# Patient Record
Sex: FEMALE | Race: WHITE | Employment: FULL TIME | URBAN - METROPOLITAN AREA
[De-identification: names, ages, dates, MRNs, and addresses within clinical notes are randomized per-mention and may not be internally consistent; named-entity substitution may affect disease eponyms.]

---

## 2017-02-14 ENCOUNTER — OFFICE VISIT (OUTPATIENT)
Dept: INTERNAL MEDICINE CLINIC | Age: 47
End: 2017-02-14

## 2017-02-14 VITALS
HEIGHT: 63 IN | HEART RATE: 68 BPM | BODY MASS INDEX: 26.75 KG/M2 | OXYGEN SATURATION: 98 % | SYSTOLIC BLOOD PRESSURE: 100 MMHG | WEIGHT: 151 LBS | TEMPERATURE: 98.2 F | DIASTOLIC BLOOD PRESSURE: 70 MMHG

## 2017-02-14 DIAGNOSIS — J02.9 SORE THROAT: ICD-10-CM

## 2017-02-14 DIAGNOSIS — R05.9 COUGH: Primary | ICD-10-CM

## 2017-02-14 LAB
S PYO AG THROAT QL: NEGATIVE
VALID INTERNAL CONTROL?: YES

## 2017-02-14 RX ORDER — HYDROCODONE POLISTIREX AND CHLORPHENIRAMINE POLISTIREX 10; 8 MG/5ML; MG/5ML
1 SUSPENSION, EXTENDED RELEASE ORAL
Qty: 240 ML | Refills: 0 | Status: SHIPPED | OUTPATIENT
Start: 2017-02-14 | End: 2017-03-08 | Stop reason: ALTCHOICE

## 2017-02-14 NOTE — PROGRESS NOTES
Chief Complaint   Patient presents with    Sore Throat    Cough     Complained of the symptoms listed above. Stated that her symptoms started on Sunday afternoon. Tried OTC \"throat spray\" to no avail. 1. Have you been to the ER, urgent care clinic since your last visit? Hospitalized since your last visit? No    2. Have you seen or consulted any other health care providers outside of the [address] [place] [address] since your last visit? Include any pap smears or colon screening.  No

## 2017-02-14 NOTE — PROGRESS NOTES
Chief Complaint   Patient presents with    Sore Throat    Cough       HPI:  Patient is a 48year old  female presents today for an acute visit with sore throat. Sore throat started three days ago and associated is productive cough. However, she denies fever, chills, sneezing, rhinorrhea, nasal congestion, sinus congestion, chest congestion, nausea, vomiting, CP, SOB, or sick contacts. She used chloraseptic throat spray and Nyquil with mild improvement and thus came in today for evaluation. Past Medical History   Diagnosis Date    Allergic rhinitis     ANURADHA (generalised anxiety disorder)     GERD (gastroesophageal reflux disease)      Lower  GERD    IBS (irritable bowel syndrome)     Other ill-defined conditions(799.89)      C5/6 HNP    Thumb pain 6/7/2010     Allergies   Allergen Reactions    Milk Containing Products Diarrhea    Sulfa (Sulfonamide Antibiotics) Rash    Other Medication Diarrhea     Eggs, patient eats, and gets flu shot yearly with no reaction     Current Outpatient Prescriptions   Medication Sig Dispense Refill    chlorpheniramine-HYDROcodone (TUSSIONEX) 10-8 mg/5 mL suspension Take 5 mL by mouth every twelve (12) hours as needed for Cough. Max Daily Amount: 10 mL. 240 mL 0    benzocaine-menthol (CEPACOL SORE THROAT, CALLIE-MEN,) 15-2.6 mg lozg lozenge Take 1 Lozenge by mouth every two (2) hours as needed for Sore throat. 20 Lozenge 0    DYMISTA 137-50 mcg/spray spry       ESTRACE 0.01 % (0.1 mg/gram) vaginal cream       FLUARIX QUAD [id number], PF, syrg injection       HYDROcodone-acetaminophen (NORCO)  mg tablet Take 1 Tab by mouth daily. Max Daily Amount: 1 Tab. 30 Tab 0    bisacodyl (DULCOLAX) 5 mg EC tablet Take 5 mg by mouth daily as needed for Constipation.  desoximetasone (TOPICORT) 0.05 % topical gel Apply  to affected area two (2) times a day.  polyethylene glycol (MIRALAX) 17 gram/dose powder One to two scoops daily as needed for constipation. 289 g 3    HYDROcodone-acetaminophen (NORCO)  mg tablet Take 1 Tab by mouth every four (4) hours as needed for Pain. Max Daily Amount: 6 Tabs. 60 Tab 0    loratadine (CLARITIN) 10 mg tablet Take 10 mg by mouth daily.  ketotifen (ZADITOR) 0.025 % (0.035 %) ophthalmic solution Administer 1 Drop to both eyes two (2) times a day.  esomeprazole (NEXIUM) 20 mg capsule Take 20 mg by mouth nightly.  diphenhydrAMINE (SIMPLY SLEEP) 25 mg tablet Take 25 mg by mouth nightly. ROS:  Pertinent as in HPI      Physical Exam:  Visit Vitals    /70 (BP 1 Location: Left arm, BP Patient Position: Sitting)    Pulse 68    Temp 98.2 °F (36.8 °C) (Oral)    Ht 5' 3\" (1.6 m)    Wt 151 lb (68.5 kg)    LMP 01/14/2017    SpO2 98%    BMI 26.75 kg/m2     General: a & o x 3, afebrile, well-nourished, interacting appropriately, in no acute distress  HEENT: Pharyngeal erythema noted  Respiratory: symmetrical chest expansion, lung sounds clear bilaterally  Cardiovascular: normal S1S2, regular rate and rhythm      Assessment/Plan:    ICD-10-CM ICD-9-CM    1. Cough R05 786.2 chlorpheniramine-HYDROcodone (TUSSIONEX) 10-8 mg/5 mL suspension   2. Sore throat J02.9 462 Rapid strep done today was negative and may be viral URI and Cepacol started today. benzocaine-menthol (CEPACOL SORE THROAT, CALLIE-MEN,) 15-2.6 mg lozg lozenge           Orders Placed This Encounter    chlorpheniramine-HYDROcodone (TUSSIONEX) 10-8 mg/5 mL suspension     Sig: Take 5 mL by mouth every twelve (12) hours as needed for Cough. Max Daily Amount: 10 mL. Dispense:  240 mL     Refill:  0    benzocaine-menthol (CEPACOL SORE THROAT, CALLIE-MEN,) 15-2.6 mg lozg lozenge     Sig: Take 1 Lozenge by mouth every two (2) hours as needed for Sore throat. Dispense:  20 Lozenge     Refill:  0       Additional Notes: Discussed today's diagnosis, treatment plans. Discussed medication indications and side effects.     After Visit Summary: Discussed provided printed patient instructions. Answered questions accordingly.   Follow-up Disposition: As previously scheduled with PCP      Leopold Lager, DO, MPH  Internal Medicine

## 2017-02-14 NOTE — PATIENT INSTRUCTIONS
Cough: Care Instructions  Your Care Instructions  A cough is your body's response to something that bothers your throat or airways. Many things can cause a cough. You might cough because of a cold or the flu, bronchitis, or asthma. Smoking, postnasal drip, allergies, and stomach acid that backs up into your throat also can cause coughs. A cough is a symptom, not a disease. Most coughs stop when the cause, such as a cold, goes away. You can take a few steps at home to cough less and feel better. Follow-up care is a key part of your treatment and safety. Be sure to make and go to all appointments, and call your doctor if you are having problems. It's also a good idea to know your test results and keep a list of the medicines you take. How can you care for yourself at home? · Drink lots of water and other fluids. This helps thin the mucus and soothes a dry or sore throat. Honey or lemon juice in hot water or tea may ease a dry cough. · Take cough medicine as directed by your doctor. · Prop up your head on pillows to help you breathe and ease a dry cough. · Try cough drops to soothe a dry or sore throat. Cough drops don't stop a cough. Medicine-flavored cough drops are no better than candy-flavored drops or hard candy. · Do not smoke. Avoid secondhand smoke. If you need help quitting, talk to your doctor about stop-smoking programs and medicines. These can increase your chances of quitting for good. When should you call for help? Call 911 anytime you think you may need emergency care. For example, call if:  · You have severe trouble breathing. Call your doctor now or seek immediate medical care if:  · You cough up blood. · You have new or worse trouble breathing. · You have a new or higher fever. · You have a new rash.   Watch closely for changes in your health, and be sure to contact your doctor if:  · You cough more deeply or more often, especially if you notice more mucus or a change in the color of your mucus. · You have new symptoms, such as a sore throat, an earache, or sinus pain. · You do not get better as expected. Where can you learn more? Go to http://sary-mk.info/. Enter D279 in the search box to learn more about \"Cough: Care Instructions. \"  Current as of: May 27, 2016  Content Version: 11.1  © 7795-9034 [employer]. Care instructions adapted under license by [employer] (which disclaims liability or warranty for this information). If you have questions about a medical condition or this instruction, always ask your healthcare professional. [address] any warranty or liability for your use of this information.

## 2017-02-14 NOTE — MR AVS SNAPSHOT
Visit Information Date & Time Provider Department Dept. Phone Encounter #  
 2/14/2017  2:30 PM Leopold Lager, DO [facility] [phone] Upcoming Health Maintenance Date Due DTaP/Tdap/Td series (1 - Tdap) 8/5/1989 PAP AKA CERVICAL CYTOLOGY 1/7/2016 BREAST CANCER SCRN MAMMOGRAM 2/18/2016 Allergies as of 2/14/2017  Review Complete On: 2/14/2017 By: Sánchez Velarde LPN Severity Noted Reaction Type Reactions Milk Containing Products  08/04/2016    Diarrhea Sulfa (Sulfonamide Antibiotics)  06/07/2010    Rash Other Medication Low 06/07/2010    Diarrhea Eggs, patient eats, and gets flu shot yearly with no reaction Current Immunizations  Reviewed on 7/28/2016 Name Date Influenza Vaccine 10/7/2016, 9/26/2015 Influenza Vaccine (Quad) PF 9/15/2014 Influenza Vaccine Split 10/10/2012, 11/1/2010 Not reviewed this visit You Were Diagnosed With   
  
 Codes Comments Cough    -  Primary ICD-10-CM: H95 ICD-9-CM: 786.2 Sore throat     ICD-10-CM: J02.9 ICD-9-CM: 368 Vitals BP Pulse Temp Height(growth percentile) Weight(growth percentile) LMP  
 100/70 (BP 1 Location: Left arm, BP Patient Position: Sitting) 68 98.2 °F (36.8 °C) (Oral) 5' 3\" (1.6 m) 151 lb (68.5 kg) 01/14/2017 SpO2 BMI OB Status Smoking Status 98% 26.75 kg/m2 Having regular periods Never Smoker Vitals History BMI and BSA Data Body Mass Index Body Surface Area  
 26.75 kg/m 2 1.74 m 2 Preferred Pharmacy Pharmacy Name Phone [facility], [address] [place] [address] [phone] Your Updated Medication List  
  
   
This list is accurate as of: 2/14/17  3:04 PM.  Always use your most recent med list.  
  
  
  
  
 benzocaine-menthol 15-2.6 mg Lozg lozenge Commonly known as:  CEPACOL SORE THROAT (CALLIE-MEN) Take 1 Lozenge by mouth every two (2) hours as needed for Sore throat. bisacodyl 5 mg EC tablet Commonly known as:  DULCOLAX Take 5 mg by mouth daily as needed for Constipation. chlorpheniramine-HYDROcodone 10-8 mg/5 mL suspension Commonly known as:  Gaile John Take 5 mL by mouth every twelve (12) hours as needed for Cough. Max Daily Amount: 10 mL. desoximetasone 0.05 % topical gel Commonly known as:  TOPICORT Apply  to affected area two (2) times a day. DYMISTA 137-50 mcg/spray [place] Generic drug:  azelastine-fluticasone  
  
 esomeprazole 20 mg capsule Commonly known as:  Mertha Sebastian Take 20 mg by mouth nightly. ESTRACE 0.01 % (0.1 mg/gram) vaginal cream  
Generic drug:  estradiol FLUARIX QUAD [id number] (PF) Syrg injection Generic drug:  influenza vaccine 2016-17 (36mos+)(PF)  
  
 * HYDROcodone-acetaminophen  mg tablet Commonly known as:  Barnet Cuff Take 1 Tab by mouth every four (4) hours as needed for Pain. Max Daily Amount: 6 Tabs. * HYDROcodone-acetaminophen  mg tablet Commonly known as:  Barnet Cuff Take 1 Tab by mouth daily. Max Daily Amount: 1 Tab.  
  
 ketotifen 0.025 % (0.035 %) ophthalmic solution Commonly known as:  ZADITOR Administer 1 Drop to both eyes two (2) times a day. loratadine 10 mg tablet Commonly known as:  Jena Martinet Take 10 mg by mouth daily. polyethylene glycol 17 gram/dose powder Commonly known as:  Rachelle Ervin One to two scoops daily as needed for constipation. SIMPLY SLEEP 25 mg tablet Generic drug:  diphenhydrAMINE Take 25 mg by mouth nightly. * Notice: This list has 2 medication(s) that are the same as other medications prescribed for you. Read the directions carefully, and ask your doctor or other care provider to review them with you. Prescriptions Printed  Refills  
 chlorpheniramine-HYDROcodone (TUSSIONEX) 10-8 mg/5 mL suspension 0  
 Sig: Take 5 mL by mouth every twelve (12) hours as needed for Cough. Max Daily Amount: 10 mL. Class: Print Route: Oral  
  
Prescriptions Sent to Pharmacy Refills  
 benzocaine-menthol (CEPACOL SORE THROAT, CALLIE-MEN,) 15-2.6 mg lozg lozenge 0 Sig: Take 1 Lozenge by mouth every two (2) hours as needed for Sore throat. Class: Normal  
 Pharmacy: [facility], [address] [address] #: [phone] Route: Oral  
  
Patient Instructions Cough: Care Instructions Your Care Instructions A cough is your body's response to something that bothers your throat or airways. Many things can cause a cough. You might cough because of a cold or the flu, bronchitis, or asthma. Smoking, postnasal drip, allergies, and stomach acid that backs up into your throat also can cause coughs. A cough is a symptom, not a disease. Most coughs stop when the cause, such as a cold, goes away. You can take a few steps at home to cough less and feel better. Follow-up care is a key part of your treatment and safety. Be sure to make and go to all appointments, and call your doctor if you are having problems. It's also a good idea to know your test results and keep a list of the medicines you take. How can you care for yourself at home? · Drink lots of water and other fluids. This helps thin the mucus and soothes a dry or sore throat. Honey or lemon juice in hot water or tea may ease a dry cough. · Take cough medicine as directed by your doctor. · Prop up your head on pillows to help you breathe and ease a dry cough. · Try cough drops to soothe a dry or sore throat. Cough drops don't stop a cough. Medicine-flavored cough drops are no better than candy-flavored drops or hard candy. · Do not smoke. Avoid secondhand smoke. If you need help quitting, talk to your doctor about stop-smoking programs and medicines. These can increase your chances of quitting for good. When should you call for help? Call 911 anytime you think you may need emergency care. For example, call if: 
· You have severe trouble breathing. Call your doctor now or seek immediate medical care if: 
· You cough up blood. · You have new or worse trouble breathing. · You have a new or higher fever. · You have a new rash. Watch closely for changes in your health, and be sure to contact your doctor if: 
· You cough more deeply or more often, especially if you notice more mucus or a change in the color of your mucus. · You have new symptoms, such as a sore throat, an earache, or sinus pain. · You do not get better as expected. Where can you learn more? Go to http://sary-mk.info/. Enter D279 in the search box to learn more about \"Cough: Care Instructions. \" Current as of: May 27, 2016 Content Version: 11.1 © 1190-0105 [employer]. Care instructions adapted under license by [employer] (which disclaims liability or warranty for this information). If you have questions about a medical condition or this instruction, always ask your healthcare professional. [place] [address] any warranty or liability for your use of this information. Introducing [facility]! Dear Dale Sarkar: Thank you for requesting a [employer] account. Our records indicate that you already have an active [employer] account. You can access your account anytime at [email or web address] Did you know that you can access your hospital and ER discharge instructions at any time in [employer]? You can also review all of your test results from your hospital stay or ER visit. Additional Information If you have questions, please visit the Frequently Asked Questions section of the [employer] website at [email or web address] Remember, [employer] is NOT to be used for urgent needs. For medical emergencies, dial 911. Now available from your iPhone and Android! Please provide this summary of care documentation to your next provider. Your primary care clinician is listed as Juan Villatoro. If you have any questions after today's visit, please call [phone].

## 2017-03-08 ENCOUNTER — OFFICE VISIT (OUTPATIENT)
Dept: FAMILY MEDICINE CLINIC | Age: 47
End: 2017-03-08

## 2017-03-08 VITALS
HEIGHT: 63 IN | RESPIRATION RATE: 16 BRPM | SYSTOLIC BLOOD PRESSURE: 114 MMHG | OXYGEN SATURATION: 99 % | DIASTOLIC BLOOD PRESSURE: 80 MMHG | TEMPERATURE: 97.7 F | HEART RATE: 58 BPM | WEIGHT: 157 LBS | BODY MASS INDEX: 27.82 KG/M2

## 2017-03-08 DIAGNOSIS — Z00.00 WELL WOMAN EXAM (NO GYNECOLOGICAL EXAM): Primary | ICD-10-CM

## 2017-03-08 DIAGNOSIS — Z13.6 SCREENING FOR ISCHEMIC HEART DISEASE: ICD-10-CM

## 2017-03-08 DIAGNOSIS — S46.011A ROTATOR CUFF STRAIN, RIGHT, INITIAL ENCOUNTER: ICD-10-CM

## 2017-03-08 RX ORDER — MELOXICAM 15 MG/1
TABLET ORAL
Qty: 30 TAB | Refills: 2 | Status: SHIPPED | OUTPATIENT
Start: 2017-03-08 | End: 2017-04-21 | Stop reason: ALTCHOICE

## 2017-03-08 NOTE — PROGRESS NOTES
Subjective:   48 y.o. female for Well Woman Check. Her gyne and breast care is done elsewhere by her Ob-Gyne physician last was [id number] normal.  MAMM scheduled. Lost 10# since January - weight watchers.     Patient Active Problem List   Diagnosis Code    Foot pain M79.673    Thumb pain M79.646    Mass of axilla R22.30    Seasonal allergic rhinitis J30.2    GERD (gastroesophageal reflux disease) K21.9    Cervical disc disorder with radiculopathy M50.10    Cervical disc herniation M50.20    S/P cervical discectomy Z98.890    Bulging lumbar disc M51.26    Spondylosis of lumbar region without myelopathy or radiculopathy M47.816    Lumbar neuritis M54.16    DDD (degenerative disc disease), lumbar M51.36    Displacement of lumbar intervertebral disc without myelopathy M51.26    HNP (herniated nucleus pulposus), lumbar M51.26    Status post lumbar laminectomy Z98.890    Cough R05    Sore throat J02.9     Patient Active Problem List    Diagnosis Date Noted    Cough 02/14/2017    Sore throat 02/14/2017    Status post lumbar laminectomy 08/19/2016    HNP (herniated nucleus pulposus), lumbar 07/01/2016    Displacement of lumbar intervertebral disc without myelopathy 06/03/2016    Bulging lumbar disc 04/07/2016    Spondylosis of lumbar region without myelopathy or radiculopathy 04/07/2016    Lumbar neuritis 04/07/2016    DDD (degenerative disc disease), lumbar 04/07/2016    Cervical disc herniation 11/14/2012    S/P cervical discectomy 11/14/2012    Cervical disc disorder with radiculopathy 09/21/2012    Seasonal allergic rhinitis 07/17/2012    GERD (gastroesophageal reflux disease) 07/17/2012    Mass of axilla 08/27/2010    Foot pain 06/07/2010    Thumb pain 06/07/2010     Current Outpatient Prescriptions   Medication Sig Dispense Refill    DYMISTA 137-50 mcg/spray spry       ESTRACE 0.01 % (0.1 mg/gram) vaginal cream       ketotifen (ZADITOR) 0.025 % (0.035 %) ophthalmic solution Administer 1 Drop to both eyes two (2) times a day.  esomeprazole (NEXIUM) 20 mg capsule Take 20 mg by mouth nightly.  diphenhydrAMINE (SIMPLY SLEEP) 25 mg tablet Take 25 mg by mouth nightly.  chlorpheniramine-HYDROcodone (TUSSIONEX) 10-8 mg/5 mL suspension Take 5 mL by mouth every twelve (12) hours as needed for Cough. Max Daily Amount: 10 mL. 240 mL 0    benzocaine-menthol (CEPACOL SORE THROAT, CALLIE-MEN,) 15-2.6 mg lozg lozenge Take 1 Lozenge by mouth every two (2) hours as needed for Sore throat. [address] QUAD [id number], PF, syrg injection       HYDROcodone-acetaminophen (NORCO)  mg tablet Take 1 Tab by mouth daily. Max Daily Amount: 1 Tab. 30 Tab 0    bisacodyl (DULCOLAX) 5 mg EC tablet Take 5 mg by mouth daily as needed for Constipation.  desoximetasone (TOPICORT) 0.05 % topical gel Apply  to affected area two (2) times a day.  polyethylene glycol (MIRALAX) 17 gram/dose powder One to two scoops daily as needed for constipation. 289 g 3    HYDROcodone-acetaminophen (NORCO)  mg tablet Take 1 Tab by mouth every four (4) hours as needed for Pain. Max Daily Amount: 6 Tabs. 60 Tab 0    loratadine (CLARITIN) 10 mg tablet Take 10 mg by mouth daily.        Allergies   Allergen Reactions    Milk Containing Products Diarrhea    Sulfa (Sulfonamide Antibiotics) Rash    Other Medication Diarrhea     Eggs, patient eats, and gets flu shot yearly with no reaction     Past Medical History:   Diagnosis Date    Allergic rhinitis     ANURADHA (generalised anxiety disorder)     GERD (gastroesophageal reflux disease)     Lower  GERD    IBS (irritable bowel syndrome)     Other ill-defined conditions(799.05)     C5/6 HNP    Thumb pain 6/7/2010     Past Surgical History:   Procedure Laterality Date    HX GYN      left tube removal ectopic pregnancy    HX HEENT  9/2011    Basal cell cancer lesion removed from nose    HX LYMPHADENECTOMY Right 8/25/16    [address]    HX OTHER SURGICAL  2013    Spinal Fusion    HX TONSILLECTOMY  2/2007     Family History   Problem Relation Age of Onset    Cancer Mother      breast cancer    Diabetes Father     High Cholesterol Father     Cancer Maternal Aunt      breast cancer    Arthritis-osteo Maternal Grandmother      arthritis      Social History   Substance Use Topics    Smoking status: Never Smoker    Smokeless tobacco: Never Used    Alcohol use Yes      Comment: few times per week        Lab Results   Component Value Date/Time    Sodium 142 07/26/2016 09:53 AM    Potassium 4.5 07/26/2016 09:53 AM    Chloride 105 07/26/2016 09:53 AM    CO2 28 07/26/2016 09:53 AM    Anion gap 9 07/26/2016 09:53 AM    Glucose 81 07/26/2016 09:53 AM    BUN 23 07/26/2016 09:53 AM    Creatinine 0.74 07/26/2016 09:53 AM    BUN/Creatinine ratio 31 07/26/2016 09:53 AM    GFR est AA >60 07/26/2016 09:53 AM    GFR est non-AA >60 07/26/2016 09:53 AM    Calcium 9.0 07/26/2016 09:53 AM    Bilirubin, total 0.4 07/26/2016 09:53 AM    AST (SGOT) 8 07/26/2016 09:53 AM    Alk. phosphatase 61 07/26/2016 09:53 AM    Protein, total 7.4 07/26/2016 09:53 AM    Albumin 4.2 07/26/2016 09:53 AM    Globulin 3.2 07/26/2016 09:53 AM    A-G Ratio 1.3 07/26/2016 09:53 AM    ALT (SGPT) 22 07/26/2016 09:53 AM     Lab Results   Component Value Date/Time    Cholesterol, total 169 01/19/2015 09:13 AM    HDL Cholesterol 57 01/19/2015 09:13 AM    LDL, calculated 99 01/19/2015 09:13 AM    VLDL, calculated 13 01/19/2015 09:13 AM    Triglyceride 63 01/19/2015 09:13 AM          Specific concerns today: see other note - right shoulder after volleyball. Review of Systems  A comprehensive review of systems was negative except for that written in the HPI.     Objective:     Visit Vitals    /80 (BP 1 Location: Left arm, BP Patient Position: Sitting)    Pulse (!) 58    Temp 97.7 °F (36.5 °C) (Oral)    Resp 16    Ht 5' 3\" (1.6 m)    Wt 157 lb (71.2 kg)    [facility]  SpO2 99%    BMI 27.81 kg/m2     GEN: Appears stated age in NAD. HEENT: Conjunctiva/lids WNL. External canals/nares WNL. Tongue midline. PERRL, EOMI. Hearing intact. NECK: Trachea midline. Supple, Full ROM. No thyroid masses. CARDIAC: RRR. S1S2. No Murmur. No peripheral edema. LUNGS: CTAB w/ normal effort. ABD: Soft, NT. No HSM. MS: No clubbing/cyanosis. Steady gait. Strength +5/5 all extremities with full ROM and good tone. NEURO: Sensation intact light touch. Good coordination. No focal deficits. SKIN: Warm/dry w/o rash. PSYCH: A+O x3. Appropriate judgment and insight. Assessment/Plan:     continue present plan, routine labs ordered    ICD-10-CM ICD-9-CM    1. Well woman exam (no gynecological exam) Z00.00 V70.0     [V70.0]   2. Rotator cuff strain, right, initial encounter S46.011A 840.4 meloxicam (MOBIC) 15 mg tablet   3. Screening for ischemic heart disease Z13.6 V81.0 LIPID PANEL      METABOLIC PANEL, COMPREHENSIVE      Will get labs and notify result mychart. See other note E/M.

## 2017-03-08 NOTE — MR AVS SNAPSHOT
Visit Information Date & Time Provider Department Dept. Phone Encounter #  
 3/8/2017 11:20 AM Brad Powers, [address]  [id number] Follow-up Instructions Return in about 6 weeks (around 4/19/2017), or if symptoms worsen or fail to improve, for shoulder. Upcoming Health Maintenance Date Due DTaP/Tdap/Td series (1 - Tdap) 8/5/1989 PAP AKA CERVICAL CYTOLOGY 1/7/2016 BREAST CANCER SCRN MAMMOGRAM 2/18/2016 Allergies as of 3/8/2017  Review Complete On: 3/8/2017 By: Brad Powers DO Severity Noted Reaction Type Reactions Milk Containing Products  08/04/2016    Diarrhea Sulfa (Sulfonamide Antibiotics)  06/07/2010    Rash Other Medication Low 06/07/2010    Diarrhea Eggs, patient eats, and gets flu shot yearly with no reaction Current Immunizations  Reviewed on 7/28/2016 Name Date Influenza Vaccine 10/7/2016, 9/26/2015 Influenza Vaccine (Quad) PF 9/15/2014 Influenza Vaccine Split 10/10/2012, 11/1/2010 Not reviewed this visit You Were Diagnosed With   
  
 Codes Comments Well woman exam (no gynecological exam)    -  Primary ICD-10-CM: Z00.00 ICD-9-CM: V70.0 [V70.0] Rotator cuff strain, right, initial encounter     ICD-10-CM: S46.011A ICD-9-CM: 840.4 Screening for ischemic heart disease     ICD-10-CM: Z13.6 ICD-9-CM: V81.0 Vitals BP Pulse Temp Resp Height(growth percentile) Weight(growth percentile) 114/80 (BP 1 Location: Left arm, BP Patient Position: Sitting) (!) 58 97.7 °F (36.5 °C) (Oral) 16 5' 3\" (1.6 m) 157 lb (71.2 kg) LMP SpO2 BMI OB Status Smoking Status 01/14/2017 99% 27.81 kg/m2 Having regular periods Never Smoker Vitals History BMI and BSA Data Body Mass Index Body Surface Area  
 27.81 kg/m 2 1.78 m 2 Preferred Pharmacy Pharmacy Name Phone  [facility] [address], [address] [place] [address] Road [phone] Your Updated Medication List  
  
   
This list is accurate as of: 3/8/17 11:42 AM.  Always use your most recent med list.  
  
  
  
  
 benzocaine-menthol 15-2.6 mg Lozg lozenge Commonly known as:  CEPACOL SORE THROAT (CALLIE-MEN) Take 1 Lozenge by mouth every two (2) hours as needed for Sore throat. bisacodyl 5 mg EC tablet Commonly known as:  DULCOLAX Take 5 mg by mouth daily as needed for Constipation. chlorpheniramine-HYDROcodone 10-8 mg/5 mL suspension Commonly known as:  Evalina Grime Take 5 mL by mouth every twelve (12) hours as needed for Cough. Max Daily Amount: 10 mL. desoximetasone 0.05 % topical gel Commonly known as:  TOPICORT Apply  to affected area two (2) times a day. DYMISTA 137-50 mcg/spray [place] Generic drug:  azelastine-fluticasone  
  
 esomeprazole 20 mg capsule Commonly known as:  Kavita Lazar Take 20 mg by mouth nightly. ESTRACE 0.01 % (0.1 mg/gram) vaginal cream  
Generic drug:  estradiol FLUARIX QUAD [id number] (PF) Syrg injection Generic drug:  influenza vaccine 2016-17 (36mos+)(PF)  
  
 ketotifen 0.025 % (0.035 %) ophthalmic solution Commonly known as:  ZADITOR Administer 1 Drop to both eyes two (2) times a day. loratadine 10 mg tablet Commonly known as:  Veria Crew Take 10 mg by mouth daily. meloxicam 15 mg tablet Commonly known as:  MOBIC Take 1 tab daily or 1/2 tab twice daily as needed pain with food. polyethylene glycol 17 gram/dose powder Commonly known as:  Jessie Zita One to two scoops daily as needed for constipation. SIMPLY SLEEP 25 mg tablet Generic drug:  diphenhydrAMINE Take 25 mg by mouth nightly. Prescriptions Sent to Pharmacy Refills  
 meloxicam (MOBIC) 15 mg tablet 2 Sig: Take 1 tab daily or 1/2 tab twice daily as needed pain with food. Class: Normal  
 Pharmacy: [place] [address] [address] [facility], [address] [place] [address] Ph #: [phone] Follow-up Instructions Return in about 6 weeks (around 4/19/2017), or if symptoms worsen or fail to improve, for shoulder. To-Do List   
 03/08/2017 Lab:  LIPID PANEL   
  
 03/08/2017 Lab:  METABOLIC PANEL, COMPREHENSIVE Patient Instructions   
Rosa Search YouTube for my channel: 
 
Dr. Anny Langford Rotator cuff Rotator Cuff: Exercises Your Care Instructions Here are some examples of typical rehabilitation exercises for your condition. Start each exercise slowly. Ease off the exercise if you start to have pain. Your doctor or physical therapist will tell you when you can start these exercises and which ones will work best for you. How to do the exercises Arm raise to the side thumb down w/ band under foot Note: During this strengthening exercise, your arm should stay about 30 degrees to the front of your side. 1. Slowly raise your injured arm to the side, with your thumb facing up. Raise your arm 60 degrees at the most (shoulder level is 90 degrees). 2. Hold the position for 3 to 5 seconds. Then lower your arm back to your side. If you need to, bring your \"good\" arm across your body and place it under the elbow as you lower your injured arm. Use your good arm to keep your injured arm from dropping down too fast. 
3. Repeat 8 to 12 times. 4. When you first start out, don't hold any extra weight in your hand. As you get stronger, you may use a 1-pound to 2-pound dumbbell or a small can of food. 1.  
Internal rotator strengthening exercise 1. Start by tying a piece of elastic exercise material to a doorknob. You can use surgical tubing or Thera-Band. 2. Stand or sit with your shoulder relaxed and your elbow bent 90 degrees. Your upper arm should rest comfortably against your side. Squeeze a rolled towel between your elbow and your body for comfort. This will help keep your arm at your side. 3. Hold one end of the elastic band in the hand of the painful arm. 4. Slowly rotate your forearm toward your body until it touches your belly. Slowly move it back to where you started. 5. Keep your elbow and upper arm firmly tucked against the towel roll or at your side. 6. Repeat 8 to 12 times. External rotator strengthening exercise 1. Start by tying a piece of elastic exercise material to a doorknob. You can use surgical tubing or Thera-Band. (You may also hold one end of the band in each hand.) 2. Stand or sit with your shoulder relaxed and your elbow bent 90 degrees. Your upper arm should rest comfortably against your side. Squeeze a rolled towel between your elbow and your body for comfort. This will help keep your arm at your side. 3. Hold one end of the elastic band with the hand of the painful arm. 4. Start with your forearm across your belly. Slowly rotate the forearm out away from your body. Keep your elbow and upper arm tucked against the towel roll or the side of your body until you begin to feel tightness in your shoulder. Slowly move your arm back to where you started. 5. Repeat 8 to 12 times. Follow-up care is a key part of your treatment and safety. Be sure to make and go to all appointments, and call your doctor if you are having problems. It's also a good idea to know your test results and keep a list of the medicines you take. Where can you learn more? Go to Schvey.be Enter Jone Dalal in the search box to learn more about \"Rotator Cuff: Exercises. \"  
© 8955-8172 Healthwise, Incorporated. Care instructions adapted under license by [facility] [employer] (which disclaims liability or warranty for this information).  This care instruction is for use with your licensed healthcare professional. If you have questions about a medical condition or this instruction, always ask your healthcare professional. [employer] disclaims any warranty or liability for your use of this information. Content Version: 32.1.831601; Current as of: June 4, 2014 Well Visit, Ages 25 to 48: Care Instructions Your Care Instructions Physical exams can help you stay healthy. Your doctor has checked your overall health and may have suggested ways to take good care of yourself. He or she also may have recommended tests. At home, you can help prevent illness with healthy eating, regular exercise, and other steps. Follow-up care is a key part of your treatment and safety. Be sure to make and go to all appointments, and call your doctor if you are having problems. It's also a good idea to know your test results and keep a list of the medicines you take. How can you care for yourself at home? · Reach and stay at a healthy weight. This will lower your risk for many problems, such as obesity, diabetes, heart disease, and high blood pressure. · Get at least 30 minutes of physical activity on most days of the week. Walking is a good choice. You also may want to do other activities, such as running, swimming, cycling, or playing tennis or team sports. Discuss any changes in your exercise program with your doctor. · Do not smoke or allow others to smoke around you. If you need help quitting, talk to your doctor about stop-smoking programs and medicines. These can increase your chances of quitting for good. · Talk to your doctor about whether you have any risk factors for sexually transmitted infections (STIs). Having one sex partner (who does not have STIs and does not have sex with anyone else) is a good way to avoid these infections. · Use birth control if you do not want to have children at this time. Talk with your doctor about the choices available and what might be best for you. · Protect your skin from too much sun.  When you're outdoors from 10 a.m. to 4 p.m., stay in the shade or cover up with clothing and a hat with a wide brim. Wear sunglasses that block UV rays. Even when it's cloudy, put broad-spectrum sunscreen (SPF 30 or higher) on any exposed skin. · See a dentist one or two times a year for checkups and to have your teeth cleaned. · Wear a seat belt in the car. · Drink alcohol in moderation, if at all. That means no more than 2 drinks a day for men and 1 drink a day for women. Follow your doctor's advice about when to have certain tests. These tests can spot problems early. For everyone · Cholesterol. Have the fat (cholesterol) in your blood tested after age 21. Your doctor will tell you how often to have this done based on your age, family history, or other things that can increase your risk for heart disease. · Blood pressure. Have your blood pressure checked during a routine doctor visit. Your doctor will tell you how often to check your blood pressure based on your age, your blood pressure results, and other factors. · Vision. Talk with your doctor about how often to have a glaucoma test. 
· Diabetes. Ask your doctor whether you should have tests for diabetes. · Colon cancer. Have a test for colon cancer at age 48. You may have one of several tests. If you are younger than 48, you may need a test earlier if you have any risk factors. Risk factors include whether you already had a precancerous polyp removed from your colon or whether your parent, brother, sister, or child has had colon cancer. For women · Breast exam and mammogram. Talk to your doctor about when you should have a clinical breast exam and a mammogram. Medical experts differ on whether and how often women under 50 should have these tests. Your doctor can help you decide what is right for you. · Pap test and pelvic exam. Begin Pap tests at age 24. A Pap test is the best way to find cervical cancer.  The test often is part of a pelvic exam. Ask how often to have this test. 
 · Tests for sexually transmitted infections (STIs). Ask whether you should have tests for STIs. You may be at risk if you have sex with more than one person, especially if your partners do not wear condoms. For men · Tests for sexually transmitted infections (STIs). Ask whether you should have tests for STIs. You may be at risk if you have sex with more than one person, especially if you do not wear a condom. · Testicular cancer exam. Ask your doctor whether you should check your testicles regularly. · Prostate exam. Talk to your doctor about whether you should have a blood test (called a PSA test) for prostate cancer. Experts differ on whether and when men should have this test. Some experts suggest it if you are older than 39 and are -American or have a father or brother who got prostate cancer when he was younger than 72. When should you call for help? Watch closely for changes in your health, and be sure to contact your doctor if you have any problems or symptoms that concern you. Where can you learn more? Go to http://sary-mk.info/. Enter P072 in the search box to learn more about \"Well Visit, Ages 25 to 48: Care Instructions. \" Current as of: July 19, 2016 Content Version: 11.1 © 2084-2705 [employer], Incorporated. Care instructions adapted under license by [employer] (which disclaims liability or warranty for this information). If you have questions about a medical condition or this instruction, always ask your healthcare professional. [place] [address] any warranty or liability for your use of this information. Introducing [facility]! Dear Jesse Benitez: Thank you for requesting a [employer] [facility] [employer] account. Our records indicate that you already have an active [employer] account. You can access your account anytime at [email or web address] Did you know that you can access your hospital and ER discharge instructions at any time in [employer]? You can also review all of your test results from your hospital stay or ER visit. Additional Information If you have questions, please visit the Frequently Asked Questions section of the [employer] website at [email or web address] Remember, [employer] is NOT to be used for urgent needs. For medical emergencies, dial 911. Now available from your iPhone and Android! Please provide this summary of care documentation to your next provider. Your primary care clinician is listed as Juan Villatoro. If you have any questions after today's visit, please call [phone].

## 2017-03-08 NOTE — PATIENT INSTRUCTIONS
Jack.ronnell    Search YouTube for my channel:    Dr. Lauren Mijares cuff      Rotator Cuff: Exercises  Your Care Instructions  Here are some examples of typical rehabilitation exercises for your condition. Start each exercise slowly. Ease off the exercise if you start to have pain. Your doctor or physical therapist will tell you when you can start these exercises and which ones will work best for you. How to do the exercises    Arm raise to the side thumb down w/ band under foot    Note: During this strengthening exercise, your arm should stay about 30 degrees to the front of your side. 1. Slowly raise your injured arm to the side, with your thumb facing up. Raise your arm 60 degrees at the most (shoulder level is 90 degrees). 2. Hold the position for 3 to 5 seconds. Then lower your arm back to your side. If you need to, bring your \"good\" arm across your body and place it under the elbow as you lower your injured arm. Use your good arm to keep your injured arm from dropping down too fast.  3. Repeat 8 to 12 times. 4. When you first start out, don't hold any extra weight in your hand. As you get stronger, you may use a 1-pound to 2-pound dumbbell or a small can of food. 1.   Internal rotator strengthening exercise    1. Start by tying a piece of elastic exercise material to a doorknob. You can use surgical tubing or Thera-Band. 2. Stand or sit with your shoulder relaxed and your elbow bent 90 degrees. Your upper arm should rest comfortably against your side. Squeeze a rolled towel between your elbow and your body for comfort. This will help keep your arm at your side. 3. Hold one end of the elastic band in the hand of the painful arm. 4. Slowly rotate your forearm toward your body until it touches your belly. Slowly move it back to where you started. 5. Keep your elbow and upper arm firmly tucked against the towel roll or at your side.   6. Repeat 8 to 12 times.  External rotator strengthening exercise    1. Start by tying a piece of elastic exercise material to a doorknob. You can use surgical tubing or Thera-Band. (You may also hold one end of the band in each hand.)  2. Stand or sit with your shoulder relaxed and your elbow bent 90 degrees. Your upper arm should rest comfortably against your side. Squeeze a rolled towel between your elbow and your body for comfort. This will help keep your arm at your side. 3. Hold one end of the elastic band with the hand of the painful arm. 4. Start with your forearm across your belly. Slowly rotate the forearm out away from your body. Keep your elbow and upper arm tucked against the towel roll or the side of your body until you begin to feel tightness in your shoulder. Slowly move your arm back to where you started. 5. Repeat 8 to 12 times. Follow-up care is a key part of your treatment and safety. Be sure to make and go to all appointments, and call your doctor if you are having problems. It's also a good idea to know your test results and keep a list of the medicines you take. Where can you learn more? Go to Survival Media.be  Enter J005 in the search box to learn more about \"Rotator Cuff: Exercises. \"   © 1394-8949 Healthwise, Incorporated. Care instructions adapted under license by [employer] (which disclaims liability or warranty for this information). This care instruction is for use with your licensed healthcare professional. If you have questions about a medical condition or this instruction, always ask your healthcare professional. [place] [address] any warranty or liability for your use of this information. Content Version: 99.0.156886; Current as of: June 4, 2014           Well Visit, Ages 25 to 48: Care Instructions  Your Care Instructions  Physical exams can help you stay healthy.  Your doctor has checked your overall health and may have suggested ways to take good care of yourself. He or she also may have recommended tests. At home, you can help prevent illness with healthy eating, regular exercise, and other steps. Follow-up care is a key part of your treatment and safety. Be sure to make and go to all appointments, and call your doctor if you are having problems. It's also a good idea to know your test results and keep a list of the medicines you take. How can you care for yourself at home? · Reach and stay at a healthy weight. This will lower your risk for many problems, such as obesity, diabetes, heart disease, and high blood pressure. · Get at least 30 minutes of physical activity on most days of the week. Walking is a good choice. You also may want to do other activities, such as running, swimming, cycling, or playing tennis or team sports. Discuss any changes in your exercise program with your doctor. · Do not smoke or allow others to smoke around you. If you need help quitting, talk to your doctor about stop-smoking programs and medicines. These can increase your chances of quitting for good. · Talk to your doctor about whether you have any risk factors for sexually transmitted infections (STIs). Having one sex partner (who does not have STIs and does not have sex with anyone else) is a good way to avoid these infections. · Use birth control if you do not want to have children at this time. Talk with your doctor about the choices available and what might be best for you. · Protect your skin from too much sun. When you're outdoors from 10 a.m. to 4 p.m., stay in the shade or cover up with clothing and a hat with a wide brim. Wear sunglasses that block UV rays. Even when it's cloudy, put broad-spectrum sunscreen (SPF 30 or higher) on any exposed skin. · See a dentist one or two times a year for checkups and to have your teeth cleaned. · Wear a seat belt in the car. · Drink alcohol in moderation, if at all.  That means no more than 2 drinks a day for men and 1 drink a day for women. Follow your doctor's advice about when to have certain tests. These tests can spot problems early. For everyone  · Cholesterol. Have the fat (cholesterol) in your blood tested after age 21. Your doctor will tell you how often to have this done based on your age, family history, or other things that can increase your risk for heart disease. · Blood pressure. Have your blood pressure checked during a routine doctor visit. Your doctor will tell you how often to check your blood pressure based on your age, your blood pressure results, and other factors. · Vision. Talk with your doctor about how often to have a glaucoma test.  · Diabetes. Ask your doctor whether you should have tests for diabetes. · Colon cancer. Have a test for colon cancer at age 48. You may have one of several tests. If you are younger than 48, you may need a test earlier if you have any risk factors. Risk factors include whether you already had a precancerous polyp removed from your colon or whether your parent, brother, sister, or child has had colon cancer. For women  · Breast exam and mammogram. Talk to your doctor about when you should have a clinical breast exam and a mammogram. Medical experts differ on whether and how often women under 50 should have these tests. Your doctor can help you decide what is right for you. · Pap test and pelvic exam. Begin Pap tests at age 24. A Pap test is the best way to find cervical cancer. The test often is part of a pelvic exam. Ask how often to have this test.  · Tests for sexually transmitted infections (STIs). Ask whether you should have tests for STIs. You may be at risk if you have sex with more than one person, especially if your partners do not wear condoms. For men  · Tests for sexually transmitted infections (STIs). Ask whether you should have tests for STIs. You may be at risk if you have sex with more than one person, especially if you do not wear a condom.   · Testicular cancer exam. Ask your doctor whether you should check your testicles regularly. · Prostate exam. Talk to your doctor about whether you should have a blood test (called a PSA test) for prostate cancer. Experts differ on whether and when men should have this test. Some experts suggest it if you are older than 39 and are -American or have a father or brother who got prostate cancer when he was younger than 72. When should you call for help? Watch closely for changes in your health, and be sure to contact your doctor if you have any problems or symptoms that concern you. Where can you learn more? Go to http://sary-mk.info/. Enter P072 in the search box to learn more about \"Well Visit, Ages 25 to 48: Care Instructions. \"  Current as of: July 19, 2016  Content Version: 11.1  © 0284-9675 [employer]. Care instructions adapted under license by [employer] (which disclaims liability or warranty for this information). If you have questions about a medical condition or this instruction, always ask your healthcare professional. [place] [address] any warranty or liability for your use of this information.

## 2017-03-08 NOTE — PROGRESS NOTES
HISTORY OF PRESENT ILLNESS    Abhilash Toussaint is a 48y.o. year old female here today for:  Shoulder pain    Patients symptoms have been present for 1 weeks. Pain level 3/10 right shoudler, It has worsened with volleyball hitting. Patient has tried:  Rest and OTC. It is described as pain in shoulder after playing VB last weekend. P[ulling when lifting overhead. Current Outpatient Prescriptions   Medication Sig Dispense Refill    DYMISTA 137-50 mcg/spray spry       ESTRACE 0.01 % (0.1 mg/gram) vaginal cream       ketotifen (ZADITOR) 0.025 % (0.035 %) ophthalmic solution Administer 1 Drop to both eyes two (2) times a day.  esomeprazole (NEXIUM) 20 mg capsule Take 20 mg by mouth nightly.  diphenhydrAMINE (SIMPLY SLEEP) 25 mg tablet Take 25 mg by mouth nightly.  chlorpheniramine-HYDROcodone (TUSSIONEX) 10-8 mg/5 mL suspension Take 5 mL by mouth every twelve (12) hours as needed for Cough. Max Daily Amount: 10 mL. 240 mL 0    benzocaine-menthol (CEPACOL SORE THROAT, CALLIE-MEN,) 15-2.6 mg lozg lozenge Take 1 Lozenge by mouth every two (2) hours as needed for Sore throat. [address] QUAD [id number], PF, syrg injection       HYDROcodone-acetaminophen (NORCO)  mg tablet Take 1 Tab by mouth daily. Max Daily Amount: 1 Tab. 30 Tab 0    bisacodyl (DULCOLAX) 5 mg EC tablet Take 5 mg by mouth daily as needed for Constipation.  desoximetasone (TOPICORT) 0.05 % topical gel Apply  to affected area two (2) times a day.  polyethylene glycol (MIRALAX) 17 gram/dose powder One to two scoops daily as needed for constipation. 289 g 3    HYDROcodone-acetaminophen (NORCO)  mg tablet Take 1 Tab by mouth every four (4) hours as needed for Pain. Max Daily Amount: 6 Tabs. 60 Tab 0    loratadine (CLARITIN) 10 mg tablet Take 10 mg by mouth daily.        Past Medical History:   Diagnosis Date    Allergic rhinitis     ANURADHA (generalised anxiety disorder)     GERD (gastroesophageal reflux disease) Lower  GERD    IBS (irritable bowel syndrome)     Other ill-defined conditions(799.89)     C5/6 HNP    Thumb pain 6/7/2010     Social History     Social History    Marital status:      Spouse name: N/A    Number of children: N/A    Years of education: N/A     Social History Main Topics    Smoking status: Never Smoker    Smokeless tobacco: Never Used    Alcohol use Yes      Comment: few times per week    Drug use: No    Sexual activity: Yes     Partners: Male      Comment: pregnancy test negative     Other Topics Concern    None     Social History Narrative     Family History   Problem Relation Age of Onset    Cancer Mother      breast cancer    Diabetes Father     High Cholesterol Father     Cancer Maternal Aunt      breast cancer    Arthritis-osteo Maternal Grandmother      arthritis        ROS:  No numb, tingle, swell. Objective:  Visit Vitals    /80 (BP 1 Location: Left arm, BP Patient Position: Sitting)    Pulse (!) 58    Temp 97.7 °F (36.5 °C) (Oral)    Resp 16    Ht 5' 3\" (1.6 m)    Wt 157 lb (71.2 kg)    LMP 01/14/2017    SpO2 99%    BMI 27.81 kg/m2       GEN:  Appears stated age in NAD. HEAD:  Normocephalic, Atraumatic. NEURO:  Sensation intact light touch B/L upper extremities. right hand dominant. M/S:  Shoulder ROM Normal  bilaterally. Spurling's negative bilaterally  right Shoulder:  Empty can negative External rotation negative. Internal rotation negative. [place] negative. SLAP negative. Load and Shift +1 Anterior, 1 Posterior. Strength +5/5 bilaterally upper extremities. Crossover test negative. Negative atrophy bilaterally. Negative TTP at [facility] joint. Apprehension test negative. Baird-Sony Test positive. Neer Test negative. Yergason's test negative. Speed's test negative. EXT no Clubbing/cyanosis. no edema. SKIN: Warm, dry w/o rash. Assessment/Plan:     ICD-10-CM ICD-9-CM    1.  Well woman exam (no gynecological exam) Z00.00 V70.0     [V70.0]   2. Rotator cuff strain, right, initial encounter S46.011A 840.4 meloxicam (MOBIC) 15 mg tablet   3. Screening for ischemic heart disease Z13.6 V81.0 LIPID PANEL      METABOLIC PANEL, COMPREHENSIVE     Will start HEP and mobic PRN and RTC not improved 4 weeks. Patient verbalizes understanding of evaluation and plan. Will notify labs mychart. See other note CPE.

## 2017-03-08 NOTE — PROGRESS NOTES
Patient is currently not taking the following medications and wants them removed from their list:    Hydrocodone, benzocaine,miralax, dulcolax, tipicort, fluarix, claritan    Learning Assessment (baseline): complete  Depression Screening: complete      1. Have you been to the ER, urgent care clinic since your last visit? Hospitalized since your last visit? No    2. Have you seen or consulted any other health care providers outside of the [address] [place] [address] since your last visit? Include any pap smears or colon screening.  No      Patient is due for the following immunizations:    Influenza: completed

## 2017-03-13 ENCOUNTER — HOSPITAL ENCOUNTER (OUTPATIENT)
Dept: LAB | Age: 47
Discharge: HOME | End: 2017-03-13
Payer: COMMERCIAL

## 2017-03-13 ENCOUNTER — HOSPITAL ENCOUNTER (OUTPATIENT)
Dept: MAMMOGRAPHY | Age: 47
Discharge: HOME | End: 2017-03-13
Attending: NURSE PRACTITIONER
Payer: COMMERCIAL

## 2017-03-13 DIAGNOSIS — Z13.6 SCREENING FOR ISCHEMIC HEART DISEASE: ICD-10-CM

## 2017-03-13 DIAGNOSIS — Z12.31 VISIT FOR SCREENING MAMMOGRAM: ICD-10-CM

## 2017-03-13 LAB
ALBUMIN SERPL BCP-MCNC: 3.9 G/DL (ref 3.4–5)
ALBUMIN/GLOB SERPL: 1.3 {RATIO} (ref 0.8–1.7)
ALP SERPL-CCNC: 63 U/L (ref 45–117)
ALT SERPL-CCNC: 17 U/L (ref 13–56)
ANION GAP BLD CALC-SCNC: 7 MMOL/L (ref 3–18)
AST SERPL W P-5'-P-CCNC: 13 U/L (ref 15–37)
BILIRUB SERPL-MCNC: 0.3 MG/DL (ref 0.2–1)
BUN SERPL-MCNC: 13 MG/DL (ref 7–18)
BUN/CREAT SERPL: 19 (ref 12–20)
CALCIUM SERPL-MCNC: 9 MG/DL (ref 8.5–10.1)
CHLORIDE SERPL-SCNC: 108 MMOL/L (ref 100–108)
CHOLEST SERPL-MCNC: 206 MG/DL
CO2 SERPL-SCNC: 27 MMOL/L (ref 21–32)
CREAT SERPL-MCNC: 0.69 MG/DL (ref 0.6–1.3)
GLOBULIN SER CALC-MCNC: 3.1 G/DL (ref 2–4)
GLUCOSE SERPL-MCNC: 81 MG/DL (ref 74–99)
HDLC SERPL-MCNC: 50 MG/DL (ref 40–60)
HDLC SERPL: 4.1 {RATIO} (ref 0–5)
LDLC SERPL CALC-MCNC: 140.6 MG/DL (ref 0–100)
LIPID PROFILE,FLP: ABNORMAL
POTASSIUM SERPL-SCNC: 4.1 MMOL/L (ref 3.5–5.5)
PROT SERPL-MCNC: 7 G/DL (ref 6.4–8.2)
SODIUM SERPL-SCNC: 142 MMOL/L (ref 136–145)
TRIGL SERPL-MCNC: 77 MG/DL (ref ?–150)
VLDLC SERPL CALC-MCNC: 15.4 MG/DL

## 2017-03-13 PROCEDURE — 80053 COMPREHEN METABOLIC PANEL: CPT | Performed by: FAMILY MEDICINE

## 2017-03-13 PROCEDURE — 77067 SCR MAMMO BI INCL CAD: CPT

## 2017-03-13 PROCEDURE — 80061 LIPID PANEL: CPT | Performed by: FAMILY MEDICINE

## 2017-03-13 PROCEDURE — 36415 COLL VENOUS BLD VENIPUNCTURE: CPT | Performed by: FAMILY MEDICINE

## 2017-04-18 ENCOUNTER — OFFICE VISIT (OUTPATIENT)
Dept: INTERNAL MEDICINE CLINIC | Age: 47
End: 2017-04-18

## 2017-04-18 VITALS
WEIGHT: 144.8 LBS | HEIGHT: 63 IN | RESPIRATION RATE: 16 BRPM | TEMPERATURE: 98.1 F | HEART RATE: 83 BPM | OXYGEN SATURATION: 98 % | DIASTOLIC BLOOD PRESSURE: 72 MMHG | BODY MASS INDEX: 25.66 KG/M2 | SYSTOLIC BLOOD PRESSURE: 103 MMHG

## 2017-04-18 DIAGNOSIS — J32.9 SINUSITIS, UNSPECIFIED CHRONICITY, UNSPECIFIED LOCATION: Primary | ICD-10-CM

## 2017-04-18 RX ORDER — AZITHROMYCIN 250 MG/1
TABLET, FILM COATED ORAL
Qty: 6 TAB | Refills: 0 | Status: SHIPPED | OUTPATIENT
Start: 2017-04-18 | End: 2017-04-23

## 2017-04-18 RX ORDER — HYDROCODONE POLISTIREX AND CHLORPHENIRAMINE POLISTIREX 10; 8 MG/5ML; MG/5ML
SUSPENSION, EXTENDED RELEASE ORAL
Qty: 115 ML | Refills: 0 | Status: SHIPPED | OUTPATIENT
Start: 2017-04-18 | End: 2017-11-01 | Stop reason: ALTCHOICE

## 2017-04-18 RX ORDER — HYDROCODONE POLISTIREX AND CHLORPHENIRAMINE POLISTIREX 10; 8 MG/5ML; MG/5ML
SUSPENSION, EXTENDED RELEASE ORAL
Refills: 0 | COMMUNITY
Start: 2017-02-14 | End: 2017-04-18 | Stop reason: SDUPTHER

## 2017-04-18 NOTE — PATIENT INSTRUCTIONS
Sinusitis: Care Instructions  Your Care Instructions    Sinusitis is an infection of the lining of the sinus cavities in your head. Sinusitis often follows a cold. It causes pain and pressure in your head and face. In most cases, sinusitis gets better on its own in 1 to 2 weeks. But some mild symptoms may last for several weeks. Sometimes antibiotics are needed. Follow-up care is a key part of your treatment and safety. Be sure to make and go to all appointments, and call your doctor if you are having problems. It's also a good idea to know your test results and keep a list of the medicines you take. How can you care for yourself at home? · Take an over-the-counter pain medicine, such as acetaminophen (Tylenol), ibuprofen (Advil, Motrin), or naproxen (Aleve). Read and follow all instructions on the label. · If the doctor prescribed antibiotics, take them as directed. Do not stop taking them just because you feel better. You need to take the full course of antibiotics. · Be careful when taking over-the-counter cold or flu medicines and Tylenol at the same time. Many of these medicines have acetaminophen, which is Tylenol. Read the labels to make sure that you are not taking more than the recommended dose. Too much acetaminophen (Tylenol) can be harmful. · Breathe warm, moist air from a steamy shower, a hot bath, or a sink filled with hot water. Avoid cold, dry air. Using a humidifier in your home may help. Follow the directions for cleaning the machine. · Use saline (saltwater) nasal washes to help keep your nasal passages open and wash out mucus and bacteria. You can buy saline nose drops at a grocery store or drugstore. Or you can make your own at home by adding 1 teaspoon of salt and 1 teaspoon of baking soda to 2 cups of distilled water. If you make your own, fill a bulb syringe with the solution, insert the tip into your nostril, and squeeze gently. Yari Piper your nose.   · Put a hot, wet towel or a warm gel pack on your face 3 or 4 times a day for 5 to 10 minutes each time. · Try a decongestant nasal spray like oxymetazoline (Afrin). Do not use it for more than 3 days in a row. Using it for more than 3 days can make your congestion worse. When should you call for help? Call your doctor now or seek immediate medical care if:  · You have new or worse swelling or redness in your face or around your eyes. · You have a new or higher fever. Watch closely for changes in your health, and be sure to contact your doctor if:  · You have new or worse facial pain. · The mucus from your nose becomes thicker (like pus) or has new blood in it. · You are not getting better as expected. Where can you learn more? Go to http://sary-mk.info/. Enter L505 in the search box to learn more about \"Sinusitis: Care Instructions. \"  Current as of: July 29, 2016  Content Version: 11.2  © 7925-7842 Healthwise, Incorporated. Care instructions adapted under license by [employer] (which disclaims liability or warranty for this information). If you have questions about a medical condition or this instruction, always ask your healthcare professional. [place] [address] any warranty or liability for your use of this information.

## 2017-04-18 NOTE — PROGRESS NOTES
Chief Complaint   Patient presents with    Sore Throat    Migraine    Cold Symptoms   Patient sts symptoms have been persisent since Sunday. 1. Have you been to the ER, urgent care clinic since your last visit? Hospitalized since your last visit? No    2. Have you seen or consulted any other health care providers outside of the [facility] since your last visit? Include any pap smears or colon screening. Yes [facility] Reflux and [facility] PAP.

## 2017-04-18 NOTE — MR AVS SNAPSHOT
Visit Information Date & Time Provider Department Dept. Phone Encounter #  
 4/18/2017  1:00 PM Jose R Villanueva DO [facility] [id number] Follow-up Instructions Return if symptoms worsen or fail to improve. Upcoming Health Maintenance Date Due DTaP/Tdap/Td series (1 - Tdap) 8/5/1989 PAP AKA CERVICAL CYTOLOGY 1/7/2016 BREAST CANCER SCRN MAMMOGRAM 3/13/2018 Allergies as of 4/18/2017  Review Complete On: 4/18/2017 By: Jose R Villanueva DO Severity Noted Reaction Type Reactions Milk Containing Products  08/04/2016    Diarrhea Sulfa (Sulfonamide Antibiotics)  06/07/2010    Rash Other Medication Low 06/07/2010    Diarrhea Eggs, patient eats, and gets flu shot yearly with no reaction Current Immunizations  Reviewed on 7/28/2016 Name Date Influenza Vaccine 10/7/2016, 9/26/2015 Influenza Vaccine (Quad) PF 9/15/2014 Influenza Vaccine Split 10/10/2012, 11/1/2010 Not reviewed this visit You Were Diagnosed With   
  
 Codes Comments Sinusitis, unspecified chronicity, unspecified location    -  Primary ICD-10-CM: J32.9 ICD-9-CM: 473.9 Vitals BP Pulse Temp Resp Height(growth percentile) Weight(growth percentile) 103/72 (BP 1 Location: Left arm, BP Patient Position: Sitting) 83 98.1 °F (36.7 °C) (Oral) 16 5' 3\" (1.6 m) 144 lb 12.8 oz (65.7 kg) LMP SpO2 BMI OB Status Smoking Status 04/12/2017 98% 25.65 kg/m2 Having regular periods Never Smoker Vitals History BMI and BSA Data Body Mass Index Body Surface Area  
 25.65 kg/m 2 1.71 m 2 Preferred Pharmacy Pharmacy Name Phone [facility] [address], [address] [phone] Your Updated Medication List  
  
   
This list is accurate as of: 4/18/17  1:38 PM.  Always use your most recent med list.  
  
  
  
  
 azithromycin 250 mg tablet Commonly known as:  Sony Wall Take 2 tablets today, then take 1 tablet daily  
  
 chlorpheniramine-HYDROcodone 10-8 mg/5 mL suspension Commonly known as:  TUSSIONEX  
TAKE 5 ML BY MOUTH EVERY 12 HOURS AS NEEDED FOR COUGH. MAX DAILY AMOUNT 10 ML.  
  
 esomeprazole 20 mg capsule Commonly known as:  Ryan España Take 20 mg by mouth nightly. ESTRACE 0.01 % (0.1 mg/gram) vaginal cream  
Generic drug:  estradiol FLUARIX QUAD [id number] (PF) Syrg injection Generic drug:  influenza vaccine 2016-17 (36mos+)(PF)  
  
 ketotifen 0.025 % (0.035 %) ophthalmic solution Commonly known as:  ZADITOR Administer 1 Drop to both eyes two (2) times a day. loratadine 10 mg tablet Commonly known as:  Juan Carlos Skinner Take 10 mg by mouth daily. meloxicam 15 mg tablet Commonly known as:  MOBIC Take 1 tab daily or 1/2 tab twice daily as needed pain with food. SIMPLY SLEEP 25 mg tablet Generic drug:  diphenhydrAMINE Take 25 mg by mouth nightly. Prescriptions Printed Refills  
 chlorpheniramine-HYDROcodone (TUSSIONEX) 10-8 mg/5 mL suspension 0 Sig: TAKE 5 ML BY MOUTH EVERY 12 HOURS AS NEEDED FOR COUGH. MAX DAILY AMOUNT 10 ML. Class: Print Prescriptions Sent to Pharmacy Refills  
 azithromycin (ZITHROMAX) 250 mg tablet 0 Sig: Take 2 tablets today, then take 1 tablet daily Class: Normal  
 Pharmacy: [facility] #: [phone] Follow-up Instructions Return if symptoms worsen or fail to improve. Patient Instructions Sinusitis: Care Instructions Your Care Instructions Sinusitis is an infection of the lining of the sinus cavities in your head. Sinusitis often follows a cold. It causes pain and pressure in your head and face. In most cases, sinusitis gets better on its own in 1 to 2 weeks. But some mild symptoms may last for several weeks. Sometimes antibiotics are needed. Follow-up care is a key part of your treatment and safety. Be sure to make and go to all appointments, and call your doctor if you are having problems. It's also a good idea to know your test results and keep a list of the medicines you take. How can you care for yourself at home? · Take an over-the-counter pain medicine, such as acetaminophen (Tylenol), ibuprofen (Advil, Motrin), or naproxen (Aleve). Read and follow all instructions on the label. · If the doctor prescribed antibiotics, take them as directed. Do not stop taking them just because you feel better. You need to take the full course of antibiotics. · Be careful when taking over-the-counter cold or flu medicines and Tylenol at the same time. Many of these medicines have acetaminophen, which is Tylenol. Read the labels to make sure that you are not taking more than the recommended dose. Too much acetaminophen (Tylenol) can be harmful. · Breathe warm, moist air from a steamy shower, a hot bath, or a sink filled with hot water. Avoid cold, dry air. Using a humidifier in your home may help. Follow the directions for cleaning the machine. · Use saline (saltwater) nasal washes to help keep your nasal passages open and wash out mucus and bacteria. You can buy saline nose drops at a grocery store or drugstore. Or you can make your own at home by adding 1 teaspoon of salt and 1 teaspoon of baking soda to 2 cups of distilled water. If you make your own, fill a bulb syringe with the solution, insert the tip into your nostril, and squeeze gently. Ernestina Néstor your nose. · Put a hot, wet towel or a warm gel pack on your face 3 or 4 times a day for 5 to 10 minutes each time. · Try a decongestant nasal spray like oxymetazoline (Afrin). Do not use it for more than 3 days in a row. Using it for more than 3 days can make your congestion worse. When should you call for help? Call your doctor now or seek immediate medical care if: · You have new or worse swelling or redness in your face or around your eyes. · You have a new or higher fever. Watch closely for changes in your health, and be sure to contact your doctor if: 
· You have new or worse facial pain. · The mucus from your nose becomes thicker (like pus) or has new blood in it. · You are not getting better as expected. Where can you learn more? Go to http://sary-mk.info/. Enter S596 in the search box to learn more about \"Sinusitis: Care Instructions. \" Current as of: July 29, 2016 Content Version: 11.2 © 3650-7036 IPG. Care instructions adapted under license by [employer]. (which disclaims liability or warranty for this information). If you have questions about a medical condition or this instruction, always ask your healthcare professional. [address] any warranty or liability for your use of this information. Introducing [facility]! Dear Andra Noonan: Thank you for requesting a TweetDeck account. Our records indicate that you already have an active TweetDeck account. You can access your account anytime at [email or web address] Did you know that you can access your hospital and ER discharge instructions at any time in TweetDeck? You can also review all of your test results from your hospital stay or ER visit. Additional Information If you have questions, please visit the Frequently Asked Questions section of the TweetDeck website at [email or web address] Remember, TweetDeck is NOT to be used for urgent needs. For medical emergencies, dial 911. Now available from your iPhone and Android! Please provide this summary of care documentation to your next provider. Your primary care clinician is listed as Bessie Patterson. If you have any questions after today's visit, please call [phone].

## 2017-04-18 NOTE — LETTER
NOTIFICATION RETURN TO WORK / SCHOOL 
 
4/18/2017 1:37 PM 
 
Ms. Hurtado [address] [address] To Whom It May Concern: 
 
Tamia Werner is currently under the care of [facility] [address] [place] [address]. She will return to work/school on: 4/20/17 If there are questions or concerns please have the patient contact our office. Sincerely, Aleena Marsh, DO

## 2017-04-21 ENCOUNTER — OFFICE VISIT (OUTPATIENT)
Dept: INTERNAL MEDICINE CLINIC | Age: 47
End: 2017-04-21

## 2017-04-21 VITALS
WEIGHT: 147.6 LBS | HEIGHT: 63 IN | RESPIRATION RATE: 16 BRPM | HEART RATE: 63 BPM | BODY MASS INDEX: 26.15 KG/M2 | DIASTOLIC BLOOD PRESSURE: 78 MMHG | SYSTOLIC BLOOD PRESSURE: 106 MMHG | TEMPERATURE: 96.7 F | OXYGEN SATURATION: 99 %

## 2017-04-21 DIAGNOSIS — R05.9 COUGH: Primary | ICD-10-CM

## 2017-04-21 NOTE — PATIENT INSTRUCTIONS
Cough: Care Instructions  Your Care Instructions  A cough is your body's response to something that bothers your throat or airways. Many things can cause a cough. You might cough because of a cold or the flu, bronchitis, or asthma. Smoking, postnasal drip, allergies, and stomach acid that backs up into your throat also can cause coughs. A cough is a symptom, not a disease. Most coughs stop when the cause, such as a cold, goes away. You can take a few steps at home to cough less and feel better. Follow-up care is a key part of your treatment and safety. Be sure to make and go to all appointments, and call your doctor if you are having problems. It's also a good idea to know your test results and keep a list of the medicines you take. How can you care for yourself at home? · Drink lots of water and other fluids. This helps thin the mucus and soothes a dry or sore throat. Honey or lemon juice in hot water or tea may ease a dry cough. · Take cough medicine as directed by your doctor. · Prop up your head on pillows to help you breathe and ease a dry cough. · Try cough drops to soothe a dry or sore throat. Cough drops don't stop a cough. Medicine-flavored cough drops are no better than candy-flavored drops or hard candy. · Do not smoke. Avoid secondhand smoke. If you need help quitting, talk to your doctor about stop-smoking programs and medicines. These can increase your chances of quitting for good. When should you call for help? Call 911 anytime you think you may need emergency care. For example, call if:  · You have severe trouble breathing. Call your doctor now or seek immediate medical care if:  · You cough up blood. · You have new or worse trouble breathing. · You have a new or higher fever. · You have a new rash.   Watch closely for changes in your health, and be sure to contact your doctor if:  · You cough more deeply or more often, especially if you notice more mucus or a change in the color of your mucus. · You have new symptoms, such as a sore throat, an earache, or sinus pain. · You do not get better as expected. Where can you learn more? Go to http://sary-mk.info/. Enter D279 in the search box to learn more about \"Cough: Care Instructions. \"  Current as of: May 27, 2016  Content Version: 11.2  © 6233-0531 [employer]. Care instructions adapted under license by [employer] (which disclaims liability or warranty for this information). If you have questions about a medical condition or this instruction, always ask your healthcare professional. [address] any warranty or liability for your use of this information.

## 2017-04-21 NOTE — MR AVS SNAPSHOT
Visit Information Date & Time Provider Department Dept. Phone Encounter #  
 4/21/2017  1:00 PM Ruben Rivas DO [facility] [phone] Follow-up Instructions Return if symptoms worsen or fail to improve. Your Appointments 10/13/2017  8:20 AM  
LAB with Ruben Rivas DO [facility] (--) Appt Note: 6 mos labs per 132[address] [facility] [address] [id number] [phone]  
  
   
 [address] [address]  
  
    
 10/20/2017  1:00 PM  
ROUTINE CARE with Ruben Rivas DO [facility] (--) Appt Note: 6 mos fu per [address] [facility] [address] [address] [phone]  
  
   
 [address] [address] Upcoming Health Maintenance Date Due DTaP/Tdap/Td series (1 - Tdap) 8/5/1989 PAP AKA CERVICAL CYTOLOGY 1/7/2016 BREAST CANCER SCRN MAMMOGRAM 3/13/2018 Allergies as of 4/21/2017  Review Complete On: 4/21/2017 By: Jose Enrique Bassett LPN Severity Noted Reaction Type Reactions Milk Containing Products  08/04/2016    Diarrhea Sulfa (Sulfonamide Antibiotics)  06/07/2010    Rash Other Medication Low 06/07/2010    Diarrhea Eggs, patient eats, and gets flu shot yearly with no reaction Current Immunizations  Reviewed on 7/28/2016 Name Date Influenza Vaccine 10/7/2016, 9/26/2015 Influenza Vaccine (Quad) PF 9/15/2014 Influenza Vaccine Split 10/10/2012, 11/1/2010 Not reviewed this visit You Were Diagnosed With   
  
 Codes Comments Cough    -  Primary ICD-10-CM: H46 ICD-9-CM: 869. 2 Vitals BP Pulse Temp Resp Height(growth percentile) Weight(growth percentile) 106/78 63 96.7 °F (35.9 °C) (Oral) 16 5' 3\" (1.6 m) 147 lb 9.6 oz (67 kg) LMP SpO2 BMI OB Status Smoking Status 04/12/2017 99% 26.15 kg/m2 Having regular periods Never Smoker Vitals History BMI and BSA Data Body Mass Index Body Surface Area  
 26.15 kg/m 2 1.73 m 2 Preferred Pharmacy Pharmacy Name Phone [facility], [address] [phone] Your Updated Medication List  
  
   
This list is accurate as of: 4/21/17  1:27 PM.  Always use your most recent med list.  
  
  
  
  
 azithromycin 250 mg tablet Commonly known as:  Mike Wilder Take 2 tablets today, then take 1 tablet daily  
  
 chlorpheniramine-HYDROcodone 10-8 mg/5 mL suspension Commonly known as:  TUSSIONEX  
TAKE 5 ML BY MOUTH EVERY 12 HOURS AS NEEDED FOR COUGH. MAX DAILY AMOUNT 10 ML.  
  
 esomeprazole 20 mg capsule Commonly known as:  Geneva Browne Take 20 mg by mouth nightly. ESTRACE 0.01 % (0.1 mg/gram) vaginal cream  
Generic drug:  estradiol FLUARIX QUAD [id number] (PF) Syrg injection Generic drug:  influenza vaccine 2016-17 (36mos+)(PF)  
  
 ketotifen 0.025 % (0.035 %) ophthalmic solution Commonly known as:  ZADITOR Administer 1 Drop to both eyes two (2) times a day. loratadine 10 mg tablet Commonly known as:  Leeroy Harmeet Take 10 mg by mouth daily. SIMPLY SLEEP 25 mg tablet Generic drug:  diphenhydrAMINE Take 25 mg by mouth nightly. Follow-up Instructions Return if symptoms worsen or fail to improve. Patient Instructions Cough: Care Instructions Your Care Instructions A cough is your body's response to something that bothers your throat or airways. Many things can cause a cough. You might cough because of a cold or the flu, bronchitis, or asthma. Smoking, postnasal drip, allergies, and stomach acid that backs up into your throat also can cause coughs. A cough is a symptom, not a disease. Most coughs stop when the cause, such as a cold, goes away. You can take a few steps at home to cough less and feel better. Follow-up care is a key part of your treatment and safety. Be sure to make and go to all appointments, and call your doctor if you are having problems. It's also a good idea to know your test results and keep a list of the medicines you take. How can you care for yourself at home? · Drink lots of water and other fluids. This helps thin the mucus and soothes a dry or sore throat. Honey or lemon juice in hot water or tea may ease a dry cough. · Take cough medicine as directed by your doctor. · Prop up your head on pillows to help you breathe and ease a dry cough. · Try cough drops to soothe a dry or sore throat. Cough drops don't stop a cough. Medicine-flavored cough drops are no better than candy-flavored drops or hard candy. · Do not smoke. Avoid secondhand smoke. If you need help quitting, talk to your doctor about stop-smoking programs and medicines. These can increase your chances of quitting for good. When should you call for help? Call 911 anytime you think you may need emergency care. For example, call if: 
· You have severe trouble breathing. Call your doctor now or seek immediate medical care if: 
· You cough up blood. · You have new or worse trouble breathing. · You have a new or higher fever. · You have a new rash. Watch closely for changes in your health, and be sure to contact your doctor if: 
· You cough more deeply or more often, especially if you notice more mucus or a change in the color of your mucus. · You have new symptoms, such as a sore throat, an earache, or sinus pain. · You do not get better as expected. Where can you learn more? Go to http://sary-mk.info/. Enter D279 in the search box to learn more about \"Cough: Care Instructions. \" Current as of: May 27, 2016 Content Version: 11.2 © 3669-7886 [employer], Book[employer]ub.  Care instructions adapted under license by [employer] (which disclaims liability or warranty for this information). If you have questions about a medical condition or this instruction, always ask your healthcare professional. [address] any warranty or liability for your use of this information. Introducing [facility]! Dear Virgil Aguayo: Thank you for requesting a [employer] account. Our records indicate that you already have an active [employer] account. You can access your account anytime at [email or web address]/"[email or web address]." Did you know that you can access your hospital and ER discharge instructions at any time in AutekBio? You can also review all of your test results from your hospital stay or ER visit. Additional Information If you have questions, please visit the Frequently Asked Questions section of the [employer] website at [email or web address][employer] [email or web address] Remember, [employer] is NOT to be used for urgent needs. For medical emergencies, dial 911. Now available from your iPhone and Android! Please provide this summary of care documentation to your next provider. Your primary care clinician is listed as Ghislaine Dennis. If you have any questions after today's visit, please call [phone].

## 2017-04-21 NOTE — PROGRESS NOTES
Pt is here for cough x 5 days. Hurts to breathe when coughing. Pt states she was seen on Tuesday but her symptoms have changed. Pt states she is taking a nasal spray & allergy medicine but doesn't recall the names. Do you have an advance directive yes    1. Have you been to the ER, urgent care clinic since your last visit? Hospitalized since your last visit? No    2. Have you seen or consulted any other health care providers outside of the [facility] since your last visit? Include any pap smears or colon screening.  No

## 2017-04-26 NOTE — PROGRESS NOTES
HISTORY OF PRESENT ILLNESS  Adan Linares is a 48 y.o. female. HPI  48year old established female patient in with an acute concern. She reports HA 7/10, temporal in nature, not associated with photophobia or phonophobia. C/o nonproductive cough, similar to her illness 1 month ago and treated with anitussive. Symptoms started about 2 days ago, she reports fatigue, sorethroat. She denies fevers and chills  She has hx of seasonal allergies but does not feel that this is the case      ROS   See HPI    Visit Vitals    /72 (BP 1 Location: Left arm, BP Patient Position: Sitting)    Pulse 83    Temp 98.1 °F (36.7 °C) (Oral)    Resp 16    Ht 5' 3\" (1.6 m)    Wt 144 lb 12.8 oz (65.7 kg)    LMP 04/12/2017    SpO2 98%    BMI 25.65 kg/m2     Physical Exam   HENT:   Right Ear: A middle ear effusion is present. Left Ear: A middle ear effusion is present. Nose: No mucosal edema or rhinorrhea. Right sinus exhibits maxillary sinus tenderness. Right sinus exhibits no frontal sinus tenderness. Left sinus exhibits maxillary sinus tenderness. Left sinus exhibits no frontal sinus tenderness. Mouth/Throat: No posterior oropharyngeal erythema. Cardiovascular: Normal rate and regular rhythm. No murmur heard. Pulmonary/Chest: Effort normal and breath sounds normal. No respiratory distress. Skin:   Erythematous right cheek (rosacea)       ASSESSMENT and PLAN    ICD-10-CM ICD-9-CM    1. Sinusitis, unspecified chronicity, unspecified location J32.9 473.9 chlorpheniramine-HYDROcodone (TUSSIONEX) 10-8 mg/5 mL suspension      azithromycin (ZITHROMAX) 250 mg tablet     -Advised symptomatic care  -Work not provided today  -RTC prn    Additional Instructions: The patient understands that they should contact the office at any time if any questions or concerns develop.   They are also aware that they can call our main office number at [phone] at any time if they would like to address any concerns with the physician. They also understand that they should dial 911 if any acute emergency arises. The patient understands that they should give us a minimum of 48 hours to complete prescription refills once they are requested. The patient has also been instructed to contact us by calling the main office number if they have not received feedback within 2 weeks of having any tests completed. The patient is a aware that they should read all package insert information when picking up the medications and that they should consult the pharmacist of a physician if they have any questions or concerns regarding the prescribed medications. Discussed with the patient new medications given and patient instructed to read pharmacy literature regarding side effects and drug interactions. Instructions for taking the medications were provided to the patient and the consequences of not taking it. Follow-up Disposition:   Return if symptoms worsen or fail to improve. Risk and benefits of new medication discussed in detail when indicated, patient was given the opportunity to ask questions   AVS provided  reviewed diet, exercise and weight control when indicated  Alarm signals discussed. ER precautions reviewed when indicated  Plan of care reviewed with patient. Understanding verbalized and they are in agreement with plan of care.      Taylor Olivia DO

## 2017-04-26 NOTE — PROGRESS NOTES
HISTORY OF PRESENT ILLNESS  Tamy Medrano is a 48 y.o. female. HPI  48year old established female patient in with an acute concern. She reports that the nature of her illness has changed. She says she now has deepening in cough, chest tightness, white sputum production. She has tried albuterol once but is not sure that it has helped. Fatigue continues, cough worse at night. She denies fevers, chills and sweats. Recap from 4/18/17:  She reports HA 7/10, temporal in nature, not associated with photophobia or phonophobia. C/o nonproductive cough, similar to her illness 1 month ago and treated with anitussive. Symptoms started about 2 days ago, she reports fatigue, sorethroat. She denies fevers and chills  She has hx of seasonal allergies but does not feel that this is the case      ROS   See HPI    Visit Vitals    /78    Pulse 63    Temp 96.7 °F (35.9 °C) (Oral)    Resp 16    Ht 5' 3\" (1.6 m)    Wt 147 lb 9.6 oz (67 kg)    LMP 04/12/2017    SpO2 99%    BMI 26.15 kg/m2     Physical Exam   HENT:   Right Ear: No middle ear effusion. Left Ear:  No middle ear effusion. Nose: No mucosal edema or rhinorrhea. Right sinus exhibits no maxillary sinus tenderness and no frontal sinus tenderness. Left sinus exhibits no maxillary sinus tenderness and no frontal sinus tenderness. Mouth/Throat: Mucous membranes are dry. No posterior oropharyngeal erythema. Cardiovascular: Normal rate and regular rhythm. No murmur heard. Pulmonary/Chest: Effort normal and breath sounds normal. No respiratory distress. Skin:   Erythematous right cheek (rosacea)       ASSESSMENT and PLAN    ICD-10-CM ICD-9-CM    1. Cough R05 786.2      -Advised symptomatic care  -Work not provided today  -RTC prn    Additional Instructions: The patient understands that they should contact the office at any time if any questions or concerns develop.   They are also aware that they can call our main office number at [phone] at any time if they would like to address any concerns with the physician. They also understand that they should dial 911 if any acute emergency arises. The patient understands that they should give us a minimum of 48 hours to complete prescription refills once they are requested. The patient has also been instructed to contact us by calling the main office number if they have not received feedback within 2 weeks of having any tests completed. The patient is a aware that they should read all package insert information when picking up the medications and that they should consult the pharmacist of a physician if they have any questions or concerns regarding the prescribed medications. Discussed with the patient new medications given and patient instructed to read pharmacy literature regarding side effects and drug interactions. Instructions for taking the medications were provided to the patient and the consequences of not taking it. Follow-up Disposition:   Return if symptoms worsen or fail to improve. Risk and benefits of new medication discussed in detail when indicated, patient was given the opportunity to ask questions   AVS provided  reviewed diet, exercise and weight control when indicated  Alarm signals discussed. ER precautions reviewed when indicated  Plan of care reviewed with patient. Understanding verbalized and they are in agreement with plan of care.      Ragini Beard,

## 2017-08-09 ENCOUNTER — OFFICE VISIT (OUTPATIENT)
Dept: FAMILY MEDICINE CLINIC | Age: 47
End: 2017-08-09

## 2017-08-09 VITALS
RESPIRATION RATE: 18 BRPM | HEART RATE: 67 BPM | SYSTOLIC BLOOD PRESSURE: 104 MMHG | BODY MASS INDEX: 26.22 KG/M2 | DIASTOLIC BLOOD PRESSURE: 80 MMHG | OXYGEN SATURATION: 98 % | HEIGHT: 63 IN | WEIGHT: 148 LBS | TEMPERATURE: 97.5 F

## 2017-08-09 DIAGNOSIS — J01.00 ACUTE NON-RECURRENT MAXILLARY SINUSITIS: Primary | ICD-10-CM

## 2017-08-09 RX ORDER — FLUCONAZOLE 150 MG/1
150 TABLET ORAL DAILY
Qty: 1 TAB | Refills: 1 | Status: SHIPPED | OUTPATIENT
Start: 2017-08-09 | End: 2017-08-10

## 2017-08-09 RX ORDER — CEFUROXIME AXETIL 500 MG/1
500 TABLET ORAL 2 TIMES DAILY
Qty: 20 TAB | Refills: 0 | Status: SHIPPED | OUTPATIENT
Start: 2017-08-09 | End: 2017-11-01 | Stop reason: ALTCHOICE

## 2017-08-09 NOTE — PROGRESS NOTES
HISTORY OF PRESENT ILLNESS  Brian Morales is a 50 y.o. female. Pt was seen in [facility] on 8-7-17 after being sick for over a week. She has nasal congestion, body aches, fever and chills, and a sore throat. She took the Fiorinal which helped her headache for the first day but makes her stomach nauseated. She has missed 4 days of work now. HPI    Review of Systems   Constitutional: Positive for chills and fever. HENT: Positive for congestion and sore throat. Eyes: Negative. Respiratory: Negative. Cardiovascular: Negative. Neurological: Positive for headaches. Visit Vitals    /80 (BP 1 Location: Left arm, BP Patient Position: Sitting)    Pulse 67    Temp 97.5 °F (36.4 °C) (Oral)    Resp 18    Ht 5' 3\" (1.6 m)    Wt 148 lb (67.1 kg)    LMP 08/02/2017    SpO2 98%    BMI 26.22 kg/m2       Physical Exam   Constitutional: She appears well-developed and well-nourished. No distress. HENT:   Right Ear: Tympanic membrane is retracted. A middle ear effusion is present. Left Ear: Tympanic membrane is retracted. A middle ear effusion is present. Eyes: Pupils are equal, round, and reactive to light. Right eye exhibits no discharge. Left eye exhibits no discharge. Neck: Normal range of motion. Neck supple. No thyromegaly present. Cardiovascular: Normal rate, regular rhythm and normal heart sounds. No murmur heard. Pulmonary/Chest: Effort normal and breath sounds normal. No respiratory distress. She has no wheezes. She has no rales. Lymphadenopathy:     She has no cervical adenopathy. ASSESSMENT and PLAN    ICD-10-CM ICD-9-CM    1. Acute non-recurrent maxillary sinusitis J01.00 461.0        PLAN:  Pt instructed to take antibiotic twice a day. She is to call if symptoms persist or worsen. Pt given after visit summary.

## 2017-08-09 NOTE — LETTER
NOTIFICATION RETURN TO WORK / SCHOOL 
 
8/9/2017 9:08 AM 
 
Ms. Hurtado [address] [address] To Whom It May Concern: 
 
Don Chou is currently under the care of [address] [place] [address]. She was seen today for sick visit. Please excuse from work 8-4-17 thru 8-9-17. If there are questions or concerns please have the patient contact our office. Sincerely, Dung Boyer NP

## 2017-08-09 NOTE — MR AVS SNAPSHOT
Visit Information Date & Time Provider Department Dept. Phone Encounter #  
 8/9/2017  8:45 AM Pratik Easley  [place] [phone] [id number] Upcoming Health Maintenance Date Due DTaP/Tdap/Td series (1 - Tdap) 8/5/1989 PAP AKA CERVICAL CYTOLOGY 1/7/2016 INFLUENZA AGE 9 TO ADULT 8/1/2017 BREAST CANCER SCRN MAMMOGRAM 3/13/2018 Allergies as of 8/9/2017  Review Complete On: 8/9/2017 By: Pratik Easley NP Severity Noted Reaction Type Reactions Milk Containing Products  08/04/2016    Diarrhea Sulfa (Sulfonamide Antibiotics)  06/07/2010    Rash Other Medication Low 06/07/2010    Diarrhea Eggs, patient eats, and gets flu shot yearly with no reaction Current Immunizations  Reviewed on 7/28/2016 Name Date Influenza Vaccine 10/7/2016, 9/26/2015 Influenza Vaccine (Quad) PF 9/15/2014 Influenza Vaccine Split 10/10/2012, 11/1/2010 Not reviewed this visit You Were Diagnosed With   
  
 Codes Comments Acute non-recurrent maxillary sinusitis    -  Primary ICD-10-CM: J01.00 ICD-9-CM: 461.0 Vitals BP Pulse Temp Resp Height(growth percentile) Weight(growth percentile) 104/80 (BP 1 Location: Left arm, BP Patient Position: Sitting) 67 97.5 °F (36.4 °C) (Oral) 18 5' 3\" (1.6 m) 148 lb (67.1 kg) LMP SpO2 BMI OB Status Smoking Status 08/02/2017 98% 26.22 kg/m2 Having regular periods Never Smoker Vitals History BMI and BSA Data Body Mass Index Body Surface Area  
 26.22 kg/m 2 1.73 m 2 Preferred Pharmacy Pharmacy Name Phone [facility], [address] [place] [address] [phone] Your Updated Medication List  
  
   
This list is accurate as of: 8/9/17  9:05 AM.  Always use your most recent med list.  
  
  
  
  
 cefUROXime 500 mg tablet Commonly known as:  CEFTIN Take 1 Tab by mouth two (2) times a day. chlorpheniramine-HYDROcodone 10-8 mg/5 mL suspension Commonly known as:  TUSSIONEX  
TAKE 5 ML BY MOUTH EVERY 12 HOURS AS NEEDED FOR COUGH. MAX DAILY AMOUNT 10 ML.  
  
 esomeprazole 20 mg capsule Commonly known as:  [place]ilio Salcedoman Take 20 mg by mouth nightly. ESTRACE 0.01 % (0.1 mg/gram) vaginal cream  
Generic drug:  estradiol FLUARIX QUAD [id number] (PF) Syrg injection Generic drug:  influenza vaccine 2016-17 (36mos+)(PF)  
  
 fluconazole 150 mg tablet Commonly known as:  DIFLUCAN Take 1 Tab by mouth daily for 1 day. FDA advises cautious prescribing of oral fluconazole in pregnancy. ketotifen 0.025 % (0.035 %) ophthalmic solution Commonly known as:  ZADITOR Administer 1 Drop to both eyes two (2) times a day. loratadine 10 mg tablet Commonly known as:  Fadumo Badder Take 10 mg by mouth daily. SIMPLY SLEEP 25 mg tablet Generic drug:  diphenhydrAMINE Take 25 mg by mouth nightly. Prescriptions Sent to Pharmacy Refills  
 cefUROXime (CEFTIN) 500 mg tablet 0 Sig: Take 1 Tab by mouth two (2) times a day. Class: Normal  
 Pharmacy: [address] Ph #: [phone] Route: Oral  
 fluconazole (DIFLUCAN) 150 mg tablet 1 Sig: Take 1 Tab by mouth daily for 1 day. FDA advises cautious prescribing of oral fluconazole in pregnancy. Class: Normal  
 Pharmacy: [address] Ph #: [phone] Route: Oral  
  
Introducing [address]! Dear Gaetano Argue: Thank you for requesting a [employer] account. Our records indicate that you already have an active [employer] account. You can access your account anytime at [email or web address] Did you know that you can access your hospital and ER discharge instructions at any time in [employer]? You can also review all of your test results from your hospital stay or ER visit. Additional Information If you have questions, please visit the Frequently Asked Questions section of the [employer] Energy Networkshart website at [email or web address] Remember, [employer] is NOT to be used for urgent needs. For medical emergencies, dial 911. Now available from your iPhone and Android! Please provide this summary of care documentation to your next provider. Your primary care clinician is listed as Helder Jeffers. If you have any questions after today's visit, please call [phone].

## 2017-08-09 NOTE — PROGRESS NOTES
1. Have you been to the ER, urgent care clinic since your last visit? Hospitalized since your last visit? yes here today to follow up    2. Have you seen or consulted any other health care providers outside of the [facility] since your last visit? Include any pap smears or colon screening.  No

## 2017-08-09 NOTE — PROGRESS NOTES
HISTORY OF PRESENT ILLNESS  Don Chou is a 50 y.o. female.   HPI    ROS    Physical Exam    ASSESSMENT and PLAN  {ASSESSMENT/PLAN:[id number]}

## 2017-08-18 ENCOUNTER — OFFICE VISIT (OUTPATIENT)
Dept: FAMILY MEDICINE CLINIC | Age: 47
End: 2017-08-18

## 2017-08-18 VITALS
HEART RATE: 85 BPM | SYSTOLIC BLOOD PRESSURE: 94 MMHG | RESPIRATION RATE: 14 BRPM | BODY MASS INDEX: 26.05 KG/M2 | DIASTOLIC BLOOD PRESSURE: 72 MMHG | HEIGHT: 63 IN | OXYGEN SATURATION: 97 % | TEMPERATURE: 98.1 F | WEIGHT: 147 LBS

## 2017-08-18 DIAGNOSIS — A09 DIARRHEA OF INFECTIOUS ORIGIN: Primary | ICD-10-CM

## 2017-08-18 RX ORDER — FLUTICASONE PROPIONATE 50 MCG
2 SPRAY, SUSPENSION (ML) NASAL DAILY
COMMUNITY
End: 2017-11-01

## 2017-08-18 RX ORDER — METRONIDAZOLE 500 MG/1
500 TABLET ORAL 3 TIMES DAILY
Qty: 30 TAB | Refills: 0 | Status: SHIPPED | OUTPATIENT
Start: 2017-08-18 | End: 2017-08-28

## 2017-08-18 RX ORDER — BUTALBITAL, ASPIRIN, AND CAFFEINE 325; 50; 40 MG/1; MG/1; MG/1
CAPSULE ORAL
Refills: 0 | COMMUNITY
Start: 2017-08-07 | End: 2017-11-01

## 2017-08-18 NOTE — PATIENT INSTRUCTIONS
Clostridium Difficile Colitis: Care Instructions  Your Care Instructions  Clostridium difficile (also called C. difficile) are bacteria that can cause swelling and irritation of the large intestine, or colon. This inflammation is also called colitis. It can cause diarrhea, fever, and belly cramps. You may get C. difficile colitis if you take antibiotics. The infection is most common in people who are taking antibiotics while in the hospital. It is also common in older people in hospitals and nursing homes. Severe disease could cause the colon to swell to many times its normal size (toxic megacolon). This can cause death and needs emergency treatment. You may have a swollen belly that is painful or tender, a rapid heartbeat, and a fever. Follow-up care is a key part of your treatment and safety. Be sure to make and go to all appointments, and call your doctor if you are having problems. It's also a good idea to know your test results and keep a list of the medicines you take. How can you care for yourself at home? · Your doctor may give you antibiotics to treat C. difficile colitis. If your doctor prescribes an antibiotic, he or she will give you a different antibiotic than the one that caused your infection. Take your antibiotics as directed. Do not stop taking them just because you feel better. You need to take the full course of antibiotics. · To prevent dehydration, drink plenty of fluids, enough so that your urine is light yellow or clear like water. Choose water and other caffeine-free clear liquids until you feel better. If you have kidney, heart, or liver disease and have to limit fluids, talk with your doctor before you increase the amount of fluids you drink. · Begin eating small amounts of mild foods, if you feel like it. Try yogurt that has live cultures of lactobacillus (check the label). ¨ Avoid spicy foods, fruits, alcohol, and caffeine until 48 hours after all symptoms go away.   ¨ Avoid chewing gum that contains sorbitol. ¨ Avoid dairy products (except for yogurt with lactobacillus) while you have diarrhea and for 3 days after symptoms go away. · To prevent the spread of C. difficile, practice good hygiene. Keep your hands clean by washing them well and often with soap and clean, running water. Alcohol-based hand sanitizers do not kill C. difficile. When should you call for help? Call 911 if:  · You passed out (lost consciousness). Call your doctor now or seek immediate medical care if:  · You have a fever over 101°F or shaking chills. · You feel lightheaded or have a fast heart rate. · You pass stools that are almost always bloody. · You have signs of needing more fluids. You have sunken eyes and a dry mouth, and you pass only a little dark urine. · You have severe belly pain with or without bloating. · You have severe vomiting and cannot keep down liquids. · You are not passing any stools or gas. Watch closely for changes in your health, and be sure to contact your doctor if:  · You do not get better as expected. Where can you learn more? Go to http://sary-mk.info/. Enter [phone] in the search box to learn more about \"Clostridium Difficile Colitis: Care Instructions. \"  Current as of: March 3, 2017  Content Version: 11.3  © 1482-1480 [employer]. Care instructions adapted under license by [employer] (which disclaims liability or warranty for this information). If you have questions about a medical condition or this instruction, always ask your healthcare professional. [place] [address] any warranty or liability for your use of this information.

## 2017-08-18 NOTE — MR AVS SNAPSHOT
Visit Information Date & Time Provider Department Dept. Phone Encounter #  
 8/18/2017 12:15 PM James Cline  [facility] [phone] [id number] Upcoming Health Maintenance Date Due DTaP/Tdap/Td series (1 - Tdap) 8/5/1989 PAP AKA CERVICAL CYTOLOGY 1/7/2016 INFLUENZA AGE 9 TO ADULT 8/1/2017 BREAST CANCER SCRN MAMMOGRAM 3/13/2018 Allergies as of 8/18/2017  Review Complete On: 8/18/2017 By: James Cline NP Severity Noted Reaction Type Reactions Milk Containing Products  08/04/2016    Diarrhea Sulfa (Sulfonamide Antibiotics)  06/07/2010    Rash Other Medication Low 06/07/2010    Diarrhea Eggs, patient eats, and gets flu shot yearly with no reaction Current Immunizations  Reviewed on 7/28/2016 Name Date Influenza Vaccine 10/7/2016, 9/26/2015 Influenza Vaccine (Quad) PF 9/15/2014 Influenza Vaccine Split 10/10/2012, 11/1/2010 Not reviewed this visit You Were Diagnosed With   
  
 Codes Comments Diarrhea of infectious origin    -  Primary ICD-10-CM: A09 ICD-9-CM: 135. 3 Vitals BP Pulse Temp Resp Height(growth percentile) Weight(growth percentile) 94/72 (BP 1 Location: Left arm, BP Patient Position: Sitting) 85 98.1 °F (36.7 °C) (Oral) 14 5' 3\" (1.6 m) 147 lb (66.7 kg) LMP SpO2 BMI OB Status Smoking Status 08/02/2017 97% 26.04 kg/m2 Having regular periods Never Smoker BMI and BSA Data Body Mass Index Body Surface Area 26.04 kg/m 2 1.72 m 2 Preferred Pharmacy Pharmacy Name Phone [facility], [address] [place] [address] [phone] Your Updated Medication List  
  
   
This list is accurate as of: 8/18/17 12:52 PM.  Always use your most recent med list.  
  
  
  
  
 butalbital-aspirin-caffeine capsule Commonly known as:  FIORINAL  
TAKE 1-2 CAPSULES BY MOUTH EVERY 4 HOURS AS NEEDED FOR HEADACHE UP TO 6 PER DAY  
  
 cefUROXime 500 mg tablet Commonly known as:  CEFTIN Take 1 Tab by mouth two (2) times a day. chlorpheniramine-HYDROcodone 10-8 mg/5 mL suspension Commonly known as:  TUSSIONEX  
TAKE 5 ML BY MOUTH EVERY 12 HOURS AS NEEDED FOR COUGH. MAX DAILY AMOUNT 10 ML.  
  
 esomeprazole 20 mg capsule Commonly known as:  Juanetta Budds Take 20 mg by mouth nightly. ESTRACE 0.01 % (0.1 mg/gram) vaginal cream  
Generic drug:  estradiol FLONASE 50 mcg/actuation nasal spray Generic drug:  fluticasone 2 Sprays by Both Nostrils route daily. FLUARIX QUAD [id number] (PF) Syrg injection Generic drug:  influenza vaccine 2016-17 (36mos+)(PF)  
  
 ketotifen 0.025 % (0.035 %) ophthalmic solution Commonly known as:  ZADITOR Administer 1 Drop to both eyes two (2) times a day. loratadine 10 mg tablet Commonly known as:  Nori Case Take 10 mg by mouth daily. metroNIDAZOLE 500 mg tablet Commonly known as:  FLAGYL Take 1 Tab by mouth three (3) times daily for 10 days. SIMPLY SLEEP 25 mg tablet Generic drug:  diphenhydrAMINE Take 25 mg by mouth nightly. Prescriptions Sent to Pharmacy Refills  
 metroNIDAZOLE (FLAGYL) 500 mg tablet 0 Sig: Take 1 Tab by mouth three (3) times daily for 10 days. Class: Normal  
 Pharmacy: [address] #: [phone] Route: Oral  
  
Patient Instructions Clostridium Difficile Colitis: Care Instructions Your Care Instructions Clostridium difficile (also called C. difficile) are bacteria that can cause swelling and irritation of the large intestine, or colon. This inflammation is also called colitis. It can cause diarrhea, fever, and belly cramps. You may get C. difficile colitis if you take antibiotics. The infection is most common in people who are taking antibiotics while in the hospital. It is also common in older people in hospitals and nursing homes. Severe disease could cause the colon to swell to many times its normal size (toxic megacolon). This can cause death and needs emergency treatment. You may have a swollen belly that is painful or tender, a rapid heartbeat, and a fever. Follow-up care is a key part of your treatment and safety. Be sure to make and go to all appointments, and call your doctor if you are having problems. It's also a good idea to know your test results and keep a list of the medicines you take. How can you care for yourself at home? · Your doctor may give you antibiotics to treat C. difficile colitis. If your doctor prescribes an antibiotic, he or she will give you a different antibiotic than the one that caused your infection. Take your antibiotics as directed. Do not stop taking them just because you feel better. You need to take the full course of antibiotics. · To prevent dehydration, drink plenty of fluids, enough so that your urine is light yellow or clear like water. Choose water and other caffeine-free clear liquids until you feel better. If you have kidney, heart, or liver disease and have to limit fluids, talk with your doctor before you increase the amount of fluids you drink. · Begin eating small amounts of mild foods, if you feel like it. Try yogurt that has live cultures of lactobacillus (check the label). ¨ Avoid spicy foods, fruits, alcohol, and caffeine until 48 hours after all symptoms go away. ¨ Avoid chewing gum that contains sorbitol. ¨ Avoid dairy products (except for yogurt with lactobacillus) while you have diarrhea and for 3 days after symptoms go away. · To prevent the spread of C. difficile, practice good hygiene. Keep your hands clean by washing them well and often with soap and clean, running water. Alcohol-based hand sanitizers do not kill C. difficile. When should you call for help? Call 911 if: 
· You passed out (lost consciousness). Call your doctor now or seek immediate medical care if: · You have a fever over 101°F or shaking chills. · You feel lightheaded or have a fast heart rate. · You pass stools that are almost always bloody. · You have signs of needing more fluids. You have sunken eyes and a dry mouth, and you pass only a little dark urine. · You have severe belly pain with or without bloating. · You have severe vomiting and cannot keep down liquids. · You are not passing any stools or gas. Watch closely for changes in your health, and be sure to contact your doctor if: 
· You do not get better as expected. Where can you learn more? Go to http://sary-mk.info/. Enter [phone] in the search box to learn more about \"Clostridium Difficile Colitis: Care Instructions. \" Current as of: March 3, 2017 Content Version: 11.3 © 9356-0829 [employer]. Care instructions adapted under license by [employer] (which disclaims liability or warranty for this information). If you have questions about a medical condition or this instruction, always ask your healthcare professional. [address] any warranty or liability for your use of this information. Introducing [facility]! Dear Jeff Kemp: Thank you for requesting a Zymetis account. Our records indicate that you already have an active Zymetis account. You can access your account anytime at [email or web address]. [email or web address] Did you know that you can access your hospital and ER discharge instructions at any time in Zymetis? You can also review all of your test results from your hospital stay or ER visit. Additional Information If you have questions, please visit the Frequently Asked Questions section of the Zymetis website at [email or web address] Remember, Zymetis is NOT to be used for urgent needs. For medical emergencies, dial 911. Now available from your iPhone and Android! Please provide this summary of care documentation to your next provider. Your primary care clinician is listed as Dorys Steiner. If you have any questions after today's visit, please call [phone].

## 2017-08-18 NOTE — PROGRESS NOTES
Chief Complaint   Patient presents with    Abdominal Pain    Diarrhea    Other     Not been able to eat in 48 hours    Headache

## 2017-08-18 NOTE — PROGRESS NOTES
HISTORY OF PRESENT ILLNESS  Clementine Levy is a 50 y.o. female. Patient presents for diarrhea that started yesterday. She has cramps, she has a lose stool after everything she eats. It is yellow in color and smells terrible. She had a fever. No appetite. HPI    Review of Systems   Constitutional: Positive for fever and malaise/fatigue. Negative for chills. HENT: Negative. Eyes: Negative. Respiratory: Negative. Cardiovascular: Negative. Gastrointestinal: Positive for abdominal pain, diarrhea and nausea. Negative for vomiting. Genitourinary: Negative. Skin: Negative. Visit Vitals    BP 94/72 (BP 1 Location: Left arm, BP Patient Position: Sitting)    Pulse 85    Temp 98.1 °F (36.7 °C) (Oral)    Resp 14    Ht 5' 3\" (1.6 m)    Wt 147 lb (66.7 kg)    LMP 08/02/2017    SpO2 97%    BMI 26.04 kg/m2       Physical Exam   Constitutional: She appears well-developed and well-nourished. No distress. Cardiovascular: Normal rate, regular rhythm and normal heart sounds. No murmur heard. Pulmonary/Chest: Effort normal and breath sounds normal. No respiratory distress. She has no wheezes. She has no rales. Abdominal: Soft. She exhibits no distension and no mass. There is tenderness. There is no rebound and no guarding. ASSESSMENT and PLAN    ICD-10-CM ICD-9-CM    1. Diarrhea of infectious origin A09 009.3 metroNIDAZOLE (FLAGYL) 500 mg tablet       PLAN:  Stop antibiotic  Push fluids  Diet as tolerated. Take Flagyl as prescribed. Dif dx: C dif. Pt given after visit summary.

## 2017-09-20 ENCOUNTER — OFFICE VISIT (OUTPATIENT)
Dept: FAMILY MEDICINE CLINIC | Age: 47
End: 2017-09-20

## 2017-09-20 VITALS
HEIGHT: 63 IN | OXYGEN SATURATION: 98 % | WEIGHT: 148 LBS | BODY MASS INDEX: 26.22 KG/M2 | RESPIRATION RATE: 16 BRPM | TEMPERATURE: 98 F | HEART RATE: 86 BPM | SYSTOLIC BLOOD PRESSURE: 116 MMHG | DIASTOLIC BLOOD PRESSURE: 80 MMHG

## 2017-09-20 DIAGNOSIS — M77.11 LATERAL EPICONDYLITIS OF RIGHT ELBOW: Primary | ICD-10-CM

## 2017-09-20 RX ORDER — MELOXICAM 15 MG/1
TABLET ORAL
Qty: 90 TAB | Refills: 0 | Status: SHIPPED | OUTPATIENT
Start: 2017-09-20 | End: 2018-12-10 | Stop reason: SDUPTHER

## 2017-09-20 NOTE — PATIENT INSTRUCTIONS
Tennis Elbow: Exercises  Your Care Instructions  Here are some examples of typical rehabilitation exercises for your condition. Start each exercise slowly. Ease off the exercise if you start to have pain. Your doctor or physical therapist will tell you when you can start these exercises and which ones will work best for you. How to do the exercises  Wrist flexor stretch    1. Extend your arm in front of you with your palm up. 2. Bend your wrist, pointing your hand toward the floor. 3. With your other hand, gently bend your wrist farther until you feel a mild to moderate stretch in your forearm. 4. Hold for at least 15 to 30 seconds. Repeat 2 to 4 times. Wrist extensor stretch    Repeat steps 1 to 4 of the stretch above but begin with your extended hand palm down. Ball or sock squeeze    1. Hold a tennis ball (or a rolled-up sock) in your hand. 2. Make a fist around the ball (or sock) and squeeze. 3. Hold for about 6 seconds, and then relax for up to 10 seconds. 4. Repeat 8 to 12 times. 5. Switch the ball (or sock) to your other hand and do 8 to 12 times. Wrist deviation    1. Sit so that your arm is supported but your hand hangs off the edge of a flat surface, such as a table. 2. Hold your hand out like you are shaking hands with someone. 3. Move your hand up and down. 4. Repeat this motion 8 to 12 times. 5. Switch arms. 6. Try to do this exercise twice with each hand. Wrist curls    1. Place your forearm on a table with your hand hanging over the edge of the table, palm up. 2. Place a 1- to 2-pound weight in your hand. This may be a dumbbell, a can of food, or a filled water bottle. 3. Slowly raise and lower the weight while keeping your forearm on the table and your palm facing up. 4. Repeat this motion 8 to 12 times. 5. Switch arms, and do steps 1 through 4.  6. Repeat with your hand facing down toward the floor. Switch arms. Biceps curls    1.  Sit leaning forward with your legs slightly spread and your left hand on your left thigh. 2. Place your right elbow on your right thigh, and hold the weight with your forearm horizontal.  3. Slowly curl the weight up and toward your chest.  4. Repeat this motion 8 to 12 times. 5. Switch arms, and do steps 1 through 4. Follow-up care is a key part of your treatment and safety. Be sure to make and go to all appointments, and call your doctor if you are having problems. It's also a good idea to know your test results and keep a list of the medicines you take. Where can you learn more? Go to http://sayr-mk.info/. Enter G405 in the search box to learn more about \"Tennis Elbow: Exercises. \"  Current as of: March 21, 2017  Content Version: 11.3  © 5617-7071 [employer]. Care instructions adapted under license by [employer] (which disclaims liability or warranty for this information). If you have questions about a medical condition or this instruction, always ask your healthcare professional. [address] any warranty or liability for your use of this information.

## 2017-09-20 NOTE — PROGRESS NOTES
HISTORY OF PRESENT ILLNESS    Guzman Watkins is a 50y.o. year old female here today for:  Right elbow pain    Patients symptoms have been present for 4 weeks. Pain level 2/10 right elbow, It is unchanged with home exercise program.  It is described as psin in right elbow worse with certain movements. Current Outpatient Prescriptions   Medication Sig Dispense Refill    fluticasone (FLONASE) 50 mcg/actuation nasal spray 2 Sprays by Both Nostrils route daily.  butalbital-aspirin-caffeine (FIORINAL) capsule TAKE 1-2 CAPSULES BY MOUTH EVERY 4 HOURS AS NEEDED FOR HEADACHE UP TO 6 PER DAY  0    cefUROXime (CEFTIN) 500 mg tablet Take 1 Tab by mouth two (2) times a day. 20 Tab 0    chlorpheniramine-HYDROcodone (TUSSIONEX) 10-8 mg/5 mL suspension TAKE 5 ML BY MOUTH EVERY 12 HOURS AS NEEDED FOR COUGH. MAX DAILY AMOUNT 10 ML. 115 mL 0    ESTRACE 0.01 % (0.1 mg/gram) vaginal cream       FLUARIX QUAD [id number], PF, syrg injection       loratadine (CLARITIN) 10 mg tablet Take 10 mg by mouth daily.  ketotifen (ZADITOR) 0.025 % (0.035 %) ophthalmic solution Administer 1 Drop to both eyes two (2) times a day.  esomeprazole (NEXIUM) 20 mg capsule Take 20 mg by mouth nightly.  diphenhydrAMINE (SIMPLY SLEEP) 25 mg tablet Take 25 mg by mouth nightly.        Past Medical History:   Diagnosis Date    Allergic rhinitis     ANURADHA (generalised anxiety disorder)     GERD (gastroesophageal reflux disease)     Lower  GERD    IBS (irritable bowel syndrome)     Other ill-defined conditions     C5/6 HNP    Thumb pain 6/7/2010     Social History     Social History    Marital status:      Spouse name: N/A    Number of children: N/A    Years of education: N/A     Social History Main Topics    Smoking status: Never Smoker    Smokeless tobacco: Never Used    Alcohol use Yes      Comment: few times per week    Drug use: No    Sexual activity: Yes     Partners: Male      Comment: pregnancy test negative     Other Topics Concern    None     Social History Narrative     Family History   Problem Relation Age of Onset    Cancer Mother      breast cancer    Diabetes Father     High Cholesterol Father     Cancer Maternal Aunt      breast cancer    Arthritis-osteo Maternal Grandmother      arthritis        ROS:  No numb, tingle, swell, bruise    Objective:  Visit Vitals    /80 (BP 1 Location: Left arm, BP Patient Position: Sitting)    Pulse 86    Temp 98 °F (36.7 °C) (Oral)    Resp 16    Ht 5' 3\" (1.6 m)    Wt 148 lb (67.1 kg)    SpO2 98%    BMI 26.22 kg/m2       GEN:  Appears stated age in NAD. HEAD:  Normocephalic, Atraumatic. NEURO:  Sensation intact light touch upper and lower extremities. Biceps & Triceps reflexes +2/4 bilaterally. right hand dominant. M/S:  right elbow/wrist: Negative ray tenderness. Phalen's negative. Tinel's negative. Strength +5/5 bilateral .  Piano key sign Negative bilateral .  Carpal bone motion normal.  Finklestein's negative  TFCC Load Test negative. Right lateral epicondyle(s) with TTP worsened with wrist extension. negative muscular atrophy. EXT:  no clubbing/cyanosis. no edema. Assessment/Plan:     ICD-10-CM ICD-9-CM    1. Lateral epicondylitis of right elbow M77.11 726.32      Orders Placed This Encounter    meloxicam (MOBIC) 15 mg tablet     Sig: Take 1 tab daily or 1/2 tab twice daily as needed pain with food. Dispense:  90 Tab     Refill:  0     Patient verbalizes understanding of evaluation and plan. Will use mobic and wrist brace with stretch often and RTC not improved for possible injection.

## 2017-09-20 NOTE — MR AVS SNAPSHOT
Visit Information Date & Time Provider Department Dept. Phone Encounter #  
 9/20/2017  2:20 PM Ramona DillonChristi [id number] Follow-up Instructions Return if symptoms worsen or fail to improve. Upcoming Health Maintenance Date Due DTaP/Tdap/Td series (1 - Tdap) 8/5/1989 PAP AKA CERVICAL CYTOLOGY 1/7/2016 INFLUENZA AGE 9 TO ADULT 8/1/2017 BREAST CANCER SCRN MAMMOGRAM 3/13/2018 Allergies as of 9/20/2017  Review Complete On: 9/20/2017 By: Ramona Dillon DO Severity Noted Reaction Type Reactions Milk Containing Products  08/04/2016    Diarrhea Sulfa (Sulfonamide Antibiotics)  06/07/2010    Rash Other Medication Low 06/07/2010    Diarrhea Eggs, patient eats, and gets flu shot yearly with no reaction Current Immunizations  Reviewed on 7/28/2016 Name Date Influenza Vaccine 10/7/2016, 9/26/2015 Influenza Vaccine (Quad) PF 9/15/2014 Influenza Vaccine Split 10/10/2012, 11/1/2010 Not reviewed this visit You Were Diagnosed With   
  
 Codes Comments Lateral epicondylitis of right elbow    -  Primary ICD-10-CM: M77.11 ICD-9-CM: 726.32 Vitals BP Pulse Temp Resp Height(growth percentile) Weight(growth percentile) 116/80 (BP 1 Location: Left arm, BP Patient Position: Sitting) 86 98 °F (36.7 °C) (Oral) 16 5' 3\" (1.6 m) 148 lb (67.1 kg) SpO2 BMI OB Status Smoking Status 98% 26.22 kg/m2 Having regular periods Never Smoker BMI and BSA Data Body Mass Index Body Surface Area  
 26.22 kg/m 2 1.73 m 2 Preferred Pharmacy Pharmacy Name Phone [facility], [place]  [phone] Your Updated Medication List  
  
   
This list is accurate as of: 9/20/17  2:44 PM.  Always use your most recent med list.  
  
  
  
  
 butalbital-aspirin-caffeine capsule Commonly known as:  Isabel Wynn  
 TAKE 1-2 CAPSULES BY MOUTH EVERY 4 HOURS AS NEEDED FOR HEADACHE UP TO 6 PER DAY  
  
 cefUROXime 500 mg tablet Commonly known as:  CEFTIN Take 1 Tab by mouth two (2) times a day. chlorpheniramine-HYDROcodone 10-8 mg/5 mL suspension Commonly known as:  TUSSIONEX  
TAKE 5 ML BY MOUTH EVERY 12 HOURS AS NEEDED FOR COUGH. MAX DAILY AMOUNT 10 ML.  
  
 esomeprazole 20 mg capsule Commonly known as:  Alinda Boots Take 20 mg by mouth nightly. ESTRACE 0.01 % (0.1 mg/gram) vaginal cream  
Generic drug:  estradiol FLONASE 50 mcg/actuation nasal spray Generic drug:  fluticasone 2 Sprays by Both Nostrils route daily. FLUARIX QUAD [id number] (PF) Syrg injection Generic drug:  influenza vaccine 2016-17 (36mos+)(PF)  
  
 ketotifen 0.025 % (0.035 %) ophthalmic solution Commonly known as:  ZADITOR Administer 1 Drop to both eyes two (2) times a day. loratadine 10 mg tablet Commonly known as:  Bula Holms Take 10 mg by mouth daily. meloxicam 15 mg tablet Commonly known as:  MOBIC Take 1 tab daily or 1/2 tab twice daily as needed pain with food. SIMPLY SLEEP 25 mg tablet Generic drug:  diphenhydrAMINE Take 25 mg by mouth nightly. Prescriptions Sent to Pharmacy Refills  
 meloxicam (MOBIC) 15 mg tablet 0 Sig: Take 1 tab daily or 1/2 tab twice daily as needed pain with food. Class: Normal  
 Pharmacy: [address] #: [phone] Follow-up Instructions Return if symptoms worsen or fail to improve. Patient Instructions Tennis Elbow: Exercises Your Care Instructions Here are some examples of typical rehabilitation exercises for your condition. Start each exercise slowly. Ease off the exercise if you start to have pain. Your doctor or physical therapist will tell you when you can start these exercises and which ones will work best for you. How to do the exercises Wrist flexor stretch 1. Extend your arm in front of you with your palm up. 2. Bend your wrist, pointing your hand toward the floor. 3. With your other hand, gently bend your wrist farther until you feel a mild to moderate stretch in your forearm. 4. Hold for at least 15 to 30 seconds. Repeat 2 to 4 times. Wrist extensor stretch Repeat steps 1 to 4 of the stretch above but begin with your extended hand palm down. Ball or sock squeeze 1. Hold a tennis ball (or a rolled-up sock) in your hand. 2. Make a fist around the ball (or sock) and squeeze. 3. Hold for about 6 seconds, and then relax for up to 10 seconds. 4. Repeat 8 to 12 times. 5. Switch the ball (or sock) to your other hand and do 8 to 12 times. Wrist deviation 1. Sit so that your arm is supported but your hand hangs off the edge of a flat surface, such as a table. 2. Hold your hand out like you are shaking hands with someone. 3. Move your hand up and down. 4. Repeat this motion 8 to 12 times. 5. Switch arms. 6. Try to do this exercise twice with each hand. Wrist curls 1. Place your forearm on a table with your hand hanging over the edge of the table, palm up. 2. Place a 1- to 2-pound weight in your hand. This may be a dumbbell, a can of food, or a filled water bottle. 3. Slowly raise and lower the weight while keeping your forearm on the table and your palm facing up. 4. Repeat this motion 8 to 12 times. 5. Switch arms, and do steps 1 through 4. 
6. Repeat with your hand facing down toward the floor. Switch arms. Biceps curls 1. Sit leaning forward with your legs slightly spread and your left hand on your left thigh. 2. Place your right elbow on your right thigh, and hold the weight with your forearm horizontal. 
3. Slowly curl the weight up and toward your chest. 
4. Repeat this motion 8 to 12 times. 5. Switch arms, and do steps 1 through 4. Follow-up care is a key part of your treatment and safety.  Be sure to make and go to all appointments, and call your doctor if you are having problems. It's also a good idea to know your test results and keep a list of the medicines you take. Where can you learn more? Go to http://sary-mk.info/. Enter P092 in the search box to learn more about \"Tennis Elbow: Exercises. \" Current as of: March 21, 2017 Content Version: 11.3 © 8461-5453 [employer]. Care instructions adapted under license by [employer] Exchange (which disclaims liability or warranty for this information). If you have questions about a medical condition or this instruction, always ask your healthcare professional. [address] any warranty or liability for your use of this information. Introducing hospitals & HEALTH SERVICES! Dear Madina Narayan: Thank you for requesting a [employer] account. Our records indicate that you already have an active [employer] account. You can access your account anytime at [email or web address] Did you know that you can access your hospital and ER discharge instructions at any time in [employer]? You can also review all of your test results from your hospital stay or ER visit. Additional Information If you have questions, please visit the Frequently Asked Questions section of the [employer] website at [email or web address] Remember, [employer] is NOT to be used for urgent needs. For medical emergencies, dial 911. Now available from your iPhone and Android! Please provide this summary of care documentation to your next provider. Your primary care clinician is listed as Bernette Fothergill. If you have any questions after today's visit, please call [phone].

## 2017-10-23 DIAGNOSIS — M77.11 LATERAL EPICONDYLITIS OF RIGHT ELBOW: Primary | ICD-10-CM

## 2017-11-01 ENCOUNTER — OFFICE VISIT (OUTPATIENT)
Dept: FAMILY MEDICINE CLINIC | Age: 47
End: 2017-11-01

## 2017-11-01 VITALS
RESPIRATION RATE: 20 BRPM | TEMPERATURE: 98.1 F | DIASTOLIC BLOOD PRESSURE: 82 MMHG | OXYGEN SATURATION: 99 % | HEIGHT: 63 IN | BODY MASS INDEX: 27.14 KG/M2 | WEIGHT: 153.2 LBS | HEART RATE: 70 BPM | SYSTOLIC BLOOD PRESSURE: 119 MMHG

## 2017-11-01 DIAGNOSIS — H00.011 HORDEOLUM EXTERNUM OF RIGHT UPPER EYELID: Primary | ICD-10-CM

## 2017-11-01 RX ORDER — GENTAMICIN SULFATE 3 MG/ML
1 SOLUTION/ DROPS OPHTHALMIC 3 TIMES DAILY
Qty: 15 ML | Refills: 0 | Status: SHIPPED | OUTPATIENT
Start: 2017-11-01 | End: 2018-03-30 | Stop reason: ALTCHOICE

## 2017-11-01 NOTE — PROGRESS NOTES
1. Have you been to the ER, urgent care clinic since your last visit? Hospitalized since your last visit? No    2. Have you seen or consulted any other health care providers outside of the [address] [place] [address] since your last visit? Include any pap smears or colon screening.  No

## 2017-11-01 NOTE — PROGRESS NOTES
HISTORY OF PRESENT ILLNESS  Isabella Waggoner is a 50 y.o. female. Patient presents today with eye pain. HPI  The pain is in the corner of her right eye. There is no blurry vision. Light does not bother her eye. She denies cold symptoms. Review of Systems   Constitutional: Negative. HENT: Negative. Eyes: Positive for pain (right eye). Negative for blurred vision, double vision, photophobia, discharge and redness. Respiratory: Negative. Cardiovascular: Negative. Neurological: Negative for headaches. Visit Vitals    /82 (BP 1 Location: Left arm, BP Patient Position: Sitting)    Pulse 70    Temp 98.1 °F (36.7 °C) (Oral)    Resp 20    Ht 5' 3\" (1.6 m)    Wt 153 lb 3.2 oz (69.5 kg)    LMP 10/06/2017    SpO2 99%    BMI 27.14 kg/m2       Physical Exam   Constitutional: She appears well-developed. No distress. Eyes: Conjunctivae and EOM are normal. Pupils are equal, round, and reactive to light. Right eye exhibits hordeolum (there is a red area at the medial aspect of the right eye.). Right eye exhibits no discharge. Left eye exhibits no discharge. Neck: Normal range of motion. Neck supple. Cardiovascular: Normal rate, regular rhythm and normal heart sounds. No murmur heard. Pulmonary/Chest: Effort normal and breath sounds normal. No respiratory distress. She has no wheezes. She has no rales. ASSESSMENT and PLAN    ICD-10-CM ICD-9-CM    1. Hordeolum externum of right upper eyelid H00.011 373.11 gentamicin (GARAMYCIN) 0.3 % ophthalmic solution     PLAN:  Warm compresses. Pt asked to call 11-3-17 if there is no improvement. Pt given after visit summary.

## 2017-11-01 NOTE — MR AVS SNAPSHOT
Visit Information Date & Time Provider Department Dept. Phone Encounter #  
 11/1/2017  8:45 AM Serafin Delgado NP [facility] [phone] [id number] Upcoming Health Maintenance Date Due DTaP/Tdap/Td series (1 - Tdap) 8/5/1989 PAP AKA CERVICAL CYTOLOGY 1/7/2016 BREAST CANCER SCRN MAMMOGRAM 3/13/2018 Allergies as of 11/1/2017  Review Complete On: 11/1/2017 By: Serafin Delgado NP Severity Noted Reaction Type Reactions Milk Containing Products  08/04/2016    Diarrhea Sulfa (Sulfonamide Antibiotics)  06/07/2010    Rash Other Medication Low 06/07/2010    Diarrhea Eggs, patient eats, and gets flu shot yearly with no reaction Current Immunizations  Reviewed on 7/28/2016 Name Date Influenza Vaccine 10/7/2016, 9/26/2015 Influenza Vaccine (Quad) PF 9/15/2014 Influenza Vaccine Split 10/10/2012, 11/1/2010 Not reviewed this visit You Were Diagnosed With   
  
 Codes Comments Hordeolum externum of right upper eyelid    -  Primary ICD-10-CM: H00.011 ICD-9-CM: 373.11 Vitals BP Pulse Temp Resp Height(growth percentile) Weight(growth percentile) 119/82 (BP 1 Location: Left arm, BP Patient Position: Sitting) 70 98.1 °F (36.7 °C) (Oral) 20 5' 3\" (1.6 m) 153 lb 3.2 oz (69.5 kg) LMP SpO2 BMI OB Status Smoking Status 10/06/2017 99% 27.14 kg/m2 Having regular periods Never Smoker BMI and BSA Data Body Mass Index Body Surface Area  
 27.14 kg/m 2 1.76 m 2 Preferred Pharmacy Pharmacy Name Phone [facility], [address] [place] [address] [phone] Your Updated Medication List  
  
   
This list is accurate as of: 11/1/17  9:36 AM.  Always use your most recent med list.  
  
  
  
  
 esomeprazole 20 mg capsule Commonly known as:  Bonnita Buerger Take 20 mg by mouth nightly. gentamicin 0.3 % ophthalmic solution Commonly known as:  GARAMYCIN  
 Administer 1 Drop to both eyes three (3) times daily. meloxicam 15 mg tablet Commonly known as:  MOBIC Take 1 tab daily or 1/2 tab twice daily as needed pain with food. NATURAL VITAMIN D PO Take  by mouth. SIMPLY SLEEP 25 mg tablet Generic drug:  diphenhydrAMINE Take 25 mg by mouth nightly. ZANTAC PO Take  by mouth. Prescriptions Sent to Pharmacy Refills  
 gentamicin (GARAMYCIN) 0.3 % ophthalmic solution 0 Sig: Administer 1 Drop to both eyes three (3) times daily. Class: Normal  
 Pharmacy: [address] #: [phone] Route: Both Eyes To-Do List   
 11/06/2017 9:20 AM  
  Appointment with Hector Breen DO at [facility], [address] and [facility] [address] ([phone]) Patient Instructions Styes and Chalazia: Care Instructions Your Care Instructions Styes and chalazia (say \"jpo-WXH-blq-\") are both conditions that can cause swelling of the eyelid. A stye is an infection in the root of an eyelash. The infection causes a tender red lump on the edge of the eyelid. The infection can spread until the whole eyelid becomes red and inflamed. Styes usually break open, and a tiny amount of pus drains. They usually clear up on their own in about a week, but they sometimes need treatment with antibiotics. A chalazion is a lump or cyst in the eyelid (chalazion is singular; chalazia is plural). It is caused by swelling and inflammation of deep oil glands inside the eyelid. Chalazia are usually not infected. They can take a few months to heal. 
If a chalazion becomes more swollen and painful or does not go away, you may need to have it drained by your doctor. Follow-up care is a key part of your treatment and safety. Be sure to make and go to all appointments, and call your doctor if you are having problems.  It's also a good idea to know your test results and keep a list of the medicines you take. How can you care for yourself at home? · Do not rub your eyes. Do not squeeze or try to open a stye or chalazion. · To help a stye or chalazion heal faster: ¨ Put a warm, moist compress on your eye for 5 to 10 minutes, 3 to 6 times a day. Heat often brings a stye to a point where it drains on its own. Keep in mind that warm compresses will often increase swelling a little at first. 
¨ Do not use hot water or heat a wet cloth in a microwave oven. The compress may get too hot and can burn the eyelid. · Always wash your hands before and after you use a compress or touch your eyes. · If the doctor gave you antibiotic drops or ointment, use the medicine exactly as directed. Use the medicine for as long as instructed, even if your eye starts to feel better. · To put in eyedrops or ointment: ¨ Tilt your head back, and pull your lower eyelid down with one finger. ¨ Drop or squirt the medicine inside the lower lid. ¨ Close your eye for 30 to 60 seconds to let the drops or ointment move around. ¨ Do not touch the ointment or dropper tip to your eyelashes or any other surface. · Do not wear eye makeup or contact lenses until the stye or chalazion heals. · Do not share towels, pillows, or washcloths while you have a stye. When should you call for help? Call your doctor now or seek immediate medical care if: 
? · You have pain in your eye.  
? · You have a change in vision or loss of vision. ? · Redness and swelling get much worse. ? Watch closely for changes in your health, and be sure to contact your doctor if: 
? · Your stye does not get better in 1 week. ? · Your chalazion does not start to get better after several weeks. Where can you learn more? Go to http://sary-mk.info/. Enter C952 in the search box to learn more about \"Styes and Chalazia: Care Instructions. \" Current as of: March 3, 2017 Content Version: 11.4 © 3749-3163 [employer]. Care instructions adapted under license by [employer] (which disclaims liability or warranty for this information). If you have questions about a medical condition or this instruction, always ask your healthcare professional. [address] any warranty or liability for your use of this information. Introducing [facility] & HEALTH SERVICES! Dear Judith Patient: Thank you for requesting a Splunk account. Our records indicate that you already have an active Splunk account. You can access your account anytime at [email or web address] Did you know that you can access your hospital and ER discharge instructions at any time in Splunk? You can also review all of your test results from your hospital stay or ER visit. Additional Information If you have questions, please visit the Frequently Asked Questions section of the Splunk website at [email or web address] Remember, Splunk is NOT to be used for urgent needs. For medical emergencies, dial 911. Now available from your iPhone and Android! Please provide this summary of care documentation to your next provider. Your primary care clinician is listed as Jadon Banks. If you have any questions after today's visit, please call [phone].

## 2017-11-01 NOTE — PATIENT INSTRUCTIONS
Styes and Chalazia: Care Instructions  Your Care Instructions    Styes and chalazia (say \"wqx-NRQ-wpu-uh\") are both conditions that can cause swelling of the eyelid. A stye is an infection in the root of an eyelash. The infection causes a tender red lump on the edge of the eyelid. The infection can spread until the whole eyelid becomes red and inflamed. Styes usually break open, and a tiny amount of pus drains. They usually clear up on their own in about a week, but they sometimes need treatment with antibiotics. A chalazion is a lump or cyst in the eyelid (chalazion is singular; chalazia is plural). It is caused by swelling and inflammation of deep oil glands inside the eyelid. Chalazia are usually not infected. They can take a few months to heal.  If a chalazion becomes more swollen and painful or does not go away, you may need to have it drained by your doctor. Follow-up care is a key part of your treatment and safety. Be sure to make and go to all appointments, and call your doctor if you are having problems. It's also a good idea to know your test results and keep a list of the medicines you take. How can you care for yourself at home? · Do not rub your eyes. Do not squeeze or try to open a stye or chalazion. · To help a stye or chalazion heal faster:  ¨ Put a warm, moist compress on your eye for 5 to 10 minutes, 3 to 6 times a day. Heat often brings a stye to a point where it drains on its own. Keep in mind that warm compresses will often increase swelling a little at first.  ¨ Do not use hot water or heat a wet cloth in a microwave oven. The compress may get too hot and can burn the eyelid. · Always wash your hands before and after you use a compress or touch your eyes. · If the doctor gave you antibiotic drops or ointment, use the medicine exactly as directed. Use the medicine for as long as instructed, even if your eye starts to feel better.   · To put in eyedrops or ointment:  ¨ Tilt your head back, and pull your lower eyelid down with one finger. ¨ Drop or squirt the medicine inside the lower lid. ¨ Close your eye for 30 to 60 seconds to let the drops or ointment move around. ¨ Do not touch the ointment or dropper tip to your eyelashes or any other surface. · Do not wear eye makeup or contact lenses until the stye or chalazion heals. · Do not share towels, pillows, or washcloths while you have a stye. When should you call for help? Call your doctor now or seek immediate medical care if:  ? · You have pain in your eye.   ? · You have a change in vision or loss of vision. ? · Redness and swelling get much worse. ? Watch closely for changes in your health, and be sure to contact your doctor if:  ? · Your stye does not get better in 1 week. ? · Your chalazion does not start to get better after several weeks. Where can you learn more? Go to http://sary-mk.info/. Enter M301 in the search box to learn more about \"Styes and Chalazia: Care Instructions. \"  Current as of: March 3, 2017  Content Version: 11.4  © 1801-9593 [employer]. Care instructions adapted under license by Elite Daily (which disclaims liability or warranty for this information). If you have questions about a medical condition or this instruction, always ask your healthcare professional. [place] [address] any warranty or liability for your use of this information.

## 2017-11-06 ENCOUNTER — OFFICE VISIT (OUTPATIENT)
Dept: ORTHOPEDIC SURGERY | Age: 47
End: 2017-11-06

## 2017-11-06 VITALS
TEMPERATURE: 98.2 F | DIASTOLIC BLOOD PRESSURE: 75 MMHG | BODY MASS INDEX: 26.93 KG/M2 | RESPIRATION RATE: 15 BRPM | HEIGHT: 63 IN | HEART RATE: 70 BPM | WEIGHT: 152 LBS | SYSTOLIC BLOOD PRESSURE: 111 MMHG

## 2017-11-06 DIAGNOSIS — M25.522 ELBOW PAIN, LEFT: ICD-10-CM

## 2017-11-06 DIAGNOSIS — M77.11 LATERAL EPICONDYLITIS OF RIGHT ELBOW: Primary | ICD-10-CM

## 2017-11-06 NOTE — MR AVS SNAPSHOT
Visit Information Date & Time Provider Department Dept. Phone Encounter #  
 11/6/2017  9:20 AM Arlette Holbrook, [address] [facility] [phone] [id number] Follow-up Instructions Return in about 6 weeks (around 12/18/2017) for right tennis elbow. Routing History Follow-up and Disposition History Upcoming Health Maintenance Date Due DTaP/Tdap/Td series (1 - Tdap) 8/5/1989 PAP AKA CERVICAL CYTOLOGY 1/7/2016 BREAST CANCER SCRN MAMMOGRAM 3/13/2018 Allergies as of 11/6/2017  Review Complete On: 11/6/2017 By: Arlette Holbrook DO Severity Noted Reaction Type Reactions Milk Containing Products  08/04/2016    Diarrhea Sulfa (Sulfonamide Antibiotics)  06/07/2010    Rash Other Medication Low 06/07/2010    Diarrhea Eggs, patient eats, and gets flu shot yearly with no reaction Current Immunizations  Reviewed on 7/28/2016 Name Date Influenza Vaccine 10/7/2016, 9/26/2015 Influenza Vaccine (Quad) PF 9/15/2014 Influenza Vaccine Split 10/10/2012, 11/1/2010 Not reviewed this visit You Were Diagnosed With   
  
 Codes Comments Lateral epicondylitis of right elbow    -  Primary ICD-10-CM: M77.11 ICD-9-CM: 726.32 Elbow pain, left     ICD-10-CM: K99.404 ICD-9-CM: 719.42 Vitals BP Pulse Temp Resp Height(growth percentile) Weight(growth percentile) 111/75 70 98.2 °F (36.8 °C) 15 5' 3\" (1.6 m) 152 lb (68.9 kg) LMP BMI OB Status Smoking Status 10/06/2017 26.93 kg/m2 Having regular periods Never Smoker Vitals History BMI and BSA Data Body Mass Index Body Surface Area  
 26.93 kg/m 2 1.75 m 2 Preferred Pharmacy Pharmacy Name Phone [facility], [address] [place] [address] [phone] Your Updated Medication List  
  
   
This list is accurate as of: 11/6/17  9:38 AM.  Always use your most recent med list.  
  
  
  
  
 esomeprazole 20 mg capsule Commonly known as:  Korin Pandya Take 20 mg by mouth nightly. gentamicin 0.3 % ophthalmic solution Commonly known as:  GARAMYCIN Administer 1 Drop to both eyes three (3) times daily. meloxicam 15 mg tablet Commonly known as:  MOBIC Take 1 tab daily or 1/2 tab twice daily as needed pain with food. NATURAL VITAMIN D PO Take  by mouth. SIMPLY SLEEP 25 mg tablet Generic drug:  diphenhydrAMINE Take 25 mg by mouth nightly. ZANTAC PO Take  by mouth. We Performed the Following ARTHROCENTESIS ASPIR&/INJ INTERM JT/BURS W/US [05698 CPT(R)] Follow-up Instructions Return in about 6 weeks (around 12/18/2017) for right tennis elbow. Introducing [facility] [place] & [facility] SERVICES! Dear Malena Buenrostro: Thank you for requesting a View and Chew account. Our records indicate that you already have an active View and Chew account. You can access your account anytime at [email or web address] Did you know that you can access your hospital and ER discharge instructions at any time in View and Chew? You can also review all of your test results from your hospital stay or ER visit. Additional Information If you have questions, please visit the Frequently Asked Questions section of the View and Chew website at [email or web address] Remember, View and Chew is NOT to be used for urgent needs. For medical emergencies, dial 911. Now available from your iPhone and Android! Please provide this summary of care documentation to your next provider. If you have any questions after today's visit, please call [phone].

## 2017-11-06 NOTE — PROGRESS NOTES
HISTORY OF PRESENT ILLNESS    Don Rangel is a 50y.o. year old female comes in today to be evaluated and treated for: right elbow pain    Has been doing HEP and wearing brace for right tennis elbow but still pain 3/10. No significant activity and no numbness. Social History     Social History    Marital status:      Spouse name: N/A    Number of children: N/A    Years of education: N/A     Social History Main Topics    Smoking status: Never Smoker    Smokeless tobacco: Never Used    Alcohol use Yes      Comment: few times per week    Drug use: No    Sexual activity: Yes     Partners: Male      Comment: pregnancy test negative     Other Topics Concern    None     Social History Narrative     Current Outpatient Prescriptions   Medication Sig Dispense Refill    RANITIDINE HCL (ZANTAC PO) Take  by mouth.  VITAMIN A/VITAMIN D3 (NATURAL VITAMIN D PO) Take  by mouth.  gentamicin (GARAMYCIN) 0.3 % ophthalmic solution Administer 1 Drop to both eyes three (3) times daily. 15 mL 0    meloxicam (MOBIC) 15 mg tablet Take 1 tab daily or 1/2 tab twice daily as needed pain with food. 90 Tab 0    esomeprazole (NEXIUM) 20 mg capsule Take 20 mg by mouth nightly.  diphenhydrAMINE (SIMPLY SLEEP) 25 mg tablet Take 25 mg by mouth nightly. 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    Thumb pain 6/7/2010     Family History   Problem Relation Age of Onset    Cancer Mother      breast cancer    Diabetes Father     High Cholesterol Father     Cancer Maternal Aunt      breast cancer    Arthritis-osteo Maternal Grandmother      arthritis          ROS:  See HPI.   Some swell    Objective:  Visit Vitals    /75    Pulse 70    Temp 98.2 °F (36.8 °C)    Resp 15    Ht 5' 3\" (1.6 m)    [employer] 152 lb (68.9 kg)    LMP 10/06/2017    BMI 26.93 kg/m2     GEN:  Appears stated age in NAD. HEAD:  Normocephalic, Atraumatic. NEURO:  Sensation intact light touch upper and lower extremities. Biceps & Triceps reflexes +2/4 bilaterally. right hand dominant. M/S:  right elbow Negative ray tenderness. Phalen's negative. Tinel's negative. Strength +5/5 bilateral .  Piano key sign Negative bilateral .  Carpal bone motion normal.  Finklestein's negative  TFCC Load Test negative. RIGHT lateral epicondyle(s) with TTP significantly worsened with wrist flexion. negative muscular atrophy. EXT:  no clubbing/cyanosis. no edema. Assessment/Plan:     ICD-10-CM ICD-9-CM    1. Lateral epicondylitis of right elbow M77.11 726.32 ARTHROCENTESIS ASPIR&/INJ INTERM JT/BURS W/US   2. Elbow pain, left M25.522 719.42        Patient verbalizes understanding of evaluation and plan. Will inject today and continue current Tx and RTC 6 weeks.

## 2017-11-06 NOTE — PROCEDURES
PROCEDURE NOTE:  Time out: 933am  * Patient was identified by name and date of birth   * Agreement on procedure being performed was verified  * Risks and Benefits explained to the patient  * Procedure site verified and marked as necessary  * Patient was positioned for comfort  * Consent was signed and verified. Risks/benefits including but not limited to bleeding, infection, and scarring discussed and Pt wishes to proceed with procedure. The area was prepped with betadine. Under sterile technique  and with ultrasound guidance 1cc of 40mg/cc depomedrol and 1cc 1% lidocaine were injected into point of maximal tenderness of right lateral epicondyle. Sterile gauze used to clean the area. Blood loss minimal.  Noticed improvement in pain Sx within 5 minutes (now rated 0/10). Tolerated procedure well. Discussed possible signs/Sx of infxn, and advised to seek care if concerned.

## 2017-11-06 NOTE — LETTER
NAME: Asia Hayden : 1970 MRN: [id number] CONSENT FOR TREATMENT/PROCEDURE I authorize Lucius Gore DO at [address] [place] [address] [facility] to perform the medical treatment and/or procedure(s) described below:  
 
Description of Medical Treatment and/or Procedure(s): (Specify number of treatments or procedures if repeated treatment is recommended) 
 
_____________inject steroid into right elbow lateral epicondyle_________________________ I understand my provider may be assisted with significant procedural tasks by other qualified medical practitioners, who may perform important parts of the treatment/procedure(s) or assist with the administration of analgesia (pain-relieving medications) as necessary for my treatment/procedure(s). These practitioners will only perform tasks within the scope of their licensure and practice, as determined under [place] law and any other applicable regulation(s). Name and Credentials of Practitioners Who May Assist with My Treatment/Procedure(s):  
 
______________________________________________________________________ I understand that a medical company representative may be present during my treatment/procedure(s) to observe and provide verbal, technical advice to my provider. I consent to the participation of this representative in my treatment/procedure(s). My provider has explained that unforeseen conditions may be identified during the performance of my treatment/procedure(s) that necessitate an extension of the original treatment/procedure(s) or the performance of procedures other than those identified above. I consent to the performance of additional treatment/procedure(s) by my provider and the qualified medical practitioners assisting my provider as deemed necessary by my provider for treatment of my medical condition(s).   
 
I understand that certain complications may arise during the use of analgesia during my treatment/procedure(s) that may include, but are not limited to, decreased/altered awareness, respiratory problems, drug reactions, paralysis, brain damage, or possibly, death. I understand that the analgesia required for the performance of my treatment/procedure(s) involves additional risks beyond those of the treatment/procedure(s) to be performed, and authorize the use of analgesia by my provider as deemed necessary for the control of pain during my treatment/procedure(s). I understand that my provider may need to change the type of analgesia or medications used, possibly without explanation to me, and I consent to any such changes as deemed necessary by my provider for treatment of my medical condition(s). I understand that there are risks of infection and other unexpected complications associated with any medical or surgical treatment/procedure including the treatment/procedure(s) listed above or other treatment/procedure(s) necessitated by my medical condition, and that such complications may occur in the absence of any negligence on the part of my healthcare providers. My provider has explained to my satisfaction my medical condition and the specific treatment/procedure(s) recommended and identified above. I have been given an opportunity to ask and have answered to my satisfaction questions about: (CONTINUED PAGE 2) NAME: Michele Landin : 1970 MRN: [id number]  The nature and extent of the treatment/procedure(s) to be performed;  The benefit of treatment, and the risks associated with not having the recommended treatment/procedure(s);  The risks and possible complications associated with having the treatment, including those which, even though   
   unlikely to occur, may involve serious consequences;  
 Analgesia and alternative forms of analgesia;  Alternative procedures and methods of treatment, and the risk associated with each;  
  The expected consequences of the treatment/procedure(s) on my future health. I understand that no assurance can be given that the treatment/procedure(s) performed will be a success, and no guarantee or warranty of success for the treatment/procedure(s) has been given to me by my provider. I consent to the disposal by the examining Pathologist of any tissue removed during my treatment/procedure(s) in accordance with the receiving hospitals or laboratorys policy and any associated regulations specific to such disposal.  
 
I DO__x___  DO NOT______ consent to other health care personnel observing my treatment/procedure(s) for the purpose of medical education or other specified purposes as may be explained by my provider. I DO_______ DO NOT____x____ consent to photography or videotaping of all or any part of my treatment/procedure(s) for medical and/or educational purposes. I understand that my identity will not be revealed in any photographs, videos, or accompanying explanations should these images be used by my healthcare providers. I certify that I have read and fully understand the above Consent for Treatment/Procedure and that all blanks were completed before I signed this consent.  
 
__________Alexandra JEROME Felisa_________                      _______________ Print Name of Patient or Legal Representative        Relationship to Patient (if not self) X_______________________________            __________________________ Signature of Patient (or legal representative)  Witness to signature __11/6/2017___/_________AM/PM 
                                                                   Date/Time (If patient is a minor or is unable to sign, complete the following) Patient is a minor, ________ years of age, or is unable to sign due to:______________________ I have explained the nature, purpose and anticipated benefits as well as any possible alternative methods of treatment, the known risks that are involved, and the possibility of complications of the proposed procedure(s) to the patient or patients legal representative. I have provided the patient or legal representative with an opportunity to ask and have answered to their satisfaction any questions about the proposed treatment/procedure(s) and alternative methods of treatment.   
 
Provider Signature:___________________  Date:__11/6/2017__Time:_____________AM/PM

## 2017-11-08 RX ORDER — LIDOCAINE HYDROCHLORIDE 20 MG/ML
1 INJECTION, SOLUTION EPIDURAL; INFILTRATION; INTRACAUDAL; PERINEURAL ONCE
Qty: 1 ML | Refills: 0
Start: 2017-11-08 | End: 2017-11-08

## 2017-11-08 RX ORDER — METHYLPREDNISOLONE ACETATE 40 MG/ML
40 INJECTION, SUSPENSION INTRA-ARTICULAR; INTRALESIONAL; INTRAMUSCULAR; SOFT TISSUE ONCE
Qty: 1 VIAL | Refills: 0
Start: 2017-11-08 | End: 2017-11-08

## 2017-12-05 ENCOUNTER — OFFICE VISIT (OUTPATIENT)
Dept: FAMILY MEDICINE CLINIC | Age: 47
End: 2017-12-05

## 2017-12-05 VITALS
WEIGHT: 150 LBS | TEMPERATURE: 98.3 F | DIASTOLIC BLOOD PRESSURE: 76 MMHG | HEIGHT: 63 IN | RESPIRATION RATE: 18 BRPM | BODY MASS INDEX: 26.58 KG/M2 | OXYGEN SATURATION: 98 % | HEART RATE: 70 BPM | SYSTOLIC BLOOD PRESSURE: 104 MMHG

## 2017-12-05 DIAGNOSIS — M25.511 ACUTE PAIN OF RIGHT SHOULDER: Primary | ICD-10-CM

## 2017-12-05 NOTE — MR AVS SNAPSHOT
Visit Information Date & Time Provider Department Dept. Phone Encounter #  
 12/5/2017 11:30 AM [place] [facility] [phone] [id number] Follow-up Instructions Return if symptoms worsen or fail to improve. Upcoming Health Maintenance Date Due DTaP/Tdap/Td series (1 - Tdap) 8/5/1989 PAP AKA CERVICAL CYTOLOGY 1/7/2016 BREAST CANCER SCRN MAMMOGRAM 3/13/2018 Allergies as of 12/5/2017  Review Complete On: 11/6/2017 By: Gracie Hernandez DO Severity Noted Reaction Type Reactions Milk Containing Products  08/04/2016    Diarrhea Sulfa (Sulfonamide Antibiotics)  06/07/2010    Rash Other Medication Low 06/07/2010    Diarrhea Eggs, patient eats, and gets flu shot yearly with no reaction Current Immunizations  Reviewed on 7/28/2016 Name Date Influenza Vaccine 10/7/2016, 9/26/2015 Influenza Vaccine (Quad) PF 9/15/2014 Influenza Vaccine Split 10/10/2012, 11/1/2010 Not reviewed this visit You Were Diagnosed With   
  
 Codes Comments Acute pain of right shoulder    -  Primary ICD-10-CM: M25.511 ICD-9-CM: 719.41 Vitals BP Pulse Temp Resp Height(growth percentile) Weight(growth percentile) 104/76 (BP 1 Location: Left arm, BP Patient Position: Sitting) 70 98.3 °F (36.8 °C) (Oral) 18 5' 3\" (1.6 m) 150 lb (68 kg) SpO2 BMI OB Status Smoking Status 98% 26.57 kg/m2 Having regular periods Never Smoker Vitals History BMI and BSA Data Body Mass Index Body Surface Area  
 26.57 kg/m 2 1.74 m 2 Preferred Pharmacy Pharmacy Name Phone [facility] [place] [address] [facility] [phone] Your Updated Medication List  
  
   
This list is accurate as of: 12/5/17 12:03 PM.  Always use your most recent med list.  
  
  
  
  
 esomeprazole 20 mg capsule Commonly known as:  Montine Likes Take 20 mg by mouth nightly. gentamicin 0.3 % ophthalmic solution Commonly known as:  GARAMYCIN Administer 1 Drop to both eyes three (3) times daily. meloxicam 15 mg tablet Commonly known as:  MOBIC Take 1 tab daily or 1/2 tab twice daily as needed pain with food. NATURAL VITAMIN D PO Take  by mouth. SIMPLY SLEEP 25 mg tablet Generic drug:  diphenhydrAMINE Take 25 mg by mouth nightly. ZANTAC PO Take  by mouth. Follow-up Instructions Return if symptoms worsen or fail to improve. Patient Instructions Arm Pain: Care Instructions Your Care Instructions You can hurt your arm by using it too much or by injuring it. Biking, wrestling, and home repair projects are examples of activities that can lead to arm pain. Everyday wear and tear, especially as you get older, can cause arm pain. Your forearms, wrists, hands, and fingers are the parts of your arm that are most likely to become painful. A minor arm injury usually will heal on its own with home treatment to relieve swelling and pain. If you have a more serious injury, you may need tests and treatment. Follow-up care is a key part of your treatment and safety. Be sure to make and go to all appointments, and call your doctor if you are having problems. It's also a good idea to know your test results and keep a list of the medicines you take. How can you care for yourself at home? · Take pain medicines exactly as directed. ¨ If the doctor gave you a prescription medicine for pain, take it as prescribed. ¨ If you are not taking a prescription pain medicine, ask your doctor if you can take an over-the-counter medicine. · Rest and protect your arm. Take a break from any activity that may cause pain. · Put ice or a cold pack on your arm for 10 to 20 minutes at a time. Put a thin cloth between the ice and your skin.  
· Prop up the sore arm on a pillow when you ice it or anytime you sit or lie down during the next 3 days. Try to keep it above the level of your heart. This will help reduce swelling. · If your doctor recommends a sling to support your arm, wear it as directed. When should you call for help? Call 911 anytime you think you may need emergency care. For example, call if: 
? · Your arm or hand is cool or pale or changes color. ?Call your doctor now or seek immediate medical care if: 
? · You cannot use your arm. ? · You have signs of infection, such as: 
¨ Increased pain, swelling, warmth, or redness. ¨ Red streaks running up or down your arm. ¨ Pus draining from an area of your arm. ¨ A fever. ? · You have tingling, weakness, or numbness in your arm. ? Watch closely for changes in your health, and be sure to contact your doctor if: 
? · You do not get better as expected. Where can you learn more? Go to http://sary-mk.info/. Enter B641 in the search box to learn more about \"Arm Pain: Care Instructions. \" Current as of: March 20, 2017 Content Version: 11.4 © 7353-7944 [employer]. Care instructions adapted under license by [employer] (which disclaims liability or warranty for this information). If you have questions about a medical condition or this instruction, always ask your healthcare professional. [place] [address] any warranty or liability for your use of this information. Rotator Cuff: Exercises Your Care Instructions Here are some examples of typical rehabilitation exercises for your condition. Start each exercise slowly. Ease off the exercise if you start to have pain. Your doctor or physical therapist will tell you when you can start these exercises and which ones will work best for you. How to do the exercises Pendulum swing If you have pain in your back, do not do this exercise. 1. Hold on to a table or the back of a chair with your good arm.  Then bend forward a little and let your sore arm hang straight down. This exercise does not use the arm muscles. Rather, use your legs and your hips to create movement that makes your arm swing freely. 2. Use the movement from your hips and legs to guide the slightly swinging arm back and forth like a pendulum (or elephant trunk). Then guide it in circles that start small (about the size of a dinner plate). Make the circles a bit larger each day, as your pain allows. 3. Do this exercise for 5 minutes, 5 to 7 times each day. 4. As you have less pain, try bending over a little farther to do this exercise. This will increase the amount of movement at your shoulder. Posterior stretching exercise 1. Hold the elbow of your injured arm with your other hand. 2. Use your hand to pull your injured arm gently up and across your body. You will feel a gentle stretch across the back of your injured shoulder. 3. Hold for at least 15 to 30 seconds. Then slowly lower your arm. 4. Repeat 2 to 4 times. Up-the-back stretch Your doctor or physical therapist may want you to wait to do this stretch until you have regained most of your range of motion and strength. You can do this stretch in different ways. Hold any of these stretches for at least 15 to 30 seconds. Repeat them 2 to 4 times. 1. Put your hand in your back pocket. Let it rest there to stretch your shoulder. 2. With your other hand, hold your injured arm (palm outward) behind your back by the wrist. Pull your arm up gently to stretch your shoulder. 3. Next, put a towel over your other shoulder. Put the hand of your injured arm behind your back. Now hold the back end of the towel. With the other hand, hold the front end of the towel in front of your body. Pull gently on the front end of the towel. This will bring your hand farther up your back to stretch your shoulder. Overhead stretch 1. Standing about an arm's length away, grasp onto a solid surface.  You could use a countertop, a doorknob, or the back of a sturdy chair. 2. With your knees slightly bent, bend forward with your arms straight. Lower your upper body, and let your shoulders stretch. 3. As your shoulders are able to stretch farther, you may need to take a step or two backward. 4. Hold for at least 15 to 30 seconds. Then stand up and relax. If you had stepped back during your stretch, step forward so you can keep your hands on the solid surface. 5. Repeat 2 to 4 times. Shoulder flexion (lying down) To make a wand for this exercise, use a piece of PVC pipe or a broom handle with the broom removed. Make the wand about a foot wider than your shoulders. 1. Lie on your back, holding a wand with both hands. Your palms should face down as you hold the wand. 2. Keeping your elbows straight, slowly raise your arms over your head. Raise them until you feel a stretch in your shoulders, upper back, and chest. 
3. Hold for 15 to 30 seconds. 4. Repeat 2 to 4 times. Shoulder rotation (lying down) To make a wand for this exercise, use a piece of PVC pipe or a broom handle with the broom removed. Make the wand about a foot wider than your shoulders. 1. Lie on your back. Hold a wand with both hands with your elbows bent and palms up. 2. Keep your elbows close to your body, and move the wand across your body toward the sore arm. 3. Hold for 8 to 12 seconds. 4. Repeat 2 to 4 times. Wall climbing (to the side) Avoid any movement that is straight to your side, and be careful not to arch your back. Your arm should stay about 30 degrees to the front of your side. 1. Stand with your side to a wall so that your fingers can just touch it at an angle about 30 degrees toward the front of your body. 2. Walk the fingers of your injured arm up the wall as high as pain permits. Try not to shrug your shoulder up toward your ear as you move your arm up. 3. Hold that position for a count of at least 15 to 20. 4. Walk your fingers back down to the starting position. 5. Repeat at least 2 to 4 times. Try to reach higher each time. Wall climbing (to the front) During this stretching exercise, be careful not to arch your back. 1. Face a wall, and stand so your fingers can just touch it. 2. Keeping your shoulder down, walk the fingers of your injured arm up the wall as high as pain permits. (Don't shrug your shoulder up toward your ear.) 3. Hold your arm in that position for at least 15 to 30 seconds. 4. Slowly walk your fingers back down to where you started. 5. Repeat at least 2 to 4 times. Try to reach higher each time. Shoulder blade squeeze 1. Stand with your arms at your sides, and squeeze your shoulder blades together. Do not raise your shoulders up as you squeeze. 2. Hold 6 seconds. 3. Repeat 8 to 12 times. Scapular exercise: Arm reach 1. Lie flat on your back. This exercise is a very slight motion that starts with your arms raised (elbows straight, arms straight). 2. From this position, reach higher toward the archie or ceiling. Keep your elbows straight. All motion should be from your shoulder blade only. 3. Relax your arms back to where you started. 4. Repeat 8 to 12 times. Arm raise to the side During this strengthening exercise, your arm should stay about 30 degrees to the front of your side. 1. Slowly raise your injured arm to the side, with your thumb facing up. Raise your arm 60 degrees at the most (shoulder level is 90 degrees). 2. Hold the position for 3 to 5 seconds. Then lower your arm back to your side. If you need to, bring your \"good\" arm across your body and place it under the elbow as you lower your injured arm. Use your good arm to keep your injured arm from dropping down too fast. 
3. Repeat 8 to 12 times. 4. When you first start out, don't hold any extra weight in your hand. As you get stronger, you may use a 1-pound to 2-pound dumbbell or a small can of food. Shoulder flexor and extensor exercise These are isometric exercises. That means you contract your muscles without actually moving. 1. Push forward (flex): Stand facing a wall or doorjamb, about 6 inches or less back. Hold your injured arm against your body. Make a closed fist with your thumb on top. Then gently push your hand forward into the wall with about 25% to 50% of your strength. Don't let your body move backward as you push. Hold for about 6 seconds. Relax for a few seconds. Repeat 8 to 12 times. 2. Push backward (extend): Stand with your back flat against a wall. Your upper arm should be against the wall, with your elbow bent 90 degrees (your hand straight ahead). Push your elbow gently back against the wall with about 25% to 50% of your strength. Don't let your body move forward as you push. Hold for about 6 seconds. Relax for a few seconds. Repeat 8 to 12 times. Scapular exercise: Wall push-ups This exercise is best done with your fingers somewhat turned out, rather than straight up and down. 1. Stand facing a wall, about 12 inches to 18 inches away. 2. Place your hands on the wall at shoulder height. 3. Slowly bend your elbows and bring your face to the wall. Keep your back and hips straight. 4. Push back to where you started. 5. Repeat 8 to 12 times. 6. When you can do this exercise against a wall comfortably, you can try it against a counter. You can then slowly progress to the end of a couch, then to a sturdy chair, and finally to the floor. Scapular exercise: Retraction For this exercise, you will need elastic exercise material, such as surgical tubing or Thera-Band. 1. Put the band around a solid object at about waist level. (A bedpost will work well.) Each hand should hold an end of the band. 2. With your elbows at your sides and bent to 90 degrees, pull the band back. Your shoulder blades should move toward each other. Then move your arms back where you started. 3. Repeat 8 to 12 times. 4. If you have good range of motion in your shoulders, try this exercise with your arms lifted out to the sides. Keep your elbows at a 90-degree angle. Raise the elastic band up to about shoulder level. Pull the band back to move your shoulder blades toward each other. Then move your arms back where you started. Internal rotator strengthening exercise 1. Start by tying a piece of elastic exercise material to a doorknob. You can use surgical tubing or Thera-Band. 2. Stand or sit with your shoulder relaxed and your elbow bent 90 degrees. Your upper arm should rest comfortably against your side. Squeeze a rolled towel between your elbow and your body for comfort. This will help keep your arm at your side. 3. Hold one end of the elastic band in the hand of the painful arm. 4. Slowly rotate your forearm toward your body until it touches your belly. Slowly move it back to where you started. 5. Keep your elbow and upper arm firmly tucked against the towel roll or at your side. 6. Repeat 8 to 12 times. External rotator strengthening exercise 1. Start by tying a piece of elastic exercise material to a doorknob. You can use surgical tubing or Thera-Band. (You may also hold one end of the band in each hand.) 2. Stand or sit with your shoulder relaxed and your elbow bent 90 degrees. Your upper arm should rest comfortably against your side. Squeeze a rolled towel between your elbow and your body for comfort. This will help keep your arm at your side. 3. Hold one end of the elastic band with the hand of the painful arm. 4. Start with your forearm across your belly. Slowly rotate the forearm out away from your body. Keep your elbow and upper arm tucked against the towel roll or the side of your body until you begin to feel tightness in your shoulder. Slowly move your arm back to where you started. 5. Repeat 8 to 12 times. Follow-up care is a key part of your treatment and safety. Be sure to make and go to all appointments, and call your doctor if you are having problems. It's also a good idea to know your test results and keep a list of the medicines you take. Where can you learn more? Go to http://sary-mk.info/. Enter Barbara Westfall in the search box to learn more about \"Rotator Cuff: Exercises. \" Current as of: March 21, 2017 Content Version: 11.4 © 7585-9250 [employer]. Care instructions adapted under license by [employer] (which disclaims liability or warranty for this information). If you have questions about a medical condition or this instruction, always ask your healthcare professional. [address] any warranty or liability for your use of this information. Introducing [facility]! Dear Jarrett Trujillo: Thank you for requesting a StoreDot account. Our records indicate that you already have an active StoreDot account. You can access your account anytime at [email or web address] Did you know that you can access your hospital and ER discharge instructions at any time in StoreDot? You can also review all of your test results from your hospital stay or ER visit. Additional Information If you have questions, please visit the Frequently Asked Questions section of the StoreDot website at [email or web address] Remember, StoreDot is NOT to be used for urgent needs. For medical emergencies, dial 911. Now available from your iPhone and Android! Please provide this summary of care documentation to your next provider. If you have any questions after today's visit, please call [phone].

## 2017-12-05 NOTE — PROGRESS NOTES
Patient is in the office today for right arm pain x 2 weeks. Patient states she received a injection from Dr. Eze Luo for tennis elbow. Patient states she did have some lymph nodes removed from the same arm and does not know if what she is experiencing is lymphedema. 1. Have you been to the ER, urgent care clinic since your last visit? Hospitalized since your last visit? No    2. Have you seen or consulted any other health care providers outside of the [address] [place] [address] since your last visit? Include any pap smears or colon screening. Yes, [facility], [facility]NT for Reflux.

## 2017-12-05 NOTE — PATIENT INSTRUCTIONS
Arm Pain: Care Instructions  Your Care Instructions    You can hurt your arm by using it too much or by injuring it. Biking, wrestling, and home repair projects are examples of activities that can lead to arm pain. Everyday wear and tear, especially as you get older, can cause arm pain. Your forearms, wrists, hands, and fingers are the parts of your arm that are most likely to become painful. A minor arm injury usually will heal on its own with home treatment to relieve swelling and pain. If you have a more serious injury, you may need tests and treatment. Follow-up care is a key part of your treatment and safety. Be sure to make and go to all appointments, and call your doctor if you are having problems. It's also a good idea to know your test results and keep a list of the medicines you take. How can you care for yourself at home? · Take pain medicines exactly as directed. ¨ If the doctor gave you a prescription medicine for pain, take it as prescribed. ¨ If you are not taking a prescription pain medicine, ask your doctor if you can take an over-the-counter medicine. · Rest and protect your arm. Take a break from any activity that may cause pain. · Put ice or a cold pack on your arm for 10 to 20 minutes at a time. Put a thin cloth between the ice and your skin. · Prop up the sore arm on a pillow when you ice it or anytime you sit or lie down during the next 3 days. Try to keep it above the level of your heart. This will help reduce swelling. · If your doctor recommends a sling to support your arm, wear it as directed. When should you call for help? Call 911 anytime you think you may need emergency care. For example, call if:  ? · Your arm or hand is cool or pale or changes color. ?Call your doctor now or seek immediate medical care if:  ? · You cannot use your arm. ? · You have signs of infection, such as:  ¨ Increased pain, swelling, warmth, or redness.   ¨ Red streaks running up or down your arm.  ¨ Pus draining from an area of your arm. ¨ A fever. ? · You have tingling, weakness, or numbness in your arm. ? Watch closely for changes in your health, and be sure to contact your doctor if:  ? · You do not get better as expected. Where can you learn more? Go to http://sary-mk.info/. Enter B641 in the search box to learn more about \"Arm Pain: Care Instructions. \"  Current as of: March 20, 2017  Content Version: 11.4  © 8233-2809 [employer]. Care instructions adapted under license by [employer] (which disclaims liability or warranty for this information). If you have questions about a medical condition or this instruction, always ask your healthcare professional. [place] [address] any warranty or liability for your use of this information. Rotator Cuff: Exercises  Your Care Instructions  Here are some examples of typical rehabilitation exercises for your condition. Start each exercise slowly. Ease off the exercise if you start to have pain. Your doctor or physical therapist will tell you when you can start these exercises and which ones will work best for you. How to do the exercises  Pendulum swing    If you have pain in your back, do not do this exercise. 1. Hold on to a table or the back of a chair with your good arm. Then bend forward a little and let your sore arm hang straight down. This exercise does not use the arm muscles. Rather, use your legs and your hips to create movement that makes your arm swing freely. 2. Use the movement from your hips and legs to guide the slightly swinging arm back and forth like a pendulum (or elephant trunk). Then guide it in circles that start small (about the size of a dinner plate). Make the circles a bit larger each day, as your pain allows. 3. Do this exercise for 5 minutes, 5 to 7 times each day. 4. As you have less pain, try bending over a little farther to do this exercise.  This will increase the amount of movement at your shoulder. Posterior stretching exercise    1. Hold the elbow of your injured arm with your other hand. 2. Use your hand to pull your injured arm gently up and across your body. You will feel a gentle stretch across the back of your injured shoulder. 3. Hold for at least 15 to 30 seconds. Then slowly lower your arm. 4. Repeat 2 to 4 times. Up-the-back stretch    Your doctor or physical therapist may want you to wait to do this stretch until you have regained most of your range of motion and strength. You can do this stretch in different ways. Hold any of these stretches for at least 15 to 30 seconds. Repeat them 2 to 4 times. 1. Put your hand in your back pocket. Let it rest there to stretch your shoulder. 2. With your other hand, hold your injured arm (palm outward) behind your back by the wrist. Pull your arm up gently to stretch your shoulder. 3. Next, put a towel over your other shoulder. Put the hand of your injured arm behind your back. Now hold the back end of the towel. With the other hand, hold the front end of the towel in front of your body. Pull gently on the front end of the towel. This will bring your hand farther up your back to stretch your shoulder. Overhead stretch    1. Standing about an arm's length away, grasp onto a solid surface. You could use a countertop, a doorknob, or the back of a sturdy chair. 2. With your knees slightly bent, bend forward with your arms straight. Lower your upper body, and let your shoulders stretch. 3. As your shoulders are able to stretch farther, you may need to take a step or two backward. 4. Hold for at least 15 to 30 seconds. Then stand up and relax. If you had stepped back during your stretch, step forward so you can keep your hands on the solid surface. 5. Repeat 2 to 4 times.   Shoulder flexion (lying down)    To make a wand for this exercise, use a piece of PVC pipe or a broom handle with the broom removed. Make the wand about a foot wider than your shoulders. 1. Lie on your back, holding a wand with both hands. Your palms should face down as you hold the wand. 2. Keeping your elbows straight, slowly raise your arms over your head. Raise them until you feel a stretch in your shoulders, upper back, and chest.  3. Hold for 15 to 30 seconds. 4. Repeat 2 to 4 times. Shoulder rotation (lying down)    To make a wand for this exercise, use a piece of PVC pipe or a broom handle with the broom removed. Make the wand about a foot wider than your shoulders. 1. Lie on your back. Hold a wand with both hands with your elbows bent and palms up. 2. Keep your elbows close to your body, and move the wand across your body toward the sore arm. 3. Hold for 8 to 12 seconds. 4. Repeat 2 to 4 times. Wall climbing (to the side)    Avoid any movement that is straight to your side, and be careful not to arch your back. Your arm should stay about 30 degrees to the front of your side. 1. Stand with your side to a wall so that your fingers can just touch it at an angle about 30 degrees toward the front of your body. 2. Walk the fingers of your injured arm up the wall as high as pain permits. Try not to shrug your shoulder up toward your ear as you move your arm up. 3. Hold that position for a count of at least 15 to 20.  4. Walk your fingers back down to the starting position. 5. Repeat at least 2 to 4 times. Try to reach higher each time. Wall climbing (to the front)    During this stretching exercise, be careful not to arch your back. 1. Face a wall, and stand so your fingers can just touch it. 2. Keeping your shoulder down, walk the fingers of your injured arm up the wall as high as pain permits. (Don't shrug your shoulder up toward your ear.)  3. Hold your arm in that position for at least 15 to 30 seconds. 4. Slowly walk your fingers back down to where you started. 5. Repeat at least 2 to 4 times.  Try to reach higher each time. Shoulder blade squeeze    1. Stand with your arms at your sides, and squeeze your shoulder blades together. Do not raise your shoulders up as you squeeze. 2. Hold 6 seconds. 3. Repeat 8 to 12 times. Scapular exercise: Arm reach    1. Lie flat on your back. This exercise is a very slight motion that starts with your arms raised (elbows straight, arms straight). 2. From this position, reach higher toward the archie or ceiling. Keep your elbows straight. All motion should be from your shoulder blade only. 3. Relax your arms back to where you started. 4. Repeat 8 to 12 times. Arm raise to the side    During this strengthening exercise, your arm should stay about 30 degrees to the front of your side. 1. Slowly raise your injured arm to the side, with your thumb facing up. Raise your arm 60 degrees at the most (shoulder level is 90 degrees). 2. Hold the position for 3 to 5 seconds. Then lower your arm back to your side. If you need to, bring your \"good\" arm across your body and place it under the elbow as you lower your injured arm. Use your good arm to keep your injured arm from dropping down too fast.  3. Repeat 8 to 12 times. 4. When you first start out, don't hold any extra weight in your hand. As you get stronger, you may use a 1-pound to 2-pound dumbbell or a small can of food. Shoulder flexor and extensor exercise    These are isometric exercises. That means you contract your muscles without actually moving. 1. Push forward (flex): Stand facing a wall or doorjamb, about 6 inches or less back. Hold your injured arm against your body. Make a closed fist with your thumb on top. Then gently push your hand forward into the wall with about 25% to 50% of your strength. Don't let your body move backward as you push. Hold for about 6 seconds. Relax for a few seconds. Repeat 8 to 12 times. 2. Push backward (extend): Stand with your back flat against a wall.  Your upper arm should be against the wall, with your elbow bent 90 degrees (your hand straight ahead). Push your elbow gently back against the wall with about 25% to 50% of your strength. Don't let your body move forward as you push. Hold for about 6 seconds. Relax for a few seconds. Repeat 8 to 12 times. Scapular exercise: Wall push-ups    This exercise is best done with your fingers somewhat turned out, rather than straight up and down. 1. Stand facing a wall, about 12 inches to 18 inches away. 2. Place your hands on the wall at shoulder height. 3. Slowly bend your elbows and bring your face to the wall. Keep your back and hips straight. 4. Push back to where you started. 5. Repeat 8 to 12 times. 6. When you can do this exercise against a wall comfortably, you can try it against a counter. You can then slowly progress to the end of a couch, then to a sturdy chair, and finally to the floor. Scapular exercise: Retraction    For this exercise, you will need elastic exercise material, such as surgical tubing or Thera-Band. 1. Put the band around a solid object at about waist level. (A bedpost will work well.) Each hand should hold an end of the band. 2. With your elbows at your sides and bent to 90 degrees, pull the band back. Your shoulder blades should move toward each other. Then move your arms back where you started. 3. Repeat 8 to 12 times. 4. If you have good range of motion in your shoulders, try this exercise with your arms lifted out to the sides. Keep your elbows at a 90-degree angle. Raise the elastic band up to about shoulder level. Pull the band back to move your shoulder blades toward each other. Then move your arms back where you started. Internal rotator strengthening exercise    1. Start by tying a piece of elastic exercise material to a doorknob. You can use surgical tubing or Thera-Band. 2. Stand or sit with your shoulder relaxed and your elbow bent 90 degrees. Your upper arm should rest comfortably against your side.  Squeeze a rolled towel between your elbow and your body for comfort. This will help keep your arm at your side. 3. Hold one end of the elastic band in the hand of the painful arm. 4. Slowly rotate your forearm toward your body until it touches your belly. Slowly move it back to where you started. 5. Keep your elbow and upper arm firmly tucked against the towel roll or at your side. 6. Repeat 8 to 12 times. External rotator strengthening exercise    1. Start by tying a piece of elastic exercise material to a doorknob. You can use surgical tubing or Thera-Band. (You may also hold one end of the band in each hand.)  2. Stand or sit with your shoulder relaxed and your elbow bent 90 degrees. Your upper arm should rest comfortably against your side. Squeeze a rolled towel between your elbow and your body for comfort. This will help keep your arm at your side. 3. Hold one end of the elastic band with the hand of the painful arm. 4. Start with your forearm across your belly. Slowly rotate the forearm out away from your body. Keep your elbow and upper arm tucked against the towel roll or the side of your body until you begin to feel tightness in your shoulder. Slowly move your arm back to where you started. 5. Repeat 8 to 12 times. Follow-up care is a key part of your treatment and safety. Be sure to make and go to all appointments, and call your doctor if you are having problems. It's also a good idea to know your test results and keep a list of the medicines you take. Where can you learn more? Go to http://sary-mk.info/. Enter Claudetta Sara in the search box to learn more about \"Rotator Cuff: Exercises. \"  Current as of: March 21, 2017  Content Version: 11.4  © 7850-1781 [employer]. Care instructions adapted under license by [employer] (which disclaims liability or warranty for this information).  If you have questions about a medical condition or this instruction, always ask your healthcare professional. [address] any warranty or liability for your use of this information.

## 2017-12-07 NOTE — PROGRESS NOTES
Louie Hagen is a 50 y.o.  female and presents with Arm Pain (right) and Shoulder Pain      SUBJECTIVE:    Shoulder Pain  Patient complains of right side shoulder pain. The symptoms began 3 weeks ago Course of symptoms since onset has been symptoms have progressed to a point and plateaued. . Pain is described as overall severity = 2 / 10, worse with overhead movements and worse at night. Symptoms were incited by no known event. Patient denies fever. Therapy to date includes none. Respiratory ROS: negative for - shortness of breath  Cardiovascular ROS: negative for - chest pain  Current Outpatient Prescriptions   Medication Sig    RANITIDINE HCL (ZANTAC PO) Take  by mouth.  VITAMIN A/VITAMIN D3 (NATURAL VITAMIN D PO) Take  by mouth.  gentamicin (GARAMYCIN) 0.3 % ophthalmic solution Administer 1 Drop to both eyes three (3) times daily.  meloxicam (MOBIC) 15 mg tablet Take 1 tab daily or 1/2 tab twice daily as needed pain with food.  esomeprazole (NEXIUM) 20 mg capsule Take 20 mg by mouth nightly.  diphenhydrAMINE (SIMPLY SLEEP) 25 mg tablet Take 25 mg by mouth nightly. No current facility-administered medications for this visit. OBJECTIVE:  alert, well appearing, and in no distress  Visit Vitals    /76 (BP 1 Location: Left arm, BP Patient Position: Sitting)    Pulse 70    Temp 98.3 °F (36.8 °C) (Oral)    Resp 18    Ht 5' 3\" (1.6 m)    Wt 150 lb (68 kg)    SpO2 98%    BMI 26.57 kg/m2      well developed and well nourished  Musculoskeletal - abnormal exam of right shoulder with pain on internal and external rotation as well as with passive abduction         Assessment/Plan      ICD-10-CM ICD-9-CM    1. Acute pain of right shoulder M25.511 719.41 Possible rotator cuff strain. Pt will try home exercises for rotator cuff. If not improving will need PT      Follow-up Disposition:  Return if symptoms worsen or fail to improve. Reviewed plan of care.  Patient has provided input and agrees with goals.

## 2017-12-11 ENCOUNTER — TELEPHONE (OUTPATIENT)
Dept: FAMILY MEDICINE CLINIC | Age: 47
End: 2017-12-11

## 2017-12-11 DIAGNOSIS — M25.511 ACUTE PAIN OF RIGHT SHOULDER: Primary | ICD-10-CM

## 2017-12-20 ENCOUNTER — HOSPITAL ENCOUNTER (OUTPATIENT)
Dept: PHYSICAL THERAPY | Age: 47
Discharge: HOME | End: 2017-12-20
Payer: COMMERCIAL

## 2017-12-20 PROCEDURE — 97161 PT EVAL LOW COMPLEX 20 MIN: CPT

## 2017-12-20 PROCEDURE — 97110 THERAPEUTIC EXERCISES: CPT

## 2017-12-20 PROCEDURE — 97140 MANUAL THERAPY 1/> REGIONS: CPT

## 2017-12-20 NOTE — PROGRESS NOTES
[facility] [address], [address] [place] [address], [address] [place], [place] [address],[address]  [phone] fax      Plan of Care/ Statement of Necessity for Physical Therapy Services    Patient name: Michele Landin Start of Care: 2017   Referral source: Belinda Louis : 1970    Medical Diagnosis: Pain in right shoulder [M25.511]   Onset Date:11/15/17    Treatment Diagnosis: RC strain   Prior Hospitalization: see medical history Provider#: [id number]   Medications: Verified on Patient summary List    Comorbidities: None noted   Prior Level of Function: Able to play Volleyball and perform weight training w/o Shoulder pain     The Plan of Care and following information is based on the information from the initial evaluation. Assessment/ key information: Patient is a 50 y.o. female referred to PT with the above Dx. Patient seen today for c/o (R) shoulder pain since 11/15/17 with reason unknown. Pain with rolling over onto the (R) shoulder in bed causing her to wake up at night. Patient presents to PT with decreased strength, decreased flexibility, and decreased mobility of the (R) Shoulder. She has poor Cx/Tx/Shoulder posture and s/s appear to be consistent w/ diagnosis. Patient demonstrates the potential to make functional gains within a reasonable time frame and will benefit from skilled PT to address impairments and improve functional mobility and strength for an improved quality of life.   Fall Risk Assessment: Patient demonstrates no Fall Risk   Evaluation Complexity History MEDIUM  Complexity : 1-2 comorbidities / personal factors will impact the outcome/ POC ; Examination LOW Complexity : 1-2 Standardized tests and measures addressing body structure, function, activity limitation and / or participation in recreation  ;Presentation LOW Complexity : Stable, uncomplicated  ;Clinical Decision Making MEDIUM Complexity : FOTO score of 26-74  Overall Complexity Rating: LOW Problem List: pain affecting function, decrease ROM, decrease strength, decrease ADL/ functional abilitiies, decrease activity tolerance, decrease flexibility/ joint mobility, decrease transfer abilities and other FOTO = 48   Treatment Plan may include any combination of the following: Therapeutic exercise, Therapeutic activities, Neuromuscular re-education, Physical agent/modality, Manual therapy, Patient education, Self Care training and Functional mobility training  Patient / Family readiness to learn indicated by: asking questions, trying to perform skills and interest  Persons(s) to be included in education: patient (P)  Barriers to Learning/Limitations: None  Patient Goal (s): Want the pain to go away  Patient Self Reported Health Status: good  Rehabilitation Potential: good    Short Term Goals: To be accomplished in 5 treatments:  1. Pt will be compliant and independent with HEP in order to facilitate PT sessions and aid with self management   Eval Status:  Initiated   Current Status:  2. Pt to tolerate 30 min or more of TE and/or Interventions w/o increased s/s   Eval Status:  Initiated   Current Status:   Long Term Goals: To be accomplished in 10 treatments:  1. Pt will report 50% improvement or better with involvement to show a significant increase in function   Eval Status:  Initiated   Current Status:  2. Pt will have decreased TTP (R) shoulder Rotator Cuff muscles allowing her to roll over at night w/o waking due to pain     Eval Status:  Initiated   Current Status:  3. Pt will have decreased pain to 3/10 or better with reaching overhead to allow lifting items overhead to complete ADL     Eval Status:  Pain 5/10   Current Status:  4. Pt will have decreased pain (R) shoulder to allow her to return to usual weight training     Eval Status:  Stopped weight training   Current Status:  5.   Pt will improve FOTO score to 71 in 13 visits to show significant improvement for progress to good shoulder function   Eval Status: 48   Current Status:    Frequency / Duration: Patient to be seen 2-3 times per week for 10 treatments. Patient/ Caregiver education and instruction: Diagnosis, prognosis, self care, activity modification and exercises   Comments:  Patient provided w/HEP   [x]  Plan of care has been reviewed with PTA Leoma Brittle, PT 12/20/2017 6:18 PM  ________________________________________________________________________    I certify that the above Therapy Services are being furnished while the patient is under my care. I agree with the treatment plan and certify that this therapy is necessary.     [de-identified] Signature:____________________  Date:____________Time: _________    Please sign and return to [facility] [address] [place] [address], [id number] [address],[facility] 300 [phone] fax

## 2017-12-20 NOTE — PROGRESS NOTES
PT DAILY TREATMENT NOTE/SHOULDER EVAL 3-16    Patient Name: Martinez MyActivityPal  Date:2017  : 1970  [x]  Patient  Verified  Payor: BLUE CROSS / Plan: [employer] [facility] / Product Type: PPO /    In time:5:25  Out time:6:10  Total Treatment Time (min): 45  Visit #: 1 of 10    Treatment Area: Pain in right shoulder [M25.511]    SUBJECTIVE  Pain Level (0-10 scale): 5  [x]constant []intermittent []improving []worsening []no change since onset    Any medication changes, allergies to medications, adverse drug reactions, diagnosis change, or new procedure performed?: [x] No    [] Yes (see summary sheet for update)  Subjective functional status/changes:     Subjective:   Pt c/o (R) shoulder pain since 11/15/17 with reason unknown. Pain with rolling over on it causing her to wake up at night. MD provided exercises but they did not help. 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 Volleyball, Running, Weight trainin    Limitations to Activity/PLOF: difficulty lifting things over head     Current Health Status:  Good    Barriers: [x]pain []financial []time []transportation []other    Goal: Want the pain to go away    FOTO: Current 48 D/C 71    Motivation: High    Substance use: []Alcohol []Tobacco []other:     FABQ Score: []low []elevate    Cognition: A & O x 3    Other:          OBJECTIVE/EXAMINATION  Posture: FWD head, Fwd Shoulder, Increased Kyphosis  - TTP w/muslce tightness (B) scalene, subclavius, pec, Teres minor at scapular attachment, Latissimus dorsi, Posterior Delt  - C3 with (L) deviation  - Elev (R) 1st Rib  - (+) (R) [place]enia End test         AROM PROM Strength Pain? ?    Right Left Right Left Right Left    Shoulder Flx 144 WNL        Shoulder Ext 51 WNL        Shoulder  WNL        Shoulder ADD 15 P! WNL        Shoulder IR WNL WNL        Shoulder ER WNL WNL              10 min [x]Eval                  []Re-Eval       10 min Therapeutic Exercise:  [] See flow sheet :   Rationale: increase ROM, increase strength and improve coordination to improve the patients ability to tolerate increased activity levels    10 min Manual Therapy:  Cx Traction, CT Jct mobs, Cx SB/Rot mobs, (R) 1st Rib mobs   Rationale: decrease pain, increase ROM, increase tissue extensibility and decrease trigger points to tolerate increased activity levels        With   [x] TE   [] TA   [] neuro   [] other: Patient Education: [x] Review HEP    [] Progressed/Changed HEP based on:   [] positioning   [] body mechanics   [] transfers   [] heat/ice application    [] other:      Other Objective/Functional Measures:   - increased (R) Shoulder Flx 158  after Manual tharapy      Pain Level (0-10 scale) post treatment: 2    ASSESSMENT/Changes in Function:      Patient will continue to benefit from skilled PT services to modify and progress therapeutic interventions, address functional mobility deficits, address ROM deficits, address strength deficits, analyze and address soft tissue restrictions and analyze and cue movement patterns to attain remaining goals.      [x]  See Plan of Care  []  See progress note/recertification  []  See Discharge Summary         Progress towards goals / Updated goals:       PLAN  [x]  Upgrade activities as tolerated     [x]  Continue plan of care  []  Update interventions per flow sheet       []  Discharge due to:_  []  Other:_      Eladio Smith, PT 12/20/2017  5:24 PM

## 2017-12-21 ENCOUNTER — APPOINTMENT (OUTPATIENT)
Dept: PHYSICAL THERAPY | Age: 47
End: 2017-12-21
Payer: COMMERCIAL

## 2017-12-22 ENCOUNTER — HOSPITAL ENCOUNTER (OUTPATIENT)
Dept: PHYSICAL THERAPY | Age: 47
Discharge: HOME | End: 2017-12-22
Payer: COMMERCIAL

## 2017-12-22 PROCEDURE — 97140 MANUAL THERAPY 1/> REGIONS: CPT

## 2017-12-22 PROCEDURE — 97110 THERAPEUTIC EXERCISES: CPT

## 2017-12-22 NOTE — PROGRESS NOTES
PT DAILY TREATMENT NOTE 3-16    Patient Name: Corrinne Exon  Date:2017  : 1970  [x]  Patient  Verified  Payor: BLUE CROSS / Plan: [employer] [facility] / Product Type: PPO /    In time:4:30  Out time:5:17  Total Treatment Time (min): 44  Visit #: 2 of 10    Treatment Area: Pain in right shoulder [M25.511]    SUBJECTIVE  Pain Level (0-10 scale): 2  Any medication changes, allergies to medications, adverse drug reactions, diagnosis change, or new procedure performed?: [x] No    [] Yes (see summary sheet for update)  Subjective functional status/changes:   [] No changes reported  Doing the exercises and feeling better    OBJECTIVE  25 min Therapeutic Exercise:  [] See flow sheet :   Rationale: increase ROM, increase strength and improve coordination to improve the patients ability to tolerate increased activity levels  19 min Manual Therapy:  Cx Tractin, (R) 1st Rib mobs, (L) Tx Rib mobs   Rationale: decrease pain, increase ROM and increase tissue extensibility to tolerate increased activity levels         With   [x] TE   [] TA   [] neuro   [] other: Patient Education: [x] Review HEP    [] Progressed/Changed HEP based on:   [] positioning   [] body mechanics   [] transfers   [] heat/ice application    [] other:      Other Objective/Functional Measures:   - good tolerance with first full treatment  - Minor elevated 1st Rib     Pain Level (0-10 scale) post treatment: 1    ASSESSMENT/Changes in Function:      Patient will continue to benefit from skilled PT services to modify and progress therapeutic interventions, address functional mobility deficits, address ROM deficits, address strength deficits, analyze and address soft tissue restrictions and analyze and cue movement patterns to attain remaining goals. []  See Plan of Care  []  See progress note/recertification  []  See Discharge Summary         Progress towards goals / Updated goals:  Short Term Goals: To be accomplished in 5 treatments:  1. Pt will be compliant and independent with HEP in order to facilitate PT sessions and aid with self management                        Eval Status:  Initiated                        Current Status: Pt reports compliance 12/22/17  2. Pt to tolerate 30 min or more of TE and/or Interventions w/o increased s/s                        Eval Status:  Initiated                        Current Status:  Met at 40 Min 12/22/17                       Long Term Goals: To be accomplished in 10 treatments:  1. Pt will report 50% improvement or better with involvement to show a significant increase in function                        Eval Status:  Initiated                        Current Status:  2. Pt will have decreased TTP (R) shoulder Rotator Cuff muscles allowing her to roll over at night w/o waking due to pain                          Eval Status:  Initiated                        Current Status:  3. Pt will have decreased pain to 3/10 or better with reaching overhead to allow lifting items overhead to complete ADL                          Eval Status:  Pain 5/10                        Current Status:  4. Pt will have decreased pain (R) shoulder to allow her to return to usual weight training                          Eval Status:  Stopped weight training                        Current Status:  5.   Pt will improve FOTO score to 71 in 13 visits to show significant improvement for progress to good shoulder function                        Eval Status: 48                        Current Status:    PLAN  [x]  Upgrade activities as tolerated     [x]  Continue plan of care  []  Update interventions per flow sheet       []  Discharge due to:_  []  Other:_      Don Meyer PT 12/22/2017  5:03 PM    Future Appointments  Date Time Provider Christi Willis   1/3/2018 3:30 PM Don Meyer PT [facility]   1/5/2018 3:30 PM Don Meyer PT [facility]   1/16/2018 2:00 PM Don Meyer PT [facility]   1/18/2018 7:30 AM Angie Tapia, PT [facility]   1/23/2018 8:00 AM Piero Quiroz, PT [facility]   1/25/2018 7:30 AM Angie Tapia, PT [facility]   1/29/2018 9:00 AM Angie Tapia, PT [facility]   1/31/2018 7:30 AM Piero Quiroz, PT [facility]

## 2018-01-03 ENCOUNTER — HOSPITAL ENCOUNTER (OUTPATIENT)
Dept: PHYSICAL THERAPY | Age: 48
Discharge: HOME | End: 2018-01-03
Payer: COMMERCIAL

## 2018-01-03 PROCEDURE — 97140 MANUAL THERAPY 1/> REGIONS: CPT

## 2018-01-03 PROCEDURE — 97110 THERAPEUTIC EXERCISES: CPT

## 2018-01-03 PROCEDURE — 97032 APPL MODALITY 1+ESTIM EA 15: CPT

## 2018-01-03 NOTE — PROGRESS NOTES
PT DAILY TREATMENT NOTE 3-16    Patient Name: Beny Post  Date:1/3/2018  : 1970  [x]  Patient  Verified  Payor: [employer] / Plan:  [facility] / Product Type: PPO /    In time:3:34  Out time:4:33  Total Treatment Time (min): 61  Visit #: 3 of 110    Treatment Area: Pain in right shoulder [M25.511]    SUBJECTIVE  Pain Level (0-10 scale): 8  Any medication changes, allergies to medications, adverse drug reactions, diagnosis change, or new procedure performed?: [x] No    [] Yes (see summary sheet for update)  Subjective functional status/changes:   [] No changes reported  Increased pain due to increased activity    OBJECTIVE  Modality rationale: decrease inflammation, decrease pain and increase tissue extensibility to improve the patients ability to perform increased ADL   Min Type Additional Details    [] Estim:  []Unatt       []IFC  []Premod                        []Other:  []w/ice   []w/heat  Position:  Location:   10 [x] Estim: [x]Att    []TENS instruct  []NMES                    [x]Other: LTO []w/US   []w/ice   []w/heat  Position:Seated  Location: (R) Teres minor and latissimus dorsi    []  Traction: [] Cervical       []Lumbar                       [] Prone          []Supine                       []Intermittent   []Continuous Lbs:  [] before manual  [] after manual    []  Ultrasound: []Continuous   [] Pulsed                           []1MHz   []3MHz Location:  W/cm2:    []  Iontophoresis with dexamethasone         Location: [] Take home patch   [] In clinic   10 [x]  Ice     []  heat  []  Ice massage  []  Laser   []  Anodyne Position: (L) SL  Location: (R) Teres minor/Latissimus dorsi    []  Laser with stim  []  Other: Position:  Location:    []  Vasopneumatic Device Pressure:       [] lo [] med [] hi   Temperature: [] lo [] med [] hi   [x] Skin assessment post-treatment:  [x]intact []redness- no adverse reaction    []redness - adverse reaction:       29 min Therapeutic Exercise:  [x] See flow sheet :   Rationale: increase ROM, increase strength and improve coordination to improve the patients ability to perform increased ADL  10 min Manual Therapy:  STM/DTM (R) Teres minor and Latissimus dorsi   Rationale: decrease pain, increase ROM, increase tissue extensibility and decrease trigger points to tolerate increased activity levels         With   [x] TE   [] TA   [] neuro   [] other: Patient Education: [x] Review HEP    [] Progressed/Changed HEP based on:   [] positioning   [] body mechanics   [] transfers   [] heat/ice application    [] other:      Other Objective/Functional Measures:   - Limited ability to reach overhead due to pain at the (R) Teres/latissimus region  - TTP (R) Teres minor and Latissimus dorsi with multiple tender points  - Decr TTP and improved mobility to full range after treatment     Pain Level (0-10 scale) post treatment: 5    ASSESSMENT/Changes in Function:      Patient will continue to benefit from skilled PT services to modify and progress therapeutic interventions, address functional mobility deficits, address ROM deficits, address strength deficits, analyze and address soft tissue restrictions and analyze and cue movement patterns to attain remaining goals.      []  See Plan of Care  []  See progress note/recertification  []  See Discharge Summary         Progress towards goals / Updated goals:  Short Term Goals: To be accomplished in 5 treatments:  1.  Pt will be compliant and independent with HEP in order to facilitate PT sessions and aid with self management                        Eval Status:  Initiated                        Current Status: Pt reports compliance 12/22/17  2.  Pt to tolerate 30 min or more of TE and/or Interventions w/o increased s/s                        Eval Status:  Initiated                        BFJLIJQ Status:  Met at 40 Min 12/22/17                       Long Term Goals: To be accomplished in 10 treatments:  1.  Pt will report 50% improvement or better with involvement to show a significant increase in function                        Eval Status:  Initiated                        RJSGYOH Status:  2.  Pt will have decreased TTP (R) shoulder Rotator Cuff muscles allowing her to roll over at night w/o waking due to pain                          Eval Status:  Initiated                        JKJQQPL Status:  3.  Pt will have decreased pain to 3/10 or better with reaching overhead to allow lifting items overhead to complete ADL                          Eval Status:  Pain 5/10                        Current Status: Pain 8/10 today 1/3/18  4.  Pt will have decreased pain (R) shoulder to allow her to return to usual weight training                          Eval Status:  Stopped weight training                        Current Status:  5.  Pt will improve FOTO score to 71 in 13 visits to show significant improvement for progress to good shoulder function                        Eval Status: 48                        Current Status:    PLAN  [x]  Upgrade activities as tolerated     [x]  Continue plan of care  []  Update interventions per flow sheet       []  Discharge due to:_  []  Other:_      Ashvin Kim PT 1/3/2018  4:45 PM    Future Appointments  Date Time Provider Christi Willis   1/5/2018 3:30 PM Ashvin Kim PT [facility]   1/16/2018 2:00 PM Ashvin Kim PT [email or web address]18/2018 7:30 AM Ashvin Kim PT [email or web address]   1/23/2018 8:00 AM Lux Carvajal PT [facility]   1/25/2018 7:30 AM Ashvin Kim PT [facility]   1/29/2018 9:00 AM Ashvin Kim PT [facility]   1/31/2018 7:30 AM Lux Carvajal PT [facility]

## 2018-01-05 ENCOUNTER — APPOINTMENT (OUTPATIENT)
Dept: PHYSICAL THERAPY | Age: 48
End: 2018-01-05
Payer: COMMERCIAL

## 2018-01-16 ENCOUNTER — HOSPITAL ENCOUNTER (OUTPATIENT)
Dept: PHYSICAL THERAPY | Age: 48
Discharge: HOME | End: 2018-01-16
Payer: COMMERCIAL

## 2018-01-16 PROCEDURE — 97110 THERAPEUTIC EXERCISES: CPT

## 2018-01-16 PROCEDURE — 97032 APPL MODALITY 1+ESTIM EA 15: CPT

## 2018-01-16 NOTE — PROGRESS NOTES
PT DAILY TREATMENT NOTE 3-16    Patient Name: Owen Acosta  Date:2018  : 1970  [x]  Patient  Verified  Payor: BLUE CROSS / Plan: [employer] [facility] / Product Type: PPO /    In time:2:00  Out time:2:44  Total Treatment Time (min): 38  Visit #: 4 of 10    Treatment Area: Pain in right shoulder [M25.511]    SUBJECTIVE  Pain Level (0-10 scale): 3  Any medication changes, allergies to medications, adverse drug reactions, diagnosis change, or new procedure performed?: [x] No    [] Yes (see summary sheet for update)  Subjective functional status/changes:   [] No changes reported  Skiing with no difficulty, Able to do usual workout other than upper body.   Not much pain    OBJECTIVE  Modality rationale: decrease inflammation, decrease pain and increase tissue extensibility to improve the patients ability to perform increased ADL   Min Type Additional Details    [] Estim:  []Unatt       []IFC  []Premod                        []Other:  []w/ice   []w/heat  Position:  Location:   8 [x] Estim: [x]Att    []TENS instruct  []NMES                    [x]Other: LTO []w/US   []w/ice   []w/heat  Position: Seated  Location:(R) Teres minor    []  Traction: [] Cervical       []Lumbar                       [] Prone          []Supine                       []Intermittent   []Continuous Lbs:  [] before manual  [] after manual    []  Ultrasound: []Continuous   [] Pulsed                           []1MHz   []3MHz Location:  W/cm2:    []  Iontophoresis with dexamethasone         Location: [] Take home patch   [] In clinic    []  Ice     []  heat  []  Ice massage  []  Laser   []  Anodyne Position:  Location:    []  Laser with stim  []  Other: Position:  Location:    []  Vasopneumatic Device Pressure:       [] lo [] med [] hi   Temperature: [] lo [] med [] hi   [x] Skin assessment post-treatment:  []intact []redness- no adverse reaction    []redness - adverse reaction:     30 min Therapeutic Exercise:  [x] See flow sheet : Rationale: increase ROM, increase strength and improve coordination to improve the patients ability to perform increased ADL    With   [x] TE   [] TA   [] neuro   [] other: Patient Education: [x] Review HEP    [] Progressed/Changed HEP based on:   [] positioning   [] body mechanics   [] transfers   [] heat/ice application    [] other:      Other Objective/Functional Measures:   - MMT (R) Shoulder F 4+, Ext 4+, ADD 4, ABD 4+  - Good response to LTO      Pain Level (0-10 scale) post treatment: 0    ASSESSMENT/Changes in Function:      Patient will continue to benefit from skilled PT services to modify and progress therapeutic interventions, address functional mobility deficits, address ROM deficits, address strength deficits, analyze and address soft tissue restrictions and analyze and cue movement patterns to attain remaining goals.      []  See Plan of Care  []  See progress note/recertification  []  See Discharge Summary         Progress towards goals / Updated goals:  Short Term Goals: To be accomplished in 5 treatments:  1.  Pt will be compliant and independent with HEP in order to facilitate PT sessions and aid with self management                        Eval Status:  Initiated                        Current Status: Pt reports compliance 12/22/17  2.  Pt to tolerate 30 min or more of TE and/or Interventions w/o increased s/s                        Eval Status:  Initiated                        Current Status:  Met at 40 Min 12/22/17                       Long Term Goals: To be accomplished in 10 treatments:  1.  Pt will report 50% improvement or better with involvement to show a significant increase in function                        Eval Status:  Initiated                        Current Status:  2.  Pt will have decreased TTP (R) shoulder Rotator Cuff muscles allowing her to roll over at night w/o waking due to pain                          Eval Status:  Initiated                        Current Status: Met, no pain at night 1/16/18  3.  Pt will have decreased pain to 3/10 or better with reaching overhead to allow lifting items overhead to complete ADL                          Eval Status:  Pain 5/10                        Current Status: Met at 3/10 1/16/18  4.  Pt will have decreased pain (R) shoulder to allow her to return to usual weight training                          Eval Status:  Stopped weight training                        Current Status:  5.  Pt will improve FOTO score to 71 in 13 visits to show significant improvement for progress to good shoulder function                        Eval Status: 48                        Current Status:PLAN  [x]  Upgrade activities as tolerated     [x]  Continue plan of care  []  Update interventions per flow sheet       []  Discharge due to:_  []  Other:_      Melania Galeano, YOSSI 1/16/2018  2:28 PM    Future Appointments  Date Time Provider Christi Willis   1/18/2018 7:30 AM Kathi Martinez PT [facility]   1/23/2018 8:00 AM Kathi Martinez PT [facility]   1/25/2018 7:30 AM Melania Galeano PT [facility]   1/29/2018 9:00 AM Melania Galeano PT [facility]   1/31/2018 7:30 AM Kathi Martinez PT [facility]

## 2018-01-18 ENCOUNTER — HOSPITAL ENCOUNTER (OUTPATIENT)
Dept: PHYSICAL THERAPY | Age: 48
Discharge: HOME | End: 2018-01-18
Payer: COMMERCIAL

## 2018-01-18 PROCEDURE — 97110 THERAPEUTIC EXERCISES: CPT

## 2018-01-18 PROCEDURE — 97032 APPL MODALITY 1+ESTIM EA 15: CPT

## 2018-01-18 NOTE — PROGRESS NOTES
PT DAILY TREATMENT NOTE     Patient Name: Alma Orr  Date:2018  : 1970  [x]  Patient  Verified  Payor: BLUE CROSS / Plan: [employer] cell [employer] [facility] / Product Type: PPO /    In time:730  Out time:810  Total Treatment Time (min): 34  Visit #: 5 of 10    Treatment Area: Pain in right shoulder [M25.511]    SUBJECTIVE  Pain Level (0-10 scale): 2  Any medication changes, allergies to medications, adverse drug reactions, diagnosis change, or new procedure performed?: [x] No    [] Yes (see summary sheet for update)  Subjective functional status/changes:   [] No changes reported  I think bc I slept on that side it is a little sore today.     OBJECTIVE    Modality rationale: decrease inflammation, decrease pain and increase tissue extensibility to improve the patients ability to aid with increase tolerance to ADLs and activities   Min Type Additional Details    [] Estim:  []Unatt       []IFC  []Premod                        []Other:  []w/ice   []w/heat  Position:  Location:   10 [x] Estim: [x]Att    []TENS instruct  []NMES                    [x]Other: LTO []w/US   []w/ice   []w/heat  Position:seated  Location:R teres    []  Traction: [] Cervical       []Lumbar                       [] Prone          []Supine                       []Intermittent   []Continuous Lbs:  [] before manual  [] after manual    []  Ultrasound: []Continuous   [] Pulsed                           []1MHz   []3MHz W/cm2:  Location:    []  Iontophoresis with dexamethasone         Location: [] Take home patch   [] In clinic    []  Ice     []  heat  []  Ice massage  []  Laser   []  Anodyne Position:  Location:    []  Laser with stim  []  Other:  Position:  Location:    []  Vasopneumatic Device Pressure:       [] lo [] med [] hi   Temperature: [] lo [] med [] hi   [] Skin assessment post-treatment:  []intact []redness- no adverse reaction    []redness - adverse reaction:      min []Eval                  []Re-Eval       24 min Therapeutic Exercise:  [] See flow sheet :   Rationale: increase ROM, increase strength and improve coordination to improve the patients ability to aid with increase tolerance to ADLS and activities     min Therapeutic Activity:  []  See flow sheet :   Rationale:   to improve the patients ability to       min Neuromuscular Re-education:  []  See flow sheet :   Rationale:   to improve the patients ability to      min Manual Therapy:     Rationale:  to      min Gait Training:  ___ feet with ___ device on level surfaces with ___ level of assist   Rationale: With   [] TE   [] TA   [] neuro   [] other: Patient Education: [x] Review HEP    [] Progressed/Changed HEP based on:   [] positioning   [] body mechanics   [] transfers   [] heat/ice application    [] other:      Other Objective/Functional Measures: VC exercises and tech. Increased UBE to level 2.0, Increased bands to green and added IR/ER          FOTO 63  Pain Level (0-10 scale) post treatment: 1    ASSESSMENT/Changes in Function: decreased pain post session    Patient will continue to benefit from skilled PT services to modify and progress therapeutic interventions, address functional mobility deficits, address ROM deficits, address strength deficits, analyze and address soft tissue restrictions, analyze and cue movement patterns, analyze and modify body mechanics/ergonomics, assess and modify postural abnormalities and instruct in home and community integration to attain remaining goals.      [x]  See Plan of Care  []  See progress note/recertification  []  See Discharge Summary         Progress towards goals / Updated goals:   Short Term Goals: To be accomplished in 5 treatments:  1.  Pt will be compliant and independent with HEP in order to facilitate PT sessions and aid with self management                        Eval Status:  Initiated                        Current Status: Pt reports compliance 12/22/17 1/18/18  2.  Pt to tolerate 30 min or more of TE and/or Interventions w/o increased s/s                        Eval Status:  Initiated                        EDOIJPK Status:  Met at 40 Min 12/22/17          24 1/18/18                [address] be accomplished in 10 treatments:  1.  Pt will report 50% improvement or better with involvement to show a significant increase in function                        Eval Status:  Initiated                        Current Status:  2.  Pt will have decreased TTP (R) shoulder Rotator Cuff muscles allowing her to roll over at night w/o waking due to pain                          Eval Status:  Initiated                        BBLGOJL Status: Met, no pain at night 1/16/18  3.  Pt will have decreased pain to 3/10 or better with reaching overhead to allow lifting items overhead to complete ADL                          Eval Status:  Pain 5/10                        Current Status: Met at 3/10 1/16/18    2 1/18/18  4.  Pt will have decreased pain (R) shoulder to allow her to return to usual weight training                          Eval Status:  Stopped weight training                        Current Status:  5.  Pt will improve FOTO score to 71 in 13 visits to show significant improvement for progress to good shoulder function                        Eval Status: 48                        Current Status   63  PLAN  [x]  Upgrade activities as tolerated     [x]  Continue plan of care  []  Update interventions per flow sheet       []  Discharge due to:_  []  Other:_      Lucinda Favre, PT 1/18/2018  8:09 AM    Future Appointments  Date Time Provider Christi Willis   1/23/2018 8:00 AM Lucinda Favre, PT [facility]   1/25/2018 7:30 AM Alexandro Messer PT [facility]   1/29/2018 9:00 AM [facility]   1/31/2018 7:30 AM Lucinda Favre, PT [facility]

## 2018-01-23 ENCOUNTER — HOSPITAL ENCOUNTER (OUTPATIENT)
Dept: PHYSICAL THERAPY | Age: 48
Discharge: HOME | End: 2018-01-23
Payer: COMMERCIAL

## 2018-01-23 PROCEDURE — 97032 APPL MODALITY 1+ESTIM EA 15: CPT

## 2018-01-23 PROCEDURE — 97110 THERAPEUTIC EXERCISES: CPT

## 2018-01-23 NOTE — PROGRESS NOTES
PT DAILY TREATMENT NOTE 12    Patient Name: Yanely Valadez  Date:2018  : 1970  [x]  Patient  Verified  Payor: BLUE CROSS / Plan: [facility] [place] / Product Type: PPO /    In time:755  Out time:836  Total Treatment Time (min): 36  Visit #: 6 of 10    Treatment Area: Pain in right shoulder [M25.511]    SUBJECTIVE  Pain Level (0-10 scale): 2-3  Any medication changes, allergies to medications, adverse drug reactions, diagnosis change, or new procedure performed?: [x] No    [] Yes (see summary sheet for update)  Subjective functional status/changes:   [] No changes reported  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      OBJECTIVE    Modality rationale: decrease pain and increase tissue extensibility to improve the patients ability to aid with increase tolerance to ADLS and activities   Min Type Additional Details    [] Estim:  []Unatt       []IFC  []Premod                        []Other:  []w/ice   []w/heat  Position:  Location:   8 [x] Estim: [x]Att    []TENS instruct  []NMES                    [x]Other: LTO []w/US   []w/ice   []w/heat  Position:seated  Location:R teres    []  Traction: [] Cervical       []Lumbar                       [] Prone          []Supine                       []Intermittent   []Continuous Lbs:  [] before manual  [] after manual    []  Ultrasound: []Continuous   [] Pulsed                           []1MHz   []3MHz W/cm2:  Location:    []  Iontophoresis with dexamethasone         Location: [] Take home patch   [] In clinic    []  Ice     []  heat  []  Ice massage  []  Laser   []  Anodyne Position:  Location:    []  Laser with stim  []  Other:  Position:  Location:    []  Vasopneumatic Device Pressure:       [] lo [] med [] hi   Temperature: [] lo [] med [] hi   [] Skin assessment post-treatment:  []intact []redness- no adverse reaction    []redness - adverse reaction:      min []Eval []Re-Eval       28 min Therapeutic Exercise:  [x] See flow sheet :   Rationale: increase ROM, increase strength and improve coordination to improve the patients ability to aid with increase tolerance to ADLS and activities     min Therapeutic Activity:  []  See flow sheet :   Rationale:   to improve the patients ability to       min Neuromuscular Re-education:  []  See flow sheet :   Rationale:   to improve the patients ability to      min Manual Therapy:     Rationale:  to      min Gait Training:  ___ feet with ___ device on level surfaces with ___ level of assist   Rationale: With   [] TE   [] TA   [] neuro   [] other: Patient Education: [x] Review HEP    [] Progressed/Changed HEP based on:   [] positioning   [] body mechanics   [] transfers   [] heat/ice application    [] other:      Other Objective/Functional Measures: VC exercises and tech. Increased IR/ER to 2 sets     Pain Level (0-10 scale) post treatment: 2    ASSESSMENT/Changes in Function: tolerated well. Patient will continue to benefit from skilled PT services to modify and progress therapeutic interventions, address functional mobility deficits, address ROM deficits, address strength deficits, analyze and address soft tissue restrictions, analyze and cue movement patterns, analyze and modify body mechanics/ergonomics, assess and modify postural abnormalities and instruct in home and community integration to attain remaining goals.      [x]  See Plan of Care  []  See progress note/recertification  []  See Discharge Summary         Progress towards goals / Updated goals:  Short Term Goals: To be accomplished in 5 treatments:  1.  Pt will be compliant and independent with HEP in order to facilitate PT sessions and aid with self management                        Eval Status:  Initiated                        Current Status: Pt reports compliance 12/22/17 1/18/18 1/23/18  2.  Pt to tolerate 30 min or more of TE and/or Interventions w/o increased s/s                        Eval Status:  Initiated                        MVAFTQD Status:  Met at 40 Min 12/22/17          24 1/18/18            28 1/23/18    Long Term Goals: To be accomplished in 10 treatments:  1.  Pt will report 50% improvement or better with involvement to show a significant increase in function                        Eval Status:  Initiated                        Current Status:  2.  Pt will have decreased TTP (R) shoulder Rotator Cuff muscles allowing her to roll over at night w/o waking due to pain                          Eval Status:  Initiated                        Current Status: Met, no pain at night 1/16/18  3.  Pt will have decreased pain to 3/10 or better with reaching overhead to allow lifting items overhead to complete ADL                          Eval Status:  Pain 5/10                        Current Status: Met at 3/10 1/16/18    2 1/18/18    2-3 1/23/18  4.  Pt will have decreased pain (R) shoulder to allow her to return to usual weight training                          Eval Status:  Stopped weight training                        Current Status:  5.  Pt will improve FOTO score to 71 in 13 visits to show significant improvement for progress to good shoulder function                        Eval Status: 48                        Current Status   63 1/18/18    PLAN  [x]  Upgrade activities as tolerated     [x]  Continue plan of care  []  Update interventions per flow sheet       []  Discharge due to:_  []  Other:_      Mookie Biggs, PT 1/23/2018  8:03 AM    Future Appointments  Date Time Provider Christi Willis   1/25/2018 7:30 AM Daja Oliver PT [facility]   1/29/2018 9:00 AM Daja Oliver PT [facility]   1/31/2018 7:30 AM Mookie Biggs PT [facility]

## 2018-01-25 ENCOUNTER — HOSPITAL ENCOUNTER (OUTPATIENT)
Dept: PHYSICAL THERAPY | Age: 48
Discharge: HOME | End: 2018-01-25
Payer: COMMERCIAL

## 2018-01-25 PROCEDURE — 97032 APPL MODALITY 1+ESTIM EA 15: CPT

## 2018-01-25 PROCEDURE — 97110 THERAPEUTIC EXERCISES: CPT

## 2018-01-25 PROCEDURE — 97140 MANUAL THERAPY 1/> REGIONS: CPT

## 2018-01-25 NOTE — PROGRESS NOTES
PT DAILY TREATMENT NOTE 3-16    Patient Name: Jadon Child  Date:2018  : 1970  [x]  Patient  Verified  Payor: BLUE CROSS / Plan: [place] / Product Type: PPO /    In time:740  Out time:822  Total Treatment Time (min): 42  Visit #: 7 of 10    Treatment Area: Pain in right shoulder [M25.511]    SUBJECTIVE  Pain Level (0-10 scale): 1-2  Any medication changes, allergies to medications, adverse drug reactions, diagnosis change, or new procedure performed?: [x] No    [] Yes (see summary sheet for update)  Subjective functional status/changes:   [] No changes reported  Doing better with less discomfort    OBJECTIVE  Modality rationale: decrease inflammation, decrease pain and increase tissue extensibility to improve the patients ability to perform increased ADL   Min Type Additional Details    [] Estim:  []Unatt       []IFC  []Premod                        []Other:  []w/ice   []w/heat  Position:  Location:   8 [x] Estim: [x]Att    []TENS instruct  []NMES                    [x]Other:LTO  []w/US   []w/ice   []w/heat  Position: Seated  Location: (R) Teres Minor    []  Traction: [] Cervical       []Lumbar                       [] Prone          []Supine                       []Intermittent   []Continuous Lbs:  [] before manual  [] after manual    []  Ultrasound: []Continuous   [] Pulsed                           []1MHz   []3MHz Location:  W/cm2:    []  Iontophoresis with dexamethasone         Location: [] Take home patch   [] In clinic    []  Ice     []  heat  []  Ice massage  []  Laser   []  Anodyne Position:  Location:    []  Laser with stim  []  Other: Position:  Location:    []  Vasopneumatic Device Pressure:       [] lo [] med [] hi   Temperature: [] lo [] med [] hi   [x] Skin assessment post-treatment:  [x]intact []redness- no adverse reaction    []redness - adverse reaction:     24 min Therapeutic Exercise:  [x] See flow sheet :   Rationale: increase ROM, increase strength and improve coordination to improve the patients ability to perform increased ADL  10 Min Manual Therapy:  STM/DTM (R) Teres minor   Rationale: decrease pain, increase ROM, increase tissue extensibility and decrease trigger points to perform increased ADL         With   [x] TE   [] TA   [] neuro   [] other: Patient Education: [x] Review HEP    [] Progressed/Changed HEP based on:   [] positioning   [] body mechanics   [] transfers   [] heat/ice application    [] other:      Other Objective/Functional Measures:   - Decr TTP with some muscle tightness       Pain Level (0-10 scale) post treatment: 1    ASSESSMENT/Changes in Function:      Patient will continue to benefit from skilled PT services to modify and progress therapeutic interventions, address functional mobility deficits, address ROM deficits, address strength deficits, analyze and address soft tissue restrictions and analyze and cue movement patterns to attain remaining goals.      []  See Plan of Care  []  See progress note/recertification  []  See Discharge Summary         Progress towards goals / Updated goals:  Short Term Goals: To be accomplished in 5 treatments:  1.  Pt will be compliant and independent with HEP in order to facilitate PT sessions and aid with self management                        Eval Status:  Initiated                        Current Status: Pt reports compliance 12/22/17 1/18/18 1/23/18  2.  Pt to tolerate 30 min or more of TE and/or Interventions w/o increased s/s                        Eval Status:  Initiated                        GWPFQSR Status:  Met at 40 Min 12/22/17          24 1/18/18            28 1/23/18    Long Term Goals: To be accomplished in 10 treatments:  1.  Pt will report 50% improvement or better with involvement to show a significant increase in function                        Eval Status:  Initiated                        Current Status:  2.  Pt will have decreased TTP (R) shoulder Rotator Cuff muscles allowing her to roll over at night w/o waking due to pain                          Eval Status:  Initiated                        RXTQVPX Status: Met, no pain at night 1/16/18  3.  Pt will have decreased pain to 3/10 or better with reaching overhead to allow lifting items overhead to complete ADL                          Eval Status:  Pain 5/10                        Current Status: Met at 1-2/10 1/25/18  4.  Pt will have decreased pain (R) shoulder to allow her to return to usual weight training                          Eval Status:  Stopped weight training                        Current Status:  5.  Pt will improve FOTO score to 71 in 13 visits to show significant improvement for progress to good shoulder function                        Eval Status: 48                        Current Status   63 1/18/18    PLAN  [x]  Upgrade activities as tolerated     [x]  Continue plan of care  []  Update interventions per flow sheet       []  Discharge due to:_  []  Other:_      Grace Oliveira, PT 1/25/2018  7:40 AM    Future Appointments  Date Time Provider Christi Willis   1/29/2018 9:00 AM Grace Oliveira PT [facility]   1/31/2018 7:30 AM Amelia Avery PT [facility]

## 2018-01-29 ENCOUNTER — HOSPITAL ENCOUNTER (OUTPATIENT)
Dept: PHYSICAL THERAPY | Age: 48
Discharge: HOME | End: 2018-01-29
Payer: COMMERCIAL

## 2018-01-29 PROCEDURE — 97110 THERAPEUTIC EXERCISES: CPT

## 2018-01-29 PROCEDURE — 97032 APPL MODALITY 1+ESTIM EA 15: CPT

## 2018-01-29 PROCEDURE — 97140 MANUAL THERAPY 1/> REGIONS: CPT

## 2018-01-29 NOTE — PROGRESS NOTES
In [address] [place] [address], [address] [place] [address], [address] [place], [place] [address] [address]  [phone] fax    Progress Note  Patient name: Richard Florez Start of Care: 2017   Referral source: Moishe Dakin : 1970                          Medical Diagnosis: Pain in right shoulder [M25.511] Onset Date:11/15/17                          Treatment Diagnosis: RC strain   Prior Hospitalization: see medical history Provider#: [id number]   Medications: Verified on Patient summary List    Comorbidities: None noted   Prior Level of Function: Able to play Volleyball and perform weight training w/o Shoulder pain  Visits from Start of Care: 7    Missed Visits: 2    Established Goals:         Excellent           Good         Limited           None  [] Increased ROM   []  [x]  []  []  [] Increased Strength  []  [x]  []  []  [] Increased Mobility  []  [x]  []  []   [] Decreased Pain   []  [x]  []  []  [] Decreased Swelling  []  []  []  []    Key Functional Changes:  Patient has shown good progress with this treatment program. Pain as of last visit was 2/10. Patient has shown decreased pain and increased strength and mobility. See other objective measures below for additional details. Patient reports 80% improvement with overall involvement. FOTO score is 63. All STG/LTGs achieved as identified below.       Other Objective/Functional Measures:   - Decr TTP with some muscle tightness  - AROM (R) Shoulder Flx 170  Ext 70 ADD 44  - MMT (R) Shoulder Flx 3+ ABD4 Ext 4+ ADD 4+ IR 5- ER 4+    Progress towards goals / Updated goals:  1.  Pt will have MMT (R) shoulder Gross 5-/5 w/little to no pain to return to progressive gym based exercise program              PNStatus:              Current Status: Flx 3+ ABD4 Ext 4+ ADD 4+ IR 5- ER 4+ 18  2.   Pt will improve FOTO score to 71 in 13 visits to show significant improvement for progress to good shoulder function              PNStatus: 63              Current Status:    Updated Goals: to be achieved in 1-2 visits:  1. Pt will have MMT (R) shoulder Gross 5-/5 w/little to no pain to return to progressive gym based exercise program              PNStatus: Flx 3+ ABD4 Ext 4+ ADD 4+ IR 5- ER 4+               Current Status:  2.   Pt will improve FOTO score to 71 in 13 visits to show significant improvement for progress to good shoulder function              PNStatus: 63              Current Status:    ASSESSMENT/RECOMMENDATIONS:  [x]Continue therapy per initial plan/protocol at a frequency of  2-3 x per week for 10 visits  [x]Continue therapy with the following recommended changes: Increased focus on strength training  []Discontinue therapy progressing towards or have reached established goals  []Discontinue therapy due to lack of appreciable progress towards goals  []Discontinue therapy due to lack of attendance or compliance  []Await Physician's recommendations/decisions regarding therapy  []Other:________________________________________________________________    Thank you for this referral.   Grace Oliveira, PT 1/29/2018 9:58 AM  NOTE TO PHYSICIAN:  PLEASE COMPLETE THE ORDERS BELOW AND   FAX TO [facility]: [phone]  If you are unable to process this request in 24 hours please contact our office: [phone]    []  I have read the above report and request that my patient continue as recommended. []  I have read the above report and request that my patient continue therapy with the following changes/special instructions:________________________________________  []I have read the above report and request that my patient be discharged from therapy.     Physicians signature: ______________________________Date: _____Time:______

## 2018-01-29 NOTE — PROGRESS NOTES
PT DAILY TREATMENT NOTE 3-16    Patient Name: Tracy Anderson  Date:2018  : 1970  [x]  Patient  Verified  Payor: BLUE CROSS / Plan: [employer] [facility] / Product Type: PPO /    In time:0900 Out time: 09  Total Treatment Time (min): 54  Visit #: 8 of 10    Treatment Area: Pain in right shoulder [M25.511]    SUBJECTIVE  Pain Level (0-10 scale): 2  Any medication changes, allergies to medications, adverse drug reactions, diagnosis change, or new procedure performed?: [x] No    [] Yes (see summary sheet for update)  Subjective functional status/changes:   [] No changes reported  Doing usual cardio workouts with including UE. Reports about 80% Improvement. Have no significant pain except with performing T-Band ABD exercises.     OBJECTIVE  Modality rationale: decrease inflammation, decrease pain and increase tissue extensibility to improve the patients ability to perform increased ADL   Min Type Additional Details    [] Estim:  []Unatt       []IFC  []Premod                        []Other:  []w/ice   []w/heat  Position:  Location:   8 [x] Estim: [x]Att    []TENS instruct  []NMES                    [x]Other:LTO  []w/US   []w/ice   []w/heat  Position: Seated  Location: (R) Teres Minor    []  Traction: [] Cervical       []Lumbar                       [] Prone          []Supine                       []Intermittent   []Continuous Lbs:  [] before manual  [] after manual    []  Ultrasound: []Continuous   [] Pulsed                           []1MHz   []3MHz Location:  W/cm2:    []  Iontophoresis with dexamethasone         Location: [] Take home patch   [] In clinic    []  Ice     []  heat  []  Ice massage  []  Laser   []  Anodyne Position:  Location:    []  Laser with stim  []  Other: Position:  Location:    []  Vasopneumatic Device Pressure:       [] lo [] med [] hi   Temperature: [] lo [] med [] hi   [x] Skin assessment post-treatment:  [x]intact []redness- no adverse reaction    []redness - adverse reaction:     38 min Therapeutic Exercise:  [x] See flow sheet : Assess strength and mobility   Rationale: increase ROM, increase strength and improve coordination to improve the patients ability to perform increased ADL  8 Min Manual Therapy:  STM/DTM (R) Teres minor   Rationale: decrease pain, increase ROM, increase tissue extensibility and decrease trigger points to perform increased ADL         With   [x] TE   [] TA   [] neuro   [] other: Patient Education: [x] Review HEP    [] Progressed/Changed HEP based on:   [] positioning   [] body mechanics   [] transfers   [] heat/ice application    [] other:      Other Objective/Functional Measures:   - Decr TTP with some muscle tightness  - AROM (R) Shoulder Flx 170  Ext 70 ADD 44  - MMT (R) Shoulder Flx 3+ ABD4 Ext 4+ ADD 4+ IR 5- ER 4+       Pain Level (0-10 scale) post treatment: 2    ASSESSMENT/Changes in Function:      Patient will continue to benefit from skilled PT services to modify and progress therapeutic interventions, address functional mobility deficits, address ROM deficits, address strength deficits, analyze and address soft tissue restrictions and analyze and cue movement patterns to attain remaining goals. []  See Plan of Care  []  See progress note/recertification  []  See Discharge Summary         Progress towards goals / Updated goals:  Updated Goals: to be achieved in 1-2 visits:  1.   Pt will have MMT (R) shoulder Gross 5-/5 w/little to no pain to return to progressive gym based exercise program              PNStatus:              Current Status: Flx 3+ ABD4 Ext 4+ ADD 4+ IR 5- ER 4+ 1/29/18  2.   Pt will improve FOTO score to 71 in 13 visits to show significant improvement for progress to good shoulder function              PNStatus:              Current Status:    PLAN  [x]  Upgrade activities as tolerated     [x]  Continue plan of care  []  Update interventions per flow sheet       []  Discharge due to:_  []  Other:_      Carmita Aldridge Alis Darden, PT 1/29/2018  9:30 AM    Future Appointments  Date Time Provider Christi Willis   1/31/2018 7:30 AM Yossi Beebe, PT [facility]

## 2018-01-29 NOTE — PROGRESS NOTES
In [address] [place] [address] Square  [address] [place] [address], [address] [place] [address], [address] [place], [place] [address]y [phone]  [phone] fax    Progress Note  Patient name: Ancelmo Arellano Start of Care: 2017   Referral source: Malcolm Willson : 1970                          Medical Diagnosis: Pain in right shoulder [M25.511] Onset Date:11/15/17                          Treatment Diagnosis: RC strain   Prior Hospitalization: see medical history Provider#: [id number]   Medications: Verified on Patient summary List    Comorbidities: None noted   Prior Level of Function: Able to play Volleyball and perform weight training w/o Shoulder pain  Visits from Start of Care: 7    Missed Visits: 2    Established Goals:         Excellent           Good         Limited           None  [] Increased ROM   []  [x]  []  []  [] Increased Strength  []  [x]  []  []  [] Increased Mobility  []  [x]  []  []   [] Decreased Pain   []  [x]  []  []  [] Decreased Swelling  []  []  []  []    Key Functional Changes:  Patient has shown good progress with this treatment program. Pain as of last visit was 1-2/10. Patient has shown decreased pain and increased strength and mobility. Patient reports 80% improvement with overall involvement. FOTO score is 63. All STG/LTGs achieved as identified below.     Fall Risk Assessment: Patient demonstrates no Fall Risk    Progress towards goals / Updated goals:  Short Term Goals: To be accomplished in 5 treatments:  1.  Pt will be compliant and independent with HEP in order to facilitate PT sessions and aid with self management                        Eval Status:  Initiated                        Current Status: Pt reports compliance 17  2.  Pt to tolerate 30 min or more of TE and/or Interventions w/o increased s/s                        Eval Status:  Initiated                        XJCWQQF Status:  Met at 40 Min 17          24 18            28 18    Long Term Goals: To be accomplished in 10 treatments:  1.  Pt will report 50% improvement or better with involvement to show a significant increase in function                        Eval Status:  Initiated                        Current Status:  2.  Pt will have decreased TTP (R) shoulder Rotator Cuff muscles allowing her to roll over at night w/o waking due to pain                          Eval Status:  Initiated                        [facility] Met, no pain at night 1/16/18  3.  Pt will have decreased pain to 3/10 or better with reaching overhead to allow lifting items overhead to complete ADL                          Eval Status:  Pain 5/10                        Current Status: Met at 1-2/10 1/25/18  4.  Pt will have decreased pain (R) shoulder to allow her to return to usual weight training                          Eval Status:  Stopped weight training                        Current Status: Met, doing usual workouts but not anything major 1/26/18  5.  Pt will improve FOTO score to 71 in 13 visits to show significant improvement for progress to good shoulder function                        Eval Status: 48                        Current Status   Progressing at 63 1/18/18    Updated Goals: to be achieved in 1-2 visits:  1.   Pt will have MMT (R) shoulder Gross 5-/5 w/little to no pain to return to progressive gym based exercise program              PNStatus:              Current Status:  2.   Pt will improve FOTO score to 71 in 13 visits to show significant improvement for progress to good shoulder function              PNStatus:              Current Status:    ASSESSMENT/RECOMMENDATIONS:  [x]Continue therapy per initial plan/protocol at a frequency of  2 x per week for 1-2 visits  []Continue therapy with the following recommended changes:_____________________      _____________________________________________________________________  []Discontinue therapy progressing towards or have reached established goals  []Discontinue therapy due to lack of appreciable progress towards goals  []Discontinue therapy due to lack of attendance or compliance  []Await Physician's recommendations/decisions regarding therapy  []Other:________________________________________________________________    Thank you for this referral.   Cortney Hidalgo, PT 1/29/2018 9:15 AM  NOTE TO PHYSICIAN:  PLEASE COMPLETE THE ORDERS BELOW AND   FAX TO [facility]: [phone]  If you are unable to process this request in 24 hours please contact our office: [phone]    []  I have read the above report and request that my patient continue as recommended. []  I have read the above report and request that my patient continue therapy with the following changes/special instructions:________________________________________  []I have read the above report and request that my patient be discharged from therapy.     Physicians signature: ______________________________Date: _____Time:______

## 2018-01-31 ENCOUNTER — HOSPITAL ENCOUNTER (OUTPATIENT)
Dept: PHYSICAL THERAPY | Age: 48
Discharge: HOME | End: 2018-01-31
Payer: COMMERCIAL

## 2018-01-31 PROCEDURE — 97032 APPL MODALITY 1+ESTIM EA 15: CPT

## 2018-01-31 PROCEDURE — 97110 THERAPEUTIC EXERCISES: CPT

## 2018-01-31 NOTE — PROGRESS NOTES
PT DAILY TREATMENT NOTE     Patient Name: Zee Franklin  Date:2018  : 1970  [x]  Patient  Verified  Payor: BLUE CROSS / Plan: [email or web address] [facility] / Product Type: PPO /    In time:733  Out time:818  Total Treatment Time (min): 40  Visit #: 9 of 10    Treatment Area: Pain in right shoulder [M25.511]    SUBJECTIVE  Pain Level (0-10 scale): 1 shoulder  Any medication changes, allergies to medications, adverse drug reactions, diagnosis change, or new procedure performed?: [x] No    [] Yes (see summary sheet for update)  Subjective functional status/changes:   [] No changes reported  She states her neck has been bothering her, but she needs to get a new pillow.      OBJECTIVE    Modality rationale: decrease inflammation, decrease pain and increase tissue extensibility to improve the patients ability to aid with increase tolerance to ADLS and activities   Min Type Additional Details    [] Estim:  []Unatt       []IFC  []Premod                        []Other:  []w/ice   []w/heat  Position:  Location:   10 [x] Estim: [x]Att    []TENS instruct  []NMES                    [x]Other: LTO []w/US   []w/ice   []w/heat  Position:seated  Location:R teres    []  Traction: [] Cervical       []Lumbar                       [] Prone          []Supine                       []Intermittent   []Continuous Lbs:  [] before manual  [] after manual    []  Ultrasound: []Continuous   [] Pulsed                           []1MHz   []3MHz W/cm2:  Location:    []  Iontophoresis with dexamethasone         Location: [] Take home patch   [] In clinic    []  Ice     []  heat  []  Ice massage  []  Laser   []  Anodyne Position:  Location:    []  Laser with stim  []  Other:  Position:  Location:    []  Vasopneumatic Device Pressure:       [] lo [] med [] hi   Temperature: [] lo [] med [] hi   [] Skin assessment post-treatment:  []intact []redness- no adverse reaction    []redness - adverse reaction:      min []Eval []Re-Eval       30 min Therapeutic Exercise:  [x] See flow sheet :   Rationale: increase ROM, increase strength and improve coordination to improve the patients ability to aid with increase tolerance to ADLs and activities     min Therapeutic Activity:  []  See flow sheet :   Rationale:   to improve the patients ability to       min Neuromuscular Re-education:  []  See flow sheet :   Rationale:   to improve the patients ability to      min Manual Therapy:     Rationale:  to      min Gait Training:  ___ feet with ___ device on level surfaces with ___ level of assist   Rationale: With   [] TE   [] TA   [] neuro   [] other: Patient Education: [x] Review HEP    [] Progressed/Changed HEP based on:   [] positioning   [] body mechanics   [] transfers   [] heat/ice application    [] other:      Other Objective/Functional Measures: VC exercises and tech     Pain Level (0-10 scale) post treatment: 1    ASSESSMENT/Changes in Function: tolerated well. Patient will continue to benefit from skilled PT services to modify and progress therapeutic interventions, address functional mobility deficits, address ROM deficits, address strength deficits, analyze and address soft tissue restrictions, analyze and cue movement patterns, analyze and modify body mechanics/ergonomics, assess and modify postural abnormalities and instruct in home and community integration to attain remaining goals. [x]  See Plan of Care  []  See progress note/recertification  []  See Discharge Summary         Progress towards goals / Updated goals:   1.   Pt will have MMT (R) shoulder Gross 5-/5 w/little to no pain to return to progressive gym based exercise program              PNStatus: Flx 3+ ABD4 Ext 4+ ADD 4+ IR 5- ER 4+               Current Status:  2.   Pt will improve FOTO score to 71 in 13 visits to show significant improvement for progress to good shoulder function              PNStatus: 63              Current Status:   Recheck next session    PLAN  [x]  Upgrade activities as tolerated     [x]  Continue plan of care  []  Update interventions per flow sheet       []  Discharge due to:_  [x]  Other:_  Beni Simmons, PT 1/31/2018  7:37 AM    Future Appointments  Date Time Provider Christi Willis   2/8/2018 10:30 AM Gregor Potter, PT [facility]   2/19/2018 9:00 AM Gregor Potter, PT [facility]   2/22/2018 7:30 AM Gregor Potter, PT [facility]   2/26/2018 3:30 PM Gregor Potter, PT [facility]   2/28/2018 7:30 AM Ninfa Jarrett, PT [facility]

## 2018-02-08 ENCOUNTER — APPOINTMENT (OUTPATIENT)
Dept: PHYSICAL THERAPY | Age: 48
End: 2018-02-08
Payer: COMMERCIAL

## 2018-02-19 ENCOUNTER — HOSPITAL ENCOUNTER (OUTPATIENT)
Dept: PHYSICAL THERAPY | Age: 48
Discharge: HOME | End: 2018-02-19
Payer: COMMERCIAL

## 2018-02-19 PROCEDURE — 97110 THERAPEUTIC EXERCISES: CPT

## 2018-02-19 PROCEDURE — 97032 APPL MODALITY 1+ESTIM EA 15: CPT

## 2018-02-19 NOTE — PROGRESS NOTES
PT DAILY TREATMENT NOTE 3-16    Patient Name: Alma Orr  Date:2018  : 1970  [x]  Patient  Verified  Payor: BLUE CROSS / Plan: [address] [place] [address] [employer] / Product Type: PPO /    In time:902  Out CKRO:[id number]  Total Treatment Time (min): 40  Visit #: 2 of 10    Treatment Area: Pain in right shoulder [M25.511]    SUBJECTIVE  Pain Level (0-10 scale): 2  Any medication changes, allergies to medications, adverse drug reactions, diagnosis change, or new procedure performed?: [x] No    [] Yes (see summary sheet for update)  Subjective functional status/changes:   [] No changes reported  Have been sick and out of town for work.     OBJECTIVE  Modality rationale: decrease inflammation, decrease pain and increase tissue extensibility to improve the patients ability to perform increased ADL   Min Type Additional Details    [] Estim:  []Unatt       []IFC  []Premod                        []Other:  []w/ice   []w/heat  Position:  Location:   8 [x] Estim: [x]Att    []TENS instruct  []NMES                    [x]Other:LTO  []w/US   []w/ice   []w/heat  Position: Seated  Location: (R) Pec    []  Traction: [] Cervical       []Lumbar                       [] Prone          []Supine                       []Intermittent   []Continuous Lbs:  [] before manual  [] after manual    []  Ultrasound: []Continuous   [] Pulsed                           []1MHz   []3MHz Location:  W/cm2:    []  Iontophoresis with dexamethasone         Location: [] Take home patch   [] In clinic    []  Ice     []  heat  []  Ice massage  []  Laser   []  Anodyne Position:  Location:    []  Laser with stim  []  Other: Position:  Location:    []  Vasopneumatic Device Pressure:       [] lo [] med [] hi   Temperature: [] lo [] med [] hi   [x] Skin assessment post-treatment:  []intact []redness- no adverse reaction    []redness - adverse reaction:     32 min Therapeutic Exercise:  [x] See flow sheet : Assess strength and mobility   Rationale: increase ROM, increase strength and improve coordination to improve the patients ability to perform increased ADL       With   [x] TE   [] TA   [] neuro   [] other: Patient Education: [x] Review HEP    [] Progressed/Changed HEP based on:   [] positioning   [] body mechanics   [] transfers   [] heat/ice application    [] other:      Other Objective/Functional Measures:   - MMT (R) Shoulder Flx 5- ABD5 Ext 5- ADD 4+ IR 5- ER 4+  - AROM (R) Shoulder Flx/ABD/Ext/ADD full IR 74    - No TTP (R) Teres Minor, TTP (R) Pec with muscle tightness    Pain Level (0-10 scale) post treatment: 1    ASSESSMENT/Changes in Function:      Patient will continue to benefit from skilled PT services to modify and progress therapeutic interventions, address functional mobility deficits, address ROM deficits, address strength deficits, analyze and address soft tissue restrictions and analyze and cue movement patterns to attain remaining goals.      []  See Plan of Care  []  See progress note/recertification  []  See Discharge Summary         Progress towards goals / Updated goals:   1.  Pt will have MMT (R) shoulder Gross 5-/5 w/little to no pain to return to progressive gym based exercise program              PNStatus: Flx 3+ ABD4 Ext 4+ ADD 4+ IR 5- ER 4+               Current Status: Progressing at MMT (R) Shoulder Flx 5- ABD5 Ext 5- ADD 4+ IR 5- ER 4+ 2/19/18  2.   Pt will improve FOTO score to 71 in 13 visits to show significant improvement for progress to good shoulder function              PNStatus: 63              Current Status:   No change at 63 2/19/18    PLAN  [x]  Upgrade activities as tolerated     [x]  Continue plan of care  []  Update interventions per flow sheet       []  Discharge due to:_  []  Other:_      Donovan Wilkinson, PT 2/19/2018  9:34 AM    Future Appointments  Date Time Provider Christi Willis   2/22/2018 7:30 AM Donovan Wilkinson PT [facility]   2/26/2018 3:30 PM Donovan Wilkinson PT [facility]   2/28/2018 7:30 AM Mookie Biggs, PT [facility]   3/5/2018 4:00 PM Mookie Biggs, PT [facility]   3/7/2018 3:30 PM Daja Oliver, PT [facility]   3/21/2018 7:30 AM Mookie Biggs, PT [facility]   3/23/2018 3:30 PM Mookie Biggs, PT [facility]   3/26/2018 9:00 AM Daja Oliver, PT [facility]   3/29/2018 3:30 PM Mookie Biggs, PT [facility]

## 2018-02-22 ENCOUNTER — HOSPITAL ENCOUNTER (OUTPATIENT)
Dept: PHYSICAL THERAPY | Age: 48
Discharge: HOME | End: 2018-02-22
Payer: COMMERCIAL

## 2018-02-22 PROCEDURE — 97110 THERAPEUTIC EXERCISES: CPT

## 2018-02-22 NOTE — PROGRESS NOTES
PT DAILY TREATMENT NOTE 3-16    Patient Name: Tony Messing  Date:2018  : 1970  [x]  Patient  Verified  Payor: BLUE CROSS / Plan: [employer] [facility] / Product Type: PPO /    In time:728  Out time:821  Total Treatment Time (min): 50  Visit #: 3 of 10    Treatment Area: Pain in right shoulder [M25.511]    SUBJECTIVE  Pain Level (0-10 scale): 1  Any medication changes, allergies to medications, adverse drug reactions, diagnosis change, or new procedure performed?: [x] No    [] Yes (see summary sheet for update)  Subjective functional status/changes:   [] No changes reported  Everything is going good.     OBJECTIVE  Modality rationale: decrease inflammation, decrease pain and increase tissue extensibility to improve the patients ability to perform increased ADL   Min Type Additional Details    [] Estim:  []Unatt       []IFC  []Premod                        []Other:  []w/ice   []w/heat  Position:  Location:    [] Estim: []Att    []TENS instruct  []NMES                    []Other:  []w/US   []w/ice   []w/heat  Position:  Location:    []  Traction: [] Cervical       []Lumbar                       [] Prone          []Supine                       []Intermittent   []Continuous Lbs:  [] before manual  [] after manual    []  Ultrasound: []Continuous   [] Pulsed                           []1MHz   []3MHz Location:  W/cm2:    []  Iontophoresis with dexamethasone         Location: [] Take home patch   [] In clinic   10 [x]  Ice     []  heat  []  Ice massage  []  Laser   []  Anodyne Position: Supine  Location: (R) Shoulder    []  Laser with stim  []  Other: Position:  Location:    []  Vasopneumatic Device Pressure:       [] lo [] med [] hi   Temperature: [] lo [] med [] hi   [] Skin assessment post-treatment:  []intact []redness- no adverse reaction    []redness - adverse reaction:     40 min Therapeutic Exercise:  [x] See flow sheet :   Rationale: increase ROM, increase strength and improve coordination to improve the patients ability to perform increased ADL     With   [x] TE   [] TA   [] neuro   [] other: Patient Education: [x] Review HEP    [] Progressed/Changed HEP based on:   [] positioning   [] body mechanics   [] transfers   [] heat/ice application    [] other:      Other Objective/Functional Measures:   - Good participation with added resistance  - Tolerated new exercises well     Pain Level (0-10 scale) post treatment: 0    ASSESSMENT/Changes in Function:      Patient will continue to benefit from skilled PT services to modify and progress therapeutic interventions, address functional mobility deficits, address ROM deficits, address strength deficits, analyze and address soft tissue restrictions and analyze and cue movement patterns to attain remaining goals.      []  See Plan of Care  []  See progress note/recertification  []  See Discharge Summary         Progress towards goals / Updated goals:   1.  Pt will have MMT (R) shoulder Gross 5-/5 w/little to no pain to return to progressive gym based exercise program              PNStatus: Flx 3+ ABD4 Ext 4+ ADD 4+ IR 5- ER 4+               Current Status: Progressing at MMT (R) Shoulder Flx 5- ABD5 Ext 5- ADD 4+ IR 5- ER 4+ 2/19/18  2.   Pt will improve FOTO score to 71 in 13 visits to show significant improvement for progress to good shoulder function              PNStatus: 63              Current Status:   No change at 63 2/19/18  PLAN  [x]  Upgrade activities as tolerated     [x]  Continue plan of care  []  Update interventions per flow sheet       []  Discharge due to:_  []  Other:_      Vladislav Foot, PT 2/22/2018  7:33 AM    Future Appointments  Date Time Provider Christi Willis   2/26/2018 3:30 PM Vladislav Foot, PT [facility]   2/28/2018 7:30 AM Dhara Redd, PT [facility]   3/5/2018 4:00 PM Dhara Redd, PT [facility]   3/7/2018 3:30 PM Madison Foot, PT [facility]   3/16/2018 8:00 AM [facility] GET RM 1 [facility]   3/16/2018 10:30 AM Diego Cervantes Aura Trujillo [facility]   3/21/2018 7:30 AM Reyes Daniels, PT [facility]   3/23/2018 3:30 PM Reyes Daniels, PT [facility]   3/26/2018 9:00 AM Maegan Bardales, PT [facility]

## 2018-02-26 ENCOUNTER — HOSPITAL ENCOUNTER (OUTPATIENT)
Dept: PHYSICAL THERAPY | Age: 48
Discharge: HOME | End: 2018-02-26
Payer: COMMERCIAL

## 2018-02-26 PROCEDURE — 97110 THERAPEUTIC EXERCISES: CPT

## 2018-02-26 NOTE — PROGRESS NOTES
PT DAILY TREATMENT NOTE 3-16    Patient Name: Sinai Giang  Date:2018  : 1970  [x]  Patient  Verified  Payor: BLUE CROSS / Plan: [employer] [facility] / Product Type: PPO /    In time:3:30 Out time: 4:27  Total Treatment Time (min): 55  Visit #: 4 of 10    Treatment Area: Pain in right shoulder [M25.511]    SUBJECTIVE  Pain Level (0-10 scale): 1  Any medication changes, allergies to medications, adverse drug reactions, diagnosis change, or new procedure performed?: [x] No    [] Yes (see summary sheet for update)  Subjective functional status/changes:   [] No changes reported  Everything is going good. Was able to reach in and take a heavy pan out of the over with the (R) UE w/o difficulty.     OBJECTIVE  Modality rationale: decrease inflammation, decrease pain and increase tissue extensibility to improve the patients ability to perform increased ADL   Min Type Additional Details    [] Estim:  []Unatt       []IFC  []Premod                        []Other:  []w/ice   []w/heat  Position:  Location:    [] Estim: []Att    []TENS instruct  []NMES                    []Other:  []w/US   []w/ice   []w/heat  Position:  Location:    []  Traction: [] Cervical       []Lumbar                       [] Prone          []Supine                       []Intermittent   []Continuous Lbs:  [] before manual  [] after manual    []  Ultrasound: []Continuous   [] Pulsed                           []1MHz   []3MHz Location:  W/cm2:    []  Iontophoresis with dexamethasone         Location: [] Take home patch   [] In clinic   10 [x]  Ice     []  heat  []  Ice massage  []  Laser   []  Anodyne Position: Supine  Location: (R) Shoulder    []  Laser with stim  []  Other: Position:  Location:    []  Vasopneumatic Device Pressure:       [] lo [] med [] hi   Temperature: [] lo [] med [] hi   [x] Skin assessment post-treatment:  [x]intact []redness- no adverse reaction    []redness - adverse reaction:     45 min Therapeutic Exercise:  [x] See flow sheet :   Rationale: increase ROM, increase strength and improve coordination to improve the patients ability to perform increased ADL     With   [x] TE   [] TA   [] neuro   [] other: Patient Education: [x] Review HEP    [] Progressed/Changed HEP based on:   [] positioning   [] body mechanics   [] transfers   [] heat/ice application    [] other:      Other Objective/Functional Measures:   - Good participation with added resistance  - Tolerated new exercises well     Pain Level (0-10 scale) post treatment: 0    ASSESSMENT/Changes in Function:      Patient will continue to benefit from skilled PT services to modify and progress therapeutic interventions, address functional mobility deficits, address ROM deficits, address strength deficits, analyze and address soft tissue restrictions and analyze and cue movement patterns to attain remaining goals.      []  See Plan of Care  []  See progress note/recertification  []  See Discharge Summary         Progress towards goals / Updated goals:   1.  Pt will have MMT (R) shoulder Gross 5-/5 w/little to no pain to return to progressive gym based exercise program              PNStatus: Flx 3+ ABD4 Ext 4+ ADD 4+ IR 5- ER 4+               Current Status: Progressing at MMT (R) Shoulder Flx 5- ABD5 Ext 5- ADD 4+ IR 5- ER 4+ 2/19/18  Good tolerance with added resisted abd and flx 2/26/18  2.   Pt will improve FOTO score to 71 in 13 visits to show significant improvement for progress to good shoulder function              PNStatus: 63              Current Status:   No change at 63 2/19/18  PLAN  [x]  Upgrade activities as tolerated     [x]  Continue plan of care  []  Update interventions per flow sheet       []  Discharge due to:_  []  Other:_      Daja Oliver, PT 2/26/2018  3:44 PM    Future Appointments  Date Time Provider Christi Willis   2/28/2018 7:30 AM Mookie Biggs PT [facility]   3/5/2018 4:00 PM Mookie Biggs PT [facility]   3/7/2018 3:30 PM Chris UGALDE Lester Beckett, PT [facility]   3/16/2018 8:00 AM [facility] GET RM 1 [facility]   3/16/2018 10:30 AM Dina Viramontes, PT [facility]   3/21/2018 7:30 AM Dina Viramontes, PT [facility]   3/23/2018 3:30 PM Dina Viramontes, PT [facility]   3/26/2018 9:00 AM Heather Luis, PT [facility]

## 2018-02-28 ENCOUNTER — HOSPITAL ENCOUNTER (OUTPATIENT)
Dept: PHYSICAL THERAPY | Age: 48
Discharge: HOME | End: 2018-02-28
Payer: COMMERCIAL

## 2018-02-28 PROCEDURE — 97110 THERAPEUTIC EXERCISES: CPT

## 2018-02-28 NOTE — PROGRESS NOTES
PT DAILY TREATMENT NOTE     Patient Name: Richard Florez  Date:2018  : 1970  [x]  Patient  Verified  Payor: BLUE CROSS / Plan: [employer] [facility] [place] / Product Type: PPO /    In time:730  Out time:820  Total Treatment Time (min): 48  Visit #: 5 of 10    Treatment Area: Pain in right shoulder [M25.511]    SUBJECTIVE  Pain Level (0-10 scale): 1  Any medication changes, allergies to medications, adverse drug reactions, diagnosis change, or new procedure performed?: [x] No    [] Yes (see summary sheet for update)  Subjective functional status/changes:   [] No changes reported  Doing alright . This is like a little work out for me.     OBJECTIVE    Modality rationale: decrease pain and post exercise recovery to improve the patients ability to aid with increase tolerance to ADLs and activities   Min Type Additional Details    [] Estim:  []Unatt       []IFC  []Premod                        []Other:  []w/ice   []w/heat  Position:  Location:    [] Estim: []Att    []TENS instruct  []NMES                    []Other:  []w/US   []w/ice   []w/heat  Position:  Location:    []  Traction: [] Cervical       []Lumbar                       [] Prone          []Supine                       []Intermittent   []Continuous Lbs:  [] before manual  [] after manual    []  Ultrasound: []Continuous   [] Pulsed                           []1MHz   []3MHz W/cm2:  Location:    []  Iontophoresis with dexamethasone         Location: [] Take home patch   [] In clinic   10 [x]  Ice post  []  heat  []  Ice massage  []  Laser   []  Anodyne Position:reclined  Location:R shoulder    []  Laser with stim  []  Other:  Position:  Location:    []  Vasopneumatic Device Pressure:       [] lo [] med [] hi   Temperature: [] lo [] med [] hi   [] Skin assessment post-treatment:  []intact []redness- no adverse reaction    []redness - adverse reaction:       min []Eval                  []Re-Eval       38 min Therapeutic Exercise:  [x] See flow sheet : Rationale: increase ROM, increase strength and improve coordination to improve the patients ability to aid with increase tolerance to ADLs and activities     min Therapeutic Activity:  []  See flow sheet :   Rationale:   to improve the patients ability to       min Neuromuscular Re-education:  []  See flow sheet :   Rationale:   to improve the patients ability to      min Manual Therapy:     Rationale:  to      min Gait Training:  ___ feet with ___ device on level surfaces with ___ level of assist   Rationale: With   [] TE   [] TA   [] neuro   [] other: Patient Education: [x] Review HEP    [] Progressed/Changed HEP based on:   [] positioning   [] body mechanics   [] transfers   [] heat/ice application    [] other:      Other Objective/Functional Measures: VC exercise and tech, increased 1/2 prone B 2# to 3 sets    Pain Level (0-10 scale) post treatment: 1    ASSESSMENT/Changes in Function: tolerated well. Patient will continue to benefit from skilled PT services to modify and progress therapeutic interventions, address functional mobility deficits, address ROM deficits, address strength deficits, analyze and address soft tissue restrictions, analyze and cue movement patterns, analyze and modify body mechanics/ergonomics, assess and modify postural abnormalities and instruct in home and community integration to attain remaining goals.      [x]  See Plan of Care  [x]  See progress note/recertification  []  See Discharge Summary         Progress towards goals / Updated goals:   1.  Pt will have MMT (R) shoulder Gross 5-/5 w/little to no pain to return to progressive gym based exercise program              PNStatus: Flx 3+ ABD4 Ext 4+ ADD 4+ IR 5- ER 4+               Current Status: Progressing at MMT (R) Shoulder Flx 5- ABD5 Ext 5- ADD 4+ IR 5- ER 4+ 2/19/18  Good tolerance with added resisted abd and flx 2/26/18  2.   Pt will improve FOTO score to 71 in 13 visits to show significant improvement for progress to good shoulder function              PNStatus: 63              Current Status:   No change at 63 2/19/18    PLAN  [x]  Upgrade activities as tolerated     [x]  Continue plan of care  []  Update interventions per flow sheet       []  Discharge due to:_  []  Other:_      Ananth Vargas, PT 2/28/2018  7:37 AM    Future Appointments  Date Time Provider Christi Willis   3/5/2018 2:00 PM Ananth Vargas, PT [facility]   3/7/2018 3:30 PM Chanelle Mejia, PT [facility]   3/16/2018 8:00 AM [facility] GET RM 1 [facility]   3/16/2018 10:30 AM Ananth Vargas, PT [facility]   3/21/2018 7:30 AM Ananth Vargas, PT [facility]   3/23/2018 3:30 PM Ananth Vargas, PT [facility]   3/26/2018 9:00 AM Chanelle Mejia, PT [facility]

## 2018-03-05 ENCOUNTER — HOSPITAL ENCOUNTER (OUTPATIENT)
Dept: PHYSICAL THERAPY | Age: 48
Discharge: HOME | End: 2018-03-05
Payer: COMMERCIAL

## 2018-03-05 PROCEDURE — 97110 THERAPEUTIC EXERCISES: CPT

## 2018-03-05 NOTE — PROGRESS NOTES
PT DAILY TREATMENT NOTE     Patient Name: Dayanara Morrow  Date:3/5/2018  : 1970  [x]  Patient  Verified  Payor: BLUE CROSS / Plan: [employer] [facility] [place] / Product Type: PPO /    In time:200  Out time:235  Total Treatment Time (min): 33  Visit #: 6 of 10    Treatment Area: Pain in right shoulder [M25.511]    SUBJECTIVE  Pain Level (0-10 scale): 1  Any medication changes, allergies to medications, adverse drug reactions, diagnosis change, or new procedure performed?: [x] No    [] Yes (see summary sheet for update)  Subjective functional status/changes:   [] No changes reported  Doing alright. The shoulder still clicks a lot but it has always done that.      OBJECTIVE    Modality rationale:     Min Type Additional Details    [] Estim:  []Unatt       []IFC  []Premod                        []Other:  []w/ice   []w/heat  Position:  Location:    [] Estim: []Att    []TENS instruct  []NMES                    []Other:  []w/US   []w/ice   []w/heat  Position:  Location:    []  Traction: [] Cervical       []Lumbar                       [] Prone          []Supine                       []Intermittent   []Continuous Lbs:  [] before manual  [] after manual    []  Ultrasound: []Continuous   [] Pulsed                           []1MHz   []3MHz W/cm2:  Location:    []  Iontophoresis with dexamethasone         Location: [] Take home patch   [] In clinic   TC had to  son []  Ice     []  heat  []  Ice massage  []  Laser   []  Anodyne Position:  Location:    []  Laser with stim  []  Other:  Position:  Location:    []  Vasopneumatic Device Pressure:       [] lo [] med [] hi   Temperature: [] lo [] med [] hi   [] Skin assessment post-treatment:  []intact []redness- no adverse reaction    []redness - adverse reaction:       min []Eval                  []Re-Eval       33 min Therapeutic Exercise:  [x] See flow sheet :   Rationale: increase ROM, increase strength and improve coordination to improve the patients ability to aid with increase tolerance to ADLs and activities     min Therapeutic Activity:  []  See flow sheet :   Rationale:   to improve the patients ability to       min Neuromuscular Re-education:  []  See flow sheet :   Rationale:   to improve the patients ability to      min Manual Therapy:     Rationale:  to      min Gait Training:  ___ feet with ___ device on level surfaces with ___ level of assist   Rationale: With   [] TE   [] TA   [] neuro   [] other: Patient Education: [x] Review HEP    [] Progressed/Changed HEP based on:   [] positioning   [] body mechanics   [] transfers   [] heat/ice application    [] other:      Other Objective/Functional Measures: VC exercises and tech     Pain Level (0-10 scale) post treatment: 1    ASSESSMENT/Changes in Function: no increase pain post exercises. Patient will continue to benefit from skilled PT services to modify and progress therapeutic interventions, address functional mobility deficits, address ROM deficits, address strength deficits, analyze and address soft tissue restrictions, analyze and cue movement patterns, analyze and modify body mechanics/ergonomics, assess and modify postural abnormalities and instruct in home and community integration to attain remaining goals.      [x]  See Plan of Care  []  See progress note/recertification  []  See Discharge Summary         Progress towards goals / Updated goals:   1.  Pt will have MMT (R) shoulder Gross 5-/5 w/little to no pain to return to progressive gym based exercise program              PNStatus: Flx 3+ ABD4 Ext 4+ ADD 4+ IR 5- ER 4+               Current Status: Progressing at MMT (R) Shoulder Flx 5- ABD5 Ext 5- ADD 4+ IR 5- ER 4+ 2/19/18  Good tolerance with added resisted abd and flx 2/26/18  2.   Pt will improve FOTO score to 71 in 13 visits to show significant improvement for progress to good shoulder function              PNStatus: 63              Current Status:   No change at 63 2/19/18 Recheck next time  PLAN  [x]  Upgrade activities as tolerated     [x]  Continue plan of care  []  Update interventions per flow sheet       []  Discharge due to:_  [x]  Other:_ recheck [address], PT 3/5/2018  2:01 PM    Future Appointments  Date Time Provider Christi Willis   3/7/2018 3:30 PM Rianamargarito Gloria, PT [facility]   3/16/2018 8:00 AM [facility] GET RM 1 [facility]   3/16/2018 10:30 AM Arva Pap, PT [facility]   3/21/2018 7:30 AM Arva Pap, PT [facility]   3/23/2018 3:30 PM Arva Pap, PT [facility]   3/26/2018 9:00 AM Riana Gloria, PT [facility]

## 2018-03-07 ENCOUNTER — HOSPITAL ENCOUNTER (OUTPATIENT)
Dept: PHYSICAL THERAPY | Age: 48
Discharge: HOME | End: 2018-03-07
Payer: COMMERCIAL

## 2018-03-07 PROCEDURE — 97110 THERAPEUTIC EXERCISES: CPT

## 2018-03-07 NOTE — PROGRESS NOTES
PT DAILY TREATMENT NOTE 3-16    Patient Name: Aleisha Schaeffer  Date:3/7/2018  : 1970  [x]  Patient  Verified  Payor: BLUE CROSS / Plan:  [facility] [place] / Product Type: PPO /    In time:3:30 Out time: 4:27  Total Treatment Time (min): 43  Visit #: 7 of 10    Treatment Area: Pain in right shoulder [M25.511]    SUBJECTIVE  Pain Level (0-10 scale): 1-2  Any medication changes, allergies to medications, adverse drug reactions, diagnosis change, or new procedure performed?: [x] No    [] Yes (see summary sheet for update)  Subjective functional status/changes:   [] No changes reported  Can sleep on the right side w/o it hurting. Pt reports 85% improvement since start of treatment    OBJECTIVE      43 min Therapeutic Exercise:  [x] See flow sheet :   Rationale: increase ROM, increase strength and improve coordination to improve the patients ability to perform increased ADL     With   [x] TE   [] TA   [] neuro   [] other: Patient Education: [x] Review HEP    [] Progressed/Changed HEP based on:   [] positioning   [] body mechanics   [] transfers   [] heat/ice application    [] other:      Other Objective/Functional Measures:   - Good participation  - Increased resistance with exercises with good tolerance  - Added [address] resisted shoulder with good tolerance   - Pt showing increased strength with good response to exercises     Pain Level (0-10 scale) post treatment: 1    ASSESSMENT/Changes in Function:      Patient will continue to benefit from skilled PT services to modify and progress therapeutic interventions, address functional mobility deficits, address ROM deficits, address strength deficits, analyze and address soft tissue restrictions and analyze and cue movement patterns to attain remaining goals.      []  See Plan of Care  []  See progress note/recertification  []  See Discharge Summary         Progress towards goals / Updated goals:   1.  Pt will have MMT (R) shoulder Gross 5-/5 w/little to no pain to return to progressive gym based exercise program              PNStatus: Flx 3+ ABD4 Ext 4+ ADD 4+ IR 5- ER 4+               Current Status: Progressing at [facility] (R) Shoulder Flx 5- ABD5 Ext 5- ADD 4+ IR 5- ER 4+ 2/19/18  Good tolerance with added resisted abd and flx 2/26/18  2.   Pt will improve FOTO score to 71 in 13 visits to show significant improvement for progress to good shoulder function              PNStatus: 63              Current Status:    Improved to 69 3/7/18  PLAN  [x]  Upgrade activities as tolerated     [x]  Continue plan of care  []  Update interventions per flow sheet       []  Discharge due to:_  []  Other:_      Heather Luis, PT 3/7/2018  6:14 PM    Future Appointments  Date Time Provider Christi Willis   3/16/2018 8:00 AM [facility] GET RM 1 [facility]   3/16/2018 10:30 AM Dina Viramontes, PT [facility]   3/21/2018 7:30 AM Dina Viramontes PT [facility]   3/23/2018 3:30 PM Dina Viramontes PT [facility]   3/26/2018 9:00 AM Heather Luis PT [facility]

## 2018-03-16 ENCOUNTER — HOSPITAL ENCOUNTER (OUTPATIENT)
Dept: PHYSICAL THERAPY | Age: 48
Discharge: HOME | End: 2018-03-16
Payer: COMMERCIAL

## 2018-03-16 ENCOUNTER — HOSPITAL ENCOUNTER (OUTPATIENT)
Dept: MAMMOGRAPHY | Age: 48
Discharge: HOME | End: 2018-03-16
Attending: NURSE PRACTITIONER
Payer: COMMERCIAL

## 2018-03-16 ENCOUNTER — DOCUMENTATION ONLY (OUTPATIENT)
Dept: SURGERY | Age: 48
End: 2018-03-16

## 2018-03-16 DIAGNOSIS — Z12.39 BREAST CANCER SCREENING: ICD-10-CM

## 2018-03-16 PROCEDURE — 97110 THERAPEUTIC EXERCISES: CPT

## 2018-03-16 PROCEDURE — 77063 BREAST TOMOSYNTHESIS BI: CPT

## 2018-03-16 NOTE — PROGRESS NOTES
Patient's lifetime risk for developing breast cancer was calculated using the information supplied by the pt on the [address] [facility]. The Tyrer-Cuzick Model was used. Patient's score came out to be >20%, therefore standards indicate she should have an annual breast MRI ordered in addition to an annual Mammogram.  It is also recommended that the patient have a clinical breast exam every 6 months. Dr. Maikel Martinez and Dr. Lurdes Dodd would be glad to see any patients to enroll them in our high risk program. Please call the Breast Nurse Navigator at [phone] if you have further questions.   (A copy of this note will be faxed to pt's referring provider.)

## 2018-03-16 NOTE — PROGRESS NOTES
PT DAILY TREATMENT NOTE     Patient Name: Alberto Arellano  Date:3/16/2018  : 1970  [x]  Patient  Verified  Payor: BLUE CROSS / Plan: [employer] [facility] [place] / Product Type: PPO /    In time:1035  Out time:1113  Total Treatment Time (min): 38  Visit #: 8 of 10    Treatment Area: Pain in right shoulder [M25.511]    SUBJECTIVE  Pain Level (0-10 scale): 0  Any medication changes, allergies to medications, adverse drug reactions, diagnosis change, or new procedure performed?: [x] No    [] Yes (see summary sheet for update)  Subjective functional status/changes:   [] No changes reported  I am not having any pain. It feels like it is doing better.      OBJECTIVE    Modality rationale:     Min Type Additional Details    [] Estim:  []Unatt       []IFC  []Premod                        []Other:  []w/ice   []w/heat  Position:  Location:    [] Estim: []Att    []TENS instruct  []NMES                    []Other:  []w/US   []w/ice   []w/heat  Position:  Location:    []  Traction: [] Cervical       []Lumbar                       [] Prone          []Supine                       []Intermittent   []Continuous Lbs:  [] before manual  [] after manual    []  Ultrasound: []Continuous   [] Pulsed                           []1MHz   []3MHz W/cm2:  Location:    []  Iontophoresis with dexamethasone         Location: [] Take home patch   [] In clinic   PD []  Ice     []  heat  []  Ice massage  []  Laser   []  Anodyne Position:  Location:    []  Laser with stim  []  Other:  Position:  Location:    []  Vasopneumatic Device Pressure:       [] lo [] med [] hi   Temperature: [] lo [] med [] hi   [] Skin assessment post-treatment:  []intact []redness- no adverse reaction    []redness - adverse reaction:       min []Eval                  []Re-Eval       38 min Therapeutic Exercise:  [x] See flow sheet :   Rationale: increase ROM, increase strength and improve coordination to improve the patients ability to aid with increase tolerance to ADLs and activities     min Therapeutic Activity:  []  See flow sheet :   Rationale:   to improve the patients ability to       min Neuromuscular Re-education:  []  See flow sheet :   Rationale:   to improve the patients ability to      min Manual Therapy:     Rationale:  to      min Gait Training:  ___ feet with ___ device on level surfaces with ___ level of assist   Rationale: With   [] TE   [] TA   [] neuro   [] other: Patient Education: [x] Review HEP    [] Progressed/Changed HEP based on:   [] positioning   [] body mechanics   [] transfers   [] heat/ice application    [] other:      Other Objective/Functional Measures: VC exercises and tech. Pain Level (0-10 scale) post treatment: 0    ASSESSMENT/Changes in Function: Tolerated well. Increased tolerance to strengthening exericses. Patient will continue to benefit from skilled PT services to modify and progress therapeutic interventions, address functional mobility deficits, address ROM deficits, address strength deficits, analyze and address soft tissue restrictions, analyze and cue movement patterns, analyze and modify body mechanics/ergonomics, assess and modify postural abnormalities and instruct in home and community integration to attain remaining goals.      [x]  See Plan of Care  [x]  See progress note/recertification  []  See Discharge Summary         Progress towards goals / Updated goals:  1.  Pt will have MMT (R) shoulder Gross 5-/5 w/little to no pain to return to progressive gym based exercise program              PNStatus: Flx 3+ ABD4 Ext 4+ ADD 4+ IR 5- ER 4+               Current Status: Progressing at MMT (R) Shoulder Flx 5- ABD5 Ext 5- ADD 4+ IR 5- ER 4+ 2/19/18  Good tolerance with added resisted abd and flx 2/26/18      MMT 5/5 3/16/18  2.   Pt will improve FOTO score to 71 in 13 visits to show significant improvement for progress to good shoulder function              PNStatus: 63              Current Status:    Improved to 69 3/7/18    PLAN  [x]  Upgrade activities as tolerated     [x]  Continue plan of care  []  Update interventions per flow sheet       []  Discharge due to:_  []  Other:_      Isac Erickson PT 3/16/2018  10:54 AM    Future Appointments  Date Time Provider Christi Willis   3/21/2018 7:30 AM Isac Erickson PT [facility]   3/23/2018 3:30 PM Isac Erickson PT [facility]   3/26/2018 9:00 AM Chan Frazier PT [facility]

## 2018-03-21 ENCOUNTER — HOSPITAL ENCOUNTER (OUTPATIENT)
Dept: PHYSICAL THERAPY | Age: 48
Discharge: HOME | End: 2018-03-21
Payer: COMMERCIAL

## 2018-03-21 PROCEDURE — 97110 THERAPEUTIC EXERCISES: CPT

## 2018-03-21 NOTE — PROGRESS NOTES
PT DAILY TREATMENT NOTE     Patient Name: Analisa Mohamud  Date:3/21/2018  : 1970  [x]  Patient  Verified  Payor: BLUE CROSS / Plan: [place] [employer] [facility] / Product Type: PPO /    In time:730  Out time:809  Total Treatment Time (min): 39  Visit #: 9 of 10    Treatment Area: Pain in right shoulder [M25.511]    SUBJECTIVE  Pain Level (0-10 scale): 0  Any medication changes, allergies to medications, adverse drug reactions, diagnosis change, or new procedure performed?: [x] No    [] Yes (see summary sheet for update)  Subjective functional status/changes:   [] No changes reported  My tennis elbow is beginning to act up some.      OBJECTIVE    Modality rationale:  PD   Min Type Additional Details    [] Estim:  []Unatt       []IFC  []Premod                        []Other:  []w/ice   []w/heat  Position:  Location:    [] Estim: []Att    []TENS instruct  []NMES                    []Other:  []w/US   []w/ice   []w/heat  Position:  Location:    []  Traction: [] Cervical       []Lumbar                       [] Prone          []Supine                       []Intermittent   []Continuous Lbs:  [] before manual  [] after manual    []  Ultrasound: []Continuous   [] Pulsed                           []1MHz   []3MHz W/cm2:  Location:    []  Iontophoresis with dexamethasone         Location: [] Take home patch   [] In clinic    []  Ice     []  heat  []  Ice massage  []  Laser   []  Anodyne Position:  Location:    []  Laser with stim  []  Other:  Position:  Location:    []  Vasopneumatic Device Pressure:       [] lo [] med [] hi   Temperature: [] lo [] med [] hi   [] Skin assessment post-treatment:  []intact []redness- no adverse reaction    []redness - adverse reaction:       min []Eval                  []Re-Eval       39 min Therapeutic Exercise:  [x] See flow sheet :   Rationale: increase ROM, increase strength, improve coordination, improve balance and increase proprioception to improve the patients ability to aid with increase tolerance to ADLS and activities     min Therapeutic Activity:  []  See flow sheet :   Rationale:   to improve the patients ability to       min Neuromuscular Re-education:  []  See flow sheet :   Rationale:   to improve the patients ability to      min Manual Therapy:     Rationale:  to      min Gait Training:  ___ feet with ___ device on level surfaces with ___ level of assist   Rationale: With   [] TE   [] TA   [] neuro   [] other: Patient Education: [x] Review HEP    [] Progressed/Changed HEP based on:   [] positioning   [] body mechanics   [] transfers   [] heat/ice application    [x] other: VC elbow F E stretches for right tennis elbow     Other Objective/Functional Measures: VC exercises and tech. She notes some flaring up of right elbow tennis elbow pain. Some pain reported with the band Ys , resolves when done with that exercise. Pain Level (0-10 scale) post treatment: 0    ASSESSMENT/Changes in Function: tolerated well with some reports of reoccurring right elbow pain. Patient will continue to benefit from skilled PT services to modify and progress therapeutic interventions, address functional mobility deficits, address ROM deficits, address strength deficits, analyze and address soft tissue restrictions, analyze and cue movement patterns, analyze and modify body mechanics/ergonomics, assess and modify postural abnormalities and instruct in home and community integration to attain remaining goals.      [x]  See Plan of Care  [x]  See progress note/recertification  []  See Discharge Summary         Progress towards goals / Updated goals:   1.  Pt will have MMT (R) shoulder Gross 5-/5 w/little to no pain to return to progressive gym based exercise program              PNStatus: Flx 3+ ABD4 Ext 4+ ADD 4+ IR 5- ER 4+               Current Status: Progressing at MMT (R) Shoulder Flx 5- ABD5 Ext 5- ADD 4+ IR 5- ER 4+ 2/19/18  Good tolerance with added resisted abd and flx 2/26/18 MMT 5/5 3/16/18  3/21/18  2.   Pt will improve FOTO score to 71 in 13 visits to show significant improvement for progress to good shoulder function              PNStatus: 63              Current Status:    Improved to 69 3/7/18    PLAN  [x]  Upgrade activities as tolerated     [x]  Continue plan of care  []  Update interventions per flow sheet       []  Discharge due to:_  []  Other:_      Yossi Beebe PT 3/21/2018  7:49 AM    Future Appointments  Date Time Provider Christi Willis   3/23/2018 3:30 PM Yossi Beebe PT [facility] [address]   3/26/2018 9:00 AM Gracie Ribeiro PT [facility] [address]

## 2018-03-23 ENCOUNTER — HOSPITAL ENCOUNTER (OUTPATIENT)
Dept: PHYSICAL THERAPY | Age: 48
Discharge: HOME | End: 2018-03-23
Payer: COMMERCIAL

## 2018-03-23 PROCEDURE — 97110 THERAPEUTIC EXERCISES: CPT

## 2018-03-23 NOTE — PROGRESS NOTES
PT DAILY TREATMENT NOTE     Patient Name: Jadon Child  Date:3/23/2018  : 1970  [x]  Patient  Verified  Payor: BLUE CROSS / Plan: Dom [employer] / Product Type: PPO /    In time:336  Out time:415  Total Treatment Time (min): 39  Visit #:9  of 10    Count possibly off from time of MD note,         Treatment Area: Pain in right shoulder [M25.511]    SUBJECTIVE  Pain Level (0-10 scale): 0  Any medication changes, allergies to medications, adverse drug reactions, diagnosis change, or new procedure performed?: [x] No    [] Yes (see summary sheet for update)  Subjective functional status/changes:   [] No changes reported  I have one more on Monday    OBJECTIVE    Modality rationale:     Min Type Additional Details    [] Estim:  []Unatt       []IFC  []Premod                        []Other:  []w/ice   []w/heat  Position:  Location:    [] Estim: []Att    []TENS instruct  []NMES                    []Other:  []w/US   []w/ice   []w/heat  Position:  Location:    []  Traction: [] Cervical       []Lumbar                       [] Prone          []Supine                       []Intermittent   []Continuous Lbs:  [] before manual  [] after manual    []  Ultrasound: []Continuous   [] Pulsed                           []1MHz   []3MHz W/cm2:  Location:    []  Iontophoresis with dexamethasone         Location: [] Take home patch   [] In clinic    []  Ice     []  heat  []  Ice massage  []  Laser   []  Anodyne Position:  Location:    []  Laser with stim  []  Other:  Position:  Location:    []  Vasopneumatic Device Pressure:       [] lo [] med [] hi   Temperature: [] lo [] med [] hi   [] Skin assessment post-treatment:  []intact []redness- no adverse reaction    []redness - adverse reaction:       min []Eval                  []Re-Eval       39 min Therapeutic Exercise:  [x] See flow sheet :   Rationale: increase ROM, increase strength and improve coordination to improve the patients ability to aid with increase tolerance to ADLs and activities     min Therapeutic Activity:  []  See flow sheet :   Rationale:   to improve the patients ability to       min Neuromuscular Re-education:  []  See flow sheet :   Rationale:   to improve the patients ability to      min Manual Therapy:     Rationale:  to      min Gait Training:  ___ feet with ___ device on level surfaces with ___ level of assist   Rationale: With   [] TE   [] TA   [] neuro   [] other: Patient Education: [x] Review HEP    [] Progressed/Changed HEP based on:   [] positioning   [] body mechanics   [] transfers   [] heat/ice application    [] other:      Other Objective/Functional Measures: VC exercises and tech     Pain Level (0-10 scale) post treatment: 0    ASSESSMENT/Changes in Function: tolerated well. Patient states she has one additional visit remaining and will be in Monday for final visit. Count possibly off around time of MD note. Patient will continue to benefit from skilled PT services to modify and progress therapeutic interventions, address functional mobility deficits, address ROM deficits, address strength deficits, analyze and address soft tissue restrictions, analyze and cue movement patterns, analyze and modify body mechanics/ergonomics, assess and modify postural abnormalities and instruct in home and community integration to attain remaining goals.      [x]  See Plan of Care  [x]  See progress note/recertification  []  See Discharge Summary         Progress towards goals / Updated goals:   1.  Pt will have MMT (R) shoulder Gross 5-/5 w/little to no pain to return to progressive gym based exercise program              PNStatus: Flx 3+ ABD4 Ext 4+ ADD 4+ IR 5- ER 4+               Current Status: Progressing at MMT (R) Shoulder Flx 5- ABD5 Ext 5- ADD 4+ IR 5- ER 4+ 2/19/18  Good tolerance with added resisted abd and flx 2/26/18      MMT 5/5 3/16/18  3/21/18  2.   Pt will improve FOTO score to 71 in 13 visits to show significant improvement for progress to good shoulder function              PNStatus: 63              Current Status:    Improved to 69 3/7/18       PLAN  [x]  Upgrade activities as tolerated     [x]  Continue plan of care  []  Update interventions per flow sheet       []  Discharge due to:_  [x]  Other:_  Recheck for [address], PT 3/23/2018  4:16 PM    Future Appointments  Date Time Provider Christi Willis   3/26/2018 9:00 AM Donovan Wilkinson, PT [facility]

## 2018-03-26 ENCOUNTER — HOSPITAL ENCOUNTER (OUTPATIENT)
Dept: PHYSICAL THERAPY | Age: 48
Discharge: HOME | End: 2018-03-26
Payer: COMMERCIAL

## 2018-03-26 PROCEDURE — 97110 THERAPEUTIC EXERCISES: CPT

## 2018-03-27 ENCOUNTER — OFFICE VISIT (OUTPATIENT)
Dept: SURGERY | Age: 48
End: 2018-03-27

## 2018-03-27 VITALS
SYSTOLIC BLOOD PRESSURE: 108 MMHG | WEIGHT: 148 LBS | HEART RATE: 55 BPM | BODY MASS INDEX: 27.23 KG/M2 | OXYGEN SATURATION: 99 % | RESPIRATION RATE: 18 BRPM | TEMPERATURE: 98.2 F | DIASTOLIC BLOOD PRESSURE: 74 MMHG | HEIGHT: 62 IN

## 2018-03-27 DIAGNOSIS — Z80.3 FAMILY HISTORY OF BREAST CANCER IN FIRST DEGREE RELATIVE: Primary | ICD-10-CM

## 2018-03-27 RX ORDER — AZELASTINE HYDROCHLORIDE 0.5 MG/ML
SOLUTION/ DROPS OPHTHALMIC 2 TIMES DAILY
COMMUNITY
End: 2019-08-15 | Stop reason: ALTCHOICE

## 2018-03-27 NOTE — MR AVS SNAPSHOT
[address] [place] [address] [address]99 [facility] 
[phone] Patient: Alberto Arellano MRN:  Valerie Borne Visit Information Date & Time Provider Department Dept. Phone Encounter #  
 3/27/2018 10:30 AM Jose Martin Hoang  [address] [id number] Upcoming Health Maintenance Date Due DTaP/Tdap/Td series (1 - Tdap) 8/5/1989 PAP AKA CERVICAL CYTOLOGY 1/7/2016 BREAST CANCER SCRN MAMMOGRAM 3/16/2019 Allergies as of 3/27/2018  Review Complete On: 3/27/2018 By: Jose Martin Hoang MD  
  
 Severity Noted Reaction Type Reactions Milk Containing Products  08/04/2016    Diarrhea Sulfa (Sulfonamide Antibiotics)  06/07/2010    Rash Other Medication Low 06/07/2010    Diarrhea Eggs, patient eats, and gets flu shot yearly with no reaction Current Immunizations  Reviewed on 7/28/2016 Name Date Influenza Vaccine 10/7/2016, 9/26/2015 Influenza Vaccine (Quad) PF 9/15/2014 Influenza Vaccine Split 10/10/2012, 11/1/2010 Not reviewed this visit You Were Diagnosed With   
  
 Codes Comments Family history of breast cancer in first degree relative    -  Primary ICD-10-CM: Z80.3 ICD-9-CM: V16.3 Vitals BP Pulse Temp Resp Height(growth percentile) Weight(growth percentile) 108/74 (!) 55 98.2 °F (36.8 °C) (Oral) 18 5' 2\" (1.575 m) 148 lb (67.1 kg) SpO2 BMI OB Status Smoking Status 99% 27.07 kg/m2 Having regular periods Never Smoker BMI and BSA Data Body Mass Index Body Surface Area  
 27.07 kg/m 2 1.71 m 2 Preferred Pharmacy Pharmacy Name Phone [facility], [address] [place] [address] [phone] Your Updated Medication List  
  
   
This list is accurate as of 3/27/18 11:10 AM.  Always use your most recent med list.  
  
  
  
  
 azelastine 0.05 % ophthalmic solution Commonly known as:  OPTIVAR Administer  to both eyes two (2) times a day. Use in affected eye(s)  
  
 esomeprazole 20 mg capsule Commonly known as:  Drinda Hilda Take 20 mg by mouth nightly. gentamicin 0.3 % ophthalmic solution Commonly known as:  GARAMYCIN Administer 1 Drop to both eyes three (3) times daily. meloxicam 15 mg tablet Commonly known as:  MOBIC Take 1 tab daily or 1/2 tab twice daily as needed pain with food. NATURAL VITAMIN D PO Take  by mouth. SIMPLY SLEEP 25 mg tablet Generic drug:  diphenhydrAMINE Take 25 mg by mouth nightly. ZANTAC PO Take  by mouth. Introducing [address]! Dear Lili Childress: Thank you for requesting a [employer] account. Our records indicate that you already have an active [employer] account. You can access your account anytime at [email or web address] Did you know that you can access your hospital and ER discharge instructions at any time in [employer]? You can also review all of your test results from your hospital stay or ER visit. Additional Information If you have questions, please visit the Frequently Asked Questions section of the [employer] website at [email or web address] Remember, [employer] is NOT to be used for urgent needs. For medical emergencies, dial 911. Now available from your iPhone and Android! Please provide this summary of care documentation to your next provider. Your primary care clinician is listed as Joseph Hidalgo. If you have any questions after today's visit, please call [phone].

## 2018-03-27 NOTE — PROGRESS NOTES
[facility]  General Surgery    Subjective:      HPI: Patient is a very pleasant 49-year-old female with a past medical history that is remarkable for gastroesophageal reflux disease, irritable bowel syndrome, generalized anxiety disorder, herniated nucleus pulposus of the lumbar spine status post lumbar laminectomy. She was referred by [facility] for evaluation and management for a high risk of breast cancer. The patient has history of a mother with breast cancer in her late 45s. Maternal with breast cancer in her 62s, and a paternal aunt with breast cancer in 62s. I reviewed her mammogram of the bilateral breast independently on the monitor and agree with the BI-RADS 1 classification. She denies any unintentional weight loss or skin changes of the breast or masses of the breast.  She denies any nipple drainage or discharge. She states that she only performs self breast exam occasionally.     Patient Active Problem List    Diagnosis Date Noted    Cough 02/14/2017    Sore throat 02/14/2017    Status post lumbar laminectomy 08/19/2016    HNP (herniated nucleus pulposus), lumbar 07/01/2016    Displacement of lumbar intervertebral disc without myelopathy 06/03/2016    Bulging lumbar disc 04/07/2016    Spondylosis of lumbar region without myelopathy or radiculopathy 04/07/2016    Lumbar neuritis 04/07/2016    DDD (degenerative disc disease), lumbar 04/07/2016    Cervical disc herniation 11/14/2012    S/P cervical discectomy 11/14/2012    Cervical disc disorder with radiculopathy 09/21/2012    Seasonal allergic rhinitis 07/17/2012    GERD (gastroesophageal reflux disease) 07/17/2012    Mass of axilla 08/27/2010    Foot pain 06/07/2010    Thumb pain 06/07/2010     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    Thumb pain 6/7/2010      Past Surgical History:   Procedure Laterality Date    HX GYN      left tube removal ectopic pregnancy    HX HEENT  9/2011    Basal cell cancer lesion removed from nose    HX LYMPHADENECTOMY Right 8/25/16    HX ORTHOPAEDIC  1999    TMJ    HX OTHER SURGICAL  2013    Spinal Fusion    HX TONSILLECTOMY  2/2007      Family History   Problem Relation Age of Onset    Cancer Mother      breast cancer    Diabetes Father     High Cholesterol Father     Cancer Maternal Aunt      breast cancer    Arthritis-osteo Maternal Grandmother      arthritis       Social History   Substance Use Topics    Smoking status: Never Smoker    Smokeless tobacco: Never Used    Alcohol use Yes      Comment: few times per week      Allergies   Allergen Reactions    Milk Containing Products Diarrhea    Sulfa (Sulfonamide Antibiotics) Rash    Other Medication Diarrhea     Eggs, patient eats, and gets flu shot yearly with no reaction       Prior to Admission medications    Medication Sig Start Date End Date Taking? Authorizing Provider   azelastine (OPTIVAR) 0.05 % ophthalmic solution Administer  to both eyes two (2) times a day. Use in affected eye(s)   Yes Historical Provider   RANITIDINE HCL (ZANTAC PO) Take  by mouth. Yes Historical Provider   VITAMIN A/VITAMIN D3 (NATURAL VITAMIN D PO) Take  by mouth. Yes Historical Provider   gentamicin (GARAMYCIN) 0.3 % ophthalmic solution Administer 1 Drop to both eyes three (3) times daily. 11/1/17  Yes Vik Joseph NP   esomeprazole (NEXIUM) 20 mg capsule Take 20 mg by mouth nightly. Yes Historical Provider   diphenhydrAMINE (SIMPLY SLEEP) 25 mg tablet Take 25 mg by mouth nightly. Yes Historical Provider   meloxicam (MOBIC) 15 mg tablet Take 1 tab daily or 1/2 tab twice daily as needed pain with food. 9/20/17   Jaime Maciel DO       Review of Systems:    14 systems were reviewed.   The results are as above in the HPI and otherwise negative. Objective:     Vitals:    03/27/18 1049   BP: 108/74   Pulse: (!) 55   Resp: 18   Temp: 98.2 °F (36.8 °C)   TempSrc: Oral   SpO2: 99%   Weight: 67.1 kg (148 lb)   Height: 5' 2\" (1.575 m)       Physical Exam:  GENERAL: alert, cooperative, no distress, appears stated age,   EYE: conjunctivae/corneas clear. PERRL, EOM's intact. THROAT & NECK: normal and no erythema or exudates noted. ,    LYMPHATIC: Cervical, supraclavicular, and axillary nodes normal. ,   LUNG: clear to auscultation bilaterally,   HEART: regular rate and rhythm, S1, S2 normal, no murmur, click, rub or gallop,   BREASTS:   Left: No dimpling, discoloration, nipple inversion or retractions. No axillary or supraclavicular lymphadenopathy. No mass  Right: No dimpling, discoloration, nipple inversion or retractions. No axillary or supraclavicular lymphadenopathy. No mass  ABDOMEN: soft, non-tender. Bowel sounds normal. No masses,  no organomegaly,   EXTREMITIES:  extremities normal, atraumatic, no cyanosis or edema,   SKIN: Normal.,   NEUROLOGIC: AOx3. Cranial nerves 2-12 and sensation grossly intact. ,     Data Review: As above in HPI and bilateral breast MRI ordered for 6 months. Impression:     · Patient with increased risk of breast cancer secondary to family history of breast cancer    Plan:     · Continue monthly self breast exam   · Please call or follow-up in the interim if any changes arise on breast exam  · Follow-up in 6 months for clinical breast exam  · Please follow-up in 6 months for bilateral breast MRI.   ·

## 2018-03-27 NOTE — PROGRESS NOTES
HISTORY OF PRESENT ILLNESS  Cindy Poole is a 50 y.o. female. HPI    Review of Systems   Constitutional: Negative. HENT: Negative. Eyes: Negative. Respiratory: Negative. Cardiovascular: Negative. Gastrointestinal: Positive for heartburn. Negative for abdominal pain, blood in stool, constipation, diarrhea, melena, nausea and vomiting. Genitourinary: Negative. Musculoskeletal: Negative. Skin: Negative. Neurological: Negative. Endo/Heme/Allergies: Negative. Psychiatric/Behavioral: Negative.         Physical Exam

## 2018-03-29 ENCOUNTER — APPOINTMENT (OUTPATIENT)
Dept: PHYSICAL THERAPY | Age: 48
End: 2018-03-29
Payer: COMMERCIAL

## 2018-03-30 ENCOUNTER — OFFICE VISIT (OUTPATIENT)
Dept: ORTHOPEDIC SURGERY | Age: 48
End: 2018-03-30

## 2018-03-30 VITALS
WEIGHT: 150 LBS | HEIGHT: 62 IN | HEART RATE: 68 BPM | TEMPERATURE: 96.4 F | SYSTOLIC BLOOD PRESSURE: 110 MMHG | BODY MASS INDEX: 27.6 KG/M2 | RESPIRATION RATE: 15 BRPM | DIASTOLIC BLOOD PRESSURE: 78 MMHG

## 2018-03-30 DIAGNOSIS — M25.521 ELBOW PAIN, RIGHT: ICD-10-CM

## 2018-03-30 DIAGNOSIS — M77.11 LATERAL EPICONDYLITIS, RIGHT ELBOW: Primary | ICD-10-CM

## 2018-03-30 RX ORDER — DICLOFENAC SODIUM 20 MG/G
2 SOLUTION TOPICAL
Qty: 112 G | Refills: 3 | Status: SHIPPED | OUTPATIENT
Start: 2018-03-30 | End: 2019-10-23

## 2018-03-30 RX ORDER — DICLOFENAC SODIUM 20 MG/G
2 SOLUTION TOPICAL
Qty: 112 G | Refills: 3 | Status: SHIPPED | OUTPATIENT
Start: 2018-03-30 | End: 2018-03-30 | Stop reason: CLARIF

## 2018-03-30 RX ORDER — METHYLPREDNISOLONE ACETATE 40 MG/ML
40 INJECTION, SUSPENSION INTRA-ARTICULAR; INTRALESIONAL; INTRAMUSCULAR; SOFT TISSUE ONCE
Qty: 1 VIAL | Refills: 0
Start: 2018-03-30 | End: 2018-03-30

## 2018-03-30 RX ORDER — AZELASTINE HYDROCHLORIDE, FLUTICASONE PROPIONATE 137; 50 UG/1; UG/1
1 SPRAY, METERED NASAL AS NEEDED
COMMUNITY
End: 2021-03-25 | Stop reason: ALTCHOICE

## 2018-03-30 RX ORDER — LIDOCAINE HYDROCHLORIDE 20 MG/ML
1 INJECTION, SOLUTION EPIDURAL; INFILTRATION; INTRACAUDAL; PERINEURAL ONCE
Qty: 1 ML | Refills: 0
Start: 2018-03-30 | End: 2018-03-30

## 2018-03-30 NOTE — LETTER
NAME: Susie Stewart : 1970 MRN: [id number] CONSENT FOR TREATMENT/PROCEDURE I authorize Mar Quiroz DO at [facility] to perform the medical treatment and/or procedure(s) described below:  
 
Description of Medical Treatment and/or Procedure(s): (Specify number of treatments or procedures if repeated treatment is recommended) 
 
_____________inject steroid into right elbow lateral epicondyle_________________________ I understand my provider may be assisted with significant procedural tasks by other qualified medical practitioners, who may perform important parts of the treatment/procedure(s) or assist with the administration of analgesia (pain-relieving medications) as necessary for my treatment/procedure(s). These practitioners will only perform tasks within the scope of their licensure and practice, as determined under [place] law and any other applicable regulation(s). Name and Credentials of Practitioners Who May Assist with My Treatment/Procedure(s):  
 
______________________________________________________________________ I understand that a medical company representative may be present during my treatment/procedure(s) to observe and provide verbal, technical advice to my provider. I consent to the participation of this representative in my treatment/procedure(s). My provider has explained that unforeseen conditions may be identified during the performance of my treatment/procedure(s) that necessitate an extension of the original treatment/procedure(s) or the performance of procedures other than those identified above. I consent to the performance of additional treatment/procedure(s) by my provider and the qualified medical practitioners assisting my provider as deemed necessary by my provider for treatment of my medical condition(s).   
 
I understand that certain complications may arise during the use of analgesia during my treatment/procedure(s) that may include, but are not limited to, decreased/altered awareness, respiratory problems, drug reactions, paralysis, brain damage, or possibly, death. I understand that the analgesia required for the performance of my treatment/procedure(s) involves additional risks beyond those of the treatment/procedure(s) to be performed, and authorize the use of analgesia by my provider as deemed necessary for the control of pain during my treatment/procedure(s). I understand that my provider may need to change the type of analgesia or medications used, possibly without explanation to me, and I consent to any such changes as deemed necessary by my provider for treatment of my medical condition(s). I understand that there are risks of infection and other unexpected complications associated with any medical or surgical treatment/procedure including the treatment/procedure(s) listed above or other treatment/procedure(s) necessitated by my medical condition, and that such complications may occur in the absence of any negligence on the part of my healthcare providers. My provider has explained to my satisfaction my medical condition and the specific treatment/procedure(s) recommended and identified above. I have been given an opportunity to ask and have answered to my satisfaction questions about: (CONTINUED PAGE 2) NAME: Alon Lopez : 1970 MRN: [id number]  The nature and extent of the treatment/procedure(s) to be performed;  The benefit of treatment, and the risks associated with not having the recommended treatment/procedure(s);  The risks and possible complications associated with having the treatment, including those which, even though   
   unlikely to occur, may involve serious consequences;  
 Analgesia and alternative forms of analgesia;  Alternative procedures and methods of treatment, and the risk associated with each;  
  The expected consequences of the treatment/procedure(s) on my future health. I understand that no assurance can be given that the treatment/procedure(s) performed will be a success, and no guarantee or warranty of success for the treatment/procedure(s) has been given to me by my provider. I consent to the disposal by the examining Pathologist of any tissue removed during my treatment/procedure(s) in accordance with the receiving hospitals or laboratorys policy and any associated regulations specific to such disposal.  
 
I DO_____  DO NOT__x____ consent to other health care personnel observing my treatment/procedure(s) for the purpose of medical education or other specified purposes as may be explained by my provider. I DO_______ DO NOT____x____ consent to photography or videotaping of all or any part of my treatment/procedure(s) for medical and/or educational purposes. I understand that my identity will not be revealed in any photographs, videos, or accompanying explanations should these images be used by my healthcare providers. I certify that I have read and fully understand the above Consent for Treatment/Procedure and that all blanks were completed before I signed this consent.  
 
__________Alexandra JEROME Felisa_________                      _______________ Print Name of Patient or Legal Representative        Relationship to Patient (if not self) X_______________________________            __________________________ Signature of Patient (or legal representative)  Witness to signature    
 
                       __3/30/2018___/_________AM/PM 
                                                                   Date/Time (If patient is a minor or is unable to sign, complete the following) Patient is a minor, ________ years of age, or is unable to sign due to:______________________ I have explained the nature, purpose and anticipated benefits as well as any possible alternative methods of treatment, the known risks that are involved, and the possibility of complications of the proposed procedure(s) to the patient or patients legal representative. I have provided the patient or legal representative with an opportunity to ask and have answered to their satisfaction any questions about the proposed treatment/procedure(s) and alternative methods of treatment.   
 
Provider Signature:___________________  Date:__3/30/2018__Time:_____________AM/PM

## 2018-03-30 NOTE — PATIENT INSTRUCTIONS
Tennis Elbow: Care Instructions  Your Care Instructions    Tennis elbow is soreness or pain on the outer part of the elbow. The pain occurs when the tendon is stretched and becomes irritated by repeated twisting of the hand, wrist, and forearm. A tendon is a tough tissue that connects muscle to bone. This injury is common in tennis players. But you also can get it from many activities that work the same muscles. Examples include gardening, painting, and using tools. Tennis elbow usually heals with rest and treatment at home. Follow-up care is a key part of your treatment and safety. Be sure to make and go to all appointments, and call your doctor if you are having problems. It's also a good idea to know your test results and keep a list of the medicines you take. How can you care for yourself at home? ? · Rest your fingers, wrist, and forearm. Try to stop or reduce any activity that causes elbow pain. You may have to rest your arm for weeks to months. Follow your doctor's directions for how long to rest.   ? · Put ice or a cold pack on your elbow for 10 to 20 minutes at a time. Try to do this every 1 to 2 hours for the next 3 days (when you are awake) or until the swelling goes down. Put a thin cloth between the ice and your skin. ? · If your doctor gave you a brace or splint, use it as directed. A \"counterforce\" brace is a strap around your forearm, just below your elbow. It may ease the pressure on the tendon and spread force throughout your arm. ? · Prop up your elbow on pillows to help reduce swelling. ? · Follow your doctor's or physical therapist's directions for exercise. ? · Return to your usual activities slowly. ? · Try to prevent the problem. Learn the best techniques for your sport. For example, make sure the  on your tennis racquet is not too big for your hand. Try not to hit a tennis ball late in your swing.    ? · Think about asking your employer about new ways of doing your job if your elbow pain is caused by something you do at work. Medicines  ? · Be safe with medicines. Read and follow all instructions on the label. ¨ If the doctor gave you a prescription medicine for pain, take it as prescribed. ¨ If you are not taking a prescription pain medicine, ask your doctor if you can take an over-the-counter medicine. When should you call for help? Call your doctor now or seek immediate medical care if:  ? · Your pain is worse. ? · You cannot bend your elbow normally. ? · Your arm or hand is cool or pale or changes color. ? · You have tingling, weakness, or numbness in your hand and fingers. ? Watch closely for changes in your health, and be sure to contact your doctor if:  ? · You have work problems caused by your elbow pain. ? · Your pain is not better after 2 weeks. Where can you learn more? Go to http://sary-mk.info/. Enter 0699 465 17 25 in the search box to learn more about \"Tennis Elbow: Care Instructions. \"  Current as of: March 21, 2017  Content Version: 11.4  © 0396-5120 [employer]wise[employer]. Care instructions adapted under license by [employer] (which disclaims liability or warranty for this information). If you have questions about a medical condition or this instruction, always ask your healthcare professional. [address] any warranty or liability for your use of this information.

## 2018-03-30 NOTE — PROGRESS NOTES
HISTORY OF PRESENT ILLNESS    Jewel Varela is a [employer] year old female comes in today to be evaluated and treated for: right elbow    Has had pain right lbow and injected by me [id number] and improved until about 2 weeks ago. Noticed worsening with PT for shoulder. Pain rated 51/10. No numbness. Social History     Social History    Marital status:      Spouse name: N/A    Number of children: N/A    Years of education: N/A     Social History Main Topics    Smoking status: Never Smoker    Smokeless tobacco: Never Used    Alcohol use Yes      Comment: few times per week    Drug use: No    Sexual activity: Yes     Partners: Male      Comment: pregnancy test negative     Other Topics Concern    None     Social History Narrative     Current Outpatient Prescriptions   Medication Sig Dispense Refill    Mv,Ca,Min-FA-Herbal No.157 (ESTROVEN MAXIMUM STRENGTH) 400 mcg tab Take  by mouth.  azelastine-fluticasone (DYMISTA) 137-50 mcg/spray spry by Nasal route.  azelastine (OPTIVAR) 0.05 % ophthalmic solution Administer  to both eyes two (2) times a day. Use in affected eye(s)      RANITIDINE HCL (ZANTAC PO) Take  by mouth.  VITAMIN A/VITAMIN D3 (NATURAL VITAMIN D PO) Take  by mouth.  esomeprazole (NEXIUM) 20 mg capsule Take 20 mg by mouth nightly.  diphenhydrAMINE (SIMPLY SLEEP) 25 mg tablet Take 25 mg by mouth nightly.  meloxicam (MOBIC) 15 mg tablet Take 1 tab daily or 1/2 tab twice daily as needed pain with food.  80 Tab 0     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    Thumb pain 6/7/2010     Family History   Problem Relation Age of Onset    Cancer Mother      breast cancer    Diabetes Father     High Cholesterol Father     Lung Disease Father      COPD and nodule on lung    Heart Disease Father     Cancer Maternal Aunt      breast cancer    Arthritis-osteo Maternal Grandmother      arthritis          ROS:  No numb, tingle, swell    Objective:  Visit Vitals    /78    Pulse 68    Temp 96.4 °F (35.8 °C)    Resp 15    Ht 5' 2\" (1.575 m)    Wt 150 lb (68 kg)    BMI 27.44 kg/m2   GEN:  Appears stated age in NAD. HEAD:  Normocephalic, Atraumatic. NEURO:  Sensation intact light touch upper and lower extremities. Biceps & Triceps reflexes +2/4 bilaterally. right hand dominant. M/S:  right elbow Negative ray tenderness. Phalen's negative. Tinel's negative. Strength +5/5 bilateral .  Piano key sign Negative bilateral .  Carpal bone motion normal.  Finklestein's negative  TFCC Load Test negative. RIGHT lateral epicondyle(s) with TTP significantly worsened with wrist flexion. negative muscular atrophy. EXT:  no clubbing/cyanosis. no edema.         Assessment/Plan:     ICD-10-CM ICD-9-CM    1. Lateral epicondylitis, right elbow M77.11 726.32 PENNSAID 20 mg/gram /actuation(2 %) sopm      AMB POC US, SONO GUIDE NEEDLE      FL INJECT TENDON ORIGIN/INSERT   2. Elbow pain, right M25.521 719.42 PENNSAID 20 mg/gram /actuation(2 %) sopm       Patient verbalizes understanding of evaluation and plan. Will use strap and stretch with Pennsaid as needed and return PRN. Time with Pt 27 minutes, >50% of which was counseling pt regarding Dx and Tx options need for rehab/Pennsaid and coordination of care.

## 2018-03-30 NOTE — MR AVS SNAPSHOT
[facility] 
 
 
 [address] [facility], [facility] [address] [place] [address] [phone] Patient: Kathryn Brito MRN: [id number] Vivi Boxer Visit Information Date & Time Provider Department Dept. Phone Encounter #  
 3/30/2018  3:20 PM [facility] [phone] Follow-up Instructions Return if symptoms worsen or fail to improve. Your Appointments 9/26/2018  3:30 PM  
Follow Up with [facility] Appt Note: 6 month f/up [address] [place] [address] [address] [id number] [address] Upcoming Health Maintenance Date Due DTaP/Tdap/Td series (1 - Tdap) 8/5/1989 PAP AKA CERVICAL CYTOLOGY 1/7/2016 BREAST CANCER SCRN MAMMOGRAM 3/16/2019 Allergies as of 3/30/2018  Review Complete On: 3/30/2018 By: Mary Garcia DO Severity Noted Reaction Type Reactions Milk Containing Products  08/04/2016    Diarrhea Sulfa (Sulfonamide Antibiotics)  06/07/2010    Rash Other Medication Low 06/07/2010    Diarrhea Eggs, patient eats, and gets flu shot yearly with no reaction Current Immunizations  Reviewed on 7/28/2016 Name Date Influenza Vaccine 10/7/2016, 9/26/2015 Influenza Vaccine (Quad) PF 9/15/2014 Influenza Vaccine Split 10/10/2012, 11/1/2010 Not reviewed this visit You Were Diagnosed With   
  
 Codes Comments Lateral epicondylitis, right elbow    -  Primary ICD-10-CM: M77.11 ICD-9-CM: 726.32 Elbow pain, right     ICD-10-CM: M25.521 ICD-9-CM: 719.42 Vitals BP Pulse Temp Resp Height(growth percentile) Weight(growth percentile) 110/78 68 96.4 °F (35.8 °C) 15 5' 2\" (1.575 m) 150 lb (68 kg) BMI OB Status Smoking Status 27.44 kg/m2 Having regular periods Never Smoker BMI and BSA Data Body Mass Index Body Surface Area  
 27.44 kg/m 2 1.72 m 2 Preferred Pharmacy Pharmacy Name Phone [facility] [address] [place] [address] [phone] Your Updated Medication List  
  
   
This list is accurate as of 3/30/18  3:32 PM.  Always use your most recent med list.  
  
  
  
  
 azelastine 0.05 % ophthalmic solution Commonly known as:  OPTIVAR Administer  to both eyes two (2) times a day. Use in affected eye(s) DYMISTA 137-50 mcg/spray [place] Generic drug:  azelastine-fluticasone  
by Nasal route.  
  
 esomeprazole 20 mg capsule Commonly known as:  Danuta Hu Take 20 mg by mouth nightly. ESTROVEN MAXIMUM STRENGTH 400 mcg Tab Generic drug:  Mv,Ca,Min-FA-Herbal No.157 Take  by mouth.  
  
 meloxicam 15 mg tablet Commonly known as:  MOBIC Take 1 tab daily or 1/2 tab twice daily as needed pain with food. NATURAL VITAMIN D PO Take  by mouth. PENNSAID 20 mg/gram /actuation(2 %) Sopm  
Generic drug:  diclofenac sodium 2 Pump(s) by Apply Externally route two (2) times daily as needed. Cell: [phone]  Pharmacy: [phone] SIMPLY SLEEP 25 mg tablet Generic drug:  diphenhydrAMINE Take 25 mg by mouth nightly. ZANTAC PO Take  by mouth. Prescriptions Sent to Pharmacy Refills PENNSAID 20 mg/gram /actuation(2 %) sopm 3 Si Pump(s) by Apply Externally route two (2) times daily as needed. Cell: [phone]  Pharmacy: [phone] Class: Normal  
 Pharmacy: [address] Ph #: [phone] Route: Apply Externally We Performed the Following AMB POC US, SONO GUIDE NEEDLE [id number] CPT(R)] [place] INJECT TENDON ORIGIN/INSERT [id number] CPT(R)] Follow-up Instructions Return if symptoms worsen or fail to improve.   
  
To-Do List   
 2018 9:30 AM  
 Appointment with [facility] MRI RM 2 at [address] [facility] ([phone]) GENERAL INSTRUCTIONS  You will be required to lie still face down for 45-60 minutes. Bring information (ID card) if you have any medically implanted devices. Remove any jewelry (including body piercing, hairpins) prior to MRI. If you have had a creatinine level drawn within the past 30 days, please bring most recent results to your appt. Bring all prior Mammogram, Breast Ultrasound, and Breast MRI films, CD's, and reports with you on the day of your exam.  This only includes studies done outside of [address] [place] [address], [facility], [place], and [facility]. Bring a complete list of all medications you are currently taking to include prescriptions, over-the-counter meds, herbals, vitamins & any dietary supplements. If you were given medications for claustrophobia or anxiety, please arrange to have someone drive you to your appointment. QUESTIONS  Notify the MRI Department if you have any questions concerning your study. [address] Patient Instructions Tennis Elbow: Care Instructions Your Care Instructions Tennis elbow is soreness or pain on the outer part of the elbow. The pain occurs when the tendon is stretched and becomes irritated by repeated twisting of the hand, wrist, and forearm. A tendon is a tough tissue that connects muscle to bone. This injury is common in tennis players. But you also can get it from many activities that work the same muscles. Examples include gardening, painting, and using tools. Tennis elbow usually heals with rest and treatment at home. Follow-up care is a key part of your treatment and safety. Be sure to make and go to all appointments, and call your doctor if you are having problems. It's also a good idea to know your test results and keep a list of the medicines you take. How can you care for yourself at home? ? · Rest your fingers, wrist, and forearm. Try to stop or reduce any activity that causes elbow pain. You may have to rest your arm for weeks to months. Follow your doctor's directions for how long to rest.  
? · Put ice or a cold pack on your elbow for 10 to 20 minutes at a time. Try to do this every 1 to 2 hours for the next 3 days (when you are awake) or until the swelling goes down. Put a thin cloth between the ice and your skin. ? · If your doctor gave you a brace or splint, use it as directed. A \"counterforce\" brace is a strap around your forearm, just below your elbow. It may ease the pressure on the tendon and spread force throughout your arm. ? · Prop up your elbow on pillows to help reduce swelling. ? · Follow your doctor's or physical therapist's directions for exercise. ? · Return to your usual activities slowly. ? · Try to prevent the problem. Learn the best techniques for your sport. For example, make sure the  on your tennis racquet is not too big for your hand. Try not to hit a tennis ball late in your swing. ? · Think about asking your employer about new ways of doing your job if your elbow pain is caused by something you do at work. Medicines ? · Be safe with medicines. Read and follow all instructions on the label. ¨ If the doctor gave you a prescription medicine for pain, take it as prescribed. ¨ If you are not taking a prescription pain medicine, ask your doctor if you can take an over-the-counter medicine. When should you call for help? Call your doctor now or seek immediate medical care if: 
? · Your pain is worse. ? · You cannot bend your elbow normally. ? · Your arm or hand is cool or pale or changes color. ? · You have tingling, weakness, or numbness in your hand and fingers. ? Watch closely for changes in your health, and be sure to contact your doctor if: 
? · You have work problems caused by your elbow pain. ? · Your pain is not better after 2 weeks. Where can you learn more? Go to http://sary-mk.info/. Enter 0699 [id number] [phone] in the search box to learn more about \"Tennis Elbow: Care Instructions. \" Current as of: March 21, 2017 Content Version: 11.4 © 6566-7313 [employer]. Care instructions adapted under license by [facility] (which disclaims liability or warranty for this information). If you have questions about a medical condition or this instruction, always ask your healthcare professional. [address] any warranty or liability for your use of this information. Introducing [facility] & HEALTH SERVICES! Dear Narendra Broderick: Thank you for requesting a Nemedia account. Our records indicate that you already have an active Nemedia account. You can access your account anytime at [email or web address] Did you know that you can access your hospital and ER discharge instructions at any time in Nemedia? You can also review all of your test results from your hospital stay or ER visit. Additional Information If you have questions, please visit the Frequently Asked Questions section of the Nemedia website at [email or web address] Remember, Nemedia is NOT to be used for urgent needs. For medical emergencies, dial 911. Now available from your iPhone and Android! Please provide this summary of care documentation to your next provider. Your primary care clinician is listed as Hay Busch. If you have any questions after today's visit, please call [phone].

## 2018-07-30 DIAGNOSIS — S46.011A ROTATOR CUFF STRAIN, RIGHT, INITIAL ENCOUNTER: ICD-10-CM

## 2018-07-30 RX ORDER — MELOXICAM 15 MG/1
TABLET ORAL
Qty: 30 TAB | Refills: 1 | Status: SHIPPED | OUTPATIENT
Start: 2018-07-30 | End: 2018-12-10 | Stop reason: SDUPTHER

## 2018-08-10 ENCOUNTER — HOSPITAL ENCOUNTER (OUTPATIENT)
Dept: LAB | Age: 48
Discharge: HOME | End: 2018-08-10
Payer: COMMERCIAL

## 2018-08-10 ENCOUNTER — OFFICE VISIT (OUTPATIENT)
Dept: FAMILY MEDICINE CLINIC | Age: 48
End: 2018-08-10

## 2018-08-10 VITALS
WEIGHT: 156 LBS | HEART RATE: 69 BPM | BODY MASS INDEX: 28.71 KG/M2 | OXYGEN SATURATION: 100 % | SYSTOLIC BLOOD PRESSURE: 118 MMHG | RESPIRATION RATE: 17 BRPM | HEIGHT: 62 IN | DIASTOLIC BLOOD PRESSURE: 80 MMHG | TEMPERATURE: 97 F

## 2018-08-10 DIAGNOSIS — Z13.1 SCREENING FOR DIABETES MELLITUS: ICD-10-CM

## 2018-08-10 DIAGNOSIS — Z13.29 SCREENING FOR THYROID DISORDER: ICD-10-CM

## 2018-08-10 DIAGNOSIS — Z12.11 SCREENING FOR COLON CANCER: ICD-10-CM

## 2018-08-10 DIAGNOSIS — Z00.00 WELL WOMAN EXAM (NO GYNECOLOGICAL EXAM): Primary | ICD-10-CM

## 2018-08-10 DIAGNOSIS — Z13.220 SCREENING FOR HYPERLIPIDEMIA: ICD-10-CM

## 2018-08-10 DIAGNOSIS — Z13.0 SCREENING FOR DEFICIENCY ANEMIA: ICD-10-CM

## 2018-08-10 LAB
ALBUMIN SERPL-MCNC: 3.9 G/DL (ref 3.4–5)
ALBUMIN/GLOB SERPL: 1.1 {RATIO} (ref 0.8–1.7)
ALP SERPL-CCNC: 97 U/L (ref 45–117)
ALT SERPL-CCNC: 24 U/L (ref 13–56)
ANION GAP SERPL CALC-SCNC: 7 MMOL/L (ref 3–18)
AST SERPL-CCNC: 15 U/L (ref 15–37)
BILIRUB SERPL-MCNC: 0.4 MG/DL (ref 0.2–1)
BUN SERPL-MCNC: 14 MG/DL (ref 7–18)
BUN/CREAT SERPL: 22 (ref 12–20)
CALCIUM SERPL-MCNC: 9.5 MG/DL (ref 8.5–10.1)
CHLORIDE SERPL-SCNC: 106 MMOL/L (ref 100–108)
CHOLEST SERPL-MCNC: 247 MG/DL
CO2 SERPL-SCNC: 30 MMOL/L (ref 21–32)
CREAT SERPL-MCNC: 0.63 MG/DL (ref 0.6–1.3)
GLOBULIN SER CALC-MCNC: 3.5 G/DL (ref 2–4)
GLUCOSE SERPL-MCNC: 79 MG/DL (ref 74–99)
HCT VFR BLD AUTO: 39.1 % (ref 35–45)
HDLC SERPL-MCNC: 54 MG/DL (ref 40–60)
HDLC SERPL: 4.6 {RATIO} (ref 0–5)
HGB BLD-MCNC: 13.3 G/DL (ref 12–16)
LDLC SERPL CALC-MCNC: 171.2 MG/DL (ref 0–100)
LIPID PROFILE,FLP: ABNORMAL
POTASSIUM SERPL-SCNC: 4 MMOL/L (ref 3.5–5.5)
PROT SERPL-MCNC: 7.4 G/DL (ref 6.4–8.2)
SODIUM SERPL-SCNC: 143 MMOL/L (ref 136–145)
TRIGL SERPL-MCNC: 109 MG/DL (ref ?–150)
TSH SERPL DL<=0.05 MIU/L-ACNC: 1.6 UIU/ML (ref 0.36–3.74)
VLDLC SERPL CALC-MCNC: 21.8 MG/DL

## 2018-08-10 PROCEDURE — 36415 COLL VENOUS BLD VENIPUNCTURE: CPT | Performed by: NURSE PRACTITIONER

## 2018-08-10 PROCEDURE — 80061 LIPID PANEL: CPT | Performed by: NURSE PRACTITIONER

## 2018-08-10 PROCEDURE — 80053 COMPREHEN METABOLIC PANEL: CPT | Performed by: NURSE PRACTITIONER

## 2018-08-10 PROCEDURE — 85018 HEMOGLOBIN: CPT | Performed by: NURSE PRACTITIONER

## 2018-08-10 PROCEDURE — 84443 ASSAY THYROID STIM HORMONE: CPT | Performed by: NURSE PRACTITIONER

## 2018-08-10 RX ORDER — BISMUTH SUBSALICYLATE 262 MG
1 TABLET,CHEWABLE ORAL DAILY
COMMUNITY

## 2018-08-10 NOTE — PROGRESS NOTES
Chief Complaint   Patient presents with    Well Woman     1. Have you been to the ER, urgent care clinic since your last visit? Hospitalized since your last visit? No    2. Have you seen or consulted any other health care providers outside of the [address] [place] [address] since your last visit? Include any pap smears or colon screening.  No

## 2018-08-10 NOTE — PROGRESS NOTES
Subjective:   46 y.o. female for Well Woman Check. Patient's last menstrual period was 04/22/2018 (approximate). Social History: single partner, contraception - none. Pertinent past medical hstory: no history of HTN, DVT, CAD, DM, liver disease, migraines or smoking. Patient Active Problem List    Diagnosis Date Noted    Cough 02/14/2017    Sore throat 02/14/2017    Status post lumbar laminectomy 08/19/2016    HNP (herniated nucleus pulposus), lumbar 07/01/2016    Displacement of lumbar intervertebral disc without myelopathy 06/03/2016    Bulging lumbar disc 04/07/2016    Spondylosis of lumbar region without myelopathy or radiculopathy 04/07/2016    Lumbar neuritis 04/07/2016    DDD (degenerative disc disease), lumbar 04/07/2016    Cervical disc herniation 11/14/2012    S/P cervical discectomy 11/14/2012    Cervical disc disorder with radiculopathy 09/21/2012    Seasonal allergic rhinitis 07/17/2012    GERD (gastroesophageal reflux disease) 07/17/2012    Mass of axilla 08/27/2010    Foot pain 06/07/2010    Thumb pain 06/07/2010     Current Outpatient Prescriptions   Medication Sig Dispense Refill    multivitamin (ONE A DAY) tablet Take 1 Tab by mouth daily.  meloxicam (MOBIC) 15 mg tablet TAKE 1 TABLET BY MOUTH DAILY OR TAKE 1/2 TABLET TWO TIMES A DAY AS NEEDED FOR PAIN *TAKE WITH FOOD* 30 Tab 1    Mv,Ca,Min-FA-Herbal No.157 (ESTROVEN MAXIMUM STRENGTH) 400 mcg tab Take  by mouth.  azelastine-fluticasone (DYMISTA) 137-50 mcg/spray spry by Nasal route.  PENNSAID 20 mg/gram /actuation(2 %) sopm 2 Pump(s) by Apply Externally route two (2) times daily as needed. Cell: [phone]  Pharmacy: [phone] 112 g 3    azelastine (OPTIVAR) 0.05 % ophthalmic solution Administer  to both eyes two (2) times a day. Use in affected eye(s)      RANITIDINE HCL (ZANTAC PO) Take  by mouth.  VITAMIN A/VITAMIN D3 (NATURAL VITAMIN D PO) Take  by mouth.       meloxicam (MOBIC) 15 mg tablet Take 1 tab daily or 1/2 tab twice daily as needed pain with food. 90 Tab 0    esomeprazole (NEXIUM) 20 mg capsule Take 20 mg by mouth nightly.  diphenhydrAMINE (SIMPLY SLEEP) 25 mg tablet Take 25 mg by mouth nightly. Allergies   Allergen Reactions    Milk Containing Products Diarrhea    Sulfa (Sulfonamide Antibiotics) Rash    Other Medication Diarrhea     Eggs, patient eats, and gets flu shot yearly with no reaction     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    Thumb pain 6/7/2010     Past Surgical History:   Procedure Laterality Date    HX GYN      left tube removal ectopic pregnancy    HX HEENT  9/2011    Basal cell cancer lesion removed from nose    HX LYMPHADENECTOMY Right 8/25/16    HX ORTHOPAEDIC  1999    TMJ    HX OTHER SURGICAL  2013    Spinal Fusion    HX TONSILLECTOMY  2/2007     Family History   Problem Relation Age of Onset    Cancer Mother      breast cancer    Diabetes Father     High Cholesterol Father     Lung Disease Father      COPD and nodule on lung    Heart Disease Father     Cancer Maternal Aunt      breast cancer    Arthritis-osteo Maternal Grandmother      arthritis      Social History   Substance Use Topics    Smoking status: Never Smoker    Smokeless tobacco: Never Used    Alcohol use Yes      Comment: few times per week        ROS:  Feeling well. No dyspnea or chest pain on exertion. No abdominal pain, change in bowel habits, black or bloody stools. No urinary tract symptoms. GYN ROS: normal menses, no abnormal bleeding, pelvic pain or discharge, no breast pain or new or enlarging lumps on self exam. No neurological complaints.     Objective:     Visit Vitals    /80 (BP 1 Location: Left arm, BP Patient Position: Sitting)    Pulse 69  Temp 97 °F (36.1 °C) (Oral)    Resp 17    Ht 5' 2\" (1.575 m)    Wt 156 lb (70.8 kg)    LMP 04/22/2018 (Approximate)    SpO2 100%    BMI 28.53 kg/m2     The patient appears well, alert, oriented x 3, in no distress. ENT normal.  Neck supple. No adenopathy or thyromegaly. DAVID. Lungs are clear, good air entry, no wheezes, rhonchi or rales. S1 and S2 normal, no murmurs, regular rate and rhythm. Abdomen soft without tenderness, guarding, mass or organomegaly. Extremities show no edema, normal peripheral pulses. Neurological is normal, no focal findings. Pt is getting a MRI of her breast in September. She is now being followed in a high risk group for her breast due to family history. Assessment/Plan:   well woman  additional lab tests per orders  return annually or prn  Ref to GI for colonoscopy    ICD-10-CM ICD-9-CM    1. Well woman exam (no gynecological exam) Z00.00 V70.0    2. Screening for colon cancer Z12.11 V76.51 REFERRAL TO GASTROENTEROLOGY   3. Screening for diabetes mellitus P57.7 F75.7 METABOLIC PANEL, COMPREHENSIVE   4. Screening for hyperlipidemia Z13.220 V77.91 LIPID PANEL   5. Screening for deficiency anemia Z13.0 V78.1 HGB & HCT   6. Screening for thyroid disorder Z13.29 V77.0 TSH 3RD GENERATION   . PLAN:  We discussed screening recommendations.     Pt given after visit summary

## 2018-08-10 NOTE — MR AVS SNAPSHOT
[address] [facility] 
 
 
 [address] [place], [place] [address] [facility] [address] 
[phone] Patient: Crispin Roca MRN:  Gavirani Urrutia Visit Information Date & Time Provider Department Dept. Phone Encounter #  
 8/10/2018  1:30 PM Flaca Jiménez NP [facility] [phone] [id number] Your Appointments 9/26/2018  3:30 PM  
[facility] with [facility] Appt Note: 6 month f/up [address] [facility] [address] [place] [address] [id number] [address] [facility] [address] Upcoming Health Maintenance Date Due DTaP/Tdap/Td series (1 - Tdap) 8/5/1989 PAP AKA CERVICAL CYTOLOGY 1/7/2016 Influenza Age 5 to Adult 8/1/2018 FOBT Q 1 YEAR AGE 50-75 8/5/2018 BREAST CANCER SCRN MAMMOGRAM 3/16/2019 Allergies as of 8/10/2018  Review Complete On: 8/10/2018 By: Flaca Jiménez NP Severity Noted Reaction Type Reactions Milk Containing Products  08/04/2016    Diarrhea Sulfa (Sulfonamide Antibiotics)  06/07/2010    Rash Other Medication Low 06/07/2010    Diarrhea Eggs, patient eats, and gets flu shot yearly with no reaction Current Immunizations  Reviewed on 7/28/2016 Name Date Influenza Vaccine 10/7/2016, 9/26/2015 Influenza Vaccine (Quad) PF 9/15/2014 Influenza Vaccine Split 10/10/2012, 11/1/2010 Not reviewed this visit You Were Diagnosed With   
  
 Codes Comments Well woman exam (no gynecological exam)    -  Primary ICD-10-CM: Z00.00 ICD-9-CM: V70.0 Screening for colon cancer     ICD-10-CM: Z12.11 ICD-9-CM: V76.51 Screening for diabetes mellitus     ICD-10-CM: Z13.1 ICD-9-CM: V77.1 Screening for hyperlipidemia     ICD-10-CM: Z13.220 ICD-9-CM: V77.91 Screening for deficiency anemia     ICD-10-CM: Z13.0 ICD-9-CM: V78.1 Screening for thyroid disorder     ICD-10-CM: Z13.29 ICD-9-CM: V77.0 Vitals BP Pulse Temp Resp Height(growth percentile) Weight(growth percentile) 118/80 (BP 1 Location: Left arm, BP Patient Position: Sitting) 69 97 °F (36.1 °C) (Oral) 17 5' 2\" (1.575 m) 156 lb (70.8 kg) LMP SpO2 BMI OB Status Smoking Status 04/22/2018 (Approximate) 100% 28.53 kg/m2 Having regular periods Never Smoker Vitals History BMI and BSA Data Body Mass Index Body Surface Area 28.53 kg/m 2 1.76 m 2 Preferred Pharmacy Pharmacy Name Phone Arvind Tavares [address], [address] [phone] Your Updated Medication List  
  
   
This list is accurate as of 8/10/18  1:54 PM.  Always use your most recent med list.  
  
  
  
  
 azelastine 0.05 % ophthalmic solution Commonly known as:  OPTIVAR Administer  to both eyes two (2) times a day. Use in affected eye(s) DYMISTA 137-50 mcg/spray [place] Generic drug:  azelastine-fluticasone  
by Nasal route.  
  
 esomeprazole 20 mg capsule Commonly known as:  [facility] Take 20 mg by mouth nightly. ESTROVEN MAXIMUM STRENGTH 400 mcg Tab Generic drug:  Mv,Ca,Min-FA-Herbal No.157 Take  by mouth. * meloxicam 15 mg tablet Commonly known as:  MOBIC Take 1 tab daily or 1/2 tab twice daily as needed pain with food. * meloxicam 15 mg tablet Commonly known as:  MOBIC  
TAKE 1 TABLET BY MOUTH DAILY OR TAKE 1/2 TABLET TWO TIMES A DAY AS NEEDED FOR PAIN *TAKE WITH FOOD*  
  
 multivitamin tablet Commonly known as:  ONE A DAY Take 1 Tab by mouth daily. NATURAL VITAMIN D PO Take  by mouth. PENNSAID 20 mg/gram /actuation(2 %) Sopm  
Generic drug:  diclofenac sodium 2 Pump(s) by Apply Externally route two (2) times daily as needed. Cell: [phone]  Pharmacy: [phone] SIMPLY SLEEP 25 mg tablet Generic drug:  diphenhydrAMINE Take 25 mg by mouth nightly. ZANTAC PO Take  by mouth. * Notice: This list has 2 medication(s) that are the same as other medications prescribed for you. Read the directions carefully, and ask your doctor or other care provider to review them with you. We Performed the Following REFERRAL TO GASTROENTEROLOGY [JD Green] To-Do List   
 08/10/2018 Lab:  HGB & HCT   
  
 08/10/2018 Lab:  LIPID PANEL   
  
 08/10/2018 Lab:  METABOLIC PANEL, COMPREHENSIVE   
  
 08/10/2018 Lab:  TSH 3RD GENERATION   
  
 09/18/2018 9:30 AM  
  Appointment with [facility] MRI RM 2 at [address] [place] [address] ([phone]) GENERAL INSTRUCTIONS  You will be required to lie still face down for 45-60 minutes. Bring information (ID card) if you have any medically implanted devices. Remove any jewelry (including body piercing, hairpins) prior to MRI. If you have had a creatinine level drawn within the past 30 days, please bring most recent results to your appt. Bring all prior Mammogram, Breast Ultrasound, and Breast MRI films, CD's, and reports with you on the day of your exam.  This only includes studies done outside of [address] [place] [address], [facility], [place], and [facility]. Bring a complete list of all medications you are currently taking to include prescriptions, over-the-counter meds, herbals, vitamins & any dietary supplements. If you were given medications for claustrophobia or anxiety, please arrange to have someone drive you to your appointment. QUESTIONS  Notify the MRI Department if you have any questions concerning your study. [facility]  [address] Referral Information Referral ID Referred By Referred To  
  
 [id number] [facility] [address] [place], [address] Phone: [phone] Fax: [phone] Visits Status Start Date End Date 1 New Request 8/10/18 8/10/19 If your referral has a status of pending review or denied, additional information will be sent to support the outcome of this decision. Introducing [facility] SERVICES! Dear Brie Powers: Thank you for requesting a Known account. Our records indicate that you already have an active Known account. You can access your account anytime at [email or web address] Did you know that you can access your hospital and ER discharge instructions at any time in Known? You can also review all of your test results from your hospital stay or ER visit. Additional Information If you have questions, please visit the Frequently Asked Questions section of the Known website at [email or web address] Remember, Known is NOT to be used for urgent needs. For medical emergencies, dial 911. Now available from your iPhone and Android! Please provide this summary of care documentation to your next provider. Your primary care clinician is listed as Tavon Nelson. If you have any questions after today's visit, please call [phone].

## 2018-08-13 NOTE — PROGRESS NOTES
Please advise Pt that her kidney and liver functions are fine. Her glucose level is 79, which is excellent. There is no signs of anemia  Her TSH is in normal range  Her total cholesterol is 247 and her LDL is 171, so encourage her to whatch her diet and we will recheck these again, fasting in 6 months when I see her.

## 2018-08-31 ENCOUNTER — OFFICE VISIT (OUTPATIENT)
Dept: ORTHOPEDIC SURGERY | Age: 48
End: 2018-08-31

## 2018-08-31 VITALS
SYSTOLIC BLOOD PRESSURE: 106 MMHG | WEIGHT: 157.8 LBS | BODY MASS INDEX: 29.04 KG/M2 | RESPIRATION RATE: 15 BRPM | HEIGHT: 62 IN | DIASTOLIC BLOOD PRESSURE: 79 MMHG | HEART RATE: 64 BPM | TEMPERATURE: 97 F

## 2018-08-31 DIAGNOSIS — M25.521 ELBOW PAIN, RIGHT: ICD-10-CM

## 2018-08-31 DIAGNOSIS — M77.11 LATERAL EPICONDYLITIS, RIGHT ELBOW: Primary | ICD-10-CM

## 2018-08-31 RX ORDER — METHYLPREDNISOLONE ACETATE 40 MG/ML
40 INJECTION, SUSPENSION INTRA-ARTICULAR; INTRALESIONAL; INTRAMUSCULAR; SOFT TISSUE ONCE
Qty: 1 VIAL | Refills: 0
Start: 2018-08-31 | End: 2018-08-31

## 2018-08-31 NOTE — MR AVS SNAPSHOT
[address] [place] [id number] [facility], [facility] [address] [address] [phone] Patient: Ayan Rushing MRN: [id number] Sheryl Yan Visit Information Date & Time Provider Department Dept. Phone Encounter #  
 8/31/2018 10:40 AM Basil Rushing, Lindsey [facility]in [facility] [id number] Follow-up Instructions Return if symptoms worsen or fail to improve, for lateral epicondylitis. Your Appointments 9/26/2018  3:30 PM  
[facility] Up with [facility] ([facility]) Appt Note: 6 month f/up [address] [place] [id number] [address] [facility] [address] Upcoming Health Maintenance Date Due DTaP/Tdap/Td series (1 - Tdap) 8/5/1989 PAP AKA CERVICAL CYTOLOGY 1/7/2016 Influenza Age 5 to Adult 8/1/2018 FOBT Q 1 YEAR AGE 50-75 8/5/2018 BREAST CANCER SCRN MAMMOGRAM 3/16/2019 Allergies as of 8/31/2018  Review Complete On: 8/31/2018 By: Basil Rushing DO Severity Noted Reaction Type Reactions Milk Containing Products  08/04/2016    Diarrhea Sulfa (Sulfonamide Antibiotics)  06/07/2010    Rash Other Medication Low 06/07/2010    Diarrhea Eggs, patient eats, and gets flu shot yearly with no reaction Current Immunizations  Reviewed on 7/28/2016 Name Date Influenza Vaccine 10/7/2016, 9/26/2015 Influenza Vaccine (Quad) PF 9/15/2014 Influenza Vaccine Split 10/10/2012, 11/1/2010 Not reviewed this visit You Were Diagnosed With   
  
 Codes Comments Lateral epicondylitis, right elbow    -  Primary ICD-10-CM: M77.11 ICD-9-CM: 726.32 Elbow pain, right     ICD-10-CM: M25.521 ICD-9-CM: 719.42 Vitals BP Pulse Temp Resp Height(growth percentile) Weight(growth percentile) 106/79 64 97 °F (36.1 °C) 15 5' 2\" (1.575 m) 157 lb 12.8 oz (71.6 kg) BMI OB Status Smoking Status 28.86 kg/m2 Having regular periods Never Smoker Vitals History BMI and BSA Data Body Mass Index Body Surface Area  
 28.86 kg/m 2 1.77 m 2 Preferred Pharmacy Pharmacy Name Phone [facility] [address] [facility] [address], [address] [place] [address] Road [phone] Your Updated Medication List  
  
   
This list is accurate as of 8/31/18 11:30 AM.  Always use your most recent med list.  
  
  
  
  
 azelastine 0.05 % ophthalmic solution Commonly known as:  OPTIVAR Administer  to both eyes two (2) times a day. Use in affected eye(s) DYMISTA 137-50 mcg/spray [place] Generic drug:  azelastine-fluticasone  
by Nasal route.  
  
 esomeprazole 20 mg capsule Commonly known as:  Harvis Bibber Take 20 mg by mouth nightly. ESTROVEN MAXIMUM STRENGTH 400 mcg Tab Generic drug:  Mv,Ca,Min-FA-Herbal No.157 Take  by mouth. * meloxicam 15 mg tablet Commonly known as:  MOBIC Take 1 tab daily or 1/2 tab twice daily as needed pain with food. * meloxicam 15 mg tablet Commonly known as:  MOBIC  
TAKE 1 TABLET BY MOUTH DAILY OR TAKE 1/2 TABLET TWO TIMES A DAY AS NEEDED FOR PAIN *TAKE WITH FOOD*  
  
 multivitamin tablet Commonly known as:  ONE A DAY Take 1 Tab by mouth daily. NATURAL VITAMIN D PO Take  by mouth. PENNSAID 20 mg/gram /actuation(2 %) Sopm  
Generic drug:  diclofenac sodium 2 Pump(s) by Apply Externally route two (2) times daily as needed. Cell: [phone]  Pharmacy: [phone] SIMPLY SLEEP 25 mg tablet Generic drug:  diphenhydrAMINE Take 25 mg by mouth nightly. ZANTAC PO Take  by mouth. * Notice: This list has 2 medication(s) that are the same as other medications prescribed for you. Read the directions carefully, and ask your doctor or other care provider to review them with you. We Performed the Following AMB POC US, SONO GUIDE NEEDLE [id number] CPT(R)] NV INJECT TENDON ORIGIN/INSERT [id number] CPT(R)] Follow-up Instructions Return if symptoms worsen or fail to improve, for lateral epicondylitis. To-Do List   
 09/18/2018 9:30 AM  
  Appointment with [facility] RM 2 at [address] [facility] ([phone]) GENERAL INSTRUCTIONS  You will be required to lie still face down for 45-60 minutes. Bring information (ID card) if you have any medically implanted devices. Remove any jewelry (including body piercing, hairpins) prior to MRI. If you have had a creatinine level drawn within the past 30 days, please bring most recent results to your appt. Bring all prior Mammogram, Breast Ultrasound, and Breast MRI films, CD's, and reports with you on the day of your exam.  This only includes studies done outside of [facility], [facility], [facility], and [facility]. Bring a complete list of all medications you are currently taking to include prescriptions, over-the-counter meds, herbals, vitamins & any dietary supplements. If you were given medications for claustrophobia or anxiety, please arrange to have someone drive you to your appointment. QUESTIONS  Notify the MRI Department if you have any questions concerning your study. [facility] [address] [facility] Patient Instructions Introducing [facility] HE[facility] SERVICES! Dear Lesley Felder: Thank you for requesting a [employer] account. Our records indicate that you already have an active [employer] account. You can access your account anytime at [email or web address] Did you know that you can access your hospital and ER discharge instructions at any time in [employer]? You can also review all of your test results from your hospital stay or ER visit. Additional Information If you have questions, please visit the Frequently Asked Questions section of the [employer] website at [email or web address] Remember, [employer] is NOT to be used for urgent needs. For medical emergencies, dial 911. Now available from your iPhone and Android! Please provide this summary of care documentation to your next provider. Your primary care clinician is listed as Yisel Larose. If you have any questions after today's visit, please call [phone].

## 2018-08-31 NOTE — PROGRESS NOTES
HISTORY OF PRESENT ILLNESS    Don Rangel is a 46y.o. year old female comes in today to be evaluated and treated for: right elbow pain    Had benefit injection tennis elbow APR2018 and returned abiut 2 weks ago. Rated 8/10 and pennsaid, mobic, stretches minimal benefit. No brace yet and no known injury. Social History     Social History    Marital status:      Spouse name: N/A    Number of children: N/A    Years of education: N/A     Social History Main Topics    Smoking status: Never Smoker    Smokeless tobacco: Never Used    Alcohol use Yes      Comment: few times per week    Drug use: No    Sexual activity: Yes     Partners: Male      Comment: pregnancy test negative     Other Topics Concern    None     Social History Narrative     Current Outpatient Prescriptions   Medication Sig Dispense Refill    multivitamin (ONE A DAY) tablet Take 1 Tab by mouth daily.  meloxicam (MOBIC) 15 mg tablet TAKE 1 TABLET BY MOUTH DAILY OR TAKE 1/2 TABLET TWO TIMES A DAY AS NEEDED FOR PAIN *TAKE WITH FOOD* 30 Tab 1    Mv,Ca,Min-FA-Herbal No.157 (ESTROVEN MAXIMUM STRENGTH) 400 mcg tab Take  by mouth.  azelastine-fluticasone (DYMISTA) 137-50 mcg/spray spry by Nasal route.  PENNSAID 20 mg/gram /actuation(2 %) sopm 2 Pump(s) by Apply Externally route two (2) times daily as needed. Cell: [phone]  Pharmacy: [phone] 112 g 3    azelastine (OPTIVAR) 0.05 % ophthalmic solution Administer  to both eyes two (2) times a day. Use in affected eye(s)      RANITIDINE HCL (ZANTAC PO) Take  by mouth.  VITAMIN A/VITAMIN D3 (NATURAL VITAMIN D PO) Take  by mouth.  meloxicam (MOBIC) 15 mg tablet Take 1 tab daily or 1/2 tab twice daily as needed pain with food. 90 Tab 0    esomeprazole (NEXIUM) 20 mg capsule Take 20 mg by mouth nightly.  diphenhydrAMINE (SIMPLY SLEEP) 25 mg tablet Take 25 mg by mouth nightly.        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 HEAD:  Normocephalic, Atraumatic. NEURO:  Sensation intact light touch upper and lower extremities. Biceps & Triceps reflexes +2/4 bilaterally. right hand dominant. M/S:  right elbow/wrist:  Negative ray tenderness. Phalen's negative. Tinel's negative. Strength +5/5 bilateral .  Piano key sign Negative bilateral .  Carpal bone motion normal.  Finklestein's negative  TFCC Load Test negative. RIGHT lateral epicondyle(s) with TTP worsened with wrist extension. negative muscular atrophy. EXT:  no clubbing/cyanosis. no edema. Assessment/Plan:     ICD-10-CM ICD-9-CM    1. Lateral epicondylitis, right elbow M77.11 726.32 AMB POC US, SONO GUIDE NEEDLE      IA INJECT TENDON ORIGIN/INSERT   2. Elbow pain, right M25.521 719.42        Patient (or guardian if minor) verbalizes understanding of evaluation and plan. Injected today and will ellen brace 4-6 weeks w/ stretch and pennsaid/mobic and RTC as needed. Time with Pt 26 minutes, >50% of which was counseling pt regarding Dx and Tx options discussion brace and HEP and coordination of care.

## 2018-08-31 NOTE — PROCEDURES
PROCEDURE NOTE:  Time out: 1123am  * Patient was identified by name and date of birth   * Agreement on procedure being performed was verified  * Risks and Benefits explained to the patient  * Procedure site verified and marked as necessary  * Patient was positioned for comfort  * Consent was signed and verified. Risks/benefits including but not limited to bleeding, infection, and scarring discussed and Pt wishes to proceed with procedure. The area was prepped with betadine. Ethyl chloride spray was used. Under sterile technique and with ultrasound guidance 1cc of 40mg/cc depomedrol and 1cc 1% lidocaine were injected into point of maximal tenderness of right lateral epicondyle. Sterile gauze used to clean the area. Blood loss minimal.  Noticed improvement in pain Sx within 5 minutes (now rated 1/10). Tolerated procedure well. Discussed possible signs/Sx of infxn, and advised to seek care if concerned.

## 2018-08-31 NOTE — LETTER
NAME: Elian Fitzpatrick : 1970 MRN: [id number] CONSENT FOR TREATMENT/PROCEDURE I authorize Annelise Rodriguez DO at [facility] to perform the medical treatment and/or procedure(s) described below:  
 
Description of Medical Treatment and/or Procedure(s): (Specify number of treatments or procedures if repeated treatment is recommended) 
 
_____________inject steroid into right elbow lateral epicondyle_________________________ I understand my provider may be assisted with significant procedural tasks by other qualified medical practitioners, who may perform important parts of the treatment/procedure(s) or assist with the administration of analgesia (pain-relieving medications) as necessary for my treatment/procedure(s). These practitioners will only perform tasks within the scope of their licensure and practice, as determined under [place] law and any other applicable regulation(s). Name and Credentials of Practitioners Who May Assist with My Treatment/Procedure(s):  
 
______________________________________________________________________ I understand that a medical company representative may be present during my treatment/procedure(s) to observe and provide verbal, technical advice to my provider. I consent to the participation of this representative in my treatment/procedure(s). My provider has explained that unforeseen conditions may be identified during the performance of my treatment/procedure(s) that necessitate an extension of the original treatment/procedure(s) or the performance of procedures other than those identified above. I consent to the performance of additional treatment/procedure(s) by my provider and the qualified medical practitioners assisting my provider as deemed necessary by my provider for treatment of my medical condition(s).   
 
I understand that certain complications may arise during the use of analgesia during my treatment/procedure(s) that may include, but are not limited to, decreased/altered awareness, respiratory problems, drug reactions, paralysis, brain damage, or possibly, death. I understand that the analgesia required for the performance of my treatment/procedure(s) involves additional risks beyond those of the treatment/procedure(s) to be performed, and authorize the use of analgesia by my provider as deemed necessary for the control of pain during my treatment/procedure(s). I understand that my provider may need to change the type of analgesia or medications used, possibly without explanation to me, and I consent to any such changes as deemed necessary by my provider for treatment of my medical condition(s). I understand that there are risks of infection and other unexpected complications associated with any medical or surgical treatment/procedure including the treatment/procedure(s) listed above or other treatment/procedure(s) necessitated by my medical condition, and that such complications may occur in the absence of any negligence on the part of my healthcare providers. My provider has explained to my satisfaction my medical condition and the specific treatment/procedure(s) recommended and identified above. I have been given an opportunity to ask and have answered to my satisfaction questions about: (CONTINUED PAGE 2) NAME: Eloise Calix : 1970 MRN: [id number]  The nature and extent of the treatment/procedure(s) to be performed;  The benefit of treatment, and the risks associated with not having the recommended treatment/procedure(s);  The risks and possible complications associated with having the treatment, including those which, even though   
   unlikely to occur, may involve serious consequences;  
 Analgesia and alternative forms of analgesia;  Alternative procedures and methods of treatment, and the risk associated with each;  
  The expected consequences of the treatment/procedure(s) on my future health. I understand that no assurance can be given that the treatment/procedure(s) performed will be a success, and no guarantee or warranty of success for the treatment/procedure(s) has been given to me by my provider. I consent to the disposal by the examining Pathologist of any tissue removed during my treatment/procedure(s) in accordance with the receiving hospitals or laboratorys policy and any associated regulations specific to such disposal.  
 
I DO_____  DO NOT__x____ consent to other health care personnel observing my treatment/procedure(s) for the purpose of medical education or other specified purposes as may be explained by my provider. I DO_______ DO NOT____x____ consent to photography or videotaping of all or any part of my treatment/procedure(s) for medical and/or educational purposes. I understand that my identity will not be revealed in any photographs, videos, or accompanying explanations should these images be used by my healthcare providers. I certify that I have read and fully understand the above Consent for Treatment/Procedure and that all blanks were completed before I signed this consent.  
 
__________Alexandra JEROME Felisa_________                      _______________ Print Name of Patient or Legal Representative        Relationship to Patient (if not self) X_______________________________            __________________________ Signature of Patient (or legal representative)  Witness to signature    
 
                       __8/31/2018___/_________AM/PM 
                                                                   Date/Time (If patient is a minor or is unable to sign, complete the following) Patient is a minor, ________ years of age, or is unable to sign due to:______________________ I have explained the nature, purpose and anticipated benefits as well as any possible alternative methods of treatment, the known risks that are involved, and the possibility of complications of the proposed procedure(s) to the patient or patients legal representative. I have provided the patient or legal representative with an opportunity to ask and have answered to their satisfaction any questions about the proposed treatment/procedure(s) and alternative methods of treatment.   
 
Provider Signature:___________________  Date:__8/31/2018__Time:_____________AM/PM

## 2018-09-18 ENCOUNTER — HOSPITAL ENCOUNTER (OUTPATIENT)
Age: 48
Discharge: HOME | End: 2018-09-18
Attending: SURGERY
Payer: COMMERCIAL

## 2018-09-18 DIAGNOSIS — Z80.3 FAMILY HISTORY OF BREAST CANCER IN FIRST DEGREE RELATIVE: ICD-10-CM

## 2018-09-18 PROCEDURE — 77059 MRI BREAST BI W WO CONT: CPT

## 2018-09-18 PROCEDURE — A9577 INJ MULTIHANCE: HCPCS | Performed by: SURGERY

## 2018-09-18 PROCEDURE — 74011250636 HC RX REV CODE- 250/636: Performed by: SURGERY

## 2018-09-18 RX ADMIN — GADOBENATE DIMEGLUMINE 14 ML: 529 INJECTION, SOLUTION INTRAVENOUS at 10:50

## 2018-10-02 ENCOUNTER — OFFICE VISIT (OUTPATIENT)
Dept: SURGERY | Age: 48
End: 2018-10-02

## 2018-10-02 VITALS
HEART RATE: 68 BPM | HEIGHT: 63 IN | SYSTOLIC BLOOD PRESSURE: 110 MMHG | BODY MASS INDEX: 27.64 KG/M2 | DIASTOLIC BLOOD PRESSURE: 68 MMHG | TEMPERATURE: 97.2 F | OXYGEN SATURATION: 98 % | RESPIRATION RATE: 18 BRPM | WEIGHT: 156 LBS

## 2018-10-02 DIAGNOSIS — Z91.89 AT HIGH RISK FOR BREAST CANCER: Primary | ICD-10-CM

## 2018-10-02 NOTE — PATIENT INSTRUCTIONS
Breast Self-Exam: Care Instructions  Your Care Instructions    A breast self-exam is when you check your breasts for lumps or changes. This regular exam helps you learn how your breasts normally look and feel. Most breast problems or changes are not because of cancer. Breast self-exam is not a substitute for a mammogram. Having regular breast exams by your doctor and regular mammograms improve your chances of finding any problems with your breasts. Some women set a time each month to do a step-by-step breast self-exam. Other women like a less formal system. They might look at their breasts as they brush their teeth, or feel their breasts once in a while in the shower. If you notice a change in your breast, tell your doctor. Follow-up care is a key part of your treatment and safety. Be sure to make and go to all appointments, and call your doctor if you are having problems. It's also a good idea to know your test results and keep a list of the medicines you take. How do you do a breast self-exam?  · The best time to examine your breasts is usually one week after your menstrual period begins. Your breasts should not be tender then. If you do not have periods, you might do your exam on a day of the month that is easy to remember. · To examine your breasts:  ¨ Remove all your clothes above the waist and lie down. When you are lying down, your breast tissue spreads evenly over your chest wall, which makes it easier to feel all your breast tissue. ¨ Use the pads-not the fingertips-of the 3 middle fingers of your left hand to check your right breast. Move your fingers slowly in small coin-sized circles that overlap. ¨ Use three levels of pressure to feel of all your breast tissue. Use light pressure to feel the tissue close to the skin surface. Use medium pressure to feel a little deeper. Use firm pressure to feel your tissue close to your breastbone and ribs.  Use each pressure level to feel your breast tissue before moving on to the next spot. ¨ Check your entire breast, moving up and down as if following a strip from the collarbone to the bra line, and from the armpit to the ribs. Repeat until you have covered the entire breast.  ¨ Repeat this procedure for your left breast, using the pads of the 3 middle fingers of your right hand. · To examine your breasts while in the shower:  ¨ Place one arm over your head and lightly soap your breast on that side. ¨ Using the pads of your fingers, gently move your hand over your breast (in the strip pattern described above), feeling carefully for any lumps or changes. ¨ Repeat for the other breast.  · Have your doctor inspect anything you notice to see if you need further testing. Where can you learn more? Go to http://sary-mk.info/. Enter P148 in the search box to learn more about \"Breast Self-Exam: Care Instructions. \"  Current as of: May 12, 2017  Content Version: 11.7  © 8034-3431 [employer]. Care instructions adapted under license by [employer] (which disclaims liability or warranty for this information). If you have questions about a medical condition or this instruction, always ask your healthcare professional. [place] [address] any warranty or liability for your use of this information.

## 2018-10-02 NOTE — PROGRESS NOTES
Shani Acosta. Zeke Sidhu, FNP-C  PROGRESS NOTE    Subjective:    Ms. Jo Horowitz is a very pleasant 45 yo white female who presents today for 6 month follow up after most recent bilateral breast MRI done September 18, 2018 and was read as a BIRADS 2. She is a high risk breast patient with a Tyrer Cuzack score of 31.1%. She has a positive family history of breast cancer and states that her family history should be updated to include a first cousin who was recently diagnosed with cervical cancer in her 63's. Otherwise, she has no breast health concerns. She denies specifically no nipple changes or drainage, no skin changes and no lumps on self exam. She says she does not do them as often as she should, so I suggested that she use her birth date as a monthly reminder to do her exams. It is important that she be able to recognize changes. She understands and agrees. Objective:    Vitals:    10/02/18 1434   BP: 110/68   Pulse: 68   Resp: 18   Temp: 97.2 °F (36.2 °C)   TempSrc: Oral   SpO2: 98%   Weight: 70.8 kg (156 lb)   Height: 5' 3\" (1.6 m)       Physical Exam:    General: Alert and oriented x 3, cooperative, in no apparent distress   Breasts:    Left:  no dimpling, discoloration, nipple inversion or retractions. no axillary or supraclavicular lymphadenopathy. no mass   Right:  no dimpling, discoloration, nipple inversion or retractions. no axillary or supraclavicular lymphadenopathy. no mass      Current Medications:  Current Outpatient Prescriptions   Medication Sig Dispense Refill    multivitamin (ONE A DAY) tablet Take 1 Tab by mouth daily.  Mv,Ca,Min-FA-Herbal No.157 (ESTROVEN MAXIMUM STRENGTH) 400 mcg tab Take  by mouth.  azelastine-fluticasone (DYMISTA) 137-50 mcg/spray spry by Nasal route.  PENNSAID 20 mg/gram /actuation(2 %) sopm 2 Pump(s) by Apply Externally route two (2) times daily as needed.  Cell: [phone]  Pharmacy: [phone] 112 g 3    azelastine (OPTIVAR) 0.05 % ophthalmic solution Administer  to both eyes two (2) times a day. Use in affected eye(s)      RANITIDINE HCL (ZANTAC PO) Take  by mouth.  VITAMIN A/VITAMIN D3 (NATURAL VITAMIN D PO) Take  by mouth.  meloxicam (MOBIC) 15 mg tablet Take 1 tab daily or 1/2 tab twice daily as needed pain with food. 90 Tab 0    esomeprazole (NEXIUM) 20 mg capsule Take 20 mg by mouth nightly.  diphenhydrAMINE (SIMPLY SLEEP) 25 mg tablet Take 25 mg by mouth nightly.  meloxicam (MOBIC) 15 mg tablet TAKE 1 TABLET BY MOUTH DAILY OR TAKE 1/2 TABLET TWO TIMES A DAY AS NEEDED FOR PAIN *TAKE WITH FOOD* 30 Tab 1       Chart and notes reviewed. Data reviewed. I have evaluated and examined the patient. Impression:  · High risk breast patient who is doing well at 6 month check following BIRADS 2 bilateral breast MRI; only change to family history is a first cousin with cervical CA in her 63's. Plan:   · Continue monthly self breast exams  · Bilateral diagnostic mammogram in 6 months followed by clinical breast exam in this office  · Please call with any questions or concerns prior to next visit    Ms. Car Cross has a reminder for a \"due or due soon\" health maintenance. I have asked that she contact her primary care provider for follow-up on this health maintenance. Dava Phalen.  IBETH Carlson

## 2018-12-03 NOTE — TELEPHONE ENCOUNTER
----- Message from [facility] [address]. Mar Reyeson sent at 12/11/2017  6:11 AM EST -----  Regarding: RE: [facility] After Visit Follow-Up Message. Contact: [phone]  Dr. Wetzel Ask, I would like to move on to try physical therapy as it's not getting better. I am out of town this week but would like to schedule it by this Saturday or in the 18th. Please have someone contact me at [phone] for scheduling. thank you,  Sola Aguero    ----- Message -----  From: Tay Navas MD  Sent: 12/10/17, 12:00 AM  To: [facility] [address]. Mar Martha  Subject: [facility] After Visit Follow-Up Message. Jade Ms. Shante Anayaul you for your recent visit to [facility]. Your health is important to us and we are privileged to be your healthcare provider and partner. If you have follow-up questions regarding your treatment plan from todays visit, please contact us through the [facility] Patient Portal by replying to this message. In the near future, you may receive a survey about your experience with us. We hope you will take the time to let us know how we did so we can continue to improve the care you receive.       Thank you,    [facility] Location [facility]: hands

## 2018-12-10 ENCOUNTER — OFFICE VISIT (OUTPATIENT)
Dept: FAMILY MEDICINE CLINIC | Age: 48
End: 2018-12-10

## 2018-12-10 VITALS
OXYGEN SATURATION: 97 % | HEART RATE: 70 BPM | BODY MASS INDEX: 28.49 KG/M2 | RESPIRATION RATE: 18 BRPM | WEIGHT: 160.8 LBS | DIASTOLIC BLOOD PRESSURE: 78 MMHG | HEIGHT: 63 IN | TEMPERATURE: 97 F | SYSTOLIC BLOOD PRESSURE: 115 MMHG

## 2018-12-10 DIAGNOSIS — G89.29 CHRONIC RIGHT SHOULDER PAIN: Primary | ICD-10-CM

## 2018-12-10 DIAGNOSIS — M25.511 CHRONIC RIGHT SHOULDER PAIN: Primary | ICD-10-CM

## 2018-12-10 DIAGNOSIS — M77.11 LATERAL EPICONDYLITIS OF RIGHT ELBOW: ICD-10-CM

## 2018-12-10 RX ORDER — MELOXICAM 15 MG/1
TABLET ORAL
Qty: 90 TAB | Refills: 0 | Status: SHIPPED | OUTPATIENT
Start: 2018-12-10 | End: 2019-04-02 | Stop reason: SDUPTHER

## 2018-12-10 RX ORDER — MELOXICAM 15 MG/1
TABLET ORAL
Qty: 90 TAB | Refills: 0 | Status: SHIPPED | OUTPATIENT
Start: 2018-12-10 | End: 2018-12-10 | Stop reason: SDUPTHER

## 2018-12-10 NOTE — PROGRESS NOTES
Chief Complaint   Patient presents with    Shoulder Pain      Patient also complains of elbow pain. Patient here for a right elbow and elbow pain. Pain is currently at a 4.        1. Have you been to the ER, urgent care clinic since your last visit? Hospitalized since your last visit? No    2. Have you seen or consulted any other health care providers outside of the [address] [place] [address] since your last visit? Include any pap smears or colon screening.  No

## 2018-12-10 NOTE — PROGRESS NOTES
HISTORY OF PRESENT ILLNESS  Maryl Castleman is a 46 y.o. female. Patient presents for:  Chief Complaint   Patient presents with    Shoulder Pain      Patient also complains of elbow pain. HPI   This started a year ago. She has Mobic and Pennsaid for it. Pt seen by physical  Therapy. She has gotten injections which help but she doesn't really want to continue this either. Pt is going to [place] on vacation 12-22-18    Review of Systems   Constitutional: Negative. Musculoskeletal: Positive for joint pain (right shoulder pain). Visit Vitals  /78 (BP 1 Location: Left arm, BP Patient Position: Sitting)   Pulse 70   Temp 97 °F (36.1 °C) (Oral)   Resp 18   Ht 5' 3\" (1.6 m)   Wt 160 lb 12.8 oz (72.9 kg)   LMP 11/15/2018 (Exact Date)   SpO2 97%   BMI 28.48 kg/m²     Physical Exam   Constitutional: She appears well-developed. No distress. Cardiovascular: Normal rate, regular rhythm and normal heart sounds. No murmur heard. Pulmonary/Chest: Effort normal and breath sounds normal. No respiratory distress. She has no wheezes. Musculoskeletal:        Right shoulder: She exhibits tenderness and decreased strength. She exhibits normal range of motion. Left shoulder: Normal.       ASSESSMENT and PLAN    ICD-10-CM ICD-9-CM    1. Chronic right shoulder pain M25.511 719.41 MRI SHOULDER RT WO CONT    G89.29 338.29    2. Lateral epicondylitis of right elbow M77.11 726.32 meloxicam (MOBIC) 15 mg tablet       PLAN:  I reviewed past imaging studies that were done. There were none done on the right shoulder. I have discussed the diagnosis with the patient and the intended plan as seen in the above orders. The patient has received an after-visit summary and questions were answered concerning future plans. I have discussed medication side effects and warnings with the patient as well. Patient will call for further questions.     Follow-up Disposition:  Return if symptoms worsen or fail to improve.

## 2018-12-11 ENCOUNTER — PATIENT MESSAGE (OUTPATIENT)
Dept: FAMILY MEDICINE CLINIC | Age: 48
End: 2018-12-11

## 2018-12-11 ENCOUNTER — HOSPITAL ENCOUNTER (OUTPATIENT)
Age: 48
Discharge: HOME | End: 2018-12-11
Attending: NURSE PRACTITIONER
Payer: COMMERCIAL

## 2018-12-11 DIAGNOSIS — G89.29 CHRONIC RIGHT SHOULDER PAIN: Primary | ICD-10-CM

## 2018-12-11 DIAGNOSIS — M25.511 CHRONIC RIGHT SHOULDER PAIN: Primary | ICD-10-CM

## 2018-12-11 DIAGNOSIS — G89.29 CHRONIC RIGHT SHOULDER PAIN: ICD-10-CM

## 2018-12-11 DIAGNOSIS — M25.511 CHRONIC RIGHT SHOULDER PAIN: ICD-10-CM

## 2018-12-11 DIAGNOSIS — R93.89 ABNORMAL FINDING ON IMAGING: ICD-10-CM

## 2018-12-11 PROCEDURE — 73221 MRI JOINT UPR EXTREM W/O DYE: CPT

## 2018-12-11 NOTE — PROGRESS NOTES
Please advise Pt that her MRI shows a full thickness tear at the supraspinator with a additional partial thickness tear  Next step is ref to ortho.  I put in a ref to Dr Avtar Hoff

## 2018-12-19 ENCOUNTER — OFFICE VISIT (OUTPATIENT)
Dept: ORTHOPEDIC SURGERY | Age: 48
End: 2018-12-19

## 2018-12-19 VITALS
SYSTOLIC BLOOD PRESSURE: 99 MMHG | WEIGHT: 158 LBS | BODY MASS INDEX: 28 KG/M2 | HEART RATE: 85 BPM | TEMPERATURE: 96.8 F | HEIGHT: 63 IN | OXYGEN SATURATION: 99 % | DIASTOLIC BLOOD PRESSURE: 73 MMHG | RESPIRATION RATE: 15 BRPM

## 2018-12-19 DIAGNOSIS — M75.121 COMPLETE TEAR OF RIGHT ROTATOR CUFF: Primary | ICD-10-CM

## 2018-12-19 NOTE — PROGRESS NOTES
Josue Colbert  1970   Chief Complaint   Patient presents with    Shoulder Pain     RIGHT SHOULDER PAIN        HISTORY OF PRESENT ILLNESS  Josue Colbert is a 46 y.o. female who presents today for evaluation of right shoulder pain. The patient was referred by Alberto Carter NP. She rates her pain 0/10 today. Pain has been present for about 1 year. She has been previously seen by Dr. Arianna Avina for tennis elbow. She takes Mobic once a day. Patient describes the pain as sharp that is Intermittent in nature. Symptoms are worse with certain movements of the arm, Activity and is better with  Rest. Associated symptoms include weakness. Since problem started, it: is unchanged. Pain does wake patient up at night. Has taken Mobic for the problem. Has tried following treatments: Injections:NO; Brace:NO;  Therapy:YES; Cane/Crutch:NO       Allergies   Allergen Reactions    Milk Containing Products Diarrhea    Sulfa (Sulfonamide Antibiotics) Rash    Other Medication Diarrhea     Eggs, patient eats, and gets flu shot yearly with no reaction        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    Thumb pain 6/7/2010      Social History     Socioeconomic History    Marital status:      Spouse name: Not on file    Number of children: Not on file    Years of education: Not on file    Highest education level: Not on file   Social Needs    Financial resource strain: Not on file    Food insecurity - worry: Not on file    Food insecurity - inability: Not on file   [employer] needs - medical: Not on file   [employer] needs - non-medical: Not on file   Occupational History    Not on file   Tobacco Use    Smoking status: Never Smoker    Smokeless tobacco: Never Used   Substance and Sexual Activity    Alcohol use: Yes     Comment: few times per week    Drug use: No    Sexual activity: Yes     Partners: Male     Comment: pregnancy test negative   Other Topics Concern    Not on file   Social History Narrative    Not on file      Past Surgical History:   Procedure Laterality Date    HX GYN      left tube removal ectopic pregnancy    HX HEENT  9/2011    Basal cell cancer lesion removed from nose    HX LYMPHADENECTOMY Right 8/25/16    HX ORTHOPAEDIC  1999    TMJ    HX OTHER SURGICAL  2013    Spinal Fusion    HX TONSILLECTOMY  2/2007      Family History   Problem Relation Age of Onset    Cancer Mother         breast cancer    Diabetes Father     High Cholesterol Father     Lung Disease Father         COPD and nodule on lung    Heart Disease Father     Cancer Maternal Aunt         breast cancer    Arthritis-osteo Maternal Grandmother         arthritis     Cancer Cousin       Current Outpatient Medications   Medication Sig    OTHER     meloxicam (MOBIC) 15 mg tablet Take 1 tab daily or 1/2 tab twice daily as needed pain with food.  multivitamin (ONE A DAY) tablet Take 1 Tab by mouth daily.  Mv,Ca,Min-FA-Herbal No.157 (ESTROVEN MAXIMUM STRENGTH) 400 mcg tab Take  by mouth.  azelastine-fluticasone (DYMISTA) 137-50 mcg/spray spry by Nasal route.  PENNSAID 20 mg/gram /actuation(2 %) sopm 2 Pump(s) by Apply Externally route two (2) times daily as needed. Cell: [phone]  Pharmacy: [phone]    azelastine (OPTIVAR) 0.05 % ophthalmic solution Administer  to both eyes two (2) times a day. Use in affected eye(s)    RANITIDINE HCL (ZANTAC PO) Take  by mouth.  VITAMIN A/VITAMIN D3 (NATURAL VITAMIN D PO) Take  by mouth.  esomeprazole (NEXIUM) 20 mg capsule Take 20 mg by mouth nightly.  diphenhydrAMINE (SIMPLY SLEEP) 25 mg tablet Take 25 mg by mouth nightly. No current facility-administered medications for this visit.         REVIEW OF SYSTEM   Patient denies: Weight loss, Fever/Chills, HA, Visual changes, Fatigue, Chest pain, SOB, Abdominal pain, N/V/D/C, Blood in stool or urine, Edema. Pertinent positive as above in HPI. All others were negative    PHYSICAL EXAM:   Visit Vitals  BP 99/73   Pulse 85   Temp 96.8 °F (36 °C) (Oral)   Resp 15   Ht 5' 3\" (1.6 m)   Wt 158 lb (71.7 kg)   SpO2 99%   BMI 27.99 kg/m²     The patient is a well-developed, well-nourished female   in no acute distress. The patient is alert and oriented times three. The patient is alert and oriented times three. Mood and affect are normal.  LYMPHATIC: lymph nodes are not enlarged and are within normal limits  SKIN: normal in color and non tender to palpation. There are no bruises or abrasions noted. NEUROLOGICAL: Motor sensory exam is within normal limits. Reflexes are equal bilaterally. There is normal sensation to pinprick and light touch  MUSCULOSKELETAL:  Examination Right shoulder   Skin Intact   AC joint tenderness -   Biceps tenderness -   Forward flexion/Elevation    Active abduction    Glenohumeral abduction 90   External rotation ROM 30   Internal rotation ROM 30   Apprehension -   Kirts Relocation -   Jerk -   Load and Shift -   Obriens -   Speeds -   Impingement sign +   Supraspinatus/Empty Can +   External Rotation Strength -, 5/5   Lift Off/Belly Press -, 5/5   Neurovascular Intact     IMAGING: MRI of right shoulder dated 12/11/18 was reviewed and read: IMPRESSION:  1. Small full-thickness tear at anterior distal supraspinatus insertion, with additional partial-thickness tear as described. Subacromial and subdeltoid bursitis. Mild infraspinatus and subscapularis tendinosis. 2. AC joint hypertrophy and inflammation    IMPRESSION:      ICD-10-CM ICD-9-CM    1. Complete tear of right rotator cuff M75.121 727.61         PLAN:  1.  I discussed the risks and benefits and potential adverse outcomes of both operative vs non operative treatment of right rotator cuff tear with the patient. Patient wishes to proceed with arthroscopic right rotator cuff repair surgery . Risks of operative intervention include but not limited to bleeding, infection, deep vein thrombosis, pulmonary embolism, death, limb length discrepancy, reflexive sympathetic dystrophy, fat embolism syndrome,damage to blood vessels and nerves, malunion, non-union, delayed union, failure of hardware, post traumatic arthritis, stroke, heart attack, and death. Patient understands that infection may arise and may require numerous surgeries. History and physical exam scheduled for a later date. Risk factors include: n/a  2. No ultrasound exam indicated today  3. No cortisone injection indicated today   4. No Physical/Occupational Therapy indicated today  5. No diagnostic test indicated today:   6. No durable medical equipment indicated today  7. No referral indicated today   8. No medications indicated today:   9. No Narcotic indicated today      RTC H&P  Follow-up Disposition: Not on File  Office note will be sent to referring provider. Scribed by Kenya Carrington ([address]) as dictated by Samantha Harrison MD    I, Dr. Samantha Harrison, confirm that all documentation is accurate.     Samantha Harrison M.D.   [facility]

## 2018-12-19 NOTE — PATIENT INSTRUCTIONS
Dr. Silvia Gann Repair    What is the surgery? - This is an outpatient procedure at either [facility] [address] or [address] [place] [address] [address] will be completely asleep for the procedure. Dr. Ingrid York will make somewhere between 2-5 small incisions in your shoulder depending on the amount of work to be completed. He will take a tour of your shoulder with the camera and fix everything that needs to be fixed during your surgery. - Total surgery takes about 45 mins to an hour and half depending on how much needed to be repaired    What can you expect after surgery? - You will have a bulky dressing on your shoulder that you can remove 2 days after surgery. You will be able to shower 2 days after surgery but no soaking in a bath, hot tub, ocean or pool x 2 weeks to allow for full wound healing  - You will be in a sling for 5-6 weeks depending on your repair. You will wear this sling whenever you are active, up moving around, and sleeping at night. This sling is to keep you from moving your arm on your own. You are essentially one armed until you are out of your sling. This means no reaching, pulling, grabbing or lifting with the operative arm.   - Dr. Ingrid York will start physical therapy for you the first business day after surgery. While you cannot move your arm we allow physical therapy to gently move your shoulder. We call this passive range of motion. The goal of this is to decrease your stiffness and in turn decrease your post operative pain. - Plan on being in physical therapy for 10-12 weeks    When can I return to work? - Most patients return to desk work only after 2 weeks. You are able to type but there is no overhead work or lifting  - You will start some gentle lifting up to 5-10lbs at about 8-10 weeks post operatively.  You will gradually increase how much you are able to lift after this point under the guidance of Dr. Ingrid York, his physician assistant and physical therapy. - At 6 months you are able to do all activities as tolerated but it may take you a full 9-12 months to fully recover from your surgery    Not all shoulder arthroscopies are the same. The specifics of your individual case will be discussed at length with you by Dr. Marlen Parikh and his Physician Assistant.

## 2019-01-06 ENCOUNTER — PATIENT MESSAGE (OUTPATIENT)
Dept: ORTHOPEDIC SURGERY | Age: 49
End: 2019-01-06

## 2019-01-07 NOTE — TELEPHONE ENCOUNTER
Please make sure Cayetano Belle is calling her regarding her shoulder. As for elbow we would need to formally see her for her elbow if we have not yet to order an MRI. If we have seen her for her elbow please order MRI. Treatment options would then be discussed.

## 2019-01-15 ENCOUNTER — OFFICE VISIT (OUTPATIENT)
Dept: ORTHOPEDIC SURGERY | Age: 49
End: 2019-01-15

## 2019-01-15 VITALS
HEART RATE: 61 BPM | TEMPERATURE: 95.7 F | HEIGHT: 63 IN | SYSTOLIC BLOOD PRESSURE: 112 MMHG | WEIGHT: 168.6 LBS | RESPIRATION RATE: 15 BRPM | OXYGEN SATURATION: 99 % | BODY MASS INDEX: 29.88 KG/M2 | DIASTOLIC BLOOD PRESSURE: 76 MMHG

## 2019-01-15 DIAGNOSIS — M25.521 RIGHT ELBOW PAIN: ICD-10-CM

## 2019-01-15 DIAGNOSIS — M77.11 LATERAL EPICONDYLITIS, RIGHT ELBOW: Primary | ICD-10-CM

## 2019-01-15 NOTE — PROGRESS NOTES
Vamsi Joseph 
1970 Chief Complaint Patient presents with  Elbow Pain RIGHT ELBOW PAIN  
  
 
HISTORY OF PRESENT ILLNESS Vamsi Joseph is a 46 y.o. female who presents today for reevaluation of right elbow pain. Patient rates pain as 7/10 today. The pain has been present for 1.5 years. She has had four cortisone injections, which has not provided much relief. Pain with certain movements of the arm. She would like to know if she could have the rotator cuff surgery and elbow surgery at the same time. Patient denies any fever, chills, chest pain, shortness of breath or calf pain. The remainder of the review of systems is negative. There are no new illness or injuries to report since last seen in the office. There are no changes to medications, allergies, family or social history. PHYSICAL EXAM:  
Visit Vitals /76 Pulse 61 Temp 95.7 °F (35.4 °C) (Oral) Resp 15 Ht 5' 3\" (1.6 m) Wt 168 lb 9.6 oz (76.5 kg) SpO2 99% BMI 29.87 kg/m² The patient is a well-developed, well-nourished female   in no acute distress. The patient is alert and oriented times three. The patient is alert and oriented times three. Mood and affect are normal. 
LYMPHATIC: lymph nodes are not enlarged and are within normal limits SKIN: normal in color and non tender to palpation. There are no bruises or abrasions noted. NEUROLOGICAL: Motor sensory exam is within normal limits. Reflexes are equal bilaterally. There is normal sensation to pinprick and light touch MUSCULOSKELETAL: 
Examination Right Elbow Skin Intact Range of Motion 0-135 Tenderness Medial Epicondyle - Tenderness Lateral Epicondyle + Tenderness Olecranon Bursa - Swelling - Bruising - Stability Normal  
Motor Strength  Normal  
Neurovascular Intact IMAGING: XR of right dated 1/15/19 was reviewed and read: normal 
 
IMPRESSION:   
  ICD-10-CM ICD-9-CM 1. Lateral epicondylitis, right elbow M77.11 726.32 MRI ELBOW RT WO CONT 2. Right elbow pain M25.521 719.42 AMB POC X-RAY ELBOW 2 VW MRI ELBOW RT WO CONT PLAN:  
1. The patient presents today with right elbow pain due to lateral epicondylitis and I would like to get an MRI. Risk factors include: n/a 2. No ultrasound exam indicated today 3. No cortisone injection indicated today 4. No Physical/Occupational Therapy indicated today 5. Yes diagnostic test indicated today: MRI R ELBOW 6. No durable medical equipment indicated today 7. No referral indicated today 8. No medications indicated today: 9. No Narcotic indicated today RTC following MRI Follow-up Disposition: Not on File Scribed by Bishop Fitzgerald ([address]) as dictated by Omar Jolly MD 
 
I, Dr. Omar Jolly, confirm that all documentation is accurate.  
 
Omar Jolly M.D.  
[facility]

## 2019-01-15 NOTE — PROGRESS NOTES
1. Have you been to the ER, urgent care clinic since your last visit? Hospitalized since your last visit? No 
 
2. Have you seen or consulted any other health care providers outside of the [address] [place] [address] since your last visit? Include any pap smears or colon screening.  No

## 2019-01-21 ENCOUNTER — HOSPITAL ENCOUNTER (OUTPATIENT)
Age: 49
Discharge: HOME | End: 2019-01-21
Attending: ORTHOPAEDIC SURGERY
Payer: COMMERCIAL

## 2019-01-21 DIAGNOSIS — M25.521 RIGHT ELBOW PAIN: ICD-10-CM

## 2019-01-21 DIAGNOSIS — M77.11 LATERAL EPICONDYLITIS, RIGHT ELBOW: ICD-10-CM

## 2019-01-21 PROCEDURE — 73221 MRI JOINT UPR EXTREM W/O DYE: CPT

## 2019-01-25 DIAGNOSIS — M75.121 COMPLETE TEAR OF RIGHT ROTATOR CUFF: Primary | ICD-10-CM

## 2019-01-25 DIAGNOSIS — Z01.818 PREOP EXAMINATION: ICD-10-CM

## 2019-01-31 ENCOUNTER — HOSPITAL ENCOUNTER (OUTPATIENT)
Dept: LAB | Age: 49
Discharge: HOME | End: 2019-01-31
Payer: COMMERCIAL

## 2019-01-31 DIAGNOSIS — Z01.818 PREOP EXAMINATION: ICD-10-CM

## 2019-01-31 LAB
ANION GAP SERPL CALC-SCNC: 5 MMOL/L (ref 3–18)
ATRIAL RATE: 57 BPM
BASOPHILS # BLD: 0 K/UL (ref 0–0.1)
BASOPHILS NFR BLD: 0 % (ref 0–2)
BUN SERPL-MCNC: 18 MG/DL (ref 7–18)
BUN/CREAT SERPL: 27 (ref 12–20)
CALCIUM SERPL-MCNC: 9.2 MG/DL (ref 8.5–10.1)
CALCULATED P AXIS, ECG09: 32 DEGREES
CALCULATED R AXIS, ECG10: -51 DEGREES
CALCULATED T AXIS, ECG11: 4 DEGREES
CHLORIDE SERPL-SCNC: 103 MMOL/L (ref 100–108)
CO2 SERPL-SCNC: 30 MMOL/L (ref 21–32)
CREAT SERPL-MCNC: 0.67 MG/DL (ref 0.6–1.3)
DIAGNOSIS, 93000: NORMAL
DIFFERENTIAL METHOD BLD: NORMAL
EOSINOPHIL # BLD: 0.4 K/UL (ref 0–0.4)
EOSINOPHIL NFR BLD: 4 % (ref 0–5)
ERYTHROCYTE [DISTWIDTH] IN BLOOD BY AUTOMATED COUNT: 12.7 % (ref 11.6–14.5)
GLUCOSE SERPL-MCNC: 93 MG/DL (ref 74–99)
HCT VFR BLD AUTO: 40.4 % (ref 35–45)
HGB BLD-MCNC: 13.4 G/DL (ref 12–16)
LYMPHOCYTES # BLD: 2.1 K/UL (ref 0.9–3.6)
LYMPHOCYTES NFR BLD: 25 % (ref 21–52)
MCH RBC QN AUTO: 31 PG (ref 24–34)
MCHC RBC AUTO-ENTMCNC: 33.2 G/DL (ref 31–37)
MCV RBC AUTO: 93.5 FL (ref 74–97)
MONOCYTES # BLD: 0.6 K/UL (ref 0.05–1.2)
MONOCYTES NFR BLD: 7 % (ref 3–10)
NEUTS SEG # BLD: 5.2 K/UL (ref 1.8–8)
NEUTS SEG NFR BLD: 64 % (ref 40–73)
P-R INTERVAL, ECG05: 138 MS
PLATELET # BLD AUTO: 363 K/UL (ref 135–420)
PMV BLD AUTO: 10.2 FL (ref 9.2–11.8)
POTASSIUM SERPL-SCNC: 4 MMOL/L (ref 3.5–5.5)
Q-T INTERVAL, ECG07: 426 MS
QRS DURATION, ECG06: 94 MS
QTC CALCULATION (BEZET), ECG08: 414 MS
RBC # BLD AUTO: 4.32 M/UL (ref 4.2–5.3)
SODIUM SERPL-SCNC: 138 MMOL/L (ref 136–145)
VENTRICULAR RATE, ECG03: 57 BPM
WBC # BLD AUTO: 8.2 K/UL (ref 4.6–13.2)

## 2019-01-31 PROCEDURE — 80048 BASIC METABOLIC PNL TOTAL CA: CPT

## 2019-01-31 PROCEDURE — 93005 ELECTROCARDIOGRAM TRACING: CPT

## 2019-01-31 PROCEDURE — 36415 COLL VENOUS BLD VENIPUNCTURE: CPT

## 2019-01-31 PROCEDURE — 85025 COMPLETE CBC W/AUTO DIFF WBC: CPT

## 2019-02-05 ENCOUNTER — OFFICE VISIT (OUTPATIENT)
Dept: ORTHOPEDIC SURGERY | Age: 49
End: 2019-02-05

## 2019-02-05 VITALS
HEART RATE: 64 BPM | TEMPERATURE: 98 F | WEIGHT: 171.2 LBS | BODY MASS INDEX: 30.33 KG/M2 | SYSTOLIC BLOOD PRESSURE: 115 MMHG | HEIGHT: 63 IN | DIASTOLIC BLOOD PRESSURE: 77 MMHG

## 2019-02-05 DIAGNOSIS — M75.121 COMPLETE TEAR OF RIGHT ROTATOR CUFF: Primary | ICD-10-CM

## 2019-02-05 RX ORDER — OXYCODONE HYDROCHLORIDE 5 MG/1
5 TABLET ORAL
Qty: 40 TAB | Refills: 0 | Status: SHIPPED | OUTPATIENT
Start: 2019-02-05 | End: 2019-05-10 | Stop reason: SDUPTHER

## 2019-02-05 RX ORDER — GABAPENTIN 300 MG/1
300 CAPSULE ORAL
Qty: 5 CAP | Refills: 0 | Status: SHIPPED | OUTPATIENT
Start: 2019-02-05 | End: 2019-02-10

## 2019-02-05 NOTE — PROGRESS NOTES
HISTORY AND PHYSICAL          Patient: Petty Kam                MRN: [id number]       SSN: [id number]  YOB: 1970          AGE: 46 y.o. SEX: female      Patient scheduled for:  Right shoulder arthroscopic rotator cuff repair    Surgeon: Robin Long MD    ANESTHESIA TYPE:  General    HISTORY:     The patient was seen in the office today for a preoperative history and physical for an upcoming above listed surgery. The patient is a pleasant 46 y.o. female who has a history of right shoulder pain. Pain has been present for about 1 year. She has been previously seen by Dr. Marquez Pierce for tennis elbow. She takes Mobic once a day. Patient describes the pain as sharp that is Intermittent in nature. Symptoms are worse with certain movements of the arm, Activity and is better with  Rest. Associated symptoms include weakness. Since problem started, it: is unchanged. Pain does wake patient up at night. Has taken Mobic for the problem. Has tried following treatments: Injections:NO; Brace:NO; Therapy:YES; Cane/Crutch:NO  . Pain level is a 5/10. Due to the current findings, affected activity of daily living and continued pain and discomfort, surgical intervention is indicated. The alternatives, risks, and complications, including but not limited to infection, blood loss, need for blood transfusion, neurovascular damage, diana-incisional numbness, subcutaneous hematoma, bone fracture, anesthetic complications, DVT, PE, death, RSD, postoperative stiffness and pain, possible surgical scar, delayed healing and nonhealing, reflexive sympathetic dystrophy, damage to blood vessels and nerves, need for more surgery, MI, and stroke,  failure of hardware, gait disturbances,have been discussed. The patient understands and wishes to proceed with surgery.      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 90 Tab 0    multivitamin (ONE A DAY) tablet Take 1 Tab by mouth daily.  Mv,Ca,Min-FA-Herbal No.157 (ESTROVEN MAXIMUM STRENGTH) 400 mcg tab Take  by mouth.  azelastine-fluticasone (DYMISTA) 137-50 mcg/spray spry by Nasal route.  azelastine (OPTIVAR) 0.05 % ophthalmic solution Administer  to both eyes two (2) times a day. Use in affected eye(s)      RANITIDINE HCL (ZANTAC PO) Take  by mouth.  VITAMIN A/VITAMIN D3 (NATURAL VITAMIN D PO) Take  by mouth.  esomeprazole (NEXIUM) 20 mg capsule Take 20 mg by mouth nightly.  diphenhydrAMINE (SIMPLY SLEEP) 25 mg tablet Take 25 mg by mouth nightly.  oxyCODONE IR (ROXICODONE) 5 mg immediate release tablet Take 1 Tab by mouth every four (4) hours as needed for Pain. Max Daily Amount: 30 mg. DO NOT TAKE UNTIL AFTER SURGERY (for acute post operative pain) 40 Tab 0    gabapentin (NEURONTIN) 300 mg capsule Take 1 Cap by mouth nightly for 5 days. START NIGHT OF SURGERY 5 Cap 0    PENNSAID 20 mg/gram /actuation(2 %) sopm 2 Pump(s) by Apply Externally route two (2) times daily as needed.  Cell: [phone]  Pharmacy: [phone] 112 g 3       ALLERGIES:     Allergies   Allergen Reactions    Milk Containing Products Diarrhea    Sulfa (Sulfonamide Antibiotics) Rash    Other Medication Diarrhea     Eggs, patient eats, and gets flu shot yearly with no reaction         SURGICAL HISTORY:     Past Surgical History:   Procedure Laterality Date    HX BACK SURGERY  2012    HX GYN      left tube removal ectopic pregnancy    HX HEENT  9/2011    Basal cell cancer lesion removed from nose  HX LYMPHADENECTOMY Right 8/25/16    HX ORTHOPAEDIC  1999    TMJ    HX OTHER SURGICAL  2013    Spinal Fusion    HX TONSILLECTOMY  2/2007       SOCIAL HISTORY:     Social History     Socioeconomic History    Marital status:      Spouse name: Not on file    Number of children: Not on file    Years of education: Not on file    Highest education level: Not on file   Tobacco Use    Smoking status: Never Smoker    Smokeless tobacco: Never Used   Substance and Sexual Activity    Alcohol use: Yes     Comment: few times per week    Drug use: No    Sexual activity: Yes     Partners: Male     Comment: pregnancy test negative       FAMILY HISTORY:     Family History   Problem Relation Age of Onset    Cancer Mother         breast cancer    Diabetes Father     High Cholesterol Father     Lung Disease Father         COPD and nodule on lung    Heart Disease Father     Cancer Maternal Aunt         breast cancer    Arthritis-osteo Maternal Grandmother         arthritis     Cancer Cousin        REVIEW OF SYSTEMS:     Negative for fevers, chills, chest pain, shortness of breath, weight loss, recent illness     General: Negative for fever and chills. No unexpected change in weight. Denies fatigue. No change in appetite. Skin: Negative for rash or itching. HEENT: Negative for congestion, sore throat, neck pain and neck stiffness. No change in vision or hearing. Hasn't noted any enlarged lymph nodes in the neck. Cardiovascular:  Negative for chest pain and palpitations. Has not noted pedal edema. Respiratory: Negative for cough, colds, sinus, hemoptysis, shortness of breath and wheezing. Gastrointestinal: Negative for nausea and vomiting, rectal bleeding, coffee ground emesis, abdominal pain, diarrhea and constipation. Genitourinary: Negative for dysuria, frequency urgency, or burning on micturition. No flank pain, no foul smelling urine, no difficulty with initiating urination.    Hematological: Negative for bleeding or easy bruising. Musculoskeletal: Negative  for arthralgias, back pain or neck pain. Neurological: Negative for dizziness, seizures or syncopal episodes. Denies headaches. Endocrine: Denies excessive thirst.  No heat/cold intolerance. Psychiatric: Negative for depression or insomnia. PHYSICAL EXAMINATION:     VITALS:   Visit Vitals  /77   Pulse 64   Temp 98 °F (36.7 °C)   Ht 5' 3\" (1.6 m)   Wt 171 lb 3.2 oz (77.7 kg)   LMP 09/15/2018 (Approximate)   BMI 30.33 kg/m²     GEN:  Well developed, well nourished 46 y.o. female in no acute distress. HEENT: Normocephalic and atraumatic. Eyes: Conjunctivae and EOM are normal.Pupils are equal, round, and reactive to light. External ear normal appearance, external nose normal appearing. Mouth/Throat: Oropharynx is clear and moist, able to handle oral secretions w/out difficulty, airway patent  NECK: Supple. Normal ROM, No lymphadenopathy. Trachea is midline. No bruising, swelling or deformity  RESP: Clear to auscultation bilaterally. No wheezes, rales, rhonchi. Normal effort and breath sounds. No respiratory distress  CARDIO:  Normal rate, regular rhythm and normal heart sounds. No MGR. ABDOMEN: Soft, non-tender, non-distended, normoactive bowel sounds in all four quadrants. There is no tenderness. There is no rebound and no guarding.    BACK: No CVA or spinal tenderness  BREAST:  Deferred  PELVIC:    Deferred   RECTAL:  Deferred   :           Deferred  EXTREMITIES: EXAMINATION OF: right shoulder  Examination Right shoulder   Skin Intact   AC joint tenderness -   Biceps tenderness -   Forward flexion/Elevation    Active abduction    Glenohumeral abduction 90   External rotation ROM 90   Internal rotation ROM 70   Apprehension -   Kirts Relocation -   Jerk -   Load and Shift -   Obriens -   Speeds -   Impingement sign +   Supraspinatus/Empty Can ++   External Rotation Strength -, 5/5   Lift Off/Belly Press -, 5/5 Neurovascular Intact       NEUROVASCULAR: Sensation intact to light touch and strength grossly intact and symmetrical. No nystagmus. Positive distal pulses and capillary refill. DVT ASSESSMENT:  There is not  calf tenderness. No evidence of DVT seen on physical exam.  MOTOR: In tact  PSYCH: Alert an oriented to person, place and time. Mood, memory, affect, behavior and judgment normal       RADIOGRAPHS & DIAGNOSTIC STUDIES:     MRI/xray reveals : MRI of right shoulder dated 12/11/18 was reviewed and read: IMPRESSION:  1. Small full-thickness tear at anterior distal supraspinatus insertion, with additional partial-thickness tear as described. Subacromial and subdeltoid bursitis. Mild infraspinatus and subscapularis tendinosis. 2. AC joint hypertrophy and inflammation      LABS:       @  CBC:   Lab Results   Component Value Date/Time    WBC 8.2 01/31/2019 02:09 PM    RBC 4.32 01/31/2019 02:09 PM    HGB 13.4 01/31/2019 02:09 PM    HCT 40.4 01/31/2019 02:09 PM    PLATELET 052 42/70/3569 02:09 PM    and BMP:   Lab Results   Component Value Date/Time    Glucose 93 01/31/2019 02:09 PM    Sodium 138 01/31/2019 02:09 PM    Potassium 4.0 01/31/2019 02:09 PM    Chloride 103 01/31/2019 02:09 PM    CO2 30 01/31/2019 02:09 PM    BUN 18 01/31/2019 02:09 PM    Creatinine 0.67 01/31/2019 02:09 PM    Calcium 9.2 01/31/2019 02:09 PM   @    Preoperative labs were reviewed and are substantially within normal limits   EKG: Sinus bradycardia   Incomplete right bundle branch block   Left anterior fascicular block   T wave abnormality, consider anterior ischemia   Abnormal ECG   When compared with ECG of 26-JUL-2016 10:35,   Nonspecific T wave abnormality now evident in Inferior leads   T wave inversion now evident in Anterior leads   Confirmed by Marcelo Russell MD, Alondra Harper ([place]) on 1/31/2019 6:10:43 PM       ASSESSMENT:       Encounter Diagnosis   Name Primary?     Complete tear of right rotator cuff Yes       PLAN:     Again, the alternatives, risks, and complications, as well as expected outcome were discussed. The patient understands and agrees to proceed with right shoulder arthroscopic rotator cuff repair.  Patient given orders listed below:    Orders Placed This Encounter    oxyCODONE IR (ROXICODONE) 5 mg immediate release tablet    gabapentin (NEURONTIN) 300 mg capsule         Eren Armendariz PA-C  2/5/2019  9:31 AM

## 2019-02-15 ENCOUNTER — TELEPHONE (OUTPATIENT)
Dept: ORTHOPEDIC SURGERY | Age: 49
End: 2019-02-15

## 2019-02-15 ENCOUNTER — HOSPITAL ENCOUNTER (OUTPATIENT)
Dept: PHYSICAL THERAPY | Age: 49
Discharge: HOME | End: 2019-02-15
Payer: COMMERCIAL

## 2019-02-15 DIAGNOSIS — Z98.890 S/P RIGHT ROTATOR CUFF REPAIR: Primary | ICD-10-CM

## 2019-02-15 PROCEDURE — 97140 MANUAL THERAPY 1/> REGIONS: CPT | Performed by: PHYSICAL THERAPIST

## 2019-02-15 PROCEDURE — 97161 PT EVAL LOW COMPLEX 20 MIN: CPT | Performed by: PHYSICAL THERAPIST

## 2019-02-15 PROCEDURE — 97110 THERAPEUTIC EXERCISES: CPT | Performed by: PHYSICAL THERAPIST

## 2019-02-15 PROCEDURE — 97530 THERAPEUTIC ACTIVITIES: CPT | Performed by: PHYSICAL THERAPIST

## 2019-02-15 NOTE — PROGRESS NOTES
PT DAILY TREATMENT NOTE/SHOULDER EVAL 10-18    Patient Name: Oliver Mis  Date:2/15/2019  : 1970  [x]  Patient  Verified  Payor: BLUE CROSS / Plan: [facility] / Product Type: PPO /    In time:10:38A  Out time:11:34A  Total Treatment Time (min): 56  Visit #: 1 of 14    Medicare/BCBS Only   Total Timed Codes (min):   1:1 Treatment Time:         Treatment Area: Right rotator cuff tear [M75.101]    SUBJECTIVE  Pain Level (0-10 scale): 1/10 on meds at 8AM  [x]constant [x]intermittent []improving []worsening []no change since onset    Any medication changes, allergies to medications, adverse drug reactions, diagnosis change, or new procedure performed?: [x] No    [] Yes (see summary sheet for update)  Subjective functional status/changes:     PLOF: Up until 2 years ago, was doing light weight training and cardio 6 days/week despite pain. Limitations to PLOF: Pain and recent surgery now limits all movement of R UE  Mechanism of Injury: 2017 the elbow started hurting and got some injections and it seemed to help. Went to PT for R shoulder pain and then tried PT and didn't help.  tried doing a workout routine, but shoulder got worse. Decision in December to have Surgery. 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 Pt Goals: More motion without pain and be able to return to weight training/exercise training.   Barriers: [x]pain []financial []time []transportation []other  Motivation: Good - plans to start getting back to walking routine this weekend  Substance use: [x]Alcohol []Tobacco []other:   FABQ Score: []low [x]elevate  Cognition: A & O x 4    Other:    OBJECTIVE/EXAMINATION  Domestic Life: See above  Activity/Recreational Limitations: RCR protocol restrictions  Mobility: WNL except for lack of R arm swing  Self Care: Min A for ADLs such as combing hair        15 min [x]Eval                  []Re-Eval       11 min Therapeutic Exercise:  [x] See flow sheet :   Rationale: increase ROM to improve the patients ability to improve P/ROM and decrease pain with movement. 10 min Therapeutic Activity:  [x]  See flow sheet :   Rationale: increase ROM and improve coordination  to improve the patients ability to improve the patients ability perform basic ADLs and job-related tasks without pain, self-manage brace. 10 min Neuromuscular Re-education:  [x]  See flow sheet :   Rationale: improve coordination and increase proprioception  to improve the patients ability to to improve the patients ability to perform activities with good form and proprioception with tactile and verbal cuing appropriately. 12 min Manual Therapy:  Supine Grades 1-2 mobs with passive stretching unforced into available range. Rationale: decrease pain, increase ROM and increase tissue extensibility to improve P/ROM.           With   [x] TE   [x] TA   [x] neuro   [] other: Patient Education: [x] Review HEP    [x] Progressed/Changed HEP based on:   [x] positioning   [] body mechanics   [] transfers   [] heat/ice application    [] other:      Other Objective/Functional Measures:     BP: 102/70mmHg  HR: 72 bpm  SpO2 97%  Timed Sit/Stand Test: 12x/10    Physical Therapy Evaluation - Shoulder    Posture: [] Poor    [x] Fair    [] Good    Describe:    ROM:  [] Unable to assess at this time                                           AROM PROM   Left Right  Left Right   Flexion  180 Flexion 180 50   Extension  180 Extension 180 50   Scaption/ABD  180 Scaptio/ 30   ER @ 0 Degrees  100 ER @ 0 Degrees 100 0   ER @ 90 Degrees  90 ER @ 90 Degrees 90 0   IR @ 90 Degrees   IR @ 90 Degrees  10     End Feel / Painful Arc: N/T    Strength:   [] Unable to assess at this time                                                                            L (1-5) R (1-5) Pain   Flexors N/T 4- [] Yes   [] No   Abductors N/T 4- [] Yes   [] No   External Rotators N/T 4- [] Yes   [] No   Internal Rotators N/T 4- [] Yes   [] No   Supraspinatus N/T 4- [] Yes   [] No   Serratus Anterior N/T  [] Yes   [] No   Lower Trapezius N/T  [] Yes   [] No   Elbow Flexion N/T 4 [] Yes   [] No   Elbow Extension N/T 4 [] Yes   [] No       Scapulohumoral Control / Rhythm:  Able to eccentrically lower with good control? Left: [] Yes   [x] No     Right: [x] Yes   [] No    Accessory Motions:    Palpation  [] Min  [x] Mod  [] Severe    Location: R AC joint  [] Min  [] Mod  [] Severe    Location:  [] Min  [] Mod  [] Severe    Location:    Optional Tests:    Sensation Left Right Reflexes Left Right   Biceps (C5)   Biceps (C5)     Silver Radial(C6-7)   Brachioradialis (C6)     Silver Ulnar(C8-T1)   Triceps (C7)       Adson's Test  [] Pos   [] Neg Yergason's Test [] Pos   [] Neg  Ivania's Test  [] Pos   [] Neg Yasemin's Sign [] Pos   [] Neg  Neer's Test  [] Pos   [] Neg Clunk Test  [] Pos   [] Neg  Hawkin's Test  [] Pos   [] Neg AC Joint  [] Pos   [] Neg  Speed's Test  [] Pos   [] Neg SC Joint  [] Pos   [] Neg  Empty Can  [] Pos   [] Neg Pectoral Tightness [] Pos   [] Neg  Anterior Apprehension [] Pos   [] Neg   Posterior Apprehension [] Pos   [] Neg      Other Tests / Comments:    No Special tests performed today second to presence of RCR. Gross edema noted in R shoulder.      Pain Level (0-10 scale) post treatment: 5/10    ASSESSMENT/Changes in Function: Patient is a pleasant middle-aged older adult female who has been suffering from R shoulder pain for 2 years, recently had RCR on 2/14/19. Patient has expected pain, weakness, edema, and limited P/ROM. Treatment will initially focus on pain-free P/ROM of shoulder and wrist/elbow strengthening, and progress per protocol. Patient will continue to benefit from skilled PT services to modify and progress therapeutic interventions, address functional mobility deficits, address ROM deficits, address strength deficits, analyze and cue movement patterns, analyze and modify body mechanics/ergonomics and assess and modify postural abnormalities to attain remaining goals. [x]  See Plan of Care  []  See progress note/recertification  []  See Discharge Summary         Progress towards goals / Updated goals:  Short Term Goals: To be accomplished in 3 weeks:  1. Patient will decrease shoulder pain during exercises by 2 points on Verbal Analog Scale (Eval: 8/10) to increase participation on Activities of Daily Living as lifting, carrying, and reaching. 2. Patient will demonstrate increased strength by ½ grade on MMT in B UEs (Eval: 2/5) to improve functional participation in such tasks as prolonged standing and sitting.         3.   Patient will achieve P/ROM of 0-120 degs without increased pain (Eval 50 degs).       Long Term Goals: To be accomplished in 6 weeks:  4. Patient will decrease pain during aggravating activities by 4 points on Verbal Analog Scale (Eval: 8/10) to increase participation in Activities of Daily Living as allowed within protocol (I.e. drinking glass of water)  5. Patient will demonstrate increased strength by 1 grade on MMT in B UEs (Eval: 2/5) to improve functional participation in such tasks as working overhead for >2 min. 6.Patient will achieve full A/ROM of 0-120 degs without increased pain (Eval: 0 degs).          7. Patient will achieve predicted FOTO scores to demonstrate decreased functional impairment to facilitate improved participation in activities of daily living as allowed per protocol, improve overall quality of life.     Frequency / Duration: Patient to be seen 3 times per week for 2 weeks, Followed by 2 times per week for 4 weeks.    Patient/ Caregiver education and instruction: Diagnosis, prognosis, self care, brace/ splint application, exercises and other Home Exercise program      PLAN  [x]  Upgrade activities as tolerated     []  Continue plan of care  []  Update interventions per flow sheet       []  Discharge due to:_  []  Other:_      Jenifer Mckinnon, PT 2/15/2019  10:39 AM

## 2019-02-15 NOTE — PROGRESS NOTES
In [address]  [address] [place] [address], [address] [place] [address], [address] [place], [place] [address],[address]  [phone] fax      Plan of Care/ Statement of Necessity for Physical Therapy Services    Patient name: Alon Lopez Start of Care: 2/15/2019   Referral source: Babeny Joykris,* : 1970    Medical Diagnosis: Right rotator cuff tear [M75.101]  Payor: BLUE CROSS / Plan:  [facility] / Product Type: PPO /  Onset Date:19    Treatment Diagnosis: R Shoulder pain, R rotator cuff rupture - nontraumatic, Weakness, Joint stiffness, Decreased ROM, localized edema   Prior Hospitalization: see medical history Provider#: [id number]   Medications: Verified on Patient summary List    Comorbidities: Lumbar laminectomy, Lymph node removal on R   Prior Level of Function: Up until 2 years ago, was doing light weight training and cardio 6 days/week without pain. As of the 2018, was still in pain, but would continue with some exercise but shoulder pain became progressively worse. The Plan of Care and following information is based on the information from the initial evaluation. Assessment/ key information: Patient is a pleasant middle-aged older adult female who has been suffering from R shoulder pain for 2 years, recently had RCR on 19. Patient has expected pain, weakness, edema, and limited P/ROM.  Treatment will initially focus on pain-free P/ROM of shoulder and wrist/elbow strengthening, and progress per protocol.     Evaluation Complexity History LOW Complexity : Zero comorbidities / personal factors that will impact the outcome / POC; Examination LOW Complexity : 1-2 Standardized tests and measures addressing body structure, function, activity limitation and / or participation in recreation  ;Presentation LOW Complexity : Stable, uncomplicated  ;Clinical Decision Making HIGH Complexity : FOTO score of 1- 25   Overall Complexity Rating: LOW   Problem List: pain affecting function, decrease ROM, decrease strength, edema affecting function, impaired gait/ balance, decrease ADL/ functional abilitiies, decrease activity tolerance, decrease flexibility/ joint mobility and decrease transfer abilities   Aggravating factors: 1) Lifting >5lbs overhead would cause 8/10 Pain (prior to surgery), 2) Reaching to open heavy doors would cause 8/10 pain - has a lot of doors at work; Eases: 1) Meds, 2) Rest/not using it/ topical agents   Treatment Plan may include any combination of the following: Therapeutic exercise, Therapeutic activities, Neuromuscular re-education, Physical agent/modality, Manual therapy, Patient education, Self Care training and Functional mobility training  Patient / Family readiness to learn indicated by: asking questions, trying to perform skills and interest  Persons(s) to be included in education: patient (P)  Barriers to Learning/Limitations: None  Patient Goal (s): More motion without pain and be able to return to weight training/exercise training.   Patient Self Reported Health Status: good  Rehabilitation Potential: excellent    Short Term Goals: To be accomplished in 3 weeks:  1. Patient will decrease shoulder pain during exercises by 2 points on Verbal Analog Scale (Eval: 8/10) to increase participation on Activities of Daily Living as lifting, carrying, and reaching. 2. Patient will demonstrate increased strength by ½ grade on MMT in B UEs (Eval: 2/5) to improve functional participation in such tasks as prolonged standing and sitting. 3.   Patient will achieve P/ROM of 0-120 degs without increased pain (Eval 50 degs). Long Term Goals: To be accomplished in 6 weeks:  4. Patient will decrease pain during aggravating activities by 4 points on Verbal Analog Scale (Eval: 8/10) to increase participation in Activities of Daily Living as allowed within protocol (I.e. drinking glass of water)  5.  Patient will demonstrate increased strength by 1 grade on MMT in B UEs (Eval: 2/5) to improve functional participation in such tasks as working overhead for >2 min. 6.Patient will achieve full A/ROM of 0-120 degs without increased pain (Eval: 0 degs). 7. Patient will achieve predicted FOTO scores to demonstrate decreased functional impairment to facilitate improved participation in activities of daily living as allowed per protocol, improve overall quality of life. Frequency / Duration: Patient to be seen 3 times per week for 2 weeks, Followed by 2 times per week for 4 weeks. Patient/ Caregiver education and instruction: Diagnosis, prognosis, self care, brace/ splint application, exercises and other Home Exercise program   [x]  Plan of care has been reviewed with OLAF Jeffries, PT 2/15/2019 12:24 PM  ________________________________________________________________________    I certify that the above Therapy Services are being furnished while the patient is under my care. I agree with the treatment plan and certify that this therapy is necessary.     Physician's Signature:____________Date:_________TIME:________    [employer], Date and Time must be completed for valid certification **      Please sign and return to In [address] [place] [address], [address] [place] [address], [address] [place] [address],[facility] 300 [phone] fax

## 2019-02-15 NOTE — TELEPHONE ENCOUNTER
Jenifer with [employer] -[phone] URF#[phone] states the referral is only for 1 visit and she will need a plan of care.

## 2019-02-18 ENCOUNTER — HOSPITAL ENCOUNTER (OUTPATIENT)
Dept: PHYSICAL THERAPY | Age: 49
Discharge: HOME | End: 2019-02-18
Payer: COMMERCIAL

## 2019-02-18 PROCEDURE — 97110 THERAPEUTIC EXERCISES: CPT

## 2019-02-18 PROCEDURE — 97140 MANUAL THERAPY 1/> REGIONS: CPT

## 2019-02-18 NOTE — TELEPHONE ENCOUNTER
Please fix this. She had a rotator cuff repair needs to be seen 2-3 x week x 4 weeks.  Follow protocol

## 2019-02-18 NOTE — PROGRESS NOTES
PT DAILY TREATMENT NOTE 10-18    Patient Name: Ki Nocatee  Date:2019  : 1970  [x]  Patient  Verified  Payor: BLUE CROSS / Plan: [employer] [facility] / Product Type: PPO /    In time:2:00  Out time:2:38  Total Treatment Time (min): 28  Visit #: 2 of 14    Medicare/BCBS Only   Total Timed Codes (min):  28 1:1 Treatment Time:  28       Treatment Area: Right rotator cuff tear [M75.101]    SUBJECTIVE  Pain Level (0-10 scale): \"fine\"  Any medication changes, allergies to medications, adverse drug reactions, diagnosis change, or new procedure performed?: [x] No    [] Yes (see summary sheet for update)  Subjective functional status/changes:   [] No changes reported  States that it is feeling fine, she is only taking tylenol    OBJECTIVE    Modality rationale: decrease edema, decrease inflammation and decrease pain to improve the patients ability to ease with tolerance to ADLs   Min Type Additional Details    [] Estim:  []Unatt       []IFC  []Premod                        []Other:  []w/ice   []w/heat  Position:  Location:    [] Estim: []Att    []TENS instruct  []NMES                    []Other:  []w/US   []w/ice   []w/heat  Position:  Location:    []  Traction: [] Cervical       []Lumbar                       [] Prone          []Supine                       []Intermittent   []Continuous Lbs:  [] before manual  [] after manual    []  Ultrasound: []Continuous   [] Pulsed                           []1MHz   []3MHz W/cm2:  Location:    []  Iontophoresis with dexamethasone         Location: [] Take home patch   [] In clinic   10 [x]  Ice     []  heat  []  Ice massage  []  Laser   []  Anodyne Position: incline  Location:right shoulder    []  Laser with stim  []  Other:  Position:  Location:    []  Vasopneumatic Device Pressure:       [] lo [] med [] hi   Temperature: [] lo [] med [] hi   [] Skin assessment post-treatment:  []intact []redness- no adverse reaction    []redness - adverse reaction:       18 min Therapeutic Exercise:  [x] See flow sheet :   Rationale: increase ROM, increase strength and increase proprioception to improve the patients ability to perform daily household chores. 10 min Manual Therapy:  Gentle PROM flexion/ abd/ IR /ER, LAD with oscillation   Rationale: decrease pain, increase ROM and increase tissue extensibility to assist with performing personal hygiene tasks      With   [] TE   [] TA   [] neuro   [] other: Patient Education: [x] Review HEP    [] Progressed/Changed HEP based on:   [] positioning   [] body mechanics   [] transfers   [] heat/ice application    [] other:      Other Objective/Functional Measures: Tolerated initiation of exercises well - reports of discomfort during flexion PROM  ~70 degrees (decreased PROM to pain free ranges)    Pain Level (0-10 scale) post treatment: 0    ASSESSMENT/Changes in Function: Patient continues to show improvements with signs/ symptoms however still demonstrates a decrease in strength, impaired ROM, and an increase in pain with functional activities. Patient will continue to benefit from skilled PT services to modify and progress therapeutic interventions, address functional mobility deficits, address strength deficits, analyze and cue movement patterns and analyze and modify body mechanics/ergonomics to attain remaining goals. [x]  See Plan of Care  []  See progress note/recertification  []  See Discharge Summary         Progress towards goals / Updated goals:  Short Term Goals: To be accomplished in 3 weeks:  1. Patient will decrease shoulder pain during exercises by 2 points on Verbal Analog Scale (Eval: 8/10) to increase participation on Activities of Daily Living as lifting, carrying, and reaching.    2. Patient will demonstrate increased strength by ½ grade on MMT in B UEs (Eval: 2/5) to improve functional participation in such tasks as prolonged standing and sitting.         3.   Patient will achieve P/ROM of 0-120 degs without increased pain (Eval 50 degs).       Long Term Goals: To be accomplished in 6 weeks:  4. Patient will decrease pain during aggravating activities by 4 points on Verbal Analog Scale (Eval: 8/10) to increase participation in Activities of Daily Living as allowed within protocol (I.e. drinking glass of water)  5. Patient will demonstrate increased strength by 1 grade on MMT in B UEs (Eval: 2/5) to improve functional participation in such tasks as working overhead for >2 min. 6.Patient will achieve full A/ROM of 0-120 degs without increased pain (Eval: 0 degs).    [phone]. Patient will achieve predicted FOTO scores to demonstrate decreased functional impairment to facilitate improved participation in activities of daily living as allowed per protocol, improve overall quality of life.         PLAN  [x]  Upgrade activities as tolerated     [x]  Continue plan of care  []  Update interventions per flow sheet       []  Discharge due to:_  []  Other:_      Jj Albarado, PT 2/18/2019  7:44 AM    Future Appointments   Date Time Provider Christi Willis   2/18/2019  2:00 PM Shiv Morales, PT [facility]   2/20/2019 10:00 AM Lowanda Samples, PT [facility]   2/21/2019  9:00 AM Donte SportsmanVLAD [address]   2/22/2019  2:00 PM Shiv Morales, PT [facility]   2/25/2019  8:00 AM Shiv Morales, PT [facility]   2/27/2019  5:00 PM Fokapil Morales, PT [facility]   3/1/2019  7:30 AM Lowanda Samples, PT [facility]   3/4/2019  5:00 PM Shiv Morales, PT [facility]   3/7/2019  5:30 PM Lowanda Samples, PT [facility]   3/11/2019  8:00 AM Shiv Morales, PT [facility]   3/14/2019  4:30 PM Lowanda Samples, PT [facility]   3/18/2019  4:30 PM Shiv Morales, PT [facility]   3/21/2019  4:30 PM Lowanda Samples, PT [facility]   3/25/2019  9:00 AM [facility]   3/25/2019 11:00 AM Shiv Morales, PT [facility]   3/28/2019  4:30 PM Lowanda Samples, PT [facility]   3/29/2019 10:00 AM Lowanda Samples, PT [facility]   4/8/2019 10:30 AM Lashae Oakley  [address]

## 2019-02-20 ENCOUNTER — HOSPITAL ENCOUNTER (OUTPATIENT)
Dept: PHYSICAL THERAPY | Age: 49
Discharge: HOME | End: 2019-02-20
Payer: COMMERCIAL

## 2019-02-20 PROCEDURE — 97110 THERAPEUTIC EXERCISES: CPT

## 2019-02-20 PROCEDURE — 97140 MANUAL THERAPY 1/> REGIONS: CPT

## 2019-02-20 NOTE — PROGRESS NOTES
PT DAILY TREATMENT NOTE 10-18    Patient Name: Nathan Murcia  Date:2019  : 1970  [x]  Patient  Verified  Payor: BLUE CROSS / Plan: [facility] [place] / Product Type: PPO /    In time:958  Out time:1033  Total Treatment Time (min): 35  Visit #: 3 of 14    Medicare/BCBS Only   Total Timed Codes (min):  25 1:1 Treatment Time:  25       Treatment Area: Right rotator cuff tear [M75.101]    SUBJECTIVE  Pain Level (0-10 scale): 0  Any medication changes, allergies to medications, adverse drug reactions, diagnosis change, or new procedure performed?: [x] No    [] Yes (see summary sheet for update)  Subjective functional status/changes:   [] No changes reported  I have not really had very much pain at all. I am only taking tylenol. I took it before I came. I tried to do an online class on my laptop but I couldn't tolerate it- made my arm more sore. States she sees the PA tomorrow. OBJECTIVE    Modality rationale: decrease inflammation and decrease pain to improve the patients ability to aid with increase tolerance to ADLS and activities.    Min Type Additional Details    [] Estim:  []Unatt       []IFC  []Premod                        []Other:  []w/ice   []w/heat  Position:  Location:    [] Estim: []Att    []TENS instruct  []NMES                    []Other:  []w/US   []w/ice   []w/heat  Position:  Location:    []  Traction: [] Cervical       []Lumbar                       [] Prone          []Supine                       []Intermittent   []Continuous Lbs:  [] before manual  [] after manual    []  Ultrasound: []Continuous   [] Pulsed                           []1MHz   []3MHz W/cm2:  Location:    []  Iontophoresis with dexamethasone         Location: [] Take home patch   [] In clinic   10 [x]  Ice post     []  heat  []  Ice massage  []  Laser   []  Anodyne Position:reclined  Location:right shoulder    []  Laser with stim  []  Other:  Position:  Location:    []  Vasopneumatic Device Pressure:       [] lo [] med [] hi   Temperature: [] lo [] med [] hi   [] Skin assessment post-treatment:  []intact []redness- no adverse reaction    []redness - adverse reaction:      min []Eval                  []Re-Eval       11 min Therapeutic Exercise:  [x] See flow sheet :   Rationale: increase ROM, increase strength and improve coordination to improve the patients ability to aid with increase tolerance to ADLs and activities     min Therapeutic Activity:  []  See flow sheet :   Rationale:   to improve the patients ability to       min Neuromuscular Re-education:  []  See flow sheet :   Rationale:   to improve the patients ability to     14 min Manual Therapy:  Gentle PROM, Gentle LAD and oscillations   Rationale: decrease pain, increase ROM and increase tissue extensibility to aid with increase tolerance to ALDS and activities     min Gait Training:  ___ feet with ___ device on level surfaces with ___ level of assist   Rationale: With   [] TE   [] TA   [] neuro   [] other: Patient Education: [x] Review HEP    [] Progressed/Changed HEP based on:   [] positioning   [] body mechanics   [] transfers   [] heat/ice application    [x] other: given copy of flow sheet for HEP compliance and advised regarding importance of PROM phase. Other Objective/Functional Measures: VC exercises and technique     Pain Level (0-10 scale) post treatment: 0    ASSESSMENT/Changes in Function: tolerated well. Some muscle guarding with PROM as duration of PROM progressed. Patient will continue to benefit from skilled PT services to modify and progress therapeutic interventions, address ROM deficits, address strength deficits, analyze and address soft tissue restrictions, analyze and cue movement patterns, analyze and modify body mechanics/ergonomics, assess and modify postural abnormalities and instruct in home and community integration to attain remaining goals.      [x]  See Plan of Care  []  See progress note/recertification  []  See Discharge Summary         Progress towards goals / Updated goals:  Short Term Goals: To be accomplished in 3 weeks:  1. Patient will decrease shoulder pain during exercises by 2 points on Verbal Analog Scale (Eval: 8/10) to increase participation on Activities of Daily Living as lifting, carrying, and reaching. CURRENT reports 0 2/20/19  2. Patient will demonstrate increased strength by ½ grade on MMT in B UEs (Eval: 2/5) to improve functional participation in such tasks as prolonged standing and sitting.   CURRENT        3.   Patient will achieve P/ROM of 0-120 degs without increased pain (Eval 50 degs).            CURRENT F and abd >100  2/20/19     Long Term Goals: To be accomplished in 6 weeks:  4. Patient will decrease pain during aggravating activities by 4 points on Verbal Analog Scale (Eval: 8/10) to increase participation in Activities of Daily Living as allowed within protocol (I.e. drinking glass of water)   CURRENT 0  2/20/19  5. Patient will demonstrate increased strength by 1 grade on MMT in B UEs (Eval: 2/5) to improve functional participation in such tasks as working overhead for >2 min. CURRENT  6. Patient will achieve full A/ROM of 0-120 degs without increased pain (Eval: 0 degs).   CURRENT        7. Patient will achieve predicted FOTO scores to demonstrate decreased functional impairment to facilitate improved participation in activities of daily living as allowed per protocol, improve overall quality of life.         CURRENT           PLAN  [x]  Upgrade activities as tolerated     [x]  Continue plan of care  []  Update interventions per flow sheet       []  Discharge due to:_  []  Other:_      Emil Farah PT 2/20/2019  10:09 AM    Future Appointments   Date Time Provider Christi Willis   2/21/2019  9:00 AM VLAD [place] [id number] [facility]   2/22/2019  2:00 PM [facility]   2/25/2019  8:00 AM YOSSI Fleming [facility]   2/27/2019  5:00 PM Brigette Casey PT [facility]   3/1/2019  7:30 AM Janell Perez, PT [facility]   3/4/2019  5:00 PM Mi Hernandez, PT [facility]   3/7/2019  5:30 PM Janell Perez, PT [facility]   3/11/2019  8:00 AM Mi Hernandez, PT [facility]   3/14/2019  4:30 PM Janell Perez, PT [facility]   3/18/2019  4:30 PM Mi Hernandez, PT [facility]   3/21/2019  4:30 PM Janell Perez, PT [facility]   3/25/2019  9:00 AM HBV GET ABHI RM [facility]   3/25/2019 11:00 AM Mi Hernandez, PT [facility]   3/28/2019  4:30 PM Janell Perez, PT [facility]   3/29/2019 10:00 AM Janell Perez, PT [facility]   4/8/2019 10:30 AM Aziza Westfall, MENG [address] [facility]

## 2019-02-21 ENCOUNTER — OFFICE VISIT (OUTPATIENT)
Dept: ORTHOPEDIC SURGERY | Age: 49
End: 2019-02-21

## 2019-02-21 DIAGNOSIS — M75.121 COMPLETE TEAR OF RIGHT ROTATOR CUFF: Primary | ICD-10-CM

## 2019-02-21 RX ORDER — FLUOCINONIDE 0.5 MG/G
2 CREAM TOPICAL 2 TIMES DAILY
Qty: 15 G | Refills: 0 | Status: SHIPPED | OUTPATIENT
Start: 2019-02-21 | End: 2019-07-31 | Stop reason: ALTCHOICE

## 2019-02-21 NOTE — PROGRESS NOTES
Noel Solitario  1970     HISTORY OF PRESENT ILLNESS  Noel Solitario is a 46 y.o. female who presents today for evaluation s/p Right shoulder arthroscopic rotator cuff repair on 2/14/19. Patient has been going to PT. Describes pain as a 2/10. Has been taking tylenol only for pain. Still has night pain. Patient denies any fever, chills, chest pain, shortness of breath or calf pain. The remainder of the review of systems is negative. There are no new illness or injuries to report since last seen in the office. No changes in medications, allergies, social or family history. PHYSICAL EXAM:   There were no vitals taken for this visit. The patient is a well-developed, well-nourished female in no acute distress. The patient is alert and oriented times three. The patient appears to be well groomed. Mood and affect are normal.  ORTHOPEDIC EXAM of right shoulder:  Inspection: swelling present,  Bruising present  Incision, clean, dry, intact, sutures in place, papular erythematous rash on forearm  Passive glenohumeral abduction 0-40 degrees  Stability: Stable  Strength: n/a  2+ distal pulses    IMPRESSION:  S/P Right shoulder arthroscopic 1cm anterior rotator cuff repair, contact dermatitis from sling    PLAN:   Incisions cleaned. Surgery was discussed at length today. Patient to continue with PROM and PT. Continue wearing sling. Stressed to patient that nothing causes an increase in pain. Lidex cream rx'd for rash. Suggested cotton sling but patient prefers to wear a sling.      RTC 3 weeks    Codi Cordova PA-C  [address] [facility]

## 2019-02-22 ENCOUNTER — HOSPITAL ENCOUNTER (OUTPATIENT)
Dept: PHYSICAL THERAPY | Age: 49
Discharge: HOME | End: 2019-02-22
Payer: COMMERCIAL

## 2019-02-22 PROCEDURE — 97110 THERAPEUTIC EXERCISES: CPT

## 2019-02-22 PROCEDURE — 97140 MANUAL THERAPY 1/> REGIONS: CPT

## 2019-02-22 NOTE — PROGRESS NOTES
PT DAILY TREATMENT NOTE 10-18    Patient Name: Ashley Cai  Date:2019  : 1970  [x]  Patient  Verified  Payor: BLUE CROSS / Plan: [facility] [facility] / Product Type: PPO /    In time:1:57  Out time:2:31  Total Treatment Time (min): 34  Visit #: 4 of 14    Medicare/BCBS Only   Total Timed Codes (min):  34 1:1 Treatment Time:  34       Treatment Area: Right rotator cuff tear [M75.101]    SUBJECTIVE  Pain Level (0-10 scale): 5  Any medication changes, allergies to medications, adverse drug reactions, diagnosis change, or new procedure performed?: [x] No    [] Yes (see summary sheet for update)  Subjective functional status/changes:   [] No changes reported  States she is in some more pain today, unsure why     OBJECTIVE    Modality rationale: decrease edema, decrease inflammation and decrease pain to improve the patients ability to ease with tolerance to ADLs   Min Type Additional Details    [] Estim:  []Unatt       []IFC  []Premod                        []Other:  []w/ice   []w/heat  Position:  Location:    [] Estim: []Att    []TENS instruct  []NMES                    []Other:  []w/US   []w/ice   []w/heat  Position:  Location:    []  Traction: [] Cervical       []Lumbar                       [] Prone          []Supine                       []Intermittent   []Continuous Lbs:  [] before manual  [] after manual    []  Ultrasound: []Continuous   [] Pulsed                           []1MHz   []3MHz W/cm2:  Location:    []  Iontophoresis with dexamethasone         Location: [] Take home patch   [] In clinic   10 [x]  Ice     []  heat  []  Ice massage  []  Laser   []  Anodyne Position: incline  Location: right shoulder    []  Laser with stim  []  Other:  Position:  Location:    []  Vasopneumatic Device Pressure:       [] lo [] med [] hi   Temperature: [] lo [] med [] hi   [] Skin assessment post-treatment:  []intact []redness- no adverse reaction    []redness - adverse reaction:       23 min Therapeutic Exercise:  [x] See flow sheet :   Rationale: increase ROM, increase strength and increase proprioception to improve the patients ability to perform daily household chores. 11 min Manual Therapy:  PROM with gentle overpressure, flexion/ abd/ IR/ ER, LAD with oscillation   Rationale: decrease pain, increase ROM and increase tissue extensibility to assist with overhead activities               With   [] TE   [] TA   [] neuro   [] other: Patient Education: [x] Review HEP    [] Progressed/Changed HEP based on:   [] positioning   [] body mechanics   [] transfers   [] heat/ice application    [] other:      Other Objective/Functional Measures: Tolerated all therex well- no changes in symptoms  Denies any increases in pain with exercises  Reports of tightness/ discomfort with overhead flexion     Pain Level (0-10 scale) post treatment: 0    ASSESSMENT/Changes in Function: Patient continues to show improvements with signs/ symptoms however still demonstrates a decrease in strength, impaired ROM,  and an increase in pain with functional activities. Patient will continue to benefit from skilled PT services to modify and progress therapeutic interventions, address functional mobility deficits, address strength deficits, analyze and cue movement patterns and analyze and modify body mechanics/ergonomics to attain remaining goals. [x]  See Plan of Care  []  See progress note/recertification  []  See Discharge Summary         Progress towards goals / Updated goals:  Short Term Goals: To be accomplished in 3 weeks:  1. Patient will decrease shoulder pain during exercises by 2 points on Verbal Analog Scale (Eval: 8/10) to increase participation on Activities of Daily Living as lifting, carrying, and reaching. CURRENT reports 0 2/20/19  2.  Patient will demonstrate increased strength by ½ grade on MMT in B UEs (Eval: 2/5) to improve functional participation in such tasks as prolonged standing and sitting.   CURRENT        3.   Patient will achieve P/ROM of 0-120 degs without increased pain (Eval 50 degs).                        CURRENT F and abd >100  2/20/19     Long Term Goals: To be accomplished in 6 weeks:  4. Patient will decrease pain during aggravating activities by 4 points on Verbal Analog Scale (Eval: 8/10) to increase participation in Activities of Daily Living as allowed within protocol (I.e. drinking glass of water)        CURRENT 0  2/20/19  5. Patient will demonstrate increased strength by 1 grade on MMT in B UEs (Eval: 2/5) to improve functional participation in such tasks as working overhead for >2 min. CURRENT  6. Patient will achieve full A/ROM of 0-120 degs without increased pain (Eval: 0 degs).   CURRENT        7. Patient will achieve predicted FOTO scores to demonstrate decreased functional impairment to facilitate improved participation in activities of daily living as allowed per protocol, improve overall quality of life.         CURRENT        PLAN  [x]  Upgrade activities as tolerated     [x]  Continue plan of care  []  Update interventions per flow sheet       []  Discharge due to:_  []  Other:_      Sandra Jenkins PT 2/22/2019  7:47 AM    Future Appointments   Date Time Provider Christi Willis   2/22/2019  2:00 PM Mariama Terrell PT [facility]   2/25/2019  8:00 AM Mariama Terrell PT [facility]   2/27/2019  5:00 PM Mariama Terrell PT [facility]   3/1/2019  7:30 AM Lev Park PT [facility]   3/4/2019  5:00 PM Mariama Terrell PT [facility]   3/7/2019  5:30 PM Lev Park PT [facility]   3/11/2019  8:00 AM Mariama Terrell PT [facility]   3/14/2019  9:00 AM VLAD [place] [address] [facility]   3/14/2019  4:30 PM Lev Park, PT [facility]   3/18/2019  4:30 PM Mariama Terrell, YOSSI [facility]   3/21/2019  4:30 PM Lev Park, PT [facility]   3/25/2019  9:00 AM [facility]  [facility]   3/25/2019 11:00 AM Mariama Terrell, YOSSI [facility] 3/28/2019  4:30 PM Edilson Strauss, PT [facility]   3/29/2019 10:00 AM Edilson Strauss PT [facility]   4/8/2019 10:30 AM Sharron Pink, MENG [address]

## 2019-02-25 ENCOUNTER — HOSPITAL ENCOUNTER (OUTPATIENT)
Dept: PHYSICAL THERAPY | Age: 49
Discharge: HOME | End: 2019-02-25
Payer: COMMERCIAL

## 2019-02-25 PROCEDURE — 97140 MANUAL THERAPY 1/> REGIONS: CPT

## 2019-02-25 PROCEDURE — 97110 THERAPEUTIC EXERCISES: CPT

## 2019-02-25 NOTE — PROGRESS NOTES
PT DAILY TREATMENT NOTE 10-18    Patient Name: Alon Lopez  Date:2019  : 1970  [x]  Patient  Verified  Payor: Dyan Geovani / Plan:  [facility] / Product Type: PPO /    In time:8:00  Out time:8:36  Total Treatment Time (min): 36  Visit #: 5 of 14    Medicare/BCBS Only   Total Timed Codes (min):  26 1:1 Treatment Time:  26       Treatment Area: Right rotator cuff tear [M75.101]    SUBJECTIVE  Pain Level (0-10 scale): 3  Any medication changes, allergies to medications, adverse drug reactions, diagnosis change, or new procedure performed?: [x] No    [] Yes (see summary sheet for update)  Subjective functional status/changes:   [] No changes reported  States it feels stiff today , she was busy and unable to perform exercises/ take the sling off    OBJECTIVE    Modality rationale: decrease edema, decrease inflammation and decrease pain to improve the patients ability to ease with tolerance to ADLs   Min Type Additional Details    [] Estim:  []Unatt       []IFC  []Premod                        []Other:  []w/ice   []w/heat  Position:  Location:    [] Estim: []Att    []TENS instruct  []NMES                    []Other:  []w/US   []w/ice   []w/heat  Position:  Location:    []  Traction: [] Cervical       []Lumbar                       [] Prone          []Supine                       []Intermittent   []Continuous Lbs:  [] before manual  [] after manual    []  Ultrasound: []Continuous   [] Pulsed                           []1MHz   []3MHz W/cm2:  Location:    []  Iontophoresis with dexamethasone         Location: [] Take home patch   [] In clinic   10 [x]  Ice     []  heat  []  Ice massage  []  Laser   []  Anodyne Position: incline  Location: right shoulder    []  Laser with stim  []  Other:  Position:  Location:    []  Vasopneumatic Device Pressure:       [] lo [] med [] hi   Temperature: [] lo [] med [] hi   [] Skin assessment post-treatment:  []intact []redness- no adverse reaction    []redness - adverse reaction:       14 min Therapeutic Exercise:  [x] See flow sheet :   Rationale: increase ROM, increase strength, improve coordination and increase proprioception to improve the patients ability to perform daily household chores. 12 min Manual Therapy:  PROM with gentle overpressure flexion/ abd/ IR/ ER, LAD with oscillation    Rationale: decrease pain, increase ROM and increase tissue extensibility to assist with overhead activities               With   [] TE   [] TA   [] neuro   [] other: Patient Education: [x] Review HEP    [] Progressed/Changed HEP based on:   [] positioning   [] body mechanics   [] transfers   [] heat/ice application    [] other:      Other Objective/Functional Measures: Tolerated all therex well, no changes in symptoms  Reports of less pain during PROM this session- increased PROM noted this session     Pain Level (0-10 scale) post treatment: 0    ASSESSMENT/Changes in Function: Patient continues to show improvements with signs/ symptoms however still demonstrates a decrease in strength, impaired ROM, and an increase in pain with functional activities. Patient will continue to benefit from skilled PT services to modify and progress therapeutic interventions, address functional mobility deficits, address strength deficits, analyze and cue movement patterns and analyze and modify body mechanics/ergonomics to attain remaining goals. [x]  See Plan of Care  []  See progress note/recertification  []  See Discharge Summary         Progress towards goals / Updated goals:  Short Term Goals: To be accomplished in 3 weeks:  1. Patient will decrease shoulder pain during exercises by 2 points on Verbal Analog Scale (Eval: 8/10) to increase participation on Activities of Daily Living as lifting, carrying, and reaching.   CURRENT reports 0 2/20/19  2.  Patient will demonstrate increased strength by ½ grade on MMT in B UEs (Eval: 2/5) to improve functional participation in such tasks as prolonged standing and sitting.   CURRENT        3.   Patient will achieve P/ROM of 0-120 degs without increased pain (Eval 50 degs).                        CURRENT F and abd >100  2/20/19     Long Term Goals: To be accomplished in 6 weeks:  4. Patient will decrease pain during aggravating activities by 4 points on Verbal Analog Scale (Eval: 8/10) to increase participation in Activities of Daily Living as allowed within protocol (I.e. drinking glass of water)        CURRENT 0  2/20/19  5. Patient will demonstrate increased strength by 1 grade on MMT in B UEs (Eval: 2/5) to improve functional participation in such tasks as working overhead for >2 min.   CURRENT  6. Patient will achieve full A/ROM of 0-120 degs without increased pain (Eval: 0 degs).   CURRENT        7. Patient will achieve predicted FOTO scores to demonstrate decreased functional impairment to facilitate improved participation in activities of daily living as allowed per protocol, improve overall quality of life.        CURRENT           PLAN  [x]  Upgrade activities as tolerated     [x]  Continue plan of care  []  Update interventions per flow sheet       []  Discharge due to:_  []  Other:_      Randolph Ortiz, YOSSI 2/25/2019  7:35 AM    Future Appointments   Date Time Provider Christi Willis   2/25/2019  8:00 AM Mili Funez PT [facility]   2/27/2019  5:00 PM Mili Funez, PT [facility]   3/1/2019  7:30 AM Michelle Nelson, PT [facility]   3/4/2019  5:00 PM Mili Funez PT [facility]   3/7/2019  5:30 PM Michelle Nelson, PT [facility]   3/11/2019  8:00 AM [facility]   3/14/2019  9:00 AM VLAD [place] [address] [facility]   3/14/2019  4:30 PM Michelle Nelson, PT [facility]   3/18/2019  4:30 PM Mili Funez, PT [facility]   3/21/2019  4:30 PM [facility]   3/25/2019  9:00 AM HBV GET ABHI RM [facility]   3/25/2019 11:00 AM Mili Funez, PT [facility]   3/28/2019  4:30 PM Diann Abdalla, Marcelo Amanda [facility]   3/29/2019 10:00 AM Sara Metzger PT [facility]   4/8/2019 10:30 AM Yaquelin Mortensen,  [address]

## 2019-02-27 ENCOUNTER — HOSPITAL ENCOUNTER (OUTPATIENT)
Dept: PHYSICAL THERAPY | Age: 49
Discharge: HOME | End: 2019-02-27
Payer: COMMERCIAL

## 2019-02-27 PROCEDURE — 97140 MANUAL THERAPY 1/> REGIONS: CPT

## 2019-02-27 PROCEDURE — 97110 THERAPEUTIC EXERCISES: CPT

## 2019-02-27 NOTE — PROGRESS NOTES
PT DAILY TREATMENT NOTE 10-18    Patient Name: Oliver Mis  Date:2019  : 1970  [x]  Patient  Verified  Payor: BLUE CROSS / Plan: [employer] [facility] [place] / Product Type: PPO /    In time:732  Out time:812  Total Treatment Time (min): 39  Visit #: 6 of 14    Medicare/BCBS Only   Total Timed Codes (min):  29 1:1 Treatment Time:  29       Treatment Area: Right rotator cuff tear [M75.101]    SUBJECTIVE  Pain Level (0-10 scale): 3  Any medication changes, allergies to medications, adverse drug reactions, diagnosis change, or new procedure performed?: [x] No    [] Yes (see summary sheet for update)  Subjective functional status/changes:   [] No changes reported  The worst pain is at night when I am trying to sleep. I am still sleeping partially sitting up, I wake up in the middle of the night with pain.      OBJECTIVE    Modality rationale: decrease pain and increase tissue extensibility to improve the patients ability to aid with increase tolerance to ADLS and activities   Min Type Additional Details    [] Estim:  []Unatt       []IFC  []Premod                        []Other:  []w/ice   []w/heat  Position:  Location:    [] Estim: []Att    []TENS instruct  []NMES                    []Other:  []w/US   []w/ice   []w/heat  Position:  Location:    []  Traction: [] Cervical       []Lumbar                       [] Prone          []Supine                       []Intermittent   []Continuous Lbs:  [] before manual  [] after manual    []  Ultrasound: []Continuous   [] Pulsed                           []1MHz   []3MHz W/cm2:  Location:    []  Iontophoresis with dexamethasone         Location: [] Take home patch   [] In clinic   10 [x]  Ice  post   []  heat  []  Ice massage  []  Laser   []  Anodyne Position:reclined  Location:right shoulder    []  Laser with stim  []  Other:  Position:  Location:    []  Vasopneumatic Device Pressure:       [] lo [] med [] hi   Temperature: [] lo [] med [] hi   [] Skin assessment post-treatment:  []intact []redness- no adverse reaction    []redness - adverse reaction:      min []Eval                  []Re-Eval       16 min Therapeutic Exercise:  [x] See flow sheet :   Rationale: increase ROM, increase strength and improve coordination to improve the patients ability to tolerate ADLs and activities     min Therapeutic Activity:  []  See flow sheet :   Rationale:   to improve the patients ability to       min Neuromuscular Re-education:  []  See flow sheet :   Rationale:   to improve the patients ability to     13 min Manual Therapy:  PROM all planes, LAD, Oscillations    Rationale: decrease pain, increase ROM and increase tissue extensibility to aid with increase tolerance to ADLs and activities     min Gait Training:  ___ feet with ___ device on level surfaces with ___ level of assist   Rationale: With   [] TE   [] TA   [] neuro   [] other: Patient Education: [x] Review HEP    [] Progressed/Changed HEP based on:   [] positioning   [] body mechanics   [] transfers   [] heat/ice application    [] other:      Other Objective/Functional Measures: VC exercises and tech, added wand gentle ER stretch in N, increase gripper to 25#     Pain Level (0-10 scale) post treatment: 3    ASSESSMENT/Changes in Function: tolerated well. Patient will continue to benefit from skilled PT services to modify and progress therapeutic interventions, address ROM deficits, address strength deficits, analyze and address soft tissue restrictions, analyze and cue movement patterns, analyze and modify body mechanics/ergonomics, assess and modify postural abnormalities and instruct in home and community integration to attain remaining goals. [x]  See Plan of Care  []  See progress note/recertification  []  See Discharge Summary         Progress towards goals / Updated goals:   Short Term Goals: To be accomplished in 3 weeks:  1.  Patient will decrease shoulder pain during exercises by 2 points on Verbal Analog Scale (Eval: 8/10) to increase participation on Activities of Daily Living as lifting, carrying, and reaching.   CURRENT reports 0 2/20/19   3 2/27/19  2. Patient will demonstrate increased strength by ½ grade on MMT in B UEs (Eval: 2/5) to improve functional participation in such tasks as prolonged standing and sitting.   CURRENT        3.   Patient will achieve P/ROM of 0-120 degs without increased pain (Eval 50 degs).                        CURRENT F and abd >100  2/20/19 2/27/19     Long Term Goals: To be accomplished in 6 weeks:  4. Patient will decrease pain during aggravating activities by 4 points on Verbal Analog Scale (Eval: 8/10) to increase participation in Activities of Daily Living as allowed within protocol (I.e. drinking glass of water)        CURRENT 0  2/20/19   3 2/27/19  5. Patient will demonstrate increased strength by 1 grade on MMT in B UEs (Eval: 2/5) to improve functional participation in such tasks as working overhead for >2 min.   CURRENT  6. Patient will achieve full A/ROM of 0-120 degs without increased pain (Eval: 0 degs).   CURRENT        7. Patient will achieve predicted FOTO scores to demonstrate decreased functional impairment to facilitate improved participation in activities of daily living as allowed per protocol, improve overall quality of life.        CURRENT     PLAN  [x]  Upgrade activities as tolerated     [x]  Continue plan of care  []  Update interventions per flow sheet       []  Discharge due to:_  []  Other:_      Roberto Castano PT 2/27/2019  7:37 AM    Future Appointments   Date Time Provider Chirsti Willis   3/1/2019  7:30 AM Sin Gallagher PT [facility]   3/4/2019  5:00 PM Arabella Greene PT [facility]   3/7/2019  5:30 PM Sin Gallagher PT [facility]   3/11/2019  8:00 AM Arabella Greene PT [facility]   3/14/2019  9:00 AM VLAD [address]   3/14/2019  4:30 PM Sin Gallagher PT [facility]   3/18/2019  4:30 PM Arabella Greene, PT [facility]   3/21/2019  4:30 PM Delvin Reyes, PT [facility]   3/25/2019  9:00 AM [facility] GET ABHI RM [facility]   3/25/2019 11:00 AM Antonio Porter, PT [facility]   3/28/2019  4:30 PM Delvin Reyes, PT [facility]   3/29/2019 10:00 AM Delvin Reyes, PT [facility]   4/8/2019 10:30 AM Lani Chester  [address]

## 2019-03-01 ENCOUNTER — HOSPITAL ENCOUNTER (OUTPATIENT)
Dept: PHYSICAL THERAPY | Age: 49
Discharge: HOME | End: 2019-03-01
Payer: COMMERCIAL

## 2019-03-01 PROCEDURE — 97110 THERAPEUTIC EXERCISES: CPT

## 2019-03-01 PROCEDURE — 97140 MANUAL THERAPY 1/> REGIONS: CPT

## 2019-03-01 NOTE — PROGRESS NOTES
PT DAILY TREATMENT NOTE 10-18    Patient Name: Dany Cheema  Date:3/1/2019  : 1970  [x]  Patient  Verified  Payor: BLUE CROSS / Plan: [employer] [facility] [place] / Product Type: PPO /    In time:732  Out time:810  Total Treatment Time (min): 38  Visit #: 7 of 14    Medicare/BCBS Only   Total Timed Codes (min):  28 1:1 Treatment Time:  28       Treatment Area: Right rotator cuff tear [M75.101]    SUBJECTIVE  Pain Level (0-10 scale): 1  Any medication changes, allergies to medications, adverse drug reactions, diagnosis change, or new procedure performed?: [x] No    [] Yes (see summary sheet for update)  Subjective functional status/changes:   [] No changes reported  Feels a little better today. I did put a small pillow under my arm and it did make a difference.     OBJECTIVE    Modality rationale: decrease pain and increase tissue extensibility to improve the patients ability to aid with increase tolerance to ADLS and activities   Min Type Additional Details    [] Estim:  []Unatt       []IFC  []Premod                        []Other:  []w/ice   []w/heat  Position:  Location:    [] Estim: []Att    []TENS instruct  []NMES                    []Other:  []w/US   []w/ice   []w/heat  Position:  Location:    []  Traction: [] Cervical       []Lumbar                       [] Prone          []Supine                       []Intermittent   []Continuous Lbs:  [] before manual  [] after manual    []  Ultrasound: []Continuous   [] Pulsed                           []1MHz   []3MHz W/cm2:  Location:    []  Iontophoresis with dexamethasone         Location: [] Take home patch   [] In clinic   10 [x]  Ice    Post  []  heat  []  Ice massage  []  Laser   []  Anodyne Position:reclined  Location:right shoulder    []  Laser with stim  []  Other:  Position:  Location:    []  Vasopneumatic Device Pressure:       [] lo [] med [] hi   Temperature: [] lo [] med [] hi   [] Skin assessment post-treatment:  []intact []redness- no adverse reaction    []redness - adverse reaction:      min []Eval                  []Re-Eval       13 min Therapeutic Exercise:  [] See flow sheet :   Rationale: increase ROM, increase strength and improve coordination to improve the patients ability to aid with increase tolerance to ADLS and activities     min Therapeutic Activity:  []  See flow sheet :   Rationale:   to improve the patients ability to       min Neuromuscular Re-education:  []  See flow sheet :   Rationale:   to improve the patients ability to     15 min Manual Therapy:  LAD oscill, PROM with gentle overstretch all planes   Rationale: decrease pain, increase ROM and increase tissue extensibility to aid with increase tolerance to ADLS and activiites     min Gait Training:  ___ feet with ___ device on level surfaces with ___ level of assist   Rationale: With   [] TE   [] TA   [] neuro   [] other: Patient Education: [x] Review HEP    [] Progressed/Changed HEP based on:   [] positioning   [] body mechanics   [] transfers   [] heat/ice application    [] other:      Other Objective/Functional Measures: VC exercises and technique     Pain Level (0-10 scale) post treatment: 3 throbbing a little    ASSESSMENT/Changes in Function: tolerated well. Patient will continue to benefit from skilled PT services to modify and progress therapeutic interventions, address ROM deficits, address strength deficits, analyze and address soft tissue restrictions, analyze and cue movement patterns, analyze and modify body mechanics/ergonomics, assess and modify postural abnormalities and instruct in home and community integration to attain remaining goals. [x]  See Plan of Care  []  See progress note/recertification  []  See Discharge Summary         Progress towards goals / Updated goals:    Short Term Goals: To be accomplished in 3 weeks:  1.  Patient will decrease shoulder pain during exercises by 2 points on Verbal Analog Scale (Eval: 8/10) to increase participation on Activities of Daily Living as lifting, carrying, and reaching.   CURRENT reports 0 2/20/19   3 2/27/19 1 3/1/19  2. Patient will demonstrate increased strength by ½ grade on MMT in B UEs (Eval: 2/5) to improve functional participation in such tasks as prolonged standing and sitting.   CURRENT        3.   Patient will achieve P/ROM of 0-120 degs without increased pain (Eval 50 degs).                        CURRENT F and abd >100  2/20/19 2/27/19     Long Term Goals: To be accomplished in 6 weeks:  4. Patient will decrease pain during aggravating activities by 4 points on Verbal Analog Scale (Eval: 8/10) to increase participation in Activities of Daily Living as allowed within protocol (I.e. drinking glass of water)        CURRENT 0  2/20/19   3 2/27/19   1 3/1/19  5. Patient will demonstrate increased strength by 1 grade on MMT in B UEs (Eval: 2/5) to improve functional participation in such tasks as working overhead for >2 min.   CURRENT  6. Patient will achieve full A/ROM of 0-120 degs without increased pain (Eval: 0 degs).   CURRENT        7. Patient will achieve predicted FOTO scores to demonstrate decreased functional impairment to facilitate improved participation in activities of daily living as allowed per protocol, improve overall quality of life.        CURRENT           PLAN  [x]  Upgrade activities as tolerated     [x]  Continue plan of care  []  Update interventions per flow sheet       []  Discharge due to:_  []  Other:_      Dana Gallagher PT 3/1/2019  7:40 AM    Future Appointments   Date Time Provider Christi Willis   3/4/2019  5:00 PM Chad Monge PT [facility]   3/7/2019  5:30 PM YOSSI Martinez Asa [facility]   3/11/2019  8:00 AM YOSSI Bermudez [facility]   3/14/2019  9:00 AM VLAD [place] [address] [facility]   3/14/2019  4:30 PM Michelle Aleman PT [facility]   3/18/2019  4:30 PM Bonilla Monge, PT [facility]   3/21/2019  4:30 PM Michelle Aleman, PT [facility]   3/25/2019  9:00 AM [facility] GET ABHI RM [facility]   3/25/2019 11:00 AM Ryan Marie, PT [facility]   3/28/2019  4:30 PM Kevin Vasquez, PT [facility]   3/29/2019 10:00 AM Kevin Vasquez, PT [facility]   4/8/2019 10:30 AM Kristi Garcia  [address]

## 2019-03-04 ENCOUNTER — HOSPITAL ENCOUNTER (OUTPATIENT)
Dept: PHYSICAL THERAPY | Age: 49
Discharge: HOME | End: 2019-03-04
Payer: COMMERCIAL

## 2019-03-04 PROCEDURE — 97140 MANUAL THERAPY 1/> REGIONS: CPT

## 2019-03-04 PROCEDURE — 97110 THERAPEUTIC EXERCISES: CPT

## 2019-03-07 ENCOUNTER — APPOINTMENT (OUTPATIENT)
Dept: PHYSICAL THERAPY | Age: 49
End: 2019-03-07
Payer: COMMERCIAL

## 2019-03-08 ENCOUNTER — HOSPITAL ENCOUNTER (OUTPATIENT)
Dept: PHYSICAL THERAPY | Age: 49
Discharge: HOME | End: 2019-03-08
Payer: COMMERCIAL

## 2019-03-08 PROCEDURE — 97140 MANUAL THERAPY 1/> REGIONS: CPT

## 2019-03-08 PROCEDURE — 97110 THERAPEUTIC EXERCISES: CPT

## 2019-03-08 NOTE — PROGRESS NOTES
PT DAILY TREATMENT NOTE 10-18    Patient Name: Ki Bullock  Date:3/8/2019  : 1970  [x]  Patient  Verified  Payor: BLUE CROSS / Plan: [facility] [place] / Product Type: PPO /    In time:9:00  Out time:9:24  Total Treatment Time (min): 24  Visit #: 9 of 14    Medicare/BCBS Only   Total Timed Codes (min):  24 1:1 Treatment Time:  24       Treatment Area: Right rotator cuff tear [M75.101]    SUBJECTIVE  Pain Level (0-10 scale): 4  Any medication changes, allergies to medications, adverse drug reactions, diagnosis change, or new procedure performed?: [x] No    [] Yes (see summary sheet for update)  Subjective functional status/changes:   [] No changes reported  States it is more stiff today, she has been going in to work this week    OBJECTIVE      12 min Therapeutic Exercise:  [x] See flow sheet :   Rationale: increase ROM, increase strength and increase proprioception to improve the patients ability to perform daily household chores. 12 min Manual Therapy:  PROM with gentle overpressure flexion/ abd/ IR/ ER, LAD with oscillaiton    Rationale: decrease pain, increase ROM and increase tissue extensibility to assist with tolerance to ADLs              With   [] TE   [] TA   [] neuro   [] other: Patient Education: [x] Review HEP    [] Progressed/Changed HEP based on:   [] positioning   [] body mechanics   [] transfers   [] heat/ice application    [] other:      Other Objective/Functional Measures: Tolerated all therex well- able to perform with minimal cueing for proper form      Pain Level (0-10 scale) post treatment: <4    ASSESSMENT/Changes in Function: Patient continues to show improvements with signs/ symptoms however still demonstrates a decrease in strength, impaired ROM, deficits with balance and an increase in pain with functional activities.        Patient will continue to benefit from skilled PT services to modify and progress therapeutic interventions, address functional mobility deficits, address strength deficits, analyze and cue movement patterns and analyze and modify body mechanics/ergonomics to attain remaining goals. [x]  See Plan of Care  []  See progress note/recertification  []  See Discharge Summary         Progress towards goals / Updated goals:  Short Term Goals: To be accomplished in 3 weeks:  1. Patient will decrease shoulder pain during exercises by 2 points on Verbal Analog Scale (Eval: 8/10) to increase participation on Activities of Daily Living as lifting, carrying, and reaching.   CURRENT reports 0 2/20/19   3 2/27/19 1 3/1/19  2. Patient will demonstrate increased strength by ½ grade on MMT in B UEs (Eval: 2/5) to improve functional participation in such tasks as prolonged standing and sitting.   CURRENT        3.   Patient will achieve P/ROM of 0-120 degs without increased pain (Eval 50 degs).                        CURRENT F and abd >100  2/20/19 2/27/19     Long Term Goals: To be accomplished in 6 weeks:  4. Patient will decrease pain during aggravating activities by 4 points on Verbal Analog Scale (Eval: 8/10) to increase participation in Activities of Daily Living as allowed within protocol (I.e. drinking glass of water)        CURRENT 0  2/20/19   3 2/27/19   1 3/1/19  5. Patient will demonstrate increased strength by 1 grade on MMT in B UEs (Eval: 2/5) to improve functional participation in such tasks as working overhead for >2 min.   CURRENT  6. Patient will achieve full A/ROM of 0-120 degs without increased pain (Eval: 0 degs).   CURRENT        7. Patient will achieve predicted FOTO scores to demonstrate decreased functional impairment to facilitate improved participation in activities of daily living as allowed per protocol, improve overall quality of life.        CURRENT        PLAN  [x]  Upgrade activities as tolerated     [x]  Continue plan of care  []  Update interventions per flow sheet       []  Discharge due to:_  []  Other:_      Jorge Luis Castro PT 3/8/2019  7:38 AM    Future Appointments   Date Time Provider Christi Willis   3/8/2019  9:00 AM David Nest, PT [facility]   3/11/2019  8:00 AM David Nest, PT [facility]   3/14/2019  9:00 AM VLAD [address] [address]   3/14/2019  4:30 PM Denise Rains, PT [facility]   3/18/2019  4:30 PM David Nest, PT [facility]   3/21/2019  4:30 PM Denise Rains, PT [facility]   3/25/2019 11:00 AM David Nest, PT [facility]   3/28/2019  4:30 PM Denise Rains, PT [facility]   3/29/2019 10:00 AM Denise Rains, PT [facility]   4/8/2019 10:30 AM Malvin Mitchell NP [facility] [place] [id number] [facility] [address] [address]   4/22/2019 12:45 PM [facility] GET MOE RM [facility]

## 2019-03-11 ENCOUNTER — HOSPITAL ENCOUNTER (OUTPATIENT)
Dept: PHYSICAL THERAPY | Age: 49
Discharge: HOME | End: 2019-03-11
Payer: COMMERCIAL

## 2019-03-11 PROCEDURE — 97140 MANUAL THERAPY 1/> REGIONS: CPT

## 2019-03-11 PROCEDURE — 97110 THERAPEUTIC EXERCISES: CPT

## 2019-03-11 NOTE — PROGRESS NOTES
PT DAILY TREATMENT NOTE 10-18    Patient Name: Tadeo Ritter  Date:3/11/2019  : 1970  [x]  Patient  Verified  Payor: BLUE CROSS / Plan: [employer] [facility] / Product Type: PPO /    In time:8:00  Out time:8:33  Total Treatment Time (min): 33  Visit #: 10 of 14    Medicare/BCBS Only   Total Timed Codes (min):  23 1:1 Treatment Time:  23       Treatment Area: Right rotator cuff tear [M75.101]    SUBJECTIVE  Pain Level (0-10 scale): 2  Any medication changes, allergies to medications, adverse drug reactions, diagnosis change, or new procedure performed?: [x] No    [] Yes (see summary sheet for update)  Subjective functional status/changes:   [] No changes reported  States some mornings she is more sore than others.      OBJECTIVE    Modality rationale: decrease edema, decrease inflammation and decrease pain to improve the patients ability to ease with tolerance to ADLs   Min Type Additional Details    [] Estim:  []Unatt       []IFC  []Premod                        []Other:  []w/ice   []w/heat  Position:  Location:    [] Estim: []Att    []TENS instruct  []NMES                    []Other:  []w/US   []w/ice   []w/heat  Position:  Location:    []  Traction: [] Cervical       []Lumbar                       [] Prone          []Supine                       []Intermittent   []Continuous Lbs:  [] before manual  [] after manual    []  Ultrasound: []Continuous   [] Pulsed                           []1MHz   []3MHz W/cm2:  Location:    []  Iontophoresis with dexamethasone         Location: [] Take home patch   [] In clinic   10 [x]  Ice     []  heat  []  Ice massage  []  Laser   []  Anodyne Position: incline  Location: right shoulder    []  Laser with stim  []  Other:  Position:  Location:    []  Vasopneumatic Device Pressure:       [] lo [] med [] hi   Temperature: [] lo [] med [] hi   [] Skin assessment post-treatment:  []intact []redness- no adverse reaction    []redness - adverse reaction:         21 min Therapeutic Exercise:  [x] See flow sheet :   Rationale: increase ROM, increase strength and increase proprioception to improve the patients ability to perform daily household chores. 12 min Manual Therapy:  PROM with gentle overpressure flexion/ abd/ IR/ ER, LAD with oscillation    Rationale: decrease pain, increase ROM and increase tissue extensibility to assist with tolerance to personal hygiene tasks such as washing face and hair              With   [] TE   [] TA   [] neuro   [] other: Patient Education: [x] Review HEP    [] Progressed/Changed HEP based on:   [] positioning   [] body mechanics   [] transfers   [] heat/ice application    [] other:      Other Objective/Functional Measures: Tolerated all therex well, no changes in symptoms   PROM ~0-120     Pain Level (0-10 scale) post treatment: 2    ASSESSMENT/Changes in Function: Patient continues to show improvements with signs/ symptoms however still demonstrates a decrease in strength, impaired ROM, deficits with balance and an increase in pain with functional activities. Patient will continue to benefit from skilled PT services to modify and progress therapeutic interventions, address functional mobility deficits, address ROM deficits, address strength deficits, analyze and address soft tissue restrictions, analyze and cue movement patterns and analyze and modify body mechanics/ergonomics to attain remaining goals. [x]  See Plan of Care  []  See progress note/recertification  []  See Discharge Summary         Progress towards goals / Updated goals:  Short Term Goals: To be accomplished in 3 weeks:  1. Patient will decrease shoulder pain during exercises by 2 points on Verbal Analog Scale (Eval: 8/10) to increase participation on Activities of Daily Living as lifting, carrying, and reaching.   CURRENT reports 0 2/20/19   3 2/27/19 1 3/1/19  2.  Patient will demonstrate increased strength by ½ grade on MMT in B UEs (Eval: 2/5) to improve functional participation in such tasks as prolonged standing and sitting.   CURRENT: PROM only at this time        3.   Patient will achieve P/ROM of 0-120 degs without increased pain (Eval 50 degs).                        CURRENT F and abd >100  2/20/19 2/27/19     Long Term Goals: To be accomplished in 6 weeks:  4. Patient will decrease pain during aggravating activities by 4 points on Verbal Analog Scale (Eval: 8/10) to increase participation in Activities of Daily Living as allowed within protocol (I.e. drinking glass of water)        CURRENT 0  2/20/19   3 2/27/19   1 3/1/19  5. Patient will demonstrate increased strength by 1 grade on MMT in B UEs (Eval: 2/5) to improve functional participation in such tasks as working overhead for >2 min.   CURRENT  6. Patient will achieve full A/ROM of 0-120 degs without increased pain (Eval: 0 degs).   CURRENT        7. Patient will achieve predicted FOTO scores to demonstrate decreased functional impairment to facilitate improved participation in activities of daily living as allowed per protocol, improve overall quality of life.        CURRENT           PLAN  [x]  Upgrade activities as tolerated     [x]  Continue plan of care  []  Update interventions per flow sheet       []  Discharge due to:_  []  Other:_      Soo Ortiz PT 3/11/2019  7:39 AM    Future Appointments   Date Time Provider Christi Willis   3/11/2019  8:00 AM Derek Sanon, PT [facility]   3/14/2019  9:00 AM VLAD Randall Olman 67   3/14/2019  4:30 PM Dutch Manriquez, PT [facility]   3/18/2019  4:30 PM Derek Sanon PT [facility]   3/21/2019  4:30 PM Dutch Manriquez PT [facility]   3/25/2019 11:00 AM Derek Sanon PT [facility]   3/28/2019  4:30 PM Dutch Manriquez, PT [facility]   3/29/2019 10:00 AM Dutch Manriquez, PT [facility]   4/8/2019 10:30 AM Yao Rojas, NP [facility] [place] [id number] [facility] [address]   4/22/2019 12:45 PM HBV GET MOE RM [facility]

## 2019-03-11 NOTE — PROGRESS NOTES
In [address] [place] [address] Square  [facility], [address] [place] [address], [address] [place], [place] [address]y [address]  [phone] fax    Progress Note  Patient name: Russel Phalen Start of Care: 2/15/2019   Referral source: Bo Miller,* : 1970                Medical Diagnosis: Right rotator cuff tear [M75.101]  Payor: BLUE CROSS / Plan: [employer] [facility] / Product Type: PPO /  Onset Date:19                Treatment Diagnosis: R Shoulder pain, R rotator cuff rupture - nontraumatic, Weakness, Joint stiffness, Decreased ROM, localized edema   Prior Hospitalization: see medical history Provider#: [id number]   Medications: Verified on Patient summary List    Comorbidities: Lumbar laminectomy, Lymph node removal on R   Prior Level of Function: Up until 2 years ago, was doing light weight training and cardio 6 days/week without pain. As of the 2018, was still in pain, but would continue with some exercise but shoulder pain became progressively worse. Visits from Start of Care: 10    Missed Visits: 0    Established Goals:         Excellent           Good         Limited           None  [] Increased ROM   []  [x]  []  []  [] Increased Strength  []  [x]  []  []  [] Increased Mobility  []  [x]  []  []   [] Decreased Pain   []  [x]  []  []    Key Functional Changes:   Updated Goals:   Short Term Goals: To be accomplished in 3 weeks:  1. Patient will decrease shoulder pain during exercises by 2 points on Verbal Analog Scale (Eval: 8/10) to increase participation on Activities of Daily Living as lifting, carrying, and reaching.   CURRENT Progressing fluctuates from 1-4/10  2. Patient will demonstrate increased strength by ½ grade on MMT in B UEs (Eval: 2/5) to improve functional participation in such tasks as prolonged standing and sitting.   CURRENT: PROM only at this time         3.   Patient will achieve P/ROM of 0-120 degs without increased pain (Eval 50 degs).                        CURRENT MET      Long Term Goals: To be accomplished in 24 treatments  4. Patient will decrease pain during aggravating activities by 4 points on Verbal Analog Scale (Eval: 8/10) to increase participation in Activities of Daily Living as allowed within protocol (I.e. drinking glass of water)        CURRENT Progressing 1-4/10  5. Patient will demonstrate increased strength by 1 grade on MMT in B UEs (Eval: 2/5) to improve functional participation in such tasks as working overhead for >2 min.   CURRENT: PROM only at this itime  6. Patient will achieve full A/ROM of 0-120 degs without increased pain (Eval: 0 degs).   CURRENT PROM only at this time        7. Patient will achieve predicted FOTO scores to demonstrate decreased functional impairment to facilitate improved participation in activities of daily living as allowed per protocol, improve overall quality of life.        CURRENT Progressing 46      ASSESSMENT/RECOMMENDATIONS:  Patient has shown significant improvements with pain control, activity tolerance and PROM since beginning therapy. She has been progressing well and is persistent with her HEP. She is able to perform all of her exercises in addition to manual therapy with no changes in symptoms. Recommend continuation of PT at this time and progress as protocol allows. [x]Continue therapy per initial plan/protocol at a frequency of  2 x per week for 10 additional treatments to original POC (24 total).   []Continue therapy with the following recommended changes:_____________________      _____________________________________________________________________  []Discontinue therapy progressing towards or have reached established goals  []Discontinue therapy due to lack of appreciable progress towards goals  []Discontinue therapy due to lack of attendance or compliance  []Await Physician's recommendations/decisions regarding therapy  []Other:________________________________________________________________    Thank you for this referral.   Gladis Silva, PT 3/11/2019 9:10 AM  NOTE TO PHYSICIAN:  PLEASE COMPLETE THE ORDERS BELOW AND   FAX TO [facility]: [phone]  If you are unable to process this request in 24 hours please contact our office: [phone]    []  I have read the above report and request that my patient continue as recommended. []  I have read the above report and request that my patient continue therapy with the following changes/special instructions:________________________________________  []I have read the above report and request that my patient be discharged from therapy.     [de-identified] Signature:____________Date:_________TIME:________    [employer], Date and Time must be completed for valid certification **

## 2019-03-14 ENCOUNTER — HOSPITAL ENCOUNTER (OUTPATIENT)
Dept: PHYSICAL THERAPY | Age: 49
Discharge: HOME | End: 2019-03-14
Payer: COMMERCIAL

## 2019-03-14 ENCOUNTER — OFFICE VISIT (OUTPATIENT)
Dept: ORTHOPEDIC SURGERY | Age: 49
End: 2019-03-14

## 2019-03-14 VITALS
TEMPERATURE: 96.8 F | RESPIRATION RATE: 15 BRPM | OXYGEN SATURATION: 99 % | DIASTOLIC BLOOD PRESSURE: 67 MMHG | HEART RATE: 81 BPM | BODY MASS INDEX: 30.3 KG/M2 | SYSTOLIC BLOOD PRESSURE: 98 MMHG | HEIGHT: 63 IN | WEIGHT: 171 LBS

## 2019-03-14 DIAGNOSIS — M75.121 COMPLETE TEAR OF RIGHT ROTATOR CUFF: Primary | ICD-10-CM

## 2019-03-14 PROCEDURE — 97110 THERAPEUTIC EXERCISES: CPT

## 2019-03-14 PROCEDURE — 97140 MANUAL THERAPY 1/> REGIONS: CPT

## 2019-03-14 NOTE — PROGRESS NOTES
Oliver Kidd  1970     HISTORY OF PRESENT ILLNESS  Oliver Kidd is a 46 y.o. female who presents today for evaluation s/p Right shoulder arthroscopic rotator cuff repair on 2/14/19. Patient has been going to PT. Describes pain as a 2/10. Has been taking tylenol only for pain. She feels like she is improving. Patient denies any fever, chills, chest pain, shortness of breath or calf pain. The remainder of the review of systems is negative. There are no new illness or injuries to report since last seen in the office. No changes in medications, allergies, social or family history. PHYSICAL EXAM:   Visit Vitals  BP 98/67   Pulse 81   Temp 96.8 °F (36 °C) (Oral)   Resp 15   Ht 5' 3\" (1.6 m)   Wt 171 lb (77.6 kg)   SpO2 99%   BMI 30.29 kg/m²      The patient is a well-developed, well-nourished female in no acute distress. The patient is alert and oriented times three. The patient appears to be well groomed. Mood and affect are normal.  ORTHOPEDIC EXAM of right shoulder:  Inspection: swelling not present,  Bruising not present  Incisions well healed, rash resolved  Passive glenohumeral abduction 0-90 degrees  Stability: Stable  Strength: n/a  2+ distal pulses    IMPRESSION:  S/P Right shoulder arthroscopic 1cm anterior rotator cuff repair, contact dermatitis from sling    PLAN:   1. Patient is doing well post operatively  2. Will cont with PT . Start active assist now. arom in 1 week  3. Stressed no lifting pushing or pulling with arm. Focus is on motion  4.  Reinforced that nothing causes an increase in pain    RTC 4 weeks    BERNIE Dove [facility]

## 2019-03-14 NOTE — PATIENT INSTRUCTIONS
You may remove your sling in 1 week. You may then begin to move your arm on your in 1 week. Continue with no lifting, pushing or pulling until you are seen again in 1 month. Remember nothing causes an increase in pain including physical therapy.

## 2019-03-14 NOTE — PROGRESS NOTES
PT DAILY TREATMENT NOTE 10-18    Patient Name: Pablo Yun  Date:3/14/2019  : 1970  [x]  Patient  Verified  Payor: BLUE CROSS / Plan: [employer] [facility] / Product Type: PPO /    In time:429  Out time:519  Total Treatment Time (min): 50  Visit #: 44 of 24   SIGNED MD NOTE    Medicare/BCBS Only   Total Timed Codes (min):  40 1:1 Treatment Time:  40       Treatment Area: Right rotator cuff tear [M75.101]    SUBJECTIVE  Pain Level (0-10 scale): 2  Any medication changes, allergies to medications, adverse drug reactions, diagnosis change, or new procedure performed?: [x] No    [] Yes (see summary sheet for update)  Subjective functional status/changes:   [] No changes reported  I saw the PA today. Sling for 1 more week and new prescription.     OBJECTIVE    Modality rationale: decrease pain and increase tissue extensibility to improve the patients ability to aid with increase tolerance to ADLs and activities   Min Type Additional Details    [] Estim:  []Unatt       []IFC  []Premod                        []Other:  []w/ice   []w/heat  Position:  Location:    [] Estim: []Att    []TENS instruct  []NMES                    []Other:  []w/US   []w/ice   []w/heat  Position:  Location:    []  Traction: [] Cervical       []Lumbar                       [] Prone          []Supine                       []Intermittent   []Continuous Lbs:  [] before manual  [] after manual    []  Ultrasound: []Continuous   [] Pulsed                           []1MHz   []3MHz W/cm2:  Location:    []  Iontophoresis with dexamethasone         Location: [] Take home patch   [] In clinic   10 [x]  Ice post    []  heat  []  Ice massage  []  Laser   []  Anodyne Position:reclined  Location:right shoulder    []  Laser with stim  []  Other:  Position:  Location:    []  Vasopneumatic Device Pressure:       [] lo [] med [] hi   Temperature: [] lo [] med [] hi   [] Skin assessment post-treatment:  []intact []redness- no adverse reaction    []redness - adverse reaction:       min []Eval                  []Re-Eval       24 min Therapeutic Exercise:  [x] See flow sheet :   Rationale: increase ROM, increase strength and improve coordination to improve the patients ability to aid with increase tolerance to ADLs and activities     min Therapeutic Activity:  []  See flow sheet :   Rationale:   to improve the patients ability to       min Neuromuscular Re-education:  []  See flow sheet :   Rationale:  to improve the patients ability to     16 min Manual Therapy:  Gentle LAD, oscill, PROM all planes with over stretch F and IR > ER and abd   Rationale: decrease pain, increase ROM and increase tissue extensibility to aid with increase tolerance to ADLs and activities     min Gait Training:  ___ feet with ___ device on level surfaces with ___ level of assist   Rationale: With   [] TE   [] TA   [] neuro   [] other: Patient Education: [x] Review HEP    [] Progressed/Changed HEP based on:   [] positioning   [] body mechanics   [] transfers   [] heat/ice application    [] other:      Other Objective/Functional Measures: VC exercises and tech     Pain Level (0-10 scale) post treatment: 2    ASSESSMENT/Changes in Function: tolerated well. Initiated AAROM per new order and will add AROM next week. Patient will continue to benefit from skilled PT services to modify and progress therapeutic interventions, address ROM deficits, address strength deficits, analyze and address soft tissue restrictions, analyze and cue movement patterns, analyze and modify body mechanics/ergonomics, assess and modify postural abnormalities and instruct in home and community integration to attain remaining goals. [x]  See Plan of Care  []  See progress note/recertification  []  See Discharge Summary         Progress towards goals / Updated goals:   Short Term Goals: To be accomplished in 3 weeks:  1.  Patient will decrease shoulder pain during exercises by 2 points on Verbal Analog Scale (Eval: 8/10) to increase participation on Activities of Daily Living as lifting, carrying, and reaching.   CURRENT 2 3/14/19  2. Patient will demonstrate increased strength by ½ grade on MMT in B UEs (Eval: 2/5) to improve functional participation in such tasks as prolonged standing and sitting.   CURRENT: AAROM now 3/14/19        3.   Patient will achieve P/ROM of 0-120 degs without increased pain (Eval 50 degs).                        CURRENT MET  3/14/19     Long Term Goals: To be accomplished in 24 treatments  4. Patient will decrease pain during aggravating activities by 4 points on Verbal Analog Scale (Eval: 8/10) to increase participation in Activities of Daily Living as allowed within protocol (I.e. drinking glass of water)        CURRENT 2 3/14/19  5. Patient will demonstrate increased strength by 1 grade on MMT in B UEs (Eval: 2/5) to improve functional participation in such tasks as working overhead for >2 min.   CURRENT: PROM only at this itime  6. Patient will achieve full A/ROM of 0-120 degs without increased pain (Eval: 0 degs).   CURRENT AAROM 3/14/19        7. Patient will achieve predicted FOTO scores to demonstrate decreased functional impairment to facilitate improved participation in activities of daily living as allowed per protocol, improve overall quality of life.        CURRENT Progressing 46 3/11/19           PLAN  [x]  Upgrade activities as tolerated     [x]  Continue plan of care  []  Update interventions per flow sheet       []  Discharge due to:_  []  Other:_      Brenda Jesus PT 3/14/2019  4:33 PM    Future Appointments   Date Time Provider Christi Willis   3/18/2019  4:30 PM Adriano Loja, PT [facility]   3/21/2019  4:30 PM Jac Samson PT [facility]   3/25/2019 11:00 AM [facility]   3/28/2019  4:30 PM [facility] Samson PT [facility]   3/29/2019 10:00 AM [facility] Samson PT [facility]   4/11/2019  8:45 AM VLAD Iraheta Olman 67 4/22/2019 12:45 PM HBV GET ABHI  [facility]   5/6/2019  1:00 PM Kenyon Rodrigues  [address]

## 2019-03-14 NOTE — PROGRESS NOTES
1. Have you been to the ER, urgent care clinic since your last visit? Hospitalized since your last visit? No    2. Have you seen or consulted any other health care providers outside of the [address] [place] [address] since your last visit? Include any pap smears or colon screening.  No

## 2019-03-18 ENCOUNTER — HOSPITAL ENCOUNTER (OUTPATIENT)
Dept: PHYSICAL THERAPY | Age: 49
Discharge: HOME | End: 2019-03-18
Payer: COMMERCIAL

## 2019-03-18 PROCEDURE — 97140 MANUAL THERAPY 1/> REGIONS: CPT

## 2019-03-18 PROCEDURE — 97110 THERAPEUTIC EXERCISES: CPT

## 2019-03-18 NOTE — PROGRESS NOTES
PT DAILY TREATMENT NOTE 10-18    Patient Name: Oliver Kidd  Date:3/18/2019  : 1970  [x]  Patient  Verified  Payor: Petty Bean / Plan:  [facility] / Product Type: PPO /    In time:4:35  Out time:5:23  Total Treatment Time (min): 48  Visit #: 12 of 24    Medicare/BCBS Only   Total Timed Codes (min):  38 1:1 Treatment Time:  38       Treatment Area: Right rotator cuff tear [M75.101]    SUBJECTIVE  Pain Level (0-10 scale): 3-4  Any medication changes, allergies to medications, adverse drug reactions, diagnosis change, or new procedure performed?: [x] No    [] Yes (see summary sheet for update)  Subjective functional status/changes:   [] No changes reported  States it is sore today and feeling a little stiff, she forgot her ice pack while at work    OBJECTIVE    Modality rationale: decrease edema, decrease inflammation and decrease pain to improve the patients ability to ease with tolerance to ADLs   Min Type Additional Details    [] Estim:  []Unatt       []IFC  []Premod                        []Other:  []w/ice   []w/heat  Position:  Location:    [] Estim: []Att    []TENS instruct  []NMES                    []Other:  []w/US   []w/ice   []w/heat  Position:  Location:    []  Traction: [] Cervical       []Lumbar                       [] Prone          []Supine                       []Intermittent   []Continuous Lbs:  [] before manual  [] after manual    []  Ultrasound: []Continuous   [] Pulsed                           []1MHz   []3MHz W/cm2:  Location:    []  Iontophoresis with dexamethasone         Location: [] Take home patch   [] In clinic   10 [x]  Ice     []  heat  []  Ice massage  []  Laser   []  Anodyne Position:incline  Location: right shoulder    []  Laser with stim  []  Other:  Position:  Location:    []  Vasopneumatic Device Pressure:       [] lo [] med [] hi   Temperature: [] lo [] med [] hi   [] Skin assessment post-treatment:  []intact []redness- no adverse reaction    []redness - adverse reaction:         28 min Therapeutic Exercise:  [x] See flow sheet :   Rationale: increase ROM, increase strength and increase proprioception to improve the patients ability to perform daily household chores. 10 min Manual Therapy:  PROM with overpressure flexion/ abd/ IR/ ER , LAD with oscillation    Rationale: decrease pain, increase ROM and increase tissue extensibility to assist with overhead activities such as reaching into cabinets              With   [] TE   [] TA   [] neuro   [] other: Patient Education: [x] Review HEP    [] Progressed/Changed HEP based on:   [] positioning   [] body mechanics   [] transfers   [] heat/ice application    [] other:      Other Objective/Functional Measures:   Able to perform all exercises well, no changes in symptoms  Reports of tenderness with PROM abduction ~90 degrees     Pain Level (0-10 scale) post treatment: <3    ASSESSMENT/Changes in Function: Patient continues to show improvements with signs/ symptoms however still demonstrates a decrease in strength, impaired ROM,  and an increase in pain with functional activities. Patient will continue to benefit from skilled PT services to modify and progress therapeutic interventions, address functional mobility deficits, address strength deficits, analyze and cue movement patterns and analyze and modify body mechanics/ergonomics to attain remaining goals. [x]  See Plan of Care  []  See progress note/recertification  []  See Discharge Summary         Progress towards goals / Updated goals:  Short Term Goals: To be accomplished in 3 weeks:  1. Patient will decrease shoulder pain during exercises by 2 points on Verbal Analog Scale (Eval: 8/10) to increase participation on Activities of Daily Living as lifting, carrying, and reaching.   CURRENT 2 3/14/19  2.  Patient will demonstrate increased strength by ½ grade on MMT in B UEs (Eval: 2/5) to improve functional participation in such tasks as prolonged standing and sitting.   CURRENT: AAROM now 3/14/19        3.   Patient will achieve P/ROM of 0-120 degs without increased pain (Eval 50 degs).                        CURRENT MET  3/14/19     Long Term Goals: To be accomplished in 24 treatments  4. Patient will decrease pain during aggravating activities by 4 points on Verbal Analog Scale (Eval: 8/10) to increase participation in Activities of Daily Living as allowed within protocol (I.e. drinking glass of water)        CURRENT 2 3/14/19  5. Patient will demonstrate increased strength by 1 grade on MMT in B UEs (Eval: 2/5) to improve functional participation in such tasks as working overhead for >2 min.   CURRENT: PROM only at this itime  6. Patient will achieve full A/ROM of 0-120 degs without increased pain (Eval: 0 degs).   CURRENT AAROM 3/14/19        7. Patient will achieve predicted FOTO scores to demonstrate decreased functional impairment to facilitate improved participation in activities of daily living as allowed per protocol, improve overall quality of life.        CURRENT Progressing 46 3/11/19        PLAN  [x]  Upgrade activities as tolerated     [x]  Continue plan of care  []  Update interventions per flow sheet       []  Discharge due to:_  []  Other:_      Bernardino Serrato PT 3/18/2019  7:47 AM    Future Appointments   Date Time Provider Christi Willis   3/18/2019  4:30 PM Arielle Lanier PT [facility]   3/21/2019  4:30 PM Osiel Guerrero PT [facility]   3/25/2019 11:00 AM Arielle Lanier PT [facility]   3/28/2019  4:30 PM Osiel Guerrero PT [facility]   4/2/2019  7:30 AM Osiel Guerrero PT [facility]   4/5/2019  7:30 AM Osiel Guerrero PT [facility]   4/8/2019  1:30 PM Osiel Guerrero PT [facility]   4/10/2019  7:30 AM Osiel Guerrero, PT [facility]   4/11/2019  8:45 AM VLAD [address]   4/22/2019 12:45 PM [facility] GET PISANOO  [facility]   5/6/2019  1:00 PM Eder Lainez  [address]

## 2019-03-21 ENCOUNTER — HOSPITAL ENCOUNTER (OUTPATIENT)
Dept: PHYSICAL THERAPY | Age: 49
Discharge: HOME | End: 2019-03-21
Payer: COMMERCIAL

## 2019-03-21 PROCEDURE — 97110 THERAPEUTIC EXERCISES: CPT

## 2019-03-21 PROCEDURE — 97140 MANUAL THERAPY 1/> REGIONS: CPT

## 2019-03-21 NOTE — PROGRESS NOTES
PT DAILY TREATMENT NOTE 10-18    Patient Name: Nathan Murcia  Date:3/21/2019  : 1970  [x]  Patient  Verified  Payor: BHUPINDER BRYANT / Plan: [facility] [place] / Product Type: PPO /    In time:433  Out time:510  Total Treatment Time (min): 38  Visit #: 13 of 24    Medicare/BCBS Only   Total Timed Codes (min):  38 1:1 Treatment Time:  38       Treatment Area: Right rotator cuff tear [M75.101]    SUBJECTIVE  Pain Level (0-10 scale): 3  Any medication changes, allergies to medications, adverse drug reactions, diagnosis change, or new procedure performed?: [x] No    [] Yes (see summary sheet for update)  Subjective functional status/changes:   [] No changes reported  No more sling ,     OBJECTIVE    Modality rationale: Requests to ice at home today.     Min Type Additional Details    [] Estim:  []Unatt       []IFC  []Premod                        []Other:  []w/ice   []w/heat  Position:  Location:    [] Estim: []Att    []TENS instruct  []NMES                    []Other:  []w/US   []w/ice   []w/heat  Position:  Location:    []  Traction: [] Cervical       []Lumbar                       [] Prone          []Supine                       []Intermittent   []Continuous Lbs:  [] before manual  [] after manual    []  Ultrasound: []Continuous   [] Pulsed                           []1MHz   []3MHz W/cm2:  Location:    []  Iontophoresis with dexamethasone         Location: [] Take home patch   [] In clinic    []  Ice     []  heat  []  Ice massage  []  Laser   []  Anodyne Position:  Location:    []  Laser with stim  []  Other:  Position:  Location:    []  Vasopneumatic Device Pressure:       [] lo [] med [] hi   Temperature: [] lo [] med [] hi   [] Skin assessment post-treatment:  []intact []redness- no adverse reaction    []redness - adverse reaction:      min []Eval                  []Re-Eval       25 min Therapeutic Exercise:  [x] See flow sheet :   Rationale: increase ROM, increase strength and improve coordination to improve the patients ability to aid with increase tolerance to ADLs and activities     min Therapeutic Activity:  []  See flow sheet :   Rationale:   to improve the patients ability to       min Neuromuscular Re-education:  []  See flow sheet :   Rationale:   to improve the patients ability to     13 min Manual Therapy:  LAD , OSCILL, PROM all planes, gentle AAROM in supine, gentle overstretch IR and ER   Rationale: decrease pain, increase ROM and increase tissue extensibility to aid with increase tolerance to ADLS and activities     min Gait Training:  ___ feet with ___ device on level surfaces with ___ level of assist   Rationale: With   [] TE   [] TA   [] neuro   [] other: Patient Education: [x] Review HEP    [] Progressed/Changed HEP based on:   [] positioning   [] body mechanics   [] transfers   [] heat/ice application    [] other:      Other Objective/Functional Measures: VC exercises and activities, added 4 way AROM, reaching activities and chicken wings AROM     Pain Level (0-10 scale) post treatment: 0    ASSESSMENT/Changes in Function: tolerated well. Advanced to AROM and DC sling per MD last week. Patient will continue to benefit from skilled PT services to modify and progress therapeutic interventions, address ROM deficits, address strength deficits, analyze and address soft tissue restrictions, analyze and cue movement patterns, analyze and modify body mechanics/ergonomics, assess and modify postural abnormalities and instruct in home and community integration to attain remaining goals. [x]  See Plan of Care  [x]  See progress note/recertification  []  See Discharge Summary         Progress towards goals / Updated goals:   Short Term Goals: To be accomplished in 3 weeks:  1.  Patient will decrease shoulder pain during exercises by 2 points on Verbal Analog Scale (Eval: 8/10) to increase participation on Activities of Daily Living as lifting, carrying, and reaching.   CURRENT 2 3/14/19 3 3/21/19  2. Patient will demonstrate increased strength by ½ grade on MMT in B UEs (Eval: 2/5) to improve functional participation in such tasks as prolonged standing and sitting.   CURRENT: AAROM now 3/14/19        3.   Patient will achieve P/ROM of 0-120 degs without increased pain (Eval 50 degs).                        CURRENT MET  3/14/19     Long Term Goals: To be accomplished in 24 treatments  4. Patient will decrease pain during aggravating activities by 4 points on Verbal Analog Scale (Eval: 8/10) to increase participation in Activities of Daily Living as allowed within protocol (I.e. drinking glass of water)        CURRENT 2 3/14/19  3 3/21/19  5. Patient will demonstrate increased strength by 1 grade on MMT in B UEs (Eval: 2/5) to improve functional participation in such tasks as working overhead for >2 min.   CURRENT: PROM only at this itime  6. Patient will achieve full A/ROM of 0-120 degs without increased pain (Eval: 0 degs).   CURRENT AAROM 3/14/19        7. Patient will achieve predicted FOTO scores to demonstrate decreased functional impairment to facilitate improved participation in activities of daily living as allowed per protocol, improve overall quality of life.        CURRENT Progressing 46 3/11/19           PLAN  [x]  Upgrade activities as tolerated     [x]  Continue plan of care  []  Update interventions per flow sheet       []  Discharge due to:_  []  Other:_      Corinna Monae, PT 3/21/2019  4:38 PM    Future Appointments   Date Time Provider Christi Willis   3/25/2019 11:00 AM Noam Muñoz, PT [facility]   3/28/2019  4:30 PM Sunny Gloria, PT [facility]   4/2/2019  7:30 AM Sunny Gloria, PT [facility]   4/5/2019  7:30 AM Sunny Gloria, PT [facility]   4/8/2019  1:30 PM Sunny Gloria, PT [facility]   4/10/2019  7:30 AM Sunny Gloria PT [facility]   4/11/2019  8:45 AM VLAD [address]   4/22/2019 12:45 PM [facility] GET [facility]   5/6/2019 1:00 PM Lashae Oakley  [facility] [address]

## 2019-03-25 ENCOUNTER — HOSPITAL ENCOUNTER (OUTPATIENT)
Dept: PHYSICAL THERAPY | Age: 49
Discharge: HOME | End: 2019-03-25
Payer: COMMERCIAL

## 2019-03-25 PROCEDURE — 97140 MANUAL THERAPY 1/> REGIONS: CPT

## 2019-03-25 PROCEDURE — 97110 THERAPEUTIC EXERCISES: CPT

## 2019-03-25 NOTE — PROGRESS NOTES
PT DAILY TREATMENT NOTE 10-18    Patient Name: Susie Stewart  Date:3/25/2019  : 1970  [x]  Patient  Verified  Payor: BLUE CROSS / Plan: [employer] [facility] / Product Type: PPO /    In time:11:01  Out time:11:29  Total Treatment Time (min): 28  Visit #: 14 of 24    Medicare/BCBS Only   Total Timed Codes (min):  28 1:1 Treatment Time:  28       Treatment Area: Right rotator cuff tear [M75.101]    SUBJECTIVE  Pain Level (0-10 scale): 3  Any medication changes, allergies to medications, adverse drug reactions, diagnosis change, or new procedure performed?: [x] No    [] Yes (see summary sheet for update)  Subjective functional status/changes:   [] No changes reported  States that she is doing well, was unable to perform all of her exercises over the weekend     OBJECTIVE        18 min Therapeutic Exercise:  [x] See flow sheet :   Rationale: increase ROM, increase strength and increase proprioception to improve the patients ability to perform daily household chores     10 min Manual Therapy:  PROM with gentle overpressure flexion/ abd/ IR/ ER, LAD with oscillatino    Rationale: decrease pain, increase ROM and increase tissue extensibility to assist with tolerance to ADLs              With   [] TE   [] TA   [] neuro   [] other: Patient Education: [x] Review HEP    [] Progressed/Changed HEP based on:   [] positioning   [] body mechanics   [] transfers   [] heat/ice application    [] other:      Other Objective/Functional Measures: Tolerated all therex well, reports of discomfort/stretch with supine chicken wings      Pain Level (0-10 scale) post treatment: <3    ASSESSMENT/Changes in Function: Patient continues to show improvements with signs/ symptoms however still demonstrates a decrease in strength, impaired ROM, and an increase in pain with functional activities.        Patient will continue to benefit from skilled PT services to modify and progress therapeutic interventions, address functional mobility deficits, address strength deficits, analyze and cue movement patterns and analyze and modify body mechanics/ergonomics to attain remaining goals. [x]  See Plan of Care  []  See progress note/recertification  []  See Discharge Summary         Progress towards goals / Updated goals:  Short Term Goals: To be accomplished in 3 weeks:  1. Patient will decrease shoulder pain during exercises by 2 points on Verbal Analog Scale (Eval: 8/10) to increase participation on Activities of Daily Living as lifting, carrying, and reaching.   CURRENT 2 3/14/19    3 3/21/19  2. Patient will demonstrate increased strength by ½ grade on MMT in B UEs (Eval: 2/5) to improve functional participation in such tasks as prolonged standing and sitting.   CURRENT: AAROM now 3/14/19        3.   Patient will achieve P/ROM of 0-120 degs without increased pain (Eval 50 degs).                        CURRENT MET  3/14/19     Long Term Goals: To be accomplished in 24 treatments  4. Patient will decrease pain during aggravating activities by 4 points on Verbal Analog Scale (Eval: 8/10) to increase participation in Activities of Daily Living as allowed within protocol (I.e. drinking glass of water)        CURRENT 2 3/14/19  3 3/21/19  5. Patient will demonstrate increased strength by 1 grade on MMT in B UEs (Eval: 2/5) to improve functional participation in such tasks as working overhead for >2 min.   CURRENT: PROM only at this itime  6. Patient will achieve full A/ROM of 0-120 degs without increased pain (Eval: 0 degs).   CURRENT AAROM 3/14/19        7. Patient will achieve predicted FOTO scores to demonstrate decreased functional impairment to facilitate improved participation in activities of daily living as allowed per protocol, improve overall quality of life.        CURRENT Progressing 46 3/11/19        PLAN  [x]  Upgrade activities as tolerated     [x]  Continue plan of care  []  Update interventions per flow sheet       []  Discharge due to:_  []  Other:_      Karla Clayton, PT 3/25/2019  7:17 AM    Future Appointments   Date Time Provider Christi Willis   3/25/2019 11:00 AM Arvtiffany Rowe, PT [facility] [address]   3/28/2019  4:30 PM Danette Reddy, PT [facility] [address]   4/2/2019  7:30 AM Danette Reddy, PT [facility] [address]   4/5/2019  7:30 AM Danette Reddy, PT [facility] [address]   4/8/2019  1:30 PM Danette Reddy, PT [facility] [address]   4/11/2019  8:45 AM [facility]   4/12/2019  7:30 AM Sánchez Tompkins, PT [facility] [address]   4/22/2019 12:45 PM [facility] GET ABHI RM [facility]   5/6/2019  1:00 PM Swati Wylie,  [address]

## 2019-03-28 ENCOUNTER — HOSPITAL ENCOUNTER (OUTPATIENT)
Dept: PHYSICAL THERAPY | Age: 49
Discharge: HOME | End: 2019-03-28
Payer: COMMERCIAL

## 2019-03-28 PROCEDURE — 97140 MANUAL THERAPY 1/> REGIONS: CPT

## 2019-03-28 NOTE — PROGRESS NOTES
PT DAILY TREATMENT NOTE 10-18    Patient Name: Lupe Morgan  Date:3/28/2019  : 1970  [x]  Patient  Verified  Payor: BLUE CROSS / Plan: [employer] [facility] [place] / Product Type: PPO /    In time:435  Out time:521  Total Treatment Time (min): 41  Visit #: 15 of 24    Medicare/BCBS Only   Total Timed Codes (min):  31 1:1 Treatment Time:  15       Treatment Area: Right rotator cuff tear [M75.101]    SUBJECTIVE  Pain Level (0-10 scale): 7  Any medication changes, allergies to medications, adverse drug reactions, diagnosis change, or new procedure performed?: [x] No    [] Yes (see summary sheet for update)  Subjective functional status/changes:   [] No changes reported  My arm is hurting today, I slipped while getting dressed yesterday and pulled my arm , I have been icing it and using the sling. I can still move it but out to the side it hurts more since then.      OBJECTIVE    Modality rationale: decrease pain and increase tissue extensibility to improve the patients ability to  Aid with increase tolerance to ADLS and activities   Min Type Additional Details    [] Estim:  []Unatt       []IFC  []Premod                        []Other:  []w/ice   []w/heat  Position:  Location:    [] Estim: []Att    []TENS instruct  []NMES                    []Other:  []w/US   []w/ice   []w/heat  Position:  Location:    []  Traction: [] Cervical       []Lumbar                       [] Prone          []Supine                       []Intermittent   []Continuous Lbs:  [] before manual  [] after manual    []  Ultrasound: []Continuous   [] Pulsed                           []1MHz   []3MHz W/cm2:  Location:    []  Iontophoresis with dexamethasone         Location: [] Take home patch   [] In clinic   10 [x]  Ice   post  []  heat  []  Ice massage  []  Laser   []  Anodyne Position:reclined  Location:right shoulder    []  Laser with stim  []  Other:  Position:  Location:    []  Vasopneumatic Device Pressure:       [] lo [] med [] hi Temperature: [] lo [] med [] hi   [] Skin assessment post-treatment:  []intact []redness- no adverse reaction    []redness - adverse reaction:       min []Eval                  []Re-Eval       23 min Therapeutic Exercise:  [x] See flow sheet :   Rationale: increase ROM, increase strength and improve coordination to improve the patients ability to aid with increase tolerance to ADLS and activities     min Therapeutic Activity:  []  See flow sheet :   Rationale:   to improve the patients ability to       min Neuromuscular Re-education:  []  See flow sheet :   Rationale:   to improve the patients ability to     8 min Manual Therapy:  Gentle PROM all planes , with some mild  painful end range ER and F   Rationale: decrease pain, increase ROM and increase tissue extensibility to aid with increase tolerance to ADLS and activities     min Gait Training:  ___ feet with ___ device on level surfaces with ___ level of assist   Rationale: With   [] TE   [] TA   [] neuro   [] other: Patient Education: [x] Review HEP    [] Progressed/Changed HEP based on:   [] positioning   [] body mechanics   [] transfers   [] heat/ice application    [] other:      Other Objective/Functional Measures: VC exercises and technique  Some decrease tolerance to table slides, able to tolerate all other exercises with no  Significant difficulty noted. Pain Level (0-10 scale) post treatment: <7    ASSESSMENT/Changes in Function: tolerated well with some discomfort with table slides. No apparent loss of PROM in either planes of F/ abd/ER/IR although mild discomfort with end range F and ER. Advised her to continue to use ice in 10 minute increments and exercise as tolerated. Also advised her to  use her best judgement tomorrow AM and if shoulder not feeling better to contact MD and inform them of what happened and to see if she could move up her 4/10 follow up with MD office.     Patient will continue to benefit from skilled PT services to modify and progress therapeutic interventions, address ROM deficits, address strength deficits, analyze and address soft tissue restrictions, analyze and cue movement patterns, analyze and modify body mechanics/ergonomics, assess and modify postural abnormalities and instruct in home and community integration to attain remaining goals. [x]  See Plan of Care  [x]  See progress note/recertification  []  See Discharge Summary         Progress towards goals / Updated goals:   Short Term Goals: To be accomplished in 3 weeks:  1. Patient will decrease shoulder pain during exercises by 2 points on Verbal Analog Scale (Eval: 8/10) to increase participation on Activities of Daily Living as lifting, carrying, and reaching.   CURRENT 2 3/14/19    3 3/21/19    7 3/28/19  2. Patient will demonstrate increased strength by ½ grade on MMT in B UEs (Eval: 2/5) to improve functional participation in such tasks as prolonged standing and sitting.   CURRENT: AAROM now 3/14/19        3.   Patient will achieve P/ROM of 0-120 degs without increased pain (Eval 50 degs).                        CURRENT MET  3/14/19     Long Term Goals: To be accomplished in 24 treatments  4. Patient will decrease pain during aggravating activities by 4 points on Verbal Analog Scale (Eval: 8/10) to increase participation in Activities of Daily Living as allowed within protocol (I.e. drinking glass of water)        CURRENT 2 3/14/19  3 3/21/19  5. Patient will demonstrate increased strength by 1 grade on MMT in B UEs (Eval: 2/5) to improve functional participation in such tasks as working overhead for >2 min.   CURRENT: PROM only at this itime  6. Patient will achieve full A/ROM of 0-120 degs without increased pain (Eval: 0 degs).   CURRENT AAROM 3/14/19        7. Patient will achieve predicted FOTO scores to demonstrate decreased functional impairment to facilitate improved participation in activities of daily living as allowed per protocol, improve overall quality of life.        CURRENT Progressing 46 3/11/19           PLAN  [x]  Upgrade activities as tolerated     [x]  Continue plan of care  []  Update interventions per flow sheet       []  Discharge due to:_  []  Other:_      Elizabeth Howard, PT 3/28/2019  5:18 PM    Future Appointments   Date Time Provider Christi Willis   4/2/2019  7:30 AM Madalyn Maxim, PT [facility]   4/5/2019  7:30 AM Madalyn Maxim, PT [facility]   4/8/2019  1:30 PM Madalyn Maxim, PT [facility]   4/11/2019  8:45 AM VLAD Robert [facility]   4/12/2019  7:30 AM Flakita Langley, PT [facility]   4/22/2019 12:45 PM HBV GET ABHI RM [facility]   5/6/2019  1:00 PM [address]lsa [address]

## 2019-03-29 ENCOUNTER — APPOINTMENT (OUTPATIENT)
Dept: PHYSICAL THERAPY | Age: 49
End: 2019-03-29
Payer: COMMERCIAL

## 2019-03-29 ENCOUNTER — TELEPHONE (OUTPATIENT)
Dept: ORTHOPEDIC SURGERY | Age: 49
End: 2019-03-29

## 2019-03-29 NOTE — TELEPHONE ENCOUNTER
Patient started to slip when putting on sweater and caught her self on bench two day ago. Still having a lot more pain and tighter and when she moves her shldr straight out and to side. She has been icing and taking Tylenol. Therapist instructed patient to call Dr. Marty Gibbs about appt. Appt is Mon 04/1. Please advise patient as to what else she can do to help relieve the pain.   Please call her at [phone]

## 2019-04-01 ENCOUNTER — OFFICE VISIT (OUTPATIENT)
Dept: ORTHOPEDIC SURGERY | Age: 49
End: 2019-04-01

## 2019-04-01 VITALS
OXYGEN SATURATION: 97 % | HEIGHT: 63 IN | SYSTOLIC BLOOD PRESSURE: 103 MMHG | WEIGHT: 164.8 LBS | RESPIRATION RATE: 16 BRPM | BODY MASS INDEX: 29.2 KG/M2 | HEART RATE: 68 BPM | DIASTOLIC BLOOD PRESSURE: 70 MMHG | TEMPERATURE: 97.9 F

## 2019-04-01 DIAGNOSIS — M75.121 COMPLETE TEAR OF RIGHT ROTATOR CUFF, UNSPECIFIED WHETHER TRAUMATIC: Primary | ICD-10-CM

## 2019-04-01 NOTE — PROGRESS NOTES
1. Have you been to the ER, urgent care clinic since your last visit? Hospitalized since your last visit? Yes, [facility]       2. Have you seen or consulted any other health care providers outside of the [address] [place] [address] since your last visit? Include any pap smears or colon screening.  Yes, [facility]

## 2019-04-01 NOTE — PROGRESS NOTES
Shelly Jones  1970     HISTORY OF PRESENT ILLNESS  Shelly Jones is a 46 y.o. female who presents today for evaluation s/p Right shoulder arthroscopic rotator cuff repair on 2/14/19. Patient has been going to PT. Describes pain as a 7/10. She states 5 days ago she slipped while trying to put her sweater on and caught herself. She is now having pain with most motions of her shoulder and pain has returned at night. Patient denies any fever, chills, chest pain, shortness of breath or calf pain. The remainder of the review of systems is negative. There are no new illness or injuries to report since last seen in the office. No changes in medications, allergies, social or family history. PHYSICAL EXAM:   Visit Vitals  /70 (BP 1 Location: Left arm, BP Patient Position: Sitting)   Pulse 68   Temp 97.9 °F (36.6 °C) (Oral)   Resp 16   Ht 5' 3\" (1.6 m)   Wt 164 lb 12.8 oz (74.8 kg)   SpO2 97%   BMI 29.19 kg/m²      The patient is a well-developed, well-nourished female in no acute distress. The patient is alert and oriented times three. The patient appears to be well groomed. Mood and affect are normal.  ORTHOPEDIC EXAM of right shoulder:  Inspection: swelling not present,  Bruising not present  Incisions well healed,  Passive glenohumeral abduction 0-90 degrees, able to reach to top of her head with some pain  Stability: Stable  Strength: 4/5  2+ distal pulses    IMPRESSION:  S/P Right shoulder arthroscopic 1cm anterior rotator cuff repair,   PLAN:   1. Patient with acute increase in pain  2. Will hold on PT x 1 weeks working on motion only  3.  Resume mobic at night for pain and cont with ice      RTC 1 week    BERNIE [facility]

## 2019-04-02 ENCOUNTER — APPOINTMENT (OUTPATIENT)
Dept: PHYSICAL THERAPY | Age: 49
End: 2019-04-02

## 2019-04-05 ENCOUNTER — APPOINTMENT (OUTPATIENT)
Dept: PHYSICAL THERAPY | Age: 49
End: 2019-04-05

## 2019-04-05 ENCOUNTER — PATIENT MESSAGE (OUTPATIENT)
Dept: ORTHOPEDIC SURGERY | Age: 49
End: 2019-04-05

## 2019-04-05 NOTE — TELEPHONE ENCOUNTER
From: [facility]  To: Steve Crawford MD  Sent: 4/5/2019 7:32 AM EDT  Subject: Visit Follow-Up Question    I just wanted to give you an update on my shoulder. I know my appointment isnt until next Thursday. It still hurts (aches) all the time. When I jar it (like bump my head on something) it causes a sharp pain that lasts for at least 30 seconds. I am continuing to ice and only use it passively. I dont feel like it is getting any better. Thank you.

## 2019-04-05 NOTE — TELEPHONE ENCOUNTER
We will reevaluate next week. It is still early to be overly concerned.  If pain persists we will likely order MRI

## 2019-04-08 ENCOUNTER — APPOINTMENT (OUTPATIENT)
Dept: PHYSICAL THERAPY | Age: 49
End: 2019-04-08

## 2019-04-10 ENCOUNTER — APPOINTMENT (OUTPATIENT)
Dept: PHYSICAL THERAPY | Age: 49
End: 2019-04-10

## 2019-04-11 ENCOUNTER — OFFICE VISIT (OUTPATIENT)
Dept: ORTHOPEDIC SURGERY | Age: 49
End: 2019-04-11

## 2019-04-11 VITALS
WEIGHT: 164 LBS | BODY MASS INDEX: 29.06 KG/M2 | OXYGEN SATURATION: 100 % | SYSTOLIC BLOOD PRESSURE: 108 MMHG | RESPIRATION RATE: 13 BRPM | TEMPERATURE: 98 F | HEART RATE: 60 BPM | HEIGHT: 63 IN | DIASTOLIC BLOOD PRESSURE: 67 MMHG

## 2019-04-11 DIAGNOSIS — M75.121 COMPLETE TEAR OF RIGHT ROTATOR CUFF, UNSPECIFIED WHETHER TRAUMATIC: Primary | ICD-10-CM

## 2019-04-11 NOTE — PROGRESS NOTES
Louie Hagen  1970     HISTORY OF PRESENT ILLNESS  Louie Hagen is a 46 y.o. female who presents today for evaluation s/p Right shoulder arthroscopic rotator cuff repair on 2/14/19. Patient has been going to PT. Describes pain as a 2/10. She states 2 weeks ago she slipped while trying to put her sweater on and caught herself. She is now having pain with most motions of her shoulder and pain has returned at night. Patient denies any fever, chills, chest pain, shortness of breath or calf pain. The remainder of the review of systems is negative. There are no new illness or injuries to report since last seen in the office. No changes in medications, allergies, social or family history. PHYSICAL EXAM:   Visit Vitals  /67   Pulse 60   Temp 98 °F (36.7 °C) (Oral)   Resp 13   Ht 5' 3\" (1.6 m)   Wt 164 lb (74.4 kg)   SpO2 100%   BMI 29.05 kg/m²      The patient is a well-developed, well-nourished female in no acute distress. The patient is alert and oriented times three. The patient appears to be well groomed. Mood and affect are normal.  ORTHOPEDIC EXAM of right shoulder:  Inspection: swelling not present,  Bruising not present  Incisions well healed,  Passive glenohumeral abduction 0-60 degrees with sig pain   Stability: Stable  Strength: 4/5  2+ distal pulses    IMPRESSION:  S/P Right shoulder arthroscopic 1cm anterior rotator cuff repair,   PLAN:   1. Patient with acute increase in pain with no relief with rest and meloxicam  2. Mri arthrogram right shoulder r/o retear  3.  Patient to continue with passive motion so we do not develop adhesive capsulitis    RTC after MRI    BERNIE [facility]

## 2019-04-11 NOTE — PROGRESS NOTES
1. Have you been to the ER, urgent care clinic since your last visit? Hospitalized since your last visit? No    2. Have you seen or consulted any other health care providers outside of the [address] [place] [address] since your last visit? Include any pap smears or colon screening.  No

## 2019-04-12 ENCOUNTER — APPOINTMENT (OUTPATIENT)
Dept: PHYSICAL THERAPY | Age: 49
End: 2019-04-12

## 2019-04-22 ENCOUNTER — HOSPITAL ENCOUNTER (OUTPATIENT)
Dept: MAMMOGRAPHY | Age: 49
Discharge: HOME | End: 2019-04-22
Attending: NURSE PRACTITIONER
Payer: COMMERCIAL

## 2019-04-22 ENCOUNTER — HOSPITAL ENCOUNTER (OUTPATIENT)
Dept: GENERAL RADIOLOGY | Age: 49
Discharge: HOME | End: 2019-04-22
Attending: PHYSICIAN ASSISTANT
Payer: COMMERCIAL

## 2019-04-22 ENCOUNTER — HOSPITAL ENCOUNTER (OUTPATIENT)
Dept: MRI IMAGING | Age: 49
Discharge: HOME | End: 2019-04-22
Attending: PHYSICIAN ASSISTANT
Payer: COMMERCIAL

## 2019-04-22 DIAGNOSIS — M75.121 COMPLETE TEAR OF RIGHT ROTATOR CUFF, UNSPECIFIED WHETHER TRAUMATIC: ICD-10-CM

## 2019-04-22 DIAGNOSIS — Z91.89 AT HIGH RISK FOR BREAST CANCER: ICD-10-CM

## 2019-04-22 PROCEDURE — 73222 MRI JOINT UPR EXTREM W/DYE: CPT

## 2019-04-22 PROCEDURE — 74011250636 HC RX REV CODE- 250/636: Performed by: PHYSICIAN ASSISTANT

## 2019-04-22 PROCEDURE — A9575 INJ GADOTERATE MEGLUMI 0.1ML: HCPCS | Performed by: PHYSICIAN ASSISTANT

## 2019-04-22 PROCEDURE — 77002 NEEDLE LOCALIZATION BY XRAY: CPT

## 2019-04-22 PROCEDURE — 74011000250 HC RX REV CODE- 250: Performed by: PHYSICIAN ASSISTANT

## 2019-04-22 PROCEDURE — 77062 BREAST TOMOSYNTHESIS BI: CPT

## 2019-04-22 PROCEDURE — 74011636320 HC RX REV CODE- 636/320: Performed by: PHYSICIAN ASSISTANT

## 2019-04-22 RX ORDER — LIDOCAINE HYDROCHLORIDE 10 MG/ML
30 INJECTION, SOLUTION EPIDURAL; INFILTRATION; INTRACAUDAL; PERINEURAL
Status: COMPLETED | OUTPATIENT
Start: 2019-04-22 | End: 2019-04-22

## 2019-04-22 RX ORDER — GADOTERATE MEGLUMINE 376.9 MG/ML
5 INJECTION INTRAVENOUS
Status: COMPLETED | OUTPATIENT
Start: 2019-04-22 | End: 2019-04-22

## 2019-04-22 RX ORDER — SODIUM CHLORIDE 9 MG/ML
10 INJECTION INTRAMUSCULAR; INTRAVENOUS; SUBCUTANEOUS
Status: COMPLETED | OUTPATIENT
Start: 2019-04-22 | End: 2019-04-22

## 2019-04-22 RX ADMIN — IOPAMIDOL 10 ML: 408 INJECTION, SOLUTION INTRATHECAL at 09:58

## 2019-04-22 RX ADMIN — GADOTERATE MEGLUMINE 0.15 ML: 376.9 INJECTION INTRAVENOUS at 09:59

## 2019-04-22 RX ADMIN — LIDOCAINE HYDROCHLORIDE 10 ML: 10 INJECTION, SOLUTION EPIDURAL; INFILTRATION; INTRACAUDAL; PERINEURAL at 09:58

## 2019-04-22 RX ADMIN — SODIUM CHLORIDE 10 ML: 9 INJECTION, SOLUTION INTRAMUSCULAR; INTRAVENOUS; SUBCUTANEOUS at 09:58

## 2019-04-24 ENCOUNTER — OFFICE VISIT (OUTPATIENT)
Dept: ORTHOPEDIC SURGERY | Age: 49
End: 2019-04-24

## 2019-04-24 VITALS
HEIGHT: 63 IN | WEIGHT: 164 LBS | BODY MASS INDEX: 29.06 KG/M2 | SYSTOLIC BLOOD PRESSURE: 103 MMHG | HEART RATE: 71 BPM | RESPIRATION RATE: 14 BRPM | DIASTOLIC BLOOD PRESSURE: 72 MMHG | TEMPERATURE: 96.3 F | OXYGEN SATURATION: 99 %

## 2019-04-24 DIAGNOSIS — M75.111 PARTIAL NONTRAUMATIC TEAR OF ROTATOR CUFF, RIGHT: Primary | ICD-10-CM

## 2019-04-24 DIAGNOSIS — M75.01 ADHESIVE CAPSULITIS OF RIGHT SHOULDER: ICD-10-CM

## 2019-04-24 DIAGNOSIS — Z98.890 S/P RIGHT ROTATOR CUFF REPAIR: ICD-10-CM

## 2019-04-24 NOTE — PROGRESS NOTES
oLuie Hagen 
1970 HISTORY OF PRESENT ILLNESS Louie Hagen is a 46 y.o. female who presents today for evaluation s/p Right shoulder arthroscopic rotator cuff repair on 2/14/19. Patient has been going to PT. Describes pain as a 7/10. She states 6 weeks ago she slipped while trying to put her sweater on and caught herself. She is now having pain with most motions of her shoulder and pain has returned at night. She states she was doing great before the incident and she has not improved since. She has increased stiffness since last OV. Patient denies any fever, chills, chest pain, shortness of breath or calf pain. The remainder of the review of systems is negative. There are no new illness or injuries to report since last seen in the office. No changes in medications, allergies, social or family history. PHYSICAL EXAM:  
Visit Vitals /72 Pulse 71 Temp 96.3 °F (35.7 °C) (Oral) Resp 14 Ht 5' 3\" (1.6 m) Wt 164 lb (74.4 kg) SpO2 99% BMI 29.05 kg/m² The patient is a well-developed, well-nourished female in no acute distress. The patient is alert and oriented times three. The patient appears to be well groomed. Mood and affect are normal. 
ORTHOPEDIC EXAM of right shoulder: Inspection: swelling not present,  Bruising not present Incisions well healed, Passive glenohumeral abduction 0-60 degrees with sig pain Stability: Stable Strength: 4/5 
2+ distal pulses IMAGING: MRI of right shoulder dated 4/22/19 was reviewed and read: IMPRESSION: 
1. There is a deep partial thickness undersurface tear involving the supraspinatus that reaches the bursal surface, and possibly has punctate perforation through the bursal surface. No tendon retraction, no muscle atrophy. 
 2. Irregularity of the axillary portion of the joint capsule, humeral side. Capsular avulsion is possible. No bony avulsion or marrow edema. IMPRESSION:  S/P Right shoulder arthroscopic 1cm anterior rotator cuff repair, partial rotator cuff retear right shoulder, adhesive capsulitis. PLAN:  
1. Patient with acute increase in pain with no relief with rest and meloxicam 
2. I discussed the risks and benefits and potential adverse outcomes of both operative vs non operative treatment of right partial rotator cuff retear and adhesive capsulitis with the patient. Patient wishes to proceed with right shoulder manipulation under anesthesia and right rotator cuff retear repair Risks of operative intervention include but not limited to bleeding, infection, deep vein thrombosis, pulmonary embolism, death, limb length discrepancy, reflexive sympathetic dystrophy, fat embolism syndrome,damage to blood vessels and nerves, malunion, non-union, delayed union, failure of hardware, post traumatic arthritis, stroke, heart attack, and death. Patient understands that infection may arise and may require numerous surgeries. History and physical exam scheduled for a later date. RTC H&P Scribed by Michael Landon ([facility]) as dictated by Cami Iqbal MD 
 
I, Dr. Cami Iqbal, confirm that all documentation is accurate.  
 
Cami Iqbal M.D.  
Lizbeth Jacome [facility] [place] [facility]

## 2019-04-24 NOTE — PROGRESS NOTES
1. Have you been to the ER, urgent care clinic since your last visit? Hospitalized since your last visit? No 
 
2. Have you seen or consulted any other health care providers outside of the [address] [place] [address] since your last visit? Include any pap smears or colon screening.  No

## 2019-05-01 DIAGNOSIS — Z01.818 PREOP EXAMINATION: ICD-10-CM

## 2019-05-01 DIAGNOSIS — M75.01 ADHESIVE CAPSULITIS OF RIGHT SHOULDER: ICD-10-CM

## 2019-05-01 DIAGNOSIS — M75.111 PARTIAL NONTRAUMATIC TEAR OF ROTATOR CUFF, RIGHT: Primary | ICD-10-CM

## 2019-05-06 ENCOUNTER — OFFICE VISIT (OUTPATIENT)
Dept: SURGERY | Age: 49
End: 2019-05-06

## 2019-05-06 ENCOUNTER — HOSPITAL ENCOUNTER (OUTPATIENT)
Dept: LAB | Age: 49
Discharge: HOME | End: 2019-05-06
Payer: COMMERCIAL

## 2019-05-06 VITALS
HEART RATE: 70 BPM | WEIGHT: 164 LBS | RESPIRATION RATE: 16 BRPM | HEIGHT: 63 IN | SYSTOLIC BLOOD PRESSURE: 113 MMHG | OXYGEN SATURATION: 97 % | TEMPERATURE: 98.1 F | DIASTOLIC BLOOD PRESSURE: 78 MMHG | BODY MASS INDEX: 29.06 KG/M2

## 2019-05-06 DIAGNOSIS — Z91.89 AT HIGH RISK FOR BREAST CANCER: Primary | ICD-10-CM

## 2019-05-06 DIAGNOSIS — Z12.31 ENCOUNTER FOR SCREENING MAMMOGRAM FOR HIGH-RISK PATIENT: ICD-10-CM

## 2019-05-06 DIAGNOSIS — Z80.3 FAMILY HISTORY OF BREAST CANCER IN FIRST DEGREE RELATIVE: ICD-10-CM

## 2019-05-06 DIAGNOSIS — Z01.818 PREOP EXAMINATION: ICD-10-CM

## 2019-05-06 LAB
ANION GAP SERPL CALC-SCNC: 6 MMOL/L (ref 3–18)
BASOPHILS # BLD: 0 K/UL (ref 0–0.1)
BASOPHILS NFR BLD: 0 % (ref 0–2)
BUN SERPL-MCNC: 14 MG/DL (ref 7–18)
BUN/CREAT SERPL: 21 (ref 12–20)
CALCIUM SERPL-MCNC: 9 MG/DL (ref 8.5–10.1)
CHLORIDE SERPL-SCNC: 108 MMOL/L (ref 100–108)
CO2 SERPL-SCNC: 28 MMOL/L (ref 21–32)
CREAT SERPL-MCNC: 0.68 MG/DL (ref 0.6–1.3)
DIFFERENTIAL METHOD BLD: NORMAL
EOSINOPHIL # BLD: 0.3 K/UL (ref 0–0.4)
EOSINOPHIL NFR BLD: 4 % (ref 0–5)
ERYTHROCYTE [DISTWIDTH] IN BLOOD BY AUTOMATED COUNT: 12.6 % (ref 11.6–14.5)
GLUCOSE SERPL-MCNC: 86 MG/DL (ref 74–99)
HCT VFR BLD AUTO: 39 % (ref 35–45)
HGB BLD-MCNC: 13.1 G/DL (ref 12–16)
LYMPHOCYTES # BLD: 2 K/UL (ref 0.9–3.6)
LYMPHOCYTES NFR BLD: 25 % (ref 21–52)
MCH RBC QN AUTO: 31.1 PG (ref 24–34)
MCHC RBC AUTO-ENTMCNC: 33.6 G/DL (ref 31–37)
MCV RBC AUTO: 92.6 FL (ref 74–97)
MONOCYTES # BLD: 0.5 K/UL (ref 0.05–1.2)
MONOCYTES NFR BLD: 6 % (ref 3–10)
NEUTS SEG # BLD: 5.2 K/UL (ref 1.8–8)
NEUTS SEG NFR BLD: 65 % (ref 40–73)
PLATELET # BLD AUTO: 371 K/UL (ref 135–420)
PMV BLD AUTO: 10.5 FL (ref 9.2–11.8)
POTASSIUM SERPL-SCNC: 4.2 MMOL/L (ref 3.5–5.5)
RBC # BLD AUTO: 4.21 M/UL (ref 4.2–5.3)
SODIUM SERPL-SCNC: 142 MMOL/L (ref 136–145)
WBC # BLD AUTO: 8 K/UL (ref 4.6–13.2)

## 2019-05-06 PROCEDURE — 36415 COLL VENOUS BLD VENIPUNCTURE: CPT

## 2019-05-06 PROCEDURE — 85025 COMPLETE CBC W/AUTO DIFF WBC: CPT

## 2019-05-06 PROCEDURE — 80048 BASIC METABOLIC PNL TOTAL CA: CPT

## 2019-05-06 RX ORDER — KETOTIFEN FUMARATE 0.35 MG/ML
1 SOLUTION/ DROPS OPHTHALMIC
COMMUNITY
End: 2019-10-23

## 2019-05-06 NOTE — PROGRESS NOTES
Chief Complaint   Patient presents with    Follow-up     3D bilat mammo bi-rads neg     1. Have you been to the ER, urgent care clinic since your last visit? Hospitalized since your last visit? No    2. Have you seen or consulted any other health care providers outside of the [address] [place] [address] since your last visit? Include any pap smears or colon screening.  No

## 2019-05-06 NOTE — PROGRESS NOTES
Musa Kwan. Lissy Mascorro, DASHA-C  PROGRESS NOTE    Subjective:    Ms. Funmi Urbano is a very pleasant 47 yo female in our high risk, breast surveillance program who presents today for a clinical, breast exam and evaluation of most recent, BIRADS 1, bilateral mammogram. She denies any personal or family history since last visit and she has no breast concerns or complaints today. She is having a rotator cuff surgery on Monday of next week after injuring it following the first surgery done in February. Other than shoulder pain and impaired ROM, she feels she is doing very well. Objective: There were no vitals filed for this visit. Review of Systems   Constitutional: Negative for chills, fever and weight loss. Respiratory: Negative for cough. Cardiovascular: Negative for chest pain and palpitations. Gastrointestinal: Negative for abdominal pain, diarrhea, nausea and vomiting. Musculoskeletal: Negative for myalgias. Skin: Negative for itching and rash. Physical Exam:   Physical Exam   Constitutional: She is oriented to person, place, and time and well-developed, well-nourished, and in no distress. HENT:   Head: Normocephalic and atraumatic. Neck: Normal range of motion. Neck supple. Pulmonary/Chest: Effort normal. No respiratory distress. Musculoskeletal: Normal range of motion. She exhibits no edema or tenderness. Right shoulder with impaired ROM. Unable to lift above 40 degrees for breast exam.   Neurological: She is alert and oriented to person, place, and time. Gait normal.   Skin: Skin is warm and dry. No rash noted. No erythema. No pallor. Psychiatric: Mood, memory, affect and judgment normal.      Breasts:    Left:  no dimpling, discoloration, nipple inversion or retractions. no axillary or supraclavicular lymphadenopathy. no mass   Right:  no dimpling, discoloration, nipple inversion or retractions. no axillary or supraclavicular lymphadenopathy.  no mass         Current Medications:  Current Outpatient Medications   Medication Sig Dispense Refill    meloxicam (MOBIC) 15 mg tablet TAKE ONE TABLET BY MOUTH DAILY OR TAKE ONE-HALF TABLET TWO TIMES A DAY AS NEEDED FOR PAIN *TAKE WITH FOOD* 90 Tab 0    fluocinoNIDE (LIDEX) 0.05 % topical cream Apply 2 g to affected area two (2) times a day. 15 g 0    oxyCODONE IR (ROXICODONE) 5 mg immediate release tablet Take 1 Tab by mouth every four (4) hours as needed for Pain. Max Daily Amount: 30 mg. DO NOT TAKE UNTIL AFTER SURGERY (for acute post operative pain) 40 Tab 0    OTHER       multivitamin (ONE A DAY) tablet Take 1 Tab by mouth daily.  Mv,Ca,Min-FA-Herbal No.157 (ESTROVEN MAXIMUM STRENGTH) 400 mcg tab Take  by mouth.  azelastine-fluticasone (DYMISTA) 137-50 mcg/spray spry by Nasal route.  PENNSAID 20 mg/gram /actuation(2 %) sopm 2 Pump(s) by Apply Externally route two (2) times daily as needed. Cell: [phone]  Pharmacy: [phone] 112 g 3    azelastine (OPTIVAR) 0.05 % ophthalmic solution Administer  to both eyes two (2) times a day. Use in affected eye(s)      RANITIDINE HCL (ZANTAC PO) Take  by mouth.  VITAMIN A/VITAMIN D3 (NATURAL VITAMIN D PO) Take  by mouth.  esomeprazole (NEXIUM) 20 mg capsule Take 20 mg by mouth nightly.  diphenhydrAMINE (SIMPLY SLEEP) 25 mg tablet Take 25 mg by mouth nightly. Chart and notes reviewed. Data reviewed. I have evaluated and examined the patient. Impression:  · High risk breast patient who is doing well at 6 month check following BIRADS 1 bilateral, mammogram.      Plan:   · Continue monthly, self exams  · Bilateral, breast MRI in September followed by clinical, breast exam in this office  · Please call with any questions or concerns prior to next visit    Ms. Kem Matos has a reminder for a \"due or due soon\" health maintenance. I have asked that she contact her primary care provider for follow-up on this health maintenance.        Latricia Mo. IBETH Fuller

## 2019-05-06 NOTE — PATIENT INSTRUCTIONS
Mammogram: About This Test  What is it? A mammogram is an X-ray of the breast that is used to screen for breast cancer. This test can find tumors that are too small for you or your doctor to feel. Cancer is most easily treated and cured when it is found at an early stage. Why is this test done? A mammogram is done to:  · Look for breast cancer in women who don't have symptoms. · Find breast cancer in women who have symptoms. Symptoms of breast cancer may include a lump or thickening in the breast, nipple discharge, or dimpling of the skin on one area of the breast.  · Find an area of suspicious breast tissue to remove for an exam under a microscope (biopsy). How can you prepare for the test?  · Tell your doctor if you:  ? Are or might be pregnant. ? Are breastfeeding. ? Have breast implants. ? Have previously had a breast biopsy. · On the day of the test, don't use any deodorant, perfume, powders, or ointments. What happens before the test?  · You will need to take off any jewelry that might interfere with the X-ray pictures. · You will need to take off your clothes above the waist.  · You will be given a cloth or paper gown to use during the test.  What happens during the test?  · You usually stand during a mammogram.  · One at a time, your breasts will be placed on a flat plate that contains the X-ray film. · Another plate is then pressed firmly against your breast to help flatten out the breast tissue. You may be asked to lift your arm. · For a few seconds while the X-ray picture is being taken, you will need to hold your breath. · At least two pictures are taken of each breast. One is taken from the top and one from the side. What else should you know about the test?  · The X-ray plate will feel cold when you place your breast on it. Having your breasts flattened and squeezed isn't comfortable. But it is necessary to flatten out the breast tissue to get the best pictures.   · Mammograms do not prevent breast cancer or reduce a woman's risk of developing cancer. · Most things that are found during a mammogram are not breast cancer. How long does the test take? · The test will take about 10 to 15 minutes. You may be in the clinic for up to an hour. What happens after the test?  · You will probably be able to go home right away. · You can go back to your usual activities right away. Follow-up care is a key part of your treatment and safety. Be sure to make and go to all appointments, and call your doctor if you are having problems. It's also a good idea to keep a list of the medicines you take. Ask your doctor when you can expect to have your test results. Where can you learn more? Go to http://sary-mk.info/. Enter J152 in the search box to learn more about \"Mammogram: About This Test.\"  Current as of: March 27, 2018  Content Version: 11.9  © 5359-4261 [employer]. Care instructions adapted under license by [employer] (which disclaims liability or warranty for this information). If you have questions about a medical condition or this instruction, always ask your healthcare professional. [address] any warranty or liability for your use of this information.

## 2019-05-10 ENCOUNTER — OFFICE VISIT (OUTPATIENT)
Dept: ORTHOPEDIC SURGERY | Age: 49
End: 2019-05-10

## 2019-05-10 VITALS
WEIGHT: 164 LBS | HEIGHT: 63 IN | HEART RATE: 62 BPM | OXYGEN SATURATION: 100 % | SYSTOLIC BLOOD PRESSURE: 105 MMHG | BODY MASS INDEX: 29.06 KG/M2 | DIASTOLIC BLOOD PRESSURE: 77 MMHG | TEMPERATURE: 96.2 F | RESPIRATION RATE: 11 BRPM

## 2019-05-10 DIAGNOSIS — G89.18 POST-OP PAIN: Primary | ICD-10-CM

## 2019-05-10 DIAGNOSIS — Z01.818 PRE-OP EXAM: ICD-10-CM

## 2019-05-10 DIAGNOSIS — M75.121 NONTRAUMATIC COMPLETE TEAR OF RIGHT ROTATOR CUFF: ICD-10-CM

## 2019-05-10 RX ORDER — OXYCODONE HYDROCHLORIDE 5 MG/1
5 TABLET ORAL
Qty: 40 TAB | Refills: 0 | Status: SHIPPED | OUTPATIENT
Start: 2019-05-10 | End: 2019-05-17

## 2019-05-10 RX ORDER — OXYCODONE AND ACETAMINOPHEN 7.5; 325 MG/1; MG/1
1 TABLET ORAL
Qty: 40 TAB | Refills: 0 | Status: SHIPPED | OUTPATIENT
Start: 2019-05-10 | End: 2019-05-10 | Stop reason: CLARIF

## 2019-05-10 RX ORDER — GABAPENTIN 300 MG/1
300 CAPSULE ORAL
Qty: 5 CAP | Refills: 0 | Status: SHIPPED | OUTPATIENT
Start: 2019-05-10 | End: 2019-08-21

## 2019-05-10 NOTE — PROGRESS NOTES
1. Have you been to the ER, urgent care clinic since your last visit? Hospitalized since your last visit? No    2. Have you seen or consulted any other health care providers outside of the [address] [place] [address] since your last visit? Include any pap smears or colon screening.  No

## 2019-05-10 NOTE — PROGRESS NOTES
HISTORY AND PHYSICAL          Patient: Naty Sims                MRN: [id number]       SSN: [id number]  YOB: 1970          AGE: 46 y.o. SEX: female      Patient scheduled for:  RIGHT SHOULDER MANIPULATION UNDER ANESTHESIA, ARTHROSCOPIC [address] RE TEAR    Surgeon: Mali Barrera MD    ANESTHESIA TYPE:  General    HISTORY:     The patient was seen in the office today for a preoperative history and physical for an upcoming above listed surgery. The patient is a pleasant 46 y.o. female who has a history of  s/p Right shoulder arthroscopic rotator cuff repair on 2/14/19. Patient has been going to PT. Describes pain as a 7/10. She states 6 weeks ago she slipped while trying to put her sweater on and caught herself. She is now having pain with most motions of her shoulder and pain has returned at night. She states she was doing great before the incident and she has not improved since. She has increased stiffness since last OV. Patient denies any fever, chills, chest pain, shortness of breath or calf pain. The remainder of the review of systems is negative. There are no new illness or injuries to report since last seen in the office. No changes in medications, allergies, social or family history. Due to the current findings, affected activity of daily living and continued pain and discomfort, surgical intervention is indicated. The alternatives, risks, and complications, including but not limited to infection, blood loss, need for blood transfusion, neurovascular damage, diana-incisional numbness, subcutaneous hematoma, bone fracture, anesthetic complications, DVT, PE, death, RSD, postoperative stiffness and pain, possible surgical scar, delayed healing and nonhealing, reflexive sympathetic dystrophy, damage to blood vessels and nerves, need for more surgery, MI, and stroke,  failure of hardware, gait disturbances,have been discussed.   The patient understands and wishes to proceed with surgery. 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 Indications: acute pain following an operation 5 Cap 0    oxyCODONE IR (ROXICODONE) 5 mg immediate release tablet Take 1 Tab by mouth every four (4) hours as needed for Pain for up to 7 days. Max Daily Amount: 30 mg. DO NOT TAKE UNTIL AFTER SURGERY (for acute post operative pain) 40 Tab 0    ketotifen (ZADITOR) 0.025 % (0.035 %) ophthalmic solution Administer 1 Drop to both eyes two (2) times a day.  meloxicam (MOBIC) 15 mg tablet TAKE ONE TABLET BY MOUTH DAILY OR TAKE ONE-HALF TABLET TWO TIMES A DAY AS NEEDED FOR PAIN *TAKE WITH FOOD* 90 Tab 0    fluocinoNIDE (LIDEX) 0.05 % topical cream Apply 2 g to affected area two (2) times a day. 15 g 0    OTHER       multivitamin (ONE A DAY) tablet Take 1 Tab by mouth daily.  Mv,Ca,Min-FA-Herbal No.157 (ESTROVEN MAXIMUM STRENGTH) 400 mcg tab Take  by mouth.  azelastine-fluticasone (DYMISTA) 137-50 mcg/spray spry by Nasal route.  PENNSAID 20 mg/gram /actuation(2 %) sopm 2 Pump(s) by Apply Externally route two (2) times daily as needed. Cell: [phone]  Pharmacy: [phone] 112 g 3    azelastine (OPTIVAR) 0.05 % ophthalmic solution Administer  to both eyes two (2) times a day. Use in affected eye(s)      RANITIDINE HCL (ZANTAC PO) Take  by mouth.  VITAMIN A/VITAMIN D3 (NATURAL VITAMIN D PO) Take  by mouth.  esomeprazole (NEXIUM) 20 mg capsule Take 20 mg by mouth nightly.  diphenhydrAMINE (SIMPLY SLEEP) 25 mg tablet Take 25 mg by mouth nightly. ALLERGIES:     Allergies   Allergen Reactions    Milk Containing Products Diarrhea    Sulfa (Sulfonamide Antibiotics) Rash    Other Medication Diarrhea     Eggs, patient eats, and gets flu shot yearly with no reaction         SURGICAL HISTORY:     Past Surgical History:   Procedure Laterality Date    HX BACK SURGERY  2012    HX GYN      left tube removal ectopic pregnancy    HX HEENT  9/2011    Basal cell cancer lesion removed from nose    HX LYMPHADENECTOMY Right 8/25/16    HX ORTHOPAEDIC  1999    TMJ    HX OTHER SURGICAL  2013    Spinal Fusion    HX TONSILLECTOMY  2/2007       SOCIAL HISTORY:     Social History     Socioeconomic History    Marital status:      Spouse name: Not on file    Number of children: Not on file    Years of education: Not on file    Highest education level: Not on file   Tobacco Use    Smoking status: Never Smoker    Smokeless tobacco: Never Used   Substance and Sexual Activity    Alcohol use: Yes     Comment: few times per week    Drug use: No    Sexual activity: Yes     Partners: Male     Comment: pregnancy test negative       FAMILY HISTORY:     Family History   Problem Relation Age of Onset    Cancer Mother         breast cancer    Diabetes Father     High Cholesterol Father     Lung Disease Father         COPD and nodule on lung    Heart Disease Father     Cancer Maternal Aunt         breast cancer    Arthritis-osteo Maternal Grandmother         arthritis     Cancer Cousin        REVIEW OF SYSTEMS:     Negative for fevers, chills, chest pain, shortness of breath, weight loss, recent illness     General: Negative for fever and chills. No unexpected change in weight. Denies fatigue. No change in appetite. Skin: Negative for rash or itching. HEENT: Negative for congestion, sore throat, neck pain and neck stiffness. No change in vision or hearing. Hasn't noted any enlarged lymph nodes in the neck.   Cardiovascular:  Negative for chest pain and palpitations. Has not noted pedal edema. Respiratory: Negative for cough, colds, sinus, hemoptysis, shortness of breath and wheezing. Gastrointestinal: Negative for nausea and vomiting, rectal bleeding, coffee ground emesis, abdominal pain, diarrhea and constipation. Genitourinary: Negative for dysuria, frequency urgency, or burning on micturition. No flank pain, no foul smelling urine, no difficulty with initiating urination. Hematological: Negative for bleeding or easy bruising. Musculoskeletal: Negative  for arthralgias, back pain or neck pain. Neurological: Negative for dizziness, seizures or syncopal episodes. Denies headaches. Endocrine: Denies excessive thirst.  No heat/cold intolerance. Psychiatric: Negative for depression or insomnia. PHYSICAL EXAMINATION:     VITALS:   Visit Vitals  /77   Pulse 62   Temp 96.2 °F (35.7 °C) (Oral)   Resp 11   Ht 5' 3\" (1.6 m)   Wt 164 lb (74.4 kg)   SpO2 100%   BMI 29.05 kg/m²     GEN:  Well developed, well nourished 46 y.o. female in no acute distress. HEENT: Normocephalic and atraumatic. Eyes: Conjunctivae and EOM are normal.Pupils are equal, round, and reactive to light. External ear normal appearance, external nose normal appearing. Mouth/Throat: Oropharynx is clear and moist, able to handle oral secretions w/out difficulty, airway patent  NECK: Supple. Normal ROM, No lymphadenopathy. Trachea is midline. No bruising, swelling or deformity  RESP: Clear to auscultation bilaterally. No wheezes, rales, rhonchi. Normal effort and breath sounds. No respiratory distress  CARDIO: Normal rate, regular rhythm and normal heart sounds. No MGR. ABDOMEN: Soft, non-tender, non-distended, normoactive bowel sounds in all four quadrants. There is no tenderness. There is no rebound and no guarding.    BACK: No CVA or spinal tenderness  BREAST:  Deferred  PELVIC:    Deferred   RECTAL:  Deferred   :           Deferred  EXTREMITIES: EXAMINATION OF: right shoulder  Inspection: swelling not present,  Bruising not present  Incisions well healed,  Passive glenohumeral abduction 0-60 degrees with sig pain   Stability: Stable  Strength: 4/5  2+ distal pulses    NEUROVASCULAR: Sensation intact to light touch and strength grossly intact and symmetrical. No nystagmus. Positive distal pulses and capillary refill. DVT ASSESSMENT:  There is not  calf tenderness. No evidence of DVT seen on physical exam.  MOTOR: In tact  PSYCH: Alert an oriented to person, place and time. Mood, memory, affect, behavior and judgment normal       RADIOGRAPHS & DIAGNOSTIC STUDIES:     MRI/xray reveals :     MRI of right shoulder dated 4/22/19 was reviewed and read: IMPRESSION:  1.  There is a deep partial thickness undersurface tear involving the supraspinatus that reaches the bursal surface, and possibly has punctate perforation through the bursal surface. No tendon retraction, no muscle atrophy.   2. Irregularity of the axillary portion of the joint capsule, humeral side. Capsular avulsion is possible. No bony avulsion or marrow edema. LABS:       @  CBC:   Lab Results   Component Value Date/Time    WBC 8.0 05/06/2019 02:19 PM    RBC 4.21 05/06/2019 02:19 PM    HGB 13.1 05/06/2019 02:19 PM    HCT 39.0 05/06/2019 02:19 PM    PLATELET 847 22/45/2965 02:19 PM    and CMP:   Lab Results   Component Value Date/Time    Glucose 86 05/06/2019 02:19 PM    Sodium 142 05/06/2019 02:19 PM    Potassium 4.2 05/06/2019 02:19 PM    Chloride 108 05/06/2019 02:19 PM    CO2 28 05/06/2019 02:19 PM    BUN 14 05/06/2019 02:19 PM    Creatinine 0.68 05/06/2019 02:19 PM    Calcium 9.0 05/06/2019 02:19 PM    Anion gap 6 05/06/2019 02:19 PM    BUN/Creatinine ratio 21 (H) 05/06/2019 02:19 PM    Alk.  phosphatase 97 08/10/2018 01:56 PM    Protein, total 7.4 08/10/2018 01:56 PM    Albumin 3.9 08/10/2018 01:56 PM    Globulin 3.5 08/10/2018 01:56 PM    A-G Ratio 1.1 08/10/2018 01:56 PM   @    Preoperative labs were reviewed and are substantially within normal limits. EKG:   Sinus bradycardia   Incomplete right bundle branch block   Left anterior fascicular block   T wave abnormality, consider anterior ischemia   Abnormal ECG   When compared with ECG of 26-JUL-2016 10:35,   Nonspecific T wave abnormality now evident in Inferior leads   T wave inversion now evident in Anterior leads       ASSESSMENT:       Encounter Diagnoses   Name Primary?  Post-op pain Yes    Nontraumatic complete tear of right rotator cuff        PLAN:     Again, the alternatives, risks, and complications, as well as expected outcome were discussed. The patient understands and agrees to proceed with RIGHT SHOULDER MANIPULATION UNDER ANESTHESIA, ARTHROSCOPIC ROTATOR CUFF REPAIR RE TEAR.  Patient given orders listed below:    Orders Placed This Encounter    DISCONTD: oxyCODONE-acetaminophen (PERCOCET) 7.5-325 mg per tablet    gabapentin (NEURONTIN) 300 mg capsule    oxyCODONE IR (ROXICODONE) 5 mg immediate release tablet         Carrie Turner PA-C  5/10/2019  9:52 AM

## 2019-05-14 ENCOUNTER — HOSPITAL ENCOUNTER (OUTPATIENT)
Dept: PHYSICAL THERAPY | Age: 49
Discharge: HOME | End: 2019-05-14
Payer: COMMERCIAL

## 2019-05-14 PROCEDURE — 97161 PT EVAL LOW COMPLEX 20 MIN: CPT

## 2019-05-14 PROCEDURE — 97140 MANUAL THERAPY 1/> REGIONS: CPT

## 2019-05-14 PROCEDURE — 97110 THERAPEUTIC EXERCISES: CPT

## 2019-05-14 NOTE — PROGRESS NOTES
[facility] [address], [address] [place] [address], [address]  [phone] fax      Plan of Care/ Statement of Necessity for Physical Therapy Services    Patient name: Erika Quintero Start of Care: 2019   Referral source: Aliza Molina,* : 1970    Medical Diagnosis: Pain in right shoulder [M25.511]  Payor: [employer] / Plan: [place] [place] [employer] / Product Type: PPO /  Onset Date:, P/O 19, P/O 19    Treatment Diagnosis: decrease tolerance to ADLs and activities due to right shoulder pain, LOM, decrease strength   Prior Hospitalization: see medical history Provider#: [id number]   Medications: Verified on Patient summary List    Comorbidities: Right RCR , skin cancer, previous pregnancy,    Prior Level of Function: Prior to initial involvement  no issues with right UE. Working, tolerated household chores and community activities, no AD use. The Plan of Care and following information is based on the information from the initial evaluation. Assessment/ key information: 45 YO female diagnosed as above and with S/S consistent with above diagnosis presents to skilled outpatient PT. [facility] P/O right shoulder RCR 19. She had initial Right RCR 19 followed by outpatient PT at this facility. She was responding well and reinjured her arm when she lost her balance and broke her fall using her right UE. She had to wait 2 weeks to assess ultimate damage that was seen on a repeat MRI and led to surgery yesterday. Previous Treatment/Compliance: see previous information  Pain 9-10, FOTO 21, Right sling, FALLS RISK IS LOW , no AD use, no LOB observed. Posture good, PROM right shoulder F 85, ABD 60, ER -10N , IR abdomen. MMT NA due to P/O, End feels are painful. Min generalized TTP right shoulder.    Patient demonstrates the potential to make gains with improved ROM, strength, endurance/activity tolerance, functional FOTO survey score and all within a reasonable time frame so as to increase their functional independence with ADLs and activities for carryover to  Improved quality of life, tolerance to household chores and community activities and work demands. Patient requires skilled Physical Therapy so as to monitor their response to and modify their treatment plan accordingly. Patient appears to be an appropriate candidate for skilled outpatient Physical Therapy. Evaluation Complexity History MEDIUM  Complexity : 1-2 comorbidities / personal factors will impact the outcome/ POC ; Examination MEDIUM Complexity : 3 Standardized tests and measures addressing body structure, function, activity limitation and / or participation in recreation  ;Presentation LOW Complexity : Stable, uncomplicated  ;Clinical Decision Making HIGH Complexity : FOTO score of 1- 25   Overall Complexity Rating: LOW   Problem List: pain affecting function, decrease ROM, decrease strength, decrease ADL/ functional abilitiies, decrease activity tolerance, decrease flexibility/ joint mobility and other FOTO 21   Treatment Plan may include any combination of the following: Therapeutic exercise, Therapeutic activities, Neuromuscular re-education, Physical agent/modality, Manual therapy, Patient education, Self Care training and Home safety training  Patient / Family readiness to learn indicated by: asking questions, trying to perform skills and interest  Persons(s) to be included in education: patient (P)  Barriers to Learning/Limitations: None  Patient Goal (s):  less pain and more movement  Patient Self Reported Health Status: good  Rehabilitation Potential: good    Short Term Goals:  To be accomplished in 5 treatments:   1 patient will have established and be I with HEP to aid with increase tolerance to activities at home   EVAL issued   CURRENT   2 patient will have pain 7/10 to aid with increase tolerance to ADLs and activities at home and work   EVAL 9-10   CURRENT    Long Term Goals: To be accomplished in 12 treatments:   1  patient will have pain 5/10 to aid with increase tolerance to ADLs and activities at home and work   EVAL 9-10   CURRENT   2 patient will have PROM unforced  F and abd 120 to aid with progression of protocol and tolerance to regular activities as home   EVAL PROM F 85, abd 60   CURRENT   3 patient will have FOTO 42 to show improving function with dressing   EVAL difficulty with dressing   CURRENT    Frequency / Duration: Patient to be seen 2-3 times per week for 12 treatments. Patient/ Caregiver education and instruction: Diagnosis, prognosis, self care, activity modification, brace/ splint application and exercises   [x]  Plan of care has been reviewed with OLAF Low, PT 5/14/2019 2:17 PM  ________________________________________________________________________    I certify that the above Therapy Services are being furnished while the patient is under my care. I agree with the treatment plan and certify that this therapy is necessary.     Physician's Signature:____________Date:_________TIME:________    [facility], Date and Time must be completed for valid certification **      Please sign and return to In . Camille Ksa[address], [address] [place] [address], [address] [place] [address],[facility] [address]  [phone] fax

## 2019-05-14 NOTE — PROGRESS NOTES
PT DAILY TREATMENT NOTE/SHOULDER EVAL 10-18    Patient Name: Ron Escobar  Date:2019  : 1970  [x]  Patient  Verified  Payor: BLUE CROSS / Plan: [facility] / Product Type: PPO /    In time:127  Out time:210  Total Treatment Time (min): 43  Visit #: 1 of 12    Medicare/BCBS Only   Total Timed Codes (min):  24 1:1 Treatment Time:  24       Treatment Area: Pain in right shoulder [M25.511]    SUBJECTIVE  Pain Level (0-10 scale): 9-10  [x]constant []intermittent []improving [x]worsening []no change since onset  WORSE movement, nerve block wore off, BETTER ice, medicine  Any medication changes, allergies to medications, adverse drug reactions, diagnosis change, or new procedure performed?: [x] No    [] Yes (see summary sheet for update)  Subjective functional status/changes:     PLOF: Prior to initial involvement  no issues with right UE. Working, tolerated household chores and community activities, no AD use. Limitations to PLOF:P/O state from 19  Mechanism of Injury: 2017 onset shoulder pain, P/O RCR Right 19 , reinjury with 2nd repair 19  Current symptoms/Complaints: 45 YO female diagnosed as above and with S/S consistent with above diagnosis presents to skilled outpatient PT. [facility] P/O right shoulder RCR 19. She had initial Right RCR 19 followed by outpatient PT at this facility. She was responding well and reinjured her arm when she lost her balance and broke her fall using her right UE. She had to wait 2 weeks to assess ultimate damage that was seen on a repeat MRI and led to surgery yesterday.   Previous Treatment/Compliance: see previous information  PMHx/Surgical Hx: Right RCR , skin cancer, previous pregnancy,   Work Hx:OOW due to surgery, going to do some light tasks from this week and RTW next week  Living Situation: house, not alone  Pt Goals: less pain and more movement  Barriers: [x]pain []financial []time []transportation []other  Motivation: good  Substance use: [x]Alcohol []Tobacco []other:   FABQ Score: []low []elevate  Cognition: A & O x  4    Other:    OBJECTIVE/EXAMINATION  Domestic Life: work, household chores, community activities  Activity/Recreational Limitations: P/O state  Mobility: I  Self Care: I with some assistance with upper body today.       Modality rationale:     Min Type Additional Details    [] Estim:  []Unatt       []IFC  []Premod                        []Other:  []w/ice   []w/heat  Position:  Location:    [] Estim: []Att    []TENS instruct  []NMES                    []Other:  []w/US   []w/ice   []w/heat  Position:  Location:    []  Traction: [] Cervical       []Lumbar                       [] Prone          []Supine                       []Intermittent   []Continuous Lbs:  [] before manual  [] after manual    []  Ultrasound: []Continuous   [] Pulsed                           []1MHz   []3MHz Location:  W/cm2:    []  Iontophoresis with dexamethasone         Location: [] Take home patch   [] In clinic    []  Ice     []  heat  []  Ice massage  []  Laser   []  Anodyne Position:  Location:    []  Laser with stim  []  Other: Position:  Location:    []  Vasopneumatic Device Pressure:       [] lo [] med [] hi   Temperature: [] lo [] med [] hi   [] Skin assessment post-treatment:  []intact []redness- no adverse reaction    []redness - adverse reaction:     19 min [x]Eval                  []Re-Eval       8 min Therapeutic Exercise:  [x] See flow sheet :   Rationale: increase ROM, increase strength and improve coordination to improve the patients ability to aid with increase tolerance to ADLs and activities     8 min Therapeutic Activity:  [x]  See flow sheet :   Rationale: increase ROM, increase strength and improve coordination  to improve the patients ability to aid       min Neuromuscular Re-education:  []  See flow sheet :   Rationale:   to improve the patients ability to     8 min Manual Therapy:  LAD,  OSCILL, PROM    Rationale:  to min Gait Training:  ___ feet with ___ device on level surfaces with ___ level of assist   Rationale: With   [x] TE   [x] TA   [] neuro   [] other: Patient Education: [x] Review HEP    [] Progressed/Changed HEP based on:   [x] positioning   [] body mechanics   [] transfers   [x] heat/ice application    [] other:      Other Objective/Functional Measures: FALLS RISK IS LOW , no AD use, no LOB observed    Physical Therapy Evaluation - Shoulder    Posture: [] Poor    [] Fair    [x] Good    Describe:    ROM:  [] Unable to assess at this time                                           AROM                                                              PROM   Left Right  Left Right   Flexion 155  Flexion   85   Extension   Extension     Scaption/  Scaptin/ABD   60   ER @ 0 Degrees 85  ER @ 0 Degrees   -10 N   ER @ 90 Degrees   ER @ 90 Degrees     IR @  0 Degrees abdomen  IR @ 0 Degrees  abdomen      End Feel / Painful Arc:    Strength:   [x] Unable to assess at this time    Right UE due to post op                                                                         L (1-5) R (1-5) Pain   Flexors 4  [] Yes   [] No   Abductors 4  [] Yes   [] No   External Rotators 5  [] Yes   [] No   Internal Rotators 5  [] Yes   [] No   Supraspinatus   [] Yes   [] No   Serratus Anterior   [] Yes   [] No   Lower Trapezius   [] Yes   [] No   Elbow Flexion   [] Yes   [] No   Elbow Extension   [] Yes   [] No       Scapulohumoral Control / Rhythm:  Able to eccentrically lower with good control?  Left: [x] Yes   [] No     Right: [] Yes   [x] No    Accessory Motions:    Palpation  [x] Min  [] Mod  [] Severe    Location:generalized TTP right shoulder  [] Min  [] Mod  [] Severe    Location:  [] Min  [] Mod  [] Severe    Location:    Optional Tests:    Sensation Left Right Reflexes Left Right   Biceps (C5)   Biceps (C5)     Silver Radial(C6-7)   Brachioradialis (C6)     Silver Ulnar(C8-T1)   Triceps (C7)       Adson's Test  [] Pos [] Neg Yergason's Test [] Pos   [] Neg  Ivania's Test  [] Pos   [] Neg Thoreau's Sign [] Pos   [] Neg  Neer's Test  [] Pos   [] Neg Clunk Test  [] Pos   [] Neg  Hawkin's Test  [] Pos   [] Neg AC Joint  [] Pos   [] Neg  Speed's Test  [] Pos   [] Neg SC Joint  [] Pos   [] Neg  Empty Can  [] Pos   [] Neg Pectoral Tightness [] Pos   [] Neg  Anterior Apprehension [] Pos   [] Neg   Posterior Apprehension [] Pos   [] Neg      Other Tests / Comments:        Pain Level (0-10 scale) post treatment: 9-10    ASSESSMENT/Changes in Function: Patient demonstrates the potential to make gains with improved ROM, strength, endurance/activity tolerance, functional FOTO survey score   and all within a reasonable time frame so as to increase their functional independence with ADLs and activities for carryover to  Improved quality of life, tolerance to household chores and community activities and work demands. Patient requires skilled Physical Therapy so as to monitor their response to and modify their treatment plan accordingly. Patient appears to be an appropriate candidate for skilled outpatient Physical Therapy. Patient will continue to benefit from skilled PT services to modify and progress therapeutic interventions, address ROM deficits, address strength deficits, analyze and address soft tissue restrictions, analyze and cue movement patterns, analyze and modify body mechanics/ergonomics, assess and modify postural abnormalities and instruct in home and community integration to attain remaining goals.      [x]  See Plan of Care  []  See progress note/recertification  []  See Discharge Summary         Progress towards goals / Updated goals:       PLAN  [x]  Upgrade activities as tolerated     [x]  Continue plan of care  []  Update interventions per flow sheet       []  Discharge due to:_  []  Other:_      Harriet Jason, PT 5/14/2019  1:28 PM

## 2019-05-16 ENCOUNTER — HOSPITAL ENCOUNTER (OUTPATIENT)
Dept: PHYSICAL THERAPY | Age: 49
Discharge: HOME | End: 2019-05-16
Payer: COMMERCIAL

## 2019-05-16 PROCEDURE — 97140 MANUAL THERAPY 1/> REGIONS: CPT

## 2019-05-16 NOTE — PROGRESS NOTES
PT DAILY TREATMENT NOTE 10-18    Patient Name: Isabella Market  Date:2019  : 1970  [x]  Patient  Verified  Payor: BLUE CROSS / Plan: [employer] [facility] / Product Type: PPO /    In time:5:00  Out time:5:34  Total Treatment Time (min): 34  Visit #: 2 of 12    Medicare/BCBS Only   Total Timed Codes (min):  24 1:1 Treatment Time:  15       Treatment Area: Pain in right shoulder [M25.511]  S/P shoulder surgery [Z98.890]    SUBJECTIVE  Pain Level (0-10 scale): 6  Any medication changes, allergies to medications, adverse drug reactions, diagnosis change, or new procedure performed?: [x] No    [] Yes (see summary sheet for update)  Subjective functional status/changes:   [] No changes reported  States it is feeling better, still some pain    OBJECTIVE    Modality rationale: decrease edema, decrease inflammation and decrease pain to improve the patients ability to ease with tolerance to ADLs   Min Type Additional Details    [] Estim:  []Unatt       []IFC  []Premod                        []Other:  []w/ice   []w/heat  Position:  Location:    [] Estim: []Att    []TENS instruct  []NMES                    []Other:  []w/US   []w/ice   []w/heat  Position:  Location:    []  Traction: [] Cervical       []Lumbar                       [] Prone          []Supine                       []Intermittent   []Continuous Lbs:  [] before manual  [] after manual    []  Ultrasound: []Continuous   [] Pulsed                           []1MHz   []3MHz W/cm2:  Location:    []  Iontophoresis with dexamethasone         Location: [] Take home patch   [] In clinic   10 [x]  Ice     []  heat  []  Ice massage  []  Laser   []  Anodyne Position: incline  Location: right shoulder    []  Laser with stim  []  Other:  Position:  Location:    []  Vasopneumatic Device Pressure:       [] lo [] med [] hi   Temperature: [] lo [] med [] hi   [] Skin assessment post-treatment:  []intact []redness- no adverse reaction    []redness - adverse reaction: 14 min Therapeutic Exercise:  [x] See flow sheet :   Rationale: increase ROM, increase strength and increase proprioception to improve the patients ability to perform daily household chores. 10 min Manual Therapy:  PROM flexion/ abd/ IR/ ER, GH joint mobs, LAD with oscillation    Rationale: decrease pain, increase ROM, increase tissue extensibility and decrease edema  to assist with tolerance to ADLs         With   [] TE   [] TA   [] neuro   [] other: Patient Education: [x] Review HEP    [] Progressed/Changed HEP based on:   [] positioning   [] body mechanics   [] transfers   [] heat/ice application    [] other:      Other Objective/Functional Measures: Initiated exercises per flow sheet with no changes in symptoms  Report of discomfort with isometric abduction- discomfort relieved with decreasing pressure   Limited PROM in abduction ~ 20-30 degrees, flexion ~40 degrees     Pain Level (0-10 scale) post treatment: <6    ASSESSMENT/Changes in Function: Patient continues to show improvements with signs/ symptoms however still demonstrates a decrease in strength, impaired ROM,  and an increase in pain with functional activities. Patient will continue to benefit from skilled PT services to modify and progress therapeutic interventions, address functional mobility deficits, address strength deficits, analyze and cue movement patterns and analyze and modify body mechanics/ergonomics to attain remaining goals. [x]  See Plan of Care  []  See progress note/recertification  []  See Discharge Summary         Progress towards goals / Updated goals:  Short Term Goals:  To be accomplished in 5 treatments:               1 patient will have established and be I with HEP to aid with increase tolerance to activities at home               EVAL issued               CURRENT               2 patient will have pain 7/10 to aid with increase tolerance to ADLs and activities at home and work               EVAL 9-10 CURRENT   Long Term Goals:  To be accomplished in 12 treatments:               1  patient will have pain 5/10 to aid with increase tolerance to ADLs and activities at home and work               EVAL 9-10               CURRENT               2 patient will have PROM unforced  F and abd 120 to aid with progression of protocol and tolerance to regular activities as home               EVAL PROM F 85, abd 60               CURRENT               3 patient will have FOTO 42 to show improving function with dressing               EVAL difficulty with dressing               CURRENT        PLAN  [x]  Upgrade activities as tolerated     [x]  Continue plan of care  []  Update interventions per flow sheet       []  Discharge due to:_  []  Other:_      Melvina Griffin, PT 5/16/2019  7:50 AM    Future Appointments   Date Time Provider Christi Willis   5/16/2019  5:00 PM Martin Mcclellan, PT [facility]   5/20/2019  1:45 PM Roni Hammans, PA [address]   5/21/2019  2:00 PM Martin Mcclellan, PT [facility]   5/22/2019  3:30 PM Martin Mcclellan, PT [facility]   5/23/2019 11:00 AM [facility] MRI RM 1 [facility]   5/23/2019  3:00 PM Lakisha Mckinnon, PT [facility]   5/29/2019  3:00 PM Lakisha Mckinnon, PT [facility]   5/30/2019 11:30 AM Madalyn Williamson, PT [facility]   5/31/2019  8:30 AM Martin Mcclellan, PT [facility]   6/3/2019  8:00 AM Martin Mcclellan, PT [facility]   6/5/2019  7:30 AM Madalyn Williamson, PT [facility]   6/7/2019  8:30 AM Martin Mcclellan, PT [facility]   6/10/2019  8:30 AM Martin Mcclellan, PT [facility]   6/12/2019  8:30 AM Lakisha Mckinnon, PT [facility]   6/14/2019  8:30 AM Martin Mcclellan, PT [facility]   6/17/2019  8:00 AM Martin Mcclellan, PT [facility]   6/19/2019  8:30 AM Aliyah Becerra, PT [facility]   6/21/2019  8:00 AM Martin Mcclellan, PT [facility]   6/25/2019  4:00 PM Martin Mcclellan, PT [facility]   6/27/2019  4:00 PM Lakisha Mckinnon, PT [facility]

## 2019-05-20 ENCOUNTER — OFFICE VISIT (OUTPATIENT)
Dept: ORTHOPEDIC SURGERY | Age: 49
End: 2019-05-20

## 2019-05-20 VITALS
BODY MASS INDEX: 29.06 KG/M2 | WEIGHT: 164 LBS | RESPIRATION RATE: 18 BRPM | HEART RATE: 79 BPM | TEMPERATURE: 97.9 F | DIASTOLIC BLOOD PRESSURE: 72 MMHG | OXYGEN SATURATION: 100 % | SYSTOLIC BLOOD PRESSURE: 120 MMHG | HEIGHT: 63 IN

## 2019-05-20 DIAGNOSIS — M75.121 NONTRAUMATIC COMPLETE TEAR OF RIGHT ROTATOR CUFF: Primary | ICD-10-CM

## 2019-05-20 NOTE — PROGRESS NOTES
1. Have you been to the ER, urgent care clinic since your last visit? Hospitalized since your last visit? No    2. Have you seen or consulted any other health care providers outside of the [address] [place] [address] since your last visit? Include any pap smears or colon screening.  No

## 2019-05-20 NOTE — PROGRESS NOTES
George Evans  1970     HISTORY OF PRESENT ILLNESS  George Evans is a 46 y.o. female who presents today for evaluation s/p Right shoulder arthroscopic rotator cuff revision and manipulation under anesthesia on 5/13/19. Patient has been going to PT. Describes pain as a 5/10. Has been taking nothing for pain. Still has night pain. Patient denies any fever, chills, chest pain, shortness of breath or calf pain. The remainder of the review of systems is negative. There are no new illness or injuries to report since last seen in the office. No changes in medications, allergies, social or family history. PHYSICAL EXAM:   Visit Vitals  /72   Pulse 79   Temp 97.9 °F (36.6 °C) (Oral)   Resp 18   Ht 5' 3\" (1.6 m)   Wt 164 lb (74.4 kg)   SpO2 100%   BMI 29.05 kg/m²      The patient is a well-developed, well-nourished female in no acute distress. The patient is alert and oriented times three. The patient appears to be well groomed. Mood and affect are normal.  ORTHOPEDIC EXAM of right shoulder:  Inspection: swelling present,  Bruising present  Incision, clean, dry, intact, sutures in place  Passive glenohumeral abduction 0-35 degrees  Stability: Stable  Strength: n/a  2+ distal pulses    IMPRESSION:  S/P Right shoulder arthroscopic rotator cuff revision with manipulation under anesthesia    PLAN:   Incisions cleaned. Surgery was discussed at length today. Patient to continue with PROM and PT. Continue wearing sling. Stressed to patient that nothing causes an increase in pain.   RTC 3 weeks    Patient seen and evaluated by Dr. Smiley Barbour today who agrees with treatment plan     BERNIE Plasc[facility]

## 2019-05-21 ENCOUNTER — HOSPITAL ENCOUNTER (OUTPATIENT)
Dept: PHYSICAL THERAPY | Age: 49
Discharge: HOME | End: 2019-05-21
Payer: COMMERCIAL

## 2019-05-21 PROCEDURE — 97140 MANUAL THERAPY 1/> REGIONS: CPT

## 2019-05-21 PROCEDURE — 97110 THERAPEUTIC EXERCISES: CPT

## 2019-05-21 NOTE — PROGRESS NOTES
PT DAILY TREATMENT NOTE 10-18    Patient Name: Nelson Orellana  Date:2019  : 1970  [x]  Patient  Verified  Payor: BLUE CROSS / Plan: [employer] [facility] [place] / Product Type: PPO /    In time:2:01  Out time:2:26  Total Treatment Time (min): 25  Visit #: 3 of 12    Medicare/BCBS Only   Total Timed Codes (min):  25 1:1 Treatment Time:  25       Treatment Area: Pain in right shoulder [M25.511]  S/P shoulder surgery [Z98.890]    SUBJECTIVE  Pain Level (0-10 scale): 4  Any medication changes, allergies to medications, adverse drug reactions, diagnosis change, or new procedure performed?: [x] No    [] Yes (see summary sheet for update)  Subjective functional status/changes:   [] No changes reported  States that she is doing well, less pain today     OBJECTIVE          15 min Therapeutic Exercise:  [x] See flow sheet :   Rationale: increase ROM, increase strength, improve coordination and increase proprioception to improve the patients ability to perform daily household chores. 10 min Manual Therapy:  PROM with gentle overpressure flexion/ abd/ IR/ ER, LAD with oscillation, GH joint mobs   Rationale: decrease pain, increase ROM and increase tissue extensibility to assist with tolerance to ADLs              With   [] TE   [] TA   [] neuro   [] other: Patient Education: [x] Review HEP    [] Progressed/Changed HEP based on:   [] positioning   [] body mechanics   [] transfers   [] heat/ice application    [] other:      Other Objective/Functional Measures:   PROM flexion ~90 degrees     Pain Level (0-10 scale) post treatment: 4    ASSESSMENT/Changes in Function: Patient continues to show improvements with signs/ symptoms however still demonstrates a decrease in strength, impaired ROM,  and an increase in pain with functional activities.        Patient will continue to benefit from skilled PT services to modify and progress therapeutic interventions, address functional mobility deficits, address ROM deficits, address strength deficits, analyze and cue movement patterns and analyze and modify body mechanics/ergonomics to attain remaining goals.      [x]  See Plan of Care  []  See progress note/recertification  []  See Discharge Summary         Progress towards goals / Updated goals:  Short Term Goals: To be accomplished in 5 treatments:               1 patient will have established and be I with HEP to aid with increase tolerance to activities at home               EVAL issued               [id number]               2 patient will have pain 7/10 to aid with increase tolerance to ADLs and activities at home and work               EVAL 9-10  42 [address] be accomplished in 12 treatments:               1  patient will have pain 5/10 to aid with increase tolerance to ADLs and activities at home and work               EVAL 9-10               [id number]               2 patient will have PROM unforced  F and abd 120 to aid with progression of protocol and tolerance to regular activities as home               EVAL PROM F 85, abd 60               [id number]               3 patient will have FOTO 42 to show improving function with dressing               EVAL difficulty with dressing               [id number]        PLAN  [x]  Upgrade activities as tolerated     [x]  Continue plan of care  []  Update interventions per flow sheet       []  Discharge due to:_  []  Other:_      Mauricio Mendez, PT 5/21/2019  7:46 AM    Future Appointments   Date Time Provider Christi Willis   5/21/2019  2:00 PM Chan Montiel PT [facility]   5/22/2019  3:30 PM Chan Montiel PT [facility]   5/23/2019  3:00 PM Simona Cruz PT [facility]   5/29/2019  3:00 PM Simona Cruz PT [facility]   5/30/2019 11:30 AM Eva Lynn PT [facility]   5/31/2019  8:30 AM Chan Montiel PT [facility]   6/3/2019  8:00 AM Chan Montiel PT [facility]   6/5/2019  7:30 AM Eva Lynn PT [facility]   6/7/2019  8:30 AM Hien Morin, PT [facility]   6/10/2019  8:30 AM Hien Morin, PT [facility]   6/10/2019  4:30 PM [facility]   6/12/2019  8:30 AM rEinn Ding, PT [facility]   6/14/2019  8:30 AM Hien Morin, PT [facility]   6/17/2019  8:00 AM Hien Morin, PT [facility]   6/19/2019  8:30 AM Erinn Ding, PT [facility]   6/21/2019  8:00 AM Hien Morin, PT [facility]   6/25/2019  4:00 PM Hien Morin, PT [facility]   6/27/2019  4:00 PM Edna Mckinnon, PT [facility]   9/24/2019 11:00 AM HBV MRI RM 1 [facility]

## 2019-05-22 ENCOUNTER — HOSPITAL ENCOUNTER (OUTPATIENT)
Dept: PHYSICAL THERAPY | Age: 49
Discharge: HOME | End: 2019-05-22
Payer: COMMERCIAL

## 2019-05-22 PROCEDURE — 97110 THERAPEUTIC EXERCISES: CPT

## 2019-05-22 PROCEDURE — 97140 MANUAL THERAPY 1/> REGIONS: CPT

## 2019-05-22 NOTE — PROGRESS NOTES
PT DAILY TREATMENT NOTE 10-18    Patient Name: Shira Carpenter  Date:2019  : 1970  [x]  Patient  Verified  Payor: BLUE CROSS / Plan: [employer] [facility] [place] / Product Type: PPO /    In time:3:30  Out time:4:05  Total Treatment Time (min): 35  Visit #: 4 of 12    Medicare/BCBS Only   Total Timed Codes (min):  25 1:1 Treatment Time:  25       Treatment Area: Pain in right shoulder [M25.511]  S/P shoulder surgery [Z98.890]    SUBJECTIVE  Pain Level (0-10 scale): 4  Any medication changes, allergies to medications, adverse drug reactions, diagnosis change, or new procedure performed?: [x] No    [] Yes (see summary sheet for update)  Subjective functional status/changes:   [] No changes reported  States it is feeling alright still, not a lot of pain    OBJECTIVE    Modality rationale: decrease edema, decrease inflammation and decrease pain to improve the patients ability to ease with tolerance to ADLs   Min Type Additional Details    [] Estim:  []Unatt       []IFC  []Premod                        []Other:  []w/ice   []w/heat  Position:  Location:    [] Estim: []Att    []TENS instruct  []NMES                    []Other:  []w/US   []w/ice   []w/heat  Position:  Location:    []  Traction: [] Cervical       []Lumbar                       [] Prone          []Supine                       []Intermittent   []Continuous Lbs:  [] before manual  [] after manual    []  Ultrasound: []Continuous   [] Pulsed                           []1MHz   []3MHz W/cm2:  Location:    []  Iontophoresis with dexamethasone         Location: [] Take home patch   [] In clinic   10 [x]  Ice     []  heat  []  Ice massage  []  Laser   []  Anodyne Position: incline  Location: right shoulder    []  Laser with stim  []  Other:  Position:  Location:    []  Vasopneumatic Device Pressure:       [] lo [] med [] hi   Temperature: [] lo [] med [] hi   [] Skin assessment post-treatment:  []intact []redness- no adverse reaction    []redness - adverse reaction:       15 min Therapeutic Exercise:  [x] See flow sheet :   Rationale: increase ROM, increase strength, improve coordination and increase proprioception to improve the patients ability to perform daily household chores. 10 min Manual Therapy:  PROM with gentle overpressure flexion/ abd/ Ir/ ER, LAD with oscillation , GH mobs for abduction/ flexion   Rationale: decrease pain, increase ROM and increase tissue extensibility to assist with tolerance to ADLs      With   [] TE   [] TA   [] neuro   [] other: Patient Education: [x] Review HEP    [] Progressed/Changed HEP based on:   [] positioning   [] body mechanics   [] transfers   [] heat/ice application    [] other:      Other Objective/Functional Measures:   Increased PROM noted this session with no discomfort  Tightness in IR/ ABD > Flexion/ ER     Pain Level (0-10 scale) post treatment: <4    ASSESSMENT/Changes in Function: Patient continues to show improvements with signs/ symptoms however still demonstrates a decrease in strength, impaired ROM, and an increase in pain with functional activities. Patient will continue to benefit from skilled PT services to modify and progress therapeutic interventions, address functional mobility deficits, address strength deficits, analyze and cue movement patterns, analyze and modify body mechanics/ergonomics and assess and modify postural abnormalities to attain remaining goals.      [x]  See Plan of Care  []  See progress note/recertification  []  See Discharge Summary         Progress towards goals / Updated goals:  Short Term Goals: To be accomplished in 5 treatments:               1 patient will have established and be I with HEP to aid with increase tolerance to activities at home               EVAL issued               [id number]               2 patient will have pain 7/10 to aid with increase tolerance to ADLs and activities at home and work               EVAL 9-10               CURRENT   Long Term Goals: To be accomplished in 12 treatments:               1  patient will have pain 5/10 to aid with increase tolerance to ADLs and activities at home and work               EVAL 9-10               [id number]               2 patient will have PROM unforced  F and abd 120 to aid with progression of protocol and tolerance to regular activities as home               EVAL PROM F 85, abd 60               [id number]               3 patient will have FOTO 42 to show improving function with dressing               EVAL difficulty with dressing               [id number]              PLAN  [x]  Upgrade activities as tolerated     [x]  Continue plan of care  []  Update interventions per flow sheet       []  Discharge due to:_  []  Other:_      Christopher Duran, PT 5/22/2019  1:16 PM    Future Appointments   Date Time Provider Christi Willis   5/22/2019  3:30 PM Geo Fairchild [address]   5/23/2019  3:00 PM Maya Mejia, PT [facility] [address]   5/29/2019  3:00 PM Kavitha Mckinnon, PT [facility] [address]   5/30/2019 11:30 AM Edenilson Pierce, PT [facility] [address]   5/31/2019  8:30 AM Maite Becker, PT [facility] [address]   6/3/2019  8:00 AM Maite Becker, PT [facility] [address]   6/5/2019  7:30 AM Edenilson Pierce, PT [facility] [address]   6/7/2019  8:30 AM Maite Becker PT [facility] [address]   6/10/2019  8:30 AM Maite Becker, PT [facility] [address]   6/10/2019  4:30 PM VLAD [facility]   6/12/2019  8:30 AM Maya Mejia, PT [facility] [address]   6/14/2019  8:30 AM Maite Becker, PT [facility] [address]   6/17/2019  8:00 AM Maite Becker, PT [facility] [address]   6/19/2019  8:30 AM Maya Mejia, PT [facility] [address]   6/21/2019  8:00 AM [facility]   6/25/2019  4:00 PM [facility] Becker[facility]   6/27/2019  4:00 PM Jenifer [facility]innon [facility]   9/24/2019 11:00 AM [facility] MRI RM 1 [facility]

## 2019-05-23 ENCOUNTER — HOSPITAL ENCOUNTER (OUTPATIENT)
Dept: PHYSICAL THERAPY | Age: 49
Discharge: HOME | End: 2019-05-23
Payer: COMMERCIAL

## 2019-05-23 PROCEDURE — 97016 VASOPNEUMATIC DEVICE THERAPY: CPT | Performed by: PHYSICAL THERAPIST

## 2019-05-23 PROCEDURE — 97530 THERAPEUTIC ACTIVITIES: CPT | Performed by: PHYSICAL THERAPIST

## 2019-05-23 PROCEDURE — 97140 MANUAL THERAPY 1/> REGIONS: CPT | Performed by: PHYSICAL THERAPIST

## 2019-05-23 NOTE — PROGRESS NOTES
PT DAILY TREATMENT NOTE 10-18    Patient Name: Shira Carpenter  Date:2019  : 1970  [x]  Patient  Verified  Payor: BLUE CROSS / Plan: [employer] [facility] / Product Type: PPO /    In time:3:05P  Out time:3:50P  Total Treatment Time (min): 45  Visit #: 5 of 12    Medicare/BCBS Only   Total Timed Codes (min):  25 1:1 Treatment Time:  25       Treatment Area: Pain in right shoulder [M25.511]  S/P shoulder surgery [Z98.890]    SUBJECTIVE  Pain Level (0-10 scale): 3/10  Any medication changes, allergies to medications, adverse drug reactions, diagnosis change, or new procedure performed?: [x] No    [] Yes (see summary sheet for update)  Subjective functional status/changes:   [] No changes reported  Patient reports still feeling tight and sore, but improving, compliant with home program.     OBJECTIVE    Modality rationale: decrease edema, decrease inflammation and increase tissue extensibility to improve the patients ability to improve activity tolerance.     Min Type Additional Details    [] Estim:  []Unatt       []IFC  []Premod                        []Other:  []w/ice   []w/heat  Position:  Location:    [] Estim: []Att    []TENS instruct  []NMES                    []Other:  []w/US   []w/ice   []w/heat  Position:  Location:    []  Traction: [] Cervical       []Lumbar                       [] Prone          []Supine                       []Intermittent   []Continuous Lbs:  [] before manual  [] after manual    []  Ultrasound: []Continuous   [] Pulsed                           []1MHz   []3MHz W/cm2:  Location:    []  Iontophoresis with dexamethasone         Location: [] Take home patch   [] In clinic    []  Ice     []  heat  []  Ice massage  []  Laser   []  Anodyne Position:  Location:    []  Laser with stim  []  Other:  Position:  Location:   15 [x]  Vasopneumatic Device Pressure:       [x] lo [] med [] hi   Temperature: [] lo [x] med [] hi   [] Skin assessment post-treatment:  []intact []redness- no adverse reaction    []redness - adverse reaction:     18 min Therapeutic Activity:  [x]  See flow sheet :   Rationale: increase strength and improve coordination  to improve the patients ability to Tolerate basic ADLs and job-related tasks without pain. 12 min Manual Therapy:  Grade 2-3 mobs to [facility] joint with passive stretching   Rationale: increase ROM and increase tissue extensibility to A/ROM and decrease pain with movement. With   [] TE   [x] TA   [x] neuro   [] other: Patient Education: [x] Review HEP    [x] Progressed/Changed HEP based on:   [x] positioning   [] body mechanics   [] transfers   [] heat/ice application    [] other:      Other Objective/Functional Measures: P/ROM of R shoulder = 110 degs without overpressure     Pain Level (0-10 scale) post treatment: 0/10    ASSESSMENT/Changes in Function: Patient is progressing well without increased pain with added isometric banded exercises today, very light. Patient will continue to benefit from skilled PT services to modify and progress therapeutic interventions, address functional mobility deficits, address ROM deficits, address strength deficits, analyze and address soft tissue restrictions, analyze and cue movement patterns, analyze and modify body mechanics/ergonomics and assess and modify postural abnormalities to attain remaining goals.      [x]  See Plan of Care  []  See progress note/recertification  []  See Discharge Summary         Progress towards goals / Updated goals:  Short Term Goals: To be accomplished in 5 treatments:               1 patient will have established and be I with HEP to aid with increase tolerance to activities at home               EVAL issued               [id number] Progressing - intermittent compliance 5/24/19               2 patient will have pain 7/10 to aid with increase tolerance to ADLs and activities at home and work               EVAL 9-10               CURRENT 6/10 on 5/24/19  Long Term Goals: To be accomplished in 12 treatments:               1  patient will have pain 5/10 to aid with increase tolerance to ADLs and activities at home and work               EVAL 9-10               [id number]               2 patient will have PROM unforced  F and abd 120 to aid with progression of protocol and tolerance to regular activities as home               EVAL PROM F 85, abd 60               [id number]               3 patient will have FOTO 42 to show improving function with dressing               EVAL difficulty with dressing               [id number]    PLAN  [x]  Upgrade activities as tolerated     []  Continue plan of care  []  Update interventions per flow sheet       []  Discharge due to:_  []  Other:_      Jenifer Mckinnon, PT 5/23/2019  3:21 PM    Future Appointments   Date Time Provider Christi Willis   5/29/2019  3:00 PM Maya Mejia, PT [facility]   5/30/2019 11:30 AM Edenilson Pierce, PT [facility]   5/31/2019  8:30 AM Maite Becker, PT [facility]   6/3/2019  8:00 AM Maite Beckre, PT [facility]   6/5/2019  7:30 AM Edenilson Pierce, PT [facility]   6/7/2019  8:30 AM Maite Becker, PT [facility]   6/10/2019  8:30 AM Maite Becker, PT [facility]   6/10/2019  4:30 PM [facility]   6/12/2019  8:30 AM Maya Mejia, PT [facility]   6/14/2019  8:30 AM Maite Becker, PT [facility]   6/17/2019  8:00 AM Maite Becker, PT [facility]   6/19/2019  8:30 AM Maya Mejia, PT [facility]   6/21/2019  8:00 AM Maite Becker, PT [facility]   6/25/2019  4:00 PM Maite Becker, PT [facility]   6/27/2019  4:00 PM Jenifer Mckinnon, PT [facility]   9/24/2019 11:00 AM HBV MRI RM 1 [facility]

## 2019-05-29 ENCOUNTER — HOSPITAL ENCOUNTER (OUTPATIENT)
Dept: PHYSICAL THERAPY | Age: 49
Discharge: HOME | End: 2019-05-29
Payer: COMMERCIAL

## 2019-05-29 PROCEDURE — 97530 THERAPEUTIC ACTIVITIES: CPT | Performed by: PHYSICAL THERAPIST

## 2019-05-29 PROCEDURE — 97110 THERAPEUTIC EXERCISES: CPT | Performed by: PHYSICAL THERAPIST

## 2019-05-29 NOTE — PROGRESS NOTES
PT DAILY TREATMENT NOTE 10-18    Patient Name: Silvino Almonte  Date:2019  : 1970  [x]  Patient  Verified  Payor: BLUE CROSS / Plan: [employer] [facility] / Product Type: PPO /    In time:3:07P  Out time:3:47P  Total Treatment Time (min): 40  Visit #: 6 of 12    Medicare/BCBS Only   Total Timed Codes (min):  23 1:1 Treatment Time:  23       Treatment Area: Pain in right shoulder [M25.511]  S/P shoulder surgery [Z98.890]    SUBJECTIVE  Pain Level (0-10 scale): 0/10  Any medication changes, allergies to medications, adverse drug reactions, diagnosis change, or new procedure performed?: [x] No    [] Yes (see summary sheet for update)  Subjective functional status/changes:   [] No changes reported  Patient states she is feeling better overall, went back to work this week and has been mostly using L UE, but starting to type with her R arm as well. Encouraged her to do that. OBJECTIVE    Modality rationale: decrease edema, decrease inflammation and decrease pain to improve the patients ability to increase activity tolerance.     Min Type Additional Details    [] Estim:  []Unatt       []IFC  []Premod                        []Other:  []w/ice   []w/heat  Position:  Location:    [] Estim: []Att    []TENS instruct  []NMES                    []Other:  []w/US   []w/ice   []w/heat  Position:  Location:    []  Traction: [] Cervical       []Lumbar                       [] Prone          []Supine                       []Intermittent   []Continuous Lbs:  [] before manual  [] after manual    []  Ultrasound: []Continuous   [] Pulsed                           []1MHz   []3MHz W/cm2:  Location:    []  Iontophoresis with dexamethasone         Location: [] Take home patch   [] In clinic    []  Ice     []  heat  []  Ice massage  []  Laser   []  Anodyne Position:  Location:    []  Laser with stim  []  Other:  Position:  Location:   10 [x]  Vasopneumatic Device Pressure:       [x] lo [] med [] hi   Temperature: [] lo [x] med [] hi   [] Skin assessment post-treatment:  []intact []redness- no adverse reaction    []redness - adverse reaction:     10 min Therapeutic Exercise:  [x] See flow sheet :   Rationale: increase ROM and increase strength to improve the patients ability to increase A/ROM and decrease pain with movement. 15 min Therapeutic Activity:  [x]  See flow sheet :   Rationale: increase strength and improve coordination  to improve the patients ability to Tolerate basic ADLs and job-related tasks without pain with manual therapy. With   [x] TE   [x] TA   [] neuro   [] other: Patient Education: [x] Review HEP    [x] Progressed/Changed HEP based on:   [x] positioning   [] body mechanics   [] transfers   [] heat/ice application    [] other:      Other Objective/Functional Measures: P/ROM of R shoulder:      Pain Level (0-10 scale) post treatment: 0/10    ASSESSMENT/Changes in Function: Patient is progressing towards goals of increased ROM and decreased pain, able to demonstrate improved P/ROM s/p surgery. Patient will continue to benefit from skilled PT services to modify and progress therapeutic interventions, address functional mobility deficits, address ROM deficits, address strength deficits, analyze and address soft tissue restrictions, analyze and cue movement patterns, analyze and modify body mechanics/ergonomics and assess and modify postural abnormalities to attain remaining goals.      [x]  See Plan of Care  []  See progress note/recertification  []  See Discharge Summary         Progress towards goals / Updated goals:  Short Term Goals: To be accomplished in 5 treatments:               1 patient will have established and be I with HEP to aid with increase tolerance to activities at home               EVAL issued               [id number] Progressing - intermittent compliance 5/24/19, MET 5/29/19               2 patient will have pain 7/10 to aid with increase tolerance to ADLs and activities at home and work               EVAL 9-10               [id number] 0/63 on 5/24/19, MET at 3-8/10 \"twinge\" 5/29/19  Long Term Goals: To be accomplished in 12 treatments:               1  patient will have pain 5/10 to aid with increase tolerance to ADLs and activities at home and work               EVAL 9-10               MARCIANO does not have any pain that really lasts 5/29/19               2 patient will have PROM unforced  F and abd 120 to aid with progression of protocol and tolerance to regular activities as home               EVAL PROM F 85, abd 60               CURRENT               3 patient will have FOTO 42 to show improving function with dressing               EVAL difficulty with dressing               [id number]    PLAN  [x]  Upgrade activities as tolerated     []  Continue plan of care  []  Update interventions per flow sheet       []  Discharge due to:_  []  Other:_      Jenifer Mckinnon, PT 5/29/2019  3:10 PM    Future Appointments   Date Time Provider Christi Willis   5/30/2019  7:30 AM Kandy Del Toro, PT [facility]   5/31/2019  8:30 AM Kandy Del Toro, PT [facility]   6/3/2019  8:00 AM Kandy Del Toro, PT [facility]   6/5/2019  7:30 AM Corbett Schilder, PT [facility]   6/7/2019  8:30 AM Kandy Del Toro PT [facility]   6/10/2019  8:30 AM Kandy Del Toro PT [facility]   6/10/2019  4:30 PM [facility]   6/12/2019  8:30 AM Sherlyn Gama, PT [facility]   6/14/2019  8:30 AM Kandy Del Toro PT [facility]   6/17/2019  8:00 AM Kandy Del Toro PT [facility]   6/19/2019  3:30 PM Kandy Del Toro, PT [facility]   6/21/2019  8:00 AM Kandy Del Toro, PT [facility]   6/25/2019  4:00 PM Sherlyn Gama, PT [facility]   6/27/2019  4:00 PM Jenifer Mckinnon, PT [facility]   9/24/2019 11:00 AM HBV MRI RM 1 [facility]

## 2019-05-30 ENCOUNTER — HOSPITAL ENCOUNTER (OUTPATIENT)
Dept: PHYSICAL THERAPY | Age: 49
Discharge: HOME | End: 2019-05-30
Payer: COMMERCIAL

## 2019-05-30 PROCEDURE — 97140 MANUAL THERAPY 1/> REGIONS: CPT

## 2019-05-30 PROCEDURE — 97016 VASOPNEUMATIC DEVICE THERAPY: CPT

## 2019-05-30 PROCEDURE — 97110 THERAPEUTIC EXERCISES: CPT

## 2019-05-30 NOTE — PROGRESS NOTES
PT DAILY TREATMENT NOTE 10-18    Patient Name: Conner Hancock  Date:2019  : 1970  [x]  Patient  Verified  Payor: BLUE CROSS / Plan: [employer] [facility] [place] / Product Type: PPO /    In time:7:30  Out time:8:16  Total Treatment Time (min): 46  Visit #: 8 of 12    Medicare/BCBS Only   Total Timed Codes (min):  36 1:1 Treatment Time:  36       Treatment Area: Pain in right shoulder [M25.511]  S/P shoulder surgery [Z98.890]    SUBJECTIVE  Pain Level (0-10 scale): 0  Any medication changes, allergies to medications, adverse drug reactions, diagnosis change, or new procedure performed?: [x] No    [] Yes (see summary sheet for update)  Subjective functional status/changes:   [] No changes reported  States that she attempted to sleep without the sling last night, woke up stiff a couple of times    OBJECTIVE    Modality rationale: decrease edema, decrease inflammation and decrease pain to improve the patients ability to ease with tolerance to ADLs.     Min Type Additional Details    [] Estim:  []Unatt       []IFC  []Premod                        []Other:  []w/ice   []w/heat  Position:  Location:    [] Estim: []Att    []TENS instruct  []NMES                    []Other:  []w/US   []w/ice   []w/heat  Position:  Location:    []  Traction: [] Cervical       []Lumbar                       [] Prone          []Supine                       []Intermittent   []Continuous Lbs:  [] before manual  [] after manual    []  Ultrasound: []Continuous   [] Pulsed                           []1MHz   []3MHz W/cm2:  Location:    []  Iontophoresis with dexamethasone         Location: [] Take home patch   [] In clinic    []  Ice     []  heat  []  Ice massage  []  Laser   []  Anodyne Position:  Location:    []  Laser with stim  []  Other:  Position:  Location:   10 [x]  Vasopneumatic Device Pressure:       [] lo [x] med [] hi   Temperature: [] lo [x] med [] hi   [] Skin assessment post-treatment:  []intact []redness- no adverse reaction []redness - adverse reaction:       26 min Therapeutic Exercise:  [x] See flow sheet :   Rationale: increase ROM, increase strength, improve coordination and increase proprioception to improve the patients ability to perform daily household chores. 10 min Manual Therapy:  GH joint mobs, PROM with gentle overpressure flexion/ abd/ IR/ ER   Rationale: decrease pain, increase ROM and increase tissue extensibility to assist with dressing/ bathing       With   [] TE   [] TA   [] neuro   [] other: Patient Education: [x] Review HEP    [] Progressed/Changed HEP based on:   [] positioning   [] body mechanics   [] transfers   [] heat/ice application    [] other:      Other Objective/Functional Measures:   Able to perform all therex per flow sheet with no changes in symptoms   PROM flexion ~  degrees, abduction ~45 degrees     Pain Level (0-10 scale) post treatment: 0    ASSESSMENT/Changes in Function: Patient continues to show improvements with signs/ symptoms however still demonstrates a decrease in strength, impaired ROM,  and an increase in pain with functional activities. Patient will continue to benefit from skilled PT services to modify and progress therapeutic interventions, address functional mobility deficits, address ROM deficits, address strength deficits, analyze and cue movement patterns and analyze and modify body mechanics/ergonomics to attain remaining goals.      [x]  See Plan of Care  []  See progress note/recertification  []  See Discharge Summary         Progress towards goals / Updated goals:  Short Term Goals: To be accomplished in 5 treatments:               1 patient will have established and be I with HEP to aid with increase tolerance to activities at home               EVAL issued               [id number] Progressing - intermittent compliance 5/24/19, MET 5/29/19               2 patient will have pain 7/10 to aid with increase tolerance to ADLs and activities at home and work               EVAL 9-10               BFFAQLC 1/48 on 5/24/19, MET at 3-8/10 \"twinge\" 5/29/19  Long Term Goals: To be accomplished in 12 treatments:               1  patient will have pain 5/10 to aid with increase tolerance to ADLs and activities at home and work               EVAL 9-10               JALIL does not have any pain that really lasts 5/29/19               2 patient will have PROM unforced  F and abd 120 to aid with progression of protocol and tolerance to regular activities as home               EVAL PROM F 85, abd 60               [id number]               3 patient will have FOTO 42 to show improving function with dressing               EVAL difficulty with dressing               [id number]         PLAN  [x]  Upgrade activities as tolerated     [x]  Continue plan of care  []  Update interventions per flow sheet       []  Discharge due to:_  []  Other:_      Benja Rasheed, YOSSI 5/30/2019  7:01 AM    Future Appointments   Date Time Provider Christi Willis   5/30/2019  7:30 AM Lucy Ashraf, PT [facility]   5/31/2019  8:30 AM Lucy Ashraf, PT [facility]   6/3/2019  8:00 AM Lucy Ashraf, PT [facility]   6/5/2019  7:30 AM Reyes Greaser, PT [facility]   6/7/2019  8:30 AM Lucy sAhraf, PT [facility]   6/10/2019  8:30 AM Lucy Ashraf, PT [facility]   6/10/2019  4:30 PM VLAD Bo[facility]ie [facility]   6/12/2019  8:30 AM Delphine Díaz, PT [facility]   6/14/2019  8:30 AM Lucy Ashraf, PT [facility]   6/17/2019  8:00 AM Lucy Ashraf, PT [facility]   6/19/2019  3:30 PM Lucy Ashraf, PT [facility]   6/21/2019  8:00 AM Lucy Ashraf, PT [facility]   6/25/2019  4:00 PM Delphine Díaz, PT [facility]   6/27/2019  4:00 PM Jenifer Mckinnon, PT [facility]   9/24/2019 11:00 AM HBV MRI RM 1 [facility]

## 2019-05-31 ENCOUNTER — HOSPITAL ENCOUNTER (OUTPATIENT)
Dept: PHYSICAL THERAPY | Age: 49
Discharge: HOME | End: 2019-05-31
Payer: COMMERCIAL

## 2019-05-31 PROCEDURE — 97140 MANUAL THERAPY 1/> REGIONS: CPT

## 2019-05-31 PROCEDURE — 97110 THERAPEUTIC EXERCISES: CPT

## 2019-05-31 PROCEDURE — 97016 VASOPNEUMATIC DEVICE THERAPY: CPT

## 2019-05-31 NOTE — PROGRESS NOTES
PT DAILY TREATMENT NOTE 10-18    Patient Name: Haily Hook  Date:2019  : 1970  [x]  Patient  Verified  Payor: Leonardo Kwong / Plan: Sirena Borden / Product Type: PPO /    In time:8:30  Out time:9:10  Total Treatment Time (min): 40  Visit #: 8 of 12    Medicare/BCBS Only   Total Timed Codes (min):  30 1:1 Treatment Time:  30       Treatment Area: Pain in right shoulder [M25.511]  S/P shoulder surgery [Z98.890]    SUBJECTIVE  Pain Level (0-10 scale): 0  Any medication changes, allergies to medications, adverse drug reactions, diagnosis change, or new procedure performed?: [x] No    [] Yes (see summary sheet for update)  Subjective functional status/changes:   [] No changes reported  States that she is feeling good today, no real pain just tight    OBJECTIVE    Modality rationale: decrease edema, decrease inflammation and decrease pain to improve the patients ability to ease with tolerance to ADLs   Min Type Additional Details    [] Estim:  []Unatt       []IFC  []Premod                        []Other:  []w/ice   []w/heat  Position:  Location:    [] Estim: []Att    []TENS instruct  []NMES                    []Other:  []w/US   []w/ice   []w/heat  Position:  Location:    []  Traction: [] Cervical       []Lumbar                       [] Prone          []Supine                       []Intermittent   []Continuous Lbs:  [] before manual  [] after manual    []  Ultrasound: []Continuous   [] Pulsed                           []1MHz   []3MHz W/cm2:  Location:    []  Iontophoresis with dexamethasone         Location: [] Take home patch   [] In clinic    []  Ice     []  heat  []  Ice massage  []  Laser   []  Anodyne Position:  Location:    []  Laser with stim  []  Other:  Position:  Location:   10 [x]  Vasopneumatic Device Pressure:       [] lo [x] med [] hi   Temperature: [] lo [x] med [] hi   [] Skin assessment post-treatment:  []intact []redness- no adverse reaction    []redness - adverse reaction: 20 min Therapeutic Exercise:  [x] See flow sheet :   Rationale: increase ROM, increase strength and increase proprioception to improve the patients ability to perform daily household chores. 10 min Manual Therapy:  PROM with gentle overpressure flexion, abd, IR, ER LAD with oscillation, GH joint mobs    Rationale: decrease pain, increase ROM and increase tissue extensibility to assist with personal hygiene tasks such as dressing/ bathing               With   [] TE   [] TA   [] neuro   [] other: Patient Education: [x] Review HEP    [] Progressed/Changed HEP based on:   [] positioning   [] body mechanics   [] transfers   [] heat/ice application    [] other:      Other Objective/Functional Measures:   Performed all therex with proper form, no changes in symptoms  PROM abduction ~70 degrees, flexion ~100 degrees     Pain Level (0-10 scale) post treatment: 0    ASSESSMENT/Changes in Function: Patient continues to show improvements with signs/ symptoms however still demonstrates a decrease in strength, impaired ROM,  and an increase in pain with functional activities. Patient will continue to benefit from skilled PT services to modify and progress therapeutic interventions, address functional mobility deficits, address ROM deficits, address strength deficits, analyze and cue movement patterns and analyze and modify body mechanics/ergonomics to attain remaining goals.      [x]  See Plan of Care  []  See progress note/recertification  []  See Discharge Summary         Progress towards goals / Updated goals:  Short Term Goals: To be accomplished in 5 treatments:               1 patient will have established and be I with HEP to aid with increase tolerance to activities at home               EVAL issued               [id number] Progressing - intermittent compliance 5/24/19, MET 5/29/19               2 patient will have pain 7/10 to aid with increase tolerance to ADLs and activities at home and work               EVAL 9-10               QIHOLND 3/90 on 5/24/19, MET at 3-8/10 \"twinge\" 5/29/19  Long Term Goals: To be accomplished in 12 treatments:               1  patient will have pain 5/10 to aid with increase tolerance to ADLs and activities at home and work               EVAL 9-10               VINEET does not have any pain that really lasts 5/29/19               2 patient will have PROM unforced  F and abd 120 to aid with progression of protocol and tolerance to regular activities as home               EVAL PROM F 85, abd 60               JNPJTRZ               3 patient will have FOTO 42 to show improving function with dressing               EVAL difficulty with dressing               [id number]        PLAN  [x]  Upgrade activities as tolerated     [x]  Continue plan of care  []  Update interventions per flow sheet       []  Discharge due to:_  []  Other:_      Mau Millard, PT 5/31/2019  7:33 AM    Future Appointments   Date Time Provider Christi Willis   5/31/2019  8:30 AM Pastora Aldridge PT [facility]   6/3/2019  8:00 AM Pastora Aldridge PT [facility]   6/5/2019  7:30 AM Monica Valladares, PT [facility]   6/7/2019  8:30 AM Pastora Aldridge, PT [facility]   6/10/2019  8:30 AM Pastora Aldridge PT [facility]   6/10/2019  4:30 PM [facility]   6/12/2019  8:30 AM Lakeshia Gonzalez PT [facility]   6/14/2019  8:30 AM Pastora Aldridge PT [facility]   6/17/2019  8:00 AM Pastora Aldridge PT [facility]   6/19/2019  3:30 PM Pastora Aldridge, PT [facility]   6/21/2019  8:00 AM Pastora Aldridge, PT [facility]   6/25/2019  4:00 PM Lakeshia Gonzalez, PT [facility]   6/27/2019  4:00 PM Jenifer Mckinnon, PT [facility]   9/24/2019 11:00 AM HBV MRI RM 1 [facility]

## 2019-06-03 ENCOUNTER — HOSPITAL ENCOUNTER (OUTPATIENT)
Dept: PHYSICAL THERAPY | Age: 49
Discharge: HOME | End: 2019-06-03
Payer: COMMERCIAL

## 2019-06-03 PROCEDURE — 97110 THERAPEUTIC EXERCISES: CPT

## 2019-06-03 PROCEDURE — 97140 MANUAL THERAPY 1/> REGIONS: CPT

## 2019-06-03 PROCEDURE — 97016 VASOPNEUMATIC DEVICE THERAPY: CPT

## 2019-06-03 NOTE — PROGRESS NOTES
PT DAILY TREATMENT NOTE 10-18    Patient Name: Martinez Etienne  Date:6/3/2019  : 1970  [x]  Patient  Verified  Payor: BLUE CROSS / Plan: [employer] [facility] [place] / Product Type: PPO /    In time:8:00  Out time:8:39  Total Treatment Time (min): 39  Visit #: 9 of 12    Medicare/BCBS Only   Total Timed Codes (min):  29 1:1 Treatment Time:  29       Treatment Area: Pain in right shoulder [M25.511]  S/P shoulder surgery [Z98.890]    SUBJECTIVE  Pain Level (0-10 scale): 0  Any medication changes, allergies to medications, adverse drug reactions, diagnosis change, or new procedure performed?: [x] No    [] Yes (see summary sheet for update)  Subjective functional status/changes:   [] No changes reported  States she feels like her elbow is hurting more than her shoulder     OBJECTIVE    Modality rationale: decrease edema, decrease inflammation and decrease pain to improve the patients ability to ease with tolerance to ADLs   Min Type Additional Details    [] Estim:  []Unatt       []IFC  []Premod                        []Other:  []w/ice   []w/heat  Position:  Location:    [] Estim: []Att    []TENS instruct  []NMES                    []Other:  []w/US   []w/ice   []w/heat  Position:  Location:    []  Traction: [] Cervical       []Lumbar                       [] Prone          []Supine                       []Intermittent   []Continuous Lbs:  [] before manual  [] after manual    []  Ultrasound: []Continuous   [] Pulsed                           []1MHz   []3MHz W/cm2:  Location:    []  Iontophoresis with dexamethasone         Location: [] Take home patch   [] In clinic    -  Ice     -  heat  -  Ice massage  -  Laser   -  Anodyne Position:  Location:    -  Laser with stim  -  Other:  Position:  Location:   10 -  Vasopneumatic Device Pressure:       - lo - med - hi   Temperature: - lo - med - hi   - Skin assessment post-treatment:  -intact -redness- no adverse reaction    -redness - adverse reaction:         19 min Therapeutic Exercise:  - See flow sheet :   Rationale: increase ROM, increase strength and increase proprioception to improve the patients ability to perform daily household chores. 10 min Manual Therapy:  PROM with overpressure flexion/ abd/ IR/ER , LAD with oscillation, GH joint mobs   Rationale: decrease pain, increase ROM and increase tissue extensibility to assist with personal hygiene tasks such as dressing/ bathing. With   [] TE   [] TA   [] neuro   [] other: Patient Education: [x] Review HEP    [] Progressed/Changed HEP based on:   [] positioning   [] body mechanics   [] transfers   [] heat/ice application    [] other:      Other Objective/Functional Measures:   PROM abduction ~85 degrees      Pain Level (0-10 scale) post treatment: 0    ASSESSMENT/Changes in Function: Patient continues to show improvements with signs/ symptoms however still demonstrates a decrease in strength, impaired ROM, and an increase in pain with functional activities. Patient will continue to benefit from skilled PT services to modify and progress therapeutic interventions, address functional mobility deficits, address strength deficits, analyze and address soft tissue restrictions, analyze and cue movement patterns and analyze and modify body mechanics/ergonomics to attain remaining goals.      [x]  See Plan of Care  []  See progress note/recertification  []  See Discharge Summary         Progress towards goals / Updated goals:  Short Term Goals: To be accomplished in 5 treatments:               1 patient will have established and be I with HEP to aid with increase tolerance to activities at home               EVAL issued               [id number] Progressing - intermittent compliance 5/24/19, MET 5/29/19               2 patient will have pain 7/10 to aid with increase tolerance to ADLs and activities at home and work               EVAL 9-10               CURRENT 6/10 on 5/24/19, MET at 3-8/10 \"twinge\" 5/29/19  Long Term Goals: To be accomplished in 12 treatments:               1  patient will have pain 5/10 to aid with increase tolerance to ADLs and activities at home and work               EVAL 9-10               IYIQICECILE does not have any pain that really lasts 5/29/19               2 patient will have PROM unforced  F and abd 120 to aid with progression of protocol and tolerance to regular activities as home               EVAL PROM F 85, abd 60               JSJTEDF               3 patient will have FOTO 42 to show improving function with dressing               EVAL difficulty with dressing               CXAHBPB        PLAN  [x]  Upgrade activities as tolerated     [x]  Continue plan of care  []  Update interventions per flow sheet       []  Discharge due to:_  []  Other:_      Malena Colindres, PT 6/3/2019  7:31 AM    Future Appointments   Date Time Provider Christi Willis   6/3/2019  8:00 AM Halima Flood, PT [facility]   6/5/2019  7:30 AM Kat Pardo, PT [facility]   6/7/2019  8:30 AM Halima Flood, PT [facility]   6/10/2019  8:30 AM Halima Flood, PT [facility]   6/10/2019  4:30 PM [facility], VLAD [address]   6/12/2019  8:30 AM Kinta Rides, PT [facility]   6/14/2019  8:30 AM Halima Flood, PT [facility]   6/17/2019  8:00 AM Halima Flood, PT [facility]   6/19/2019  3:30 PM Halima Flood, PT [facility]   6/21/2019  8:00 AM [facility] Flood, [facility]   6/25/2019  4:00 PM Ellie Rides, PT [facility]   6/27/2019  4:00 PM Jenifer Mckinnon, PT [facility]   9/24/2019 11:00 AM HBV MRI RM 1 [facility]

## 2019-06-05 ENCOUNTER — HOSPITAL ENCOUNTER (OUTPATIENT)
Dept: PHYSICAL THERAPY | Age: 49
Discharge: HOME | End: 2019-06-05
Payer: COMMERCIAL

## 2019-06-05 PROCEDURE — 97110 THERAPEUTIC EXERCISES: CPT

## 2019-06-05 PROCEDURE — 97140 MANUAL THERAPY 1/> REGIONS: CPT

## 2019-06-05 NOTE — PROGRESS NOTES
In [address] [place] [address], [address] [place] [address], [id number] [address]  [phone] fax    Physician Update  [x] Progress Note  [] Discharge Summary  Patient name: Ron Escobar Start of Care: 19   Referral source: Ricky Davenport,* : 1970   Medical/Treatment Diagnosis: Pain in right shoulder [M25.511]  S/P shoulder surgery [Z98.890]  Payor: BLUE CROSS / Plan: [address] [facility] [place] / Product Type: PPO /  Onset Date:, P/O 19, P/O 19                  Prior Hospitalization: see medical history Provider#: [id number]   Medications: Verified on Patient Summary List    Comorbidities: Right RCR , skin cancer, previous pregnancy,    Prior Level of Function: Prior to initial involvement  no issues with right UE. Working, tolerated household chores and community activities, no AD use.   Visits from [facility]: 10    Missed Visits: 0    Status at Evaluation/Last Progress Note: eval and plan of care  Progress towards Goals: Pain 0 , CCO stiffness. She has exercises for her HEP and reports compliance. FOTO 47, all end feels tight and with CCO stiffness, PROM F 125     ER 25 and IR 65. Plan to advance to gentle AAROM phase per review of our (Marlan Runner PA)  communication due to  getting stiff .  Thank you    Goals: to be achieved in 22 total treatments                 1  patient will have pain 0-1/10 to aid with increase tolerance to ADLs and activities at home and work               PN 0 CCO stiffness               PNOWOAK                 2 patient will have AAROM unforced  F and abd 135    ER 50   to aid with progression of protocol and tolerance to regular activities as home             PN F 125    ER 25               [id number]                   3 patient will have FOTO 55 to show improving function with dressing               PN 47               [id number]           ASSESSMENT/RECOMMENDATIONS:  [x]Continue therapy per initial plan/protocol at a frequency of  2-3 x per week for 22 total treatments    []Continue therapy with the following recommended changes:_____________________      _____________________________________________________________________  []Discontinue therapy progressing towards or have reached established goals  []Discontinue therapy due to lack of appreciable progress towards goals  []Discontinue therapy due to lack of attendance or compliance  []Await Physician's recommendations/decisions regarding therapy  []Other:________________________________________________________________    Thank you for this referral. Damari Yoo, PT 6/5/2019 7:43 AM  NOTE TO PHYSICIAN:  PLEASE COMPLETE THE ORDERS BELOW AND   FAX TO [facility]: [phone]  If you are unable to process this request in 24 hours please contact our office: [phone]    ? I have read the above report and request that my patient continue as recommended. ? I have read the above report and request that my patient continue therapy with the following changes/special instructions:_____________________________________  ? I have read the above report and request that my patient be discharged from therapy.     [de-identified] Signature:____________Date:_________TIME:________    [employer], Date and Time must be completed for valid certification **

## 2019-06-05 NOTE — PROGRESS NOTES
PT DAILY TREATMENT NOTE 10-18    Patient Name: Haily Hook  Date:2019  : 1970  [x]  Patient  Verified  Payor: BLUE CROSS / Plan: [employer] [facility] / Product Type: PPO /    In time:730  Out time:801  Total Treatment Time (min): 31  Visit #: 10 of 12    Medicare/BCBS Only   Total Timed Codes (min):  31 1:1 Treatment Time:  31       Treatment Area: Pain in right shoulder [M25.511]  S/P shoulder surgery [Z98.890]    SUBJECTIVE  Pain Level (0-10 scale): 0 stiffness   Any medication changes, allergies to medications, adverse drug reactions, diagnosis change, or new procedure performed?: [x] No    [] Yes (see summary sheet for update)  Subjective functional status/changes:   [] No changes reported  \"It is just really stiff today. I see the PA Deirdre Monday.  \"    OBJECTIVE    Modality rationale:     Min Type Additional Details    [] Estim:  []Unatt       []IFC  []Premod                        []Other:  []w/ice   []w/heat  Position:  Location:    [] Estim: []Att    []TENS instruct  []NMES                    []Other:  []w/US   []w/ice   []w/heat  Position:  Location:    []  Traction: [] Cervical       []Lumbar                       [] Prone          []Supine                       []Intermittent   []Continuous Lbs:  [] before manual  [] after manual    []  Ultrasound: []Continuous   [] Pulsed                           []1MHz   []3MHz W/cm2:  Location:    []  Iontophoresis with dexamethasone         Location: [] Take home patch   [] In clinic   PD had another appointment []  Ice     []  heat  []  Ice massage  []  Laser   []  Anodyne Position:  Location:    []  Laser with stim  []  Other:  Position:  Location:    []  Vasopneumatic Device Pressure:       [] lo [] med [] hi   Temperature: [] lo [] med [] hi   [] Skin assessment post-treatment:  []intact []redness- no adverse reaction    []redness - adverse reaction:       min []Eval                  []Re-Eval       17 min Therapeutic Exercise:  [x] See flow sheet :   Rationale: increase ROM, increase strength and improve coordination to improve the patients ability to aid with increase tolerance to ADLs and activities    6 min Therapeutic Activity:  [x]  See flow sheet :   Rationale: increase ROM, increase strength and improve coordination  to improve the patients ability to aid with increase tolerance to ADLS and activities      min Neuromuscular Re-education:  []  See flow sheet :   Rationale:   to improve the patients ability to     8 min Manual Therapy:  LAD OSCILL gentle all planes, overstretch gentle with grade 2-3 mobs for ER/IR   Rationale: decrease pain, increase ROM and increase tissue extensibility to aid with increase tolerance to ADLs and activities     min Gait Training:  ___ feet with ___ device on level surfaces with ___ level of assist   Rationale: With   [x] TE   [x] TA   [] neuro   [] other: Patient Education: [x] Review HEP    [x] Progressed/Changed HEP based on:   [] positioning   [] body mechanics   [] transfers   [] heat/ice application    [x] other: POC, MD note     Other Objective/Functional Measures: FOTO 47, PROM tight end feels C/O stiffness> pain  F 125   ABD  105    ER  25    IR  65     Pain Level (0-10 scale) post treatment: 0    ASSESSMENT/Changes in Function: tolerated well, MD next week, tight end feels. CCO stiffness    Patient will continue to benefit from skilled PT services to modify and progress therapeutic interventions, address ROM deficits, address strength deficits, analyze and address soft tissue restrictions, analyze and cue movement patterns, analyze and modify body mechanics/ergonomics, assess and modify postural abnormalities and instruct in home and community integration to attain remaining goals.      [x]  See Plan of Care  [x]  See progress note/recertification  []  See Discharge Summary         Progress towards goals / Updated goals:   Short Term Goals: To be accomplished in 5 treatments:               1 patient will have established and be I with HEP to aid with increase tolerance to activities at home               EVAL issued               NNGLHXU Progressing - intermittent compliance 5/24/19, MET 5/29/19 6/5/19               2 patient will have pain 7/10 to aid with increase tolerance to ADLs and activities at home and work               EVAL 9-10               CURRENT 6/10 on 5/24/19, MET at 3-8/10 \"twinge\" 5/29/19    Stiff 6/5/19  Long Term Goals: To be accomplished in 12 treatments:               1  patient will have pain 5/10 to aid with increase tolerance to ADLs and activities at home and work               EVAL 9-10               BPVCPGX does not have any pain that really lasts 5/29/19   Stiff  6/5/19               2 patient will have PROM unforced  F and abd 120 to aid with progression of protocol and tolerance to regular activities as home               EVAL PROM F 85, abd 60               CURRENT   F 125      ER 25   IR 65 all tight 6/5/19               3 patient will have FOTO 42 to show improving function with dressing               EVAL difficulty with dressing               HFJXMML     63  6/5/19        PLAN  [x]  Upgrade activities as tolerated     [x]  Continue plan of care  []  Update interventions per flow sheet       []  Discharge due to:_  [x]  Other:_  Send 10th visit note  Elias Potter PT 6/5/2019  7:36 AM    Future Appointments   Date Time Provider Christi Willis   6/7/2019  8:30 AM Sree Brennan PT [facility]   6/10/2019  8:30 AM Sree Brennan PT [facility]   6/10/2019  4:30 PM VLAD [address]   6/12/2019  8:30 AM Sarbjit Chatman PT [facility]   6/14/2019  8:30 AM Sree Brennan PT [facility]   6/17/2019  8:00 AM Sree Brennan PT [facility]   6/19/2019  3:30 PM Sree Brennan PT [facility]   6/21/2019  8:00 AM [facility]   6/25/2019  4:00 PM [facility] Chatman, [facility]   6/27/2019  4:00 PM Ino Steve Paredes, PT [facility]   9/24/2019 11:00 AM HBV MRI RM 1 [facility]

## 2019-06-07 ENCOUNTER — HOSPITAL ENCOUNTER (OUTPATIENT)
Dept: PHYSICAL THERAPY | Age: 49
Discharge: HOME | End: 2019-06-07
Payer: COMMERCIAL

## 2019-06-07 PROCEDURE — 97140 MANUAL THERAPY 1/> REGIONS: CPT

## 2019-06-07 PROCEDURE — 97016 VASOPNEUMATIC DEVICE THERAPY: CPT

## 2019-06-07 PROCEDURE — 97530 THERAPEUTIC ACTIVITIES: CPT

## 2019-06-07 PROCEDURE — 97110 THERAPEUTIC EXERCISES: CPT

## 2019-06-07 NOTE — PROGRESS NOTES
PT DAILY TREATMENT NOTE 10-18    Patient Name: Karyn Lang  Date:2019  : 1970  [x]  Patient  Verified  Payor: Lorenzo Mohan / Plan:  [facility] / Product Type: PPO /    In time:8:28  Out time:9:21  Total Treatment Time (min): 53  Visit #: 11 of 22    Medicare/BCBS Only   Total Timed Codes (min):  43 1:1 Treatment Time:  40       Treatment Area: Pain in right shoulder [M25.511]  S/P shoulder surgery [Z98.890]    SUBJECTIVE  Pain Level (0-10 scale): 0  Any medication changes, allergies to medications, adverse drug reactions, diagnosis change, or new procedure performed?: [x] No    [] Yes (see summary sheet for update)  Subjective functional status/changes:   [] No changes reported  Patient reports that she Took some pain medication last night and was able to sleep throughout the night     OBJECTIVE    Modality rationale: decrease edema, decrease inflammation and decrease pain to improve the patients ability to ease with tolerance to ADLs   Min Type Additional Details    [] Estim:  []Unatt       []IFC  []Premod                        []Other:  []w/ice   []w/heat  Position:  Location:    [] Estim: []Att    []TENS instruct  []NMES                    []Other:  []w/US   []w/ice   []w/heat  Position:  Location:    []  Traction: [] Cervical       []Lumbar                       [] Prone          []Supine                       []Intermittent   []Continuous Lbs:  [] before manual  [] after manual    []  Ultrasound: []Continuous   [] Pulsed                           []1MHz   []3MHz W/cm2:  Location:    []  Iontophoresis with dexamethasone         Location: [] Take home patch   [] In clinic    []  Ice     []  heat  []  Ice massage  []  Laser   []  Anodyne Position:  Location:    []  Laser with stim  []  Other:  Position:  Location:   10 [x]  Vasopneumatic Device Pressure:       [] lo [x] med [] hi   Temperature: [] lo [x] med [] hi   [] Skin assessment post-treatment:  []intact []redness- no adverse reaction    []redness - adverse reaction:       23 min Therapeutic Exercise:  [x] See flow sheet :   Rationale: increase strength, improve coordination and increase proprioception to improve the patients ability to perform daily household chores. 10 min Therapeutic Activity:  [x]  See flow sheet :   Rationale: increase strength, improve coordination and increase proprioception  to improve the patients ability to assist with personal hygiene tasks such as dressing/ bathing. 10 min Manual Therapy:  PROM with overpressure flexion/ abd/ IR/ ER, LAD with oscillation , GH joint mobs   Rationale: decrease pain, increase ROM and increase tissue extensibility to assist with tolerance to ADLs              With   [] TE   [] TA   [] neuro   [] other: Patient Education: [x] Review HEP    [] Progressed/Changed HEP based on:   [] positioning   [] body mechanics   [] transfers   [] heat/ice application    [] other:      Other Objective/Functional Measures:   PROM  Flexion ~130, abduction ~100     Pain Level (0-10 scale) post treatment: 0    ASSESSMENT/Changes in Function: Patient continues to show improvements with signs/ symptoms however still demonstrates a decrease in strength, impaired ROM,  and an increase in pain with functional activities. Patient will continue to benefit from skilled PT services to modify and progress therapeutic interventions, address functional mobility deficits, address ROM deficits, address strength deficits, analyze and cue movement patterns and analyze and modify body mechanics/ergonomics to attain remaining goals.      [x]  See Plan of Care  []  See progress note/recertification  []  See Discharge Summary         Progress towards goals / Updated goals:  Goals: to be achieved in 22 total treatments                 1  patient will have pain 0-1/10 to aid with increase tolerance to ADLs and activities at home and work               PN 0 CCO stiffness               FPHRVMF              2 patient will have AAROM unforced  F and abd 135    ER 50   to aid with progression of protocol and tolerance to regular activities as home             PN F 125    ER 25               VQVAIFC                   3 patient will have FOTO 55 to show improving function with dressing               PN 47               ICRQJLB             PLAN  [x]  Upgrade activities as tolerated     [x]  Continue plan of care  []  Update interventions per flow sheet       []  Discharge due to:_  []  Other:_      Mauricio Mendez, PT 6/7/2019  7:36 AM    Future Appointments   Date Time Provider Christi Willis   6/7/2019  8:30 AM Chan Montiel, PT [facility]   6/10/2019  8:30 AM Chan Montiel, PT [facility]   6/10/2019  4:30 PM VLAD Meyer 69   6/12/2019  8:30 AM Simona Cruz, PT [facility]   6/14/2019  8:30 AM Chan Montiel, PT [facility]   6/17/2019  8:00 AM Chan Montiel, PT [facility]   6/19/2019  3:30 PM Chan Montiel, PT [facility]   6/21/2019  8:00 AM Chan Montiel, PT [facility]   6/25/2019  4:00 PM Simona Cruz, PT [facility]   6/27/2019  4:00 PM Jenifer Mckinnon, PT [facility]   9/24/2019 11:00 AM HBV MRI RM 1 [facility]

## 2019-06-10 ENCOUNTER — HOSPITAL ENCOUNTER (OUTPATIENT)
Dept: PHYSICAL THERAPY | Age: 49
Discharge: HOME | End: 2019-06-10
Payer: COMMERCIAL

## 2019-06-10 ENCOUNTER — OFFICE VISIT (OUTPATIENT)
Dept: ORTHOPEDIC SURGERY | Age: 49
End: 2019-06-10

## 2019-06-10 VITALS
SYSTOLIC BLOOD PRESSURE: 123 MMHG | RESPIRATION RATE: 12 BRPM | WEIGHT: 164 LBS | TEMPERATURE: 96 F | OXYGEN SATURATION: 100 % | HEART RATE: 64 BPM | HEIGHT: 63 IN | BODY MASS INDEX: 29.06 KG/M2 | DIASTOLIC BLOOD PRESSURE: 79 MMHG

## 2019-06-10 DIAGNOSIS — M75.121 NONTRAUMATIC COMPLETE TEAR OF RIGHT ROTATOR CUFF: Primary | ICD-10-CM

## 2019-06-10 PROCEDURE — 97140 MANUAL THERAPY 1/> REGIONS: CPT

## 2019-06-10 PROCEDURE — 97110 THERAPEUTIC EXERCISES: CPT

## 2019-06-10 PROCEDURE — 97016 VASOPNEUMATIC DEVICE THERAPY: CPT

## 2019-06-10 NOTE — PROGRESS NOTES
PT DAILY TREATMENT NOTE 10-18    Patient Name: Ruth Pizarro  Date:6/10/2019  : 1970  [x]  Patient  Verified  Payor: Delena Boeck / Plan: [address] [facility] [facility] / Product Type: PPO /    In time:8:28  Out time:9:16  Total Treatment Time (min): 48  Visit #: 12 of 22    Medicare/BCBS Only   Total Timed Codes (min):  38 1:1 Treatment Time:  30       Treatment Area: Pain in right shoulder [M25.511]  S/P shoulder surgery [Z98.890]    SUBJECTIVE  Pain Level (0-10 scale): 0  Any medication changes, allergies to medications, adverse drug reactions, diagnosis change, or new procedure performed?: [x] No    [] Yes (see summary sheet for update)  Subjective functional status/changes:   [] No changes reported  States it is just feeling stiff today    OBJECTIVE    Modality rationale: decrease edema, decrease inflammation and decrease pain to improve the patients ability to ease with tolerance to ADLs   Min Type Additional Details    [] Estim:  []Unatt       []IFC  []Premod                        []Other:  []w/ice   []w/heat  Position:  Location:    [] Estim: []Att    []TENS instruct  []NMES                    []Other:  []w/US   []w/ice   []w/heat  Position:  Location:    []  Traction: [] Cervical       []Lumbar                       [] Prone          []Supine                       []Intermittent   []Continuous Lbs:  [] before manual  [] after manual    []  Ultrasound: []Continuous   [] Pulsed                           []1MHz   []3MHz W/cm2:  Location:    []  Iontophoresis with dexamethasone         Location: [] Take home patch   [] In clinic    []  Ice     []  heat  []  Ice massage  []  Laser   []  Anodyne Position:  Location:    []  Laser with stim  []  Other:  Position:  Location:   10 [x]  Vasopneumatic Device Pressure:       [] lo [x] med [] hi   Temperature: [] lo [x] med [] hi   [] Skin assessment post-treatment:  []intact []redness- no adverse reaction    []redness - adverse reaction:       28 min Therapeutic Exercise:  [x] See flow sheet :   Rationale: increase ROM, increase strength, improve coordination and increase proprioception to improve the patients ability to perform daily household chores. 10 min Manual Therapy:  PROM with overpressure flexion, abd, IR, ER, LAD with oscillation , GH joint mobs   Rationale: decrease pain, increase ROM and increase tissue extensibility to assist with tolerance to ADLs      With   [] TE   [] TA   [] neuro   [] other: Patient Education: [x] Review HEP    [] Progressed/Changed HEP based on:   [] positioning   [] body mechanics   [] transfers   [] heat/ice application    [] other:      Other Objective/Functional Measures:   Performed all therex per flow sheet with proper form   Discomfort reported with end ranges of PROM     Pain Level (0-10 scale) post treatment: 0    ASSESSMENT/Changes in Function: Patient continues to show improvements with signs/ symptoms however still demonstrates a decrease in strength, impaired ROM, and an increase in pain with functional activities. Patient will continue to benefit from skilled PT services to modify and progress therapeutic interventions, address functional mobility deficits, address ROM deficits, analyze and cue movement patterns and analyze and modify body mechanics/ergonomics to attain remaining goals.      [x]  See Plan of Care  []  See progress note/recertification  []  See Discharge Summary         Progress towards goals / Updated goals:  Goals: to be achieved in 22 total treatments                 1  patient will have pain 0-1/10 to aid with increase tolerance to ADLs and activities at home and work               PN 0 CCO stiffness               [id number]                 2 patient will have AAROM unforced  F and abd 135    ER 50   to aid with progression of protocol and tolerance to regular activities as home             PN F 125    ER 25               [id number]                   3 patient will have FOTO 55 to show improving function with dressing               PN 47               MAMFYNE                PLAN  [x]  Upgrade activities as tolerated     [x]  Continue plan of care  []  Update interventions per flow sheet       []  Discharge due to:_  []  Other:_      Blanca Danielson, PT 6/10/2019  7:33 AM    Future Appointments   Date Time Provider Christi Willis   6/10/2019  8:30 AM Mary Quan, PT [facility]   6/10/2019  4:30 PM VLAD Sol 67   6/12/2019  7:30 AM Lydia Chain, PT [facility]   6/14/2019  8:30 AM Mary Ogdene, PT [facility]   6/17/2019  8:00 AM Mary Larve, PT [facility]   6/19/2019  3:30 PM Mary Ogdene, PT [facility]   6/21/2019  8:00 AM Mary Larve, PT [facility]   6/25/2019  4:00 PM Lydia Chain, PT [facility]   6/27/2019  4:00 PM Jenifer Mckinnon, PT [facility]   9/24/2019 11:00 AM [facility] MRI RM 1 [facility]

## 2019-06-10 NOTE — PROGRESS NOTES
1. Have you been to the ER, urgent care clinic since your last visit? Hospitalized since your last visit? No    2. Have you seen or consulted any other health care providers outside of the [address] [place] [address] since your last visit? Include any pap smears or colon screening.  No

## 2019-06-10 NOTE — PROGRESS NOTES
Ranjeet Huitron  1970     HISTORY OF PRESENT ILLNESS  Ranjeet Huitron is a 46 y.o. female who presents today for evaluation s/p Right shoulder arthroscopic rotator cuff revision and manipulation under anesthesia on 5/13/19. Patient has been going to PT. Describes pain as a 1/10. She overall feels like she is improving. Has been taking nothing for pain. Still has night pain. Patient denies any fever, chills, chest pain, shortness of breath or calf pain. The remainder of the review of systems is negative. There are no new illness or injuries to report since last seen in the office. No changes in medications, allergies, social or family history. PHYSICAL EXAM:   Visit Vitals  /79   Pulse 64   Temp 96 °F (35.6 °C) (Oral)   Resp 12   Ht 5' 3\" (1.6 m)   Wt 164 lb (74.4 kg)   SpO2 100%   BMI 29.05 kg/m²      The patient is a well-developed, well-nourished female in no acute distress. The patient is alert and oriented times three. The patient appears to be well groomed. Mood and affect are normal.  ORTHOPEDIC EXAM of right shoulder:  Inspection: swelling not present,  Bruising not present  Incisions well healed  Passive glenohumeral abduction 0-70 degrees  Stability: Stable  Strength: n/a  2+ distal pulses    IMPRESSION:  S/P Right shoulder arthroscopic rotator cuff revision with manipulation under anesthesia    PLAN:   1. Patient cont to improve post operatively  2. Will cont with PT. Start active assist now, arom in 1 weeks. D/c sling in 1 week  3. Stressed no increases in pain.  No lifting pushing or pulling    RTC 4 weeks    BERNIE Jones [facility]

## 2019-06-12 ENCOUNTER — HOSPITAL ENCOUNTER (OUTPATIENT)
Dept: PHYSICAL THERAPY | Age: 49
Discharge: HOME | End: 2019-06-12
Payer: COMMERCIAL

## 2019-06-12 PROCEDURE — 97110 THERAPEUTIC EXERCISES: CPT | Performed by: PHYSICAL THERAPIST

## 2019-06-12 PROCEDURE — 97530 THERAPEUTIC ACTIVITIES: CPT | Performed by: PHYSICAL THERAPIST

## 2019-06-12 PROCEDURE — 97140 MANUAL THERAPY 1/> REGIONS: CPT | Performed by: PHYSICAL THERAPIST

## 2019-06-12 PROCEDURE — 97016 VASOPNEUMATIC DEVICE THERAPY: CPT | Performed by: PHYSICAL THERAPIST

## 2019-06-12 NOTE — PROGRESS NOTES
PT DAILY TREATMENT NOTE 10-18    Patient Name: Paulina Kohler  Date:2019  : 1970  [x]  Patient  Verified  Payor: BLUE CROSS / Plan: [address] [place] [address] [place] / Product Type: PPO /    In time:7:32A  Out time:8:25A  Total Treatment Time (min): 53min  Visit #: 12 of 22    Medicare/BCBS Only   Total Timed Codes (min):  39 1:1 Treatment Time:  39       Treatment Area: Pain in right shoulder [M25.511]  S/P shoulder surgery [Z98.890]    SUBJECTIVE  Pain Level (0-10 scale): 0/10  Any medication changes, allergies to medications, adverse drug reactions, diagnosis change, or new procedure performed?: [x] No    [] Yes (see summary sheet for update)  Subjective functional status/changes:   [] No changes reported  Patient reports the MD said to discharge the sling in 5 days and can begin A/ROM. Did not wake up due to pain last night. OBJECTIVE    Modality rationale: decrease edema, decrease inflammation and decrease pain to improve the patients ability to increase activity tolerance.     Min Type Additional Details    [] Estim:  []Unatt       []IFC  []Premod                        []Other:  []w/ice   []w/heat  Position:  Location:    [] Estim: []Att    []TENS instruct  []NMES                    []Other:  []w/US   []w/ice   []w/heat  Position:  Location:    []  Traction: [] Cervical       []Lumbar                       [] Prone          []Supine                       []Intermittent   []Continuous Lbs:  [] before manual  [] after manual    []  Ultrasound: []Continuous   [] Pulsed                           []1MHz   []3MHz W/cm2:  Location:    []  Iontophoresis with dexamethasone         Location: [] Take home patch   [] In clinic    []  Ice     []  heat  []  Ice massage  []  Laser   []  Anodyne Position:  Location:    []  Laser with stim  []  Other:  Position:  Location:   15 [x]  Vasopneumatic Device to R shoulder Pressure:       [] lo [x] med [] hi   Temperature: [x] lo [] med [] hi   [x] Skin assessment post-treatment:  []intact []redness- no adverse reaction    []redness - adverse reaction:     15 min Therapeutic Exercise:  [x] See flow sheet :   Rationale: increase ROM and increase strength to improve the patients ability to increase A/ROM and decrease pain with movement. 11 min Therapeutic Activity:  [x]  See flow sheet :   Rationale: increase strength and improve coordination  to improve the patients ability to Tolerate basic ADLs and job-related tasks without pain. 13 min Manual Therapy:  Grade 2-3 mobs to R GH and scapulo-thoracic joint   Rationale: increase ROM to improve functional activity tolerance. With   [x] TE   [x] TA   [] neuro   [] other: Patient Education: [x] Review HEP    [x] Progressed/Changed HEP based on:   [x] positioning   [] body mechanics   [] transfers   [] heat/ice application    [] other:      Other Objective/Functional Measures: P/ROM of R shoulder 140 degs     Pain Level (0-10 scale) post treatment: 0/10    ASSESSMENT/Changes in Function: Patient is making steady gains towards goals, but continues to struggle to achieve full P/ROM second to guarding; VC and TC required to de-activate upper trap. Patient will continue to benefit from skilled PT services to modify and progress therapeutic interventions, address functional mobility deficits, address ROM deficits, address strength deficits, analyze and address soft tissue restrictions, analyze and cue movement patterns and analyze and modify body mechanics/ergonomics to attain remaining goals.      [x]  See Plan of Care  []  See progress note/recertification  []  See Discharge Summary         Progress towards goals / Updated goals:  Goals: to be achieved in 22 total treatments                 1  patient will have pain 0-1/10 to aid with increase tolerance to ADLs and activities at home and work               PN 0 CCO stiffness               WLHQUJE                 2 patient will have AAROM unforced  F and abd 135    ER 50   to aid with progression of protocol and tolerance to regular activities as home             PN F 125    ER 25               LVYZURZ                   3 patient will have TVGU 49 LM show improving function with dressing               NK 82               CJPZWDS         PLAN  [x]  Upgrade activities as tolerated     []  Continue plan of care  []  Update interventions per flow sheet       []  Discharge due to:_  []  Other:_      Jenifer Mckinnon, PT 6/12/2019  7:39 AM    Future Appointments   Date Time Provider Christi Willis   6/14/2019  8:30 AM Maite Becker, PT [facility]   6/17/2019  1:00 PM Maya Mejia, PT [facility]   6/19/2019  3:30 PM Maite Becker, PT [facility]   6/21/2019  8:00 AM Maite Becker, PT [facility]   6/25/2019  4:00 PM Maya Mejia, PT [facility]   6/27/2019  4:00 PM Maya Mejia, PT [facility]   7/8/2019  3:30 PM VLAD Perez 67   9/24/2019 11:00 AM HBV MRI RM 1 [facility]

## 2019-06-13 NOTE — PROGRESS NOTES
[facility] [address] [facility]      [address] [facility], [address] [facility], [address] [address]  [phone] fax    Discharge Summary    Patient name: George Evans Start of Care: 2/15/2019   Referral source: Carolyn Pizano : 1970                Medical Diagnosis: Right rotator cuff tear [M75.101]  Payor: BLUE CROSS / Plan: [facility] / Product Type: PPO /  Onset Date:19                Treatment Diagnosis: R Shoulder pain, R rotator cuff rupture - nontraumatic, Weakness, Joint stiffness, Decreased ROM, localized edema   Prior Hospitalization: see medical history Provider#: [id number]   Medications: Verified on Patient summary List    Comorbidities: Lumbar laminectomy, Lymph node removal on R   Prior Level of Function: Up until 2 years ago, was doing light weight training and cardio 6 days/week without pain. As of the 2018, was still in pain, but would continue with some exercise but shoulder pain became progressively worse. Visits from Start of Care: 15    Missed Visits: 0  Reporting Period : 2/15/19 to 19  Summary of Care:  Short Term Goals: To be accomplished in 3 weeks:  1. Patient will decrease shoulder pain during exercises by 2 points on Verbal Analog Scale (Eval: 8/10) to increase participation on Activities of Daily Living as lifting, carrying, and reaching.   CURRENT 2 3/14/19    3 3/21/19    7 3/28/19  2. Patient will demonstrate increased strength by ½ grade on MMT in B UEs (Eval: 2/5) to improve functional participation in such tasks as prolonged standing and sitting.   CURRENT: AAROM now 3/14/19        3.   Patient will achieve P/ROM of 0-120 degs without increased pain (Eval 50 degs).                        CURRENT MET  3/14/19  Long Term Goals: To be accomplished in 24 treatments  4.  Patient will decrease pain during aggravating activities by 4 points on Verbal Analog Scale (Eval: 8/10) to increase participation in Activities of Daily Living as allowed within protocol (I.e. drinking glass of water)        CURRENT 2 3/14/19  3 3/21/19  5. Patient will demonstrate increased strength by 1 grade on MMT in B UEs (Eval: 2/5) to improve functional participation in such tasks as working overhead for >2 min.   CURRENT: PROM only at this itime  6. Patient will achieve full A/ROM of 0-120 degs without increased pain (Eval: 0 degs).   CURRENT AAROM 3/14/19        7. Patient will achieve predicted FOTO scores to demonstrate decreased functional impairment to facilitate improved participation in activities of daily living as allowed per protocol, improve overall quality of life.        CURRENT Progressing 46 3/11/19  ASSESSMENT/RECOMMENDATIONS:  []Discontinue therapy progressing towards or have reached established goals  []Discontinue therapy due to lack of appreciable progress towards goals  []Discontinue therapy due to lack of attendance or compliance  [x]Other: Patient fell on surgical arm and re-tore muscles, Had to have second surgery and manipulation.     Thank you for this referral.     Farhat Mckinnon, PT 6/13/2019 8:40 AM

## 2019-06-14 ENCOUNTER — HOSPITAL ENCOUNTER (OUTPATIENT)
Dept: PHYSICAL THERAPY | Age: 49
Discharge: HOME | End: 2019-06-14
Payer: COMMERCIAL

## 2019-06-14 PROCEDURE — 97016 VASOPNEUMATIC DEVICE THERAPY: CPT

## 2019-06-14 PROCEDURE — 97110 THERAPEUTIC EXERCISES: CPT

## 2019-06-14 PROCEDURE — 97140 MANUAL THERAPY 1/> REGIONS: CPT

## 2019-06-14 NOTE — PROGRESS NOTES
PT DAILY TREATMENT NOTE 10-18    Patient Name: Ayan Rushing  Date:2019  : 1970  [x]  Patient  Verified  Payor: Aiden Calles / Plan:  [facility] / Product Type: PPO /    In time:8:30  Out time:9:18  Total Treatment Time (min): 48  Visit #: 14 of 22    Medicare/BCBS Only   Total Timed Codes (min):  38 1:1 Treatment Time:  38       Treatment Area: Pain in right shoulder [M25.511]  S/P shoulder surgery [Z98.890]    SUBJECTIVE  Pain Level (0-10 scale): 0  Any medication changes, allergies to medications, adverse drug reactions, diagnosis change, or new procedure performed?: [x] No    [] Yes (see summary sheet for update)  Subjective functional status/changes:   [] No changes reported  States she is doing well, when sitting at home she takes off the sling with no increases in pain     OBJECTIVE    Modality rationale: decrease edema, decrease inflammation and decrease pain to improve the patients ability to ease with tolerance to ADLs   Min Type Additional Details    [] Estim:  []Unatt       []IFC  []Premod                        []Other:  []w/ice   []w/heat  Position:  Location:    [] Estim: []Att    []TENS instruct  []NMES                    []Other:  []w/US   []w/ice   []w/heat  Position:  Location:    []  Traction: [] Cervical       []Lumbar                       [] Prone          []Supine                       []Intermittent   []Continuous Lbs:  [] before manual  [] after manual    []  Ultrasound: []Continuous   [] Pulsed                           []1MHz   []3MHz W/cm2:  Location:    []  Iontophoresis with dexamethasone         Location: [] Take home patch   [] In clinic    []  Ice     []  heat  []  Ice massage  []  Laser   []  Anodyne Position:  Location:    []  Laser with stim  []  Other:  Position:  Location:   10 [x]  Vasopneumatic Device Pressure:       [] lo [] med [x] hi   Temperature: [] lo [x] med [] hi   [] Skin assessment post-treatment:  []intact []redness- no adverse reaction []redness - adverse reaction:         28 min Therapeutic Exercise:  [x] See flow sheet :   Rationale: increase strength, improve coordination and increase proprioception , ROM to improve the patients ability to perform daily household chores,          10 min Manual Therapy:  PROM with overpressure stretch, flexion/ abd/ IR/ ER, LAD with oscillation, GH joint mobs grade 2-3   Rationale: decrease pain, increase ROM and increase tissue extensibility to assist with personal hygiene tasks such as dressing /brushing hair              With   [] TE   [] TA   [] neuro   [] other: Patient Education: [x] Review HEP    [] Progressed/Changed HEP based on:   [] positioning   [] body mechanics   [] transfers   [] heat/ice application    [] other:      Other Objective/Functional Measures:   Completed all therex per flow sheet with proper form  Demonstrates UT compensation with abduction exercises     Pain Level (0-10 scale) post treatment: 0    ASSESSMENT/Changes in Function: Patient continues to show improvements with signs/ symptoms however still demonstrates a decrease in strength, impaired ROM, and an increase in pain with functional activities. Patient will continue to benefit from skilled PT services to modify and progress therapeutic interventions, address functional mobility deficits, address strength deficits, analyze and cue movement patterns and analyze and modify body mechanics/ergonomics to attain remaining goals.      [x]  See Plan of Care  []  See progress note/recertification  []  See Discharge Summary         Progress towards goals / Updated goals:  Goals: to be achieved in 22 total treatments                 1  patient will have pain 0-1/10 to aid with increase tolerance to ADLs and activities at home and work               PN 0 CCO stiffness               CURRENT                 2 patient will have AAROM unforced  F and abd 135    ER 50   to aid with progression of protocol and tolerance to regular activities as home             GZ F 125  ABD [place]               PTXCGLW                   3 patient will have JOTJ 33 DO show improving function with dressing               VJ 50               ECNKJOH            PLAN  [x]  Upgrade activities as tolerated     [x]  Continue plan of care  []  Update interventions per flow sheet       []  Discharge due to:_  []  Other:_      Mau Millard, PT 6/14/2019  7:42 AM    Future Appointments   Date Time Provider Christi Willis   6/14/2019  8:30 AM Pastora Aldridge, PT [facility]   6/17/2019  1:00 PM Lakeshia Gonzalez, PT [facility]   6/19/2019  3:30 PM Pastora Aldridge, PT [facility]   6/21/2019  8:00 AM Pastora Aldridge, PT [facility]   6/25/2019  4:00 PM Lakeshia Gonzalez, PT [facility]   6/27/2019  4:00 PM Lakeshia Gonzalez, PT [facility]   7/8/2019  3:30 PM VLAD Arizmendi 67   9/24/2019 11:00 AM [facility] MRI RM 1 [facility]

## 2019-06-17 ENCOUNTER — HOSPITAL ENCOUNTER (OUTPATIENT)
Dept: PHYSICAL THERAPY | Age: 49
Discharge: HOME | End: 2019-06-17
Payer: COMMERCIAL

## 2019-06-17 PROCEDURE — 97016 VASOPNEUMATIC DEVICE THERAPY: CPT

## 2019-06-17 PROCEDURE — 97110 THERAPEUTIC EXERCISES: CPT

## 2019-06-17 PROCEDURE — 97140 MANUAL THERAPY 1/> REGIONS: CPT

## 2019-06-17 NOTE — PROGRESS NOTES
PT DAILY TREATMENT NOTE 10-18    Patient Name: Nelson Orellana  Date:2019  : 1970  [x]  Patient  Verified  Payor: Kristine Rubioon / Plan:  [facility] / Product Type: PPO /    In time:4:55  Out time:5:45  Total Treatment Time (min): 50  Visit #: 15 of 22    Medicare/BCBS Only   Total Timed Codes (min):  40 1:1 Treatment Time:  35       Treatment Area: Pain in right shoulder [M25.511]  S/P shoulder surgery [Z98.890]    SUBJECTIVE  Pain Level (0-10 scale): 3  Any medication changes, allergies to medications, adverse drug reactions, diagnosis change, or new procedure performed?: [x] No    [] Yes (see summary sheet for update)  Subjective functional status/changes:   [] No changes reported  States it is sore today because she was hit by the canopy side of an umbrella on the top of her shoulder     OBJECTIVE    Modality rationale: decrease edema, decrease inflammation and decrease pain to improve the patients ability to ease with tolelrance to ADLs   Min Type Additional Details    [] Estim:  []Unatt       []IFC  []Premod                        []Other:  []w/ice   []w/heat  Position:  Location:    [] Estim: []Att    []TENS instruct  []NMES                    []Other:  []w/US   []w/ice   []w/heat  Position:  Location:    []  Traction: [] Cervical       []Lumbar                       [] Prone          []Supine                       []Intermittent   []Continuous Lbs:  [] before manual  [] after manual    []  Ultrasound: []Continuous   [] Pulsed                           []1MHz   []3MHz W/cm2:  Location:    []  Iontophoresis with dexamethasone         Location: [] Take home patch   [] In clinic    []  Ice     []  heat  []  Ice massage  []  Laser   []  Anodyne Position:  Location:    []  Laser with stim  []  Other:  Position:  Location:   10 [x]  Vasopneumatic Device Pressure:       [] lo [] med [x] hi   Temperature: [] lo [x] med [] hi   [] Skin assessment post-treatment:  []intact []redness- no adverse reaction    []redness - adverse reaction:         30 min Therapeutic Exercise:  [] See flow sheet :   Rationale: increase ROM, improve coordination and increase proprioception to improve the patients ability to perform daily household chores. 10 min Manual Therapy:  PROM with gentle overpressure flexion/ abd/ IR/ ER, LAD with oscillation, GH joint mobs   Rationale: decrease pain, increase ROM and increase tissue extensibility to assist with personal hygiene tasks such as dressing/ bathing        With   [] TE   [] TA   [] neuro   [] other: Patient Education: [x] Review HEP    [] Progressed/Changed HEP based on:   [] positioning   [] body mechanics   [] transfers   [] heat/ice application    [] other:      Other Objective/Functional Measures: Added AROM 4 way with cueing to avoid UT compensation during abduction/ scaption  AROM ~90 degrees abduction ~100 flexion before compensation      Pain Level (0-10 scale) post treatment: 0    ASSESSMENT/Changes in Function: Patient continues to show improvements with signs/ symptoms however still demonstrates a decrease in strength, impaired ROM, and an increase in pain with functional activities. Patient will continue to benefit from skilled PT services to modify and progress therapeutic interventions, address functional mobility deficits, address ROM deficits, address strength deficits, analyze and address soft tissue restrictions, analyze and cue movement patterns and analyze and modify body mechanics/ergonomics to attain remaining goals.      [x]  See Plan of Care  []  See progress note/recertification  []  See Discharge Summary         Progress towards goals / Updated goals:  Goals: to be achieved in 22 total treatments                 1  patient will have pain 0-1/10 to aid with increase tolerance to ADLs and activities at home and work               PN 0 CCO stiffness               CRUGGAL                 2 patient will have AAROM unforced  F and abd 135    ER 50   to aid with progression of protocol and tolerance to regular activities as home             PN F 125    ER 25               HOBUZEJ                   3 patient will have AUTM 04 JK show improving function with dressing               FJ 91               CURRENT            PLAN  [x]  Upgrade activities as tolerated     [x]  Continue plan of care  []  Update interventions per flow sheet       []  Discharge due to:_  []  Other:_      David Mcrae, PT 6/17/2019  7:51 AM    Future Appointments   Date Time Provider Christi Willis   6/17/2019  5:00 PM Geo Barnett [address] [facility]   6/19/2019  3:30 PM Michael Chandra, PT [facility]   6/21/2019  8:00 AM Michael Chandra, PT [facility]   6/25/2019  4:00 PM Yusef Grissom PT [facility]   6/27/2019  4:00 PM Yusef Grissom PT [facility]   7/8/2019  3:30 PM VL[facility] Aguilera Olman [address]   9/24/2019 11:00 AM [facility] MRI RM 1 [facility]

## 2019-06-19 ENCOUNTER — HOSPITAL ENCOUNTER (OUTPATIENT)
Dept: PHYSICAL THERAPY | Age: 49
Discharge: HOME | End: 2019-06-19
Payer: COMMERCIAL

## 2019-06-19 PROCEDURE — 97016 VASOPNEUMATIC DEVICE THERAPY: CPT

## 2019-06-19 PROCEDURE — 97530 THERAPEUTIC ACTIVITIES: CPT

## 2019-06-19 PROCEDURE — 97110 THERAPEUTIC EXERCISES: CPT

## 2019-06-19 PROCEDURE — 97140 MANUAL THERAPY 1/> REGIONS: CPT

## 2019-06-19 NOTE — PROGRESS NOTES
PT DAILY TREATMENT NOTE 10-18    Patient Name: Nelson Orellana  Date:2019  : 1970  [x]  Patient  Verified  Payor: Kristine Panchal / Plan:  [facility] / Product Type: PPO /    In time:3:30  Out time:4:30  Total Treatment Time (min): 45  Visit #: 16 of 22    Medicare/BCBS Only   Total Timed Codes (min):  45 1:1 Treatment Time:  45       Treatment Area: Pain in right shoulder [M25.511]  S/P shoulder surgery [Z98.890]    SUBJECTIVE  Pain Level (0-10 scale): 0  Any medication changes, allergies to medications, adverse drug reactions, diagnosis change, or new procedure performed?: [x] No    [] Yes (see summary sheet for update)  Subjective functional status/changes:   [] No changes reported  States she has been doing her HEP regularly and is getting used to not wearing the brace    OBJECTIVE    Modality rationale: decrease edema, decrease inflammation and decrease pain to improve the patients ability to ease with tolerance to ADLs   Min Type Additional Details    [] Estim:  []Unatt       []IFC  []Premod                        []Other:  []w/ice   []w/heat  Position:  Location:    [] Estim: []Att    []TENS instruct  []NMES                    []Other:  []w/US   []w/ice   []w/heat  Position:  Location:    []  Traction: [] Cervical       []Lumbar                       [] Prone          []Supine                       []Intermittent   []Continuous Lbs:  [] before manual  [] after manual    []  Ultrasound: []Continuous   [] Pulsed                           []1MHz   []3MHz W/cm2:  Location:    []  Iontophoresis with dexamethasone         Location: [] Take home patch   [] In clinic    []  Ice     []  heat  []  Ice massage  []  Laser   []  Anodyne Position:  Location:    []  Laser with stim  []  Other:  Position:  Location:   15 [x]  Vasopneumatic Device Pressure:       [] lo [] med [x] hi   Temperature: [] lo [x] med [] hi   [] Skin assessment post-treatment:  []intact []redness- no adverse reaction []redness - adverse reaction:       25 min Therapeutic Exercise:  [x] See flow sheet :   Rationale: increase strength, improve coordination and increase proprioception to improve the patients ability to perform daily household chores. 10 min Therapeutic Activity:  [x]  See flow sheet :   Rationale: increase strength, improve coordination and increase proprioception  to improve the patients ability to perform full duties while at work          10 min Manual Therapy:  PROM with overpressure flexion/ abd/ Ir/ ER, GH joint mobs, LAD with oscillation    Rationale: decrease pain, increase ROM and increase tissue extensibility to assist with personal hygiene tasks such as dressing/ bathing. With   [] TE   [] TA   [] neuro   [] other: Patient Education: [x] Review HEP    [] Progressed/Changed HEP based on:   [] positioning   [] body mechanics   [] transfers   [] heat/ice application    [] other:      Other Objective/Functional Measures:   Able to perform all therex per flow sheet  Require cueing for UT compensation throughout active exercises  Difficulties with eccentric lowering      Pain Level (0-10 scale) post treatment: 0    ASSESSMENT/Changes in Function: Patient continues to show improvements with signs/ symptoms however still demonstrates a decrease in strength, impaired ROM,  and an increase in pain with functional activities. Patient will continue to benefit from skilled PT services to modify and progress therapeutic interventions, address functional mobility deficits, address strength deficits, analyze and cue movement patterns and analyze and modify body mechanics/ergonomics to attain remaining goals.      [x]  See Plan of Care  []  See progress note/recertification  []  See Discharge Summary         Progress towards goals / Updated goals:  Goals: to be achieved in 22 total treatments                 1  patient will have pain 0-1/10 to aid with increase tolerance to ADLs and activities at home and work               PN 0 CCO stiffness               OYFTSEP                 2 patient will have AAROM unforced  F and abd 416    OD 50   to aid with progression of protocol and tolerance to regular activities as home             PN F 125    ER 25               ALYFPZG                   3 patient will have GUIF 96 VQ show improving function with dressing                73               CURRENT               PLAN  [x]  Upgrade activities as tolerated     [x]  Continue plan of care  []  Update interventions per flow sheet       []  Discharge due to:_  []  Other:_      Cameron Reinoso, PT 6/19/2019  1:06 PM    Future Appointments   Date Time Provider Christi Willis   6/19/2019  3:30 PM Grey Sacks, PT [facility]   6/21/2019  8:00 AM Grey Sacks, PT [facility]   6/25/2019  4:00 PM Alie Culp, PT [facility]   6/27/2019  4:00 PM Sawyer Osullivan, PT [facility]   7/8/2019  3:30 PM VLAD Cat Olman 67   9/24/2019 11:00 AM [facility] MRI RM 1 [facility]

## 2019-06-21 ENCOUNTER — OFFICE VISIT (OUTPATIENT)
Dept: FAMILY MEDICINE CLINIC | Age: 49
End: 2019-06-21

## 2019-06-21 ENCOUNTER — HOSPITAL ENCOUNTER (OUTPATIENT)
Dept: PHYSICAL THERAPY | Age: 49
End: 2019-06-21
Payer: COMMERCIAL

## 2019-06-21 VITALS
HEIGHT: 63 IN | SYSTOLIC BLOOD PRESSURE: 102 MMHG | TEMPERATURE: 97.8 F | WEIGHT: 161 LBS | BODY MASS INDEX: 28.53 KG/M2 | RESPIRATION RATE: 18 BRPM | OXYGEN SATURATION: 97 % | DIASTOLIC BLOOD PRESSURE: 74 MMHG | HEART RATE: 83 BPM

## 2019-06-21 DIAGNOSIS — R19.7 DIARRHEA, UNSPECIFIED TYPE: Primary | ICD-10-CM

## 2019-06-21 DIAGNOSIS — K52.9 GASTROENTERITIS: ICD-10-CM

## 2019-06-21 RX ORDER — DICYCLOMINE HYDROCHLORIDE 10 MG/1
10 CAPSULE ORAL 3 TIMES DAILY
Qty: 15 CAP | Refills: 0 | Status: SHIPPED | OUTPATIENT
Start: 2019-06-21 | End: 2019-06-26

## 2019-06-21 RX ORDER — METRONIDAZOLE 500 MG/1
500 TABLET ORAL 3 TIMES DAILY
Qty: 30 TAB | Refills: 0 | Status: SHIPPED | OUTPATIENT
Start: 2019-06-21 | End: 2019-07-01

## 2019-06-21 NOTE — PROGRESS NOTES
KASANDRA Wolf is a 46 y.o. female who presents today for complaint of gastrointestinal symptoms of watery diarrhea, lower abdominal cramps, nausea for3  days. No blood in stool. Pt notes she recently finished a course of Clindamycin for BV within the last 10 days, pt notes she has had C. Diff in the past and feel like the symptoms now feels like when she had C. Dfif. Pt hs tried OTC tums ffor symptoms and note minimal improvement. Pt was seen at [facility] today and given Augmentin but patient has not taken this medication yet. Last occurrence of watery stool was an hour ago, yesterday she had 5 occurrences. Pt is able to tolerate food and liquid ok but notes she has a decreased appetite. Current Outpatient Medications   Medication Sig Dispense Refill    meloxicam (MOBIC) 15 mg tablet TAKE ONE TABLET BY MOUTH DAILY OR TAKE ONE-HALF TABLET TWO TIMES A DAY AS NEEDED FOR PAIN *TAKE WITH FOOD* 90 Tab 0    Mv,Ca,Min-FA-Herbal No.157 (ESTROVEN MAXIMUM STRENGTH) 400 mcg tab Take  by mouth.  azelastine-fluticasone (DYMISTA) 137-50 mcg/spray spry by Nasal route.  esomeprazole (NEXIUM) 20 mg capsule Take 20 mg by mouth nightly.  gabapentin (NEURONTIN) 300 mg capsule Take 1 Cap by mouth nightly. Indications: acute pain following an operation 5 Cap 0    ketotifen (ZADITOR) 0.025 % (0.035 %) ophthalmic solution Administer 1 Drop to both eyes two (2) times a day.  fluocinoNIDE (LIDEX) 0.05 % topical cream Apply 2 g to affected area two (2) times a day. 15 g 0    OTHER       multivitamin (ONE A DAY) tablet Take 1 Tab by mouth daily.  PENNSAID 20 mg/gram /actuation(2 %) sopm 2 Pump(s) by Apply Externally route two (2) times daily as needed. Cell: [phone]  Pharmacy: [phone] 112 g 3    azelastine (OPTIVAR) 0.05 % ophthalmic solution Administer  to both eyes two (2) times a day. Use in affected eye(s)      RANITIDINE HCL (ZANTAC PO) Take  by mouth.       VITAMIN A/VITAMIN D3 (NATURAL VITAMIN D PO) Take  by mouth.  diphenhydrAMINE (SIMPLY SLEEP) 25 mg tablet Take 25 mg by mouth nightly. Allergies   Allergen Reactions    Milk Containing Products Diarrhea    Sulfa (Sulfonamide Antibiotics) Rash    Other Medication Diarrhea     Eggs, patient eats, and gets flu shot yearly with no reaction       SUBJECTIVE:  Review of Systems   Constitutional: Positive for chills. Negative for fever. Respiratory: Negative for shortness of breath. Cardiovascular: Negative for chest pain, palpitations and leg swelling. Gastrointestinal: Positive for abdominal pain, diarrhea and nausea. Negative for vomiting. Genitourinary: Negative for dysuria, frequency and urgency. Musculoskeletal: Negative for myalgias. Neurological: Positive for headaches. Negative for dizziness, tingling and sensory change. OBJECTIVE:  Visit Vitals  /74 (BP 1 Location: Left arm, BP Patient Position: Sitting)   Pulse 83   Temp 97.8 °F (36.6 °C) (Oral)   Resp 18   Ht 5' 3\" (1.6 m)   Wt 161 lb (73 kg)   LMP 05/13/2019 (Exact Date)   SpO2 97%   BMI 28.52 kg/m²      Physical Exam   Constitutional: She is oriented to person, place, and time and well-developed, well-nourished, and in no distress. HENT:   Head: Normocephalic. Eyes: Pupils are equal, round, and reactive to light. Conjunctivae and EOM are normal.   Neck: No thyromegaly present. Cardiovascular: Normal rate, regular rhythm and normal heart sounds. Pulmonary/Chest: Effort normal and breath sounds normal. She has no wheezes. She has no rales. She exhibits no tenderness. Abdominal: Soft. Bowel sounds are normal. She exhibits no distension. There is tenderness. There is no rebound and no guarding. Musculoskeletal: She exhibits no edema. Neurological: She is alert and oriented to person, place, and time. Gait normal.   Skin: Skin is warm and dry. No pallor.    Psychiatric: Mood and affect normal.   Nursing note and vitals reviewed. ASSESSMENT:  Diagnoses and all orders for this visit:    1. Diarrhea, unspecified type  -     metroNIDAZOLE (FLAGYL) 500 mg tablet; Take 1 Tab by mouth three (3) times daily for 10 days. 2. Gastroenteritis  -     dicyclomine (BENTYL) 10 mg capsule; Take 1 Cap by mouth three (3) times daily for 5 days. PLAN:  Start Flagyl TID for 10 days  Start Bentyl PRN for abdominal cramping  Encouraged increased hydration and bland/brat diet    I have discussed the diagnosis with the patient and the intended plan as seen in the above orders. The patient has received an after-visit summary and questions were answered concerning future plans. I have discussed medication side effects and warnings with the patient as well. Patient will call for further questions. Follow-up and Dispositions    · Return if symptoms worsen or fail to improve with PCP.        Gina Carrasco, NP

## 2019-06-21 NOTE — PROGRESS NOTES
Chief Complaint   Patient presents with    Diarrhea     1. Have you been to the ER, urgent care clinic since your last visit? Hospitalized since your last visit? Patient went to urgent care and they told her she had an UTI today    2. Have you seen or consulted any other health care providers outside of the [address] [place] [address] since your last visit? Include any pap smears or colon screening.  No

## 2019-06-21 NOTE — PATIENT INSTRUCTIONS
Diarrhea: Care Instructions  Your Care Instructions    Diarrhea is loose, watery stools (bowel movements). The exact cause is often hard to find. Sometimes diarrhea is your body's way of getting rid of what caused an upset stomach. Viruses, food poisoning, and many medicines can cause diarrhea. Some people get diarrhea in response to emotional stress, anxiety, or certain foods. Almost everyone has diarrhea now and then. It usually isn't serious, and your stools will return to normal soon. The important thing to do is replace the fluids you have lost, so you can prevent dehydration. The doctor has checked you carefully, but problems can develop later. If you notice any problems or new symptoms, get medical treatment right away. Follow-up care is a key part of your treatment and safety. Be sure to make and go to all appointments, and call your doctor if you are having problems. It's also a good idea to know your test results and keep a list of the medicines you take. How can you care for yourself at home? · Watch for signs of dehydration, which means your body has lost too much water. Dehydration is a serious condition and should be treated right away. Signs of dehydration are:  ? Increasing thirst and dry eyes and mouth. ? Feeling faint or lightheaded. ? Darker urine, and a smaller amount of urine than normal.  · To prevent dehydration, drink plenty of fluids, enough so that your urine is light yellow or clear like water. Choose water and other caffeine-free clear liquids until you feel better. If you have kidney, heart, or liver disease and have to limit fluids, talk with your doctor before you increase the amount of fluids you drink. · Begin eating small amounts of mild foods the next day, if you feel like it. ? Try yogurt that has live cultures of Lactobacillus. (Check the label.)  ? Avoid spicy foods, fruits, alcohol, and caffeine until 48 hours after all symptoms are gone. ?  Avoid chewing gum that contains sorbitol. ? Avoid dairy products (except for yogurt with Lactobacillus) while you have diarrhea and for 3 days after symptoms are gone. · The doctor may recommend that you take over-the-counter medicine, such as loperamide (Imodium), if you still have diarrhea after 6 hours. Read and follow all instructions on the label. Do not use this medicine if you have bloody diarrhea, a high fever, or other signs of serious illness. Call your doctor if you think you are having a problem with your medicine. When should you call for help? Call 911 anytime you think you may need emergency care. For example, call if:    · You passed out (lost consciousness).     · Your stools are maroon or very bloody.    Call your doctor now or seek immediate medical care if:    · You are dizzy or lightheaded, or you feel like you may faint.     · Your stools are black and look like tar, or they have streaks of blood.     · You have new or worse belly pain.     · You have symptoms of dehydration, such as:  ? Dry eyes and a dry mouth. ? Passing only a little dark urine. ? Feeling thirstier than usual.     · You have a new or higher fever.    Watch closely for changes in your health, and be sure to contact your doctor if:    · Your diarrhea is getting worse.     · You see pus in the diarrhea.     · You are not getting better after 2 days (48 hours). Where can you learn more? Go to http://sary-mk.info/. Enter H662 in the search box to learn more about \"Diarrhea: Care Instructions. \"  Current as of: September 23, 2018  Content Version: 11.9  © 5600-3805 [employer] Care instructions adapted under license by [employer] (which disclaims liability or warranty for this information).  If you have questions about a medical condition or this instruction, always ask your healthcare professional. [place] [address] any warranty or liability for your use of this information.

## 2019-06-25 ENCOUNTER — HOSPITAL ENCOUNTER (OUTPATIENT)
Dept: PHYSICAL THERAPY | Age: 49
Discharge: HOME | End: 2019-06-25
Payer: COMMERCIAL

## 2019-06-25 PROCEDURE — 97140 MANUAL THERAPY 1/> REGIONS: CPT | Performed by: PHYSICAL THERAPIST

## 2019-06-25 PROCEDURE — 97530 THERAPEUTIC ACTIVITIES: CPT | Performed by: PHYSICAL THERAPIST

## 2019-06-25 PROCEDURE — 97110 THERAPEUTIC EXERCISES: CPT | Performed by: PHYSICAL THERAPIST

## 2019-06-25 PROCEDURE — 97016 VASOPNEUMATIC DEVICE THERAPY: CPT | Performed by: PHYSICAL THERAPIST

## 2019-06-25 NOTE — PROGRESS NOTES
PT DAILY TREATMENT NOTE 10-18    Patient Name: Arsenio Kebede  Date:2019  : 1970  [x]  Patient  Verified  Payor: BLUE CROSS / Plan: [address] [place] [address] [place] / Product Type: PPO /    In time:3:50P  Out time:4:45P  Total Treatment Time (min): 55 min  Visit #: 17 of 22    Medicare/BCBS Only   Total Timed Codes (min):  40 1:1 Treatment Time:  40       Treatment Area: Pain in right shoulder [M25.511]  S/P shoulder surgery [Z98.890]    SUBJECTIVE  Pain Level (0-10 scale): 0/10  Any medication changes, allergies to medications, adverse drug reactions, diagnosis change, or new procedure performed?: [x] No    [] Yes (see summary sheet for update)  Subjective functional status/changes:   [] No changes reported  Patient states she is feeling pretty good today. No pain in the shoulder    OBJECTIVE    Modality rationale: decrease edema, decrease inflammation and increase tissue extensibility to improve the patients ability to improve activity tolerance.     Min Type Additional Details    [] Estim:  []Unatt       []IFC  []Premod                        []Other:  []w/ice   []w/heat  Position:  Location:    [] Estim: []Att    []TENS instruct  []NMES                    []Other:  []w/US   []w/ice   []w/heat  Position:  Location:    []  Traction: [] Cervical       []Lumbar                       [] Prone          []Supine                       []Intermittent   []Continuous Lbs:  [] before manual  [] after manual    []  Ultrasound: []Continuous   [] Pulsed                           []1MHz   []3MHz W/cm2:  Location:    []  Iontophoresis with dexamethasone         Location: [] Take home patch   [] In clinic    []  Ice     []  heat  []  Ice massage  []  Laser   []  Anodyne Position:  Location:    []  Laser with stim  []  Other:  Position:  Location:   15 [x]  Vasopneumatic Device Pressure:       [x] lo [] med [] hi   Temperature: [x] lo [] med [] hi   [] Skin assessment post-treatment:  []intact []redness- no adverse reaction    []redness - adverse reaction:     13 min Therapeutic Exercise:  [x] See flow sheet :   Rationale: increase ROM and increase strength to improve the patients ability to A/ROM and decrease pain with movement. 17 min Therapeutic Activity:  [x]  See flow sheet :   Rationale: increase strength and improve coordination  to improve the patients ability to Tolerate basic ADLs and job-related tasks without pain. 10 min Manual Therapy:  Grade 2-3 mobs to CSP   Rationale: decrease pain, increase ROM and increase tissue extensibility to the patients ability to perform functional tasks such as overhead reach. With   [x] TE   [x] TA   [x] neuro   [] other: Patient Education: [x] Review HEP    [x] Progressed/Changed HEP based on:   [x] positioning   [] body mechanics   [] transfers   [] heat/ice application    [] other:      Other Objective/Functional Measures: A/ROM of  Shoulder increased to 130 degs abductio     Pain Level (0-10 scale) post treatment: 0/10    ASSESSMENT/Changes in Function: Patient is progressing well today - no increased pain, but soreness and fatigue following advancement in exercises per protocol at this stage. Patient will continue to benefit from skilled PT services to modify and progress therapeutic interventions, address functional mobility deficits, address ROM deficits, address strength deficits, analyze and address soft tissue restrictions, analyze and cue movement patterns, analyze and modify body mechanics/ergonomics and assess and modify postural abnormalities to attain remaining goals.      [x]  See Plan of Care  []  See progress note/recertification  []  See Discharge Summary         Progress towards goals / Updated goals:  Goals: to be achieved in 22 total treatments                 1  patient will have pain 0-1/10 to aid with increase tolerance to ADLs and activities at home and work               PN 0 CCO stiffness               [id number]                 2 patient will have AAROM unforced  F and abd 118    HA 50   to aid with progression of protocol and tolerance to regular activities as home             PN F 125    ER 25               EIUWPHP 130 flexion and 120 abd 6/25/19                 3 patient will have DGCZ 01 BB show improving function with dressing               AH 19               CURRENT        PLAN  [x]  Upgrade activities as tolerated     []  Continue plan of care  []  Update interventions per flow sheet       []  Discharge due to:_  []  Other:_      Jersey Sims, PT 6/25/2019  4:24 PM    Future Appointments   Date Time Provider Christi Willis   6/27/2019  4:00 PM Edenilson Pierce, PT [facility]   7/1/2019  3:00 PM Edenilson Pierce, PT [facility]   7/3/2019 11:00 AM Alicia Stahl, PT [facility]   7/8/2019  3:30 PM VLAD Perez Olman [address]   7/9/2019  7:30 AM Edenilson Pierce, PT [facility]   7/12/2019  7:30 AM Edenilson Pierce, PT [facility]   9/24/2019 11:00 AM [facility] MRI RM 1 [facility]

## 2019-06-26 ENCOUNTER — HOSPITAL ENCOUNTER (OUTPATIENT)
Dept: PHYSICAL THERAPY | Age: 49
Discharge: HOME | End: 2019-06-26
Payer: COMMERCIAL

## 2019-06-26 PROCEDURE — 97530 THERAPEUTIC ACTIVITIES: CPT | Performed by: PHYSICAL THERAPIST

## 2019-06-26 PROCEDURE — 97110 THERAPEUTIC EXERCISES: CPT | Performed by: PHYSICAL THERAPIST

## 2019-06-26 NOTE — PROGRESS NOTES
PT DAILY TREATMENT NOTE 10-18    Patient Name: Asia Hayden  Date:2019  : 1970  [x]  Patient  Verified  Payor: BLUE CROSS / Plan: [address] [place] [address] [place] / Product Type: PPO /    In time:9:35A  Out time:10:20A  Total Treatment Time (min): 45min  Visit #: 18 of 22    Medicare/BCBS Only   Total Timed Codes (min):  25 1:1 Treatment Time:  25       Treatment Area: Pain in right shoulder [M25.511]  S/P shoulder surgery [Z98.890]    SUBJECTIVE  Pain Level (0-10 scale): 8/10  Any medication changes, allergies to medications, adverse drug reactions, diagnosis change, or new procedure performed?: [x] No    [] Yes (see summary sheet for update)  Subjective functional status/changes:   [] No changes reported  Patient enters today complaining of high levels of neck pain, shoulder is fine, but unable to turn head after progression of shoulder exercises the day before. OBJECTIVE    Modality rationale: decrease edema and decrease inflammation to improve the patients ability to increase activity tolerance.     Min Type Additional Details    [] Estim:  []Unatt       []IFC  []Premod                        []Other:  []w/ice   []w/heat  Position:  Location:    [] Estim: []Att    []TENS instruct  []NMES                    []Other:  []w/US   []w/ice   []w/heat  Position:  Location:    []  Traction: [] Cervical       []Lumbar                       [] Prone          []Supine                       []Intermittent   []Continuous Lbs:  [] before manual  [] after manual    []  Ultrasound: []Continuous   [] Pulsed                           []1MHz   []3MHz W/cm2:  Location:    []  Iontophoresis with dexamethasone         Location: [] Take home patch   [] In clinic   15 []  Ice     [x]  heat  []  Ice massage  []  Laser   []  Anodyne Position: supine  Location: CSP    []  Laser with stim  []  Other:  Position:  Location:    []  Vasopneumatic Device Pressure:       [] lo [] med [] hi   Temperature: [] lo [] med [] hi   [] Skin assessment post-treatment:  []intact []redness- no adverse reaction    []redness - adverse reaction:     13 min Therapeutic Exercise:  [x] See flow sheet :   Rationale: increase ROM, increase strength and improve coordination to improve the patients ability to A/ROM and decrease pain with movement. 12 min Therapeutic Activity:  [x]  See flow sheet :   Rationale: increase strength and improve coordination  to improve the patients ability to Tolerate basic ADLs and job-related tasks without pain, including manual grade 2-5 mobs to CSP. With   [x] TE   [x] TA   [] neuro   [] other: Patient Education: [x] Review HEP    [x] Progressed/Changed HEP based on:   [] positioning   [] body mechanics   [] transfers   [] heat/ice application    [] other:      Other Objective/Functional Measures: A/ROM of CSP increased by 10 degs following manual therapy     Pain Level (0-10 scale) post treatment: 3/10    ASSESSMENT/Changes in Function: Patient is progressing towards goals, was educated on normal reaction of increased neck accessory motion with pushing for increase shoulder range. Educated on need to work on muscle isolation vs. Compensation. Patient will continue to benefit from skilled PT services to modify and progress therapeutic interventions, address functional mobility deficits, address ROM deficits, address strength deficits, analyze and address soft tissue restrictions, analyze and modify body mechanics/ergonomics and assess and modify postural abnormalities to attain remaining goals.      [x]  See Plan of Care  []  See progress note/recertification  []  See Discharge Summary         Progress towards goals / Updated goals:  Goals: to be achieved in 22 total treatments                 1  patient will have pain 0-1/10 to aid with increase tolerance to ADLs and activities at home and work               PN 0 CCO stiffness               GFPHREO  none 6/26/19               2 patient will have AAROM unforced  F and abd 660    RD 50   to aid with progression of protocol and tolerance to regular activities as home             PN F 125    ER 25               TATCHAM                   3 patient will have ZKFX 82 OU show improving function with dressing               TV 62               LLYEFLW         PLAN  [x]  Upgrade activities as tolerated     []  Continue plan of care  []  Update interventions per flow sheet       []  Discharge due to:_  []  Other:_      Jovita Mckinnon, PT 6/26/2019  10:19 AM    Future Appointments   Date Time Provider Christi Willis   6/27/2019  4:00 PM Swetha Lyman, PT [facility]   7/1/2019  3:00 PM Swetha Lyman, PT [facility]   7/3/2019 11:00 AM Bonifacio Monzon, PT [facility]   7/8/2019  3:30 PM VLAD Jamison Olman 67   7/9/2019  7:30 AM Swetha Lyman, PT [facility]   7/12/2019  7:30 AM Swetha Lyman, PT [facility]   9/24/2019 11:00 AM [facility] MRI RM 1 [facility]

## 2019-06-27 ENCOUNTER — HOSPITAL ENCOUNTER (OUTPATIENT)
Dept: PHYSICAL THERAPY | Age: 49
Discharge: HOME | End: 2019-06-27
Payer: COMMERCIAL

## 2019-06-27 PROCEDURE — 97140 MANUAL THERAPY 1/> REGIONS: CPT

## 2019-06-27 PROCEDURE — 97110 THERAPEUTIC EXERCISES: CPT

## 2019-06-27 NOTE — PROGRESS NOTES
PT DAILY TREATMENT NOTE 10-18    Patient Name: Ivelisse Bond  Date:2019  : 1970  [x]  Patient  Verified  Payor: BLUE CROSS / Plan: [employer] [facility] / Product Type: PPO /    In time:356  Out time:450  Total Treatment Time (min): 45  Visit #: 19 of 22    Medicare/BCBS Only   Total Timed Codes (min):  35 1:1 Treatment Time:  23       Treatment Area: Pain in right shoulder [M25.511]  S/P shoulder surgery [Z98.890]    SUBJECTIVE  Pain Level (0-10 scale): 8 - neck  Any medication changes, allergies to medications, adverse drug reactions, diagnosis change, or new procedure performed?: [x] No    [] Yes (see summary sheet for update)  Subjective functional status/changes:   [] No changes reported  \" I woke up at 3 am 2 nights ago and it hurt to move my neck. Yesterday I called and they fit me in and Gerard did some stretching and cracking on my neck - I could move ir better when I left but it still hurt. I did stretches yesterday and the range of motion. And today it is still hurting me not sure whether it is due to the shoulder exercises or the neck manipulations and pressure points she did. But the shoulder has nothing. I am going back a lot further than I was before with the shoulder. \"    OBJECTIVE    Modality rationale: decrease pain and increase tissue extensibility to improve the patients ability to aid with increase tolerance to ADLs and activities   Min Type Additional Details    [] Estim:  []Unatt       []IFC  []Premod                        []Other:  []w/ice   []w/heat  Position:  Location:    [] Estim: []Att    []TENS instruct  []NMES                    []Other:  []w/US   []w/ice   []w/heat  Position:  Location:    []  Traction: [] Cervical       []Lumbar                       [] Prone          []Supine                       []Intermittent   []Continuous Lbs:  [] before manual  [] after manual    []  Ultrasound: []Continuous   [] Pulsed                           []1MHz   []3MHz W/cm2:  Location:    []  Iontophoresis with dexamethasone         Location: [] Take home patch   [] In clinic   10 []  Ice     [x]  Heat post   []  Ice massage  []  Laser   []  Anodyne Position:supine   Location:Csp    []  Laser with stim  []  Other:  Position:  Location:    []  Vasopneumatic Device Pressure:       [] lo [] med [] hi   Temperature: [] lo [] med [] hi   [] Skin assessment post-treatment:  []intact []redness- no adverse reaction    []redness - adverse reaction:       min []Eval                  []Re-Eval       10 min Therapeutic Exercise:  [] See flow sheet :   Rationale: increase ROM, increase strength and improve coordination to improve the patients ability to aid with increase tolerance to ADLs and activities    10 min Therapeutic Activity:  []  See flow sheet :   Rationale: increase ROM, increase strength and improve coordination  to improve the patients ability to aid with increase tolerance to ADLS and activities      min Neuromuscular Re-education:  []  See flow sheet :   Rationale:  to improve the patients ability to     15 min Manual Therapy:  Csp gentle manual traction, gentle Csp PROM , sub occipital release. TPR right > left Csp paraspinals and UT   Rationale: decrease pain, increase ROM, increase tissue extensibility and decrease trigger points to aid with increase tolerance to ADLS and activities     min Gait Training:  ___ feet with ___ device on level surfaces with ___ level of assist   Rationale: With   [x] TE   [x] TA   [] neuro   [] other: Patient Education: [x] Review HEP    [] Progressed/Changed HEP based on:   [] positioning   [] body mechanics   [] transfers   [] heat/ice application    [] other:      Other Objective/Functional Measures: VC exercises and tech. Continues with neck pain, LOM Csp all planes, trigger point Csp paraspinals and UT     Pain Level (0-10 scale) post treatment: a little better     ASSESSMENT/Changes in Function: tolerated well.  Continues with Csp pain. Patient will continue to benefit from skilled PT services to modify and progress therapeutic interventions, address ROM deficits, address strength deficits, analyze and address soft tissue restrictions, analyze and cue movement patterns, analyze and modify body mechanics/ergonomics, assess and modify postural abnormalities and instruct in home and community integration to attain remaining goals.      [x]  See Plan of Care  []  See progress note/recertification  []  See Discharge Summary         Progress towards goals / Updated goals:   Goals: to be achieved in 22 total treatments                 1  patient will have pain 0-1/10 to aid with increase tolerance to ADLs and activities at home and work               PN 0 CCO stiffness               CURRENT   Csp 8, shoulder 0 6/27/19               2 patient will have AAROM unforced  F and abd 007    NP 50   to aid with progression of protocol and tolerance to regular activities as home             PN F 125    ER 25               SNAJNFS                   3 patient will have RBDQ 64 KG show improving function with dressing               YG 66               CURRENT                  PLAN  [x]  Upgrade activities as tolerated     [x]  Continue plan of care  []  Update interventions per flow sheet       []  Discharge due to:_  []  Other:_      Gissell Abreu, PT 6/27/2019  4:20 PM    Future Appointments   Date Time Provider Christi Willis   7/1/2019  3:00 PM Janessa Natarajan PT [facility]   7/3/2019 11:00 AM Raj Kearns, PT [facility]   7/8/2019  3:30 PM VLAD Prince VS [facility]   7/9/2019  7:30 AM Janessa Natarajan PT [facility]   7/12/2019  7:30 AM Janessa Natarajan PT [facility]   9/24/2019 11:00 AM [facility] MRI RM 1 [facility]

## 2019-06-28 ENCOUNTER — OFFICE VISIT (OUTPATIENT)
Dept: FAMILY MEDICINE CLINIC | Age: 49
End: 2019-06-28

## 2019-06-28 ENCOUNTER — HOSPITAL ENCOUNTER (OUTPATIENT)
Dept: GENERAL RADIOLOGY | Age: 49
Discharge: HOME | End: 2019-06-28
Payer: COMMERCIAL

## 2019-06-28 VITALS
HEART RATE: 79 BPM | WEIGHT: 162.8 LBS | SYSTOLIC BLOOD PRESSURE: 112 MMHG | OXYGEN SATURATION: 99 % | BODY MASS INDEX: 28.84 KG/M2 | RESPIRATION RATE: 17 BRPM | HEIGHT: 63 IN | DIASTOLIC BLOOD PRESSURE: 74 MMHG | TEMPERATURE: 98 F

## 2019-06-28 DIAGNOSIS — M54.2 NECK PAIN: Primary | ICD-10-CM

## 2019-06-28 DIAGNOSIS — M54.2 NECK PAIN: ICD-10-CM

## 2019-06-28 PROCEDURE — 72040 X-RAY EXAM NECK SPINE 2-3 VW: CPT

## 2019-06-28 RX ORDER — CYCLOBENZAPRINE HCL 10 MG
10 TABLET ORAL
Qty: 30 TAB | Refills: 0 | Status: SHIPPED | OUTPATIENT
Start: 2019-06-28 | End: 2019-07-15

## 2019-06-28 NOTE — PROGRESS NOTES
HISTORY OF PRESENT ILLNESS    Anne Argueta is a 46y.o. year old female here today for:  Cervical neck pain     Onset three days ago   [facility] physical therapy session last week for right shoulder pain  (supraspinatus deep partial thickness undersurface tear into the bursal surface, possible capsular avulsion    physical therapist saw her  for right shoulder pain therapy and she was asked Adam Herr I ever cracked your back' and patient states no and patient reports that therapist  \"cracked\" her back. Patient reports that she awakened at 3 am the next morning experiencing severe neck pain   Patient reports that she felt pain mid neck and she could not move her neck without severe pain. She went back to physical therapy on Wednesday at 0930 the same physical therapist \"cracked\" her neck again and she felt pain again after she left. She did not discuss with physical therapy  She reports 9/10 neck pain today  Quality of pain:Stabbing pain constant   Pain with movement of her neck   Review of Systems   Eyes: Negative. Musculoskeletal: Negative for back pain and falls. Neurological: Negative for tingling, sensory change, speech change, focal weakness, loss of consciousness and weakness. Current Outpatient Medications   Medication Sig Dispense Refill    metroNIDAZOLE (FLAGYL) 500 mg tablet Take 1 Tab by mouth three (3) times daily for 10 days. 30 Tab 0    ketotifen (ZADITOR) 0.025 % (0.035 %) ophthalmic solution Administer 1 Drop to both eyes two (2) times a day.  multivitamin (ONE A DAY) tablet Take 1 Tab by mouth daily.  Mv,Ca,Min-FA-Herbal No.157 (ESTROVEN MAXIMUM STRENGTH) 400 mcg tab Take  by mouth.  azelastine-fluticasone (DYMISTA) 137-50 mcg/spray spry by Nasal route.  RANITIDINE HCL (ZANTAC PO) Take  by mouth.  esomeprazole (NEXIUM) 20 mg capsule Take 20 mg by mouth nightly.  diphenhydrAMINE (SIMPLY SLEEP) 25 mg tablet Take 25 mg by mouth nightly.       gabapentin (NEURONTIN) 300 mg capsule Take 1 Cap by mouth nightly. Indications: acute pain following an operation 5 Cap 0    meloxicam (MOBIC) 15 mg tablet TAKE ONE TABLET BY MOUTH DAILY OR TAKE ONE-HALF TABLET TWO TIMES A DAY AS NEEDED FOR PAIN *TAKE WITH FOOD* 90 Tab 0    fluocinoNIDE (LIDEX) 0.05 % topical cream Apply 2 g to affected area two (2) times a day. 15 g 0    OTHER       PENNSAID 20 mg/gram /actuation(2 %) sopm 2 Pump(s) by Apply Externally route two (2) times daily as needed. Cell: [phone]  Pharmacy: [phone] 112 g 3    azelastine (OPTIVAR) 0.05 % ophthalmic solution Administer  to both eyes two (2) times a day. Use in affected eye(s)      VITAMIN A/VITAMIN D3 (NATURAL VITAMIN D PO) Take  by mouth. Patient Active Problem List   Diagnosis Code    Foot pain M79.673    Thumb pain M79.646    Mass of axilla R22.30    Seasonal allergic rhinitis J30.2    GERD (gastroesophageal reflux disease) K21.9    Cervical disc disorder with radiculopathy M50.10    Cervical disc herniation M50.20    S/P cervical discectomy Z98.890    Bulging lumbar disc M51.26    Spondylosis of lumbar region without myelopathy or radiculopathy M47.816    Lumbar neuritis M54.16    DDD (degenerative disc disease), lumbar M51.36    Displacement of lumbar intervertebral disc without myelopathy M51.26    HNP (herniated nucleus pulposus), lumbar M51.26    Status post lumbar laminectomy Z98.890    Cough R05    Sore throat J02.9     Reviewed past medical, family and social history      OBJECTIVE:  Awake and alert in no acute distress  Neck supple without lymphadenopathy, no carotid artery bruits auscultated bilaterally. No thyromegaly  Lungs clear throughout  S1 S2 RRR without ectopy or murmur auscultated.   Inspection: no erythema no edema no warmth to palpation  Cervical paraspinals TTP mid cervical spine and TTP paraspinals bilaterally mid cervical spine  Pain with lateral bending, flexion and extension  No gait abnormalities Neuro no focal changes       Visit Vitals  /74 (BP 1 Location: Left arm, BP Patient Position: Sitting)   Pulse 79   Temp 98 °F (36.7 °C) (Oral)   Resp 17   Ht 5' 3\" (1.6 m)   Wt 162 lb 12.8 oz (73.8 kg)   SpO2 99%   BMI 28.84 kg/m²     Diagnoses and all orders for this visit:    Neck pain  -     cyclobenzaprine (FLEXERIL) 10 mg tablet; Take 1 Tab by mouth three (3) times daily as needed for Muscle Spasm(s). , Normal, Disp-30 Tab, R-0  -     XR SPINE CERV PA LAT ODONT 3 V MAX; Future    Anticipatory guidance given  Patient agrees with plan and verbalizes understanding. I have discussed the diagnosis with the patient and the intended plan as seen in the above orders. The patient has received an after-visit summary and questions were answered concerning future plans. I have discussed medication side effects and warnings with the patient as well. Follow-up and Dispositions    · Return if symptoms worsen or fail to improve. Cervical spine results communicated with patient via my chart per our discussion upon conclusion of her visit today.     No acute findings see my chart message   [facility]

## 2019-06-28 NOTE — PROGRESS NOTES
Chief Complaint   Patient presents with    Neck Pain     for the past three days      1. Have you been to the ER, urgent care clinic since your last visit? Hospitalized since your last visit? No     2. Have you seen or consulted any other health care providers outside of the [address] [place] [address] since your last visit? Include any pap smears or colon screening.  No

## 2019-07-01 ENCOUNTER — HOSPITAL ENCOUNTER (OUTPATIENT)
Dept: PHYSICAL THERAPY | Age: 49
Discharge: HOME | End: 2019-07-01
Payer: COMMERCIAL

## 2019-07-01 PROCEDURE — 97140 MANUAL THERAPY 1/> REGIONS: CPT

## 2019-07-01 PROCEDURE — 97530 THERAPEUTIC ACTIVITIES: CPT

## 2019-07-01 PROCEDURE — 97110 THERAPEUTIC EXERCISES: CPT

## 2019-07-01 NOTE — PROGRESS NOTES
In Bar[facility]illerm[facility] Ob[facility] [address], [address] [place] [address], [address]  [phone] fax    Physician Update  [x] Progress Note  [] Discharge Summary  Patient name: Piero Wolf Start of Care: 19   Referral source: Shubham Ordaz,* : 1970   Medical/Treatment Diagnosis: Pain in right shoulder [M25.511]  S/P shoulder surgery [Z98.890]  Payor: BLUE CROSS / Plan: [address] [facility] [place] / Product Type: PPO /  Onset Date:, P/O 19 and 19     Prior Hospitalization: see medical history Provider#: [id number]   Medications: Verified on Patient Summary List    Comorbidities: Right RCR , skin cancer, previous pregnancy,    Prior Level of Function: Prior to initial involvement  no issues with right UE. Working, tolerated household chores and community activities, no AD use.       [facility]: [facility]    Missed Visits: 1    Status at Evaluation/Last Progress Note: last MD note  Progress towards Goals: Recent neck pain with visit to MD and placed on muscle relaxants. She has begun light strengthening with Tbands in all planes and remains compliant with her HEP.  She demonstrates the potential to make further functional gains within a reasonable time frame for carryover to work, home and community activiites  Goals: to be achieved in 32 total treatments:             1  patient will have pain 0-1/10 to aid with increase tolerance to ADLs and activities at home and work               PN 3 achy               TUVCOJK                  2 patient will have AAROM unforced  F and abd 135    ER 50   to aid with progression of protocol and tolerance to regular activities as home             PN F 125    ER 25 last measurement              SNPDSAT                   3 patient will have ZZFH 95 PY show improving function with dressing               PN 47 last measurement               CURRENT             ASSESSMENT/RECOMMENDATIONS:  [x]Continue therapy per initial plan/protocol at a frequency of  2-3 x per week for 32 total treatments    []Continue therapy with the following recommended changes:_____________________      _____________________________________________________________________  []Discontinue therapy progressing towards or have reached established goals  []Discontinue therapy due to lack of appreciable progress towards goals  []Discontinue therapy due to lack of attendance or compliance  []Await Physician's recommendations/decisions regarding therapy  []Other:________________________________________________________________    Thank you for this referral. Ekta Calabrese, PT 7/1/2019 3:40 PM  NOTE TO PHYSICIAN:  PLEASE COMPLETE THE ORDERS BELOW AND   FAX TO [facility]: [phone]  If you are unable to process this request in 24 hours please contact our office: [phone]    ? I have read the above report and request that my patient continue as recommended. ? I have read the above report and request that my patient continue therapy with the following changes/special instructions:_____________________________________  ? I have read the above report and request that my patient be discharged from therapy.     [de-identified] Signature:____________Date:_________TIME:________    [employer], Date and Time must be completed for valid certification **

## 2019-07-01 NOTE — PROGRESS NOTES
PT DAILY TREATMENT NOTE 10-18    Patient Name: Lexy Haro  Date:2019  : 1970  [x]  Patient  Verified  Payor: BLUE CROSS / Plan: [facility] / Product Type: PPO /    In time:258  Out time:329  Total Treatment Time (min): 31  Visit #: 20 of 22    Medicare/BCBS Only   Total Timed Codes (min):  31 1:1 Treatment Time:  31       Treatment Area: Pain in right shoulder [M25.511]  S/P shoulder surgery [Z98.890]    SUBJECTIVE  Pain Level (0-10 scale): 3  Any medication changes, allergies to medications, adverse drug reactions, diagnosis change, or new procedure performed?: [x] No    [] Yes (see summary sheet for update)  Subjective functional status/changes:   [] No changes reported  \"the shoulder is a little achy.  I went to the doctor for my neck and was put on muscle relaxants    OBJECTIVE     Modality rationale:     Min Type Additional Details    [] Estim:  []Unatt       []IFC  []Premod                        []Other:  []w/ice   []w/heat  Position:  Location:    [] Estim: []Att    []TENS instruct  []NMES                    []Other:  []w/US   []w/ice   []w/heat  Position:  Location:    []  Traction: [] Cervical       []Lumbar                       [] Prone          []Supine                       []Intermittent   []Continuous Lbs:  [] before manual  [] after manual    []  Ultrasound: []Continuous   [] Pulsed                           []1MHz   []3MHz W/cm2:  Location:    []  Iontophoresis with dexamethasone         Location: [] Take home patch   [] In clinic    []  Ice     []  heat  []  Ice massage  []  Laser   []  Anodyne Position:  Location:    []  Laser with stim  []  Other:  Position:  Location:    []  Vasopneumatic Device Pressure:       [] lo [] med [] hi   Temperature: [] lo [] med [] hi   [] Skin assessment post-treatment:  []intact []redness- no adverse reaction    []redness - adverse reaction:       min []Eval                  []Re-Eval       15 min Therapeutic Exercise:  [x] See flow sheet :   Rationale: increase ROM, increase strength and improve coordination to improve the patients ability to aid with increase tolerance to ADLS and activities    8 min Therapeutic Activity:  [x]  See flow sheet :   Rationale: increase ROM, increase strength and improve coordination  to improve the patients ability to aid with increase tolerance to ADLS and activities      min Neuromuscular Re-education:  []  See flow sheet :   Rationale:   to improve the patients ability to     8 min Manual Therapy:  LAD gentle, oscill gentle, AAROM all planes right shoulder with gentle overstretch and mobs for IR/ER > F/ABD   Rationale: decrease pain, increase ROM and increase tissue extensibility to aid with increase tolerance to ADLS and activities      min Gait Training:  ___ feet with ___ device on level surfaces with ___ level of assist   Rationale: With   [x] TE   [x] TA   [] neuro   [] other: Patient Education: [x] Review HEP    [] Progressed/Changed HEP based on:   [] positioning   [] body mechanics   [] transfers   [] heat/ice application    [] other:      Other Objective/Functional Measures: VC exercises and technique     Pain Level (0-10 scale) post treatment: 0    ASSESSMENT/Changes in Function: tolerated well. Decrease neck pain    Patient will continue to benefit from skilled PT services to modify and progress therapeutic interventions, address ROM deficits, address strength deficits, analyze and address soft tissue restrictions, analyze and cue movement patterns, analyze and modify body mechanics/ergonomics, assess and modify postural abnormalities and instruct in home and community integration to attain remaining goals.      [x]  See Plan of Care  [x]  See progress note/recertification  []  See Discharge Summary         Progress towards goals / Updated goals:  Goals: to be achieved in 22 total treatments                 1  patient will have pain 0-1/10 to aid with increase tolerance to ADLs and activities at home and work               PN 0 CCO stiffness               YFAVGVC  shoulder achy 3 7/1/19               2 patient will have AAROM unforced  F and abd [phone]   to aid with progression of protocol and tolerance to regular activities as home             PN F 125    ER 25               PJXTAUF                   3 patient will have EZOR 29 AT show improving function with dressing               KO 60               YYOQZLH               PLAN  [x]  Upgrade activities as tolerated     [x]  Continue plan of care  []  Update interventions per flow sheet       []  Discharge due to:_  [x]  Other:_  Recheck AROM and MMT gentle  Efrain Her, PT 7/1/2019  3:01 PM    Future Appointments   Date Time Provider Christi Willis   7/3/2019 11:00 AM Rebecca Escalona, PT [facility]   7/8/2019  3:30 PM VL[facility] Charles   7/9/2019  7:30 AM [facility]   7/12/2019  7:30 AM Christina Metcalf PT [facility]   9/24/2019 11:00 AM [facility] MRI RM 1 [facility]

## 2019-07-03 ENCOUNTER — HOSPITAL ENCOUNTER (OUTPATIENT)
Dept: PHYSICAL THERAPY | Age: 49
Discharge: HOME | End: 2019-07-03
Payer: COMMERCIAL

## 2019-07-03 PROCEDURE — 97110 THERAPEUTIC EXERCISES: CPT

## 2019-07-03 PROCEDURE — 97140 MANUAL THERAPY 1/> REGIONS: CPT

## 2019-07-03 NOTE — PROGRESS NOTES
PT DAILY TREATMENT NOTE 10-18    Patient Name: Gretel Molina  Date:7/3/2019  : 1970  [x]  Patient  Verified  Payor: BLUE CROSS / Plan: [employer] [facility] / Product Type: PPO /    In time: 11:00   Out time: 11:32  Total Treatment Time (min): 32  Visit #: 21 of 22    Medicare/BCBS Only   Total Timed Codes (min):  32 1:1 Treatment Time: 24       Treatment Area: Pain in right shoulder [M25.511]  S/P shoulder surgery [Z98.890]    SUBJECTIVE  Pain Level (0-10 scale): 0  Any medication changes, allergies to medications, adverse drug reactions, diagnosis change, or new procedure performed?: [x] No    [] Yes (see summary sheet for update)  Subjective functional status/changes:   [] No changes reported  Pt reports no changes since the last session     OBJECTIVE     22 min Therapeutic Exercise:  [x] See flow sheet :   Rationale: increase ROM, increase strength and improve coordination to improve the patients ability to aid with increase tolerance to activities    10 min Manual Therapy: Right GHJ distraction, STM/DTM right UT/supraspinatus/pec minor/bicep, right GHJ grade 2-3 posterior/inferior/anterior mobs, PROM with gentle overpressure into right shoulder IR/ER after performing above   Rationale: decrease pain, increase ROM and increase tissue extensibility to improve ADL ease          With   [x] TE   [x] TA   [] neuro   [] other: Patient Education: [x] Review HEP    [] Progressed/Changed HEP based on:   [] positioning   [] body mechanics   [] transfers   [] heat/ice application    [] other:      Other Objective/Functional Measures: Added overhead reaching and prone rows to improve strength and mobility in the right UE. Pain Level (0-10 scale) post treatment: 0    ASSESSMENT/Changes in Function: Reported no pain post session or with exercises today. Pt demonstrates mild right UT compensation noted with overhead reaching exercise today. Pt seems to be responding well to therapy interventions at this time. She states she sees the MD next week. Continue POC as tolerated. Patient will continue to benefit from skilled PT services to modify and progress therapeutic interventions, address ROM deficits, address strength deficits, analyze and address soft tissue restrictions, analyze and cue movement patterns, analyze and modify body mechanics/ergonomics, assess and modify postural abnormalities and instruct in home and community integration to attain remaining goals.      []  See Plan of Care  []  See progress note/recertification  []  See Discharge Summary         Progress towards goals / Updated goals:  Goals: to be achieved in 32 total treatments:             6  patient will have pain 0-1/10 to aid with increase tolerance to ADLs and activities at home and work               PN 3 achy               RQLFGTR                  2 patient will have AAROM unforced  F and abd 135    ER 50   to aid with progression of protocol and tolerance to regular activities as home             PN F 125    ER 25 last measurement              EHBWYCU                   3 patient will have NDCX 72 OP show improving function with dressing               WM 22 last measurement               CURRENT       PLAN  [x]  Upgrade activities as tolerated     [x]  Continue plan of care  [x]  Update interventions per flow sheet       []  Discharge due to:_  []  Other:_      Jozef Spence, PT 7/3/2019  11:11 AM    Future Appointments   Date Time Provider Christi Willis   7/3/2019 11:00 AM Anton Muro, PT [facility]   7/8/2019  3:30 PM VLAD Rodriguez [address]man [address]   7/9/2019  7:30 AM Norval Marker, PT [facility]   7/12/2019  7:30 AM Norval Marker, PT [facility]   9/24/2019 11:00 AM [facility] MRI RM 1 [facility]

## 2019-07-08 ENCOUNTER — OFFICE VISIT (OUTPATIENT)
Dept: ORTHOPEDIC SURGERY | Age: 49
End: 2019-07-08

## 2019-07-08 VITALS
OXYGEN SATURATION: 97 % | TEMPERATURE: 97.7 F | HEIGHT: 63 IN | SYSTOLIC BLOOD PRESSURE: 114 MMHG | BODY MASS INDEX: 28.35 KG/M2 | WEIGHT: 160 LBS | HEART RATE: 87 BPM | DIASTOLIC BLOOD PRESSURE: 83 MMHG

## 2019-07-08 DIAGNOSIS — M75.121 NONTRAUMATIC COMPLETE TEAR OF RIGHT ROTATOR CUFF: Primary | ICD-10-CM

## 2019-07-08 NOTE — PROGRESS NOTES
Sandra Moreira  1970     HISTORY OF PRESENT ILLNESS  Sandra Moreira is a 46 y.o. female who presents today for evaluation s/p Right shoulder arthroscopic rotator cuff revision and manipulation under anesthesia on 5/13/19. Patient has been going to PT. Describes pain as a 0/10. She overall feels like she is improving. Has been taking nothing for pain. Very happy with her progress. Patient denies any fever, chills, chest pain, shortness of breath or calf pain. The remainder of the review of systems is negative. There are no new illness or injuries to report since last seen in the office. No changes in medications, allergies, social or family history. PHYSICAL EXAM:   Visit Vitals  /83   Pulse 87   Temp 97.7 °F (36.5 °C) (Oral)   Ht 5' 2.5\" (1.588 m)   Wt 160 lb (72.6 kg)   SpO2 97%   BMI 28.80 kg/m²      The patient is a well-developed, well-nourished female in no acute distress. The patient is alert and oriented times three. The patient appears to be well groomed. Mood and affect are normal.  ORTHOPEDIC EXAM of right shoulder:  Inspection: swelling not present,  Bruising not present  Incisions well healed  Passive glenohumeral abduction 0-90 degrees, able to reach over head  Stability: Stable  Strength: 4/5  2+ distal pulses    IMPRESSION:  S/P Right shoulder arthroscopic rotator cuff revision with manipulation under anesthesia    PLAN:   1. Patient cont to improve post operatively  2. Will cont with PT. Start gentle strengthening transition to HEP when ready  3. Stressed no increases in pain.  No lifting pushing or pulling greater than 10lbs    RTC 6 weeks    BERNIE Thomas [facility]

## 2019-07-09 ENCOUNTER — HOSPITAL ENCOUNTER (OUTPATIENT)
Dept: PHYSICAL THERAPY | Age: 49
Discharge: HOME | End: 2019-07-09
Payer: COMMERCIAL

## 2019-07-09 PROCEDURE — 97140 MANUAL THERAPY 1/> REGIONS: CPT

## 2019-07-09 PROCEDURE — 97016 VASOPNEUMATIC DEVICE THERAPY: CPT

## 2019-07-09 PROCEDURE — 97110 THERAPEUTIC EXERCISES: CPT

## 2019-07-09 PROCEDURE — 97161 PT EVAL LOW COMPLEX 20 MIN: CPT

## 2019-07-09 NOTE — PROGRESS NOTES
PT DAILY TREATMENT NOTE 10-18    Patient Name: Alise Ward  Date:2019  : 1970  [x]  Patient  Verified  Payor: BLUE CROSS / Plan: [employer] [facility] / Product Type: PPO /    In time:323  Out time:415  Total Treatment Time (min): 46  Visit #: 25 of 28   SIGNED MD NOTE    Medicare/BCBS Only   Total Timed Codes (min):  42 1:1 Treatment Time:  30       Treatment Area: Pain in right shoulder [M25.511]  S/P shoulder surgery [Z98.890]    SUBJECTIVE  Pain Level (0-10 scale): 0  Any medication changes, allergies to medications, adverse drug reactions, diagnosis change, or new procedure performed?: [x] No    [] Yes (see summary sheet for update)  Subjective functional status/changes:   [] No changes reported  \"I saw the doctor and have a new order. She was pleased that it was not tight. She said I could decide if I wanted to be done. I go on vacation in a week or so I will see how I am when I get back.  \"    OBJECTIVE    Modality rationale: decrease pain, increase tissue extensibility and post exercise recovery to improve the patients ability to aid with increase tolerance to ADLS and activities   Min Type Additional Details    [] Estim:  []Unatt       []IFC  []Premod                        []Other:  []w/ice   []w/heat  Position:  Location:    [] Estim: []Att    []TENS instruct  []NMES                    []Other:  []w/US   []w/ice   []w/heat  Position:  Location:    []  Traction: [] Cervical       []Lumbar                       [] Prone          []Supine                       []Intermittent   []Continuous Lbs:  [] before manual  [] after manual    []  Ultrasound: []Continuous   [] Pulsed                           []1MHz   []3MHz W/cm2:  Location:    []  Iontophoresis with dexamethasone         Location: [] Take home patch   [] In clinic    []  Ice     []  heat  []  Ice massage  []  Laser   []  Anodyne Position:  Location:    []  Laser with stim  []  Other:  Position:  Location:   10 [x]  Vasopneumatic Device right shoulder Pressure:       [x] lo [] med [] hi   Temperature: [x] lo [] med [] hi   [] Skin assessment post-treatment:  []intact []redness- no adverse reaction    []redness - adverse reaction:       min []Eval                  []Re-Eval       24 min Therapeutic Exercise:  [x] See flow sheet :   Rationale: increase ROM, increase strength and improve coordination to improve the patients ability to aid with increase tolerance to ADLS and activities    10 min Therapeutic Activity:  [x]  See flow sheet :   Rationale: increase ROM, increase strength and improve coordination  to improve the patients ability to aid with increase tolerance to ADLs and activities       min Neuromuscular Re-education:  []  See flow sheet :   Rationale:   to improve the patients ability to     8 min Manual Therapy:  LAD oscill, PROM all planes with overstretch IR> ER and F/ABD   Rationale: decrease pain, increase ROM and increase tissue extensibility to aid with increase tolerance to ADLs and activities     min Gait Training:  ___ feet with ___ device on level surfaces with ___ level of assist   Rationale: With   [x] TE   [x] TA   [] neuro   [] other: Patient Education: [x] Review HEP    [x] Progressed/Changed HEP based on:   [] positioning   [] body mechanics   [] transfers   [] heat/ice application    [x] other: POC FOTO     Other Objective/Functional Measures: VC exercises and technique     Pain Level (0-10 scale) post treatment: 0    ASSESSMENT/Changes in Function: tolerated well. Patient will continue to benefit from skilled PT services to modify and progress therapeutic interventions, address ROM deficits, address strength deficits, analyze and address soft tissue restrictions, analyze and cue movement patterns, analyze and modify body mechanics/ergonomics, assess and modify postural abnormalities and instruct in home and community integration to attain remaining goals.      [x]  See Plan of Care  [x]  See progress note/recertification  []  See Discharge Summary         Progress towards goals / Updated goals:  Goals: to be achieved in 32 total treatments:             6  patient will have pain 0-1/10 to aid with increase tolerance to ADLs and activities at home and work               PN 3 achy               CJVSEKY   0 7/9/19               2 patient will have AAROM unforced  F and abd 453    [address]   to aid with progression of protocol and tolerance to regular activities as home             PN F [address]   [address] measurement              CURRENT                   9 patient will have PKVI 70 WI show improving function with dressing               GH 67 NFZK measurement               YXRVCWG   27 7/9/19        PLAN  [x]  Upgrade activities as tolerated     [x]  Continue plan of care  []  Update interventions per flow sheet       []  Discharge due to:_  []  Other:_      Nica Vicente PT 7/9/2019  3:24 PM    Future Appointments   Date Time Provider Christi Willis   7/9/2019  3:30 PM Efrain Miles PT [facility]   7/12/2019  7:30 AM Efrain Miles PT [facility]   8/19/2019  3:30 PM VLAD Ramos 67   9/24/2019 11:00 AM [facility] MRI RM 1 [facility]

## 2019-07-12 ENCOUNTER — HOSPITAL ENCOUNTER (OUTPATIENT)
Dept: PHYSICAL THERAPY | Age: 49
Discharge: HOME | End: 2019-07-12
Payer: COMMERCIAL

## 2019-07-12 PROCEDURE — 97140 MANUAL THERAPY 1/> REGIONS: CPT

## 2019-07-12 PROCEDURE — 97530 THERAPEUTIC ACTIVITIES: CPT

## 2019-07-12 PROCEDURE — 97016 VASOPNEUMATIC DEVICE THERAPY: CPT

## 2019-07-12 PROCEDURE — 97110 THERAPEUTIC EXERCISES: CPT

## 2019-07-15 ENCOUNTER — HOSPITAL ENCOUNTER (OUTPATIENT)
Dept: LAB | Age: 49
Discharge: HOME | End: 2019-07-15
Payer: COMMERCIAL

## 2019-07-15 ENCOUNTER — HOSPITAL ENCOUNTER (OUTPATIENT)
Dept: PHYSICAL THERAPY | Age: 49
Discharge: HOME | End: 2019-07-15
Payer: COMMERCIAL

## 2019-07-15 ENCOUNTER — OFFICE VISIT (OUTPATIENT)
Dept: FAMILY MEDICINE CLINIC | Age: 49
End: 2019-07-15

## 2019-07-15 VITALS
HEIGHT: 63 IN | WEIGHT: 160 LBS | TEMPERATURE: 98 F | OXYGEN SATURATION: 99 % | SYSTOLIC BLOOD PRESSURE: 108 MMHG | BODY MASS INDEX: 28.35 KG/M2 | HEART RATE: 73 BPM | RESPIRATION RATE: 16 BRPM | DIASTOLIC BLOOD PRESSURE: 74 MMHG

## 2019-07-15 DIAGNOSIS — M62.838 NECK MUSCLE SPASM: Primary | ICD-10-CM

## 2019-07-15 DIAGNOSIS — M79.674 PAIN IN TOES OF BOTH FEET: ICD-10-CM

## 2019-07-15 DIAGNOSIS — M79.675 PAIN IN TOES OF BOTH FEET: ICD-10-CM

## 2019-07-15 LAB — URATE SERPL-MCNC: 3.8 MG/DL (ref 2.6–7.2)

## 2019-07-15 PROCEDURE — 97110 THERAPEUTIC EXERCISES: CPT

## 2019-07-15 PROCEDURE — 36415 COLL VENOUS BLD VENIPUNCTURE: CPT

## 2019-07-15 PROCEDURE — 84550 ASSAY OF BLOOD/URIC ACID: CPT

## 2019-07-15 PROCEDURE — 97112 NEUROMUSCULAR REEDUCATION: CPT

## 2019-07-15 PROCEDURE — 97140 MANUAL THERAPY 1/> REGIONS: CPT

## 2019-07-15 RX ORDER — METAXALONE 800 MG/1
800 TABLET ORAL
Qty: 60 TAB | Refills: 0 | Status: SHIPPED | OUTPATIENT
Start: 2019-07-15 | End: 2019-07-31 | Stop reason: SDUPTHER

## 2019-07-15 NOTE — PROGRESS NOTES
PT DAILY TREATMENT NOTE 10-18    Patient Name: Ivelisse Bond  Date:7/15/2019  : 1970  [x]  Patient  Verified  Payor: BLUE CROSS / Plan: [employer] [facility] / Product Type: PPO /    In time: 10:02   Out time: 10:41  Total Treatment Time (min): 39  Visit #: 24 of 32    Medicare/BCBS Only   Total Timed Codes (min): 39 1:1 Treatment Time: 39       Treatment Area: Pain in right shoulder [M25.511]  S/P shoulder surgery [Z98.890]    SUBJECTIVE  Pain Level (0-10 scale): 0  Any medication changes, allergies to medications, adverse drug reactions, diagnosis change, or new procedure performed?: [x] No    [] Yes (see summary sheet for update)  Subjective functional status/changes:   [] No changes reported  Pt reports her shoulder is doing good and was able to put a cup of tea into a microwave with her right UE this morning.     OBJECTIVE    19 min Therapeutic Exercise:  [x] See flow sheet :   Rationale: increase ROM, increase strength and improve coordination to improve the patients ability to aid with increase tolerance to ADLs and activities    8 min Neuromuscular Re-education:  [x]  See flow sheet : scapular re-education exercises   Rationale: increase ROM and increase strength  to improve the patients ability to tolerate ADLs     12 min Manual Therapy: Right GHJ distraction, right GHJ grade 2-3 posterior/anterior mobs, TPR/DTM right pec minor/bicep/teres minor/infraspinatus/supraspinatus, PROM with gentle overpressure into right shoulder IR with posterior GHJ glide from therapist to avoid excessive anterior GHJ glide after performing above   Rationale: increase ROM and increase tissue extensibility to aid with increase tolerance to ADLs and activities          With   [x] TE   [x] TA   [] neuro   [] other: Patient Education: [x] Review HEP    [x] Progressed/Changed HEP based on:   [] positioning   [] body mechanics   [] transfers   [] heat/ice application    [] other:      Other Objective/Functional Measures: right shoulder AROM: flex 139 degs with stretching pain,  degs, ER 60 degs with pain in the right upper arm, IR 46 degs with increased right GHJ anterior translation. Pain Level (0-10 scale) post treatment: 0    ASSESSMENT/Changes in Function: Reported no pain post session and denied ice pack/vaso. Educated pt that she can use ice at home as needed for pain/soreness. Gave pt a green tband for HEP exercises to improve strength and mobility. Improvements in right shoulder AROM is noted since the last progress note. Progressed exercises per flow sheet to improve strength, scapular stability and AROM. Continue POC as tolerated. Patient will continue to benefit from skilled PT services to modify and progress therapeutic interventions, address ROM deficits, address strength deficits, analyze and address soft tissue restrictions, analyze and cue movement patterns, analyze and modify body mechanics/ergonomics, assess and modify postural abnormalities and instruct in home and community integration to attain remaining goals.      []  See Plan of Care  []  See progress note/recertification  []  See Discharge Summary         Progress towards goals / Updated goals:  Goals: to be achieved in 32 total treatments:             1  patient will have pain 0-1/10 to aid with increase tolerance to ADLs and activities at home and work               PN 3 achy               GRMFVIF   0 7/9/19 7/12/19               2 patient will have AAROM unforced  F and abd 135    ER 50   to aid with progression of protocol and tolerance to regular activities as home             PN F 125    ER 25 last measurement                 CURRENT  flex 139 degs with stretching pain,  degs, ER 60 degs with pain in the right upper arm, IR 46 degs with increased right GHL anterior translation 7/15/2019                  3 patient will have URIT 00 KN show improving function with dressing               IM 52 KGYW measurement               CURRENT   66 7/9/19      PLAN  [x]  Upgrade activities as tolerated     [x]  Continue plan of care  [x]  Update interventions per flow sheet       []  Discharge due to:_  []  Other:_      China Mclaughlin, PT 7/15/2019  10:09 AM    Future Appointments   Date Time Provider Christi Willis   7/15/2019  2:40 PM Casandra Guerra NP [facility]   7/19/2019  7:30 AM Delmar Hester, PT [facility]   7/30/2019  3:30 PM Kulwant Mayo, PT [facility]   8/2/2019  3:00 PM Kulwant Mayo, PT [facility]   8/19/2019  3:30 PM VLAD Miller 67   9/24/2019 11:00 AM [facility] MRI RM 1 [facility]

## 2019-07-15 NOTE — PATIENT INSTRUCTIONS
Purine-Restricted Diet: Care Instructions  Your Care Instructions    Purines are substances that are found in some foods. Your body turns purines into uric acid. High levels of uric acid can cause gout, which is a form of arthritis that causes pain and inflammation in joints. You may be able to help control the amount of uric acid in your body by limiting high-purine foods in your diet. Follow-up care is a key part of your treatment and safety. Be sure to make and go to all appointments, and call your doctor if you are having problems. It's also a good idea to know your test results and keep a list of the medicines you take. How can you care for yourself at home? · Plan your meals and snacks around foods that are low in purines and are safe for you to eat. These foods include:  ? Green vegetables and tomatoes. ? Fruits. ? Whole-grain breads, rice, and cereals. ? Eggs, peanut butter, and nuts. ? Low-fat milk, cheese, and other milk products. ? Popcorn. ? Gelatin desserts, chocolate, cocoa, and cakes and sweets, in small amounts. · You can eat certain foods that are medium-high in purines, but eat them only once in a while. These foods include:  ? Legumes, such as dried beans and dried peas. You can have 1 cup cooked legumes each day. ? Asparagus, cauliflower, spinach, mushrooms, and green peas. ? Fish and seafood (other than very high-purine seafood). ? Oatmeal, wheat bran, and wheat germ. · Limit very high-purine foods, including:  ? Organ meats, such as liver, kidneys, sweetbreads, and brains. ? Meats, including cannon, beef, pork, and lamb. ? Game meats and any other meats in large amounts. ? Anchovies, sardines, herring, mackerel, and scallops. ? Gravy. ? Beer. Where can you learn more? Go to http://sary-mk.info/. Enter F448 in the search box to learn more about \"Purine-Restricted Diet: Care Instructions. \"  Current as of: March 28, 2018  Content Version: 11.9  © 3143-9506 [employer]wise, Incorporated. Care instructions adapted under license by [employer] (which disclaims liability or warranty for this information). If you have questions about a medical condition or this instruction, always ask your healthcare professional. [place] [address] any warranty or liability for your use of this information.

## 2019-07-15 NOTE — PROGRESS NOTES
Chief Complaint   Patient presents with    Neck Pain     1. Have you been to the ER, urgent care clinic since your last visit? Hospitalized since your last visit? No     2. Have you seen or consulted any other health care providers outside of the [address] [place] [address] since your last visit? Include any pap smears or colon screening.  Follow with Podiatry and ENT

## 2019-07-15 NOTE — PROGRESS NOTES
Subjective:   Kwaku Randolph is a 46 y.o. female presents to the office with some resolution of neck muscle spasms (seen on 6-) but she also reports that pain is moderate and not resolving as she had hoped. No other associated symptoms. She also discusses that she can only take the cyclobenzaprine 10 mg at night as it causes extreme drowsiness. She also reports that she has noticed an increase in toe pain bilaterally over the past several weeks. Moderate aching. She has an appointment with podiatry next week. FH: father has gout  SH: drinks hard seltzer socially   Review of Systems   Skin: Negative. Neurological: Negative for tingling, weakness and headaches. Current Outpatient Medications   Medication Sig Dispense Refill    cyclobenzaprine (FLEXERIL) 10 mg tablet Take 1 Tab by mouth three (3) times daily as needed for Muscle Spasm(s). 30 Tab 0    gabapentin (NEURONTIN) 300 mg capsule Take 1 Cap by mouth nightly. Indications: acute pain following an operation 5 Cap 0    meloxicam (MOBIC) 15 mg tablet TAKE ONE TABLET BY MOUTH DAILY OR TAKE ONE-HALF TABLET TWO TIMES A DAY AS NEEDED FOR PAIN *TAKE WITH FOOD* 90 Tab 0    multivitamin (ONE A DAY) tablet Take 1 Tab by mouth daily.  Mv,Ca,Min-FA-Herbal No.157 (ESTROVEN MAXIMUM STRENGTH) 400 mcg tab Take  by mouth.  azelastine-fluticasone (DYMISTA) 137-50 mcg/spray spry by Nasal route.  PENNSAID 20 mg/gram /actuation(2 %) sopm 2 Pump(s) by Apply Externally route two (2) times daily as needed. Cell: [phone]  Pharmacy: [phone] 112 g 3    RANITIDINE HCL (ZANTAC PO) Take  by mouth.  VITAMIN A/VITAMIN D3 (NATURAL VITAMIN D PO) Take  by mouth.  esomeprazole (NEXIUM) 20 mg capsule Take 20 mg by mouth nightly.  diphenhydrAMINE (SIMPLY SLEEP) 25 mg tablet Take 25 mg by mouth nightly.  ketotifen (ZADITOR) 0.025 % (0.035 %) ophthalmic solution Administer 1 Drop to both eyes two (2) times a day.       fluocinoNIDE (LIDEX) 0.05 % topical cream Apply 2 g to affected area two (2) times a day. 15 g 0    OTHER       azelastine (OPTIVAR) 0.05 % ophthalmic solution Administer  to both eyes two (2) times a day. Use in affected eye(s)        PMH: reviewed medications and allergy lists and medical and family history. OBJECTIVE:  Awake and alert in no acute distress  Lungs clear throughout  S1 S2 RRR without ectopy or murmur auscultated. Musculoskeletal: palpable spasm trapezius bilaterally no limited ROM to head or neck  Great toe no erythema mild edema TTP metatarsal head bilaterally  No gait abnormalities   Neuro no focal changes  Visit Vitals  /74 (BP 1 Location: Left arm, BP Patient Position: Sitting)   Pulse 73   Temp 98 °F (36.7 °C) (Oral)   Resp 16   Ht 5' 2.5\" (1.588 m)   Wt 160 lb (72.6 kg)   SpO2 99%   BMI 28.80 kg/m²   Diagnoses and all orders for this visit:    Neck muscle spasm  -     metaxalone (SKELAXIN) 800 mg tablet; Take 1 Tab by mouth four (4) times daily as needed for Pain., Normal, Disp-60 Tab, R-0    Pain in toes of both feet  -     URIC ACID; Future    Reviewed risks and benefits and common side effects of new medication  Stop flexeril  Patient agrees with plan and verbalizes understanding. Purine diet reviewed   Will notify of uric acid results   Keep appointment with podiatry  Patient agrees with plan and verbalizes understanding. I have discussed the diagnosis with the patient and the intended plan as seen in the above orders. The patient has received an after-visit summary and questions were answered concerning future plans. I have discussed medication side effects and warnings with the patient as well. Follow-up and Dispositions    · Return if symptoms worsen or fail to improve.

## 2019-07-19 ENCOUNTER — HOSPITAL ENCOUNTER (OUTPATIENT)
Dept: PHYSICAL THERAPY | Age: 49
Discharge: HOME | End: 2019-07-19
Payer: COMMERCIAL

## 2019-07-19 PROCEDURE — 97140 MANUAL THERAPY 1/> REGIONS: CPT

## 2019-07-19 PROCEDURE — 97530 THERAPEUTIC ACTIVITIES: CPT

## 2019-07-19 PROCEDURE — 97110 THERAPEUTIC EXERCISES: CPT

## 2019-07-19 NOTE — PROGRESS NOTES
PT DAILY TREATMENT NOTE 10-18    Patient Name: Minor Beny  Date:2019  : 1970  [x]  Patient  Verified  Payor: BLUE CROSS / Plan: [employer] [facility] [place] / Product Type: PPO /    In time:731  Out time:809  Total Treatment Time (min): 38  Visit #: 25 of 32    Medicare/BCBS Only   Total Timed Codes (min):  38 1:1 Treatment Time:  38       Treatment Area: Pain in right shoulder [M25.511]  S/P shoulder surgery [Z98.890]    SUBJECTIVE  Pain Level (0-10 scale): 0  Any medication changes, allergies to medications, adverse drug reactions, diagnosis change, or new procedure performed?: [x] No    [] Yes (see summary sheet for update)  Subjective functional status/changes:   [] No changes reported   \"we leave for vacation today. I have my bands and weights packed. \"    OBJECTIVE    Modality rationale:     Min Type Additional Details    [] Estim:  []Unatt       []IFC  []Premod                        []Other:  []w/ice   []w/heat  Position:  Location:    [] Estim: []Att    []TENS instruct  []NMES                    []Other:  []w/US   []w/ice   []w/heat  Position:  Location:    []  Traction: [] Cervical       []Lumbar                       [] Prone          []Supine                       []Intermittent   []Continuous Lbs:  [] before manual  [] after manual    []  Ultrasound: []Continuous   [] Pulsed                           []1MHz   []3MHz W/cm2:  Location:    []  Iontophoresis with dexamethasone         Location: [] Take home patch   [] In clinic   PD []  Ice     []  heat  []  Ice massage  []  Laser   []  Anodyne Position:  Location:    []  Laser with stim  []  Other:  Position:  Location:    []  Vasopneumatic Device Pressure:       [] lo [] med [] hi   Temperature: [] lo [] med [] hi   [] Skin assessment post-treatment:  []intact []redness- no adverse reaction    []redness - adverse reaction:      min []Eval                  []Re-Eval       15 min Therapeutic Exercise:  [x] See flow sheet :   Rationale: increase ROM, increase strength, improve coordination, improve balance and increase proprioception to improve the patients ability to aid with increase tolerance to ADLs and activities    15 min Therapeutic Activity:  [x]  See flow sheet :   Rationale: increase ROM, increase strength, improve coordination, improve balance and increase proprioception  to improve the patients ability to aid with increase tolerance to ADLs and activities      min Neuromuscular Re-education:  []  See flow sheet :   Rationale:   to improve the patients ability to     8 min Manual Therapy:  MOBS for IR > ER, PROM/AAROM all planes right shoulder, LAD and gentle Oscill    Rationale: increase ROM and increase tissue extensibility to aid with increase tolerance to ADLS and activities     min Gait Training:  ___ feet with ___ device on level surfaces with ___ level of assist   Rationale: With   [x] TE   [x] TA   [] neuro   [] other: Patient Education: [x] Review HEP    [] Progressed/Changed HEP based on:   [] positioning   [] body mechanics   [] transfers   [] heat/ice application    [x] other: safety with exercises while out of town, issued orange band     Other Objective/Functional Measures: VC exercises and tech     Pain Level (0-10 scale) post treatment: 0    ASSESSMENT/Changes in Function: tolerated well. Patient will continue to benefit from skilled PT services to modify and progress therapeutic interventions, address ROM deficits, address strength deficits, analyze and address soft tissue restrictions, analyze and cue movement patterns, analyze and modify body mechanics/ergonomics, assess and modify postural abnormalities and instruct in home and community integration to attain remaining goals.      [x]  See Plan of Care  [x]  See progress note/recertification  []  See Discharge Summary         Progress towards goals / Updated goals:   Goals: to be achieved in 32 total treatments:             7  patient will have pain 0-1/10 to aid with increase tolerance to ADLs and activities at home and work               PN 3 achy               MYQIVAX   0 7/9/19 7/12/19 7/19/19               2 patient will have AAROM unforced  F and abd 135    ER 50   to aid with progression of protocol and tolerance to regular activities as home             PN F 125    ER 25 last measurement                            CURRENT  flex 139 degs with stretching pain,  degs, ER 60 degs with pain in the right upper arm, IR 46 degs with increased right GHL anterior translation 7/15/2019                  3 patient will have GNTL 27 TQ show improving function with dressing               JG 77 BDAB measurement               USDLWPT   13 7/9/19    PLAN  [x]  Upgrade activities as tolerated     [x]  Continue plan of care  []  Update interventions per flow sheet       []  Discharge due to:_  []  Other:_      Delmis Swenson, PT 7/19/2019  7:38 AM    Future Appointments   Date Time Provider Christi Willis   7/30/2019  3:30 PM Manolo Proud, PT [facility]   8/2/2019  3:00 PM Manolo Proud, PT [facility]   8/19/2019  3:30 PM VL[facility] Phipps Olman [address]   9/24/2019 11:00 AM [facility] MRI RM 1 [facility]

## 2019-07-30 ENCOUNTER — APPOINTMENT (OUTPATIENT)
Dept: PHYSICAL THERAPY | Age: 49
End: 2019-07-30
Payer: COMMERCIAL

## 2019-07-31 ENCOUNTER — OFFICE VISIT (OUTPATIENT)
Dept: FAMILY MEDICINE CLINIC | Age: 49
End: 2019-07-31

## 2019-07-31 ENCOUNTER — HOSPITAL ENCOUNTER (OUTPATIENT)
Dept: PHYSICAL THERAPY | Age: 49
Discharge: HOME | End: 2019-07-31
Payer: COMMERCIAL

## 2019-07-31 ENCOUNTER — HOSPITAL ENCOUNTER (OUTPATIENT)
Dept: LAB | Age: 49
Discharge: HOME | End: 2019-07-31
Payer: COMMERCIAL

## 2019-07-31 VITALS
RESPIRATION RATE: 16 BRPM | WEIGHT: 164.2 LBS | BODY MASS INDEX: 29.09 KG/M2 | TEMPERATURE: 98 F | OXYGEN SATURATION: 98 % | HEIGHT: 63 IN | HEART RATE: 90 BPM | DIASTOLIC BLOOD PRESSURE: 81 MMHG | SYSTOLIC BLOOD PRESSURE: 112 MMHG

## 2019-07-31 DIAGNOSIS — M79.675 PAIN IN TOES OF BOTH FEET: ICD-10-CM

## 2019-07-31 DIAGNOSIS — M79.675 PAIN IN TOES OF BOTH FEET: Primary | ICD-10-CM

## 2019-07-31 DIAGNOSIS — M79.674 PAIN IN TOES OF BOTH FEET: Primary | ICD-10-CM

## 2019-07-31 DIAGNOSIS — M62.838 NECK MUSCLE SPASM: ICD-10-CM

## 2019-07-31 DIAGNOSIS — M79.674 PAIN IN TOES OF BOTH FEET: ICD-10-CM

## 2019-07-31 LAB — ERYTHROCYTE [SEDIMENTATION RATE] IN BLOOD: 34 MM/HR (ref 0–20)

## 2019-07-31 PROCEDURE — 86225 DNA ANTIBODY NATIVE: CPT

## 2019-07-31 PROCEDURE — 85652 RBC SED RATE AUTOMATED: CPT

## 2019-07-31 PROCEDURE — 86060 ANTISTREPTOLYSIN O TITER: CPT

## 2019-07-31 PROCEDURE — 86431 RHEUMATOID FACTOR QUANT: CPT

## 2019-07-31 PROCEDURE — 97140 MANUAL THERAPY 1/> REGIONS: CPT

## 2019-07-31 PROCEDURE — 97110 THERAPEUTIC EXERCISES: CPT

## 2019-07-31 PROCEDURE — 36415 COLL VENOUS BLD VENIPUNCTURE: CPT

## 2019-07-31 PROCEDURE — 97530 THERAPEUTIC ACTIVITIES: CPT

## 2019-07-31 RX ORDER — METAXALONE 800 MG/1
800 TABLET ORAL
Qty: 60 TAB | Refills: 0 | Status: SHIPPED | OUTPATIENT
Start: 2019-07-31 | End: 2019-08-21

## 2019-07-31 RX ORDER — CLOBETASOL PROPIONATE 0.5 MG/G
CREAM TOPICAL 2 TIMES DAILY
Qty: 30 G | Refills: 0 | Status: SHIPPED | OUTPATIENT
Start: 2019-07-31 | End: 2019-10-23

## 2019-07-31 NOTE — PROGRESS NOTES
PT DISCHARGE DAILY NOTE AND [id number]    Patient name: Lisa Gomez Start of Care: 19   Referral source: Marcell Landeros,* : 1970   Medical/Treatment Diagnosis: Pain in right shoulder [M25.511]  S/P shoulder surgery [Z98.890] Onset Date:, P/O 19 and 19     Prior Hospitalization: see medical history Provider#: [id number]   Medications: Verified on Patient Summary List    Comorbidities: Right RCR , skin cancer, previous pregnancy,    Prior Level of Function: Prior to initial involvement  no issues with right UE. Working, tolerated household chores and community activities, no AD use.   Visits from [facility]: 26    Missed Visits: 2    Reporting Period : 19 to 19    Date:2019  : 1970  [x]  Patient  Verified  Payor: BLUE CROSS / Plan: [employer] / Product Type: PPO /    In time:734  Out time:813  Total Treatment Time (min): 39  Visit #: 26 of 32    Medicare/BCBS Only   Total Timed Codes (min):  39 1:1 Treatment Time:  39       SUBJECTIVE  Pain Level (0-10 scale): 0  Any medication changes, allergies to medications, adverse drug reactions, diagnosis change, or new procedure performed?: [x] No    [] Yes (see summary sheet for update)  Subjective functional status/changes:   [] No changes reported  \"I think I am ready to graduate today. I have equipment at home and exercise at home and I think I am ready. I see the doctor next month. \"    OBJECTIVE    Modality rationale:    Min Type Additional Details    [] Estim:  []Unatt       []IFC  []Premod                        []Other:  []w/ice   []w/heat  Position:  Location:    [] Estim: []Att    []TENS instruct  []NMES                    []Other:  []w/US   []w/ice   []w/heat  Position:  Location:    []  Traction: [] Cervical       []Lumbar                       [] Prone          []Supine                       []Intermittent   []Continuous Lbs:  [] before manual  [] after manual    []  Ultrasound: []Continuous   [] Pulsed                           []1MHz   []3MHz W/cm2:  Location:    []  Iontophoresis with dexamethasone         Location: [] Take home patch   [] In clinic    []  Ice     []  heat  []  Ice massage  []  Laser   []  Anodyne Position:  Location:    []  Laser with stim  []  Other:  Position:  Location:    []  Vasopneumatic Device Pressure:       [] lo [] med [] hi   Temperature: [] lo [] med [] hi   [] Skin assessment post-treatment:  []intact []redness- no adverse reaction    []redness - adverse reaction:       min []Eval                  []Re-Eval       15 min Therapeutic Exercise:  [x] See flow sheet :   Rationale: increase ROM, increase strength and improve coordination to improve the patients ability to aid with increase tolerance to ADLs and activities    15 min Therapeutic Activity:  [x]  See flow sheet :   Rationale: increase ROM, increase strength and improve coordination  to improve the patients ability to aid with increase tolerance to ADLs and activities      min Neuromuscular Re-education:  []  See flow sheet :   Rationale:   to improve the patients ability to     8 min Manual Therapy:  PROM, LAD, LAD right shoulder   Rationale: increase ROM and increase tissue extensibility to aid with increase tolerance to ADLs and activiites     min Gait Training:  ___ feet with ___ device on level surfaces with ___ level of assist   Rationale: With   [x] TE   [x] TA   [] neuro   [] other: Patient Education: [x] Review HEP    [x] Progressed/Changed HEP based on:   [] positioning   [] body mechanics   [] transfers   [] heat/ice application    [x] other: FOTO, DC instructions.      Other Objective/Functional Measures: VC exercises and technique    F  155   abd 150   IR abdomen   ER  58  F 4+, Abd 5,    ER 4   IR 5     Pain Level (0-10 scale) post treatment: 0    Summary of Care:  Hans Cortes will have established and be I with HEP to aid with increase tolerance to activities at home  Status at last note/certification:eval  Status at discharge: met    Goal: patient will have pain 5/10 to aid with increase tolerance to ADLs and activities at home and work  Status at last note/certification:eval  Status at discharge: met    Taylor Zapata will have FOTO 42 to show improving function with dressing  Status at last note/certification:eval  Status at discharge: met    Goal:patient will have PROM unforced  F and abd 120 to aid with progression of protocol and tolerance to regular activities as home  Status at last note/certification:eval  Status at discharge: met    ASSESSMENT/Changes in Function: Patient ready to attempt self management with HEP and equipment she has at home.  She is scheduled to follow up with MD in August.    Thank you for this referral!      PLAN  [x]Discontinue therapy: [x]Patient has reached or is progressing toward set goals      []Patient is non-compliant or has abdicated      []Due to lack of appreciable progress towards set goals    Dominick Villareal, PT 7/31/2019  7:40 AM

## 2019-07-31 NOTE — PROGRESS NOTES
HISTORY OF PRESENT ILLNESS  Lexy Haro is a 46 y.o. female. Patient presents for toe swelling    HPI  Toes have been swelling for one month now. Pt had a uric acid test was done on 7-15-19 here by [facility] and this was negative. Pt has seen podiatry and he told her this was caused by nerve damage. She disagrees with this. She did take the gabapentin which she didn't see any improvement. Review of Systems   HENT: Negative. Respiratory: Negative. Cardiovascular: Negative. Skin: Positive for rash (toes skin is peeling.). Visit Vitals  /81 (BP 1 Location: Left arm)   Pulse 90   Temp 98 °F (36.7 °C) (Oral)   Resp 16   Ht 5' 2.5\" (1.588 m)   Wt 164 lb 3.2 oz (74.5 kg)   LMP 05/13/2019 (Exact Date)   SpO2 98%   BMI 29.55 kg/m²     Physical Exam   Constitutional: She appears well-developed. No distress. Cardiovascular: Normal rate, regular rhythm and normal heart sounds. No murmur heard. Pulmonary/Chest: Effort normal and breath sounds normal. She has no wheezes. She has no rales. Musculoskeletal:        Right foot: There is decreased range of motion, tenderness and swelling. Left foot: There is decreased range of motion, tenderness and swelling (all toes). Neurological: She is alert. Skin peeling at the tips and edge of toes. Both feet. ASSESSMENT and PLAN    ICD-10-CM ICD-9-CM    1. Pain in toes of both feet M79.674 729.5 AD COMPREHENSIVE PANEL    M79.675  RHEUMATOID FACTOR, IGM      SED RATE (ESR)      STREPTOLYSIN O (ASO) AB   2. Neck muscle spasm M62.838 728.85 metaxalone (SKELAXIN) 800 mg tablet     PLAN:  We discussed different possible causes including psoriatic arthritis. Pt can use temovate for now. I have discussed the diagnosis with the patient and the intended plan as seen in the above orders. The patient has received an after-visit summary and questions were answered concerning future plans.   I have discussed medication side effects and warnings with the patient as well. Patient will call for further questions. Follow-up and Dispositions    · Return if symptoms worsen or fail to improve.

## 2019-07-31 NOTE — PROGRESS NOTES
Pt is here for toes swelling on bilaterally x 1 month. 1. Have you been to the ER, urgent care clinic since your last visit? Hospitalized since your last visit? No    2. Have you seen or consulted any other health care providers outside of the [address] [place] [address] since your last visit? Include any pap smears or colon screening. Yes Dr Nando Benz, podiatry 7/5/19 & 7/18/19 for toes swelling.

## 2019-07-31 NOTE — PROGRESS NOTES
Physical Therapy Discharge Instructions      [facility] [address], [address] [place] [address], [address] [place], [place] [phone]  [phone] fax    Patient: Jose Manuel Hyde  : 1970      Continue Home Exercise Program 2 times per day for 2 weeks, then decrease to 3 times per week      Continue with    [x] Ice  as needed   times per day     [] Heat           Follow up with MD:     [x] Upon completion of therapy     [] As needed      Recommendations:     [x]   Return to activity with home program    []   Return to activity with the following modifications:       []Post Rehab Program    []Join Independent aquatic program     []Return to/join local gym        Additional Comments: call PT if any questions arise and follow up with MD in August      Delmis Swenson, PT 2019 7:43 AM

## 2019-08-02 ENCOUNTER — APPOINTMENT (OUTPATIENT)
Dept: PHYSICAL THERAPY | Age: 49
End: 2019-08-02

## 2019-08-02 LAB
ASO AB SERPL-ACNC: 78 IU/ML (ref 0–200)
CENTROMERE B AB SER-ACNC: <0.2 AI (ref 0–0.9)
CHROMATIN AB SERPL-ACNC: <0.2 AI (ref 0–0.9)
DSDNA AB SER-ACNC: 2 IU/ML (ref 0–9)
ENA JO1 AB SER-ACNC: <0.2 AI (ref 0–0.9)
ENA RNP AB SER-ACNC: 0.2 AI (ref 0–0.9)
ENA SCL70 AB SER-ACNC: <0.2 AI (ref 0–0.9)
ENA SM AB SER-ACNC: <0.2 AI (ref 0–0.9)
ENA SS-A AB SER-ACNC: <0.2 AI (ref 0–0.9)
ENA SS-B AB SER-ACNC: <0.2 AI (ref 0–0.9)
SEE BELOW, 164869: NORMAL

## 2019-08-02 NOTE — PROGRESS NOTES
Please advise Pt that her test for strep is negative. Her sed rate which looks at inflammation is elevted. The other two labs are pending.

## 2019-08-05 ENCOUNTER — TELEPHONE (OUTPATIENT)
Dept: FAMILY MEDICINE CLINIC | Age: 49
End: 2019-08-05

## 2019-08-05 NOTE — TELEPHONE ENCOUNTER
----- Message from Lea Tran NP sent at 8/2/2019 12:10 PM EDT -----  Please advise Pt that her test for strep is negative. Her sed rate which looks at inflammation is elevted. The other two labs are pending. Notes recorded by Dhruv Duarte NP on 8/3/2019 at 6:33 PM EDT  Please advise Pt that her test for strep was negative. Her sed rate elevated.  Her AD was negative.  ------

## 2019-08-05 NOTE — TELEPHONE ENCOUNTER
Spoke with patient she is aware of lab results however she would like to know what her next step is.

## 2019-08-13 NOTE — TELEPHONE ENCOUNTER
Lamar Martínez NP  You 3 days ago      Please check on patient to see if she if getting better. This can possibly be caused by a virus that just needs to run its course.     Routing comment

## 2019-08-15 ENCOUNTER — OFFICE VISIT (OUTPATIENT)
Dept: FAMILY MEDICINE CLINIC | Age: 49
End: 2019-08-15

## 2019-08-15 VITALS
OXYGEN SATURATION: 100 % | RESPIRATION RATE: 16 BRPM | WEIGHT: 163.8 LBS | TEMPERATURE: 98.2 F | HEART RATE: 80 BPM | BODY MASS INDEX: 29.02 KG/M2 | DIASTOLIC BLOOD PRESSURE: 77 MMHG | HEIGHT: 63 IN | SYSTOLIC BLOOD PRESSURE: 113 MMHG

## 2019-08-15 DIAGNOSIS — M79.675 PAIN IN TOES OF BOTH FEET: ICD-10-CM

## 2019-08-15 DIAGNOSIS — M62.838 NECK MUSCLE SPASM: Primary | ICD-10-CM

## 2019-08-15 DIAGNOSIS — M79.674 PAIN IN TOES OF BOTH FEET: ICD-10-CM

## 2019-08-15 RX ORDER — METHOCARBAMOL 750 MG/1
750 TABLET, FILM COATED ORAL 4 TIMES DAILY
Qty: 60 TAB | Refills: 1 | Status: SHIPPED | OUTPATIENT
Start: 2019-08-15 | End: 2019-10-17 | Stop reason: SDUPTHER

## 2019-08-15 RX ORDER — TRAMADOL HYDROCHLORIDE 50 MG/1
50 TABLET ORAL
Qty: 120 TAB | Refills: 0 | Status: SHIPPED | OUTPATIENT
Start: 2019-08-15 | End: 2019-08-20

## 2019-08-15 NOTE — PROGRESS NOTES
Pt is here for follow up on neck & foot pain. Pt states she has an appointment with the podiatrist next week    1. Have you been to the ER, urgent care clinic since your last visit? Hospitalized since your last visit? No    2. Have you seen or consulted any other health care providers outside of the [address] [place] [address] since your last visit? Include any pap smears or colon screening.  No

## 2019-08-15 NOTE — PROGRESS NOTES
HISTORY OF PRESENT ILLNESS  Sandra Moreira is a 44 y.o. female. Patient presents for follow up neck and feet. HPI  Pt brought in a history of her symptoms. She Googled Dactylitis as a possible cause for her toes. Prednisone made no difference. The skelaxin does not seem to be helping. She has an appt with podiatry next week. Review of Systems   Musculoskeletal: Positive for joint pain (jose m toe pains/foot) and neck pain. Visit Vitals  /77 (BP 1 Location: Left arm)   Pulse 80   Temp 98.2 °F (36.8 °C) (Oral)   Resp 16   Ht 5' 2.52\" (1.588 m)   Wt 163 lb 12.8 oz (74.3 kg)   LMP 05/13/2019 (Exact Date)   SpO2 100%   BMI 29.46 kg/m²       Physical Exam   Constitutional: She appears well-developed. No distress. Cardiovascular: Normal rate, regular rhythm and normal heart sounds. No murmur heard. Pulmonary/Chest: Effort normal and breath sounds normal. No respiratory distress. She has no wheezes. She has no rales. Musculoskeletal:        Right foot: There is decreased range of motion and swelling. Left foot: There is decreased range of motion and swelling. Neurological: She is alert. Jose M feet: all toes are swollen. The peeling has resolved. ASSESSMENT and PLAN    ICD-10-CM ICD-9-CM    1. Neck muscle spasm M62.838 728.85 methocarbamol (ROBAXIN) 750 mg tablet   2. Pain in toes of both feet M79.674 729.5 traMADol (ULTRAM) 50 mg tablet    M79.675        PLAN:  Pt aware of lab results. Rheumatoid factor still pending  We discussed Dactylitis: sausage toes. Pt ha an appt with podiatry next week. We discussed in the mean time pain management until the cause of her joint pains can be determined. I have discussed the diagnosis with the patient and the intended plan as seen in the above orders. The patient has received an after-visit summary and questions were answered concerning future plans. I have discussed medication side effects and warnings with the patient as well. Patient will call for further questions. Follow-up and Dispositions    · Return if symptoms worsen or fail to improve.

## 2019-08-18 LAB — CANNABINOIDS BLD CFM-MCNC: 7.8 IU/ML (ref 0–15)

## 2019-08-19 ENCOUNTER — OFFICE VISIT (OUTPATIENT)
Dept: ORTHOPEDIC SURGERY | Age: 49
End: 2019-08-19

## 2019-08-19 VITALS
BODY MASS INDEX: 28.35 KG/M2 | WEIGHT: 160 LBS | HEART RATE: 64 BPM | HEIGHT: 63 IN | DIASTOLIC BLOOD PRESSURE: 77 MMHG | TEMPERATURE: 96.3 F | OXYGEN SATURATION: 100 % | SYSTOLIC BLOOD PRESSURE: 114 MMHG

## 2019-08-19 DIAGNOSIS — M75.121 NONTRAUMATIC COMPLETE TEAR OF RIGHT ROTATOR CUFF: Primary | ICD-10-CM

## 2019-08-19 NOTE — PROGRESS NOTES
Leah Lerner  1970     HISTORY OF PRESENT ILLNESS  Leah Lerner is a 44 y.o. female who presents today for evaluation s/p Right shoulder arthroscopic rotator cuff revision and manipulation under anesthesia on 5/13/19. Patient has finished formal PT. Describes pain as a 0/10. She overall feels like she is improving. Has been taking nothing for pain. Very happy with her progress. Patient denies any fever, chills, chest pain, shortness of breath or calf pain. The remainder of the review of systems is negative. There are no new illness or injuries to report since last seen in the office. No changes in medications, allergies, social or family history. PHYSICAL EXAM:   Visit Vitals  /77   Pulse 64   Temp 96.3 °F (35.7 °C) (Oral)   Ht 5' 3\" (1.6 m)   Wt 160 lb (72.6 kg)   SpO2 100%   BMI 28.34 kg/m²      The patient is a well-developed, well-nourished female in no acute distress. The patient is alert and oriented times three. The patient appears to be well groomed. Mood and affect are normal.  ORTHOPEDIC EXAM of right shoulder:  Inspection: swelling not present,  Bruising not present  Incisions well healed  Passive glenohumeral abduction 0-90 degrees, able to reach over head  Stability: Stable  Strength: 5/5  2+ distal pulses    IMPRESSION:  S/P Right shoulder arthroscopic rotator cuff revision with manipulation under anesthesia    PLAN:   1. Patient doing well post operatively. Cont with gradually increasing her activities being mindful of pain. Limit heavy lifting over next 3 months  Risk factors include: n/a  2. No cortisone injection indicated today   3. No Physical/Occupational Therapy indicated today  4. No diagnostic test indicated today:   5. No durable medical equipment indicated today  6. No referral indicated today   7. No medications indicated today:   8.  No Narcotic indicated today     RTC PRN     BERNIE Hebert [phone]

## 2019-08-20 ENCOUNTER — HOSPITAL ENCOUNTER (EMERGENCY)
Age: 49
Discharge: HOME | End: 2019-08-20
Attending: EMERGENCY MEDICINE
Payer: COMMERCIAL

## 2019-08-20 VITALS
SYSTOLIC BLOOD PRESSURE: 106 MMHG | BODY MASS INDEX: 29.44 KG/M2 | OXYGEN SATURATION: 99 % | WEIGHT: 160 LBS | RESPIRATION RATE: 18 BRPM | DIASTOLIC BLOOD PRESSURE: 77 MMHG | HEIGHT: 62 IN | HEART RATE: 70 BPM | TEMPERATURE: 97.8 F

## 2019-08-20 DIAGNOSIS — M54.2 CERVICAL PAIN: Primary | ICD-10-CM

## 2019-08-20 PROCEDURE — 74011250637 HC RX REV CODE- 250/637: Performed by: EMERGENCY MEDICINE

## 2019-08-20 PROCEDURE — 99283 EMERGENCY DEPT VISIT LOW MDM: CPT

## 2019-08-20 RX ORDER — HYDROCODONE BITARTRATE AND ACETAMINOPHEN 5; 325 MG/1; MG/1
1 TABLET ORAL
Status: COMPLETED | OUTPATIENT
Start: 2019-08-20 | End: 2019-08-20

## 2019-08-20 RX ORDER — HYDROCODONE BITARTRATE AND ACETAMINOPHEN 5; 325 MG/1; MG/1
2 TABLET ORAL
Qty: 15 TAB | Refills: 0 | Status: SHIPPED | OUTPATIENT
Start: 2019-08-20 | End: 2019-08-27

## 2019-08-20 RX ADMIN — HYDROCODONE BITARTRATE AND ACETAMINOPHEN 1 TABLET: 5; 325 TABLET ORAL at 03:23

## 2019-08-20 NOTE — ED PROVIDER NOTES
Pt c/o diffuse neck pain, x 2 months, after manipulation of rt shoulder in pt . H/o prior cervical surgery. Taking ultram, robaxin, hleping some, but not much last few days. No new injury. No weakness or numbness. No fever. No rash. No cp or sob. No urinary or bowel changes. No back or abd pain. Diff sleeping tonight.             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    Thumb pain 6/7/2010       Past Surgical History:   Procedure Laterality Date    HX BACK SURGERY  2012    HX GYN      left tube removal ectopic pregnancy    HX HEENT  9/2011    Basal cell cancer lesion removed from nose    HX LYMPHADENECTOMY Right 8/25/16    HX ORTHOPAEDIC  1999    TMJ    HX OTHER SURGICAL  2013    Spinal Fusion    HX TONSILLECTOMY  2/2007         Family History:   Problem Relation Age of Onset    Cancer Mother         breast cancer    Diabetes Father     High Cholesterol Father     Lung Disease Father         COPD and nodule on lung    Heart Disease Father     Gout Father     Cancer Maternal Aunt         breast cancer    Arthritis-osteo Maternal Grandmother         arthritis     Cancer Cousin        Social History     Socioeconomic History    Marital status:      Spouse name: Not on file    Number of children: Not on file    Years of education: Not on file    Highest education level: Not on file   Occupational History    Not on file   Social Needs    Financial resource strain: Not on file    Food insecurity:     Worry: Not on file     Inability: Not on file    Transportation needs:     Medical: Not on file     Non-medical: Not on file   Tobacco Use    Smoking status: Never Smoker    Smokeless tobacco: Never Used   Substance and Sexual Activity    Alcohol use: Yes     Comment: few times per week    Drug use: No    Sexual activity: Yes     Partners: Male     Comment: pregnancy test negative   Lifestyle    Physical activity:     Days per week: Not on file     Minutes per session: Not on file    Stress: Not on file   Relationships    Social connections:     Talks on phone: Not on file     Gets together: Not on file     Attends Jew service: Not on file     Active member of club or organization: Not on file     Attends meetings of clubs or organizations: Not on file     Relationship status: Not on file    Intimate partner violence:     Fear of current or ex partner: Not on file     Emotionally abused: Not on file     Physically abused: Not on file     Forced sexual activity: Not on file   Other Topics Concern    Not on file   Social History Narrative    Not on file         ALLERGIES: Milk containing products; Pennsaid [diclofenac sodium]; Sulfa (sulfonamide antibiotics); and Other medication    Review of Systems   Constitutional: Negative for fever. HENT: Negative for congestion. Respiratory: Negative for cough and shortness of breath. Cardiovascular: Negative for chest pain. Gastrointestinal: Negative for abdominal pain and vomiting. Musculoskeletal: Positive for neck pain. Negative for back pain. Skin: Negative for rash. Neurological: Negative for light-headedness. All other systems reviewed and are negative. Vitals:    08/20/19 0253   BP: 106/77   Pulse: 70   Resp: 18   Temp: 97.8 °F (36.6 °C)   SpO2: 99%   Weight: 72.6 kg (160 lb)   Height: 5' 2\" (1.575 m)            Physical Exam   Constitutional: She is oriented to person, place, and time. She appears well-developed. HENT:   Head: Normocephalic and atraumatic. Eyes: Pupils are equal, round, and reactive to light. Neck: Normal range of motion. + diffuse post muscle spasm, no sig pain to palp. Cardiovascular: Normal rate and regular rhythm. No murmur heard.   Pulmonary/Chest: Effort normal. She has no wheezes. Abdominal: Soft. There is no tenderness. Musculoskeletal: She exhibits no tenderness. Neurological: She is alert and oriented to person, place, and time. Nl strength/sensation x 4   Skin: Skin is dry. Capillary refill takes less than 2 seconds. No rash noted. She is not diaphoretic. Psychiatric: She has a normal mood and affect. Nursing note and vitals reviewed. MDM       Procedures      Vitals:  Patient Vitals for the past 12 hrs:   Temp Pulse Resp BP SpO2   08/20/19 0253 97.8 °F (36.6 °C) 70 18 106/77 99 %         Medications ordered:   Medications   HYDROcodone-acetaminophen (NORCO) 5-325 mg per tablet 1 Tab (1 Tab Oral Given 8/20/19 0323)         Lab findings:  No results found for this or any previous visit (from the past 12 hour(s)). X-Ray, CT or other radiology findings or impressions:  No orders to display       Progress notes, Consult notes or additional Procedure notes:   No ind for urgent imaging, no injury. Neuro intact. Not c/w meningitis/sah/dissection/fx/abscess/cord compression. Stable for dc and close f/u. Detailed ret inst given. Pt agrees w dc plan and verb und of detailed ret inst given. Diagnosis:   1. Cervical pain        Disposition: home    Follow-up Information     Follow up With Specialties Details Why Contact Info    Alondra Ratliff NP Nurse Practitioner Schedule an appointment as soon as possible for a visit in 2 days  [address] [facility]  [address]  [address] [phone] [facility] EMERGENCY DEPT Emergency Medicine Go to As needed [address] [place] [address]  [phone]           Discharge Medication List as of 8/20/2019  3:17 AM      START taking these medications    Details   HYDROcodone-acetaminophen (NORCO) 5-325 mg per tablet Take 2 Tabs by mouth every six (6) hours as needed for Pain for up to 7 days.  Max Daily Amount: 8 Tabs., Print, Disp-15 Tab, R-0         CONTINUE these medications which have NOT CHANGED    Details   cholecalciferol, vitamin D3, (VITAMIN D3 PO) Take 10,000 mg by mouth every seven (7) days. , Historical Med      methocarbamol (ROBAXIN) 750 mg tablet Take 1 Tab by mouth four (4) times daily. , Normal, Disp-60 Tab, R-1      metaxalone (SKELAXIN) 800 mg tablet Take 1 Tab by mouth four (4) times daily as needed for Pain., Normal, Disp-60 Tab, R-0      clobetasol (TEMOVATE) 0.05 % topical cream Apply  to affected area two (2) times a day., Normal, Disp-30 g, R-0      gabapentin (NEURONTIN) 300 mg capsule Take 1 Cap by mouth nightly. Indications: acute pain following an operation, Print, Disp-5 Cap, R-0      ketotifen (ZADITOR) 0.025 % (0.035 %) ophthalmic solution Administer 1 Drop to both eyes two (2) times a day., Historical Med      meloxicam (MOBIC) 15 mg tablet TAKE ONE TABLET BY MOUTH DAILY OR TAKE ONE-HALF TABLET TWO TIMES A DAY AS NEEDED FOR PAIN *TAKE WITH FOOD*, Normal, Disp-90 Tab, R-0      OTHER Historical Med      multivitamin (ONE A DAY) tablet Take 1 Tab by mouth daily. , Historical Med      Mv,Ca,Min-FA-Herbal No.157 (ESTROVEN MAXIMUM STRENGTH) 400 mcg tab Take  by mouth., Historical Med      azelastine-fluticasone (DYMISTA) 137-50 mcg/spray spry by Nasal route., Historical Med      PENNSAID 20 mg/gram /actuation(2 %) sopm 2 Pump(s) by Apply Externally route two (2) times daily as needed. Cell: [phone]  Pharmacy: [phone], Normal, Disp-112 g, R-3, KELLIE      RANITIDINE HCL (ZANTAC PO) Take 150 mg by mouth daily. , Historical Med      esomeprazole (NEXIUM) 20 mg capsule Take 20 mg by mouth nightly., Historical Med      diphenhydrAMINE (SIMPLY SLEEP) 25 mg tablet Take 25 mg by mouth nightly., Historical Med         STOP taking these medications       traMADol (ULTRAM) 50 mg tablet Comments:   Reason for Stopping:

## 2019-08-20 NOTE — DISCHARGE INSTRUCTIONS
Return for any new or worsening pain, fever not resolving with motrin or tylenol, shortness of breath, vomiting, decreased fluid intake, weakness, numbness, dizziness, urinary or bowel changes, or any change or concerns. Patient Education        Neck Pain: Care Instructions  Your Care Instructions    You can have neck pain anywhere from the bottom of your head to the top of your shoulders. It can spread to the upper back or arms. Injuries, painting a ceiling, sleeping with your neck twisted, staying in one position for too long, and many other activities can cause neck pain. Most neck pain gets better with home care. Your doctor may recommend medicine to relieve pain or relax your muscles. He or she may suggest exercise and physical therapy to increase flexibility and relieve stress. You may need to wear a special (cervical) collar to support your neck for a day or two. Follow-up care is a key part of your treatment and safety. Be sure to make and go to all appointments, and call your doctor if you are having problems. It's also a good idea to know your test results and keep a list of the medicines you take. How can you care for yourself at home? · Try using a heating pad on a low or medium setting for 15 to 20 minutes every 2 or 3 hours. Try a warm shower in place of one session with the heating pad. · You can also try an ice pack for 10 to 15 minutes every 2 to 3 hours. Put a thin cloth between the ice and your skin. · Take pain medicines exactly as directed. ¨ If the doctor gave you a prescription medicine for pain, take it as prescribed. ¨ If you are not taking a prescription pain medicine, ask your doctor if you can take an over-the-counter medicine. · If your doctor recommends a cervical collar, wear it exactly as directed. When should you call for help? Call your doctor now or seek immediate medical care if:  ? · You have new or worsening numbness in your arms, buttocks or legs.    ? · You have new or worsening weakness in your arms or legs. (This could make it hard to stand up.)   ? · You lose control of your bladder or bowels. ? Watch closely for changes in your health, and be sure to contact your doctor if:  ? · Your neck pain is getting worse. ? · You are not getting better after 1 week. ? · You do not get better as expected. Where can you learn more? Go to http://sary-mk.info/. Enter [id number] in the search box to learn more about \"Neck Pain: Care Instructions. \"  Current as of: March 21, 2017  Content Version: 11.5  © 6184-4205 [employer]. Care instructions adapted under license by [employer] (which disclaims liability or warranty for this information). If you have questions about a medical condition or this instruction, always ask your healthcare professional. [address] any warranty or liability for your use of this information.

## 2019-08-21 ENCOUNTER — OFFICE VISIT (OUTPATIENT)
Dept: FAMILY MEDICINE CLINIC | Age: 49
End: 2019-08-21

## 2019-08-21 VITALS
RESPIRATION RATE: 16 BRPM | TEMPERATURE: 98.1 F | OXYGEN SATURATION: 99 % | WEIGHT: 161.6 LBS | SYSTOLIC BLOOD PRESSURE: 112 MMHG | DIASTOLIC BLOOD PRESSURE: 76 MMHG | BODY MASS INDEX: 29.74 KG/M2 | HEART RATE: 80 BPM | HEIGHT: 62 IN

## 2019-08-21 DIAGNOSIS — M53.3 COCCYX PAIN: ICD-10-CM

## 2019-08-21 DIAGNOSIS — M79.674 PAIN IN TOES OF BOTH FEET: Primary | ICD-10-CM

## 2019-08-21 DIAGNOSIS — M79.675 PAIN IN TOES OF BOTH FEET: Primary | ICD-10-CM

## 2019-08-21 RX ORDER — GABAPENTIN 300 MG/1
CAPSULE ORAL
COMMUNITY
Start: 2019-07-05 | End: 2019-10-23

## 2019-08-21 RX ORDER — METAXALONE 800 MG/1
TABLET ORAL
Refills: 0 | COMMUNITY
Start: 2019-07-31 | End: 2019-10-23

## 2019-08-21 RX ORDER — TRAMADOL HYDROCHLORIDE 50 MG/1
TABLET ORAL
Refills: 0 | COMMUNITY
Start: 2019-08-15 | End: 2019-10-17 | Stop reason: SDUPTHER

## 2019-08-21 NOTE — PROGRESS NOTES
Pt is here for ED follow up for neck pain    1. Have you been to the ER, urgent care clinic since your last visit? Hospitalized since your last visit? Yes [facility] ED 8/20/19 neck pain    2. Have you seen or consulted any other health care providers outside of the [address] [place] [address] since your last visit? Include any pap smears or colon screening.  Yes Dr Sanju Paniagua 8/20/19 foot pain

## 2019-08-21 NOTE — PROGRESS NOTES
HISTORY OF PRESENT ILLNESS  Shashank Boyd is a 44 y.o. female. Patient presents for follow up ED for neck pain. HPI  Pt had a x-ray of her neck which showed progression of the degenerative. Pt ref to neurology    Pt had her neck fused by Dr Eneida Tena. Pt stopped having sex about 10 years ago because it became so painful. Pt is going to be on a long car drive tomorrow. She was given Norco  (15 tablets) by ED. Review of Systems   Constitutional: Negative. Musculoskeletal: Positive for back pain (coccyx area) and neck pain. Visit Vitals  /76 (BP 1 Location: Left arm)   Pulse 80   Temp 98.1 °F (36.7 °C) (Oral)   Resp 16   Ht 5' 2\" (1.575 m)   Wt 161 lb 9.6 oz (73.3 kg)   LMP 05/13/2019 (Exact Date)   SpO2 99%   BMI 29.56 kg/m²       Physical Exam   Constitutional: She appears well-developed. No distress. Cardiovascular: Normal rate, regular rhythm and normal heart sounds. No murmur heard. Pulmonary/Chest: Breath sounds normal. No respiratory distress. She has no wheezes. She has no rales. Musculoskeletal:        Cervical back: She exhibits tenderness and spasm. She exhibits normal range of motion. Neurological: She is alert. coccyx area tender to light palpation. ASSESSMENT and PLAN    ICD-10-CM ICD-9-CM    1. Pain in toes of both feet M79.674 729.5 MRI UNLISTED PROCEDURE    M79.675     2. Coccyx pain M53.3 724.79 MRI UNLISTED PROCEDURE     PLAN:  We discussed her ED visit. I recommend that she see the neurologist and let her decide which imaging study she would want to do regarding her neck. Pt is to call with any concerns. Pt advised not to go back to the chiropractor for now. Warm heat therapy for upper back. I have discussed the diagnosis with the patient and the intended plan as seen in the above orders. The patient has received an after-visit summary and questions were answered concerning future plans.   I have discussed medication side effects and warnings with the patient as well. Patient will call for further questions. Follow-up and Dispositions    · Return if symptoms worsen or fail to improve.

## 2019-09-07 ENCOUNTER — OFFICE VISIT (OUTPATIENT)
Dept: FAMILY MEDICINE CLINIC | Age: 49
End: 2019-09-07

## 2019-09-07 VITALS
HEART RATE: 76 BPM | SYSTOLIC BLOOD PRESSURE: 117 MMHG | OXYGEN SATURATION: 99 % | BODY MASS INDEX: 30 KG/M2 | RESPIRATION RATE: 18 BRPM | TEMPERATURE: 98.1 F | HEIGHT: 62 IN | WEIGHT: 163 LBS | DIASTOLIC BLOOD PRESSURE: 80 MMHG

## 2019-09-07 DIAGNOSIS — J02.9 SORE THROAT: Primary | ICD-10-CM

## 2019-09-07 DIAGNOSIS — J02.0 STREP PHARYNGITIS: ICD-10-CM

## 2019-09-07 LAB
S PYO AG THROAT QL: POSITIVE
VALID INTERNAL CONTROL?: YES

## 2019-09-07 RX ORDER — CEFUROXIME AXETIL 500 MG/1
500 TABLET ORAL 2 TIMES DAILY
Qty: 20 TAB | Refills: 0 | Status: SHIPPED | OUTPATIENT
Start: 2019-09-07 | End: 2019-09-17

## 2019-09-07 NOTE — PROGRESS NOTES
KASANDRA Bryant is a 44 y.o. female who presents today for congestion, sore throat and cough described as productive of white sputum for 11 days. Pt denies a history of chest pain, chills, dizziness, fatigue, fevers, nausea, shortness of breath, vomiting and wheezing and denies a history of asthma. Patient denies smoke cigarettes. Pt has tried Dayquil, Mucinex and sore throat lozenges and notes little to no relief. Current Outpatient Medications   Medication Sig Dispense Refill    BEE POLLEN PO Take  by mouth.  traMADol (ULTRAM) 50 mg tablet TAKE 1 TABLET BY MOUTH EVERY 6 HOURS AS NEEDED FOR PAIN FOR UP TO 30 DAYS  0    gabapentin (NEURONTIN) 300 mg capsule       cholecalciferol, vitamin D3, (VITAMIN D3 PO) Take 10,000 mg by mouth every seven (7) days.  methocarbamol (ROBAXIN) 750 mg tablet Take 1 Tab by mouth four (4) times daily. 60 Tab 1    ketotifen (ZADITOR) 0.025 % (0.035 %) ophthalmic solution Administer 1 Drop to both eyes two (2) times daily as needed.  multivitamin (ONE A DAY) tablet Take 1 Tab by mouth daily.  Mv,Ca,Min-FA-Herbal No.157 (ESTROVEN MAXIMUM STRENGTH) 400 mcg tab Take  by mouth.  azelastine-fluticasone (DYMISTA) 137-50 mcg/spray spry by Nasal route.  RANITIDINE HCL (ZANTAC PO) Take 150 mg by mouth daily.  esomeprazole (NEXIUM) 20 mg capsule Take 20 mg by mouth nightly.  diphenhydrAMINE (SIMPLY SLEEP) 25 mg tablet Take 25 mg by mouth nightly.  metaxalone (SKELAXIN) 800 mg tablet TAKE 1 TAB BY MOUTH FOUR (4) TIMES DAILY AS NEEDED FOR PAIN.  0    clobetasol (TEMOVATE) 0.05 % topical cream Apply  to affected area two (2) times a day.  (Patient taking differently: Apply  to affected area two (2) times daily as needed.) 30 g 0    meloxicam (MOBIC) 15 mg tablet TAKE ONE TABLET BY MOUTH DAILY OR TAKE ONE-HALF TABLET TWO TIMES A DAY AS NEEDED FOR PAIN *TAKE WITH FOOD* 90 Tab 0    PENNSAID 20 mg/gram /actuation(2 %) sopm 2 Pump(s) by Apply Externally route two (2) times daily as needed. Cell: [phone]  Pharmacy: [phone] 112 g 3      Allergies   Allergen Reactions    Ciprofloxacin Diarrhea    Milk Containing Products Diarrhea    Pennsaid [Diclofenac Sodium] Rash    Sulfa (Sulfonamide Antibiotics) Rash    Other Medication Diarrhea     Eggs, patient eats, and gets flu shot yearly with no reaction       SUBJECTIVE:  Review of Systems   Constitutional: Negative for chills, fever and malaise/fatigue. HENT: Positive for congestion and sore throat. Negative for ear discharge, ear pain and sinus pain. Respiratory: Positive for cough and sputum production. Negative for shortness of breath and wheezing. Cardiovascular: Negative for chest pain. Gastrointestinal: Negative for abdominal pain, diarrhea, nausea and vomiting. Musculoskeletal: Negative for myalgias. Neurological: Negative for dizziness, tingling, sensory change and headaches. OBJECTIVE:  Visit Vitals  /80 (BP 1 Location: Left arm, BP Patient Position: Sitting)   Pulse 76   Temp 98.1 °F (36.7 °C) (Oral)   Resp 18   Ht 5' 2\" (1.575 m)   Wt 163 lb (73.9 kg)   LMP 05/13/2019 (Exact Date)   SpO2 99%   BMI 29.81 kg/m²        Physical Exam   Constitutional: She is oriented to person, place, and time and well-developed, well-nourished, and in no distress. HENT:   Head: Normocephalic. Mouth/Throat: Uvula is midline. Posterior oropharyngeal erythema present. No posterior oropharyngeal edema. Eyes: Pupils are equal, round, and reactive to light. Conjunctivae and EOM are normal.   Neck: No thyromegaly present. Cardiovascular: Normal rate, regular rhythm and normal heart sounds. Pulmonary/Chest: Effort normal and breath sounds normal. She has no wheezes. She has no rales. She exhibits no tenderness. Neurological: She is alert and oriented to person, place, and time. Gait normal.   Skin: Skin is warm and dry. No erythema.    Psychiatric: Mood and affect normal. Nursing note and vitals reviewed. ASSESSMENT:  Diagnoses and all orders for this visit:    1. Sore throat  -     AMB POC RAPID STREP A    2. Strep pharyngitis  -     cefUROXime (CEFTIN) 500 mg tablet; Take 1 Tab by mouth two (2) times a day for 10 days. PLAN:  Rapid strep-pos  Start Ceftin BID for 10 days  Symptomatic therapy suggested: push fluids, rest, use vaporizer or mist prn, use acetaminophen prn and return office visit prn if symptoms persist or worsen. I have discussed the diagnosis with the patient and the intended plan as seen in the above orders. The patient has received an after-visit summary and questions were answered concerning future plans. I have discussed medication side effects and warnings with the patient as well. Patient will call for further questions. Follow-up and Dispositions    · Return in about 2 weeks (around 9/21/2019), or if symptoms worsen or fail to improve.         Willow Almanzar, MENG

## 2019-09-07 NOTE — PATIENT INSTRUCTIONS

## 2019-09-24 DIAGNOSIS — M53.3 COCCYX PAIN: Primary | ICD-10-CM

## 2019-09-26 ENCOUNTER — HOSPITAL ENCOUNTER (OUTPATIENT)
Age: 49
Discharge: HOME | End: 2019-09-26
Attending: NURSE PRACTITIONER
Payer: COMMERCIAL

## 2019-09-26 DIAGNOSIS — Z91.89 AT HIGH RISK FOR BREAST CANCER: ICD-10-CM

## 2019-09-26 DIAGNOSIS — Z80.3 FAMILY HISTORY OF BREAST CANCER IN FIRST DEGREE RELATIVE: ICD-10-CM

## 2019-09-26 PROCEDURE — 74011250636 HC RX REV CODE- 250/636: Performed by: NURSE PRACTITIONER

## 2019-09-26 PROCEDURE — A9577 INJ MULTIHANCE: HCPCS | Performed by: NURSE PRACTITIONER

## 2019-09-26 PROCEDURE — 77049 MRI BREAST C-+ W/CAD BI: CPT

## 2019-09-26 RX ADMIN — GADOBENATE DIMEGLUMINE 15 ML: 529 INJECTION, SOLUTION INTRAVENOUS at 23:00

## 2019-09-27 PROCEDURE — 74011250636 HC RX REV CODE- 250/636: Performed by: NURSE PRACTITIONER

## 2019-09-27 PROCEDURE — A9577 INJ MULTIHANCE: HCPCS | Performed by: NURSE PRACTITIONER

## 2019-10-01 ENCOUNTER — HOSPITAL ENCOUNTER (OUTPATIENT)
Age: 49
Discharge: HOME | End: 2019-10-01
Attending: NURSE PRACTITIONER
Payer: COMMERCIAL

## 2019-10-01 DIAGNOSIS — M53.3 COCCYX PAIN: ICD-10-CM

## 2019-10-01 PROCEDURE — 74011636320 HC RX REV CODE- 636/320: Performed by: NURSE PRACTITIONER

## 2019-10-01 PROCEDURE — 72197 MRI PELVIS W/O & W/DYE: CPT

## 2019-10-01 PROCEDURE — A9575 INJ GADOTERATE MEGLUMI 0.1ML: HCPCS | Performed by: NURSE PRACTITIONER

## 2019-10-01 RX ADMIN — GADOTERATE MEGLUMINE 15 ML: 376.9 INJECTION INTRAVENOUS at 16:00

## 2019-10-08 ENCOUNTER — OFFICE VISIT (OUTPATIENT)
Dept: SURGERY | Age: 49
End: 2019-10-08

## 2019-10-08 VITALS
RESPIRATION RATE: 16 BRPM | HEIGHT: 63 IN | SYSTOLIC BLOOD PRESSURE: 122 MMHG | WEIGHT: 193 LBS | DIASTOLIC BLOOD PRESSURE: 64 MMHG | TEMPERATURE: 98.3 F | OXYGEN SATURATION: 99 % | HEART RATE: 73 BPM | BODY MASS INDEX: 34.2 KG/M2

## 2019-10-08 DIAGNOSIS — Z80.3 FAMILY HISTORY OF BREAST CANCER IN FIRST DEGREE RELATIVE: ICD-10-CM

## 2019-10-08 DIAGNOSIS — Z91.89 AT HIGH RISK FOR BREAST CANCER: Primary | ICD-10-CM

## 2019-10-08 RX ORDER — LANOLIN ALCOHOL/MO/W.PET/CERES
2500 CREAM (GRAM) TOPICAL
COMMUNITY
End: 2022-01-05 | Stop reason: ALTCHOICE

## 2019-10-08 NOTE — PROGRESS NOTES
[facility]  General Surgery    Subjective:  Patient presents today without complaints. We discussed the BI-RADS 1 findings of the bilateral breast MRI. She states that she performs her self breast exam on the eighth of each month. She denies any unintentional weight loss, nipple drainage or discharge, masses within the breasts or skin changes of the breast.  Objective:  Vitals:    10/08/19 1517   BP: 122/64   Pulse: 73   Resp: 16   Temp: 98.3 °F (36.8 °C)   TempSrc: Oral   SpO2: 99%   Weight: 87.5 kg (193 lb)   Height: 5' 3\" (1.6 m)       Physical Exam:    General: Awake and alert, oriented x4, no apparent distress   Breasts:    Left: No dimpling, discoloration, nipple inversion or retractions. No axillary or supraclavicular lymphadenopathy. No mass   Right: No dimpling, discoloration, nipple inversion or retractions. No axillary or supraclavicular lymphadenopathy. No mass    Current Medications:  Current Outpatient Medications   Medication Sig Dispense Refill    cyanocobalamin 1,000 mcg tablet Take 2,500 mcg by mouth daily.  traMADol (ULTRAM) 50 mg tablet TAKE 1 TABLET BY MOUTH EVERY 6 HOURS AS NEEDED FOR PAIN FOR UP TO 30 DAYS  0    metaxalone (SKELAXIN) 800 mg tablet TAKE 1 TAB BY MOUTH FOUR (4) TIMES DAILY AS NEEDED FOR PAIN.  0    cholecalciferol, vitamin D3, (VITAMIN D3 PO) Take 10,000 mg by mouth every seven (7) days.  ketotifen (ZADITOR) 0.025 % (0.035 %) ophthalmic solution Administer 1 Drop to both eyes two (2) times daily as needed.  multivitamin (ONE A DAY) tablet Take 1 Tab by mouth daily.  Mv,Ca,Min-FA-Herbal No.157 (ESTROVEN MAXIMUM STRENGTH) 400 mcg tab Take  by mouth.  RANITIDINE HCL (ZANTAC PO) Take 150 mg by mouth daily.  esomeprazole (NEXIUM) 20 mg capsule Take 20 mg by mouth nightly.  diphenhydrAMINE (SIMPLY SLEEP) 25 mg tablet Take 25 mg by mouth nightly.  BEE POLLEN PO Take  by mouth.       gabapentin (NEURONTIN) 300 mg capsule       methocarbamol (ROBAXIN) 750 mg tablet Take 1 Tab by mouth four (4) times daily. 60 Tab 1    clobetasol (TEMOVATE) 0.05 % topical cream Apply  to affected area two (2) times a day. (Patient taking differently: Apply  to affected area two (2) times daily as needed.) 30 g 0    meloxicam (MOBIC) 15 mg tablet TAKE ONE TABLET BY MOUTH DAILY OR TAKE ONE-HALF TABLET TWO TIMES A DAY AS NEEDED FOR PAIN *TAKE WITH FOOD* 90 Tab 0    azelastine-fluticasone (DYMISTA) 137-50 mcg/spray spry by Nasal route.  PENNSAID 20 mg/gram /actuation(2 %) sopm 2 Pump(s) by Apply Externally route two (2) times daily as needed. Cell: [phone]  Pharmacy: [phone] 112 g 3       Chart and notes reviewed. Data reviewed. I have evaluated and examined the patient. Impression and plan:  Patient with lifetime risk of developing breast cancer at 31.1% as calculated by the [employer] model doing well. Continue monthly self breast exam  Bilateral 3D screening mammography in 6 months  Clinical breast exam in 6 months  Please call a visit in the interim with any new questions or concerns.      Jase Agarwal MD

## 2019-10-10 NOTE — PROGRESS NOTES
CHIEF COMPLAINT: Annual exam.     HISTORY OF PRESENT ILLNESS:  Ngoc is a 37 year old       female, seen today for annual GYN (Gynecological) exam.  She is s/p hysterectomy with ovaries left in place. She has no concerns.    PAST MEDICAL HISTORY:    Acne                                                          Seasonal allergies                                            Uterine prolapse                                              PAST SURGICAL HISTORY:    HYSTERECTOMY                                    2013      Comment: vaginal-ovaries intact    MEDICATIONS:  Current Outpatient Medications   Medication Sig Dispense Refill   • cetirizine (ZYRTEC) 10 MG tablet Take 10 mg by mouth daily.     • omeprazole (PRILOSEC) 20 MG capsule Take 20 mg by mouth daily.     • Omega-3 Fatty Acids (FISH OIL PO)      • clindamycin (CLINDAGEL) 1 % gel Apply twice a day to pimples.. 60 g 11   • benzoyl peroxide 5 % gel Apply each night to acne areas of face before going to bed. 60 g 11   • CALCIUM CITRATE-VITAMIN D PO      • pseudoephedrine (SUDAFED) 30 MG tablet Take 30 mg by mouth every 4 hours as needed.       No current facility-administered medications for this visit.        ALLERGIES:  ALLERGIES:   Allergen Reactions   • Seasonal Other (See Comments)     Sneezing, runny nose, watery eyes       SOCIAL HISTORY:  Social History     Tobacco Use   • Smoking status: Never Smoker   • Smokeless tobacco: Never Used   Substance Use Topics   • Alcohol use: Yes     Alcohol/week: 1.0 standard drinks     Types: 1 Standard drinks or equivalent per week     Comment: Occasionally     Marital status:   Exercise: minimal   Occupation:       FAMILY HISTORY:  Family History   Problem Relation Age of Onset   • Hypertension Mother    • Migraines Mother    • High cholesterol Mother    • Hypertension Father    • High cholesterol Father    • Blood Disorder Sister         DVT/PE, positive genetic, but unknown  PT DAILY TREATMENT NOTE 10-18    Patient Name: Megna Heredia  Date:2019  : 1970  [x]  Patient  Verified  Payor: BLUE CROSS / Plan: [employer] [facility] [place] / Product Type: PPO /    In time:729  Out time:822  Total Treatment Time (min): 53  Visit #: 23 of 32    Medicare/BCBS Only   Total Timed Codes (min):  38 1:1 Treatment Time:  38       Treatment Area: Pain in right shoulder [M25.511]  S/P shoulder surgery [Z98.890]    SUBJECTIVE  Pain Level (0-10 scale): 0  Any medication changes, allergies to medications, adverse drug reactions, diagnosis change, or new procedure performed?: [x] No    [] Yes (see summary sheet for update)  Subjective functional status/changes:   [] No changes reported  \" I am not having any shoulder pain    OBJECTIVE    Modality rationale: increase tissue extensibility to improve the patients ability to aid with increase tolerance to ADLs and activities   Min Type Additional Details    [] Estim:  []Unatt       []IFC  []Premod                        []Other:  []w/ice   []w/heat  Position:  Location:    [] Estim: []Att    []TENS instruct  []NMES                    []Other:  []w/US   []w/ice   []w/heat  Position:  Location:    []  Traction: [] Cervical       []Lumbar                       [] Prone          []Supine                       []Intermittent   []Continuous Lbs:  [] before manual  [] after manual    []  Ultrasound: []Continuous   [] Pulsed                           []1MHz   []3MHz W/cm2:  Location:    []  Iontophoresis with dexamethasone         Location: [] Take home patch   [] In clinic    []  Ice     []  heat  []  Ice massage  []  Laser   []  Anodyne Position:  Location:    []  Laser with stim  []  Other:  Position:  Location:   15 [x]  Vasopneumatic Device   Right shoulder Pressure:       [x] lo [] med [] hi   Temperature: [x] lo [] med [] hi   [] Skin assessment post-treatment:  []intact []redness- no adverse reaction    []redness - adverse reaction:       min []Eval       PAST GYNECOLOGIC HISTORY:  No LMP recorded. Patient has had a hysterectomy. Last Pap smear was 2013 and was normal. Patient reports Pap smears had been normal prior to hysterectomy. The patient denies pain with intercourse or vaginal discharge.    PAST OBSTETRICAL HISTORY:         .     HEALTH MAINTENANCE:  Immunizations: Tdap 2013    REVIEW OF SYSTEMS:  Negative for any significant problems with the following:    General: unexplained fevers or chills  Cardiovascular: chest pain, palpitations or edema  Respiratory: shortness of breath  Breasts: dominant masses or nipple discharge  Gastrointestinal: nausea/vomiting, diarrhea/constipation  Urinary: dysuria/hematuria  Reproductive: vaginal discharge, burning, odor or itching  Skin: new or changing nevi  Neurologic: headaches  Musculoskeletal: new aches or pains    PHYSICAL EXAM:  Blood pressure 122/76, height 5' 5\" (1.651 m), weight 92.4 kg., Body mass index is 33.9 kg/m²., No LMP recorded. Patient has had a hysterectomy.  General: Well developed, well nourished, in no acute distress  Psychiatric: Alert and cooperative; normal mood and affect  HEENT: Atraumatic, normocephalic  Cardiovascular: Regular rate and rhythm  Respiratory: Clear to auscultation bilaterally.  Unlabored respiratory effort  Breasts:  Symmetric.  There are no skin changes, dominant masses, nipple discharge, or axillary lymphadenopathy bilaterally  Abdomen: Abdomen soft and non-tender without masses, hepatosplenomegaly or hernias noted  Neurologic: No focal deficits   Skin: Warm and dry without lesions  Extremities: Without edema or cyanosis    Pelvic Exam:  EXTERNAL: Normal female external genitalia, no lesions, normal hair distribution.  URETHRAL MEATUS: Normal-appearing urethra without lesions or prolapse.  URETHRA: No masses or tenderness.   BLADDER: Nontender. Nondistended.  VAGINA: Rugated. Normal physiologic discharge. No lesions. Vaginal cuff well supported.  ADNEXA: No  []Re-Eval       20 min Therapeutic Exercise:  [x] See flow sheet :   Rationale: increase ROM, increase strength and improve coordination to improve the patients ability to aid with increase tolerance to ADLs and activities    10 min Therapeutic Activity:  [x]  See flow sheet :   Rationale: increase ROM, increase strength, improve coordination and improve balance  to improve the patients ability to aid with increase tolerance to ADLs and activities      min Neuromuscular Re-education:  []  See flow sheet :   Rationale:   to improve the patients ability to     8 min Manual Therapy:  LAD OSCILL PROM Right shoulder with mobs for ER/IR   Rationale: increase ROM and increase tissue extensibility to aid with increase tolerance to ADLs and activities     min Gait Training:  ___ feet with ___ device on level surfaces with ___ level of assist   Rationale: With   [x] TE   [x] TA   [] neuro   [] other: Patient Education: [x] Review HEP    [x] Progressed/Changed HEP based on:   [] positioning   [] body mechanics   [] transfers   [] heat/ice application    [] other:      Other Objective/Functional Measures: VC exercises and technique  increased  UBE, Increased TBand to GTB for Rows, Ext, IR, ER  increased prone reps    Pain Level (0-10 scale) post treatment: 0    ASSESSMENT/Changes in Function: tolerated increases well without any noted difficulties, residual tight end feels for IR and ER    Patient will continue to benefit from skilled PT services to modify and progress therapeutic interventions, address ROM deficits, address strength deficits, analyze and address soft tissue restrictions, analyze and cue movement patterns, analyze and modify body mechanics/ergonomics, assess and modify postural abnormalities and instruct in home and community integration to attain remaining goals.      [x]  See Plan of Care  [x]  See progress note/recertification  []  See Discharge Summary         Progress towards goals / Updated goals:  Goals: to be achieved in 32 total treatments:             7  patient will have pain 0-1/10 to aid with increase tolerance to ADLs and activities at home and work               PN 3 achy               MJDSBMS   0 7/9/19 7/12/19               2 patient will have AAROM unforced  F and abd 135    ER 50   to aid with progression of protocol and tolerance to regular activities as home             PN F [address] measurement              CURRENT                   3 patient will have INXZ 75 OW show improving function with dressing               KJ 05 [facility] measurement               MNOIZVV   73 7/9/19              PLAN  [x]  Upgrade activities as tolerated     [x]  Continue plan of care  []  Update interventions per flow sheet       []  Discharge due to:_  []  Other:_      Dominick Villareal PT 7/12/2019  7:51 AM    Future Appointments   Date Time Provider Christi Willis   7/15/2019  4:30 PM Priscilla Cherry, PT [facility]   7/19/2019  7:30 AM Willis See PT [facility]   7/30/2019  3:30 PM Karena Snider PT [facility]   8/2/2019  3:00 PM Karena Snider PT [facility]   8/19/2019  3:30 PM VLAD Sotelo Olman 67   9/24/2019 11:00 AM [facility] MRI RM 1 [facility] masses, enlargement, or tenderness.  ANUS AND PERINEUM: No lesions.    ASSESSMENT:  Ngoc is a 37 year old       female who presents for annual visit. Normal exam.     PLAN:    Pap smear not performed  Discussed diet and exercise  Breast self-awareness  Mammogram order placed, to be performed after patient turns 38  Follow up in one year or as needed    Dilan Dykes MD  PGY II  10/10/2019    Patient seen and examined. Agree with above.   Valeria Caballero MD

## 2019-10-16 NOTE — PROGRESS NOTES
LM for Pt with MRI results & ask if she would like to be referred to ortho.  Request Pt to call back if she is interested in seeing a specialist.

## 2019-10-17 DIAGNOSIS — M79.674 PAIN IN TOES OF BOTH FEET: Primary | ICD-10-CM

## 2019-10-17 DIAGNOSIS — M54.2 NECK PAIN: ICD-10-CM

## 2019-10-17 DIAGNOSIS — M62.838 NECK MUSCLE SPASM: ICD-10-CM

## 2019-10-17 DIAGNOSIS — M79.675 PAIN IN TOES OF BOTH FEET: Primary | ICD-10-CM

## 2019-10-21 RX ORDER — METHOCARBAMOL 750 MG/1
TABLET, FILM COATED ORAL
Qty: 60 TAB | Refills: 1 | Status: SHIPPED | OUTPATIENT
Start: 2019-10-21 | End: 2019-10-23

## 2019-10-21 RX ORDER — TRAMADOL HYDROCHLORIDE 50 MG/1
50 TABLET ORAL
Qty: 120 TAB | Refills: 0 | Status: SHIPPED | OUTPATIENT
Start: 2019-10-21 | End: 2019-11-20

## 2019-10-23 ENCOUNTER — OFFICE VISIT (OUTPATIENT)
Dept: FAMILY MEDICINE CLINIC | Age: 49
End: 2019-10-23

## 2019-10-23 VITALS
HEIGHT: 63 IN | SYSTOLIC BLOOD PRESSURE: 117 MMHG | DIASTOLIC BLOOD PRESSURE: 75 MMHG | BODY MASS INDEX: 29.73 KG/M2 | OXYGEN SATURATION: 99 % | HEART RATE: 76 BPM | RESPIRATION RATE: 16 BRPM | WEIGHT: 167.8 LBS | TEMPERATURE: 97.8 F

## 2019-10-23 DIAGNOSIS — M79.675 PAIN IN TOES OF BOTH FEET: ICD-10-CM

## 2019-10-23 DIAGNOSIS — R93.7 ABNORMAL MRI, LUMBAR SPINE: Primary | ICD-10-CM

## 2019-10-23 DIAGNOSIS — M54.2 NECK PAIN: ICD-10-CM

## 2019-10-23 DIAGNOSIS — M79.674 PAIN IN TOES OF BOTH FEET: ICD-10-CM

## 2019-10-23 NOTE — PROGRESS NOTES
HISTORY OF PRESENT ILLNESS  Marjorie Ly is a 44 y.o. female. Patient presents to discuss MRI results. 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 She can now at least move her toes. Her back still hurts. Review of Systems   Constitutional: Negative. Respiratory: Negative. Cardiovascular: Negative. Musculoskeletal: Positive for back pain and joint pain. Visit Vitals  /75 (BP 1 Location: Left arm)   Pulse 76   Temp 97.8 °F (36.6 °C) (Oral)   Resp 16   Ht 5' 3\" (1.6 m)   Wt 167 lb 12.8 oz (76.1 kg)   LMP 05/13/2019   SpO2 99%   BMI 29.72 kg/m²     Physical Exam   Constitutional: She is oriented to person, place, and time. She appears well-developed. No distress. Cardiovascular: Normal rate, regular rhythm and normal heart sounds. No murmur heard. Pulmonary/Chest: Effort normal and breath sounds normal. No respiratory distress. She has no wheezes. She has no rales. Neurological: She is alert and oriented to person, place, and time. ASSESSMENT and PLAN    ICD-10-CM ICD-9-CM    1. Abnormal MRI, lumbar spine R93.7 793.7 REFERRAL TO ORTHOPEDICS   2. Neck pain M54.2 723.1 REFERRAL TO ORTHOPEDICS   3. Pain in toes of both feet M79.674 729.5     M79.675       PLAN  We discussed her MRI results. Pt was ref to ortho. Pt will continue with podiatry as scheduled. Pt is to call with any concerns. I have discussed the diagnosis with the patient and the intended plan as seen in the above orders. The patient has received an after-visit summary and questions were answered concerning future plans. I have discussed medication side effects and warnings with the patient as well. Patient will call for further questions. Follow-up and Dispositions    · Return if symptoms worsen or fail to improve.

## 2019-10-23 NOTE — PROGRESS NOTES
Pt is here to discuss MRI results    1. Have you been to the ER, urgent care clinic since your last visit? Hospitalized since your last visit? No    2. Have you seen or consulted any other health care providers outside of the [address] [place] [address] since your last visit? Include any pap smears or colon screening.  Yes Dr Carlisle-neurology 9/19, Dr Madera January 10/15/19

## 2019-11-18 ENCOUNTER — OFFICE VISIT (OUTPATIENT)
Dept: ORTHOPEDIC SURGERY | Age: 49
End: 2019-11-18

## 2019-11-18 VITALS
HEIGHT: 63 IN | HEART RATE: 64 BPM | RESPIRATION RATE: 16 BRPM | WEIGHT: 162 LBS | TEMPERATURE: 98.4 F | OXYGEN SATURATION: 98 % | DIASTOLIC BLOOD PRESSURE: 76 MMHG | BODY MASS INDEX: 28.7 KG/M2 | SYSTOLIC BLOOD PRESSURE: 107 MMHG

## 2019-11-18 DIAGNOSIS — S16.1XXS STRAIN OF NECK MUSCLE, SEQUELA: ICD-10-CM

## 2019-11-18 DIAGNOSIS — M79.671 CHRONIC PAIN OF BOTH FEET: ICD-10-CM

## 2019-11-18 DIAGNOSIS — M79.672 CHRONIC PAIN OF BOTH FEET: ICD-10-CM

## 2019-11-18 DIAGNOSIS — M50.30 DDD (DEGENERATIVE DISC DISEASE), CERVICAL: ICD-10-CM

## 2019-11-18 DIAGNOSIS — M96.1 CERVICAL POSTLAMINECTOMY SYNDROME: ICD-10-CM

## 2019-11-18 DIAGNOSIS — M51.26 HNP (HERNIATED NUCLEUS PULPOSUS), LUMBAR: Primary | ICD-10-CM

## 2019-11-18 DIAGNOSIS — G89.29 CHRONIC PAIN OF BOTH FEET: ICD-10-CM

## 2019-11-18 RX ORDER — METHOCARBAMOL 750 MG/1
TABLET, FILM COATED ORAL 4 TIMES DAILY
COMMUNITY
End: 2020-02-27 | Stop reason: SDUPTHER

## 2019-11-18 NOTE — LETTER
11/18/19 Patient: Danae Gordon YOB: 1970 Date of Visit: 11/18/2019 Gonzales Drake NP 
[address] [facility] [address] [facility] [address] VIA In Basket Dear Gonzales Drake NP, Thank you for referring Ms. Vishnu Sarkar to [address] for evaluation. My notes for this consultation are attached. If you have questions, please do not hesitate to call me. I look forward to following your patient along with you. Sincerely, Ezequiel Heath MD

## 2019-11-18 NOTE — PROGRESS NOTES
[facility]  [address], [address]  Phone: [phone]  Fax: [phone]        INITIAL CONSULTATION      HISTORY OF PRESENT ILLNESS:  Yohana Coffey is a 44 y.o. female whom is referred from Vanessa Fairchild NP secondary to neck pain extending into the bilateral (L>R) shoulders following PT manipulation on 6/26/19. She rates her pain 2-8/10. Denies radicular sxs. She additionally endorses bilateral foot paraesthesias. Patient reports she had no neck pain after cervical surgery. Pain exacerbated with turning neck to the left. Pt has seen a chiropractor with no relief. Treated with Tramadol, Ibuprofen, and muscle relaxer with no relief. Pt completed MDP with no relief. Pt reports she has had an EMG done by Dr. Maddy Maloney and also Rx her Neurontin. Pt denies dropping things or loss of balance. Pt is being followed by dermatologist for rash under her left breast. Pt denies fever, weight loss, or skin changes. PmHx IBS, left L5 hemilaminotomy, diskectomy (Dr. Taryn Gaffney), right shoulder surgery x 2 (2/2019 and 5/2019), cervical surgery (Dtr. Michael Babin, 2012). Last seen by me 6/3/16 with c/o low back and left hip pain into the LLE laterally and posteriorly to the foot. Did not tolerate TOPAMAX. Referred to Dr. Arlette Howell for surgical evaluation. Note from Dr. Arlette Howell dated 9/16/16 indicating patient was seen 6 weeks out from hemilaminotomy. Not having any pain. Return to physical activity. Note from Manpreet Alford dated 8/19/19 indicating patient was seen s/p right shoulder arthroscopic rotator cuff revision and manipulation under anesthesia on 5/13/19. Pt completed PT. Rated pain 0/10 at that time. Per pt, she had previous right shoulder surgery in February 2019. ER note from Dr. Debra Barr dated 8/20/19 indicating patient presented for diffuse neck pain x 2 months after manipulation in PT. H/o prior cervical surgery. Diffuse muscle spasms, no pain to palpation.  Normal strength. Gave her Superior. 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 Continues with pediatry. Referred to us. Cervical spine XR dated 6/28/19 films not available. Per report, Anterior fusion C5-6. Degenerative disc disease C6-7. Unremarkable C1-C4. The patient is RHD.  reviewed. Body mass index is 28.7 kg/m². PCP: MENG Delaney    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    Thumb pain 6/7/2010          Past Surgical History:   Procedure Laterality Date    HX BACK SURGERY  2012    HX GYN      left tube removal ectopic pregnancy    HX HEENT  9/2011    Basal cell cancer lesion removed from nose    HX LYMPHADENECTOMY Right 8/25/16    HX ORTHOPAEDIC  1999    TMJ    HX OTHER SURGICAL  2013    Spinal Fusion    HX TONSILLECTOMY  2/2007         Social History     Tobacco Use    Smoking status: Never Smoker    Smokeless tobacco: Never Used   Substance Use Topics    Alcohol use: Yes     Comment: few times per week     Work status: The patient is unknown. Marital status: The patient is . Current Outpatient Medications   Medication Sig Dispense Refill    methocarbamol (ROBAXIN) 750 mg tablet Take  by mouth four (4) times daily.  cyanocobalamin 1,000 mcg tablet Take 2,500 mcg by mouth daily.  cholecalciferol, vitamin D3, (VITAMIN D3 PO) Take 10,000 mg by mouth every seven (7) days.  multivitamin (ONE A DAY) tablet Take 1 Tab by mouth daily.  Mv,Ca,Min-FA-Herbal No.157 (ESTROVEN MAXIMUM STRENGTH) 400 mcg tab Take  by mouth.  azelastine-fluticasone (DYMISTA) 137-50 mcg/spray spry by Nasal route.  RANITIDINE HCL (ZANTAC PO) Take 150 mg by mouth daily.       esomeprazole (NEXIUM) 20 mg capsule Take 20 mg by mouth nightly.  diphenhydrAMINE (SIMPLY SLEEP) 25 mg tablet Take 25 mg by mouth nightly.  traMADol (ULTRAM) 50 mg tablet Take 1 Tab by mouth every eight (8) hours as needed for Pain for up to 30 days. Max Daily Amount: 150 mg. 120 Tab 0       Allergies   Allergen Reactions    Ciprofloxacin Diarrhea    Milk Containing Products Diarrhea    Pennsaid [Diclofenac Sodium] Rash    Sulfa (Sulfonamide Antibiotics) Rash    Other Medication Diarrhea     Eggs, patient eats, and gets flu shot yearly with no reaction            Family History   Problem Relation Age of Onset    Cancer Mother         breast cancer    Diabetes Father     High Cholesterol Father     Lung Disease Father         COPD and nodule on lung    Heart Disease Father     Gout Father     Cancer Maternal Aunt         breast cancer    Arthritis-osteo Maternal Grandmother         arthritis     Cancer Cousin          REVIEW OF SYSTEMS  Constitutional symptoms: Negative  Eyes: Negative  Ears, Nose, Throat, and Mouth: Negative  Cardiovascular: Negative  Respiratory: Negative  Genitourinary: Negative  Integumentary (Skin and/or breast): Negative  Musculoskeletal: Positive for neck pain, bilateral shoulder pain, bilateral feet pain  Extremities: Negative for edema. Endocrine/Rheumatologic: Negative  Hematologic/Lymphatic: Negative  Allergic/Immunologic: Negative  Psychiatric: Negative       PHYSICAL EXAMINATION  Visit Vitals  /76 (BP 1 Location: Left arm, BP Patient Position: Sitting)   Pulse 64   Temp 98.4 °F (36.9 °C) (Oral)   Resp 16   Ht 5' 3\" (1.6 m)   Wt 162 lb (73.5 kg)   SpO2 98%   BMI 28.70 kg/m²       CONSTITUTIONAL: NAD, A&O x 3  HEART: Regular rate and rhythm  ABDOMEN: Positive bowel sounds, soft, nontender, and nondistended  LUNGS: Clear to auscultation bilaterally. SKIN: Negative for rash.   RANGE OF MOTION: The patient has full passive range of motion in all four extremities. SENSATION: Sensation is intact to light touch throughout. MOTOR:   Straight Leg Raise: Negative, bilateral  Bills: Negative, bilateral  Tandem Gait: Neg. Deep tendon reflexes are 2+ at the brachioradialis, biceps, and triceps. Deep tendon reflexes are 1+ at the knees and 0 at the left ankle, 2+ at the right ankle. Shoulder AB/Flex Elbow Flex Wrist Ext Elbow Ext Wrist Flex Hand Intrin Tone   Right +4/5 +4/5 +4/5 +4/5 +4/5 +4/5 +4/5   Left +4/5 +4/5 +4/5 +4/5 +4/5 +4/5 +4/5              Hip Flex Knee Ext Knee Flex Ankle DF GTE Ankle PF Tone   Right +4/5 +4/5 +4/5 +4/5 +4/5 +4/5 +4/5   Left +4/5 +4/5 +4/5 +4/5 +4/5 +4/5 +4/5         ASSESSMENT   Diagnoses and all orders for this visit:    1. HNP (herniated nucleus pulposus), lumbar    2. Strain of neck muscle, sequela    3. Cervical postlaminectomy syndrome    4. DDD (degenerative disc disease), cervical    5. Chronic pain of both feet           IMPRESSIONS/RECOMMENDATIONS:  Patient presents today with c/o neck pain extending into the bilateral (L>R) shoulders following PT manipulation on 6/26/19. She additionally endorses bilateral foot paraesthesias. My sense is that her foot pain is not related to her neck. I will have the patient sign a release of medical information to obtain EMG results from Dr. Torres Benavides. Consideration was given to starting her on MDP, however she just took it recently and it did not provide relief to her neck pain. I will refer her to physical therapy with an emphasis on HEP. Multiple treatment options were discussed. Patient is neurologically intact. I will see the patient back in 1 month's time or earlier if needed. Written by Samara White, as dictated by Jordan Her MD  I examined the patient, reviewed and agree with the note.

## 2019-11-25 ENCOUNTER — HOSPITAL ENCOUNTER (OUTPATIENT)
Dept: PHYSICAL THERAPY | Age: 49
Discharge: HOME | End: 2019-11-25
Payer: COMMERCIAL

## 2019-11-25 PROCEDURE — 97162 PT EVAL MOD COMPLEX 30 MIN: CPT

## 2019-11-25 PROCEDURE — 97110 THERAPEUTIC EXERCISES: CPT

## 2019-11-25 PROCEDURE — 97140 MANUAL THERAPY 1/> REGIONS: CPT

## 2019-11-25 NOTE — PROGRESS NOTES
PT DAILY TREATMENT NOTE 10-18    Patient Name: Mitra Bolton  Date:2019  : 1970  [x]  Patient  Verified  Payor: BLUE CROSS / Plan: [facility] / Product Type: PPO /    In time:418  Out time:453  Total Treatment Time (min): 35  Visit #: 1 of 10    Medicare/BCBS Only   Total Timed Codes (min):  20 1:1 Treatment Time:  35       Treatment Area: Neck pain [M54.2]    SUBJECTIVE  Pain Level (0-10 scale): 5  Any medication changes, allergies to medications, adverse drug reactions, diagnosis change, or new procedure performed?: [x] No    [] Yes (see summary sheet for update)  Subjective functional status/changes:   [] No changes reported      OBJECTIVE        Min Type Additional Details    [] Estim:  []Unatt       []IFC  []Premod                        []Other:  []w/ice   []w/heat  Position:  Location:    [] Estim: []Att    []TENS instruct  []NMES                    []Other:  []w/US   []w/ice   []w/heat  Position:  Location:    []  Traction: [] Cervical       []Lumbar                       [] Prone          []Supine                       []Intermittent   []Continuous Lbs:  [] before manual  [] after manual    []  Ultrasound: []Continuous   [] Pulsed                           []1MHz   []3MHz W/cm2:  Location:    []  Iontophoresis with dexamethasone         Location: [] Take home patch   [] In clinic    []  Ice     []  heat  []  Ice massage  []  Laser   []  Anodyne Position:  Location:    []  Laser with stim  []  Other:  Position:  Location:    []  Vasopneumatic Device Pressure:       [] lo [] med [] hi   Temperature: [] lo [] med [] hi   [] Skin assessment post-treatment:  []intact []redness- no adverse reaction    []redness - adverse reaction:     15 min [x]Eval                  []Re-Eval       15 min Therapeutic Exercise:  [] See flow sheet :HEP   Rationale: increase ROM and increase strength to improve the patients ability to perform job tasks    5 min Manual Therapy:  Supine SOR, manual cervical distraction, STM bilateral cervical paraspinals, manual isometrics to bilateral cervical rotation    Rationale: decrease pain, increase ROM, increase tissue extensibility and decrease trigger points to improve ease of turning head       With   [] TE   [] TA   [] neuro   [] other: Patient Education: [x] Review HEP    [] Progressed/Changed HEP based on:   [] positioning   [] body mechanics   [] transfers   [] heat/ice application    [] other:      Other Objective/Functional Measures:      Pain Level (0-10 scale) post treatment: 5    ASSESSMENT/Changes in Function:     Patient will continue to benefit from skilled PT services to modify and progress therapeutic interventions, address functional mobility deficits, address ROM deficits, address strength deficits, analyze and address soft tissue restrictions, analyze and cue movement patterns, analyze and modify body mechanics/ergonomics, assess and modify postural abnormalities and instruct in home and community integration to attain remaining goals.      [x]  See Plan of Care  []  See progress note/recertification  []  See Discharge Summary         Progress towards goals / Updated goals:  See POC    PLAN  []  Upgrade activities as tolerated     [x]  Continue plan of care  []  Update interventions per flow sheet       []  Discharge due to:_  []  Other:_      Aidee Cavanaugh, PT 11/25/2019  4:53 PM    Future Appointments   Date Time Provider Christi Sheetsisti   12/26/2019  9:55 AM Cyndi Giraldo  [address]
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
## 2019-11-26 ENCOUNTER — HOSPITAL ENCOUNTER (OUTPATIENT)
Dept: PHYSICAL THERAPY | Age: 49
Discharge: HOME | End: 2019-11-26
Payer: COMMERCIAL

## 2019-11-26 PROCEDURE — 97014 ELECTRIC STIMULATION THERAPY: CPT

## 2019-11-26 PROCEDURE — 97140 MANUAL THERAPY 1/> REGIONS: CPT

## 2019-11-26 PROCEDURE — 97112 NEUROMUSCULAR REEDUCATION: CPT

## 2019-11-26 NOTE — PROGRESS NOTES
PT DAILY TREATMENT NOTE 10-18    Patient Name: Kika Fry  Date:2019  : 1970  [x]  Patient  Verified  Payor: BLUE CROSS / Plan: [employer] [facility] / Product Type: PPO /    In time:805  Out time:855  Total Treatment Time (min): 50  Visit #: 2 of 10    Medicare/BCBS Only   Total Timed Codes (min):  40 1:1 Treatment Time:  40       Treatment Area: Neck pain [M54.2]    SUBJECTIVE  Pain Level (0-10 scale): 4  Any medication changes, allergies to medications, adverse drug reactions, diagnosis change, or new procedure performed?: [x] No    [] Yes (see summary sheet for update)  Subjective functional status/changes:   [] No changes reported  I'm a little sore from doing the exercises, but it's a good sore.      OBJECTIVE    Modality rationale: decrease pain and increase tissue extensibility to improve the patients ability to reduce soreness after exercises   Min Type Additional Details   10 [x] Estim:  [x]Unatt       []IFC  [x]Premod                        []Other:  []w/ice   [x]w/heat  Position:seated  Location:bilateral neck    [] Estim: []Att    []TENS instruct  []NMES                    []Other:  []w/US   []w/ice   []w/heat  Position:  Location:    []  Traction: [] Cervical       []Lumbar                       [] Prone          []Supine                       []Intermittent   []Continuous Lbs:  [] before manual  [] after manual    []  Ultrasound: []Continuous   [] Pulsed                           []1MHz   []3MHz W/cm2:  Location:    []  Iontophoresis with dexamethasone         Location: [] Take home patch   [] In clinic    []  Ice     []  heat  []  Ice massage  []  Laser   []  Anodyne Position:  Location:    []  Laser with stim  []  Other:  Position:  Location:    []  Vasopneumatic Device Pressure:       [] lo [] med [] hi   Temperature: [] lo [] med [] hi   [] Skin assessment post-treatment:  []intact []redness- no adverse reaction    []redness - adverse reaction:     25 min Neuromuscular Re-education:  [x]  See flow sheet :   Rationale: increase strength, improve coordination and increase proprioception  to improve the patients ability to improve cervical stability, deep neck activation in order to improve ease of job tasks    15 min Manual Therapy:  Supine SOR, STM to bilateral cervical paraspinals/Left SCM/scalenes, manual cervical rotation and lateral flexion/flexion stretching with holds   Rationale: decrease pain, increase ROM, increase tissue extensibility and decrease trigger points to improve ease of turning head       With   [] TE   [] TA   [] neuro   [] other: Patient Education: [x] Review HEP    [] Progressed/Changed HEP based on:   [] positioning   [] body mechanics   [] transfers   [] heat/ice application    [] other:      Other Objective/Functional Measures: initiated treatment per flow sheet     Pain Level (0-10 scale) post treatment: 2    ASSESSMENT/Changes in Function: Patient demonstrates improved cervical mobility after initiating home exercise routine. No significant pain with initiating routine this morning, and Patient demonstrates good overall stability. Will progress as able. Patient will continue to benefit from skilled PT services to modify and progress therapeutic interventions, address functional mobility deficits, address ROM deficits, address strength deficits, analyze and address soft tissue restrictions, analyze and cue movement patterns, analyze and modify body mechanics/ergonomics, assess and modify postural abnormalities and instruct in home and community integration to attain remaining goals. []  See Plan of Care  []  See progress note/recertification  []  See Discharge Summary         Progress towards goals / Updated goals:  Short Term Goals: To be accomplished in 1 weeks:  Goal: Patient will be independent and compliant with HEP in order to progress toward long term goals.   Status at last note/certification: issued and reviewed  Current status: met  Long Term Goals: To be accomplished in 10 treatments:  Goal: Patient will improve FOTO assessment score to 69 pts in order to indicate improved functional abilities. Status at last note/certification: 59 pts  Goal: Patient will improve Left cervical rotation AROM by 20 deg and bilateral lateral flexion AROM by 10 deg in order to improve ease of driving and job tasks. Status at last note/certification: Left rotation 35 deg, lateral flexion 19 deg bilaterally  Goal: Patient will report worst neck pain as 5/10 or less in order to be able to perform moderate activities around the home without limitation. Status at last note/certification: 1/13  Goal: Patient will report overall at least 60% improvement in function in order to resume premorbid activities and quality of life.   Status at last note/certification: n/a    PLAN  []  Upgrade activities as tolerated     [x]  Continue plan of care  []  Update interventions per flow sheet       []  Discharge due to:_  []  Other:_      Wayne Waldron, PT 11/26/2019  8:15 AM    Future Appointments   Date Time Provider Christi Willis   11/26/2019  8:15 AM Shikha Flores, PT [facility]   12/2/2019  7:30 AM Yohana Fuentes, PT [facility]   12/4/2019  3:30 PM Yohana Fuentes, PT [facility]   12/9/2019  3:30 PM Shikha Flores, [facility]   12/12/2019  3:30 PM [facility]   12/16/2019 12:00 PM Shikha Flores, [facility]   12/18/2019  3:30 PM [facility]   12/23/2019  3:30 PM Shikha Flores, [facility]   12/26/2019  9:55 AM Malia Lozada  [address]

## 2019-12-02 ENCOUNTER — HOSPITAL ENCOUNTER (OUTPATIENT)
Dept: PHYSICAL THERAPY | Age: 49
Discharge: HOME | End: 2019-12-02
Payer: COMMERCIAL

## 2019-12-02 PROCEDURE — 97140 MANUAL THERAPY 1/> REGIONS: CPT

## 2019-12-02 PROCEDURE — 97112 NEUROMUSCULAR REEDUCATION: CPT

## 2019-12-02 PROCEDURE — 97014 ELECTRIC STIMULATION THERAPY: CPT

## 2019-12-02 NOTE — PROGRESS NOTES
PT DAILY TREATMENT NOTE 10-18    Patient Name: Shannen Rader  Date:2019  : 1970  [x]  Patient  Verified  Payor: BLUE CROSS / Plan: [employer] [facility][1] [place] / Product Type: PPO /    In time:7:32  Out time:8:20  Total Treatment Time (min): 48  Visit #: 3 of 10    Medicare/BCBS Only   Total Timed Codes (min): 38  1:1 Treatment Time: 38         Treatment Area: Neck pain [M54.2]    SUBJECTIVE  Pain Level (0-10 scale): 7/10  Any medication changes, allergies to medications, adverse drug reactions, diagnosis change, or new procedure performed?: [x] No    [] Yes (see summary sheet for update)  Subjective functional status/changes:   [] No changes reported  \"I was camping all last week and I felt good but I woke up this morning and I felt really sore in my neck down into the traps. It's pretty tight. I didn't do anything strenuous, though. \"    OBJECTIVE    Modality rationale: decrease pain and increase tissue extensibility to improve the patients ability to reduce soreness after exercises   Min Type Additional Details   10 [x] Estim:  [x]Unatt       [x]IFC  []Premod                        []Other:  []w/ice   [x]w/heat  Position: prone  Location: B upper trapezius, rhomboids    [] Estim: []Att    []TENS instruct  []NMES                    []Other:  []w/US   []w/ice   []w/heat  Position:  Location:    []  Traction: [] Cervical       []Lumbar                       [] Prone          []Supine                       []Intermittent   []Continuous Lbs:  [] before manual  [] after manual    []  Ultrasound: []Continuous   [] Pulsed                           []1MHz   []3MHz W/cm2:  Location:    []  Iontophoresis with dexamethasone         Location: [] Take home patch   [] In clinic    []  Ice     []  heat  []  Ice massage  []  Laser   []  Anodyne Position:  Location:    []  Laser with stim  []  Other:  Position:  Location:    []  Vasopneumatic Device Pressure:       [] lo [] med [] hi   Temperature: [] lo [] med [] hi   [x] Skin assessment post-treatment:  [x]intact []redness- no adverse reaction    []redness - adverse reaction:     23 min Neuromuscular Re-education:  [x]  See flow sheet :   Rationale: increase strength, improve coordination and increase proprioception  to improve the patients ability to improve postural awareness, deep neck flexor strength, and scapular stability    15 min Manual Therapy:  Supine SOR; STM to B upper trapezius, C/S paraspinals; manual stretching into cervical flexion, rotation with contract-relax, lateral flexion   Rationale: decrease pain, increase ROM, increase tissue extensibility and decrease trigger points to increase ease turning head from left to right       With   [] TE   [] TA   [x] neuro   [] other: Patient Education: [x] Review HEP    [x] Progressed/Changed HEP based on: supine T's and ER with TBand for postural correction  [] positioning   [] body mechanics   [] transfers   [] heat/ice application    [] other:      Other Objective/Functional Measures: Continued with exercises per flow sheet. Verbal cues given for proper technique of all exercises. Pain Level (0-10 scale) post treatment: 3/10    ASSESSMENT/Changes in Function: Pt given updated HEP for scapular stabilization in supine as Pt continues to maintain forward neck and rounded shoulder posture. Pt reported 100% understanding of updated HEP. Pt also given OTB to use. Pt able to report relief with therapy measures and modalities, noting decreased pain at end of session. Patient will continue to benefit from skilled PT services to modify and progress therapeutic interventions, address functional mobility deficits, address ROM deficits, address strength deficits, analyze and address soft tissue restrictions, analyze and cue movement patterns, analyze and modify body mechanics/ergonomics, assess and modify postural abnormalities and instruct in home and community integration to attain remaining goals.      []  See Plan of Care  []  See progress note/recertification  []  See Discharge Summary         Progress towards goals / Updated goals:  Short Term Goals: To be accomplished in 1 weeks:  Goal: Patient will be independent and compliant with HEP in order to progress toward long term goals. Status at last note/certification: issued and reviewed  Current status: met - Pt reports compliance   Long Term Goals: To be accomplished in 10 treatments:  Goal: Patient will improve FOTO assessment score to 69 pts in order to indicate improved functional abilities. Status at last note/certification: 59 pts  Current status: reassess at MD note  Goal: Patient will improve Left cervical rotation AROM by 20 deg and bilateral lateral flexion AROM by 10 deg in order to improve ease of driving and job tasks. Status at last note/certification: Left rotation 35 deg, lateral flexion 19 deg bilaterally  Current status:  Goal: Patient will report worst neck pain as 5/10 or less in order to be able to perform moderate activities around the home without limitation. Status at last note/certification: 1/60  Current status:  Goal: Patient will report overall at least 60% improvement in function in order to resume premorbid activities and quality of life.   Status at last note/certification: n/a  Current status:    PLAN  []  Upgrade activities as tolerated     [x]  Continue plan of care  []  Update interventions per flow sheet       []  Discharge due to:_  []  Other:_      Kiana Fuentes, PT 12/2/2019  8:15 AM    Future Appointments   Date Time Provider Christi Willis   12/4/2019  3:30 PM Kiana Fuentes, PT [facility]   12/9/2019  3:30 PM Yazmin Gooden, PT [facility]   12/12/2019  3:30 PM [facility]   12/16/2019 12:00 PM Yazmin Gooden, PT [facility]   12/18/2019  3:30 PM [facility]   12/23/2019  3:30 PM Yazmin Gooden, PT [facility]   12/26/2019  9:55 AM Vanessa Donaldson MD [place] [address]

## 2019-12-04 ENCOUNTER — HOSPITAL ENCOUNTER (OUTPATIENT)
Dept: PHYSICAL THERAPY | Age: 49
Discharge: HOME | End: 2019-12-04
Payer: COMMERCIAL

## 2019-12-04 PROCEDURE — 97140 MANUAL THERAPY 1/> REGIONS: CPT

## 2019-12-04 PROCEDURE — 97112 NEUROMUSCULAR REEDUCATION: CPT

## 2019-12-04 PROCEDURE — 97014 ELECTRIC STIMULATION THERAPY: CPT

## 2019-12-04 NOTE — PROGRESS NOTES
PT DAILY TREATMENT NOTE 10-18    Patient Name: Shannen Rader  Date:2019  : 1970  [x]  Patient  Verified  Payor: BLUE CROSS / Plan: [employer] [facility] / Product Type: PPO /    In time:3:30  Out time:4:25  Total Treatment Time (min): 55  Visit #: 4 of 10    Medicare/BCBS Only   Total Timed Codes (min): 45  1:1 Treatment Time: 45         Treatment Area: Neck pain [M54.2]    SUBJECTIVE  Pain Level (0-10 scale): 4/10  Any medication changes, allergies to medications, adverse drug reactions, diagnosis change, or new procedure performed?: [x] No    [] Yes (see summary sheet for update)  Subjective functional status/changes:   [] No changes reported  \"I was hurting all day after I was here last time but I think it was because I was sleeping on a different pillow from when I was camping last week. I used Salonpas all around my neck and the next day I was back to baseline, so I feel better today. \"    OBJECTIVE    Modality rationale: decrease pain and increase tissue extensibility to improve the patients ability to reduce soreness after exercises   Min Type Additional Details   10 [x] Estim:  [x]Unatt       [x]IFC  []Premod                        []Other:  []w/ice   [x]w/heat  Position: prone  Location: B upper trapezius, rhomboids    [] Estim: []Att    []TENS instruct  []NMES                    []Other:  []w/US   []w/ice   []w/heat  Position:  Location:    []  Traction: [] Cervical       []Lumbar                       [] Prone          []Supine                       []Intermittent   []Continuous Lbs:  [] before manual  [] after manual    []  Ultrasound: []Continuous   [] Pulsed                           []1MHz   []3MHz W/cm2:  Location:    []  Iontophoresis with dexamethasone         Location: [] Take home patch   [] In clinic    []  Ice     []  heat  []  Ice massage  []  Laser   []  Anodyne Position:  Location:    []  Laser with stim  []  Other:  Position:  Location:    []  Vasopneumatic Device Pressure:       [] lo [] med [] hi   Temperature: [] lo [] med [] hi   [x] Skin assessment post-treatment:  [x]intact []redness- no adverse reaction    []redness - adverse reaction:     15 min Neuromuscular Re-education:  [x]  See flow sheet :   Rationale: increase strength, improve coordination and increase proprioception  to improve the patients ability to improve postural awareness, deep neck flexor strength, and scapular stability    25 min Manual Therapy:  Supine SOR; STM to B upper trapezius, C/S paraspinals; manual stretching into B cervical sidebending; manual cervical distraction with gentle retractions, extension with mild rotations    Rationale: decrease pain, increase ROM, increase tissue extensibility and decrease trigger points to increase ease turning head from left to right       With   [] TE   [] TA   [x] neuro   [] other: Patient Education: [x] Review HEP    [x] Progressed/Changed HEP based on: supine T's and ER with TBand for postural correction  [] positioning   [] body mechanics   [] transfers   [] heat/ice application    [] other:      Other Objective/Functional Measures: Continued with exercises per flow sheet. Pain Level (0-10 scale) post treatment: 0/10    ASSESSMENT/Changes in Function: Pt continues to exhibit FWD head, chin tucked posture with minimal cervical rotation but use of whole body when needing to look left and right. Pt exhibited improved cervical mobility after manual interventions with improved posture and cervical rotation AROM. Patient will continue to benefit from skilled PT services to modify and progress therapeutic interventions, address functional mobility deficits, address ROM deficits, address strength deficits, analyze and address soft tissue restrictions, analyze and cue movement patterns, analyze and modify body mechanics/ergonomics, assess and modify postural abnormalities and instruct in home and community integration to attain remaining goals.      [] See Plan of Care  []  See progress note/recertification  []  See Discharge Summary         Progress towards goals / Updated goals:  Short Term Goals: To be accomplished in 1 weeks:  Goal: Patient will be independent and compliant with HEP in order to progress toward long term goals. Status at last note/certification: issued and reviewed  Current status: met - Pt reports compliance   Long Term Goals: To be accomplished in 10 treatments:  Goal: Patient will improve FOTO assessment score to 69 pts in order to indicate improved functional abilities. Status at last note/certification: 59 pts  Current status: reassess at MD note  Goal: Patient will improve Left cervical rotation AROM by 20 deg and bilateral lateral flexion AROM by 10 deg in order to improve ease of driving and job tasks. Status at last note/certification: Left rotation 35 deg, lateral flexion 19 deg bilaterally  Current status:  Goal: Patient will report worst neck pain as 5/10 or less in order to be able to perform moderate activities around the home without limitation. Status at last note/certification: 6/25  Current status:  Goal: Patient will report overall at least 60% improvement in function in order to resume premorbid activities and quality of life.   Status at last note/certification: n/a  Current status: reassess at MD note    PLAN  [x]  Upgrade activities as tolerated     [x]  Continue plan of care  []  Update interventions per flow sheet       []  Discharge due to:_  []  Other:_      Ellie Fuentes, PT 12/4/2019  8:15 AM    Future Appointments   Date Time Provider Christi Willis   12/9/2019  3:30 PM Yohana Strauss, PT [facility]   12/12/2019  3:30 PM Ervin Milligan [facility]   12/16/2019 12:00 PM Yohana Strauss, PT [facility]   12/18/2019  3:30 PM Rock Joiner [facility]   12/23/2019  3:30 PM Yohana Strauss, PT [facility]   12/26/2019  9:55 AM Julio Cesar Chaudhry  [address]

## 2019-12-06 DIAGNOSIS — M62.838 NECK MUSCLE SPASM: ICD-10-CM

## 2019-12-09 ENCOUNTER — HOSPITAL ENCOUNTER (OUTPATIENT)
Dept: PHYSICAL THERAPY | Age: 49
Discharge: HOME | End: 2019-12-09
Payer: COMMERCIAL

## 2019-12-09 PROCEDURE — 97014 ELECTRIC STIMULATION THERAPY: CPT

## 2019-12-09 PROCEDURE — 97112 NEUROMUSCULAR REEDUCATION: CPT

## 2019-12-09 PROCEDURE — 97140 MANUAL THERAPY 1/> REGIONS: CPT

## 2019-12-09 NOTE — PROGRESS NOTES
PT DAILY TREATMENT NOTE 10-18    Patient Name: Emilee Ordonez  Date:2019  : 1970  [x]  Patient  Verified  Payor: BLUE CROSS / [facility] / Product Type: PPO /    In time:315  Out time:417  Total Treatment Time (min): 62  Visit #: 5 of 10    Medicare/BCBS Only   Total Timed Codes (min):  52 1:1 Treatment Time:  52       Treatment Area: Neck pain [M54.2]    SUBJECTIVE  Pain Level (0-10 scale): 4  Any medication changes, allergies to medications, adverse drug reactions, diagnosis change, or new procedure performed?: [x] No    [] Yes (see summary sheet for update)  Subjective functional status/changes:   [] No changes reported  Usually the night and day after I feel worse but then it gets better. Today someone dropped a book and I turned my head quickly and my pain went up to an 8/10.      OBJECTIVE    Modality rationale: decrease pain and increase tissue extensibility to improve the patients ability to reduce neck soreness after exercises    Min Type Additional Details   10 [x] Estim:  [x]Unatt       [x]IFC  []Premod                        []Other:  []w/ice   [x]w/heat  Position:prone  Location:Bilateral UTs/ rhomboids     [] Estim: []Att    []TENS instruct  []NMES                    []Other:  []w/US   []w/ice   []w/heat  Position:  Location:    []  Traction: [] Cervical       []Lumbar                       [] Prone          []Supine                       []Intermittent   []Continuous Lbs:  [] before manual  [] after manual    []  Ultrasound: []Continuous   [] Pulsed                           []1MHz   []3MHz W/cm2:  Location:    []  Iontophoresis with dexamethasone         Location: [] Take home patch   [] In clinic    []  Ice     []  heat  []  Ice massage  []  Laser   []  Anodyne Position:  Location:    []  Laser with stim  []  Other:  Position:  Location:    []  Vasopneumatic Device Pressure:       [] lo [] med [] hi   Temperature: [] lo [] med [] hi   [] Skin assessment post-treatment:  []intact []redness- no adverse reaction    []redness - adverse reaction:     29 min Neuromuscular Re-education:  [x]  See flow sheet :   Rationale: increase strength, improve coordination and increase proprioception  to improve the patients ability to improve cervical and scapular stability for improved postural awareness    23 min Manual Therapy:  Supine SOR, STM bilateral cervical paraspinals/scalenes/SCMs/UTs, manual cervical distraction with rotation/lateral flexion, manual cervical flexion stretch into deep flexion, prone STM to bilateral thoracic paraspinals/UTs/levator scapulae    Rationale: decrease pain, increase ROM, increase tissue extensibility and decrease trigger points to improve ease of turning head with daily tasks      With   [] TE   [] TA   [] neuro   [] other: Patient Education: [x] Review HEP    [] Progressed/Changed HEP based on:   [] positioning   [] body mechanics   [] transfers   [] heat/ice application    [] other:      Other Objective/Functional Measures: Progressed plinth activities to Oov     Pain Level (0-10 scale) post treatment: 2    ASSESSMENT/Changes in Function: Patient continues to report no real change in status since starting therapy, and continues to have tenderness and restrictions in the cervical as well as thoracic/scapular musculature. Today she is very tender in the T3/T4 region on the Left during manual interventions. Decent stability with Oov activities and no increased pain. We will continue to progress as able up until MD follow up 12/26/19.      Patient will continue to benefit from skilled PT services to modify and progress therapeutic interventions, address functional mobility deficits, address ROM deficits, address strength deficits, analyze and address soft tissue restrictions, analyze and cue movement patterns, analyze and modify body mechanics/ergonomics, assess and modify postural abnormalities, address imbalance/dizziness and instruct in home and community integration to attain remaining goals. []  See Plan of Care  []  See progress note/recertification  []  See Discharge Summary         Progress towards goals / Updated goals:  Short Term Goals: To be accomplished in 1 weeks:  Goal: Patient will be independent and compliant with HEP in order to progress toward long term goals. Status at last note/certification: issued and reviewed  Current status: met - Pt reports compliance   Long Term Goals: To be accomplished in 10 treatments:  Goal: Patient will improve FOTO assessment score to 69 pts in order to indicate improved functional abilities. Status at last note/certification: 59 pts  Current status: reassess at MD note  Goal: Patient will improve Left cervical rotation AROM by 20 deg and bilateral lateral flexion AROM by 10 deg in order to improve ease of driving and job tasks. Status at last note/certification: Left rotation 35 deg, lateral flexion 19 deg bilaterally  Current status:  Goal: Patient will report worst neck pain as 5/10 or less in order to be able to perform moderate activities around the home without limitation. Status at last note/certification: 1/10  Current status:  Goal: Patient will report overall at least 60% improvement in function in order to resume premorbid activities and quality of life.   Status at last note/certification: n/a  Current status: reassess at MD note    PLAN  [x]  Upgrade activities as tolerated     [x]  Continue plan of care  []  Update interventions per flow sheet       []  Discharge due to:_  []  Other:_      Kailee Boss PT 12/9/2019  3:30 PM    Future Appointments   Date Time Provider Christi Willis   12/9/2019  3:30 PM Loreen Fothergill, PT [facility]   12/12/2019  3:30 PM [facility]   12/16/2019 12:00 PM Loreen Fothergill, PT [facility]   12/18/2019  3:30 PM [facility]   12/23/2019  3:30 PM Loreen Fothergill, PT [facility]   12/26/2019  9:55 AM Juan C Sims  [address]

## 2019-12-10 DIAGNOSIS — M25.511 CHRONIC RIGHT SHOULDER PAIN: ICD-10-CM

## 2019-12-10 DIAGNOSIS — G89.29 CHRONIC RIGHT SHOULDER PAIN: ICD-10-CM

## 2019-12-10 DIAGNOSIS — M53.3 COCCYX PAIN: ICD-10-CM

## 2019-12-10 DIAGNOSIS — M54.2 NECK PAIN: Primary | ICD-10-CM

## 2019-12-10 RX ORDER — TRAMADOL HYDROCHLORIDE 50 MG/1
50 TABLET ORAL
Qty: 120 TAB | Refills: 0 | Status: SHIPPED | OUTPATIENT
Start: 2019-12-10 | End: 2020-01-27

## 2019-12-10 RX ORDER — METHOCARBAMOL 750 MG/1
TABLET, FILM COATED ORAL
Qty: 60 TAB | Refills: 1 | Status: SHIPPED | OUTPATIENT
Start: 2019-12-10 | End: 2020-01-27

## 2019-12-12 ENCOUNTER — HOSPITAL ENCOUNTER (OUTPATIENT)
Dept: PHYSICAL THERAPY | Age: 49
Discharge: HOME | End: 2019-12-12
Payer: COMMERCIAL

## 2019-12-12 PROCEDURE — 97014 ELECTRIC STIMULATION THERAPY: CPT

## 2019-12-12 PROCEDURE — 97140 MANUAL THERAPY 1/> REGIONS: CPT

## 2019-12-12 PROCEDURE — 97112 NEUROMUSCULAR REEDUCATION: CPT

## 2019-12-12 NOTE — PROGRESS NOTES
PT DAILY TREATMENT NOTE 10-18    Patient Name: William Kimbrough  Date:2019  : 1970  [x]  Patient  Verified  Payor: BLUE CROSS / Plan: [employer] [facility] / Product Type: PPO /    In time:3:25  Out time:4:25  Total Treatment Time (min): 60  Visit #: 6 of 10    Medicare/BCBS Only   Total Timed Codes (min):  50 1:1 Treatment Time:  50       Treatment Area: Neck pain [M54.2]    SUBJECTIVE  Pain Level (0-10 scale): 4  Any medication changes, allergies to medications, adverse drug reactions, diagnosis change, or new procedure performed?: [x] No    [] Yes (see summary sheet for update)  Subjective functional status/changes:   [] No changes reported  \"It is still feeling about the same honestly, better when I leave and then it gets worse at night. \"    OBJECTIVE    Modality rationale: decrease pain to improve the patients ability to relax and sleep following therapy session to improve restorative sleep patterns.     Min Type Additional Details   10 [x] Estim:  [x]Unatt       [x]IFC  []Premod                        []Other:  []w/ice   []w/heat  Position: prone  Location: upper back at rhomboid/ interscapular area    [] Estim: []Att    []TENS instruct  []NMES                    []Other:  []w/US   []w/ice   []w/heat  Position:  Location:    []  Traction: [] Cervical       []Lumbar                       [] Prone          []Supine                       []Intermittent   []Continuous Lbs:  [] before manual  [] after manual    []  Ultrasound: []Continuous   [] Pulsed                           []1MHz   []3MHz W/cm2:  Location:    []  Iontophoresis with dexamethasone         Location: [] Take home patch   [] In clinic    []  Ice     []  heat  []  Ice massage  []  Laser   []  Anodyne Position:   Location:     []  Laser with stim  []  Other:  Position:  Location:    []  Vasopneumatic Device Pressure:       [] lo [] med [] hi   Temperature: [] lo [] med [] hi   [x] Skin assessment post-treatment:  [x]intact []redness- no adverse reaction    []redness - adverse reaction:       20 min Neuromuscular Re-education:  [x]  See flow sheet :   Rationale: increase ROM, increase strength, improve coordination, improve balance and increase proprioception  to improve the patients ability to perform overhead functional lifts with improved scapular stabilization and neutral cervical posture. 25 min Manual Therapy:  Supine SOR, STM bilateral cervical paraspinals,scalenes, UTs; manual cervical distraction with flexion; manual stretching into right lateral flexion with some hold relax at end range;  prone STM to bilateral thoracic paraspinals/UTs/levator scapulae; unilateral PAIVMs to C5-6 segments   Rationale: decrease pain, increase ROM, increase tissue extensibility, decrease trigger points and increase postural awareness to improve ease of ADLs in the home environment. With   [] TE   [] TA   [x] neuro   [] other: Patient Education: [x] Review HEP    [] Progressed/Changed HEP based on:   [] positioning   [] body mechanics   [] transfers   [] heat/ice application    [] other:      Other Objective/Functional Measures:  left rotation 41 deg, lateral flexion 22 deg      Pain Level (0-10 scale) post treatment: 2    ASSESSMENT/Changes in Function: Some increased tautness and reports of pain in bilateral paraspinals at the cervicothoracic junction. Overall the patient reports little symptom relief from therapy interventions although her range of motion has improved slightly since [facility].     Patient will continue to benefit from skilled PT services to modify and progress therapeutic interventions, address functional mobility deficits, address ROM deficits, address strength deficits, analyze and address soft tissue restrictions, analyze and cue movement patterns, analyze and modify body mechanics/ergonomics, assess and modify postural abnormalities, address imbalance/dizziness and instruct in home and community integration to attain remaining goals. []  See Plan of Care  []  See progress note/recertification  []  See Discharge Summary         Progress towards goals / Updated goals:  Short Term Goals: To be accomplished in 1 weeks:  Goal: Patient will be independent and compliant with HEP in order to progress toward long term goals. Status at last note/certification: issued and reviewed  Current status: met - Pt reports compliance   Long Term Goals: To be accomplished in 10 treatments:  Goal: Patient will improve FOTO assessment score to 69 pts in order to indicate improved functional abilities. Status at last note/certification: 59 pts  Current status: reassess at MD note  Goal: Patient will improve Left cervical rotation AROM by 20 deg and bilateral lateral flexion AROM by 10 deg in order to improve ease of driving and job tasks. Status at last note/certification: Left rotation 35 deg, lateral flexion 19 deg bilaterally  Current status: progressing, left rotation 41 deg, lateral flexion 22 deg bilaterally  Goal: Patient will report worst neck pain as 5/10 or less in order to be able to perform moderate activities around the home without limitation. Status at last note/certification: 1/42  Current status: not met, 8-9/10  Goal: Patient will report overall at least 60% improvement in function in order to resume premorbid activities and quality of life.   Status at last note/certification: n/a  Current status: reassess at MD note    PLAN  [x]  Upgrade activities as tolerated     [x]  Continue plan of care  []  Update interventions per flow sheet       []  Discharge due to:_  []  Other:_      Seven Lawrence 12/12/2019  1:54 PM    Future Appointments   Date Time Provider Christi Willis   12/12/2019  3:30 PM [facility]   12/16/2019 12:00 PM Heavenly Zuniga PT [facility]   12/18/2019  3:30 PM Myrna Aaron [facility]   12/23/2019  3:30 PM Heavenly Zuniga PT [facility]   12/26/2019  9:55 AM Karyn Peñaloza Roque Smiht  [address]

## 2019-12-16 ENCOUNTER — HOSPITAL ENCOUNTER (OUTPATIENT)
Dept: PHYSICAL THERAPY | Age: 49
Discharge: HOME | End: 2019-12-16
Payer: COMMERCIAL

## 2019-12-16 PROCEDURE — 97530 THERAPEUTIC ACTIVITIES: CPT

## 2019-12-16 NOTE — PROGRESS NOTES
Physical Therapy Discharge Instructions      [facility] - [facility], [address] [place] [address]  [phone] fax    Patient: Sergio Leavelizabeth  : 1970      Continue Home Exercise Program  times per day for  weeks, then decrease to  times per week      Continue with    [] Ice  as needed  times per day     [] Heat           Follow up with MD:     [x] Upon completion of therapy     [] As needed      Recommendations:     []   Return to activity with home program    []   Return to activity with the following modifications:       []Post Rehab Program    []Join Independent aquatic program     []Return to/join local gym    Additional Comments: Follow up with your doctor next week (as scheduled) and follow his recommendations.      Finn Alexis, PT 2019 12:19 PM

## 2019-12-16 NOTE — PROGRESS NOTES
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    Visits from [place] of Care: 7    Missed Visits: 0    Reporting Period : 19 to 19    Date:2019  : 1970  [x]  Patient  Verified  Payor: BLUE CROSS / Plan: [employer] / Product Type: PPO /    In time:1148  Out time:1201  Total Treatment Time (min): 13  Visit #: 7 of 10    Medicare/BCBS Only   Total Timed Codes (min):  13 1:1 Treatment Time:  13       SUBJECTIVE  Pain Level (0-10 scale): 3  Any medication changes, allergies to medications, adverse drug reactions, diagnosis change, or new procedure performed?: [x] No    [] Yes (see summary sheet for update)  Subjective functional status/changes:   [] No changes reported  The day after I'm here I feel worse and I can hardly move, then the next day it's back to where it was, but not better.      OBJECTIVE    Modality rationale:    Min Type Additional Details    [] Estim:  []Unatt       []IFC  []Premod                        []Other:  []w/ice   []w/heat  Position:  Location:    [] Estim: []Att    []TENS instruct  []NMES                    []Other:  []w/US   []w/ice   []w/heat  Position:  Location:    []  Traction: [] Cervical       []Lumbar                       [] Prone          []Supine                       []Intermittent   []Continuous Lbs:  [] before manual  [] after manual    []  Ultrasound: []Continuous   [] Pulsed                           []1MHz   []3MHz W/cm2:  Location:    []  Iontophoresis with dexamethasone         Location: [] Take home patch   [] In clinic    []  Ice     []  heat  []  Ice massage  []  Laser   []  Anodyne Position:  Location:    []  Laser with stim  []  Other:  Position:  Location:    []  Vasopneumatic Device Pressure:       [] lo [] med [] hi   Temperature: [] lo [] med [] hi   [] Skin assessment post-treatment:  []intact []redness- no adverse reaction    []redness - adverse reaction:     13 min Therapeutic Activity:  [x] See flow sheet :goal reassessment   Rationale: increase ROM and increase strength to improve the patients ability to improve ease of ADLs       With   [] TE   [] TA   [] neuro   [] other: Patient Education: [x] Review HEP    [] Progressed/Changed HEP based on:   [] positioning   [] body mechanics   [] transfers   [] heat/ice application    [] other:      Other Objective/Functional Measures: see goals     Pain Level (0-10 scale) post treatment: 3    Summary of Care:  Short Term Goals: To be accomplished in 1 weeks:  Goal: Patient will be independent and compliant with HEP in order to progress toward long term goals. Status at last note/certification: issued and reviewed  Current status: met - Pt reports compliance   Long Term Goals: To be accomplished in 10 treatments:  Goal: Patient will improve FOTO assessment score to 69 pts in order to indicate improved functional abilities. Status at last note/certification: 59 pts  Current status: not met, 55 pts  Goal: Patient will improve Left cervical rotation AROM by 20 deg and bilateral lateral flexion AROM by 10 deg in order to improve ease of driving and job tasks.   Status at last note/certification: Left rotation 35 deg, lateral flexion 19 deg bilaterally  Current status: progressing, left rotation 40 deg, lateral flexion 20 deg bilaterally  Goal: Patient will report worst neck pain as 5/10 or less in order to be able to perform moderate activities around the home without limitation. Status at last note/certification: 9/34  Current status: not met, remains 8-9/10  Goal: Patient will report overall at least 60% improvement in function in order to resume premorbid activities and quality of life. Status at last note/certification: n/a  Current status: not met, no overall change    ASSESSMENT/Changes in Function: Patient has consistently attended therapy for neck pain, brought on by a manipulation earlier this year at another location. She does have some gains in cervical AROM, but overall reports no improvement in function with manual interventions and stability exercises at therapy. She states that during exercises she feels better, but has significant increased pain the days after therapy, leading to difficulty with driving and attending social events. Due to limited overall change, and increased pain following therapy sessions, we will discharge at this time. MD follow up next week. Recommend further testing as appropriate.       Thank you for this referral!      PLAN  [x]Discontinue therapy: []Patient has reached or is progressing toward set goals      []Patient is non-compliant or has abdicated      [x]Due to lack of appreciable progress towards set goals    Barber Chang, PT 12/16/2019  11:52 AM

## 2019-12-18 ENCOUNTER — APPOINTMENT (OUTPATIENT)
Dept: PHYSICAL THERAPY | Age: 49
End: 2019-12-18
Payer: COMMERCIAL

## 2019-12-26 ENCOUNTER — OFFICE VISIT (OUTPATIENT)
Dept: ORTHOPEDIC SURGERY | Age: 49
End: 2019-12-26

## 2019-12-26 VITALS
DIASTOLIC BLOOD PRESSURE: 84 MMHG | HEART RATE: 68 BPM | WEIGHT: 167 LBS | BODY MASS INDEX: 29.59 KG/M2 | OXYGEN SATURATION: 99 % | TEMPERATURE: 97.9 F | RESPIRATION RATE: 17 BRPM | SYSTOLIC BLOOD PRESSURE: 133 MMHG | HEIGHT: 63 IN

## 2019-12-26 DIAGNOSIS — M96.1 CERVICAL POSTLAMINECTOMY SYNDROME: ICD-10-CM

## 2019-12-26 DIAGNOSIS — M50.30 DDD (DEGENERATIVE DISC DISEASE), CERVICAL: Primary | ICD-10-CM

## 2019-12-26 DIAGNOSIS — M51.26 HNP (HERNIATED NUCLEUS PULPOSUS), LUMBAR: ICD-10-CM

## 2019-12-26 DIAGNOSIS — S16.1XXS STRAIN OF NECK MUSCLE, SEQUELA: ICD-10-CM

## 2019-12-26 NOTE — PROGRESS NOTES
[facility]  [address], [place]   Phone: [phone]  Fax: [phone]        PROGRESS NOTE      HISTORY OF PRESENT ILLNESS:  The patient is a 44 y.o. female and was seen today for follow up of neck pain extending into the bilateral (L>R) shoulders following PT manipulation on 6/26/19. Denies radicular sxs. She additionally endorses bilateral foot paraesthesias. Patient reports she had no neck pain after cervical surgery. Pain exacerbated with turning neck to the left. Pt has seen a chiropractor with no relief. Treated with Tramadol, Ibuprofen, and muscle relaxer with no relief. Pt completed MDP with no relief. Pt reports she has had an EMG done by Dr. James Marrero and also Rx her Neurontin. Pt denies dropping things or loss of balance. Pt is being followed by dermatologist for rash under her left breast. Pt denies fever, weight loss, or skin changes. The patient is RHD. PmHx IBS, left L5 hemilaminotomy, diskectomy (Dr. Tash Flaherty), right shoulder surgery x 2 (2/2019 and 5/2019), cervical surgery (Dr. Riya Velez, 2012). Last seen by me 6/3/16 with c/o low back and left hip pain into the LLE laterally and posteriorly to the foot. Did not tolerate TOPAMAX. Referred to Dr. Gavin Thomas for surgical evaluation. Note from Dr. Gavin Thomas dated 9/16/16 indicating patient was seen 6 weeks out from hemilaminotomy. Not having any pain. Return to physical activity. Note from Lynsey Ware dated 8/19/19 indicating patient was seen s/p right shoulder arthroscopic rotator cuff revision and manipulation under anesthesia on 5/13/19. Pt completed PT. Rated pain 0/10 at that time. Per pt, she had previous right shoulder surgery in February 2019. ER note from Dr. Angelica Sanchez dated 8/20/19 indicating patient presented for diffuse neck pain x 2 months after manipulation in PT. H/o prior cervical surgery. Diffuse muscle spasms, no pain to palpation. Normal strength.  Gave her Norco. 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 Continues with pediatry. Referred to us. Cervical spine XR dated 6/28/19 films not available. Per report, Anterior fusion C5-6. Degenerative disc disease C6-7. Unremarkable C1-C4. At her last clinical appointment, patient presented with c/o neck pain extending into the bilateral (L>R) shoulders following PT manipulation on 6/26/19. She additionally endorsed bilateral foot paraesthesias. My sense is that her foot pain is not related to her neck. I had the patient sign a release of medical information to obtain EMG results from Dr. Bharti Mckay. Consideration was given to starting her on MDP, however she just took it recently and it did not provide relief to her neck pain. I referred her to physical therapy with an emphasis on HEP.      The patient returns today with pain location and distribution remain unchanged. . she rates her pain 6/10, previously 2-8/10. She has completed PT (11/25/19- 12/16/19; [facility]) with minimal benefit. Pt noted that her pain level did not improve, and she felt it was worse the days following PT. However, she notes that her neck ROM has improved. BLE EMG performed by Dr. Bharti Mckay dated 9/12/19 reviewed. Per report, BLE sensorimotor polyneuropathy, axonal, moderate, right side slightly worse. There is no electrodiagnostic evidence of lumbosacral radiculopathy bilaterally. BUE EMG performed by Dr. Bharti Mckay dated 9/18/19 reviewed. Per report, BUE sensory polyneuropathy, axonal, very mild, left side slightly worse. There is no electrodiagnostic evidence of carpal tunnel syndrome or cervical radiculopathy bilaterally. Pt notes that she continues to be followed by her neurologist. Pt denies dropping things or loss of balance.  reviewed. Body mass index is 29.58 kg/m².       PCP: Ian Tovar NP      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    Thumb pain 6/7/2010        Social History     Socioeconomic History    Marital status:      Spouse name: Not on file    Number of children: Not on file    Years of education: Not on file    Highest education level: Not on file   Occupational History    Not on file   Social Needs    Financial resource strain: Not on file    Food insecurity:     Worry: Not on file     Inability: Not on file    Transportation needs:     Medical: Not on file     Non-medical: Not on file   Tobacco Use    Smoking status: Never Smoker    Smokeless tobacco: Never Used   Substance and Sexual Activity    Alcohol use: Yes     Comment: few times per week    Drug use: No    Sexual activity: Yes     Partners: Male     Comment: pregnancy test negative   Lifestyle    Physical activity:     Days per week: Not on file     Minutes per session: Not on file    Stress: Not on file   Relationships    Social connections:     Talks on phone: Not on file     Gets together: Not on file     Attends Mormon service: Not on file     Active member of club or organization: Not on file     Attends meetings of clubs or organizations: Not on file     Relationship status: Not on file    Intimate partner violence:     Fear of current or ex partner: Not on file     Emotionally abused: Not on file     Physically abused: Not on file     Forced sexual activity: Not on file   Other Topics Concern    Not on file   Social History Narrative    Not on file       Current Outpatient Medications   Medication Sig Dispense Refill    methocarbamol (ROBAXIN) 750 mg tablet TAKE 1 TABLET BY MOUTH FOUR TIMES DAILY. 60 Tab 1    traMADol (ULTRAM) 50 mg tablet Take 1 Tab by mouth every eight (8) hours as needed for Pain for up to 30 days.  Max Daily Amount: 150 mg. 120 Tab 0  methocarbamol (ROBAXIN) 750 mg tablet Take  by mouth four (4) times daily.  cyanocobalamin 1,000 mcg tablet Take 2,500 mcg by mouth daily.  cholecalciferol, vitamin D3, (VITAMIN D3 PO) Take 10,000 mg by mouth every seven (7) days.  multivitamin (ONE A DAY) tablet Take 1 Tab by mouth daily.  Mv,Ca,Min-FA-Herbal No.157 (ESTROVEN MAXIMUM STRENGTH) 400 mcg tab Take  by mouth.  azelastine-fluticasone (DYMISTA) 137-50 mcg/spray spry by Nasal route.  RANITIDINE HCL (ZANTAC PO) Take 150 mg by mouth daily.  esomeprazole (NEXIUM) 20 mg capsule Take 20 mg by mouth nightly.  diphenhydrAMINE (SIMPLY SLEEP) 25 mg tablet Take 25 mg by mouth nightly. Allergies   Allergen Reactions    Ciprofloxacin Diarrhea    Milk Containing Products Diarrhea    Pennsaid [Diclofenac Sodium] Rash    Sulfa (Sulfonamide Antibiotics) Rash    Other Medication Diarrhea     Eggs, patient eats, and gets flu shot yearly with no reaction          PHYSICAL EXAMINATION    Visit Vitals  /84 (BP 1 Location: Left arm, BP Patient Position: Sitting)   Pulse 68   Temp 97.9 °F (36.6 °C) (Oral)   Wt 167 lb (75.8 kg)   BMI 29.58 kg/m²       CONSTITUTIONAL: NAD, A&O x 3  SENSATION: Intact to light touch throughout  NEURO: Shahriar's is negative bilaterally. RANGE OF MOTION: The patient has full passive range of motion in all four extremities. Shoulder AB/Flex Elbow Flex Wrist Ext Elbow Ext Wrist Flex Hand Intrin Tone   Right +4/5 +4/5 +4/5 +4/5 +4/5 +4/5 +4/5   Left +4/5 +4/5 +4/5 +4/5 +4/5 +4/5 +4/5                 ASSESSMENT   Diagnoses and all orders for this visit:    1. DDD (degenerative disc disease), cervical    2. Strain of neck muscle, sequela    3. Cervical postlaminectomy syndrome    4. HNP (herniated nucleus pulposus), lumbar          IMPRESSION AND PLAN:  I will refer her for a cervical MRI with contrast for potential pre-surgical planning purposes. Patient is neurologically intact.  I will see the patient back following MRI or earlier if needed. Written by Phoenix Zhong, as dictated by Marie Kuhn MD  I examined the patient, reviewed and agree with the note.

## 2020-01-03 ENCOUNTER — HOSPITAL ENCOUNTER (OUTPATIENT)
Age: 50
Discharge: HOME | End: 2020-01-03
Attending: PHYSICAL MEDICINE & REHABILITATION
Payer: COMMERCIAL

## 2020-01-03 DIAGNOSIS — M50.30 DDD (DEGENERATIVE DISC DISEASE), CERVICAL: ICD-10-CM

## 2020-01-03 DIAGNOSIS — M96.1 CERVICAL POSTLAMINECTOMY SYNDROME: ICD-10-CM

## 2020-01-03 DIAGNOSIS — M51.26 HNP (HERNIATED NUCLEUS PULPOSUS), LUMBAR: ICD-10-CM

## 2020-01-03 DIAGNOSIS — S16.1XXS STRAIN OF NECK MUSCLE, SEQUELA: ICD-10-CM

## 2020-01-03 PROCEDURE — 74011636320 HC RX REV CODE- 636/320: Performed by: PHYSICAL MEDICINE & REHABILITATION

## 2020-01-03 PROCEDURE — A9575 INJ GADOTERATE MEGLUMI 0.1ML: HCPCS | Performed by: PHYSICAL MEDICINE & REHABILITATION

## 2020-01-03 PROCEDURE — 72156 MRI NECK SPINE W/O & W/DYE: CPT

## 2020-01-03 RX ADMIN — GADOTERATE MEGLUMINE 15 ML: 376.9 INJECTION INTRAVENOUS at 18:00

## 2020-01-22 NOTE — PROGRESS NOTES
[facility]  [address], [place]   Phone: [phone]  Fax: [phone]        PROGRESS NOTE      HISTORY OF PRESENT ILLNESS:  The patient is a 44 y.o. female and was seen today for follow up of neck pain extending into the bilateral (L>R) shoulders following PT manipulation on 6/26/19. Denies radicular sxs. She additionally endorses bilateral foot paraesthesias. Patient reports she had no neck pain after cervical surgery. Pain exacerbated with turning neck to the left. Pt has seen a chiropractor without relief. She completed PT (11/25/19- 12/16/19; [facility] with minimal benefit, noting her pain level did not improve, and she felt worse the days following; however, she reports improved ROM of her neck. Treated with Tramadol, Ibuprofen, and muscle relaxer without relief. Pt completed MDP with no relief. Pt reports she has had an EMG done by Dr. Pete Garsia and also Rx her Neurontin. Pt denies dropping things or loss of balance. Pt is being followed by dermatologist for rash under her left breast. Pt denies fever, weight loss, or skin changes. The patient is RHD. PmHx IBS, left L5 hemilaminotomy, diskectomy (Dr. Ozzy Verma), right shoulder surgery x 2 (2/2019 and 5/2019), cervical surgery (Dr. Chris Rock, 2012). Last seen by me 6/3/16 with c/o low back and left hip pain into the LLE laterally and posteriorly to the foot. Did not tolerate TOPAMAX. Referred to Dr. Mirella Slater for surgical evaluation. Note from Dr. Tierra Denise 9/16/16 indicating patient was seen 6 weeks out from hemilaminotomy. Not having any pain. Return to physical activity. Note from Deirdre Donald dated 8/19/19 indicating patient was seen s/p right shoulder arthroscopic rotator cuff revision and manipulation under anesthesia on 5/13/19. Pt completed PT. Rated pain 0/10 at that time.  Per pt, she had previous right shoulder surgery in February 2019. ER note from Dr. To Cotton 8/20/19 indicating patient presented for diffuse neck pain x 2 months after manipulation in PT. H/o prior cervical surgery. Diffuse muscle spasms, no pain to palpation. Normal strength. Gave her [place]. 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 Continues with pediatry. Referred to us. BLE EMG performed by Dr. Chance Jain dated 9/12/19 reviewed. Per report, BLE sensorimotor polyneuropathy, axonal, moderate, right side slightly worse. There is no electrodiagnostic evidence of lumbosacral radiculopathy bilaterally. BUE EMG performed by Dr. Chance Jain dated 9/18/19 reviewed. Per report, BUE sensory polyneuropathy, axonal, very mild, left side slightly worse. There is no electrodiagnostic evidence of carpal tunnel syndrome or cervical radiculopathy bilaterally. Cervical spine XR dated 6/28/19 films not available. Per report, Anterior fusion C5-6. Degenerative disc disease C6-7. Unremarkable C1-C4. At her last clinical appointment, I ordered a C Spine MRI with contrast for potential pre-surgical planning purposes.      The patient returns today with pain location and distribution remain unchanged. She rates her pain 2/10, previously 6/10. She reports in the last week she aggravated her neck and the pain shot up to a 8/10. Additionally, she describes intermittent paraesthesia in the right hand and wrist, not to the digits, in the mornings. She takes ibuprofen 600 mg, TID tramadol TID, and robaxin TID. She previously discontinued Neurontin 300 mg TID due to no improvement, but reports tolerating the medication. Per pt, Dr. Chance Jain recently told her she had a Vitamin D and Vitamin B12 deficiency, and is now taking supplements. Per pt, she previously failed TOPAMAX, and previously used MDP in 7/2019 for foot pain. Pt denies dropping things or loss of balance. Patient denies current use of antidepressants or blood thinners.   Patient denies h/o DM, gastric bypass, glaucoma or chemo. She will be going on a cruise in 3 weeks. C Spine MRI dated 1/3/2020 reviewed. Per report, progressive degenerative changes are seen at both native disc levels above and below the fusion level, predominantly at the C4/C5 disc space, where diffuse bulging of the central prominence is now causing some mild deformity of the cord subjacent, without associated signal abnormality. Likely stable generative changes at C6/C7. Central canal contents and paravertebral soft tissues unremarkable. Fusion hardware appears well anchored and orthotopic.  reviewed. Body mass index is 29.76 kg/m².       PCP: Emre Wong NP      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    Thumb pain 6/7/2010        Social History     Socioeconomic History    Marital status:      Spouse name: Not on file    Number of children: Not on file    Years of education: Not on file    Highest education level: Not on file   Occupational History    Not on file   Social Needs    Financial resource strain: Not on file    Food insecurity:     Worry: Not on file     Inability: Not on file    Transportation needs:     Medical: Not on file     Non-medical: Not on file   Tobacco Use    Smoking status: Never Smoker    Smokeless tobacco: Never Used   Substance and Sexual Activity    Alcohol use: Yes     Comment: few times per week    Drug use: No    Sexual activity: Yes     Partners: Male     Comment: pregnancy test negative   Lifestyle    Physical activity:     Days per week: Not on file     Minutes per session: Not on file    Stress: Not on file   Relationships    Social connections:     Talks on phone: Not on file     Gets together: Not on file     Attends Yazidi service: Not on file Active member of club or organization: Not on file     Attends meetings of clubs or organizations: Not on file     Relationship status: Not on file    Intimate partner violence:     Fear of current or ex partner: Not on file     Emotionally abused: Not on file     Physically abused: Not on file     Forced sexual activity: Not on file   Other Topics Concern    Not on file   Social History Narrative    Not on file       Current Outpatient Medications   Medication Sig Dispense Refill    methocarbamol (ROBAXIN) 750 mg tablet TAKE 1 TABLET BY MOUTH FOUR TIMES DAILY. 60 Tab 1    methocarbamol (ROBAXIN) 750 mg tablet Take  by mouth four (4) times daily.  cyanocobalamin 1,000 mcg tablet Take 2,500 mcg by mouth daily.  cholecalciferol, vitamin D3, (VITAMIN D3 PO) Take 10,000 mg by mouth every seven (7) days.  multivitamin (ONE A DAY) tablet Take 1 Tab by mouth daily.  Mv,Ca,Min-FA-Herbal No.157 (ESTROVEN MAXIMUM STRENGTH) 400 mcg tab Take  by mouth.  azelastine-fluticasone (DYMISTA) 137-50 mcg/spray spry by Nasal route.  RANITIDINE HCL (ZANTAC PO) Take 150 mg by mouth daily.  esomeprazole (NEXIUM) 20 mg capsule Take 20 mg by mouth nightly.  diphenhydrAMINE (SIMPLY SLEEP) 25 mg tablet Take 25 mg by mouth nightly. Allergies   Allergen Reactions    Ciprofloxacin Diarrhea    Milk Containing Products Diarrhea    Pennsaid [Diclofenac Sodium] Rash    Sulfa (Sulfonamide Antibiotics) Rash    Other Medication Diarrhea     Eggs, patient eats, and gets flu shot yearly with no reaction          PHYSICAL EXAMINATION    Visit Vitals  /70   Pulse 67   Ht 5' 3\" (1.6 m)   Wt 168 lb (76.2 kg)   BMI 29.76 kg/m²       CONSTITUTIONAL: NAD, A&O x 3  SENSATION: Intact to light touch throughout  NEURO: Shahriar's is negative bilaterally. RANGE OF MOTION: The patient has full passive range of motion in all four extremities.   MOTOR:  Tandem Gait: negative      Shoulder AB/Flex Elbow Flex Wrist Ext Elbow Ext Wrist Flex Hand Intrin Tone   Right +4/5 +4/5 +4/5 +4/5 +4/5 +4/5 +4/5   Left +4/5 +4/5 +4/5 +4/5 +4/5 +4/5 +4/5     ASSESSMENT   Diagnoses and all orders for this visit:    1. Cervical spondylolysis    2. Cervical post-laminectomy syndrome    3. DDD (degenerative disc disease), cervical      IMPRESSION AND PLAN:  Patient returns to the office today with c/o neck pain extending into the bilateral (L>R) shoulders. Additionally, she endorses paraesthesia in the right hand and wrist in the mornings. Despite no evidence in the EMG, I continue to suspect her right hand and wrist sxs are due to CTS. Multiple treatment options were discussed. I will try her on Cymbalta 30 mg. The risks, benefits, and potential side effects of this medication were discussed. Patient understands and wishes to proceed. Patient advised to call the office if intolerant to new medication. Additionally, I advised her against chronic use of ibuprofen. Patient is neurologically intact. I will see the patient back in 1 month's time or earlier if needed. Written by Sahil Mccain, as dictated by Tariq Mejia MD  I examined the patient, reviewed and agree with the note.

## 2020-01-23 ENCOUNTER — OFFICE VISIT (OUTPATIENT)
Dept: ORTHOPEDIC SURGERY | Age: 50
End: 2020-01-23

## 2020-01-23 VITALS
DIASTOLIC BLOOD PRESSURE: 70 MMHG | SYSTOLIC BLOOD PRESSURE: 110 MMHG | HEART RATE: 67 BPM | BODY MASS INDEX: 29.77 KG/M2 | WEIGHT: 168 LBS | HEIGHT: 63 IN

## 2020-01-23 DIAGNOSIS — M96.1 CERVICAL POST-LAMINECTOMY SYNDROME: ICD-10-CM

## 2020-01-23 DIAGNOSIS — M50.30 DDD (DEGENERATIVE DISC DISEASE), CERVICAL: ICD-10-CM

## 2020-01-23 DIAGNOSIS — M43.02 CERVICAL SPONDYLOLYSIS: Primary | ICD-10-CM

## 2020-01-23 RX ORDER — DULOXETIN HYDROCHLORIDE 30 MG/1
30 CAPSULE, DELAYED RELEASE ORAL DAILY
Qty: 30 CAP | Refills: 1 | Status: SHIPPED | OUTPATIENT
Start: 2020-01-23 | End: 2020-02-27

## 2020-01-23 NOTE — LETTER
1/23/20 Patient: Simón Arvizu YOB: 1970 Date of Visit: 1/23/2020 Leandro Pearson NP 
[address] [facility] [address] [place] [id number] VIA In Basket Dear Leandro Pearson NP, Thank you for referring Ms. Ronnie Marquez to [address] for evaluation. My notes for this consultation are attached. If you have questions, please do not hesitate to call me. I look forward to following your patient along with you. Sincerely, Parish Crabtree MD

## 2020-02-21 NOTE — PROGRESS NOTES
[facility]  [address], [place]   Phone: [phone]  Fax: [phone]        PROGRESS NOTE      HISTORY OF PRESENT ILLNESS:  The patient is a 44 y.o. female and was seen today for follow up of neck pain extending into the bilateral (L>R) shoulders following PT manipulation on 6/26/19.  Additionally, she describes intermittent paraesthesia in the right hand and wrist, not involving the digits, in the mornings and bilateral foot paraesthesias. Patient reports she had no neck pain after cervical surgery. Pain exacerbated with turning neck to the left. Pt has seen a chiropractor without relief. She completed PT (11/25/19- 12/16/19; Annemarie Gannon) with minimal benefit, noting her pain level did not improve, and she felt worse the days following; however, she reports improved ROM of her neck. Treated with Tramadol, Ibuprofen, and muscle relaxants without relief. Pt completed MDP with no relief. Pt reports she has had an EMG done by Dr. Jeff Zacarias and also Rx her Neurontin. Pt denies dropping things or loss of balance. Pt is being followed by dermatologist for rash under her left breast. Pt denies fever, weight loss, or skin changes. The patient is RHD. Patient denies current use of antidepressants or anticoagulants. Patient denies h/o DM, gastric bypass, glaucoma or chemo. PmHx IBS, left L5 hemilaminotomy, diskectomy (Dr. Kris Johnson), right shoulder surgery x 2 (2/2019 and 5/2019), cervical surgery (Dr. Sam Moran, 2012). Per pt, Dr. Jeff Zacarias indicated she had a Vitamin D and Vitamin B12 deficiency, and is now taking supplements. Last seen by me 6/3/16 with c/o low back and left hip pain into the LLE laterally and posteriorly to the foot. Did not tolerate TOPAMAX. Referred to Dr. Gaurang Saravia for surgical evaluation. Note from Dr. Katey Reich 9/16/16 indicating patient was seen 6 weeks out from hemilaminotomy. Not having any pain.  Return to physical activity. Note from Deirdre Shabana dated 8/19/19 indicating patient was seen s/p right shoulder arthroscopic rotator cuff revision and manipulation under anesthesia on 5/13/19. Pt completed PT. Rated pain 0/10 at that time. Per pt, she had previous right shoulder surgery in February 2019. ER note from Dr. Holden Parent 8/20/19 indicating patient presented for diffuse neck pain x 2 months after manipulation in PT. H/o prior cervical surgery. Diffuse muscle spasms, no pain to palpation. Normal strength. Gave her Iuka. 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 Continues with pediatry. Referred to us.  C Spine MRI dated 1/3/2020 reviewed. Per report, progressive degenerative changes are seen at both native disc levels above and below the fusion level, predominantly at the C4/C5 disc space, where diffuse bulging of the central prominence is now causing some mild deformity of the cord subjacent, without associated signal abnormality. Likely stable generative changes at C6/C7. Central canal contents and paravertebral soft tissues unremarkable. Fusion hardware appears well anchored and orthotopic. BLE EMG performed by Dr. Kostas Everett. Per report, BLE sensorimotor polyneuropathy, axonal, moderate, right side slightly worse. There is no electrodiagnostic evidence of lumbosacral radiculopathy bilaterally. BUE EMG performed by Dr. Kostas Everett. Per report, BUE sensory polyneuropathy, axonal, very mild, left side slightly worse. There is no electrodiagnostic evidence of carpal tunnel syndrome or cervical radiculopathy bilaterally. C Spine XR dated 6/28/19 films not available. Per report, Anterior fusion C5-6. Degenerative disc disease C6-7. Unremarkable C1-C4. At her last clinical appointment, despite no evidence in the EMG, I continued to suspect her right hand and wrist symptoms were due to CTS.  I tried her on Cymbalta 30 mg daily. Additionally, I advised her against chronic use of ibuprofen.      The patient returns today with pain location and distribution remain unchanged. She rates her pain 10/10, previously 2-8/10. She describes experiencing episodes of sharp pain with unspecified positions. Patient did not tolerate the Cymbalta 30 mg daily. Subsequently, she notified the office and we tapered her onto Neurontin 300-600 mg TID. Patient indicates she is still in the process of tapering up to the higher dose and indicates some confusion in the prescription. Pt denies dropping things or loss of balance.  reviewed. Body mass index is 30.43 kg/m².     PCP: Fariba Denise NP      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    Thumb pain 6/7/2010        Social History     Socioeconomic History    Marital status:      Spouse name: Not on file    Number of children: Not on file    Years of education: Not on file    Highest education level: Not on file   Occupational History    Not on file   Social Needs    Financial resource strain: Not on file    Food insecurity:     Worry: Not on file     Inability: Not on file    Transportation needs:     Medical: Not on file     Non-medical: Not on file   Tobacco Use    Smoking status: Never Smoker    Smokeless tobacco: Never Used   Substance and Sexual Activity    Alcohol use: Yes     Comment: few times per week    Drug use: No    Sexual activity: Yes     Partners: Male     Comment: pregnancy test negative   Lifestyle    Physical activity:     Days per week: Not on file     Minutes per session: Not on file    Stress: Not on file   Relationships    Social connections:     Talks on phone: Not on file     Gets together: Not on file     Attends Yazidism service: Not on file     Active member of club or organization: Not on file     Attends meetings of clubs or organizations: Not on file     Relationship status: Not on file    Intimate partner violence:     Fear of current or ex partner: Not on file     Emotionally abused: Not on file     Physically abused: Not on file     Forced sexual activity: Not on file   Other Topics Concern    Not on file   Social History Narrative    Not on file       Current Outpatient Medications   Medication Sig Dispense Refill    famotidine (PEPCID) 20 mg tablet Take 10 mg by mouth nightly.  traMADol (ULTRAM) 50 mg tablet Take 50 mg by mouth three (3) times daily.  gabapentin (NEURONTIN) 300 mg capsule Take 1-2 Caps by mouth three (3) times daily. Max Daily Amount: 1,800 mg. Indications: neuropathic pain (Patient taking differently: Take 300 mg by mouth five (5) times daily. Indications: neuropathic pain) 180 Cap 0    methocarbamol (ROBAXIN) 750 mg tablet TAKE 1 TABLET BY MOUTH FOUR TIMES DAILY. (Patient taking differently: 750 mg three (3) times daily.) 60 Tab 0    cyanocobalamin 1,000 mcg tablet Take 2,500 mcg by mouth every other day.  cholecalciferol, vitamin D3, (VITAMIN D3 PO) Take 10,000 mg by mouth daily.  multivitamin (ONE A DAY) tablet Take 1 Tab by mouth daily.  azelastine-fluticasone (DYMISTA) 137-50 mcg/spray spry by Nasal route as needed.  esomeprazole (NEXIUM) 20 mg capsule Take 20 mg by mouth daily.  diphenhydrAMINE (SIMPLY SLEEP) 25 mg tablet Take 12.5 mg by mouth nightly.  DULoxetine (CYMBALTA) 30 mg capsule Take 1 Cap by mouth daily. (Patient not taking: Reported on 2/27/2020) 30 Cap 1    Mv,Ca,Min-FA-Herbal No.157 (ESTROVEN MAXIMUM STRENGTH) 400 mcg tab Take  by mouth.  RANITIDINE HCL (ZANTAC PO) Take 150 mg by mouth daily.          Allergies   Allergen Reactions    Ciprofloxacin Diarrhea    Milk Containing Products Diarrhea    Pennsaid [Diclofenac Sodium] Rash    Sulfa (Sulfonamide Antibiotics) Rash    Other Medication Diarrhea     Eggs, patient eats, and gets flu shot yearly with no reaction          PHYSICAL EXAMINATION    Visit Vitals  /73 (BP 1 Location: Left arm, BP Patient Position: Sitting)   Pulse 91   Temp 98.3 °F (36.8 °C) (Oral)   Resp 28   Ht 5' 3\" (1.6 m)   Wt 171 lb 12.8 oz (77.9 kg)   SpO2 97%   BMI 30.43 kg/m²       CONSTITUTIONAL: NAD, A&O x 3  SENSATION: Intact to light touch throughout  NEURO: Shahriar's is negative bilaterally. RANGE OF MOTION: The patient has full passive range of motion in all four extremities. MOTOR:     Shoulder AB/Flex Elbow Flex Wrist Ext Elbow Ext Wrist Flex Hand Intrin Tone   Right +4/5 +4/5 +4/5 +4/5 +4/5 +4/5 +4/5   Left +4/5 +4/5 +4/5 +4/5 +4/5 +4/5 +4/5     ASSESSMENT   Diagnoses and all orders for this visit:    1. Cervical spondylolysis  -     gabapentin (NEURONTIN) 600 mg tablet; Take 1 Tab by mouth three (3) times daily. Max Daily Amount: 1,800 mg.    2. Cervical post-laminectomy syndrome  -     gabapentin (NEURONTIN) 600 mg tablet; Take 1 Tab by mouth three (3) times daily. Max Daily Amount: 1,800 mg.    3. DDD (degenerative disc disease), cervical  -     gabapentin (NEURONTIN) 600 mg tablet; Take 1 Tab by mouth three (3) times daily. Max Daily Amount: 1,800 mg. IMPRESSION AND PLAN:  Patient returns to the office today with c/o neck pain extending into the bilateral (L>R) shoulders following PT manipulation on 6/26/19. Multiple treatment options were discussed. Patient was interested in the potential for surgical intervention. I will request Dr. Ha Pink surgical evaluation. In the interim, I will provide her a prescription for Neurontin 600 mg TID. Patient advised to call the office if intolerant to higher dose. Patient is neurologically intact. I will see the patient back in 1 month's time or earlier if needed.       Written by Tony Macias, as dictated by Praveen Sanchez MD  I examined the patient, reviewed and agree with the note.

## 2020-02-27 ENCOUNTER — OFFICE VISIT (OUTPATIENT)
Dept: ORTHOPEDIC SURGERY | Age: 50
End: 2020-02-27

## 2020-02-27 ENCOUNTER — TELEPHONE (OUTPATIENT)
Dept: ORTHOPEDIC SURGERY | Age: 50
End: 2020-02-27

## 2020-02-27 ENCOUNTER — OFFICE VISIT (OUTPATIENT)
Dept: FAMILY MEDICINE CLINIC | Age: 50
End: 2020-02-27

## 2020-02-27 VITALS
HEART RATE: 93 BPM | HEIGHT: 63 IN | OXYGEN SATURATION: 98 % | TEMPERATURE: 99.6 F | WEIGHT: 171 LBS | RESPIRATION RATE: 16 BRPM | BODY MASS INDEX: 30.3 KG/M2 | SYSTOLIC BLOOD PRESSURE: 100 MMHG | DIASTOLIC BLOOD PRESSURE: 74 MMHG

## 2020-02-27 VITALS
DIASTOLIC BLOOD PRESSURE: 73 MMHG | BODY MASS INDEX: 30.44 KG/M2 | WEIGHT: 171.8 LBS | OXYGEN SATURATION: 97 % | SYSTOLIC BLOOD PRESSURE: 110 MMHG | TEMPERATURE: 98.3 F | HEART RATE: 91 BPM | RESPIRATION RATE: 28 BRPM | HEIGHT: 63 IN

## 2020-02-27 DIAGNOSIS — M96.1 CERVICAL POST-LAMINECTOMY SYNDROME: ICD-10-CM

## 2020-02-27 DIAGNOSIS — R05.9 COUGH: ICD-10-CM

## 2020-02-27 DIAGNOSIS — J06.9 URI, ACUTE: Primary | ICD-10-CM

## 2020-02-27 DIAGNOSIS — M50.30 DDD (DEGENERATIVE DISC DISEASE), CERVICAL: ICD-10-CM

## 2020-02-27 DIAGNOSIS — M43.02 CERVICAL SPONDYLOLYSIS: Primary | ICD-10-CM

## 2020-02-27 LAB
FLUAV+FLUBV AG NOSE QL IA.RAPID: NEGATIVE POS/NEG
FLUAV+FLUBV AG NOSE QL IA.RAPID: NEGATIVE POS/NEG
VALID INTERNAL CONTROL?: YES

## 2020-02-27 RX ORDER — ALBUTEROL SULFATE 90 UG/1
AEROSOL, METERED RESPIRATORY (INHALATION)
Qty: 1 INHALER | Refills: 0 | Status: SHIPPED | OUTPATIENT
Start: 2020-02-27 | End: 2020-03-26 | Stop reason: SDUPTHER

## 2020-02-27 RX ORDER — FAMOTIDINE 20 MG
10 TABLET ORAL
COMMUNITY
End: 2020-04-29

## 2020-02-27 RX ORDER — TRAMADOL HYDROCHLORIDE 50 MG/1
50 TABLET ORAL DAILY
COMMUNITY
End: 2021-03-25 | Stop reason: ALTCHOICE

## 2020-02-27 RX ORDER — GABAPENTIN 600 MG/1
600 TABLET ORAL 3 TIMES DAILY
Qty: 90 TAB | Refills: 1 | Status: SHIPPED | OUTPATIENT
Start: 2020-02-27 | End: 2020-04-29

## 2020-02-27 NOTE — TELEPHONE ENCOUNTER
Alanna Landrum MD  You; Crystal Fu MD; Chemo Lo LPN 1 hour ago (4:37 AM)     Has hnp at c4/5, smaller at C6/7- yes ma be surgical. Have me see her friday.     Routing comment

## 2020-02-27 NOTE — PATIENT INSTRUCTIONS

## 2020-02-27 NOTE — PROGRESS NOTES
HPI:   36 hrs of head congestion, facial discomfort, NP cough, low grade fever  No dyspnea, CP, or N  Intermittent wheezing  +foreign travel to [facility] in last 10 days    ROS is otherwise negative.     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    Thumb pain 6/7/2010       Past Surgical History:   Procedure Laterality Date    HX BACK SURGERY  2012    HX GYN      left tube removal ectopic pregnancy    HX HEENT  9/2011    Basal cell cancer lesion removed from nose    HX LYMPHADENECTOMY Right 8/25/16    HX ORTHOPAEDIC  1999    TMJ    HX OTHER SURGICAL  2013    Spinal Fusion    HX TONSILLECTOMY  2/2007       Social History     Socioeconomic History    Marital status:      Spouse name: Not on file    Number of children: Not on file    Years of education: Not on file    Highest education level: Not on file   Occupational History    Not on file   Social Needs    Financial resource strain: Not on file    Food insecurity:     Worry: Not on file     Inability: Not on file    Transportation needs:     Medical: Not on file     Non-medical: Not on file   Tobacco Use    Smoking status: Never Smoker    Smokeless tobacco: Never Used   Substance and Sexual Activity    Alcohol use: Yes     Comment: few times per week    Drug use: No    Sexual activity: Yes     Partners: Male     Comment: pregnancy test negative   Lifestyle    Physical activity:     Days per week: Not on file     Minutes per session: Not on file    Stress: Not on file   Relationships    Social connections:     Talks on phone: Not on file     Gets together: Not on file     Attends Muslim service: Not on file     Active member of club or organization: Not on file     Attends meetings of clubs or organizations: Not on file Relationship status: Not on file    Intimate partner violence:     Fear of current or ex partner: Not on file     Emotionally abused: Not on file     Physically abused: Not on file     Forced sexual activity: Not on file   Other Topics Concern    Not on file   Social History Narrative    Not on file       Allergies   Allergen Reactions    Ciprofloxacin Diarrhea    Milk Containing Products Diarrhea    Pennsaid [Diclofenac Sodium] Rash    Sulfa (Sulfonamide Antibiotics) Rash    Other Medication Diarrhea     Eggs, patient eats, and gets flu shot yearly with no reaction       Family History   Problem Relation Age of Onset    Cancer Mother         breast cancer    Diabetes Father     High Cholesterol Father     Lung Disease Father         COPD and nodule on lung    Heart Disease Father     Gout Father     Cancer Maternal Aunt         breast cancer    Arthritis-osteo Maternal Grandmother         arthritis     Cancer Cousin        Current Outpatient Medications   Medication Sig Dispense Refill    famotidine (PEPCID) 20 mg tablet Take 10 mg by mouth nightly.  traMADol (ULTRAM) 50 mg tablet Take 50 mg by mouth three (3) times daily.  gabapentin (NEURONTIN) 300 mg capsule Take 1-2 Caps by mouth three (3) times daily. Max Daily Amount: 1,800 mg. Indications: neuropathic pain (Patient taking differently: Take 300 mg by mouth five (5) times daily. Indications: neuropathic pain) 180 Cap 0    methocarbamol (ROBAXIN) 750 mg tablet TAKE 1 TABLET BY MOUTH FOUR TIMES DAILY. (Patient taking differently: 750 mg three (3) times daily.) 60 Tab 0    cyanocobalamin 1,000 mcg tablet Take 2,500 mcg by mouth every other day.  cholecalciferol, vitamin D3, (VITAMIN D3 PO) Take 10,000 mg by mouth daily.  multivitamin (ONE A DAY) tablet Take 1 Tab by mouth daily.  Mv,Ca,Min-FA-Herbal No.157 (ESTROVEN MAXIMUM STRENGTH) 400 mcg tab Take  by mouth.       azelastine-fluticasone (DYMISTA) 137-50 mcg/spray spry by Nasal route as needed.  RANITIDINE HCL (ZANTAC PO) Take 150 mg by mouth daily.  esomeprazole (NEXIUM) 20 mg capsule Take 20 mg by mouth daily.  diphenhydrAMINE (SIMPLY SLEEP) 25 mg tablet Take 12.5 mg by mouth nightly. Visit Vitals  /74 (BP 1 Location: Left arm, BP Patient Position: Sitting)   Pulse 93   Temp 99.6 °F (37.6 °C) (Tympanic)   Resp 16   Ht 5' 3\" (1.6 m)   Wt 171 lb (77.6 kg)   SpO2 98%   BMI 30.29 kg/m²       PE  heavy in NAD  HEENT:  OP: clear. TMS: clear  Neck: supple w/o mass   Chest: clear. CV: RRR w/o m,r,g  Abd: soft, NT  Ext: w/o edema. Rapid flu neg  Assessment and Plan    Encounter Diagnoses   Name Primary?     URI, acute Yes    Cough        URI/cough  Probable viral URI  Claritin/albuterol prn  F/U worsening or unimproved 7 days    OV 3 mos or prn  I have explained plan to patient and the patient verbalizes understanding

## 2020-02-27 NOTE — PROGRESS NOTES
Chief Complaint   Patient presents with    Cough       Pt preferred language for health care discussion is english. Is someone accompanying this pt? no    Is the patient using any DME equipment during OV? no    Depression Screening:  3 most recent [facility] 36 Screens 2/27/2020 12/26/2019 11/18/2019 6/10/2019 5/20/2019 5/10/2019 4/24/2019   Little interest or pleasure in doing things Not at all Not at all Not at all Not at all Not at all Not at all Not at all   Feeling down, depressed, irritable, or hopeless Not at all Not at all Not at all Not at all Not at all Not at all Not at all   Total Score PHQ 2 0 0 0 0 0 0 0       Learning Assessment:  Learning Assessment 7/16/2015 3/17/2014 12/12/2012   PRIMARY LEARNER Patient Patient Patient   HIGHEST LEVEL OF EDUCATION - PRIMARY LEARNER  > 4 YEARS OF COLLEGE - -   [facility] - -   CO-LEARNER CAREGIVER No - -   PRIMARY LANGUAGE ENGLISH ENGLISH ENGLISH   LEARNER PREFERENCE PRIMARY READING DEMONSTRATION DEMONSTRATION     - READING READING   ANSWERED BY patient patient patient   Marvin Joseph 1907 Maintenance reviewed and discussed per provider. Yes        Advance Directive:  1. Do you have an advance directive in place? Patient Reply:no    2. If not, would you like material regarding how to put one in place? Patient Reply: no    Coordination of Care:  1. Have you been to the ER, urgent care clinic since your last visit? Hospitalized since your last visit? no    2. Have you seen or consulted any other health care providers outside of the [address] [place] [address] since your last visit? Include any pap smears or colon screening.  no    Provider prefers to do his own med reconciliation

## 2020-02-27 NOTE — LETTER
2/27/20 Patient: Pancho Mike YOB: 1970 Date of Visit: 2/27/2020 Madhavi Ruffin NP 
[address] [address] [address] [place] [address] VIA In Basket Dear Madhavi Ruffin NP, Thank you for referring Ms. Dave Rizzo to [address] for evaluation. My notes for this consultation are attached. If you have questions, please do not hesitate to call me. I look forward to following your patient along with you. Sincerely, Javon Vences MD

## 2020-02-27 NOTE — TELEPHONE ENCOUNTER
Please review Cspine MRI that was completed at [facility] on 01.03.2020. Patient reports pain 9/10.  Anything surgical?

## 2020-02-28 ENCOUNTER — OFFICE VISIT (OUTPATIENT)
Dept: ORTHOPEDIC SURGERY | Age: 50
End: 2020-02-28

## 2020-02-28 VITALS
TEMPERATURE: 98.2 F | OXYGEN SATURATION: 97 % | RESPIRATION RATE: 16 BRPM | BODY MASS INDEX: 30.48 KG/M2 | HEIGHT: 63 IN | SYSTOLIC BLOOD PRESSURE: 130 MMHG | HEART RATE: 91 BPM | WEIGHT: 172 LBS | DIASTOLIC BLOOD PRESSURE: 82 MMHG

## 2020-02-28 DIAGNOSIS — M50.20 HNP (HERNIATED NUCLEUS PULPOSUS), CERVICAL: Primary | ICD-10-CM

## 2020-02-28 NOTE — PROGRESS NOTES
[address] [facility]   Pre-Surgical Worksheet    Patient: Walt Morales                         MRN: [id number]     Age:  44 y.o.,      Sex: female    YOB: 1970           VICTOR MANUEL: February 28, 2020  PCP: Nora Rocha NP    Allergies   Allergen Reactions    Ciprofloxacin Diarrhea    Milk Containing Products Diarrhea    Pennsaid [Diclofenac Sodium] Rash    Sulfa (Sulfonamide Antibiotics) Rash    Other Medication Diarrhea     Eggs, patient eats, and gets flu shot yearly with no reaction         ICD-10-CM ICD-9-CM    1. HNP (herniated nucleus pulposus), cervical M50.20 722.0        Surgery: ACDF C4/C5, possible plate removal.    Pain Assessment   Pain Assessment  2/28/2020   Location of Pain Neck   Location Modifiers (No Data)   Severity of Pain 3   Quality of Pain Aching; Other (Comment)   Quality of Pain Comment shooting & weakness   Duration of Pain Persistent   Duration of Pain Comment -   Frequency of Pain Constant   Aggravating Factors Other (Comment)   Aggravating Factors Comment sudden movements to the side   Limiting Behavior Yes   Relieving Factors Nothing   Relieving Factors Comment -   Result of Injury (No Data)       Visit Vitals  /82 (BP 1 Location: Left arm, BP Patient Position: Sitting)   Pulse 91   Temp 98.2 °F (36.8 °C) (Oral)   Resp 16   Ht 5' 3\" (1.6 m)   Wt 172 lb (78 kg)   SpO2 97%   BMI 30.47 kg/m²       ADL Limits: dressing, bathing, driving    Spine Surgery?: Yes When Aug 2016. Where [facility]. Spinal Injections?: Yes  When Jul 2016. Where [facility]. Physical Therapy?: No:   When . Where. NSAID's?: Yes, ibuprofen as needed    Pain Medications?: Yes  Type: tramadol 50 mg three times a day. In Pain Management: YES, Where: [facility]    Current Outpatient Medications   Medication Sig    famotidine (PEPCID) 20 mg tablet Take 10 mg by mouth nightly.  traMADol (ULTRAM) 50 mg tablet Take 50 mg by mouth three (3) times daily.     gabapentin (NEURONTIN) 600 mg tablet Take 1 Tab by mouth three (3) times daily. Max Daily Amount: 1,800 mg.    albuterol (PROVENTIL HFA, VENTOLIN HFA, PROAIR HFA) 90 mcg/actuation inhaler 2 puffs q 6 hrs prn wheezing/cough    gabapentin (NEURONTIN) 300 mg capsule Take 1-2 Caps by mouth three (3) times daily. Max Daily Amount: 1,800 mg. Indications: neuropathic pain (Patient taking differently: Take 300 mg by mouth five (5) times daily. Indications: neuropathic pain)    methocarbamol (ROBAXIN) 750 mg tablet TAKE 1 TABLET BY MOUTH FOUR TIMES DAILY. (Patient taking differently: 750 mg three (3) times daily.)    cyanocobalamin 1,000 mcg tablet Take 2,500 mcg by mouth every other day.  cholecalciferol, vitamin D3, (VITAMIN D3 PO) Take 10,000 mg by mouth daily.  multivitamin (ONE A DAY) tablet Take 1 Tab by mouth daily.  Mv,Ca,Min-FA-Herbal No.157 (ESTROVEN MAXIMUM STRENGTH) 400 mcg tab Take  by mouth.  azelastine-fluticasone (DYMISTA) 137-50 mcg/spray spry by Nasal route as needed.  RANITIDINE HCL (ZANTAC PO) Take 150 mg by mouth daily.  esomeprazole (NEXIUM) 20 mg capsule Take 20 mg by mouth daily.  diphenhydrAMINE (SIMPLY SLEEP) 25 mg tablet Take 12.5 mg by mouth nightly. No current facility-administered medications for this visit.         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    Thumb pain 6/7/2010       Past Surgical History:   Procedure Laterality Date    HX BACK SURGERY  2012    HX GYN      left tube removal ectopic pregnancy    HX HEENT  9/2011    Basal cell cancer lesion removed from nose    HX LYMPHADENECTOMY Right 8/25/16    HX ORTHOPAEDIC  1999    TMJ    HX OTHER SURGICAL  2013    Spinal Fusion    HX TONSILLECTOMY  2/2007       Social History     Socioeconomic History    Marital status:      Spouse name: Not on file    Number of children: Not on file    Years of education: Not on file    Highest education level: Not on file   Tobacco Use    Smoking status: Never Smoker    Smokeless tobacco: Never Used   Substance and Sexual Activity    Alcohol use: Yes     Comment: few times per week    Drug use: No    Sexual activity: Yes     Partners: Male     Comment: pregnancy test negative

## 2020-02-28 NOTE — PROGRESS NOTES
[address]  [address], [address]  [address]  Phone: [phone]  Fax: [phone]  INITIAL CONSULTATION  Patient: Mike Lunsford                MRN: [id number]       SSN: [id number]  YOB: 1970        AGE: 44 y.o. SEX: female  Body mass index is 30.47 kg/m². PCP: Janis Figueredo NP  02/28/20    Chief Complaint   Patient presents with    Neck Pain     neck pain, Sx consult appt. HISTORY OF PRESENT ILLNESS, RADIOGRAPHS, and PLAN:         HISTORY OF PRESENT ILLNESS:  Ms. Altaf Sharma is seen today at the request of Dr. Eleanor Kat. Ms. Altaf Sharma is a 75-year-old female known to me. I performed a lumbar laminectomy on her about four years ago with good results. She has had a progression of neck pain with cervicogenic headache and some arm symptoms since a therapist cracked her neck. The pain has been consistent, at least a 3-4/10 and much higher when flared. It has been unresponsive to treatments. PHYSICAL EXAMINATION:  Her physical exam is benign other than a Billss on the left. RADIOGRAPHS:  Her MRI demonstrates her previous solid C4-5 fusion. She has a C3-4 left paracentral disc herniation that compresses her spinal cord. There is no signal change. She has a C5-6 degenerative segment that I do not think is of significance. She has a known peripheral neuropathy with EMGs indicating peripheral neuropathy without clear cervical radiculopathy. ASSESSMENT/PLAN: The patients symptoms are not consistent with peripheral neuropathy. They are mechanically induced neck and radiating pain. It is consistent with this C4-5 junctional disc herniation above her previous C5-6 fusion. The risks, benefits, complications, and alternatives to surgery have been discussed. The patient consents. We will proceed with a cervical discectomy and fusion at C4-5 once the appropriate approvals and clearances take place. cc: Leonel Moore N.P. 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    Thumb pain 6/7/2010       Family History   Problem Relation Age of Onset    Cancer Mother         breast cancer    Diabetes Father     High Cholesterol Father     Lung Disease Father         COPD and nodule on lung    Heart Disease Father     Gout Father     Cancer Maternal Aunt         breast cancer    Arthritis-osteo Maternal Grandmother         arthritis     Cancer Cousin        Current Outpatient Medications   Medication Sig Dispense Refill    famotidine (PEPCID) 20 mg tablet Take 10 mg by mouth nightly.  traMADol (ULTRAM) 50 mg tablet Take 50 mg by mouth three (3) times daily.  gabapentin (NEURONTIN) 600 mg tablet Take 1 Tab by mouth three (3) times daily. Max Daily Amount: 1,800 mg. 90 Tab 1    albuterol (PROVENTIL HFA, VENTOLIN HFA, PROAIR HFA) 90 mcg/actuation inhaler 2 puffs q 6 hrs prn wheezing/cough 1 Inhaler 0    gabapentin (NEURONTIN) 300 mg capsule Take 1-2 Caps by mouth three (3) times daily. Max Daily Amount: 1,800 mg. Indications: neuropathic pain (Patient taking differently: Take 300 mg by mouth five (5) times daily. Indications: neuropathic pain) 180 Cap 0    methocarbamol (ROBAXIN) 750 mg tablet TAKE 1 TABLET BY MOUTH FOUR TIMES DAILY. (Patient taking differently: 750 mg three (3) times daily.) 60 Tab 0    cyanocobalamin 1,000 mcg tablet Take 2,500 mcg by mouth every other day.  cholecalciferol, vitamin D3, (VITAMIN D3 PO) Take 10,000 mg by mouth daily.  multivitamin (ONE A DAY) tablet Take 1 Tab by mouth daily.  Mv,Ca,Min-FA-Herbal No.157 (ESTROVEN MAXIMUM STRENGTH) 400 mcg tab Take  by mouth.       azelastine-fluticasone (DYMISTA) 137-50 mcg/spray spry by Nasal route as needed.  RANITIDINE HCL (ZANTAC PO) Take 150 mg by mouth daily.  esomeprazole (NEXIUM) 20 mg capsule Take 20 mg by mouth daily.  diphenhydrAMINE (SIMPLY SLEEP) 25 mg tablet Take 12.5 mg by mouth nightly.          Allergies   Allergen Reactions    Ciprofloxacin Diarrhea    Milk Containing Products Diarrhea    Pennsaid [Diclofenac Sodium] Rash    Sulfa (Sulfonamide Antibiotics) Rash    Other Medication Diarrhea     Eggs, patient eats, and gets flu shot yearly with no reaction       Past Surgical History:   Procedure Laterality Date    HX BACK SURGERY  2012    HX GYN      left tube removal ectopic pregnancy    HX HEENT  9/2011    Basal cell cancer lesion removed from nose    HX LYMPHADENECTOMY Right 8/25/16    HX ORTHOPAEDIC  1999    TMJ    HX OTHER SURGICAL  2013    Spinal Fusion    HX TONSILLECTOMY  2/2007       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    Thumb pain 6/7/2010       Social History     Socioeconomic History    Marital status:      Spouse name: Not on file    Number of children: Not on file    Years of education: Not on file    Highest education level: Not on file   Occupational History    Not on file   Social Needs    Financial resource strain: Not on file    Food insecurity:     Worry: Not on file     Inability: Not on file    Transportation needs:     Medical: Not on file     Non-medical: Not on file   Tobacco Use    Smoking status: Never Smoker    Smokeless tobacco: Never Used   Substance and Sexual Activity    Alcohol use: Yes     Comment: few times per week    Drug use: No    Sexual activity: Yes     Partners: Male     Comment: pregnancy test negative   Lifestyle    Physical activity:     Days per week: Not on file     Minutes per session: Not on file    Stress: Not on file   Relationships    Social connections:     Talks on phone: Not on file     Gets together: Not on file     Attends Mandaen service: Not on file     Active member of club or organization: Not on file     Attends meetings of clubs or organizations: Not on file     Relationship status: Not on file    Intimate partner violence:     Fear of current or ex partner: Not on file     Emotionally abused: Not on file     Physically abused: Not on file     Forced sexual activity: Not on file   Other Topics Concern    Not on file   Social History Narrative    Not on file           REVIEW OF SYSTEMS:   CONSTITUTIONAL SYMPTOMS:  Negative. EYES:  Negative. EARS, NOSE, THROAT AND MOUTH:  Negative. CARDIOVASCULAR:  Negative. RESPIRATORY:  Negative. GENITOURINARY: Per HPI. GASTROINTESTINAL:  Per HPI. INTEGUMENTARY (SKIN AND/OR BREAST):  Negative. MUSCULOSKELETAL: Per HPI.   ENDOCRINE/RHEUMATOLOGIC:  Negative. NEUROLOGICAL:  Per HPI. HEMATOLOGIC/LYMPHATIC:  Negative. ALLERGIC/IMMUNOLOGIC:  Negative. PSYCHIATRIC:  Negative. PHYSICAL EXAMINATION:   Visit Vitals  /82 (BP 1 Location: Left arm, BP Patient Position: Sitting)   Pulse 91   Temp 98.2 °F (36.8 °C) (Oral)   Resp 16   Ht 5' 3\" (1.6 m)   Wt 172 lb (78 kg)   SpO2 97%   BMI 30.47 kg/m²    PAIN SCALE: 2/10    CONSTITUTIONAL: The patient is in no apparent distress and is alert and oriented x 3. HEENT: Normocephalic. Hearing grossly intact. NECK: Supple and symmetric. no tenderness, or masses were felt. RESPIRATORY: No labored breathing. CARDIOVASCULAR: The carotid pulses were normal. Peripheral pulses were 2+. CHEST: Normal AP diameter and normal contour without any kyphoscoliosis. LYMPHATIC: No lymphadenopathy was appreciated in the neck, axillae or groin. SKIN:  Negative for scars, rashes, lesions, or ulcers on the right upper, right lower, left upper, left lower and trunk.    NEUROLOGICAL: Alert and oriented x 3. Ambulation without assistive device. FWB. EXTREMITIES:  See musculoskeletal.  MUSCULOSKELETAL:   Head and Neck: Neck pain. Negative for misalignment, asymmetry, crepitation, defects, tenderness masses or effusions.  Left Upper Extremity: Inspection, percussion and palpation performed. Equivocal Billss sign is positive.  Right Upper Extremity: Inspection, percussion and palpation performed. Billss sign is negative.  Spine, Ribs and Pelvis: Inspection, percussion and palpation performed. Negative for misalignment, asymmetry, crepitation, defects, tenderness masses or effusions.  Left Lower Extremity: Inspection, percussion and palpation performed. Negative straight leg raise.  Right Lower Extremity: Inspection, percussion and palpation performed. Negative straight leg raise. SPINE EXAM:     Cervical spine: Neck is midline. Normal muscle tone. No focal atrophy is noted. Lumbar spine: No rash, ecchymosis, or gross obliquity. No focal atrophy is noted. ASSESSMENT    ICD-10-CM ICD-9-CM    1. HNP (herniated nucleus pulposus), cervical M50.20 722.0        Written by Johnie Quiroz, as dictated by Carolyn Martins MD.    I, Dr. Carolyn Martins MD, confirm that all documentation is accurate.

## 2020-03-02 ENCOUNTER — HOSPITAL ENCOUNTER (OUTPATIENT)
Dept: LAB | Age: 50
Discharge: HOME | End: 2020-03-02
Payer: COMMERCIAL

## 2020-03-02 DIAGNOSIS — Z01.818 PRE-OP TESTING: ICD-10-CM

## 2020-03-02 LAB
ALBUMIN SERPL-MCNC: 3.8 G/DL (ref 3.4–5)
ALBUMIN/GLOB SERPL: 1 {RATIO} (ref 0.8–1.7)
ALP SERPL-CCNC: 115 U/L (ref 45–117)
ALT SERPL-CCNC: 79 U/L (ref 13–56)
ANION GAP SERPL CALC-SCNC: 4 MMOL/L (ref 3–18)
AST SERPL-CCNC: 32 U/L (ref 10–38)
BILIRUB SERPL-MCNC: 0.2 MG/DL (ref 0.2–1)
BUN SERPL-MCNC: 10 MG/DL (ref 7–18)
BUN/CREAT SERPL: 14 (ref 12–20)
CALCIUM SERPL-MCNC: 8.9 MG/DL (ref 8.5–10.1)
CHLORIDE SERPL-SCNC: 107 MMOL/L (ref 100–111)
CO2 SERPL-SCNC: 29 MMOL/L (ref 21–32)
CREAT SERPL-MCNC: 0.69 MG/DL (ref 0.6–1.3)
ERYTHROCYTE [DISTWIDTH] IN BLOOD BY AUTOMATED COUNT: 13.2 % (ref 11.6–14.5)
GLOBULIN SER CALC-MCNC: 3.8 G/DL (ref 2–4)
GLUCOSE SERPL-MCNC: 89 MG/DL (ref 74–99)
HCT VFR BLD AUTO: 40.4 % (ref 35–45)
HGB BLD-MCNC: 13.6 G/DL (ref 12–16)
MCH RBC QN AUTO: 31.6 PG (ref 24–34)
MCHC RBC AUTO-ENTMCNC: 33.7 G/DL (ref 31–37)
MCV RBC AUTO: 93.7 FL (ref 74–97)
PLATELET # BLD AUTO: 388 K/UL (ref 135–420)
PMV BLD AUTO: 10.3 FL (ref 9.2–11.8)
POTASSIUM SERPL-SCNC: 4 MMOL/L (ref 3.5–5.5)
PROT SERPL-MCNC: 7.6 G/DL (ref 6.4–8.2)
RBC # BLD AUTO: 4.31 M/UL (ref 4.2–5.3)
SODIUM SERPL-SCNC: 140 MMOL/L (ref 136–145)
WBC # BLD AUTO: 7.3 K/UL (ref 4.6–13.2)

## 2020-03-02 PROCEDURE — 80053 COMPREHEN METABOLIC PANEL: CPT

## 2020-03-02 PROCEDURE — 93005 ELECTROCARDIOGRAM TRACING: CPT

## 2020-03-02 PROCEDURE — 36415 COLL VENOUS BLD VENIPUNCTURE: CPT

## 2020-03-02 PROCEDURE — 85027 COMPLETE CBC AUTOMATED: CPT

## 2020-03-03 ENCOUNTER — OFFICE VISIT (OUTPATIENT)
Dept: FAMILY MEDICINE CLINIC | Age: 50
End: 2020-03-03

## 2020-03-03 ENCOUNTER — DOCUMENTATION ONLY (OUTPATIENT)
Dept: ORTHOPEDIC SURGERY | Age: 50
End: 2020-03-03

## 2020-03-03 VITALS
RESPIRATION RATE: 16 BRPM | DIASTOLIC BLOOD PRESSURE: 79 MMHG | HEART RATE: 73 BPM | HEIGHT: 63 IN | BODY MASS INDEX: 29.91 KG/M2 | SYSTOLIC BLOOD PRESSURE: 115 MMHG | WEIGHT: 168.8 LBS | TEMPERATURE: 98.3 F | OXYGEN SATURATION: 97 %

## 2020-03-03 DIAGNOSIS — Z01.818 PREOP EXAMINATION: Primary | ICD-10-CM

## 2020-03-03 DIAGNOSIS — M50.10 CERVICAL DISC DISORDER WITH RADICULOPATHY: ICD-10-CM

## 2020-03-03 LAB
ATRIAL RATE: 66 BPM
CALCULATED P AXIS, ECG09: 33 DEGREES
CALCULATED R AXIS, ECG10: -55 DEGREES
CALCULATED T AXIS, ECG11: 16 DEGREES
DIAGNOSIS, 93000: NORMAL
P-R INTERVAL, ECG05: 132 MS
Q-T INTERVAL, ECG07: 386 MS
QRS DURATION, ECG06: 94 MS
QTC CALCULATION (BEZET), ECG08: 404 MS
VENTRICULAR RATE, ECG03: 66 BPM

## 2020-03-03 RX ORDER — LORATADINE 10 MG/1
10 TABLET ORAL
COMMUNITY
End: 2021-03-25 | Stop reason: ALTCHOICE

## 2020-03-03 RX ORDER — ACETAMINOPHEN 325 MG/1
325 TABLET ORAL
COMMUNITY
End: 2022-01-05 | Stop reason: ALTCHOICE

## 2020-03-03 NOTE — PROGRESS NOTES
Preoperative Evaluation    Date of Exam: 3/3/2020    Jessica Leger is a 44 y.o. female (Ozzy Goddard) who presents for preoperative evaluation. Procedure/Surgery:fusion of the C spine  Date of Procedure/Surgery: 3-5-2020  Surgeon: Dr Nila Schultz: [facility]  Primary Physician: Nicki Giang NP  Latex Allergy: no    Problem List:     Patient Active Problem List    Diagnosis Date Noted    Cough 02/14/2017    Sore throat 02/14/2017    Status post lumbar laminectomy 08/19/2016    HNP (herniated nucleus pulposus), lumbar 07/01/2016    Displacement of lumbar intervertebral disc without myelopathy 06/03/2016    Bulging lumbar disc 04/07/2016    Spondylosis of lumbar region without myelopathy or radiculopathy 04/07/2016    Lumbar neuritis 04/07/2016    DDD (degenerative disc disease), lumbar 04/07/2016    Cervical disc herniation 11/14/2012    S/P cervical discectomy 11/14/2012    Cervical disc disorder with radiculopathy 09/21/2012    Seasonal allergic rhinitis 07/17/2012    GERD (gastroesophageal reflux disease) 07/17/2012    Mass of axilla 08/27/2010    Foot pain 06/07/2010    Thumb pain 06/07/2010     Medical History:     Past Medical History:   Diagnosis Date    Allergic rhinitis     ANURADHA (generalised anxiety disorder)     GERD (gastroesophageal reflux disease)     Lower  GERD    IBS (irritable bowel syndrome)     Nausea & vomiting     Other ill-defined conditions(799.89)     C5/6 HNP    Skin cancer     removed from nose    Sleep apnea     \"mild per patient\" no cpap    Thumb pain 6/7/2010     Allergies:      Allergies   Allergen Reactions    Ciprofloxacin Diarrhea    Milk Containing Products Diarrhea    Pennsaid [Diclofenac Sodium] Rash    Sulfa (Sulfonamide Antibiotics) Rash    Other Medication Diarrhea     Eggs, patient eats, and gets flu shot yearly with no reaction      Medications:     Current Outpatient Medications   Medication Sig    acetaminophen (TYLENOL) 325 mg tablet Take 325 mg by mouth every four (4) hours as needed for Pain.  loratadine (CLARITIN) 10 mg tablet Take 10 mg by mouth.  famotidine (PEPCID) 20 mg tablet Take 10 mg by mouth nightly.  traMADol (ULTRAM) 50 mg tablet Take 50 mg by mouth three (3) times daily.  gabapentin (NEURONTIN) 600 mg tablet Take 1 Tab by mouth three (3) times daily. Max Daily Amount: 1,800 mg. (Patient taking differently: Take 600 mg by mouth two (2) times a day.)    gabapentin (NEURONTIN) 300 mg capsule Take 1-2 Caps by mouth three (3) times daily. Max Daily Amount: 1,800 mg. Indications: neuropathic pain (Patient taking differently: Take 300 mg by mouth daily. Patient takes at [facility]  Indications: neuropathic pain)    methocarbamol (ROBAXIN) 750 mg tablet TAKE 1 TABLET BY MOUTH FOUR TIMES DAILY. (Patient taking differently: 750 mg three (3) times daily.)    cyanocobalamin 1,000 mcg tablet Take 2,500 mcg by mouth every other day.  cholecalciferol, vitamin D3, (VITAMIN D3 PO) Take 10,000 Units by mouth daily.  multivitamin (ONE A DAY) tablet Take 1 Tab by mouth daily.  esomeprazole (NEXIUM) 20 mg capsule Take 20 mg by mouth daily.  diphenhydrAMINE (SIMPLY SLEEP) 25 mg tablet Take 12.5 mg by mouth nightly.  albuterol (PROVENTIL HFA, VENTOLIN HFA, PROAIR HFA) 90 mcg/actuation inhaler 2 puffs q 6 hrs prn wheezing/cough    azelastine-fluticasone (DYMISTA) 137-50 mcg/spray spry by Nasal route as needed. No current facility-administered medications for this visit.       Surgical History:     Past Surgical History:   Procedure Laterality Date    HX BACK SURGERY  2012    HX GYN      left tube removal ectopic pregnancy    HX HEENT  9/2011    Basal cell cancer lesion removed from nose    HX LYMPHADENECTOMY Right 8/25/16    HX ORTHOPAEDIC  1999    TMJ    HX OTHER SURGICAL  2013    Spinal Fusion    HX TONSILLECTOMY  2/2007     Social History:     Social History     Socioeconomic History    Marital status:      Spouse name: Not on file    Number of children: Not on file    Years of education: Not on file    Highest education level: Not on file   Tobacco Use    Smoking status: Never Smoker    Smokeless tobacco: Never Used   Substance and Sexual Activity    Alcohol use: Yes     Comment: few times per week    Drug use: No    Sexual activity: Yes     Partners: Male     Comment: pregnancy test negative       Recent use of: No recent use of aspirin (ASA), NSAIDS or steroids    Tetanus up to date: tetanus status unknown to the patient      Anesthesia Complications: vomiting  History of abnormal bleeding : None  History of Blood Transfusions: no  Health Care Directive or Living Will: yes    REVIEW OF SYSTEMS:  Constitutional: negative  Eyes: wears glasses  Ears, nose, mouth, throat, and face: negative  Respiratory: negative  Cardiovascular: negative  Gastrointestinal: negative  Genitourinary:negative  Integument/breast: negative  Hematologic/lymphatic: negative  Musculoskeletal:neck pain and lower back  Neurological: negative  Behavioral/Psych: negative  Endocrine: negative  Allergic/Immunologic: Cipro, dairy, Sulfa Pensaids    EXAM:   Visit Vitals  /79 (BP 1 Location: Left arm)   Pulse 73   Temp 98.3 °F (36.8 °C) (Oral)   Resp 16   Ht 5' 3\" (1.6 m)   Wt 168 lb 12.8 oz (76.6 kg)   SpO2 97%   BMI 29.90 kg/m²     General appearance - alert, well appearing, and in no distress  Mental status - alert, oriented to person, place, and time  Eyes - pupils equal and reactive, extraocular eye movements intact  Ears - bilateral TM's and external ear canals normal  Nose - normal and patent, no erythema, discharge or polyps  Mouth - mucous membranes moist, pharynx normal without lesions  Neck - supple, no significant adenopathy  Lymphatics - no palpable lymphadenopathy, no hepatosplenomegaly  Chest - clear to auscultation, no wheezes, rales or rhonchi, symmetric air entry  Heart - normal rate, regular rhythm, normal S1, S2, no murmurs, rubs, clicks or gallops  Abdomen - soft, nontender, nondistended, no masses or organomegaly  Neurological - alert, oriented, normal speech, no focal findings or movement disorder noted  Musculoskeletal - no joint tenderness, deformity or swelling  Extremities - peripheral pulses normal, no pedal edema, no clubbing or cyanosis  Skin - normal coloration and turgor, no rashes, no suspicious skin lesions noted      DIAGNOSTICS:   1. EKG: EKG FINDINGS - normal EKG, normal sinus rhythm, unchanged from previous tracings    3. Labs:   Lab Results   Component Value Date/Time    WBC 7.3 03/02/2020 03:30 PM    HGB 13.6 03/02/2020 03:30 PM    HCT 40.4 03/02/2020 03:30 PM    PLATELET 797 88/74/1208 03:30 PM    MCV 93.7 03/02/2020 03:30 PM     Lab Results   Component Value Date/Time    Sodium 140 03/02/2020 03:30 PM    Potassium 4.0 03/02/2020 03:30 PM    Chloride 107 03/02/2020 03:30 PM    CO2 29 03/02/2020 03:30 PM    Anion gap 4 03/02/2020 03:30 PM    Glucose 89 03/02/2020 03:30 PM    BUN 10 03/02/2020 03:30 PM    Creatinine 0.69 03/02/2020 03:30 PM    BUN/Creatinine ratio 14 03/02/2020 03:30 PM    GFR est AA >60 03/02/2020 03:30 PM    GFR est non-AA >60 03/02/2020 03:30 PM    Calcium 8.9 03/02/2020 03:30 PM    Bilirubin, total 0.2 03/02/2020 03:30 PM    ALT (SGPT) 79 (H) 03/02/2020 03:30 PM    AST (SGOT) 32 03/02/2020 03:30 PM    Alk.  phosphatase 115 03/02/2020 03:30 PM    Protein, total 7.6 03/02/2020 03:30 PM    Albumin 3.8 03/02/2020 03:30 PM    Globulin 3.8 03/02/2020 03:30 PM    A-G Ratio 1.0 03/02/2020 03:30 PM        IMPRESSION:   None  No contraindications to planned surgery    Diagnoses and all orders for this visit:    Preop examination    Cervical disc disorder with radiculopathy        Gisela Rasheed NP   3/3/2020

## 2020-03-03 NOTE — H&P
Pre-Admission History and Physical    Patient: Michael Schafer   MRN: [id number]   SSN: [id number]   YOB: 1970   Age: 44 y.o. Sex: female     Patient scheduled for: ACDF C4/5, possible plate removal.  Date of surgery: 3/5/2020. Location of surgery: [facility]. Surgeon: Trell Stanley MD    HPI:  Michael Schafer is a 44 y.o. female with a progression of neck pain with cervicogenic headache and some arm symptoms since a therapist cracked her neck. The pain has been consistent, at least a 3-4/10 and much higher when flared. It has been unresponsive to treatments. Her physical exam is benign other than a Billss on the left. Her MRI demonstrates her previous solid C4-5 fusion. She has a C3-4 left paracentral disc herniation that compresses her spinal cord. There is no signal change. She has a C5-6 degenerative segment that I do not think is of significance. She has a known peripheral neuropathy with EMGs indicating peripheral neuropathy without clear cervical radiculopathy. She reports a pain level of 2/10. This patient has failed the presurgical conservative treatments  including spinal block injections and medications. Pain has impacted the patient's functional ability to perform daily activity. She is being admitted for surgical intervention.          Past Medical History:   Diagnosis Date    Allergic rhinitis     ANURADHA (generalised anxiety disorder)     GERD (gastroesophageal reflux disease)     Lower  GERD    IBS (irritable bowel syndrome)     Nausea & vomiting     Other ill-defined conditions(319.89)     C5/6 HNP    Skin cancer     removed from nose    Sleep apnea     \"mild per patient\" no cpap    Thumb pain 6/7/2010     Social History     Socioeconomic History    Marital status:      Spouse name: Not on file    Number of children: Not on file    Years of education: Not on file    Highest education level: Not on file   Tobacco Use    Smoking status: Never Smoker    Smokeless tobacco: Never Used   Substance and Sexual Activity    Alcohol use: Yes     Comment: few times per week    Drug use: No    Sexual activity: Yes     Partners: Male     Comment: pregnancy test negative     Past Surgical History:   Procedure Laterality Date    HX BACK SURGERY  2012    HX GYN      left tube removal ectopic pregnancy    HX HEENT  9/2011    Basal cell cancer lesion removed from nose    HX LYMPHADENECTOMY Right 8/25/16    HX ORTHOPAEDIC  1999    TMJ    HX OTHER SURGICAL  2013    Spinal Fusion    HX TONSILLECTOMY  2/2007     Family History   Problem Relation Age of Onset    Cancer Mother         breast cancer    Diabetes Father     High Cholesterol Father     Lung Disease Father         COPD and nodule on lung    Heart Disease Father     Gout Father     Cancer Maternal Aunt         breast cancer    Arthritis-osteo Maternal Grandmother         arthritis     Cancer Cousin      Allergies   Allergen Reactions    Ciprofloxacin Diarrhea    Milk Containing Products Diarrhea    Pennsaid [Diclofenac Sodium] Rash    Sulfa (Sulfonamide Antibiotics) Rash    Other Medication Diarrhea     Eggs, patient eats, and gets flu shot yearly with no reaction     Current Outpatient Medications   Medication Sig Dispense Refill    acetaminophen (TYLENOL) 325 mg tablet Take 325 mg by mouth every four (4) hours as needed for Pain.  famotidine (PEPCID) 20 mg tablet Take 10 mg by mouth nightly.  traMADol (ULTRAM) 50 mg tablet Take 50 mg by mouth three (3) times daily.  albuterol (PROVENTIL HFA, VENTOLIN HFA, PROAIR HFA) 90 mcg/actuation inhaler 2 puffs q 6 hrs prn wheezing/cough 1 Inhaler 0    gabapentin (NEURONTIN) 300 mg capsule Take 1-2 Caps by mouth three (3) times daily. Max Daily Amount: 1,800 mg. Indications: neuropathic pain (Patient taking differently: Take 300 mg by mouth daily.  Patient takes at [facility]  Indications: neuropathic pain) 180 Cap 0  methocarbamol (ROBAXIN) 750 mg tablet TAKE 1 TABLET BY MOUTH FOUR TIMES DAILY. (Patient taking differently: 750 mg three (3) times daily.) 60 Tab 0    cyanocobalamin 1,000 mcg tablet Take 2,500 mcg by mouth every other day.  cholecalciferol, vitamin D3, (VITAMIN D3 PO) Take 10,000 Units by mouth daily.  multivitamin (ONE A DAY) tablet Take 1 Tab by mouth daily.  azelastine-fluticasone (DYMISTA) 137-50 mcg/spray spry by Nasal route as needed.  esomeprazole (NEXIUM) 20 mg capsule Take 20 mg by mouth daily.  diphenhydrAMINE (SIMPLY SLEEP) 25 mg tablet Take 12.5 mg by mouth nightly.  loratadine (CLARITIN) 10 mg tablet Take 10 mg by mouth.  gabapentin (NEURONTIN) 600 mg tablet Take 1 Tab by mouth three (3) times daily. Max Daily Amount: 1,800 mg. (Patient taking differently: Take 600 mg by mouth two (2) times a day.) 90 Tab 1       ROS:  Denies chills, fever,night sweats,  bowel or bladder dysfunction, unexplained weight loss/weight gain, chest pain, sob or anxiety. Physical Examination    Gen: Well developed, well nourished 44 y.o. female Visit Vitals  /82 (BP 1 Location: Left arm, BP Patient Position: Sitting)   Pulse 91   Temp 98.2 °F (36.8 °C) (Oral)   Resp 16   Ht 5' 3\" (1.6 m)   Wt 172 lb (78 kg)   SpO2 97%   BMI 30.47 kg/m²    PAIN SCALE: 2/10     CONSTITUTIONAL: The patient is in no apparent distress and is alert and oriented x 3. HEENT: Normocephalic. Hearing grossly intact. NECK: Supple and symmetric. no tenderness, or masses were felt. RESPIRATORY: No labored breathing. CARDIOVASCULAR: The carotid pulses were normal. Peripheral pulses were 2+. CHEST: Normal AP diameter and normal contour without any kyphoscoliosis. LYMPHATIC: No lymphadenopathy was appreciated in the neck, axillae or groin. SKIN:  Negative for scars, rashes, lesions, or ulcers on the right upper, right lower, left upper, left lower and trunk. NEUROLOGICAL: Alert and oriented x 3. Ambulation without assistive device. FWB. EXTREMITIES:  See musculoskeletal.  MUSCULOSKELETAL:  · Head and Neck: Neck pain. Negative for misalignment, asymmetry, crepitation, defects, tenderness masses or effusions. · Left Upper Extremity: Inspection, percussion and palpation performed. Equivocal Billss sign is positive. · Right Upper Extremity: Inspection, percussion and palpation performed. Billss sign is negative. · Spine, Ribs and Pelvis: Inspection, percussion and palpation performed. Negative for misalignment, asymmetry, crepitation, defects, tenderness masses or effusions. · Left Lower Extremity: Inspection, percussion and palpation performed. Negative straight leg raise. · Right Lower Extremity: Inspection, percussion and palpation performed. Negative straight leg raise.        SPINE EXAM:      Cervical spine: Neck is midline. Normal muscle tone. No focal atrophy is noted.     Lumbar spine: No rash, ecchymosis, or gross obliquity. No focal atrophy is noted.          Assessment and Plan    Due to the pt's persistent symptoms unrelieved by conservative measure Cheko Hart is being admitted to [facility] to undergo surgical intervention. The post-operative plan of care consists of physical therapy, home health and a 2 week f/u office visit. We are pending medical clearance by N. The risks, benefits, complications and alternatives to surgery have been discussed in detail with the patient. The patient understands and agrees to proceed.      Tim Hugo, NP-C for Roberta Child MD

## 2020-03-04 ENCOUNTER — ANESTHESIA EVENT (OUTPATIENT)
Dept: SURGERY | Age: 50
End: 2020-03-04
Payer: COMMERCIAL

## 2020-03-05 ENCOUNTER — ANESTHESIA (OUTPATIENT)
Dept: SURGERY | Age: 50
End: 2020-03-05
Payer: COMMERCIAL

## 2020-03-05 ENCOUNTER — HOSPITAL ENCOUNTER (OUTPATIENT)
Age: 50
Discharge: HOME | End: 2020-03-06
Attending: ORTHOPAEDIC SURGERY | Admitting: ORTHOPAEDIC SURGERY
Payer: COMMERCIAL

## 2020-03-05 ENCOUNTER — APPOINTMENT (OUTPATIENT)
Dept: GENERAL RADIOLOGY | Age: 50
End: 2020-03-05
Attending: ORTHOPAEDIC SURGERY
Payer: COMMERCIAL

## 2020-03-05 ENCOUNTER — HOME HEALTH ADMISSION (OUTPATIENT)
Dept: HOME HEALTH SERVICES | Facility: HOME HEALTH | Age: 50
End: 2020-03-05
Payer: COMMERCIAL

## 2020-03-05 DIAGNOSIS — Z98.890 S/P CERVICAL DISCECTOMY: Primary | ICD-10-CM

## 2020-03-05 PROBLEM — M50.20 HNP (HERNIATED NUCLEUS PULPOSUS), CERVICAL: Status: ACTIVE | Noted: 2020-03-05

## 2020-03-05 LAB — HCG UR QL: NEGATIVE

## 2020-03-05 PROCEDURE — 76010000149 HC OR TIME 1 TO 1.5 HR: Performed by: ORTHOPAEDIC SURGERY

## 2020-03-05 PROCEDURE — 77030019605: Performed by: ORTHOPAEDIC SURGERY

## 2020-03-05 PROCEDURE — 77030040922 HC BLNKT HYPOTHRM STRY -A: Performed by: ORTHOPAEDIC SURGERY

## 2020-03-05 PROCEDURE — 81025 URINE PREGNANCY TEST: CPT

## 2020-03-05 PROCEDURE — 77030039266 HC ADH SKN EXOFIN S2SG -A: Performed by: ORTHOPAEDIC SURGERY

## 2020-03-05 PROCEDURE — 97161 PT EVAL LOW COMPLEX 20 MIN: CPT

## 2020-03-05 PROCEDURE — 74011250637 HC RX REV CODE- 250/637: Performed by: NURSE ANESTHETIST, CERTIFIED REGISTERED

## 2020-03-05 PROCEDURE — 74011000250 HC RX REV CODE- 250: Performed by: ORTHOPAEDIC SURGERY

## 2020-03-05 PROCEDURE — 74011250636 HC RX REV CODE- 250/636: Performed by: NURSE ANESTHETIST, CERTIFIED REGISTERED

## 2020-03-05 PROCEDURE — 99218 HC RM OBSERVATION: CPT

## 2020-03-05 PROCEDURE — 77030019908 HC STETH ESOPH SIMS -A: Performed by: ANESTHESIOLOGY

## 2020-03-05 PROCEDURE — 74011250637 HC RX REV CODE- 250/637: Performed by: ORTHOPAEDIC SURGERY

## 2020-03-05 PROCEDURE — 77030027960 HC SPCR CERV VIKOS K2M -G1: Performed by: ORTHOPAEDIC SURGERY

## 2020-03-05 PROCEDURE — 77030010512 HC APPL CLP LIG J&J -C: Performed by: ORTHOPAEDIC SURGERY

## 2020-03-05 PROCEDURE — 76210000006 HC OR PH I REC 0.5 TO 1 HR: Performed by: ORTHOPAEDIC SURGERY

## 2020-03-05 PROCEDURE — 77030008684 HC TU ET CUF COVD -B: Performed by: ANESTHESIOLOGY

## 2020-03-05 PROCEDURE — L0120 CERV FLEX N/ADJ FOAM PRE OTS: HCPCS | Performed by: ORTHOPAEDIC SURGERY

## 2020-03-05 PROCEDURE — 77030026438 HC STYL ET INTUB CARD -A: Performed by: ANESTHESIOLOGY

## 2020-03-05 PROCEDURE — 74011250636 HC RX REV CODE- 250/636

## 2020-03-05 PROCEDURE — 77030031139 HC SUT VCRL2 J&J -A: Performed by: ORTHOPAEDIC SURGERY

## 2020-03-05 PROCEDURE — C1713 ANCHOR/SCREW BN/BN,TIS/BN: HCPCS | Performed by: ORTHOPAEDIC SURGERY

## 2020-03-05 PROCEDURE — 77030013567 HC DRN WND RESERV BARD -A: Performed by: ORTHOPAEDIC SURGERY

## 2020-03-05 PROCEDURE — 77030004402 HC BUR NEUR STRY -C: Performed by: ORTHOPAEDIC SURGERY

## 2020-03-05 PROCEDURE — 74011000250 HC RX REV CODE- 250: Performed by: NURSE ANESTHETIST, CERTIFIED REGISTERED

## 2020-03-05 PROCEDURE — 74011250636 HC RX REV CODE- 250/636: Performed by: NURSE PRACTITIONER

## 2020-03-05 PROCEDURE — 74011250636 HC RX REV CODE- 250/636: Performed by: ORTHOPAEDIC SURGERY

## 2020-03-05 PROCEDURE — 51798 US URINE CAPACITY MEASURE: CPT

## 2020-03-05 PROCEDURE — 76060000033 HC ANESTHESIA 1 TO 1.5 HR: Performed by: ORTHOPAEDIC SURGERY

## 2020-03-05 PROCEDURE — 77030012890: Performed by: ORTHOPAEDIC SURGERY

## 2020-03-05 PROCEDURE — 77030040361 HC SLV COMPR DVT MDII -B: Performed by: ORTHOPAEDIC SURGERY

## 2020-03-05 PROCEDURE — 77030034475 HC MISC IMPL SPN: Performed by: ORTHOPAEDIC SURGERY

## 2020-03-05 PROCEDURE — 74011000272 HC RX REV CODE- 272: Performed by: ORTHOPAEDIC SURGERY

## 2020-03-05 PROCEDURE — 77030029099 HC BN WAX SSPC -A: Performed by: ORTHOPAEDIC SURGERY

## 2020-03-05 PROCEDURE — C1821 INTERSPINOUS IMPLANT: HCPCS | Performed by: ORTHOPAEDIC SURGERY

## 2020-03-05 PROCEDURE — 77030011267 HC ELECTRD BLD COVD -A: Performed by: ORTHOPAEDIC SURGERY

## 2020-03-05 PROCEDURE — 77030020061 HC IV BLD WRMR ADMIN SET 3M -B: Performed by: ANESTHESIOLOGY

## 2020-03-05 PROCEDURE — 77030012406 HC DRN WND PENRS BARD -A: Performed by: ORTHOPAEDIC SURGERY

## 2020-03-05 DEVICE — SCREW SPNL L12MM DIA4MM SELF STARTING ANTR CERV PLT SYS: Type: IMPLANTABLE DEVICE | Site: SPINE CERVICAL | Status: FUNCTIONAL

## 2020-03-05 DEVICE — Z DUP USE 2717610 GRAFT SPNL W14XH8XL11MM CERV LORD W PLUG VIKOS: Type: IMPLANTABLE DEVICE | Site: SPINE CERVICAL | Status: FUNCTIONAL

## 2020-03-05 RX ORDER — ONDANSETRON 2 MG/ML
4 INJECTION INTRAMUSCULAR; INTRAVENOUS
Status: DISCONTINUED | OUTPATIENT
Start: 2020-03-05 | End: 2020-03-06 | Stop reason: HOSPADM

## 2020-03-05 RX ORDER — SODIUM CHLORIDE 0.9 % (FLUSH) 0.9 %
5-40 SYRINGE (ML) INJECTION EVERY 8 HOURS
Status: DISCONTINUED | OUTPATIENT
Start: 2020-03-05 | End: 2020-03-05 | Stop reason: HOSPADM

## 2020-03-05 RX ORDER — PROPOFOL 10 MG/ML
INJECTION, EMULSION INTRAVENOUS AS NEEDED
Status: DISCONTINUED | OUTPATIENT
Start: 2020-03-05 | End: 2020-03-05 | Stop reason: HOSPADM

## 2020-03-05 RX ORDER — HYDROMORPHONE HYDROCHLORIDE 1 MG/ML
1 INJECTION, SOLUTION INTRAMUSCULAR; INTRAVENOUS; SUBCUTANEOUS
Status: DISCONTINUED | OUTPATIENT
Start: 2020-03-05 | End: 2020-03-05 | Stop reason: CLARIF

## 2020-03-05 RX ORDER — KETAMINE HCL 50MG/ML(1)
SYRINGE (ML) INTRAVENOUS AS NEEDED
Status: DISCONTINUED | OUTPATIENT
Start: 2020-03-05 | End: 2020-03-05 | Stop reason: HOSPADM

## 2020-03-05 RX ORDER — NALOXONE HYDROCHLORIDE 0.4 MG/ML
0.1 INJECTION, SOLUTION INTRAMUSCULAR; INTRAVENOUS; SUBCUTANEOUS AS NEEDED
Status: DISCONTINUED | OUTPATIENT
Start: 2020-03-05 | End: 2020-03-05 | Stop reason: HOSPADM

## 2020-03-05 RX ORDER — GLYCOPYRROLATE 0.2 MG/ML
INJECTION INTRAMUSCULAR; INTRAVENOUS AS NEEDED
Status: DISCONTINUED | OUTPATIENT
Start: 2020-03-05 | End: 2020-03-05 | Stop reason: HOSPADM

## 2020-03-05 RX ORDER — ROCURONIUM BROMIDE 10 MG/ML
INJECTION, SOLUTION INTRAVENOUS AS NEEDED
Status: DISCONTINUED | OUTPATIENT
Start: 2020-03-05 | End: 2020-03-05 | Stop reason: HOSPADM

## 2020-03-05 RX ORDER — NALOXONE HYDROCHLORIDE 0.4 MG/ML
0.4 INJECTION, SOLUTION INTRAMUSCULAR; INTRAVENOUS; SUBCUTANEOUS AS NEEDED
Status: DISCONTINUED | OUTPATIENT
Start: 2020-03-05 | End: 2020-03-06 | Stop reason: HOSPADM

## 2020-03-05 RX ORDER — GABAPENTIN 300 MG/1
600 CAPSULE ORAL 3 TIMES DAILY
Status: DISCONTINUED | OUTPATIENT
Start: 2020-03-05 | End: 2020-03-06 | Stop reason: HOSPADM

## 2020-03-05 RX ORDER — HYDROMORPHONE HYDROCHLORIDE 2 MG/ML
0.5 INJECTION, SOLUTION INTRAMUSCULAR; INTRAVENOUS; SUBCUTANEOUS
Status: COMPLETED | OUTPATIENT
Start: 2020-03-05 | End: 2020-03-05

## 2020-03-05 RX ORDER — SODIUM CHLORIDE, SODIUM LACTATE, POTASSIUM CHLORIDE, CALCIUM CHLORIDE 600; 310; 30; 20 MG/100ML; MG/100ML; MG/100ML; MG/100ML
75 INJECTION, SOLUTION INTRAVENOUS CONTINUOUS
Status: DISCONTINUED | OUTPATIENT
Start: 2020-03-05 | End: 2020-03-05 | Stop reason: HOSPADM

## 2020-03-05 RX ORDER — PANTOPRAZOLE SODIUM 40 MG/1
40 TABLET, DELAYED RELEASE ORAL
Status: DISCONTINUED | OUTPATIENT
Start: 2020-03-05 | End: 2020-03-06 | Stop reason: HOSPADM

## 2020-03-05 RX ORDER — DEXAMETHASONE SODIUM PHOSPHATE 4 MG/ML
INJECTION, SOLUTION INTRA-ARTICULAR; INTRALESIONAL; INTRAMUSCULAR; INTRAVENOUS; SOFT TISSUE AS NEEDED
Status: DISCONTINUED | OUTPATIENT
Start: 2020-03-05 | End: 2020-03-05 | Stop reason: HOSPADM

## 2020-03-05 RX ORDER — SODIUM CHLORIDE 0.9 % (FLUSH) 0.9 %
5-40 SYRINGE (ML) INJECTION AS NEEDED
Status: DISCONTINUED | OUTPATIENT
Start: 2020-03-05 | End: 2020-03-06 | Stop reason: HOSPADM

## 2020-03-05 RX ORDER — MIDAZOLAM HYDROCHLORIDE 1 MG/ML
INJECTION, SOLUTION INTRAMUSCULAR; INTRAVENOUS AS NEEDED
Status: DISCONTINUED | OUTPATIENT
Start: 2020-03-05 | End: 2020-03-05 | Stop reason: HOSPADM

## 2020-03-05 RX ORDER — DIPHENHYDRAMINE HCL 25 MG
25 CAPSULE ORAL
Status: DISCONTINUED | OUTPATIENT
Start: 2020-03-05 | End: 2020-03-06 | Stop reason: HOSPADM

## 2020-03-05 RX ORDER — SODIUM CHLORIDE 0.9 % (FLUSH) 0.9 %
5-40 SYRINGE (ML) INJECTION AS NEEDED
Status: DISCONTINUED | OUTPATIENT
Start: 2020-03-05 | End: 2020-03-05 | Stop reason: HOSPADM

## 2020-03-05 RX ORDER — SODIUM CHLORIDE, SODIUM LACTATE, POTASSIUM CHLORIDE, CALCIUM CHLORIDE 600; 310; 30; 20 MG/100ML; MG/100ML; MG/100ML; MG/100ML
INJECTION, SOLUTION INTRAVENOUS
Status: DISCONTINUED | OUTPATIENT
Start: 2020-03-05 | End: 2020-03-05 | Stop reason: HOSPADM

## 2020-03-05 RX ORDER — VANCOMYCIN HYDROCHLORIDE 1 G/20ML
INJECTION, POWDER, LYOPHILIZED, FOR SOLUTION INTRAVENOUS AS NEEDED
Status: DISCONTINUED | OUTPATIENT
Start: 2020-03-05 | End: 2020-03-05 | Stop reason: HOSPADM

## 2020-03-05 RX ORDER — SUCCINYLCHOLINE CHLORIDE 20 MG/ML
INJECTION INTRAMUSCULAR; INTRAVENOUS AS NEEDED
Status: DISCONTINUED | OUTPATIENT
Start: 2020-03-05 | End: 2020-03-05 | Stop reason: HOSPADM

## 2020-03-05 RX ORDER — OXYCODONE HYDROCHLORIDE 5 MG/1
5-10 TABLET ORAL
Status: DISCONTINUED | OUTPATIENT
Start: 2020-03-05 | End: 2020-03-06 | Stop reason: HOSPADM

## 2020-03-05 RX ORDER — DIAZEPAM 5 MG/1
5 TABLET ORAL
Status: DISCONTINUED | OUTPATIENT
Start: 2020-03-05 | End: 2020-03-06 | Stop reason: HOSPADM

## 2020-03-05 RX ORDER — ACETAMINOPHEN 500 MG
1000 TABLET ORAL EVERY 6 HOURS
Status: DISCONTINUED | OUTPATIENT
Start: 2020-03-05 | End: 2020-03-06 | Stop reason: HOSPADM

## 2020-03-05 RX ORDER — LIDOCAINE HYDROCHLORIDE 20 MG/ML
INJECTION, SOLUTION EPIDURAL; INFILTRATION; INTRACAUDAL; PERINEURAL AS NEEDED
Status: DISCONTINUED | OUTPATIENT
Start: 2020-03-05 | End: 2020-03-05 | Stop reason: HOSPADM

## 2020-03-05 RX ORDER — FENTANYL CITRATE 50 UG/ML
50 INJECTION, SOLUTION INTRAMUSCULAR; INTRAVENOUS AS NEEDED
Status: DISCONTINUED | OUTPATIENT
Start: 2020-03-05 | End: 2020-03-05 | Stop reason: HOSPADM

## 2020-03-05 RX ORDER — FENTANYL CITRATE 50 UG/ML
INJECTION, SOLUTION INTRAMUSCULAR; INTRAVENOUS AS NEEDED
Status: DISCONTINUED | OUTPATIENT
Start: 2020-03-05 | End: 2020-03-05 | Stop reason: HOSPADM

## 2020-03-05 RX ORDER — ALBUTEROL SULFATE 0.83 MG/ML
2.5 SOLUTION RESPIRATORY (INHALATION)
Status: DISCONTINUED | OUTPATIENT
Start: 2020-03-05 | End: 2020-03-06 | Stop reason: HOSPADM

## 2020-03-05 RX ORDER — KETOROLAC TROMETHAMINE 15 MG/ML
INJECTION, SOLUTION INTRAMUSCULAR; INTRAVENOUS AS NEEDED
Status: DISCONTINUED | OUTPATIENT
Start: 2020-03-05 | End: 2020-03-05 | Stop reason: HOSPADM

## 2020-03-05 RX ORDER — SCOLOPAMINE TRANSDERMAL SYSTEM 1 MG/1
1 PATCH, EXTENDED RELEASE TRANSDERMAL
Status: DISCONTINUED | OUTPATIENT
Start: 2020-03-05 | End: 2020-03-06 | Stop reason: HOSPADM

## 2020-03-05 RX ORDER — CEFAZOLIN SODIUM 2 G/50ML
2 SOLUTION INTRAVENOUS ONCE
Status: COMPLETED | OUTPATIENT
Start: 2020-03-05 | End: 2020-03-05

## 2020-03-05 RX ORDER — DEXTROSE, SODIUM CHLORIDE, AND POTASSIUM CHLORIDE 5; .45; .15 G/100ML; G/100ML; G/100ML
25 INJECTION INTRAVENOUS CONTINUOUS
Status: DISCONTINUED | OUTPATIENT
Start: 2020-03-05 | End: 2020-03-06 | Stop reason: HOSPADM

## 2020-03-05 RX ORDER — DIPHENHYDRAMINE HYDROCHLORIDE 50 MG/ML
12.5 INJECTION, SOLUTION INTRAMUSCULAR; INTRAVENOUS
Status: DISCONTINUED | OUTPATIENT
Start: 2020-03-05 | End: 2020-03-06 | Stop reason: HOSPADM

## 2020-03-05 RX ORDER — FAMOTIDINE 20 MG/1
20 TABLET, FILM COATED ORAL ONCE
Status: COMPLETED | OUTPATIENT
Start: 2020-03-05 | End: 2020-03-05

## 2020-03-05 RX ORDER — CEFAZOLIN SODIUM 2 G/50ML
2 SOLUTION INTRAVENOUS EVERY 8 HOURS
Status: DISCONTINUED | OUTPATIENT
Start: 2020-03-05 | End: 2020-03-06 | Stop reason: HOSPADM

## 2020-03-05 RX ORDER — FAMOTIDINE 20 MG/1
10 TABLET, FILM COATED ORAL
Status: DISCONTINUED | OUTPATIENT
Start: 2020-03-05 | End: 2020-03-05 | Stop reason: SDUPTHER

## 2020-03-05 RX ORDER — LORAZEPAM 2 MG/ML
1 INJECTION INTRAMUSCULAR
Status: DISCONTINUED | OUTPATIENT
Start: 2020-03-05 | End: 2020-03-06 | Stop reason: HOSPADM

## 2020-03-05 RX ORDER — DIPHENHYDRAMINE HYDROCHLORIDE 50 MG/ML
12.5 INJECTION, SOLUTION INTRAMUSCULAR; INTRAVENOUS
Status: DISCONTINUED | OUTPATIENT
Start: 2020-03-05 | End: 2020-03-05 | Stop reason: HOSPADM

## 2020-03-05 RX ORDER — ACETAMINOPHEN 500 MG
1000 TABLET ORAL ONCE
Status: COMPLETED | OUTPATIENT
Start: 2020-03-05 | End: 2020-03-05

## 2020-03-05 RX ORDER — HYDROMORPHONE HYDROCHLORIDE 1 MG/ML
1 INJECTION, SOLUTION INTRAMUSCULAR; INTRAVENOUS; SUBCUTANEOUS
Status: DISCONTINUED | OUTPATIENT
Start: 2020-03-05 | End: 2020-03-06 | Stop reason: HOSPADM

## 2020-03-05 RX ORDER — ALBUTEROL SULFATE 90 UG/1
2 AEROSOL, METERED RESPIRATORY (INHALATION)
Status: DISCONTINUED | OUTPATIENT
Start: 2020-03-05 | End: 2020-03-05

## 2020-03-05 RX ORDER — ONDANSETRON 2 MG/ML
4 INJECTION INTRAMUSCULAR; INTRAVENOUS ONCE
Status: COMPLETED | OUTPATIENT
Start: 2020-03-05 | End: 2020-03-05

## 2020-03-05 RX ORDER — SODIUM CHLORIDE 0.9 % (FLUSH) 0.9 %
5-40 SYRINGE (ML) INJECTION EVERY 8 HOURS
Status: DISCONTINUED | OUTPATIENT
Start: 2020-03-05 | End: 2020-03-06 | Stop reason: HOSPADM

## 2020-03-05 RX ORDER — SODIUM CHLORIDE, SODIUM LACTATE, POTASSIUM CHLORIDE, CALCIUM CHLORIDE 600; 310; 30; 20 MG/100ML; MG/100ML; MG/100ML; MG/100ML
50 INJECTION, SOLUTION INTRAVENOUS CONTINUOUS
Status: DISCONTINUED | OUTPATIENT
Start: 2020-03-05 | End: 2020-03-05 | Stop reason: HOSPADM

## 2020-03-05 RX ORDER — METHYLENE BLUE 10 MG/ML
INJECTION INTRAVENOUS AS NEEDED
Status: DISCONTINUED | OUTPATIENT
Start: 2020-03-05 | End: 2020-03-05 | Stop reason: HOSPADM

## 2020-03-05 RX ORDER — ONDANSETRON 2 MG/ML
INJECTION INTRAMUSCULAR; INTRAVENOUS AS NEEDED
Status: DISCONTINUED | OUTPATIENT
Start: 2020-03-05 | End: 2020-03-05 | Stop reason: HOSPADM

## 2020-03-05 RX ORDER — NEOSTIGMINE METHYLSULFATE 1 MG/ML
INJECTION INTRAVENOUS AS NEEDED
Status: DISCONTINUED | OUTPATIENT
Start: 2020-03-05 | End: 2020-03-05 | Stop reason: HOSPADM

## 2020-03-05 RX ADMIN — ACETAMINOPHEN 1000 MG: 500 TABLET ORAL at 13:34

## 2020-03-05 RX ADMIN — KETOROLAC TROMETHAMINE 15 MG: 15 INJECTION, SOLUTION INTRAMUSCULAR; INTRAVENOUS at 10:57

## 2020-03-05 RX ADMIN — OXYCODONE HYDROCHLORIDE 10 MG: 5 TABLET ORAL at 20:09

## 2020-03-05 RX ADMIN — DEXAMETHASONE SODIUM PHOSPHATE 10 MG: 4 INJECTION, SOLUTION INTRAMUSCULAR; INTRAVENOUS at 10:13

## 2020-03-05 RX ADMIN — GLYCOPYRROLATE 0.1 MG: 0.2 INJECTION INTRAMUSCULAR; INTRAVENOUS at 09:58

## 2020-03-05 RX ADMIN — FENTANYL CITRATE 50 MCG: 50 INJECTION INTRAMUSCULAR; INTRAVENOUS at 10:03

## 2020-03-05 RX ADMIN — PROPOFOL 150 MG: 10 INJECTION, EMULSION INTRAVENOUS at 10:07

## 2020-03-05 RX ADMIN — GABAPENTIN 600 MG: 300 CAPSULE ORAL at 15:56

## 2020-03-05 RX ADMIN — HYDROMORPHONE HYDROCHLORIDE 0.5 MG: 2 INJECTION INTRAMUSCULAR; INTRAVENOUS; SUBCUTANEOUS at 12:01

## 2020-03-05 RX ADMIN — HYDROMORPHONE HYDROCHLORIDE 0.5 MG: 2 INJECTION INTRAMUSCULAR; INTRAVENOUS; SUBCUTANEOUS at 12:11

## 2020-03-05 RX ADMIN — ROCURONIUM BROMIDE 25 MG: 10 INJECTION, SOLUTION INTRAVENOUS at 10:13

## 2020-03-05 RX ADMIN — Medication 10 ML: at 14:00

## 2020-03-05 RX ADMIN — GLYCOPYRROLATE 0.6 MG: 0.2 INJECTION INTRAMUSCULAR; INTRAVENOUS at 10:57

## 2020-03-05 RX ADMIN — Medication 10 ML: at 09:40

## 2020-03-05 RX ADMIN — LIDOCAINE HYDROCHLORIDE 100 MG: 20 INJECTION, SOLUTION EPIDURAL; INFILTRATION; INTRACAUDAL; PERINEURAL at 10:07

## 2020-03-05 RX ADMIN — Medication 10 ML: at 09:35

## 2020-03-05 RX ADMIN — FENTANYL CITRATE 50 MCG: 50 INJECTION INTRAMUSCULAR; INTRAVENOUS at 09:58

## 2020-03-05 RX ADMIN — MIDAZOLAM HYDROCHLORIDE 2 MG: 2 INJECTION, SOLUTION INTRAMUSCULAR; INTRAVENOUS at 09:58

## 2020-03-05 RX ADMIN — ROCURONIUM BROMIDE 5 MG: 10 INJECTION, SOLUTION INTRAVENOUS at 10:07

## 2020-03-05 RX ADMIN — OXYCODONE HYDROCHLORIDE 5 MG: 5 TABLET ORAL at 13:42

## 2020-03-05 RX ADMIN — ACETAMINOPHEN 1000 MG: 500 TABLET ORAL at 20:10

## 2020-03-05 RX ADMIN — ACETAMINOPHEN 1000 MG: 500 TABLET ORAL at 09:43

## 2020-03-05 RX ADMIN — FENTANYL CITRATE 50 MCG: 50 INJECTION, SOLUTION INTRAMUSCULAR; INTRAVENOUS at 11:30

## 2020-03-05 RX ADMIN — NEOSTIGMINE METHYLSULFATE 5 MG: 1 INJECTION, SOLUTION INTRAVENOUS at 10:57

## 2020-03-05 RX ADMIN — CEFAZOLIN SODIUM 2 G: 2 SOLUTION INTRAVENOUS at 18:04

## 2020-03-05 RX ADMIN — SODIUM CHLORIDE, SODIUM LACTATE, POTASSIUM CHLORIDE, AND CALCIUM CHLORIDE: 600; 310; 30; 20 INJECTION, SOLUTION INTRAVENOUS at 09:58

## 2020-03-05 RX ADMIN — ONDANSETRON 4 MG: 2 INJECTION INTRAMUSCULAR; INTRAVENOUS at 10:13

## 2020-03-05 RX ADMIN — HYDROMORPHONE HYDROCHLORIDE 0.5 MG: 2 INJECTION INTRAMUSCULAR; INTRAVENOUS; SUBCUTANEOUS at 11:30

## 2020-03-05 RX ADMIN — PANTOPRAZOLE SODIUM 40 MG: 40 TABLET, DELAYED RELEASE ORAL at 15:56

## 2020-03-05 RX ADMIN — Medication 50 MG: at 10:07

## 2020-03-05 RX ADMIN — GABAPENTIN 600 MG: 300 CAPSULE ORAL at 22:20

## 2020-03-05 RX ADMIN — FENTANYL CITRATE 50 MCG: 50 INJECTION, SOLUTION INTRAMUSCULAR; INTRAVENOUS at 11:51

## 2020-03-05 RX ADMIN — CEFAZOLIN 2 G: 10 INJECTION, POWDER, FOR SOLUTION INTRAVENOUS at 10:10

## 2020-03-05 RX ADMIN — Medication 10 ML: at 22:27

## 2020-03-05 RX ADMIN — FAMOTIDINE 20 MG: 20 TABLET ORAL at 09:44

## 2020-03-05 RX ADMIN — DEXTROSE MONOHYDRATE, SODIUM CHLORIDE, AND POTASSIUM CHLORIDE 25 ML/HR: 50; 4.5; 1.49 INJECTION, SOLUTION INTRAVENOUS at 13:35

## 2020-03-05 RX ADMIN — ONDANSETRON 4 MG: 2 INJECTION INTRAMUSCULAR; INTRAVENOUS at 11:30

## 2020-03-05 RX ADMIN — SUCCINYLCHOLINE CHLORIDE 140 MG: 20 INJECTION, SOLUTION INTRAMUSCULAR; INTRAVENOUS at 10:13

## 2020-03-05 RX ADMIN — SODIUM CHLORIDE, SODIUM LACTATE, POTASSIUM CHLORIDE, AND CALCIUM CHLORIDE 75 ML/HR: 600; 310; 30; 20 INJECTION, SOLUTION INTRAVENOUS at 09:36

## 2020-03-05 RX ADMIN — PHENOL 1 SPRAY: 1.4 SPRAY ORAL at 15:58

## 2020-03-05 RX ADMIN — ONDANSETRON 4 MG: 2 INJECTION INTRAMUSCULAR; INTRAVENOUS at 10:52

## 2020-03-05 RX ADMIN — HYDROMORPHONE HYDROCHLORIDE 0.5 MG: 2 INJECTION INTRAMUSCULAR; INTRAVENOUS; SUBCUTANEOUS at 11:51

## 2020-03-05 NOTE — PROGRESS NOTES
Informed Nathaliadarrius Myers NP of patient's report of numbness and tingling in both the right and left hands.

## 2020-03-05 NOTE — ANESTHESIA PREPROCEDURE EVALUATION
Relevant Problems   No relevant active problems       Anesthetic History     PONV          Review of Systems / Medical History  Patient summary reviewed, nursing notes reviewed and pertinent labs reviewed    Pulmonary        Sleep apnea: No treatment           Neuro/Psych             Comments: No significant myelopathic symptoms from C4C5 HNP. Cardiovascular  Within defined limits                Exercise tolerance: >4 METS  Comments: 2020 ECG: IRBBB & LAFB noted.    GI/Hepatic/Renal     GERD: well controlled           Endo/Other        Obesity and arthritis     Other Findings            Physical Exam    Airway  Mallampati: II  TM Distance: 4 - 6 cm  Neck ROM: decreased range of motion        Cardiovascular  Regular rate and rhythm,  S1 and S2 normal,  no murmur, click, rub, or gallop  Rhythm: regular  Rate: normal         Dental  No notable dental hx       Pulmonary  Breath sounds clear to auscultation               Abdominal  Abdominal exam normal       Other Findings            Anesthetic Plan    ASA: 2  Anesthesia type: general          Induction: Intravenous  Anesthetic plan and risks discussed with: Patient

## 2020-03-05 NOTE — ANESTHESIA POSTPROCEDURE EVALUATION
Procedure(s):  ANTERIOR CERVICAL DISCECTOMY WITH FUSION C4/5/PLATE [id number]. general    Anesthesia Post Evaluation      Multimodal analgesia: multimodal analgesia used between 6 hours prior to anesthesia start to PACU discharge  Patient location during evaluation: PACU  Patient participation: complete - patient participated  Level of consciousness: awake  Pain score: 5  Pain management: adequate  Airway patency: patent  Anesthetic complications: no  Cardiovascular status: acceptable  Respiratory status: acceptable  Hydration status: acceptable  Post anesthesia nausea and vomiting:  none      Vitals Value Taken Time   /68 3/5/2020 11:43 AM   Temp 36.5 °C (97.7 °F) 3/5/2020 11:16 AM   Pulse 64 3/5/2020 11:47 AM   Resp 14 3/5/2020 11:47 AM   SpO2 100 % 3/5/2020 11:47 AM   Vitals shown include unvalidated device data.

## 2020-03-05 NOTE — PROGRESS NOTES
Problem: Falls - Risk of  Goal: *Absence of Falls  Description  Document Jimenawallyne Vanna Fall Risk and appropriate interventions in the flowsheet.   Outcome: Progressing Towards Goal  Note: Fall Risk Interventions:  Mobility Interventions: Communicate number of staff needed for ambulation/transfer, PT Consult for mobility concerns, Patient to call before getting OOB, Utilize walker, cane, or other assistive device         Medication Interventions: Assess postural VS orthostatic hypotension, Patient to call before getting OOB, Teach patient to arise slowly    Elimination Interventions: Call light in reach, Patient to call for help with toileting needs    History of Falls Interventions: Vital signs minimum Q4HRs X 24 hrs (comment for end date), Door open when patient unattended         Problem: Patient Education: Go to Patient Education Activity  Goal: Patient/Family Education  Outcome: Progressing Towards Goal

## 2020-03-05 NOTE — PROGRESS NOTES
Date of Surgery: OR today  Surgery: ACDF C4/5, plate removal  VSS  Wound: Dressing clean, dry and intact  CRISTI: 0, to suction  :  Due to void, patient has urge now   PT: to see patient  Swallowing ok. Neuro intact. Moving all extremities. 4/5 strength to BUE. Pre op pain: neck pain and cervicogenic headache  Post op pain: patient states she really only had pain with movement so she is unable to tell if this has improved. In Soft collar now. She states she had some intermittent, positional hand numbness pre-op and experienced this for a few moments earlier.      London Whatley, MENG

## 2020-03-05 NOTE — INTERVAL H&P NOTE
H&P Update:  Jewel Fregoso was seen and examined. History and physical has been reviewed. The patient has been examined.  There have been no significant clinical changes since the completion of the originally dated History and Physical.

## 2020-03-05 NOTE — ROUTINE PROCESS
End of Shift Note     Bedside and verbal shift change report given to Jay Tian RN (On coming nurse) by Marita Juarez RN (Off going nurse).   Report included the following information:      --Procedure Summary     --MAR,     --Recent Results     --Med Rec Status    SBAR Recommendations: Monitor Pain    Issues for Provider to address N/A          Activity This Shift     [] Bed Rest Order   [] Refused   [] Dangled    [] TDWB         Ambulating:     [x] Bathroom     [] BSC     [x] Room/Hallway      Up in Chair for meals    [x]Yes [] No   Voiding       [x] Yes  [] No  Samuels          [] Yes  [x] No  Incontinent [] Yes  [x] No    DUE TO VOID N/A POUR        [] Yes [x] No  Purewick    [] Yes [x] No  New Onset [] Yes [x] No Straight Cath   []Yes  [x] No  Condom Cath  [] Yes [x] No  MD Called      [] Yes  [x] No   Blood Sugars Managed []Yes [x] No    Bowels Moved [] Yes [x] No    Incontinent     [] Yes [x] No Passed Gas [x]Yes [] No    New Onset  []Yes [x] No        MD Called []Yes  [x] No     CHG Bath Done     Before Surgery     After Surgery      [] Yes  [] No  [] Yes  [] No       Drain Removed [] Yes  [x] No [] N/A    Dressing Changed [] Yes   [x] No [] N/A      Nausea/Vomiting [] Yes   [x] No     Ice Packs Changed [x] Yes   [] No  [] N/A    Incentive Spirometer  [x] Yes  [] No      SCD Pumps On     Ankle Pumping  [x] Yes   [] No      [x] Yes   [] No        Telemetry Monitoring [] Yes   [x] No   Rhythm NSR

## 2020-03-05 NOTE — PROGRESS NOTES
Reason for Admission:  HNP (herniated nucleus pulposus) with myelopathy, cervical [M50.00]  HNP (herniated nucleus pulposus), cervical [M50.20]                 RRAT Score:   8%           Plan for utilizing home health: [facility]                     Likelihood of Readmission:   LOW                         Transition of Care Plan:              Initial assessment completed with patient. Cognitive status of patient: oriented to time, place, person and situation. Face sheet information confirmed:  yes. The patient designates Keon Bruno, spouse ([phone]) to participate in her discharge plan and to receive any needed information. This patient lives in a single family home with spouse. Patient was able to navigate steps as needed. Prior to hospitalization, patient was considered to be independent with ADLs/IADLS : yes . Patient has a current ACP document on file: no  The patient's family will be available to transport patient home upon discharge. The patient already has raised toilet seat medical equipment available in the home. Patient is not currently active with home health. Patient has not stayed in a skilled nursing facility or rehab. This patient is on dialysis :no    List of available Home Health agencies were provided and reviewed with the patient prior to discharge. Freedom of choice signed: yes, for [facility] and referral sent. Currently, the discharge plan is [facility]. CM will continue to monitor for transitional needs. The patient states that she can obtain her medications from the pharmacy, and take her medications as directed. Patient's current insurance is [employer] and [employer]      Care Management Interventions  PCP Verified by CM:  Yes  Last Visit to PCP: 03/02/20  Mode of Transport at Discharge: Self  Transition of Care Consult (CM Consult): Discharge Planning, [address]: Yes  Current Support Network: Lives with Spouse  Confirm Follow Up Transport: Self  The Plan for Transition of Care is Related to the Following Treatment Goals : home with home health  The Patient and/or Patient Representative was Provided with a Choice of Provider and Agrees with the Discharge Plan?: Yes  Freedom of Choice List was Provided with Basic Dialogue that Supports the Patient's Individualized Plan of Care/Goals, Treatment Preferences and Shares the Quality Data Associated with the Providers?: Yes  Discharge Location  Discharge Placement: Home with home health      VANESSA Thompson, RN  Pager # [phone]  Care Manager

## 2020-03-05 NOTE — BRIEF OP NOTE
BRIEF OPERATIVE NOTE    Date of Procedure: 3/5/2020   Preoperative Diagnosis: HNP (herniated nucleus pulposus) with myelopathy, cervical [M50.00]  Postoperative Diagnosis: HNP (herniated nucleus pulposus) with myelopathy, cervical [M50.00]    Procedure(s):  ANTERIOR CERVICAL DISCECTOMY WITH FUSION C4/5/PLATE REMOVAL R9/5/G-BYH/M2R  Surgeon(s) and Role:     Belinda Matos MD - Primary         Surgical Assistant: 0    Surgical Staff:  Circ-1: Ramírez Petty RN  Radiology Technician: RT Radha  Scrub Tech-1: Ever [place]  Surg Asst-1: Anurag Covington  Event Time In Time Out   Incision Start 1021    Incision Close       Anesthesia: General   Estimated Blood Loss: 5  Specimens: * No specimens in log *   Findings: hnp   Complications: 0  Implants:   Implant Name Type Inv.  Item Serial No.  Lot No. LRB No. Used Action   SPACER BNE CERV [id number] -- Bernie Oconnell W/PLUG - [id number]  SPACER BNE CERV [id number] -- Annie Grosser ALLOGRAFT W/PLUG [id number] [employer] [facility]  N/A 1 Implanted

## 2020-03-05 NOTE — PROGRESS NOTES
Albuterol nebulizer solution was therapeutically interchanged for albuterol inhaler per the P&T Committee approved Therapeutic Interchanges Policy.     Lou Tian [facility], Pharmacist  3/5/2020 1:05 PM

## 2020-03-05 NOTE — OP NOTES
[address] [place] [address]   OPERATIVE REPORT    Name:  Gladys Bee  MR#:   [id number]  :  1970  ACCOUNT #:  [de-identified]  DATE OF SERVICE:  2020    PREOPERATIVE DIAGNOSES:  Herniated nucleus pulposus, C4-5; post-laminectomy syndrome, cervical.    POSTOPERATIVE DIAGNOSES:  Herniated nucleus pulposus, C4-5; post-laminectomy syndrome, cervical.    PROCEDURE PERFORMED:  Anterior cervical decompression and fusion, C4-5; structural allograft x1; anterior cervical plate fixation, K7-U2 with K2M locking plate and screws; removal of hardware, anterior cervical plate at Z5-9. SURGEON:  Jessee Garner MD    ASSISTANT:  None. ANESTHESIA:  General endotracheal.    COMPLICATIONS:  No complications. SPECIMENS REMOVED:  No specimens. IMPLANTS:  K2M anterior cervical locking plate and screws. EXPLANT:  Cervical locking plate, unknown type at C5-6. ESTIMATED BLOOD LOSS:  5 mL. FINDINGS:  The patient had a central disc herniation at C4-5 going inferiorly behind the C5 body. OPERATION:  Following induction of general endotracheal anesthesia, the patient was placed with the head in neutral position on Leon headrest.  She was prepped and draped in usual fashion. Right-sided approach was utilized. Sternocleidomastoid and great vessels were mobilized laterally. Esophagus and trachea were mobilized medially. With blunt finger dissection, the prevertebral fascia was entered and C-arm image verified our surgical level. Previous plate at S7/2  was readily seen. It was surrounded  in the scar. Carefully dissected the scar from the plate. It was an unknown type of plate we used. We took a micro star drive tool, which we found, and was able to remove the four locking screws. The C5/6 segment again was radiographically fused. Attention was then turned to the C4/5 disc    Albin pins were placed in the bodies of 4 and 5.   Cloward self-retaining retractor was placed under the cover of the longus colli musculature bilaterally. Annular tissue was incised. A radical discectomy done back to the posterior margin. Posterior marginal osteophytes were thinned with a high-speed bur and resected with a 4.0 Ida curette and sella punch. Posterior longitudinal ligament was debrided. Extruded nuclear material could be seen in the midline to slightly left paramedian position, compressing the cord and being inferior to the disk space. By debriding the ligament, we were able to remove the extruded disk material and decompress the cord. Various micro nerve hooks and angled curettes were utilized to confirm decompression behind 5. Endplates prepared. A #8 structural allograft tamped firmly in to place with excellent bony apposition. An anterior cervical locking plate was utilized, J7E type, with two screws in the C4, two in C5, and the locking device engaged. The wound was copiously irrigated. There was no apparent bleeding. Bone wax had been placed in the holes from the previous plate. Vancomycin powder instilled for infection prophylaxis. A deep drain utilized per routine. The neck was then closed in layers and the skin closed with a subcuticular suture and Dermabond. A sterile occlusive dressing placed upon the wound. All counts were correct.       MD MARCELLUS [id number]  D:  03/05/2020 11:12  T:  03/05/2020 17:34  JOB #:  [id number]

## 2020-03-05 NOTE — PERIOP NOTES
TRANSFER - OUT REPORT:    Verbal report given to [facility] RN(name) on Maryellen Schaumann  being transferred to [address] [place] [address](unit) for routine post - op       Report consisted of patients Situation, Background, Assessment and   Recommendations(SBAR). Information from the following report(s) SBAR, OR Summary, Procedure Summary, Intake/Output and MAR was reviewed with the receiving nurse. Lines:   Peripheral IV 03/05/20 Left; Outer Wrist (Active)   Site Assessment Clean, dry, & intact 3/5/2020 11:13 AM   Phlebitis Assessment 0 3/5/2020 11:13 AM   Infiltration Assessment 0 3/5/2020 11:13 AM   Dressing Status Clean, dry, & intact 3/5/2020 11:13 AM   Dressing Type Transparent 3/5/2020 11:13 AM   Hub Color/Line Status Pink; Infusing;Patent 3/5/2020 11:13 AM   Alcohol Cap Used No 3/5/2020  9:57 AM       Peripheral IV 03/05/20 Left; Inner Forearm (Active)   Site Assessment Clean, dry, & intact 3/5/2020 11:13 AM   Phlebitis Assessment 0 3/5/2020 11:13 AM   Infiltration Assessment 0 3/5/2020 11:13 AM   Dressing Status Clean, dry, & intact 3/5/2020 11:13 AM   Dressing Type Transparent 3/5/2020 11:13 AM   Hub Color/Line Status Pink;Capped; Patent 3/5/2020 11:13 AM   Alcohol Cap Used No 3/5/2020  9:58 AM        Opportunity for questions and clarification was provided.       Patient transported with:   O2 @ 3 liters  Tech

## 2020-03-05 NOTE — PROGRESS NOTES
PHYSICAL THERAPY EVALUATION AND DISCHARGE    Patient: Milagro Pendleton (27 y.o. female)  Date: 3/5/2020  Primary Diagnosis: HNP (herniated nucleus pulposus) with myelopathy, cervical [M50.00]  HNP (herniated nucleus pulposus), cervical [M50.20]  Procedure(s) (LRB):  ANTERIOR CERVICAL DISCECTOMY WITH FUSION C4/5/PLATE NSNXPED/F-FPB/H2Z (N/A) Day of Surgery   Precautions:   Fall  PLOF: Patient reports she was independent with ADLs and functional mobility without AD    ASSESSMENT :  Patient is a 44 y.o. female s/p ACDF C4-C5 with plate removal POD#0. Pt received semi reclined in room agreeable to PT evaluation/treatment. She reports 5/10 surgical site pain, but willingness to participate. Educated pt on cervical precautions; she verbalizes understanding. Pt performs bed mobility and transfers at Stand by level of assistance. She denies dizziness/lightheadedness when mobilizing to EOB and standing. Pt ambulates in hallway for 150 ft without AD, good static and fair dynamic, slightly increased lateral trunk excursion, no LOB. Her dynamic balance continues to improve as she ambulates. Pt expresses she does not usually walk with out shoes due to B plantar fascitis. Patient is at supervision level of assist with mobility therefore can be assisted by nursing staff. Patient is safe from mobility stand point for discharge to home when medically stable. Patient does not require further skilled intervention at this level of care, therefore will be discharged from PT caseload. PLAN :  Recommendations and Planned Interventions:   No formal PT needs identified at this time.   Discharge Recommendations: None  Further Equipment Recommendations for Discharge: N/A     SUBJECTIVE:   Patient stated  I can walk without it \" RW.    OBJECTIVE DATA SUMMARY:     Past Medical History:   Diagnosis Date    Allergic rhinitis     ANURADHA (generalised anxiety disorder)     GERD (gastroesophageal reflux disease)     Lower  GERD    IBS (irritable bowel syndrome)     Nausea & vomiting     Other ill-defined conditions(799.89)     C5/6 HNP    Skin cancer     removed from nose    Sleep apnea     \"mild per patient\" no cpap    Thumb pain 6/7/2010     Past Surgical History:   Procedure Laterality Date    HX BACK SURGERY  2012    HX GYN      left tube removal ectopic pregnancy    HX HEENT  9/2011    Basal cell cancer lesion removed from nose    HX LYMPHADENECTOMY Right 8/25/16    HX ORTHOPAEDIC  1999    TMJ    HX OTHER SURGICAL  2013    Spinal Fusion    HX TONSILLECTOMY  2/2007     Barriers to Learning/Limitations: None  Compensate with: N/A  Home Situation:   Home Situation  Home Environment: Private residence  # Steps to Enter: 3  One/Two Story Residence: Two story  # of Interior Steps: 14  Living Alone: No  Support Systems: Spouse/Significant Other/Partner  Patient Expects to be Discharged to[de-identified] Private residence  Current DME Used/Available at Home: Adaptive dressing aides, Raised toilet seat  Critical Behavior:     Psychosocial  Purposeful Interaction: Yes  Pt Identified Daily Priority: Clinical issues (comment)  Caritas Process: Nurture loving kindness;Establish trust;Teaching/learning; Attend basic human needs;Create healing environment  Caring Interventions: Reassure  Reassure: Therapeutic listening; Informing  Skin Condition/Temp: Warm     Skin Integrity: Incision (comment)  Skin Integumentary  Skin Color: Appropriate for ethnicity  Skin Condition/Temp: Warm  Skin Integrity: Incision (comment)  Turgor: Non-tenting  Hair Growth: Present  Varicosities: Absent     Strength:    Strength:  Within functional limits    Tone & Sensation:   Tone: Normal  Sensation: Intact        Range Of Motion:  AROM: Within functional limits    Functional Mobility:  Bed Mobility:  Supine to Sit: Stand-by assistance  Scooting: Stand-by assistance  Transfers:  Sit to Stand: Stand-by assistance  Stand to Sit: Stand-by assistance    Balance:   Sitting: Intact  Standing: Impaired  Standing - Static: Good  Standing - Dynamic : Fair    Ambulation/Gait Training:  Distance (ft): 100 Feet (ft)  Assistive Device: (none)  Ambulation - Level of Assistance: Stand-by assistance  Base of Support: Center of gravity altered  Speed/Maday: Slow  Interventions: Safety awareness training;Verbal cues     Pain:  Pain level pre-treatment: 5/10   Pain level post-treatment: 5/10  Pain Intervention(s): Medication (see MAR); Rest, Ice, Repositioning   Response to intervention: Nurse notified, See doc flow    Activity Tolerance:   Good  Please refer to the flowsheet for vital signs taken during this treatment. After treatment:   [x]         Patient left in no apparent distress sitting up in recliner  []         Patient left in no apparent distress in bed  [x]         Call bell left within reach  [x]         Nursing notified  []         Caregiver present  []         Bed alarm activated  []         SCDs applied    COMMUNICATION/EDUCATION:   [x]         Role of Physical Therapy in the acute care setting. [x]         Fall prevention education was provided and the patient/caregiver indicated understanding. [x]         Patient/family have participated as able in goal setting and plan of care. [x]         Patient/family agree to work toward stated goals and plan of care. []         Patient understands intent and goals of therapy, but is neutral about his/her participation. []         Patient is unable to participate in goal setting/plan of care: ongoing with therapy staff.  []         Other:     Thank you for this referral.  Ann Griffin, PT   Time Calculation: 20 mins      Eval Complexity: History: LOW Complexity : Zero comorbidities / personal factors that will impact the outcome / POCExam:LOW Complexity : 1-2 Standardized tests and measures addressing body structure, function, activity limitation and / or participation in recreation  Presentation: LOW Complexity : Stable, uncomplicated  Clinical Decision Making:Low Complexity    Overall Complexity:LOW Wound Care: Bacitracin

## 2020-03-05 NOTE — ROUTINE PROCESS
TRANSFER - IN REPORT:    Verbal report received from [address] [place] [address] Delfin RN(name) on Gretel Chávez  being received from [facility]) for routine post - op      Report consisted of patients Situation, Background, Assessment and   Recommendations(SBAR). Information from the following report(s) SBAR, Kardex, OR Summary, Procedure Summary, Intake/Output, MAR, Recent Results and Med Rec Status was reviewed with the receiving nurse. Opportunity for questions and clarification was provided. Assessment completed upon patients arrival to unit and care assumed.

## 2020-03-06 VITALS
HEART RATE: 77 BPM | RESPIRATION RATE: 16 BRPM | DIASTOLIC BLOOD PRESSURE: 61 MMHG | BODY MASS INDEX: 29.15 KG/M2 | WEIGHT: 164.5 LBS | OXYGEN SATURATION: 97 % | TEMPERATURE: 97 F | HEIGHT: 63 IN | SYSTOLIC BLOOD PRESSURE: 96 MMHG

## 2020-03-06 PROCEDURE — 74011250636 HC RX REV CODE- 250/636: Performed by: ORTHOPAEDIC SURGERY

## 2020-03-06 PROCEDURE — 92610 EVALUATE SWALLOWING FUNCTION: CPT

## 2020-03-06 PROCEDURE — 74011250637 HC RX REV CODE- 250/637: Performed by: ORTHOPAEDIC SURGERY

## 2020-03-06 PROCEDURE — 97165 OT EVAL LOW COMPLEX 30 MIN: CPT

## 2020-03-06 RX ORDER — OXYCODONE HYDROCHLORIDE 5 MG/1
5 TABLET ORAL
Qty: 20 TAB | Refills: 0 | Status: SHIPPED | OUTPATIENT
Start: 2020-03-06 | End: 2020-03-11

## 2020-03-06 RX ADMIN — ACETAMINOPHEN 1000 MG: 500 TABLET ORAL at 01:55

## 2020-03-06 RX ADMIN — OXYCODONE HYDROCHLORIDE 5 MG: 5 TABLET ORAL at 10:40

## 2020-03-06 RX ADMIN — Medication 10 ML: at 06:27

## 2020-03-06 RX ADMIN — CEFAZOLIN SODIUM 2 G: 2 SOLUTION INTRAVENOUS at 10:40

## 2020-03-06 RX ADMIN — CEFAZOLIN SODIUM 2 G: 2 SOLUTION INTRAVENOUS at 01:54

## 2020-03-06 RX ADMIN — PANTOPRAZOLE SODIUM 40 MG: 40 TABLET, DELAYED RELEASE ORAL at 08:05

## 2020-03-06 RX ADMIN — GABAPENTIN 600 MG: 300 CAPSULE ORAL at 08:04

## 2020-03-06 RX ADMIN — OXYCODONE HYDROCHLORIDE 10 MG: 5 TABLET ORAL at 01:59

## 2020-03-06 NOTE — PROGRESS NOTES
OT order received and chart reviewed. Patient seen for skilled OT evaluation and is safe for discharge home when medically stable. Full note to follow.       Thank you for the referral.    Jazmin Clarke MS, OTR/L

## 2020-03-06 NOTE — ROUTINE PROCESS
Bedside shift change report given to Faith Fields RN (oncoming nurse) by Noel Gao RN (offgoing nurse). Report included the following information SBAR, Kardex, Intake/Output, MAR, Recent Results and Med Rec Status.

## 2020-03-06 NOTE — PROGRESS NOTES
Problem: Dysphagia (Adult)  Goal: *Acute Goals and Plan of Care (Insert Text)  Description  Dysphagia Present:   No    Recommendations:  Diet: per MD recs  Meds: as tolerated  Aspiration Precautions    Patient will:  1. Participate in training and education related to continued aspiration risk, diet recs and compensatory strategies (goal met). Outcome: Resolved/Met  SPEECH LANGUAGE PATHOLOGY BEDSIDE SWALLOW EVALUATION AND DISCHARGE    Patient: Patti So (84 y.o. female)  Date: 3/6/2020  Primary Diagnosis: HNP (herniated nucleus pulposus) with myelopathy, cervical [M50.00]  HNP (herniated nucleus pulposus), cervical [M50.20]  Procedure(s) (LRB):  ANTERIOR CERVICAL DISCECTOMY WITH FUSION C4/5/PLATE [id number] (N/A) 1 Day Post-Op   Precautions: aspiration,  Fall  PLOF: as per H&P    ASSESSMENT :  Clinical beside swallow eval completed per MD orders. Pt A&Ox4. Functional communication. Intelligibility >90%. Cognitive-linguistic function appears intact. OM examination revealed oral motor structures functional for mastication and deglutition. Presented with thin liquid, puree, and solid trials. Exhibited functional bolus cohesion, manipulation and A-P transit. Further exhibited functional swallow timing/reflex and hyolaryngeal excursion. Pt able to manipulate and clear with 0 clinical s/s aspiration and/or oropharyngeal dysphagia. Pt safe for soft solids, thin liquid diet. 0 formal ST needs for dysphagia indicated at this time. SLP educated pt on role of speech therapist in current setting with re: speech/swallow; verbalized comprehension. SLP available for re-evaluation if indicated by MD. Results d/w RN, Diego Dillard. Pt reports occasional coughing and throat clearing, more so following solids vs liquids.   Educated pt to basic compensatory swallow strategies (small bites/ sips, repeat swallows, alternate solids/ liquids, sitting upright for all po, decreased rate of intake), possible causes of dysphagia: swelling, stretching and possible current swallow physiology with possible decreased epiglottic inversion due to swelling, and s/sx aspiration. Notified pt to contact surgeon if s/sx dysphagia do not improve within the next couple of weeks and SLP can complete OP MBS at that time. Pt verbalized understanding and agreed. Thank you for this referral.   Kailee Chand MS, CCC/SLP     PLAN :  Recommendations and Planned Interventions:  No formal ST needs ID'd for dysphagia. Eval only. Discharge Recommendations: pending surgery f/u, if pt continues to report dysphagia     SUBJECTIVE:   Patient stated I read up on this before I had the surgery so I know what to expect.     OBJECTIVE:     Past Medical History:   Diagnosis Date    Allergic rhinitis     ANURADHA (generalised anxiety disorder)     GERD (gastroesophageal reflux disease)     Lower  GERD    IBS (irritable bowel syndrome)     Nausea & vomiting     Other ill-defined conditions(799.89)     C5/6 HNP    Skin cancer     removed from nose    Sleep apnea     \"mild per patient\" no cpap    Thumb pain 6/7/2010     Past Surgical History:   Procedure Laterality Date    HX BACK SURGERY  2012    HX GYN      left tube removal ectopic pregnancy    HX HEENT  9/2011    Basal cell cancer lesion removed from nose    HX LYMPHADENECTOMY Right 8/25/16    HX ORTHOPAEDIC  1999    TMJ    HX OTHER SURGICAL  2013    Spinal Fusion    HX TONSILLECTOMY  2/2007     Home Situation:   Home Situation  Home Environment: Private residence  # Steps to Enter: 3  One/Two Story Residence: Two story  # of Interior Steps: 14  Living Alone: No  Support Systems: Spouse/Significant Other/Partner  Patient Expects to be Discharged to[de-identified] Private residence  Current DME Used/Available at Home: Adaptive dressing aides, Raised toilet seat    Diet prior to admission: regular/ thin  Current Diet:  soft solids/ thin     Cognitive and Communication Status:  Neurologic State: Alert  Orientation Level: Oriented X4, Appropriate for age  Cognition: Appropriate decision making, Appropriate for age attention/concentration, Appropriate safety awareness  Perception: Appears intact  Perseveration: No perseveration noted  Safety/Judgement: Awareness of environment  Oral Assessment:  Oral Assessment  Labial: No impairment  Dentition: Natural;Intact; Full  Oral Hygiene: wfl  Lingual: No impairment  Velum: No impairment  Mandible: Restricted  P.O. Trials:  Patient Position: Whitney chair  Vocal quality prior to P.O.: No impairment  Consistency Presented: Mechanical soft; Thin liquid  How Presented: Self-fed/presented;Straw     Bolus Acceptance: No impairment  Bolus Formation/Control: No impairment     Propulsion: No impairment  Oral Residue: None  Initiation of Swallow: No impairment  Laryngeal Elevation: Weak  Aspiration Signs/Symptoms: None  Pharyngeal Phase Characteristics: Suspected pharyngeal residue(per pt report)  Effective Modifications: Alternate liquids/solids;Small sips and bites  Cues for Modifications: None       Oral Phase Severity: No impairment  Pharyngeal Phase Severity : Mild    PAIN:  Pain level pre-treatment: 2/10   Pain level post-treatment: 2/10, RN aware, pain meds admin  Pain Intervention(s): Medication (see MAR); Rest, Ice, Repositioning   Response to intervention: Nurse notified, See doc flow    After evaluation:   [x]            Patient left in no apparent distress sitting up in chair  []            Patient left in no apparent distress in bed  [x]            Call bell left within reach  [x]            Nursing notified  []            Family present  []            Caregiver present  []            Bed alarm activated      COMMUNICATION/EDUCATION:   [x]            Aspiration precautions; swallow safety; compensatory techniques. [x]            Patient/family have participated as able in goal setting and plan of care. []            Patient/family agree to work toward stated goals and plan of care.   []            Patient understands intent and goals of therapy; neutral about participation. []            Patient unable to participate in goal setting/plan of care; educ ongoing with interdisciplinary staff  []         Posted safety precautions in patient's room.     Thank you for this referral.  Kailee Chand MS, CCC/SLP  Time Calculation: 16 mins

## 2020-03-06 NOTE — PROGRESS NOTES
Problem: Self Care Deficits Care Plan (Adult)  Goal: *Acute Goals and Plan of Care (Insert Text)  Outcome: Resolved/Met   OCCUPATIONAL THERAPY EVALUATION/DISCHARGE    Patient: Michael Schafer (2 y.o. female)  Date: 3/6/2020  Primary Diagnosis: HNP (herniated nucleus pulposus) with myelopathy, cervical [M50.00]  HNP (herniated nucleus pulposus), cervical [M50.20]  Procedure(s) (LRB):  ANTERIOR CERVICAL DISCECTOMY WITH FUSION C4/5/PLATE [id number] (N/A) 1 Day Post-Op   Precautions:   Fall; Spinal/Cervical  PLOF: Pt reports she lives with her  and 2 children. Pt was (I) with basic self-care/ADLs, IADLs, and functional mobility without AD PTA. ASSESSMENT AND RECOMMENDATIONS:  Upon entering room, pt supine with HOB elevated, alert, and agreeable to therapy session. Based on the objective data described below, the patient presents she is Mod(I) with basic self-care/ADLs. Reviewed spinal/cervical precautions with pt able to recall 4/4. Pt denied abnormal sensation in BUEs. Pt performed toilet transfers without AD, no LOB noted. Will defer to PT for further functional balance and mobility tasks. Pt is able to perform LB dressing using cross legs method, while following precautions, without difficulty. Pt has supportive  and children to assist PRN. At the end of the session, pt resting comfortably in bed, call bell in reach, with all needs met. Pt is safe for discharge home when medically stable. Pt does not require further skilled OT at this level of care. Discharge Recommendations: None  Further Equipment Recommendations for Discharge: N/A     SUBJECTIVE:   Patient stated not right now\" when asked if pt had abnormal sensation in BUEs.     OBJECTIVE DATA SUMMARY:     Past Medical History:   Diagnosis Date    Allergic rhinitis     ANURAHDA (generalised anxiety disorder)     GERD (gastroesophageal reflux disease)     Lower  GERD    IBS (irritable bowel syndrome)     Nausea & vomiting     Other ill-defined conditions(799.89)     C5/6 HNP    Skin cancer     removed from nose    Sleep apnea     \"mild per patient\" no cpap    Thumb pain 6/7/2010     Past Surgical History:   Procedure Laterality Date    HX BACK SURGERY  2012    HX GYN      left tube removal ectopic pregnancy    HX HEENT  9/2011    Basal cell cancer lesion removed from nose    HX LYMPHADENECTOMY Right 8/25/16    HX ORTHOPAEDIC  1999    TMJ    HX OTHER SURGICAL  2013    Spinal Fusion    HX TONSILLECTOMY  2/2007     Barriers to Learning/Limitations: None  Compensate with: visual, verbal, tactile, kinesthetic cues/model    Home Situation:   Home Situation  Home Environment: Private residence  # Steps to Enter: 3  One/Two Story Residence: Two story  # of Interior Steps: 14  Living Alone: No  Support Systems: Spouse/Significant Other/Partner  Patient Expects to be Discharged to[de-identified] Private residence  Current DME Used/Available at Home: Adaptive dressing aides, Raised toilet seat  [x]     Right hand dominant   []     Left hand dominant    Cognitive/Behavioral Status:  Neurologic State: Alert  Orientation Level: Oriented X4  Cognition: Appropriate decision making; Follows commands  Safety/Judgement: Fall prevention    Skin: Visible skin appeared intact  Edema: None noted      Coordination: BUE  Coordination: Within functional limits(following cervical precautions)  Fine Motor Skills-Upper: Left Intact; Right Intact    Gross Motor Skills-Upper: Left Intact; Right Intact    Balance:  Sitting: Intact  Standing: Intact; Without support    Strength: BUE  Strength:  Within functional limits(following cervical precautions)    Tone & Sensation: BUE  Tone: Normal  Sensation: Intact    Range of Motion: BUE  AROM: Within functional limits(following cervical precautions)      Functional Mobility and Transfers for ADLs:  Bed Mobility:  Supine to Sit: Modified independent    Transfers:  Sit to Stand: Modified independent  Stand to Sit: Modified independent   Toilet Transfer : Modified independent     ADL Assessment:  Feeding: Modified independent    Oral Facial Hygiene/Grooming: Modified Independent    Bathing: Modified independent    Upper Body Dressing: Modified independent    Lower Body Dressing: Modified independent    Toileting: Modified independent      ADL Intervention:  Pt educated on UB/LB dressing techniques while following spinal precautions; pt demo good understanding. Lower Body Dressing Assistance  Dressing Assistance: Modified independent  Socks: Modified independent  Leg Crossed Method Used: Yes  Position Performed: Seated edge of bed    Cognitive Retraining  Safety/Judgement: Fall prevention    Pain:  Pain level pre-treatment: 5/10 (Procedure site)  Pain level post-treatment: 5/10   Pain Intervention(s): Medication (see MAR); Rest, Ice, Repositioning  Response to intervention: Nurse notified, See doc flow    Activity Tolerance:   Good    Please refer to the flowsheet for vital signs taken during this treatment. After treatment:   [x]  Patient left in no apparent distress sitting up in chair  []  Patient left in no apparent distress in bed  [x]  Call bell left within reach  [x]  Nursing notified  []  Caregiver present  []  Bed alarm activated    COMMUNICATION/EDUCATION:   [x]      Role of Occupational Therapy in the acute care setting  [x]      Home safety education was provided and the patient/caregiver indicated understanding. [x]      Patient/family have participated as able and agree with findings and recommendations. []      Patient is unable to participate in plan of care at this time. Thank you for this referral.  Jeanette Chung MS, OTR/L  Time Calculation: 10 mins      Eval Complexity: History: LOW Complexity : Brief history review ; Examination: LOW Complexity : 1-3 performance deficits relating to physical, cognitive , or psychosocial skils that result in activity limitations and / or participation restrictions ;    Decision Making:LOW Complexity : No comorbidities that affect functional and no verbal or physical assistance needed to complete eval tasks

## 2020-03-06 NOTE — ROUTINE PROCESS
I have reviewed discharge instructions and medications with the patient. The patient verbalized understanding. Dressings C/D/I. No questions or concerns verbalized. Nurse informed to remove IV after antibiotic finished.

## 2020-03-06 NOTE — PROGRESS NOTES
conducted an initial consultation and Spiritual Assessment for Evans Martínez, who is a 44 y.o.,female. Patient's Primary Language is: [place]. According to the patient's EMR [employer] Affiliation is: Faith. The reason the Patient came to the hospital is:   Patient Active Problem List    Diagnosis Date Noted    HNP (herniated nucleus pulposus), cervical 03/05/2020    Cough 02/14/2017    Sore throat 02/14/2017    Status post lumbar laminectomy 08/19/2016    HNP (herniated nucleus pulposus), lumbar 07/01/2016    Displacement of lumbar intervertebral disc without myelopathy 06/03/2016    Bulging lumbar disc 04/07/2016    Spondylosis of lumbar region without myelopathy or radiculopathy 04/07/2016    Lumbar neuritis 04/07/2016    DDD (degenerative disc disease), lumbar 04/07/2016    Cervical disc herniation 11/14/2012    S/P cervical discectomy 11/14/2012    Cervical disc disorder with radiculopathy 09/21/2012    Seasonal allergic rhinitis 07/17/2012    GERD (gastroesophageal reflux disease) 07/17/2012    Mass of axilla 08/27/2010    Foot pain 06/07/2010    Thumb pain 06/07/2010        The  provided the following Interventions:  Initiated a relationship of care and support. Explored issues of kenan, belief, spirituality and Shinto/ritual needs while hospitalized. Listened empathically. Provided chaplaincy education. Provided information about Spiritual Care Services. Offered prayer and assurance of continued prayers on patient's behalf. Chart reviewed. The following outcomes where achieved:  Patient shared limited information about both their medical narrative and spiritual journey/beliefs.  confirmed Patient's [employer] Affiliation. Patient processed feeling about current hospitalization. Patient expressed gratitude for 's visit.     Assessment:  Patient does not have any Shinto/cultural needs that will affect patient's preferences in health care. There are no spiritual or Synagogue issues which require intervention at this time. Plan:  Chaplains will continue to follow and will provide pastoral care on an as needed/requested basis.  recommends bedside caregivers page  on duty if patient shows signs of acute spiritual or emotional distress.     [id number]   [phone]

## 2020-03-06 NOTE — PROGRESS NOTES
Problem: Falls - Risk of  Goal: *Absence of Falls  Description  Document Adi Arzate Fall Risk and appropriate interventions in the flowsheet.   Outcome: Progressing Towards Goal  Note: Fall Risk Interventions:  Mobility Interventions: Patient to call before getting OOB         Medication Interventions: Patient to call before getting OOB    Elimination Interventions: Elevated toilet seat    History of Falls Interventions: Door open when patient unattended         Problem: Patient Education: Go to Patient Education Activity  Goal: Patient/Family Education  Outcome: Progressing Towards Goal     Problem: Pain  Goal: *Control of Pain  Outcome: Progressing Towards Goal

## 2020-03-06 NOTE — PROGRESS NOTES
Discharge planning    Discharge order noted for today. Pt has been accepted to [facility] agency. Met with patient and agreeable to the transition plan today. Transport has been arranged with family. Patient's discharge summary and home health  orders have been forwarded to [facility] home health  agency via [facility] Updated bedside RN, Nikki/Sera to the transition plan.   Discharge information has been documented on the AVS.       VANESSA Manning, RN  Pager # [phone]  Care Manager

## 2020-03-06 NOTE — DISCHARGE SUMMARY
Discharge  Summary     Patient: Mike Lunsford MRN: [id number]  SSN: [id number]    YOB: 1970  Age: 44 y.o. Sex: female       Admit Date: 3/5/2020    Discharge Date: 3/6/2020      Admission Diagnoses: HNP (herniated nucleus pulposus) with myelopathy, cervical [M50.00]  HNP (herniated nucleus pulposus), cervical [M50.20]    Discharge Diagnoses:   Problem List as of 3/6/2020 Date Reviewed: 3/3/2020          Codes Class Noted - Resolved    HNP (herniated nucleus pulposus), cervical ICD-10-CM: M50.20  ICD-9-CM: 722.0  3/5/2020 - Present        Cough ICD-10-CM: R05  ICD-9-CM: 786.2  2/14/2017 - Present        Sore throat ICD-10-CM: J02.9  ICD-9-CM: 038  2/14/2017 - Present        Status post lumbar laminectomy ICD-10-CM: Z98.890  ICD-9-CM: V45.89  8/19/2016 - Present        HNP (herniated nucleus pulposus), lumbar ICD-10-CM: M51.26  ICD-9-CM: 722.10  7/1/2016 - Present        Displacement of lumbar intervertebral disc without myelopathy ICD-10-CM: M51.26  ICD-9-CM: 722.10  6/3/2016 - Present        Bulging lumbar disc ICD-10-CM: M51.26  ICD-9-CM: 722.10  4/7/2016 - Present        Spondylosis of lumbar region without myelopathy or radiculopathy ICD-10-CM: M47.816  ICD-9-CM: 721.3  4/7/2016 - Present        Lumbar neuritis ICD-10-CM: M54.16  ICD-9-CM: 724.4  4/7/2016 - Present        DDD (degenerative disc disease), lumbar ICD-10-CM: M51.36  ICD-9-CM: 722.52  4/7/2016 - Present        Cervical disc herniation ICD-10-CM: M50.20  ICD-9-CM: 722.0  11/14/2012 - Present    Overview Signed 11/14/2012 12:19 PM by Allyssa Monaco NP     C5 C6             S/P cervical discectomy ICD-10-CM: Z32.133  ICD-9-CM: V45.89  11/14/2012 - Present    Overview Signed 11/14/2012 12:15 PM by Allyssa Monaco NP     PROCEDURE:   1. C5-C6 anterior cervical diskectomy and fusion. 2. Insertion of PEEK interbody spacer. 3. NuVasive plating. 4. Use of Osteocel Plus allograft.    5. Intraoperative C-arm fluoroscopy. Cervical disc disorder with radiculopathy ICD-10-CM: M50.10  ICD-9-CM: 723.4  9/21/2012 - Present        Seasonal allergic rhinitis ICD-10-CM: J30.2  ICD-9-CM: 477.9  7/17/2012 - Present        GERD (gastroesophageal reflux disease) ICD-10-CM: K21.9  ICD-9-CM: 530.81  7/17/2012 - Present        Mass of axilla ICD-10-CM: R22.30  ICD-9-CM: 782.2  8/27/2010 - Present        Foot pain ICD-10-CM: M79.673  ICD-9-CM: 729.5  6/7/2010 - Present    Overview Signed 6/7/2010  3:43 PM by Allyssa Kiran NP     Sees Dr. Kassie Dickerson and has been in Physical therapy for plantar fascititis             Thumb pain ICD-10-CM: M79.646  ICD-9-CM: 729.5  6/7/2010 - Present    Overview Signed 6/7/2010  5:13 PM by Allyssa Kiran NP     Right thumb pain. Discharge Condition: Good    Procedure: Anterior cervical decompression and fusion, C4-5; structural allograft x1; anterior cervical plate fixation, Q1-I2 with K2M locking plate and screws; removal of hardware, anterior cervical plate at Y6-9. Hospital Course: Had some mild dysphagia, ST consulted-cleared for DC-home. Otherwise, Normal hospital course for this procedure. Tolerated surgical intervention well. VSS Throughout. Neuro intact . Incision dry and intact, tolerating PO intake, voiding adequately. Ambulatory . Disposition: home      Face to Face Encounter    Patients Name: Keiry Nelson  YOB: 1970    Primary Diagnosis: Herniated nucleus pulposus, C4-5; post-laminectomy syndrome, cervical.    Date of Face to Face:   3/6/2020                                   Face to Face Encounter findings are related to primary reason for home care:   yes    1. I certify that the patient needs intermittent skilled nursing care, physical therapy and/or speech therapy. I will be following this patient in the Community and will be responsible for reviewing and signing the [address]     2. Initial Orders for Care: Must be completed only if Face to Face MD will not be signing the [address] Skilled Nursing and Physical Therapy    3. I certify that this patient is homebound for the following reason(s): Requires considerable and taxing effort to leave the home     4. I certify that this patient is under my care and that I, or a nurse practitioner or  [id number] working with me, had a Face-to-Face Encounter that meets the physician Face-to-Face Encounter requirements. Document the physical findings from the Face-to-Face Encounter that support the need for skilled services: Has wound that requires skilled nursing assessment and treatment     Yovanny Agarwal NP  3/6/2020      Discharge Medications:      My Medications      TAKE these medications as instructed      Instructions Each Dose to Equal Morning Noon Evening Bedtime   acetaminophen 325 mg tablet  Commonly known as:  TYLENOL    Your last dose was: Your next dose is: Take 325 mg by mouth every four (4) hours as needed for Pain. 325 mg                 albuterol 90 mcg/actuation inhaler  Commonly known as:  PROVENTIL HFA, VENTOLIN HFA, PROAIR HFA    Your last dose was: Your next dose is:          2 puffs q 6 hrs prn wheezing/cough                  Claritin 10 mg tablet  Generic drug:  loratadine    Your last dose was: Your next dose is: Take 10 mg by mouth. 10 mg                 cyanocobalamin 1,000 mcg tablet    Your last dose was: Your next dose is: Take 2,500 mcg by mouth every other day. 2,500 mcg                 Dymista 137-50 mcg/spray Spry  Generic drug:  azelastine-fluticasone    Your last dose was: Your next dose is:          by Nasal route as needed. esomeprazole 20 mg capsule  Commonly known as:  [address]    Your last dose was: Your next dose is: Take 20 mg by mouth daily.    20 mg                 * gabapentin 300 mg capsule  Commonly known as: NEURONTIN  What changed:    · how much to take  · when to take this  · additional instructions    Your last dose was: Your next dose is: Take 1-2 Caps by mouth three (3) times daily. Max Daily Amount: 1,800 mg. Indications: neuropathic pain   300-600 mg                 * gabapentin 600 mg tablet  Commonly known as:  Neurontin  What changed:  when to take this    Your last dose was: Your next dose is: Take 1 Tab by mouth three (3) times daily. Max Daily Amount: 1,800 mg.   600 mg                 methocarbamoL 750 mg tablet  Commonly known as:  ROBAXIN  What changed:  See the new instructions. Your last dose was: Your next dose is:          TAKE 1 TABLET BY MOUTH FOUR TIMES DAILY. multivitamin tablet  Commonly known as:  ONE A DAY    Your last dose was: Your next dose is: Take 1 Tab by mouth daily. 1 Tab                 oxyCODONE IR 5 mg immediate release tablet  Commonly known as:  Roxicodone    Your last dose was: Your next dose is: Take 1 Tab by mouth every six (6) hours as needed for Pain for up to 5 days. Max Daily Amount: 20 mg.   5 mg                 Pepcid 20 mg tablet  Generic drug:  famotidine    Your last dose was: Your next dose is: Take 10 mg by mouth nightly. 10 mg                 Simply Sleep 25 mg tablet  Generic drug:  diphenhydrAMINE    Your last dose was: Your next dose is: Take 12.5 mg by mouth nightly. 12.5 mg                 traMADoL 50 mg tablet  Commonly known as:  ULTRAM    Your last dose was: Your next dose is: Take 50 mg by mouth three (3) times daily. 50 mg                 VITAMIN D3 PO    Your last dose was: Your next dose is: Take 10,000 Units by mouth daily. 10,000 Units                     * This list has 2 medication(s) that are the same as other medications prescribed for you.  Read the directions carefully, and ask your doctor or other care provider to review them with you. Where to Get Your Medications      These medications were sent to [address], [address] [place] [address]    Phone:  [phone]   · oxyCODONE IR 5 mg immediate release tablet             Follow-up Appointments   Procedures    FOLLOW UP VISIT Appointment in: Two Weeks     Standing Status:   Standing     Number of Occurrences:   1     Order Specific Question:   Appointment in     Answer:    Two Weeks       Signed By: Justine Woods NP     March 6, 2020

## 2020-03-06 NOTE — ROUTINE PROCESS
I have reviewed discharge instructions with the patient. The patient verbalized understanding. Discharge medications reviewed with patient and appropriate educational materials and side effects teaching were provided. Discharge instructions reviewed with patient by [facility], RN. Current Discharge Medication List      START taking these medications    Details   oxyCODONE IR (ROXICODONE) 5 mg immediate release tablet Take 1 Tab by mouth every six (6) hours as needed for Pain for up to 5 days. Max Daily Amount: 20 mg.  Qty: 20 Tab, Refills: 0    Associated Diagnoses: S/P cervical discectomy         CONTINUE these medications which have NOT CHANGED    Details   acetaminophen (TYLENOL) 325 mg tablet Take 325 mg by mouth every four (4) hours as needed for Pain.      loratadine (CLARITIN) 10 mg tablet Take 10 mg by mouth. famotidine (PEPCID) 20 mg tablet Take 10 mg by mouth nightly. traMADol (ULTRAM) 50 mg tablet Take 50 mg by mouth three (3) times daily. gabapentin (NEURONTIN) 600 mg tablet Take 1 Tab by mouth three (3) times daily. Max Daily Amount: 1,800 mg. Qty: 90 Tab, Refills: 1    Associated Diagnoses: Cervical spondylolysis; Cervical post-laminectomy syndrome; DDD (degenerative disc disease), cervical      albuterol (PROVENTIL HFA, VENTOLIN HFA, PROAIR HFA) 90 mcg/actuation inhaler 2 puffs q 6 hrs prn wheezing/cough  Qty: 1 Inhaler, Refills: 0      gabapentin (NEURONTIN) 300 mg capsule Take 1-2 Caps by mouth three (3) times daily. Max Daily Amount: 1,800 mg. Indications: neuropathic pain  Qty: 180 Cap, Refills: 0    Associated Diagnoses: Cervical post-laminectomy syndrome      methocarbamol (ROBAXIN) 750 mg tablet TAKE 1 TABLET BY MOUTH FOUR TIMES DAILY. Qty: 60 Tab, Refills: 0    Associated Diagnoses: Neck muscle spasm      cyanocobalamin 1,000 mcg tablet Take 2,500 mcg by mouth every other day. cholecalciferol, vitamin D3, (VITAMIN D3 PO) Take 10,000 Units by mouth daily. multivitamin (ONE A DAY) tablet Take 1 Tab by mouth daily. azelastine-fluticasone (DYMISTA) 137-50 mcg/spray spry by Nasal route as needed. esomeprazole (NEXIUM) 20 mg capsule Take 20 mg by mouth daily. diphenhydrAMINE (SIMPLY SLEEP) 25 mg tablet Take 12.5 mg by mouth nightly.

## 2020-03-06 NOTE — PROGRESS NOTES
Problem: Falls - Risk of  Goal: *Absence of Falls  Description  Document Mecca Tejeda Fall Risk and appropriate interventions in the flowsheet.   Outcome: Progressing Towards Goal  Note: Fall Risk Interventions:  Mobility Interventions: Communicate number of staff needed for ambulation/transfer, Patient to call before getting OOB, PT Consult for mobility concerns, Utilize walker, cane, or other assistive device         Medication Interventions: Assess postural VS orthostatic hypotension, Patient to call before getting OOB, Teach patient to arise slowly    Elimination Interventions: Call light in reach, Patient to call for help with toileting needs    History of Falls Interventions: Door open when patient unattended, Vital signs minimum Q4HRs X 24 hrs (comment for end date)         Problem: Patient Education: Go to Patient Education Activity  Goal: Patient/Family Education  Outcome: Progressing Towards Goal     Problem: Patient Education: Go to Patient Education Activity  Goal: Patient/Family Education  Outcome: Progressing Towards Goal     Problem: Pain  Goal: *Control of Pain  Outcome: Progressing Towards Goal     Problem: Patient Education: Go to Patient Education Activity  Goal: Patient/Family Education  Outcome: Progressing Towards Goal     Problem: Patient Education: Go to Patient Education Activity  Goal: Patient/Family Education  Outcome: Progressing Towards Goal

## 2020-03-06 NOTE — PROGRESS NOTES
Speech Pathology Note      Swallow eval completed b/s with recs of soft solids/ thin liquids, aspiration precautions. Full report to follow.     Thank you for this referral,  Kiet Schulte MS, CCC/SLP  Speech- Language Pathologist

## 2020-03-06 NOTE — DISCHARGE INSTRUCTIONS
[facility]ative [facility] Instructions [facility]  [phone]      You will return to the office 2 weeks after surgery. (Appointment was made at the time you scheduled your surgery)    Call office or physician on-call for any of the following:  · Fever greater than 100.5  · Uncontrollable chills  · Drainage from incision  · Pain not controlled by pain medication  · New pain  · New weakness or progressive weakness  · Food sticking in throat or excessive coughing when swallowing    Stop all anti-inflammatories (arthritis medication) such as Ibuprofen, Motrin, Aleve, Voltaren, Relafen, Naproxen, Arthrotec, etc. for 12 weeks ONLY if you had a neck or back Fusion. You may take Tylenol or acetaminophen over the counter. You may take Tylenol for pain. Tylenol ***** mg    Next time you can take is ******    Take your pain medication as prescribed. Medication Name ******    Next dose can be taken at ******      May remove NGA stockings when discharged from the hospital.    May shower on the third day after surgery. Leave dressing on while you shower; the dressing is waterproof. No need for a dressing on neck patients after the third day. Abstain from driving until your first post-operative visit. If you must drive, do not take pain medications that may alter your abilities. Neck collars are for comfort only. Neck patients feel better sleeping in a recliner or \"propped up\" position for a few days after surgery. This helps reduce the swelling. It is normal for neck patients to have some difficulty swallowing the first few days. Use a straw for all liquids. Cut up solid foods smaller than normal until you are swallowing without difficulty. Walking is the best therapy after back surgery. Avoid extremes of bending, twisting, or lifting. All post-operative surgical patients should function within the range of the pain.  \"If it hurts - do not do it.\"    Thank you for choosing [facility] for your [facility]. DISCHARGE SUMMARY from Nurse    PATIENT INSTRUCTIONS:    After general anesthesia or intravenous sedation, for 24 hours or while taking prescription Narcotics:  · Limit your activities  · Do not drive and operate hazardous machinery  · Do not make important personal or business decisions  · Do  not drink alcoholic beverages  · If you have not urinated within 8 hours after discharge, please contact your surgeon on call. Report the following to your surgeon:  · Excessive pain, swelling, redness or odor of or around the surgical area  · Temperature over 100.5  · Nausea and vomiting lasting longer than 4 hours or if unable to take medications  · Any signs of decreased circulation or nerve impairment to extremity: change in color, persistent  numbness, tingling, coldness or increase pain  · Any questions    What to do at Home:      *  Please give a list of your current medications to your Primary Care Provider. *  Please update this list whenever your medications are discontinued, doses are      changed, or new medications (including over-the-counter products) are added. *  Please carry medication information at all times in case of emergency situations. These are general instructions for a healthy lifestyle:    No smoking/ No tobacco products/ Avoid exposure to second hand smoke  Surgeon General's Warning:  Quitting smoking now greatly reduces serious risk to your health. Obesity, smoking, and sedentary lifestyle greatly increases your risk for illness    A healthy diet, regular physical exercise & weight monitoring are important for maintaining a healthy lifestyle    You may be retaining fluid if you have a history of heart failure or if you experience any of the following symptoms:  Weight gain of 3 pounds or more overnight or 5 pounds in a week, increased swelling in our hands or feet or shortness of breath while lying flat in bed. Please call your doctor as soon as you notice any of these symptoms; do not wait until your next office visit. The discharge information has been reviewed with the patient. The patient verbalized understanding. Discharge medications reviewed with the patient and spouse and appropriate educational materials and side effects teaching were provided.   ___________________________________________________________________________________________________________________________________

## 2020-03-06 NOTE — ROUTINE PROCESS
Bedside and Verbal shift change report given to Anila Rodriguez (oncoming nurse) by Alma Delia Hampton RN (offgoing nurse). Report included the following information SBAR, Kardex, Procedure Summary, MAR and Recent Results.

## 2020-03-06 NOTE — ROUTINE PROCESS

## 2020-03-07 ENCOUNTER — HOME CARE VISIT (OUTPATIENT)
Dept: SCHEDULING | Facility: HOME HEALTH | Age: 50
End: 2020-03-07
Payer: COMMERCIAL

## 2020-03-07 PROCEDURE — G0299 HHS/HOSPICE OF RN EA 15 MIN: HCPCS

## 2020-03-07 PROCEDURE — 400013 HH SOC

## 2020-03-08 ENCOUNTER — HOME CARE VISIT (OUTPATIENT)
Dept: SCHEDULING | Facility: HOME HEALTH | Age: 50
End: 2020-03-08
Payer: COMMERCIAL

## 2020-03-08 VITALS
OXYGEN SATURATION: 98 % | SYSTOLIC BLOOD PRESSURE: 118 MMHG | TEMPERATURE: 97 F | RESPIRATION RATE: 20 BRPM | DIASTOLIC BLOOD PRESSURE: 70 MMHG | HEART RATE: 86 BPM

## 2020-03-08 PROCEDURE — G0151 HHCP-SERV OF PT,EA 15 MIN: HCPCS

## 2020-03-09 ENCOUNTER — HOME CARE VISIT (OUTPATIENT)
Dept: HOME HEALTH SERVICES | Facility: HOME HEALTH | Age: 50
End: 2020-03-09
Payer: COMMERCIAL

## 2020-03-09 VITALS
TEMPERATURE: 97.5 F | DIASTOLIC BLOOD PRESSURE: 70 MMHG | RESPIRATION RATE: 16 BRPM | OXYGEN SATURATION: 98 % | SYSTOLIC BLOOD PRESSURE: 110 MMHG | HEART RATE: 72 BPM

## 2020-03-11 ENCOUNTER — HOME CARE VISIT (OUTPATIENT)
Dept: SCHEDULING | Facility: HOME HEALTH | Age: 50
End: 2020-03-11
Payer: COMMERCIAL

## 2020-03-11 PROCEDURE — G0300 HHS/HOSPICE OF LPN EA 15 MIN: HCPCS

## 2020-03-12 PROCEDURE — A6258 TRANSPARENT FILM >16<=48 IN: HCPCS

## 2020-03-15 VITALS
DIASTOLIC BLOOD PRESSURE: 76 MMHG | HEART RATE: 67 BPM | RESPIRATION RATE: 17 BRPM | SYSTOLIC BLOOD PRESSURE: 122 MMHG | OXYGEN SATURATION: 98 %

## 2020-03-17 ENCOUNTER — HOME CARE VISIT (OUTPATIENT)
Dept: SCHEDULING | Facility: HOME HEALTH | Age: 50
End: 2020-03-17
Payer: COMMERCIAL

## 2020-03-17 VITALS
DIASTOLIC BLOOD PRESSURE: 68 MMHG | HEART RATE: 70 BPM | TEMPERATURE: 98.6 F | OXYGEN SATURATION: 98 % | SYSTOLIC BLOOD PRESSURE: 110 MMHG | RESPIRATION RATE: 16 BRPM

## 2020-03-17 PROCEDURE — G0299 HHS/HOSPICE OF RN EA 15 MIN: HCPCS

## 2020-03-18 NOTE — PROGRESS NOTES
[address], [address]  [place]  Phone: [phone]  Fax: [phone]    Spine Post-Op Office Visit Note    Patient: Som Wong   MRN: [id number]     Age:  44 y.o.,      Sex: female    YOB: 1970     PCP: Andrwe Rollins NP    HISTORY OF PRESENT ILLNESS:  No chief complaint on file. Som Wong is a 44 y.o.  female with history of neck pain. Patient had ACDF C4/5 surgery 2 weeks ago. Prior to surgery, she had neck pain with cervicogenic headache and some arm symptoms. Today, her headache and arms are doing well. She is having incisional pain. She is having some trouble with swallowing. She did have trouble with a pill this morning. She has a sore throat from mouth breathing. Patient denies any bladder/bowel dysfunction, new onset weakness, or other neurological deficits. ASSESSMENT   Diagnoses and all orders for this visit:    1. S/P cervical spinal fusion    2. HNP (herniated nucleus pulposus), cervical            IMPRESSION AND PLAN   1) Pt was given information on s/p fusion. 2) Tylenol for pain. 3) Wound care and activity level reviewed. 4) Ms. Funmi Urbano has a reminder for a \"due or due soon\" health maintenance. I have asked that she contact her primary care provider, Andrew Rollins NP, for follow-up on this health maintenance. 5) We have informed the patient to notify us for immediate appointment if he has any worsening neurogical symptoms or if an emergency situation presents, then call 911  6)  demonstrated consistency with prescribing. 7) Pt will follow-up in 4 weeks as scheduled. 8) cont tele work for 4 more weeks. SUBJECTIVE    Pain Scale: /10    Pain Assessment  2/28/2020   Location of Pain Neck   Location Modifiers (No Data)   Severity of Pain 3   Quality of Pain Aching; Other (Comment)   Quality of Pain Comment shooting & weakness   Duration of Pain Persistent   Duration of Pain Comment -   Frequency of Pain Constant   Aggravating Factors Other (Comment)   Aggravating Factors Comment sudden movements to the side   Limiting Behavior Yes   Relieving Factors Nothing   Relieving Factors Comment -   Result of Injury (No Data)         Review of systems  Constitutional: Negative for fever, chills, or weight change. Respiratory: Negative for cough or shortness of breath. Cardiovascular: Negative for chest pain or palpitations. Gastrointestinal: Negative for acid reflux, change in bowel habits, or constipation. Genitourinary: Negative for dysuria and flank pain. Musculoskeletal: Positive for neck pain. Skin: Negative for rash. Neurological: Negative for headaches, dizziness, or numbness. Endo/Heme/Allergies: Negative for increased bruising. Psychiatric/Behavioral: Negative for difficulty with sleep. Past Medical History:   Diagnosis Date    Allergic rhinitis     ANURADHA (generalised anxiety disorder)     GERD (gastroesophageal reflux disease)     Lower  GERD    IBS (irritable bowel syndrome)     Nausea & vomiting     Other ill-defined conditions(799.89)     C5/6 HNP    Skin cancer     removed from nose    Sleep apnea     \"mild per patient\" no cpap    Thumb pain 6/7/2010       Past Surgical History:   Procedure Laterality Date    HX BACK SURGERY  2012    HX GYN      left tube removal ectopic pregnancy    HX HEENT  9/2011    Basal cell cancer lesion removed from nose    HX LYMPHADENECTOMY Right 8/25/16    HX ORTHOPAEDIC  1999    TMJ    HX OTHER SURGICAL  2013    Spinal Fusion    HX TONSILLECTOMY  2/2007       Current Outpatient Medications   Medication Sig Dispense Refill    acetaminophen (TYLENOL) 325 mg tablet Take 325 mg by mouth every four (4) hours as needed for Pain.  loratadine (CLARITIN) 10 mg tablet Take 10 mg by mouth daily as needed for Allergies.  famotidine (PEPCID) 20 mg tablet Take 10 mg by mouth nightly.       traMADol (ULTRAM) 50 mg tablet Take 50 mg by mouth three (3) times daily.  gabapentin (NEURONTIN) 600 mg tablet Take 1 Tab by mouth three (3) times daily. Max Daily Amount: 1,800 mg. (Patient taking differently: Take 600 mg by mouth two (2) times a day.) 90 Tab 1    albuterol (PROVENTIL HFA, VENTOLIN HFA, PROAIR HFA) 90 mcg/actuation inhaler 2 puffs q 6 hrs prn wheezing/cough 1 Inhaler 0    gabapentin (NEURONTIN) 300 mg capsule Take 1-2 Caps by mouth three (3) times daily. Max Daily Amount: 1,800 mg. Indications: neuropathic pain (Patient taking differently: Take 300 mg by mouth daily. Patient [facility] at [facility]  Indications: neuropathic pain) 180 Cap 0    methocarbamol (ROBAXIN) 750 mg tablet TAKE 1 TABLET BY MOUTH FOUR TIMES DAILY. (Patient taking differently: 750 mg three (3) times daily.) 60 Tab 0    cyanocobalamin 1,000 mcg tablet Take 2,500 mcg by mouth every other day.  cholecalciferol, vitamin D3, (VITAMIN D3 PO) Take 10,000 Units by mouth daily.  multivitamin (ONE A DAY) tablet Take 1 Tab by mouth daily.  azelastine-fluticasone (DYMISTA) 137-50 mcg/spray spry 1 [place] by Nasal route as needed (stuffy nose).  esomeprazole (NEXIUM) 20 mg capsule Take 20 mg by mouth daily.  diphenhydrAMINE (SIMPLY SLEEP) 25 mg tablet Take 12.5 mg by mouth nightly. Allergies   Allergen Reactions    Ciprofloxacin Diarrhea    Milk Containing Products Diarrhea    Pennsaid [Diclofenac Sodium] Rash    Sulfa (Sulfonamide Antibiotics) Rash    Other Medication Diarrhea     Eggs, patient eats, and gets flu shot yearly with no reaction            OBJECTIVE    There were no vitals filed for this visit. Physical Exam:  General: alert, cooperative, no distress, appears stated age  Constitutional:  Well developed, well nourished, in no acute distress. Psychiatric: Affect and mood are appropriate. Integumentary: No rashes or abrasions noted on exposed areas. Wound:   Incision healing well, has well approximated edges, no erythema, warmth, drainage or signs of infection. Cardiovascular/Peripheral Vascular: No peripheral edema is noted. Lymphatic:  No evidence of lymphedema. No cervical lymphadenopathy. MOTOR    Biceps  Triceps Deltoids Wrist Ext Wrist Flex Hand Intrin   Right +4/5 +4/5 +4/5 +4/5 +4/5 +4/5   Left +4/5 +4/5 +4/5 +4/5 +4/5 +4/5       normal gait and station      Ambulation without assistive device. full weight bearing, non-antalgic gait. Accompanied by self.       Ari Cates NP  March 19, 2020  4:15 PM

## 2020-03-19 ENCOUNTER — OFFICE VISIT (OUTPATIENT)
Dept: ORTHOPEDIC SURGERY | Age: 50
End: 2020-03-19

## 2020-03-19 VITALS — TEMPERATURE: 98.2 F

## 2020-03-19 DIAGNOSIS — M50.20 HNP (HERNIATED NUCLEUS PULPOSUS), CERVICAL: ICD-10-CM

## 2020-03-19 DIAGNOSIS — Z98.1 S/P CERVICAL SPINAL FUSION: Primary | ICD-10-CM

## 2020-03-19 NOTE — PATIENT INSTRUCTIONS
Cervical Spinal Fusion: What to Expect at Home  Your Recovery    After surgery, you can expect your neck to feel stiff and sore. This should improve in the weeks after surgery. You may have trouble sitting or standing in one position for very long and may need pain medicine in the weeks after your surgery. You may need to wear a neck brace for a while. It may take 4 to 6 weeks to get back to your usual activities, but it may depend on what kind of surgery you had. Your doctor may advise you to work with a physical therapist to strengthen the muscles around your neck and back. This care sheet gives you a general idea about how long it will take for you to recover. But each person recovers at a different pace. Follow the steps below to get better as quickly as possible. How can you care for yourself at home? Activity    · Rest when you feel tired. Getting enough sleep will help you recover.     · Try to walk each day. Start by walking a little more than you did the day before. Bit by bit, increase the amount you walk. Walking boosts blood flow and helps prevent pneumonia and constipation. Walking may also decrease your muscle soreness after surgery.     · Follow your doctor's directions about not lifting anything that would strain your neck and back. This may include a child, heavy grocery bags and milk containers, a heavy briefcase or backpack, cat litter or dog food bags, or a vacuum .     · Avoid strenuous activities, such as bicycle riding, jogging, weightlifting, or aerobic exercise, until your doctor says it is okay.     · Do not drive for 2 to 4 weeks after your surgery or until your doctor says it is okay.     · Avoid taking long car trips for 2 to 4 weeks after surgery. Your neck may become tired and painful from sitting too long in one position.     · You will probably need to take 4 to 6 weeks off from work.  It depends on the type of work you do and how you feel.     · You may have sex as soon as you feel able, but avoid positions that put stress on your neck or cause pain. Diet    · You can eat your normal diet. If your stomach is upset, try bland, low-fat foods like plain rice, broiled chicken, toast, and yogurt.     · Drink plenty of fluids. If you have kidney, heart, or liver disease and have to limit fluids, talk with your doctor before you increase the amount of fluids you drink.     · You may notice that your bowel movements are not regular right after your surgery. This is common. Try to avoid constipation and straining with bowel movements. You may want to take a fiber supplement every day. If you have not had a bowel movement after a couple of days, ask your doctor about taking a mild laxative. Medicines    · Your doctor will tell you if and when you can restart your medicines. He or she will also give you instructions about taking any new medicines.     · If you take aspirin or some other blood thinner, ask your doctor if and when to start taking it again. Make sure that you understand exactly what your doctor wants you to do.     · Be safe with medicines. Take pain medicines exactly as directed. ? If the doctor gave you a prescription medicine for pain, take it as prescribed. ? If you are not taking a prescription pain medicine, ask your doctor if you can take an over-the-counter medicine.     · If your doctor prescribed antibiotics, take them as directed. Do not stop taking them just because you feel better. You need to take the full course of antibiotics.     · If you think your pain pill is making you sick to your stomach:  ? Take your pills after meals (unless your doctor has told you not to). ? Ask your doctor for a different pain pill. Incision care    · You will be given specific instructions about how to care for the cut (incision) the doctor made. The instructions will depend on the type of materials used to close the cut.    Exercise    · Do exercises as instructed by your doctor or physical therapist to improve your strength and flexibility. Other instructions    · To reduce stiffness and help sore muscles, use a warm water bottle, a heating pad set on low, or a warm cloth on your neck. Do not put heat right over the incision. Do not go to sleep with a heating pad on your skin. Follow-up care is a key part of your treatment and safety. Be sure to make and go to all appointments, and call your doctor if you are having problems. It's also a good idea to know your test results and keep a list of the medicines you take. When should you call for help? Call 911 anytime you think you may need emergency care. For example, call if:    · You passed out (lost consciousness).     · You have chest pain, are short of breath, or cough up blood.     · You are unable to move an arm or a leg at all.   [facility] your doctor now or seek immediate medical care if:    · You have pain that does not get better after you take pain medicine.     · You have loose stitches, or your incision comes open.     · Bright red blood has soaked through the bandage over your incision.     · You have signs of a blood clot in your leg (called a deep vein thrombosis), such as:  ? Pain in your calf, back of the knee, thigh, or groin. ? Redness or swelling in your leg.     · You have signs of infection, such as:  ? Increased pain, swelling, warmth, or redness. ? Red streaks leading from the incision. ? Pus draining from the incision. ? A fever.     · You have new or worse symptoms in your arms, legs, chest, belly, or buttocks. Symptoms may include:  ? Numbness or tingling. ? Weakness. ? Pain.     · You lose bladder or bowel control.    Watch closely for any changes in your health, and be sure to contact your doctor if:    · You do not get better as expected. Where can you learn more?   Go to http://sary-mk.info/  Enter I663 in the search box to learn more about \"Cervical Spinal Fusion: What to Expect at Home. \"  Current as of: June 26, 2019Content Version: 12.4  © 9055-6516 [employer]. Care instructions adapted under license by [employer] online [employer] (which disclaims liability or warranty for this information). If you have questions about a medical condition or this instruction, always ask your healthcare professional. [place] [address] any warranty or liability for your use of this information.

## 2020-03-19 NOTE — LETTER
NOTIFICATION OF RETURN TO WORK / SCHOOL 
 
3/19/2020 9:04 AM 
 
Ms. [address] [address] Ashley Romano To Whom It May Concern: 
 
Milagro Pendleton was under the care of [address] [facility] She should continue telework until her follow up on 4/21/2020. If there are questions or concerns please have the patient contact our office. Sincerely, Jhony Clifton NP

## 2020-03-23 ENCOUNTER — PATIENT MESSAGE (OUTPATIENT)
Dept: FAMILY MEDICINE CLINIC | Age: 50
End: 2020-03-23

## 2020-03-23 NOTE — TELEPHONE ENCOUNTER
From: Ilana Dunbar  To: Kris Squires NP  Sent: 3/23/2020 9:37 AM EDT  Subject: Non-Urgent Medical Question    My breathing is much better today. I can still feel it but it is better and I slept better.

## 2020-03-24 ENCOUNTER — HOSPITAL ENCOUNTER (EMERGENCY)
Age: 50
Discharge: HOME | End: 2020-03-24
Attending: EMERGENCY MEDICINE
Payer: COMMERCIAL

## 2020-03-24 ENCOUNTER — APPOINTMENT (OUTPATIENT)
Dept: GENERAL RADIOLOGY | Age: 50
End: 2020-03-24
Attending: EMERGENCY MEDICINE
Payer: COMMERCIAL

## 2020-03-24 ENCOUNTER — HOME CARE VISIT (OUTPATIENT)
Dept: SCHEDULING | Facility: HOME HEALTH | Age: 50
End: 2020-03-24
Payer: COMMERCIAL

## 2020-03-24 VITALS
OXYGEN SATURATION: 100 % | TEMPERATURE: 98 F | HEIGHT: 62 IN | BODY MASS INDEX: 30 KG/M2 | SYSTOLIC BLOOD PRESSURE: 114 MMHG | DIASTOLIC BLOOD PRESSURE: 77 MMHG | RESPIRATION RATE: 15 BRPM | HEART RATE: 68 BPM | WEIGHT: 163 LBS

## 2020-03-24 DIAGNOSIS — R07.89 ATYPICAL CHEST PAIN: ICD-10-CM

## 2020-03-24 DIAGNOSIS — K21.9 GASTROESOPHAGEAL REFLUX DISEASE, ESOPHAGITIS PRESENCE NOT SPECIFIED: Primary | ICD-10-CM

## 2020-03-24 LAB
ANION GAP SERPL CALC-SCNC: 8 MMOL/L (ref 3–18)
BASOPHILS # BLD: 0 K/UL (ref 0–0.1)
BASOPHILS NFR BLD: 0 % (ref 0–2)
BUN SERPL-MCNC: 20 MG/DL (ref 7–18)
BUN/CREAT SERPL: 29 (ref 12–20)
CALCIUM SERPL-MCNC: 9.3 MG/DL (ref 8.5–10.1)
CHLORIDE SERPL-SCNC: 106 MMOL/L (ref 100–111)
CK MB CFR SERPL CALC: ABNORMAL % (ref 0–4)
CK MB SERPL-MCNC: <1 NG/ML (ref 5–25)
CK SERPL-CCNC: 22 U/L (ref 26–192)
CO2 SERPL-SCNC: 26 MMOL/L (ref 21–32)
CREAT SERPL-MCNC: 0.7 MG/DL (ref 0.6–1.3)
D DIMER PPP FEU-MCNC: 0.47 UG/ML(FEU)
DIFFERENTIAL METHOD BLD: NORMAL
EOSINOPHIL # BLD: 0.4 K/UL (ref 0–0.4)
EOSINOPHIL NFR BLD: 5 % (ref 0–5)
ERYTHROCYTE [DISTWIDTH] IN BLOOD BY AUTOMATED COUNT: 12.7 % (ref 11.6–14.5)
GLUCOSE SERPL-MCNC: 90 MG/DL (ref 74–99)
HCG SERPL QL: NEGATIVE
HCT VFR BLD AUTO: 40.9 % (ref 35–45)
HGB BLD-MCNC: 13.5 G/DL (ref 12–16)
LYMPHOCYTES # BLD: 1.8 K/UL (ref 0.9–3.6)
LYMPHOCYTES NFR BLD: 25 % (ref 21–52)
MCH RBC QN AUTO: 30.8 PG (ref 24–34)
MCHC RBC AUTO-ENTMCNC: 33 G/DL (ref 31–37)
MCV RBC AUTO: 93.4 FL (ref 74–97)
MONOCYTES # BLD: 0.5 K/UL (ref 0.05–1.2)
MONOCYTES NFR BLD: 7 % (ref 3–10)
NEUTS SEG # BLD: 4.5 K/UL (ref 1.8–8)
NEUTS SEG NFR BLD: 63 % (ref 40–73)
PLATELET # BLD AUTO: 394 K/UL (ref 135–420)
PMV BLD AUTO: 9.7 FL (ref 9.2–11.8)
POTASSIUM SERPL-SCNC: 3.5 MMOL/L (ref 3.5–5.5)
RBC # BLD AUTO: 4.38 M/UL (ref 4.2–5.3)
SODIUM SERPL-SCNC: 140 MMOL/L (ref 136–145)
TROPONIN I SERPL-MCNC: <0.02 NG/ML (ref 0–0.04)
WBC # BLD AUTO: 7.1 K/UL (ref 4.6–13.2)

## 2020-03-24 PROCEDURE — 93005 ELECTROCARDIOGRAM TRACING: CPT

## 2020-03-24 PROCEDURE — 80048 BASIC METABOLIC PNL TOTAL CA: CPT

## 2020-03-24 PROCEDURE — 85025 COMPLETE CBC W/AUTO DIFF WBC: CPT

## 2020-03-24 PROCEDURE — 71046 X-RAY EXAM CHEST 2 VIEWS: CPT

## 2020-03-24 PROCEDURE — G0299 HHS/HOSPICE OF RN EA 15 MIN: HCPCS

## 2020-03-24 PROCEDURE — 84703 CHORIONIC GONADOTROPIN ASSAY: CPT

## 2020-03-24 PROCEDURE — 82550 ASSAY OF CK (CPK): CPT

## 2020-03-24 PROCEDURE — 85379 FIBRIN DEGRADATION QUANT: CPT

## 2020-03-24 PROCEDURE — 99284 EMERGENCY DEPT VISIT MOD MDM: CPT

## 2020-03-24 NOTE — ED PROVIDER NOTES
Pt notes cervical surgery by dr Kelly Etienne 19 days ago. Says since the procedure, has had mild chest tightness and sob. Sx's wax and wane. Worse when lies down and when trying to sleep. Mild dry cough also. Says mild allergic cough prior to the surgery, so was given an alb mdi. Using this week, mild relief of these sx's only. No abd pain. No vomiting. No fever/chills. No leg pain or swelling. Non smoker. No prior eval for these sx's per pt.             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    Skin cancer     removed from nose    Sleep apnea     \"mild per patient\" no cpap    Thumb pain 6/7/2010       Past Surgical History:   Procedure Laterality Date    HX BACK SURGERY  2012    HX GYN      left tube removal ectopic pregnancy    HX HEENT  9/2011    Basal cell cancer lesion removed from nose    HX LYMPHADENECTOMY Right 8/25/16    HX ORTHOPAEDIC  1999    TMJ    HX OTHER SURGICAL  2013    Spinal Fusion    HX TONSILLECTOMY  2/2007         Family History:   Problem Relation Age of Onset    Cancer Mother         breast cancer    Diabetes Father     High Cholesterol Father     Lung Disease Father         COPD and nodule on lung    Heart Disease Father     Gout Father     Cancer Maternal Aunt         breast cancer    Arthritis-osteo Maternal Grandmother         arthritis     Cancer Cousin        Social History     Socioeconomic History    Marital status:      Spouse name: Not on file    Number of children: Not on file    Years of education: Not on file    Highest education level: Not on file   Occupational History    Not on file   Social Needs    Financial resource strain: Not on file    Food insecurity     Worry: Not on file     Inability: Not on file   [address] [facility] Transportation needs     Medical: Not on file     Non-medical: Not on file   Tobacco Use    Smoking status: Never Smoker    Smokeless tobacco: Never Used   Substance and Sexual Activity    Alcohol use: Yes     Comment: few times per week    Drug use: No    Sexual activity: Yes     Partners: Male     Comment: pregnancy test negative   Lifestyle    Physical activity     Days per week: Not on file     Minutes per session: Not on file    Stress: Not on file   Relationships    Social connections     Talks on phone: Not on file     Gets together: Not on file     Attends Caodaism service: Not on file     Active member of club or organization: Not on file     Attends meetings of clubs or organizations: Not on file     Relationship status: Not on file    Intimate partner violence     Fear of current or ex partner: Not on file     Emotionally abused: Not on file     Physically abused: Not on file     Forced sexual activity: Not on file   Other Topics Concern    Not on file   Social History Narrative    Not on file         ALLERGIES: Ciprofloxacin; Milk containing products; Pennsaid [diclofenac sodium]; Sulfa (sulfonamide antibiotics); and Other medication    Review of Systems   Constitutional: Negative for fever. HENT: Negative for congestion. Respiratory: Positive for shortness of breath. Negative for cough. Cardiovascular: Positive for chest pain. Gastrointestinal: Negative for abdominal pain and vomiting. Musculoskeletal: Negative for back pain. Skin: Negative for rash. Neurological: Negative for light-headedness. All other systems reviewed and are negative. Vitals:    03/24/20 0606 03/24/20 0700   BP: 114/77    Pulse: 85 68   Resp: 16 15   Temp: 98 °F (36.7 °C)    SpO2: 98% 100%   Weight: 73.9 kg (163 lb)    Height: 5' 2\" (1.575 m)             Physical Exam  Vitals signs and nursing note reviewed. Constitutional:       Appearance: She is well-developed. She is not diaphoretic.    HENT: Head: Normocephalic and atraumatic. Eyes:      Pupils: Pupils are equal, round, and reactive to light. Neck:      Musculoskeletal: Normal range of motion. Cardiovascular:      Rate and Rhythm: Normal rate and regular rhythm. Heart sounds: No murmur. Pulmonary:      Effort: Pulmonary effort is normal.      Breath sounds: No wheezing. Abdominal:      Palpations: Abdomen is soft. Tenderness: There is no abdominal tenderness. Musculoskeletal:         General: No tenderness. Skin:     General: Skin is dry. Capillary Refill: Capillary refill takes less than 2 seconds. Findings: No rash. Neurological:      Mental Status: She is alert and oriented to person, place, and time. MDM       Procedures    Vitals:  No data found. Medications ordered:   Medications - No data to display      Lab findings:  No results found for this or any previous visit (from the past 12 hour(s)). X-Ray, CT or other radiology findings or impressions:  XR CHEST PA LAT   Final Result   IMPRESSION:      Normal chest.                   Progress notes, Consult notes or additional Procedure notes:   S/o to dr Hernesto Perez at 0700      Diagnosis:   1. Gastroesophageal reflux disease, esophagitis presence not specified    2.  Atypical chest pain        Disposition: home    Follow-up Information     Follow up With Specialties Details Why Contact Info    Drinda Hamman, MD Gastroenterology Schedule an appointment as soon as possible for a visit  [facility]  [phone]      Natacha Abdi NP Nurse Practitioner Schedule an appointment as soon as possible for a visit  [address] [place] [address]  [address]  [phone] [address] [facility] EMERGENCY DEPT Emergency Medicine  As needed, If symptoms worsen [address] [facility]  [phone]           Discharge Medication List as of 3/24/2020  8:37 AM      CONTINUE these medications which have NOT CHANGED    Details   acetaminophen (TYLENOL) 325 mg tablet Take 325 mg by mouth every four (4) hours as needed for Pain., Historical Med      loratadine (CLARITIN) 10 mg tablet Take 10 mg by mouth daily as needed for Allergies. , Historical Med      famotidine (PEPCID) 20 mg tablet Take 10 mg by mouth nightly., Historical Med      albuterol (PROVENTIL HFA, VENTOLIN HFA, PROAIR HFA) 90 mcg/actuation inhaler 2 puffs q 6 hrs prn wheezing/cough, Normal, Disp-1 Inhaler, R-0      gabapentin (NEURONTIN) 300 mg capsule Take 1-2 Caps by mouth three (3) times daily. Max Daily Amount: 1,800 mg. Indications: neuropathic pain, Normal, Disp-180 Cap, R-0      cyanocobalamin 1,000 mcg tablet Take 2,500 mcg by mouth every other day., Historical Med      cholecalciferol, vitamin D3, (VITAMIN D3 PO) Take 10,000 Units by mouth daily. , Historical Med      multivitamin (ONE A DAY) tablet Take 1 Tab by mouth daily. , Historical Med      esomeprazole (NEXIUM) 20 mg capsule Take 20 mg by mouth daily. , Historical Med      diphenhydrAMINE (SIMPLY SLEEP) 25 mg tablet Take 12.5 mg by mouth nightly., Historical Med      traMADol (ULTRAM) 50 mg tablet Take 50 mg by mouth three (3) times daily. , Historical Med      gabapentin (NEURONTIN) 600 mg tablet Take 1 Tab by mouth three (3) times daily. Max Daily Amount: 1,800 mg., Normal, Disp-90 Tab, R-1      methocarbamol (ROBAXIN) 750 mg tablet TAKE 1 TABLET BY MOUTH FOUR TIMES DAILY., Normal, Disp-60 Tab, R-0      azelastine-fluticasone (DYMISTA) 137-50 mcg/spray spry 1 Valentine by Nasal route as needed (stuffy nose). , Historical Med

## 2020-03-24 NOTE — DISCHARGE INSTRUCTIONS
Patient Education        Gastroesophageal Reflux Disease (GERD): Care Instructions  Your Care Instructions    Gastroesophageal reflux disease (GERD) is the backward flow of stomach acid into the esophagus. The esophagus is the tube that leads from your throat to your stomach. A one-way valve prevents the stomach acid from moving up into this tube. When you have GERD, this valve does not close tightly enough. If you have mild GERD symptoms including heartburn, you may be able to control the problem with antacids or over-the-counter medicine. Changing your diet, losing weight, and making other lifestyle changes can also help reduce symptoms. Follow-up care is a key part of your treatment and safety. Be sure to make and go to all appointments, and call your doctor if you are having problems. It's also a good idea to know your test results and keep a list of the medicines you take. How can you care for yourself at home? · Take your medicines exactly as prescribed. Call your doctor if you think you are having a problem with your medicine. · Your doctor may recommend over-the-counter medicine. For mild or occasional indigestion, antacids, such as Tums, Gaviscon, Mylanta, or Maalox, may help. Your doctor also may recommend over-the-counter acid reducers, such as Pepcid AC, Tagamet HB, Zantac 75, or Prilosec. Read and follow all instructions on the label. If you use these medicines often, talk with your doctor. · Change your eating habits. ? It's best to eat several small meals instead of two or three large meals. ? After you eat, wait 2 to 3 hours before you lie down. ? Chocolate, mint, and alcohol can make GERD worse. ? Spicy foods, foods that have a lot of acid (like tomatoes and oranges), and coffee can make GERD symptoms worse in some people. If your symptoms are worse after you eat a certain food, you may want to stop eating that food to see if your symptoms get better.   · Do not smoke or chew tobacco. Smoking can make GERD worse. If you need help quitting, talk to your doctor about stop-smoking programs and medicines. These can increase your chances of quitting for good. · If you have GERD symptoms at night, raise the head of your bed 6 to 8 inches by putting the frame on blocks or placing a foam wedge under the head of your mattress. (Adding extra pillows does not work.)  · Do not wear tight clothing around your middle. · Lose weight if you need to. Losing just 5 to 10 pounds can help. When should you call for help? Call your doctor now or seek immediate medical care if:    · You have new or different belly pain.     · Your stools are black and tarlike or have streaks of blood.    Watch closely for changes in your health, and be sure to contact your doctor if:    · Your symptoms have not improved after 2 days.     · Food seems to catch in your throat or chest.   Where can you learn more? Go to http://saryAktinomk.info/  Enter G496 in the search box to learn more about \"Gastroesophageal Reflux Disease (GERD): Care Instructions. \"  Current as of: August 11, 2019Content Version: 12.4  © 9967-9032 [employer]. Care instructions adapted under license by [employer] (which disclaims liability or warranty for this information). If you have questions about a medical condition or this instruction, always ask your healthcare professional. [place] [address] any warranty or liability for your use of this information. Patient Education        Chest Pain: Care Instructions  Your Care Instructions    There are many things that can cause chest pain. Some are not serious and will get better on their own in a few days. But some kinds of chest pain need more testing and treatment. Your doctor may have recommended a follow-up visit in the next 8 to 12 hours. If you are not getting better, you may need more tests or treatment.   Even though your doctor has released you, you still need to watch for any problems. The doctor carefully checked you, but sometimes problems can develop later. If you have new symptoms or if your symptoms do not get better, get medical care right away. If you have worse or different chest pain or pressure that lasts more than 5 minutes or you passed out (lost consciousness), call 911 or seek other emergency help right away. A medical visit is only one step in your treatment. Even if you feel better, you still need to do what your doctor recommends, such as going to all suggested follow-up appointments and taking medicines exactly as directed. This will help you recover and help prevent future problems. How can you care for yourself at home? · Rest until you feel better. · Take your medicine exactly as prescribed. Call your doctor if you think you are having a problem with your medicine. · Do not drive after taking a prescription pain medicine. When should you call for help? Call 911 if:    · You passed out (lost consciousness).     · You have severe difficulty breathing.     · You have symptoms of a heart attack. These may include:  ? Chest pain or pressure, or a strange feeling in your chest.  ? Sweating. ? Shortness of breath. ? Nausea or vomiting. ? Pain, pressure, or a strange feeling in your back, neck, jaw, or upper belly or in one or both shoulders or arms. ? Lightheadedness or sudden weakness. ? A fast or irregular heartbeat. After you call  911, the  may tell you to chew 1 adult-strength or 2 to 4 low-dose aspirin. Wait for an ambulance. Do not try to drive yourself.    Call your doctor today if:    · You have any trouble breathing.     · Your chest pain gets worse.     · You are dizzy or lightheaded, or you feel like you may faint.     · You are not getting better as expected.     · You are having new or different chest pain. Where can you learn more?   Go to http://sary-mk.info/  Enter A120 in the search box to learn more about \"Chest Pain: Care Instructions. \"  Current as of: June 26, 2019Content Version: 12.4  © 9140-4941 [employer], Incorporated. Care instructions adapted under license by [employer] (which disclaims liability or warranty for this information). If you have questions about a medical condition or this instruction, always ask your healthcare professional. [address] any warranty or liability for your use of this information.

## 2020-03-24 NOTE — ED NOTES
7: 37 AM :Pt care assumed from Dr. Gera Berry , ED provider. Pt complaint(s), current treatment plan, progression and available diagnostic results have been discussed thoroughly. Rounding occurred: yes  Intended Disposition: TBD   Pending diagnostic reports and/or labs (please list): labs, consider CTA vs CxR    Reassessed pt. Lungs CTAB, HR normal rate and rhythm. She is in no distress. She reports chest discomfort with mild sob and dry cough is worse at night when supine, and also is worse when wakes up in morning. She states that she uses nexium daily as well as pepcid. She sees a ENT specialist regarding her GERD. Has not seen a GI specialist.    Lis Sayres only 0.47    I have low suspicion for PE. Will check a CxR. Likely flare up of her reflux since just after extubation for surgery Dr Ryan Black. She has follow up outpt there since surgery. Has no neck pain. O2 100%RA, afebrile. Low suspicion for covid-19. Will give her GI referral.     Chest x-ray per my preliminary read: No pneumothorax, no acute process  Blanca Loud DO    Patient Vitals for the past 4 hrs:   Temp Pulse Resp BP SpO2   03/24/20 0700  68 15  100 %   03/24/20 0606 98 °F (36.7 °C) 85 16 114/77 98 %           ICD-10-CM ICD-9-CM   1. Gastroesophageal reflux disease, esophagitis presence not specified K21.9 530.81   2.  Atypical chest pain R07.89 786.59       Follow-up Information     Follow up With Specialties Details Why Contact Info    Cory Pinon MD Gastroenterology Schedule an appointment as soon as possible for a visit  [facility]  [phone]      Tuyet Holbrook NP Nurse Practitioner Schedule an appointment as soon as possible for a visit  [facility]  [address]  [address]  [phone] [address] [facility] EMERGENCY DEPT Emergency Medicine  As needed, If symptoms worsen [address] [facility]  [phone]

## 2020-03-24 NOTE — ED TRIAGE NOTES
Patient presents to ER for C/O SOB. States she had surgery 19 days ago and breathing has become progressively more difficulty and Chest discomfort has progressively worsened, states laying down worsens breathing and chest discomfort.

## 2020-03-24 NOTE — ED NOTES
Bedside and Verbal shift change report given to [place] (oncoming nurse) by .me (offgoing nurse). Report included the following information SBAR, Kardex, MAR, Recent Results and Cardiac Rhythm - SInus Rhythm. Evgeny Fallon

## 2020-03-24 NOTE — ED NOTES
Assumed care of patient. Dr. David Chester in room to assess. Pillow provided and call bell within reach. Will continue to monitor closely while awaiting further orders.

## 2020-03-25 LAB
ATRIAL RATE: 75 BPM
CALCULATED P AXIS, ECG09: 49 DEGREES
CALCULATED R AXIS, ECG10: -55 DEGREES
CALCULATED T AXIS, ECG11: 19 DEGREES
DIAGNOSIS, 93000: NORMAL
P-R INTERVAL, ECG05: 126 MS
Q-T INTERVAL, ECG07: 380 MS
QRS DURATION, ECG06: 92 MS
QTC CALCULATION (BEZET), ECG08: 424 MS
VENTRICULAR RATE, ECG03: 75 BPM

## 2020-03-26 ENCOUNTER — PATIENT MESSAGE (OUTPATIENT)
Dept: FAMILY MEDICINE CLINIC | Age: 50
End: 2020-03-26

## 2020-03-26 RX ORDER — ALBUTEROL SULFATE 90 UG/1
AEROSOL, METERED RESPIRATORY (INHALATION)
Qty: 1 INHALER | Refills: 0 | Status: SHIPPED | OUTPATIENT
Start: 2020-03-26 | End: 2020-04-13

## 2020-03-29 VITALS
OXYGEN SATURATION: 97 % | DIASTOLIC BLOOD PRESSURE: 60 MMHG | SYSTOLIC BLOOD PRESSURE: 100 MMHG | RESPIRATION RATE: 20 BRPM | HEART RATE: 82 BPM | TEMPERATURE: 97.8 F

## 2020-04-13 RX ORDER — ALBUTEROL SULFATE 90 UG/1
AEROSOL, METERED RESPIRATORY (INHALATION)
Qty: 8.5 INHALER | Refills: 0 | Status: ON HOLD | OUTPATIENT
Start: 2020-04-13 | End: 2020-05-04

## 2020-04-17 ENCOUNTER — VIRTUAL VISIT (OUTPATIENT)
Dept: ORTHOPEDIC SURGERY | Age: 50
End: 2020-04-17

## 2020-04-17 DIAGNOSIS — Z98.1 S/P CERVICAL SPINAL FUSION: Primary | ICD-10-CM

## 2020-04-17 NOTE — PATIENT INSTRUCTIONS
Neck Spasm: Exercises Introduction Here are some examples of exercises for you to try. The exercises may be suggested for a condition or for rehabilitation. Start each exercise slowly. Ease off the exercises if you start to have pain. You will be told when to start these exercises and which ones will work best for you. How to do the exercises Levator scapula stretch 1. Sit in a firm chair, or stand up straight. 2. Gently tilt your head toward your left shoulder. 3. Turn your head to look down into your armpit, bending your head slightly forward. Let the weight of your head stretch your neck muscles. 4. Hold for 15 to 30 seconds. 5. Return to your starting position. 6. Follow the same instructions above, but tilt your head toward your right shoulder. 7. Repeat 2 to 4 times toward each shoulder. Upper trapezius stretch 1. Sit in a firm chair, or stand up straight. 2. This stretch works best if you keep your shoulder down as you lean away from it. To help you remember to do this, start by relaxing your shoulders and lightly holding on to your thighs or your chair. 3. Tilt your head toward your shoulder and hold for 15 to 30 seconds. Let the weight of your head stretch your muscles. 4. If you would like a little added stretch, place your arm behind your back. Use the arm opposite of the direction you are tilting your head. For example, if you are tilting your head to the left, place your right arm behind your back. 5. Repeat 2 to 4 times toward each shoulder. Neck rotation 1. Sit in a firm chair, or stand up straight. 2. Keeping your chin level, turn your head to the right, and hold for 15 to 30 seconds. 3. Turn your head to the left, and hold for 15 to 30 seconds. 4. Repeat 2 to 4 times to each side. Chin tuck 1. Lie on the floor with a rolled-up towel under your neck. Your head should be touching the floor. 2. Slowly bring your chin toward the front of your neck. 3. Hold for a count of 6, and then relax for up to 10 seconds. 4. Repeat 8 to 12 times. Forward neck flexion 1. Sit in a firm chair, or stand up straight. 2. Bend your head forward. 3. Hold for 15 to 30 seconds, then return to your starting position. 4. Repeat 2 to 4 times. Follow-up care is a key part of your treatment and safety. Be sure to make and go to all appointments, and call your doctor if you are having problems. It's also a good idea to know your test results and keep a list of the medicines you take. Where can you learn more? Go to http://sary-mk.info/ Enter P962 in the search box to learn more about \"Neck Spasm: Exercises. \" Current as of: June 26, 2019Content Version: 12.4 © 0607-4246 Healthwise, Incorporated. Care instructions adapted under license by [employer] (which disclaims liability or warranty for this information). If you have questions about a medical condition or this instruction, always ask your healthcare professional. [address] any warranty or liability for your use of this information. Neck Arthritis: Exercises Introduction Here are some examples of exercises for you to try. The exercises may be suggested for a condition or for rehabilitation. Start each exercise slowly. Ease off the exercises if you start to have pain. You will be told when to start these exercises and which ones will work best for you. How to do the exercises Neck stretches to the side 1. This stretch works best if you keep your shoulder down as you lean away from it. To help you remember to do this, start by relaxing your shoulders and lightly holding on to your thighs or your chair. 2. Tilt your head toward your shoulder and hold for 15 to 30 seconds. Let the weight of your head stretch your muscles. 3. Repeat 2 to 4 times toward each shoulder. Chin tuck 1. Lie on the floor with a rolled-up towel under your neck. Your head should be touching the floor. 2. Slowly bring your chin toward your chest. 
3. Hold for a count of 6, and then relax for up to 10 seconds. 4. Repeat 8 to 12 times. Active cervical rotation 1. Sit in a firm chair, or stand up straight. 2. Keeping your chin level, turn your head to the right, and hold for 15 to 30 seconds. 3. Turn your head to the left and hold for 15 to 30 seconds. 4. Repeat 2 to 4 times to each side. Shoulder blade squeeze 1. While standing, squeeze your shoulder blades together. 2. Do not raise your shoulders up as you are squeezing. 3. Hold for 6 seconds. 4. Repeat 8 to 12 times. Shoulder rolls 1. Sit comfortably with your feet shoulder-width apart. You can also do this exercise standing up. 2. Roll your shoulders up, then back, and then down in a smooth, circular motion. 3. Repeat 2 to 4 times. Follow-up care is a key part of your treatment and safety. Be sure to make and go to all appointments, and call your doctor if you are having problems. It's also a good idea to know your test results and keep a list of the medicines you take. Where can you learn more? Go to http://sary-mk.info/ Enter N824 in the search box to learn more about \"Neck Arthritis: Exercises. \" Current as of: June 26, 2019Content Version: 12.4 © 2214-3987 Healthwise, Incorporated. Care instructions adapted under license by [employer] (which disclaims liability or warranty for this information). If you have questions about a medical condition or this instruction, always ask your healthcare professional. [address] any warranty or liability for your use of this information.

## 2020-04-17 NOTE — PROGRESS NOTES
Balbina Simons is a 44 y.o. female who was seen by synchronous (real-time) audio-video technology on 4/17/2020. She has a history of neck pain. She had a ACDF C4/5 surgery 6 weeks ago. Prior to surgery, she had neck pain with cervicogenic headache and some arm symptoms. at her 2 week visit, her headache and arms were doing well. She was having incisional pain. She was having some trouble with swallowing. She did have trouble with a pill that morning. She had a sore throat from mouth breathing. Today, she is doing well. She has a posterior neck ache at times, worse in the AM. She is taking tylenol prn. Using heat as needed. She stil has some discomfort with swallowing. she is seeing GI soon ofr reflux. Her ENT just increased her reflux medication. Denies bladder/bowel dysfunction, saddle paresthesia, weakness, gait disturbance, or other neurological deficit. Pt at this time desires to  continue with current care/proceed with medication evaluation/proceed with post op care. ASSESSMENT  44 y.o. female with s/p acdf. Diagnoses and all orders for this visit:    1. S/P cervical spinal fusion           IMPRESSION/PLAN    1) Pt was given information on neck exercises. 2) cont Tylenol prn  3) heat, tens for pain  4) bring GI notes to next visit for review, apt Tuesday. Can do SP if needed  5) Ms. Lynsey Trujillo has a reminder for a \"due or due soon\" health maintenance. I have asked that she contact her primary care provider, Korey Roth NP, for follow-up on this health maintenance. 6) We have informed patient to notify us for immediate appointment if he has any worsening neurogical symptoms or if an emergency situation presents, then call 911  7) Pt will follow-up in June for 12 week visit, obtain 2 v c xray. Risks and benefits of ongoing therapy have been reviewed with the patient.  is appropriate. No pain behaviors. Denies thoughts of harming self or others.  Pt has a good risk to benefit ratio which allows the pt to function in a home environment without side effects. Assessment & Plan:   Diagnoses and all orders for this visit:    1. S/P cervical spinal fusion              Subjective:   Park Simms was seen for No chief complaint on file. 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  loratadine (CLARITIN) 10 mg tablet Take 10 mg by mouth daily as needed for Allergies.  traMADol (ULTRAM) 50 mg tablet Take 50 mg by mouth three (3) times daily.  methocarbamol (ROBAXIN) 750 mg tablet TAKE 1 TABLET BY MOUTH FOUR TIMES DAILY. (Patient taking differently: 750 mg three (3) times daily.) 60 Tab 0    cyanocobalamin 1,000 mcg tablet Take 2,500 mcg by mouth every other day.  cholecalciferol, vitamin D3, (VITAMIN D3 PO) Take 10,000 Units by mouth daily.  multivitamin (ONE A DAY) tablet Take 1 Tab by mouth daily.  azelastine-fluticasone (DYMISTA) 137-50 mcg/spray spry 1 [place] by Nasal route as needed (stuffy nose).  esomeprazole (NEXIUM) 20 mg capsule Take 20 mg by mouth daily.  diphenhydrAMINE (SIMPLY SLEEP) 25 mg tablet Take 12.5 mg by mouth nightly.  famotidine (PEPCID) 20 mg tablet Take 10 mg by mouth nightly.       gabapentin (NEURONTIN) 600 mg tablet Take 1 Tab by mouth three (3) times daily. Max Daily Amount: 1,800 mg. (Patient taking differently: Take 600 mg by mouth two (2) times a day.) 90 Tab 1    gabapentin (NEURONTIN) 300 mg capsule Take 1-2 Caps by mouth three (3) times daily. Max Daily Amount: 1,800 mg. Indications: neuropathic pain (Patient taking differently: Take 300 mg by mouth daily.  Patient [facility] at [facility]  Indications: neuropathic pain) 180 Cap 0       ALLERGIES  Allergies   Allergen Reactions    Ciprofloxacin Diarrhea    Milk Containing Products Diarrhea    Pennsaid [Diclofenac Sodium] Rash    Sulfa (Sulfonamide Antibiotics) Rash    Other Medication Diarrhea     Eggs, patient eats, and gets flu shot yearly with no reaction       SOCIAL HISTORY    Social History     Socioeconomic History    Marital status:      Spouse name: Not on file    Number of children: Not on file    Years of education: Not on file    Highest education level: Not on file   Occupational History    Not on file   Social Needs    Financial resource strain: Not on file    Food insecurity     Worry: Not on file     Inability: Not on file    Transportation needs     Medical: Not on file     Non-medical: Not on file   Tobacco Use    Smoking status: Never Smoker    Smokeless tobacco: Never Used   Substance and Sexual Activity    Alcohol use: Yes     Comment: few times per week    Drug use: No    Sexual activity: Yes     Partners: Male     Comment: pregnancy test negative   Lifestyle    Physical activity     Days per week: Not on file     Minutes per session: Not on file    Stress: Not on file   Relationships    Social connections     Talks on phone: Not on file     Gets together: Not on file     Attends Mandaeism service: Not on file     Active member of club or organization: Not on file     Attends meetings of clubs or organizations: Not on file     Relationship status: Not on file    Intimate partner violence     Fear of current or ex partner: Not on file Emotionally abused: Not on file     Physically abused: Not on file     Forced sexual activity: Not on file   Other Topics Concern    Not on file   Social History Narrative    Not on file       Pain Scale: /10    Pain Assessment  2/28/2020   Location of Pain Neck   Location Modifiers (No Data)   Severity of Pain 3   Quality of Pain Aching; Other (Comment)   Quality of Pain Comment shooting & weakness   Duration of Pain Persistent   Duration of Pain Comment -   Frequency of Pain Constant   Aggravating Factors Other (Comment)   Aggravating Factors Comment sudden movements to the side   Limiting Behavior Yes   Relieving Factors Nothing   Relieving Factors Comment -   Result of Injury (No Data)       ROS      Objective:       General: alert, cooperative, no distress, appears stated age  Constitutional:  Well developed, well nourished, in no acute distress. Psychiatric: Affect and mood are appropriate. Integumentary: No rashes or abrasions noted on exposed areas. Wound: Incision healing well, has well approximated edges, no erythema, warmth, drainage or signs of infection. Due to this being a TeleHealth evaluation, many elements of the physical examination are unable to be assessed. We discussed the expected course, resolution and complications of the diagnosis(es) in detail. Medication risks, benefits, costs, interactions, and alternatives were discussed as indicated. I advised her to contact the office if her condition worsens, changes or fails to improve as anticipated. She expressed understanding with the diagnosis(es) and plan. Pursuant to the emergency declaration under the [facility], 1135 waiver authority and the [employer] General Act, this Virtual  Visit was conducted, with patient's consent, to reduce the patient's risk of exposure to COVID-19 and provide continuity of care for an established patient. Services were provided through real time synchronous discussion virtually to substitute for in-person clinic visit. Patient verbally consented to this type of visit and that they might get a bill from the billing of this visit. This service was provided through my chart,  the patient was located at home and  the provider was located at home. There was only the patient participating in the service. Start time [facility]  End [id number].      Avni Goff NP

## 2020-04-29 RX ORDER — CHOLECALCIFEROL (VITAMIN D3) 50 MCG
CAPSULE ORAL DAILY
COMMUNITY

## 2020-05-01 ENCOUNTER — ANESTHESIA EVENT (OUTPATIENT)
Dept: ENDOSCOPY | Age: 50
End: 2020-05-01
Payer: COMMERCIAL

## 2020-05-04 ENCOUNTER — HOSPITAL ENCOUNTER (OUTPATIENT)
Age: 50
Setting detail: OUTPATIENT SURGERY
Discharge: HOME | End: 2020-05-04
Attending: INTERNAL MEDICINE | Admitting: INTERNAL MEDICINE
Payer: COMMERCIAL

## 2020-05-04 ENCOUNTER — ANESTHESIA (OUTPATIENT)
Dept: ENDOSCOPY | Age: 50
End: 2020-05-04
Payer: COMMERCIAL

## 2020-05-04 VITALS
BODY MASS INDEX: 28.71 KG/M2 | OXYGEN SATURATION: 100 % | SYSTOLIC BLOOD PRESSURE: 110 MMHG | DIASTOLIC BLOOD PRESSURE: 77 MMHG | HEIGHT: 62 IN | WEIGHT: 156 LBS | RESPIRATION RATE: 16 BRPM | HEART RATE: 59 BPM | TEMPERATURE: 97 F

## 2020-05-04 LAB — HCG UR QL: NEGATIVE

## 2020-05-04 PROCEDURE — 74011000250 HC RX REV CODE- 250: Performed by: NURSE ANESTHETIST, CERTIFIED REGISTERED

## 2020-05-04 PROCEDURE — 81025 URINE PREGNANCY TEST: CPT

## 2020-05-04 PROCEDURE — 77030008565 HC TBNG SUC IRR ERBE -B: Performed by: INTERNAL MEDICINE

## 2020-05-04 PROCEDURE — 74011250636 HC RX REV CODE- 250/636: Performed by: NURSE ANESTHETIST, CERTIFIED REGISTERED

## 2020-05-04 PROCEDURE — 76060000031 HC ANESTHESIA FIRST 0.5 HR: Performed by: INTERNAL MEDICINE

## 2020-05-04 PROCEDURE — 76040000019: Performed by: INTERNAL MEDICINE

## 2020-05-04 PROCEDURE — 77030018846 HC SOL IRR STRL H20 ICUM -A: Performed by: INTERNAL MEDICINE

## 2020-05-04 RX ORDER — FENTANYL CITRATE 50 UG/ML
50 INJECTION, SOLUTION INTRAMUSCULAR; INTRAVENOUS AS NEEDED
Status: CANCELLED | OUTPATIENT
Start: 2020-05-04

## 2020-05-04 RX ORDER — SODIUM CHLORIDE, SODIUM LACTATE, POTASSIUM CHLORIDE, CALCIUM CHLORIDE 600; 310; 30; 20 MG/100ML; MG/100ML; MG/100ML; MG/100ML
75 INJECTION, SOLUTION INTRAVENOUS CONTINUOUS
Status: CANCELLED | OUTPATIENT
Start: 2020-05-04 | End: 2020-05-04

## 2020-05-04 RX ORDER — MAGNESIUM SULFATE 100 %
4 CRYSTALS MISCELLANEOUS AS NEEDED
Status: CANCELLED | OUTPATIENT
Start: 2020-05-04

## 2020-05-04 RX ORDER — SODIUM CHLORIDE 0.9 % (FLUSH) 0.9 %
5-40 SYRINGE (ML) INJECTION AS NEEDED
Status: CANCELLED | OUTPATIENT
Start: 2020-05-04

## 2020-05-04 RX ORDER — SODIUM CHLORIDE 0.9 % (FLUSH) 0.9 %
5-40 SYRINGE (ML) INJECTION EVERY 8 HOURS
Status: DISCONTINUED | OUTPATIENT
Start: 2020-05-04 | End: 2020-05-04 | Stop reason: HOSPADM

## 2020-05-04 RX ORDER — SODIUM CHLORIDE 0.9 % (FLUSH) 0.9 %
5-40 SYRINGE (ML) INJECTION EVERY 8 HOURS
Status: CANCELLED | OUTPATIENT
Start: 2020-05-04

## 2020-05-04 RX ORDER — ALBUTEROL SULFATE 90 UG/1
AEROSOL, METERED RESPIRATORY (INHALATION)
Qty: 8.5 INHALER | Refills: 0 | Status: SHIPPED | OUTPATIENT
Start: 2020-05-04 | End: 2022-01-05 | Stop reason: ALTCHOICE

## 2020-05-04 RX ORDER — DEXTROSE 50 % IN WATER (D50W) INTRAVENOUS SYRINGE
25-50 AS NEEDED
Status: CANCELLED | OUTPATIENT
Start: 2020-05-04

## 2020-05-04 RX ORDER — LIDOCAINE HYDROCHLORIDE 20 MG/ML
INJECTION, SOLUTION EPIDURAL; INFILTRATION; INTRACAUDAL; PERINEURAL AS NEEDED
Status: DISCONTINUED | OUTPATIENT
Start: 2020-05-04 | End: 2020-05-04 | Stop reason: HOSPADM

## 2020-05-04 RX ORDER — SODIUM CHLORIDE, SODIUM LACTATE, POTASSIUM CHLORIDE, CALCIUM CHLORIDE 600; 310; 30; 20 MG/100ML; MG/100ML; MG/100ML; MG/100ML
75 INJECTION, SOLUTION INTRAVENOUS CONTINUOUS
Status: DISCONTINUED | OUTPATIENT
Start: 2020-05-04 | End: 2020-05-04 | Stop reason: HOSPADM

## 2020-05-04 RX ORDER — PROPOFOL 10 MG/ML
INJECTION, EMULSION INTRAVENOUS AS NEEDED
Status: DISCONTINUED | OUTPATIENT
Start: 2020-05-04 | End: 2020-05-04 | Stop reason: HOSPADM

## 2020-05-04 RX ORDER — SODIUM CHLORIDE 0.9 % (FLUSH) 0.9 %
5-40 SYRINGE (ML) INJECTION AS NEEDED
Status: DISCONTINUED | OUTPATIENT
Start: 2020-05-04 | End: 2020-05-04 | Stop reason: HOSPADM

## 2020-05-04 RX ORDER — ONDANSETRON 2 MG/ML
4 INJECTION INTRAMUSCULAR; INTRAVENOUS ONCE
Status: CANCELLED | OUTPATIENT
Start: 2020-05-04 | End: 2020-05-04

## 2020-05-04 RX ADMIN — SODIUM CHLORIDE, SODIUM LACTATE, POTASSIUM CHLORIDE, AND CALCIUM CHLORIDE 75 ML/HR: 600; 310; 30; 20 INJECTION, SOLUTION INTRAVENOUS at 06:51

## 2020-05-04 RX ADMIN — FAMOTIDINE 20 MG: 10 INJECTION, SOLUTION INTRAVENOUS at 06:51

## 2020-05-04 RX ADMIN — PROPOFOL 70 MG: 10 INJECTION, EMULSION INTRAVENOUS at 07:55

## 2020-05-04 RX ADMIN — LIDOCAINE HYDROCHLORIDE 60 MG: 20 INJECTION, SOLUTION EPIDURAL; INFILTRATION; INTRACAUDAL; PERINEURAL at 07:55

## 2020-05-04 NOTE — H&P
History and Physical reviewed; I have examined the patient and there are no pertinent changes. Edenilson Martinez MD, MD   7:52 AM 5/4/2020  [facility], [address]  [email or web address]

## 2020-05-04 NOTE — PROCEDURES
Jeseon  [address] [place], [address]      Brief Procedure Note    Mayme December 1970  [id number]    Date of Procedure: 5/4/2020    Preoperative diagnosis: Dysphagia:   787.20 - R13.10  Gastro-esophageal reflux disease:   530.81 - K21.9  Globus sensation:   300.11 - F45.8  Medium hiatal hernia:   553.3 - K44.9  S/P cervical spine fusion:   V45.89 - Z98.890  Body mass index 25-29,  overweight:   278.02 - E66.3    Postoperative diagnosis:  canker pocket, hiatal hernia, hyperplastic gastric polyp    Type of Anesthesia: MAC (monitered anesthesia care)    Description of Findings: same as post op dx    Procedure: Procedure(s):  UPPER ENDOSCOPY / dilation    :  Dr. Kd Waldrop MD    Assistant(s): [unfilled]    Type of Anesthesia:MAC     EBL:None, no implants.     Specimens: None  Findings: See printed and scanned procedure note    Complications: None    Dr. Kd Waldrop MD  5/4/2020  8:10 AM

## 2020-05-04 NOTE — ANESTHESIA PREPROCEDURE EVALUATION
Relevant Problems   No relevant active problems       Anesthetic History               Review of Systems / Medical History  Patient summary reviewed and pertinent labs reviewed    Pulmonary        Sleep apnea: No treatment           Neuro/Psych   Within defined limits           Cardiovascular                  Exercise tolerance: >4 METS     GI/Hepatic/Renal     GERD: well controlled          Comments: Feeling of dysphagia since her Cervical spine surgery Endo/Other        Arthritis     Other Findings              Physical Exam    Airway  Mallampati: III  TM Distance: 4 - 6 cm  Neck ROM: decreased range of motion   Mouth opening: Normal     Cardiovascular  Regular rate and rhythm,  S1 and S2 normal,  no murmur, click, rub, or gallop             Dental  No notable dental hx       Pulmonary  Breath sounds clear to auscultation               Abdominal  GI exam deferred       Other Findings            Anesthetic Plan    ASA: 2  Anesthesia type: MAC          Induction: Intravenous  Anesthetic plan and risks discussed with: Patient

## 2020-05-04 NOTE — DISCHARGE INSTRUCTIONS
Learning About Gastric Polyps  What are gastric polyps? Gastric polyps are growths in the stomach. Most people who get them don't have any problems. The cause of most gastric polyps is not known. But some polyps grow in people who use stomach acid reducers called proton pump inhibitors (PPIs). People who have gastritis, ulcers, or an H. pylori infection in the stomach can sometimes get polyps. People who have a parent, brother, or sister with gastric polyps might have them too. Most gastric polyps aren't cancer. But a certain kind of polyp can turn into cancer. What are the symptoms? You may not know that you have gastric polyps. Most polyps don't lead to symptoms. Once in a while, larger polyps may cause bleeding, belly pain, or a blockage in the stomach. How are polyps diagnosed and treated? Most gastric polyps are found during an endoscopy (say \"[id number]\") that is done for another health problem. Endoscopy is a test that uses a thin flexible tube to allow a doctor to look at the inside of your stomach. Your doctor will treat your polyps based on what he or she sees during this test. If your doctor finds a polyp during the test, he or she may take it out. It will then be tested to make sure it isn't cancer. If your doctor finds H. pylori bacteria in your stomach, he or she will prescribe antibiotics. If you have been taking a PPI, the doctor may prescribe a different medicine instead. Sometimes the doctor may suggest another endoscopy to see how treatment is working. He or she may also suggest this to recheck certain kinds of polyps. The doctor may also suggest a colonoscopy to look for polyps in your colon. Follow-up care is a key part of your treatment and safety. Be sure to make and go to all appointments, and call your doctor if you are having problems. It's also a good idea to know your test results and keep a list of the medicines you take. Where can you learn more?   Go to http://sary-mk.info/  Enter V850 in the search box to learn more about \"Learning About Gastric Polyps. \"  Current as of: August 11, 2019Content Version: 12.4  © 3561-5771 Healthwise, Incorporated. Care instructions adapted under license by [employer] (which disclaims liability or warranty for this information). If you have questions about a medical condition or this instruction, always ask your healthcare professional. [place] [address] any warranty or liability for your use of this information. Hiatal Hernia: Care Instructions  Your Care Instructions  A hiatal hernia occurs when part of the stomach bulges into the chest cavity. A hiatal hernia may allow stomach acid and juices to back up into the esophagus (acid reflux). This can cause a feeling of burning, warmth, heat, or pain behind the breastbone. This feeling may often occur after you eat, soon after you lie down, or when you bend forward, and it may come and go. You also may have a sour taste in your mouth. These symptoms are commonly known as heartburn or reflux. But not all hiatal hernias cause symptoms. Follow-up care is a key part of your treatment and safety. Be sure to make and go to all appointments, and call your doctor if you are having problems. It's also a good idea to know your test results and keep a list of the medicines you take. How can you care for yourself at home? · Take your medicines exactly as prescribed. Call your doctor if you think you are having a problem with your medicine. · Do not take aspirin or other nonsteroidal anti-inflammatory drugs (NSAIDs), such as ibuprofen (Advil, Motrin) or naproxen (Aleve), unless your doctor says it is okay. Ask your doctor what you can take for pain. · Your doctor may recommend over-the-counter medicine. For mild or occasional indigestion, antacids such as Tums, Gaviscon, Maalox, or Mylanta may help.  Your doctor also may recommend over-the-counter acid reducers, such as famotidine (Pepcid AC), cimetidine (Tagamet HB), ranitidine (Zantac 75 and Zantac 150), or omeprazole (Prilosec). Read and follow all instructions on the label. If you use these medicines often, talk with your doctor. · Change your eating habits. ? It's best to eat several small meals instead of two or three large meals. ? After you eat, wait 2 to 3 hours before you lie down. Late-night snacks aren't a good idea. ? Chocolate, mint, and alcohol can make heartburn worse. They relax the valve between the esophagus and the stomach. ? Spicy foods, foods that have a lot of acid (like tomatoes and oranges), and coffee can make heartburn symptoms worse in some people. If your symptoms are worse after you eat a certain food, you may want to stop eating that food to see if your symptoms get better. · Do not smoke or chew tobacco.  · If you get heartburn at night, raise the head of your bed 6 to 8 inches by putting the frame on blocks or placing a foam wedge under the head of your mattress. (Adding extra pillows does not work.)  · Do not wear tight clothing around your middle. · Lose weight if you need to. Losing just 5 to 10 pounds can help. When should you call for help? Call your doctor now or seek immediate medical care if:    · You have new or worse belly pain.     · You are vomiting.    Watch closely for changes in your health, and be sure to contact your doctor if:    · You have new or worse symptoms of indigestion.     · You have trouble or pain swallowing.     · You are losing weight.     · You do not get better as expected. Where can you learn more? Go to http://sary-mk.info/  Enter T074 in the search box to learn more about \"Hiatal Hernia: Care Instructions. \"  Current as of: August 11, 2019Content Version: 12.4  © 2163-6338 Healthwise, Incorporated.   Care instructions adapted under license by [employer] (which disclaims liability or warranty for this information). If you have questions about a medical condition or this instruction, always ask your healthcare professional. [place] [address] any warranty or liability for your use of this information. Upper GI Endoscopy: What to Expect at [address] [place] [address]  After you have an endoscopy, you will stay at the hospital or clinic for 1 to 2 hours. This will allow the medicine to wear off. You will be able to go home after your doctor or nurse checks to make sure you are not having any problems. You may have to stay overnight if you had treatment during the test. You may have a sore throat for a day or two after the test.  This care sheet gives you a general idea about what to expect after the test.  How can you care for yourself at home? Activity  · Rest as much as you need to after you go home. · You should be able to go back to your usual activities the day after the test.  Diet  · Follow your doctor's directions for eating after the test.  · Drink plenty of fluids (unless your doctor has told you not to). Medications  · If you have a sore throat the day after the test, use an over-the-counter spray to numb your throat. Follow-up care is a key part of your treatment and safety. Be sure to make and go to all appointments, and call your doctor if you are having problems. It's also a good idea to know your test results and keep a list of the medicines you take. When should you call for help? Call 911 anytime you think you may need emergency care.  For example, call if:    · You passed out (loses consciousness).     · You have trouble breathing.     · You pass maroon or bloody stools.    Call your doctor now or seek immediate medical care if:    · You have pain that does not get better after your take pain medicine.     · You have new or worse belly pain.     · You have blood in your stools.     · You are sick to your stomach and cannot keep fluids down.     · You have a fever.     · You cannot pass stools or gas.    Watch closely for changes in your health, and be sure to contact your doctor if:    · Your throat still hurts after a day or two.     · You do not get better as expected. Where can you learn more? Go to http://sary-mk.info/  Enter J454 in the search box to learn more about \"Upper GI Endoscopy: What to Expect at Home. \"  Current as of: August 11, 2019Content Version: 12.4  © 5447-4570 [employer]. Care instructions adapted under license by [employer] (which disclaims liability or warranty for this information). If you have questions about a medical condition or this instruction, always ask your healthcare professional. [address] any warranty or liability for your use of this information. DISCHARGE SUMMARY from Nurse    PATIENT INSTRUCTIONS:    After general anesthesia or intravenous sedation, for 24 hours or while taking prescription Narcotics:  · Limit your activities  · Do not drive and operate hazardous machinery  · Do not make important personal or business decisions  · Do  not drink alcoholic beverages  · If you have not urinated within 8 hours after discharge, please contact your surgeon on call. Report the following to your surgeon:  · Excessive pain, swelling, redness or odor of or around the surgical area  · Temperature over 100.5  · Nausea and vomiting lasting longer than 4 hours or if unable to take medications  · Any signs of decreased circulation or nerve impairment to extremity: change in color, persistent  numbness, tingling, coldness or increase pain  · Any questions    What to do at Home:  Recommended activity: Activity as tolerated. *  Please give a list of your current medications to your Primary Care Provider.     *  Please update this list whenever your medications are discontinued, doses are      changed, or new medications (including over-the-counter products) are added. *  Please carry medication information at all times in case of emergency situations. These are general instructions for a healthy lifestyle:    No smoking/ No tobacco products/ Avoid exposure to second hand smoke  Surgeon General's Warning:  Quitting smoking now greatly reduces serious risk to your health. Obesity, smoking, and sedentary lifestyle greatly increases your risk for illness    A healthy diet, regular physical exercise & weight monitoring are important for maintaining a healthy lifestyle    You may be retaining fluid if you have a history of heart failure or if you experience any of the following symptoms:  Weight gain of 3 pounds or more overnight or 5 pounds in a week, increased swelling in our hands or feet or shortness of breath while lying flat in bed. Please call your doctor as soon as you notice any of these symptoms; do not wait until your next office visit. The discharge information has been reviewed with the patient. The patient verbalized understanding. Discharge medications reviewed with the patient and appropriate educational materials and side effects teaching were provided.   ___________________________________________________________________________________________________________________________________

## 2020-05-04 NOTE — ANESTHESIA POSTPROCEDURE EVALUATION
Procedure(s):  UPPER ENDOSCOPY / dilation. MAC    Anesthesia Post Evaluation      Multimodal analgesia: multimodal analgesia used between 6 hours prior to anesthesia start to PACU discharge  Patient location during evaluation: bedside  Patient participation: complete - patient participated  Level of consciousness: awake  Pain score: 0  Pain management: adequate  Airway patency: patent  Anesthetic complications: no  Cardiovascular status: stable  Respiratory status: acceptable  Hydration status: acceptable  Post anesthesia nausea and vomiting:  controlled      Vitals Value Taken Time   /70 5/4/2020  8:28 AM   Temp 36.4 °C (97.6 °F) 5/4/2020  8:10 AM   Pulse 0 5/4/2020  8:30 AM   Resp 14 5/4/2020  8:29 AM   SpO2 100 % 5/4/2020  8:30 AM   Vitals shown include unvalidated device data.

## 2020-05-18 ENCOUNTER — HOSPITAL ENCOUNTER (OUTPATIENT)
Dept: GENERAL RADIOLOGY | Age: 50
Discharge: HOME | End: 2020-05-18
Attending: OTOLARYNGOLOGY
Payer: COMMERCIAL

## 2020-05-18 ENCOUNTER — HOSPITAL ENCOUNTER (OUTPATIENT)
Dept: GENERAL RADIOLOGY | Age: 50
Discharge: HOME | End: 2020-05-18
Attending: INTERNAL MEDICINE
Payer: COMMERCIAL

## 2020-05-18 DIAGNOSIS — K21.9 ESOPHAGEAL REFLUX: ICD-10-CM

## 2020-05-18 DIAGNOSIS — K22.5: ICD-10-CM

## 2020-05-18 DIAGNOSIS — Z98.890 STATUS POST LUMBAR LAMINECTOMY: ICD-10-CM

## 2020-05-18 PROCEDURE — 74230 X-RAY XM SWLNG FUNCJ C+: CPT

## 2020-05-18 PROCEDURE — 92611 MOTION FLUOROSCOPY/SWALLOW: CPT

## 2020-05-18 PROCEDURE — 74011000250 HC RX REV CODE- 250: Performed by: INTERNAL MEDICINE

## 2020-05-18 PROCEDURE — 74220 X-RAY XM ESOPHAGUS 1CNTRST: CPT

## 2020-05-18 PROCEDURE — 74011000255 HC RX REV CODE- 255: Performed by: INTERNAL MEDICINE

## 2020-05-18 PROCEDURE — 74011000255 HC RX REV CODE- 255: Performed by: OTOLARYNGOLOGY

## 2020-05-18 RX ADMIN — ANTACID/ANTIFLATULENT 4 G: 380; 550; 10; 10 GRANULE, EFFERVESCENT ORAL at 09:29

## 2020-05-18 RX ADMIN — BARIUM SULFATE 176 G: 960 POWDER, FOR SUSPENSION ORAL at 10:11

## 2020-05-18 RX ADMIN — BARIUM SULFATE 75 ML: 400 SUSPENSION ORAL at 10:10

## 2020-05-18 RX ADMIN — BARIUM SULFATE 135 ML: 980 POWDER, FOR SUSPENSION ORAL at 09:29

## 2020-05-18 RX ADMIN — BARIUM SULFATE 60 ML: 400 PASTE ORAL at 10:10

## 2020-05-18 RX ADMIN — BARIUM SULFATE 90 ML: 400 SUSPENSION ORAL at 10:11

## 2020-05-18 NOTE — PROGRESS NOTES
[facility]  SPEECH LANGUAGE PATHOLOGY OUTPATIENT MODIFIED BARIUM SWALLOW STUDY    Patient: Antoine Mike (87 y.o. female)  Date: 5/18/2020  Primary Diagnosis: Esophageal reflux [K21.9]       Precautions: aspiration       ASSESSMENT :  Based on the objective data described below, the patient presents with mod-sev pharyngeal dysphagia s/p ACDF 3/5/2020 C4-C5. Pt with silent aspiration during the swallow with thin, nectar-thick, honey-thick, and solid consistencies. Further silent aspiration events post swallow secondary to mod vallecular and pyriform sinus residuals. Pt with increased residuals at the site of cervical hardware. Small sips/bites, effortful swallows, and chin tuck ineffective at improving airway protection; however, airway compromise appeared minimally improved with nectar-thick viscosity. She did tolerate puree with effortful swallow with no aspiration/penetration events; however, suspect she would eventually have airway compromise with residuals observed. Deficits include decreased laryngeal elevation and impaired pharyngeal motility/sensation. Safest diet would be NPO with consideration of an alternate means of nutrition/hydration; however, recommend initiation of OP SLP services with diet modifications per MD discretion. Recommend soft solid diet with nectar-thick liquids, aspiration precautions, oral care TID, and meds as tolerated. Rec Fiz Protocol. Highly recommend OP SLP services to address above deficits with repeat MBS x 6-8 weeks to reassess oropharyngeal swallow fxn. She may benefit from follow up with ortho per MD discretion as well as a possible GI consult? Results/recommendations d/w pt in detail following study with handout provided.       PLAN :    Recommendations:  Safest diet would be NPO with consideration of an alternate means of nutrition/hydration; however, rec initiation of OP SLP services with diet modifications to see if there is pharyngeal swallow improvement prior to GI consult. Recommend initiation of SLP services to further address dysphagia management with below stated recommendations. Follow up MBS x 6-8 weeks. Soft solid diet with nectar-thick liquids  Aspiration precautions  HOB >45 during po intake, remain >30 for 30-45 minutes after po   Small bites/sips; alternate liquid/solid with slow feeding rate   Oral care TID  Meds per pt preference    [facility] Protocol:  Pt may have water:  1. AFTER oral care  2. 30 minutes AFTER a meal  3. Never with medications  4. Never with meals (continue nectar-thick liquids during meals)       SUBJECTIVE:   Patient stated Rod carrillo for taking the time to review all of this with me! I'm glad I'm not crazy! . 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    Skin cancer     removed from nose    Sleep apnea     \"mild per patient\" no cpap    Thumb pain 6/7/2010     Past Surgical History:   Procedure Laterality Date    HX BACK SURGERY  2012    HX CERVICAL FUSION  03/05/2020    Anterior    HX GYN      left tube removal ectopic pregnancy    HX HEENT  9/2011    Basal cell cancer lesion removed from nose    HX LYMPHADENECTOMY Right 8/25/16    HX ORTHOPAEDIC  1999    TMJ    HX OTHER SURGICAL  2013    Spinal Fusion    HX ROTATOR CUFF REPAIR Right 2019    x2    HX TONSILLECTOMY  2/2007     Prior Level of Function/Home Situation: Pt with no reported dysphagia prior to cervical fusion on 3/5/2020. Diet prior to admission: reg solid with thin  Current Diet:  Soft solid with nectar-thick, [facility] Protocol    Radiology:  Film Views: Lateral;Fluoro  Patient Position: 90 in fluoro chair    Trial 1: Trial 2:   Consistency Presented:  Thin liquid; Nectar thick liquid;Honey thick liquid; Solid Consistency Presented: Puree   How Presented: Self-fed/presented;Cup/sip;Spoon How Presented: Self-fed/presented;Spoon         Bolus Acceptance: No impairment Bolus Acceptance: No impairment   Bolus Formation/Control: No impairment:   Bolus Formation/Control: No impairment:     Propulsion: No impairment Propulsion: No impairment   Oral Residue: None Oral Residue: None   Initiation of Swallow: No impairment     Timing: No impairment Timing: No impairment   Penetration: None Penetration: None   Aspiration/Timing: Silent ; After;From residual;From initial swallow;During Aspiration/Timing: No evidence of aspiration   Pharyngeal Clearance: Vallecular residue;Pyriform residue ;10-50% Pharyngeal Clearance: Vallecular residue;Pyriform residue ;10-50%   Attempted Modifications: Small sips and bites; Double swallow;Effortful swallow; Chin tuck     Effective Modifications: None Effective Modifications: None   Cues for Modifications: Moderate Cues for Modifications: None         Decreased Tongue Base Retraction?: No  Laryngeal Elevation: Reduced excursion with laryngeal vestibule gap;Minimal movement of larynx/epiglottis; Incomplete laryngeal closure  Aspiration/Penetration Score: 8 (Aspiration-Contrast passes cords/glottis with no effort to eject, ie/silent aspiration)  Pharyngeal Symmetry: Not assessed  Pharyngeal-Esophageal Segment: No impairment  Pharyngeal Dysfunction: Decreased strength;Decreased elevation/closure;Decreased pharyngeal wall constriction    Oral Phase Severity: No impairment  Pharyngeal Phase Severity: Moderately severe    8-point Penetration-Aspiration Scale: Score 8    PAIN:  Pt reports 0/10 pain or discomfort prior to MBS. Pt reports 0/10 pain or discomfort post MBS.      COMMUNICATION/EDUCATION:   [x]  Education provided post diagnostic testing including oropharyngeal anatomy/physiology, MBS results, diet recommendations and        compensatory strategies/positioning. [x]  Video feedback utilized. [x]  Handout regarding diet recommendations and thickener instructions provided. [x]  Patient/family have participated as able in goal setting and plan of care.     Thank you for this referral.    Aleksandra Mauricio M.S. CCC-SLP/L  Speech-Language Pathologist

## 2020-05-27 ENCOUNTER — HOSPITAL ENCOUNTER (OUTPATIENT)
Dept: PHYSICAL THERAPY | Age: 50
Discharge: HOME | End: 2020-05-27
Payer: COMMERCIAL

## 2020-05-27 PROCEDURE — 92526 ORAL FUNCTION THERAPY: CPT

## 2020-05-27 PROCEDURE — 92610 EVALUATE SWALLOWING FUNCTION: CPT

## 2020-05-27 NOTE — PROGRESS NOTES
ST DAILY TREATMENT NOTE    Patient Name: Ollie Simmons  Date:2020  : 1970  [x]  Patient  Verified  Payor: BLUE CROSS / Plan: Carmina Cannon / Product Type: PPO /   In time:137 Out time:215  Total Treatment Time (min): 38  Visit #: 1 of 8    SUBJECTIVE  Pain Level (0-10 scale): 0    Any medication changes, allergies to medications, adverse drug reactions, diagnosis change, or new procedure performed?: [] No    [x] Yes (see summary sheet for update)  Subjective functional status/changes:   [] No changes reported  Pt was referred to OP ST for mod-sev dysphagia. Pt reports she abruptly experienced dysphagia s/p  ACDF of c 4-5, 3/2020. Date of Onset: 3/5/2020  Social History: Works full time for [employer];   Prior Functional level:   Radiology: Per MBS on 20,   Based on the objective data described below, the patient presents with mod-sev pharyngeal dysphagia s/p ACDF 3/5/2020 C4-C5. Pt with silent aspiration during the swallow with thin, nectar-thick, honey-thick, and solid consistencies. Further silent aspiration events post swallow secondary to mod vallecular and pyriform sinus residuals. Pt with increased residuals at the site of cervical hardware. Small sips/bites, effortful swallows, and chin tuck ineffective at improving airway protection; however, airway compromise appeared minimally improved with nectar-thick viscosity. She did tolerate puree with effortful swallow with no aspiration/penetration events; however, suspect she would eventually have airway compromise with residuals observed. Deficits include decreased laryngeal elevation and impaired pharyngeal motility/sensation.  Safest diet would be NPO with consideration of an alternate means of nutrition/hydration; however, recommend initiation of OP SLP services with diet modifications per MD discretion.      Recommend soft solid diet with nectar-thick liquids, aspiration precautions, oral care TID, and meds as tolerated. Rec [employer] Protocol. Highly recommend OP SLP services to address above deficits with repeat MBS x 6-8 weeks to reassess oropharyngeal swallow fxn. She may benefit from follow up with ortho per MD discretion as well as a possible GI consult? Results/recommendations d/w pt in detail following study with handout provided.       PLAN :     Recommendations:  Safest diet would be NPO with consideration of an alternate means of nutrition/hydration; however, rec initiation of OP SLP services with diet modifications to see if there is pharyngeal swallow improvement prior to GI consult.           Current diet: Soft solids NTLs with chin tuck  positionin    Mental Status:  [x]alert []lethargic []confused  Orientation: [x]person [x]place [x]time [x]situation  [facility] Directions: []1-step []2-step []3-step []complex  Motivation: []excellent []good  []fair  []poor   Barriers to learning: []none []aphasia []? cognition []lethargy/motivation [][place]   []?vision []language [x]Fatigue/pain [x]pain compensate with:   Dentition: [x]normal []abnormal []edentulous []dentures   Respiratory Status: [x]WFL []SOB  []O2 L/min:  []NC []Mask               []Trach Tube:                     []Excess secretions  Lips:  [x]Symmetrical []asymmetrical  Retraction [x]WFL  []?min []?mod []?max  Protrusion [x]WFL  []?min []?mod []?max  Strength [x]WFL  []?min []?mod []?max  Puff  [x]WFL  []?min []?mod []?max  Tongue:  []Symmetrical [x]asymmetrical deviates to the Left side  Protrusion [x]WFL  []?min []?mod []?max  Elevation [x]WFL  []?min []?mod []?max  Depression [x]WFL  []?min []?mod []?max  Lateralization [x]WFL  []?min []?mod []?max  Strength [x]WFL  []?min []?mod []?max  Velum:  [x]Symmetrical []asymmetrical  Gag Reflex:  [x]Present []absent []DNT  Sensation:  []Intact [x]Diminished  Specify: based on silent aspiration on MBS  Voice:  [x]Normal []Hoarse []harsh  []Breathy []Hypernasal []hyponasal  []Gurgly Other:  C/o vocal fatigue  Swallow:  [x]Volitional []absent  []Reflexive []absent  Cough   Strength : [x]WNL []Diminished  []Volitional []absent  []Reflexive []absent  OBJECTIVE    OP SWALLOW EVALUATION    Observation of Swallow: Thin Nectar Honey Puree  applesauce Solids  Mechanical soft fruit and grain bar/regular peanut butter cracker nabs Other  Mixed consistency canned fruit cocktail   Oral Phase  X  X     Anterior loss of bolus  (decreased labial seal [])         Decreased bolus formation  (?lingual ROM/Coord[])         Increased mastication         Increased oral transit time  (?A-P bolus transit[])         Abnormal chewing         Tongue pumping/tremors         Pocketing  (?labial/buccal tension/lingual control)         Other         Oral Pharyngeal Phase         Delayed swallow initiation         Absent swallow         Stasis on lingual surface  (?lingual movement)         Adherence to hard palate   (? lingual elevation)         Pharyngeal Phase         Multiple swallows  (residue in pharynx [])  X  X     Coughing post swallow    X     Throat clear post swallow    X     Wet vocal quality  (residue on vocal cords [])         Reduced laryngeal elevation  X  X     Nasal Regurgitation  (? velopharyngeal closure)         Other           [] No symptoms of dysphagia evidenced  [x] Symptoms of dysphagia observed  [] Patient at risk for aspiration  [] Other:     Recommendations:  Diet:  [x] NPO    [] Pureed [] Ground [] MechSoft [] Regular  Rec NPO with alternative means of nutriton; however based on pt not having alternative means, and report of MBS, agreeable to SOft solids with Nectar thick liquids using chin tuck and re-swallos.  Pt confirmed f/u with ortho Mariah 3, 2020; defer to MD.    Liquids: [] Water  [] Regular [x] Thickened   [x] [place]  [] Honeythick  [] Pudding  Presentation: [x] Cup    [] Spoon [] Straw [] Alternate liquids and solids  Monitor: [x] Sitting up at 90 deg [] Reclined to:  [] Head turned to:    [x] Chin tuck  [] Head tilt to:      [] Seated upright post meals (min): 30  Document: [x] Coughing [x] Temperatures [x] Lung Sounds    Videoflouroscopy: [] Yes [x] No completed 5/18/20  Dysphagia Treatment: [x] Yes [] No Sessions per week: 2  Other: F/u with GI and Ortho ASAP    Remediation Techniques:  C = Compensatory techniques to use during meal      F = Facilitation/treatment techniques by SLP    [x] Supraglottic Swallow (c,f)    [] Oral motor exercises (f)  [] Super-supraglottic swallow (c,f)   [] Labial closure  [] Compensations for pocketing (c)   [] Lingual elevation  [] Sweep mouth with tongue    [] Lingual lateralization  [] Sweep mouth with finger    [] Lingual anterior-posterior  [] External pressure to check   [] Lingual base of tongue  [] Rinse mouth/expel after meal   [] Vocal Fold Exercises (f)  [] Alternate liquid swallows every _ bites (c)  [] Falsetto/laryngeal elevation exercises (f)  [] Discourage liquid wash between bites (c)  [] Thermal application (c,f)  [x] Multiple swallows     [] Sour bolus (f)  [x] Patient needs cues     [] Cold bolus (f)  [] Patient does not need cues   [] Pharyngeal exercise (f)  [x] Mendelsohn Maneuver (c,f)    [] Breath hold  [] Encourage/stimulate lip closure (c)   [x] Effortful Swallow (c,f)  [] Empty mouth before next bite (c)   [] Tongue base retraction  [x] Cue patient to slow down (c)    [] Tongue hold  [x] Encourage coughing (c)    [x] Laryngeal closure  [x]Chin tuck (c)  []Head turn  (c)  []Head turn , chin tuck (c)    Patient/Caregiver instruction/education: [x] Review HEP    [] Progressed/Changed    HEP/Handouts given: FFWP and HEP    Pain Level (0-10 scale) post treatment: 0 c/o globus sensation    ASSESSMENT  [x]  See Plan of Care    Short Term Goals: To be accomplished in 4 weeks  1.  Patient will complete laryngeal/pharyngeal strengthening exercises (including Mendelsohn maneuver, hard /k/ words,  effortful swallow/re-swallow, supraglottic swallow, etc, and supra supraglottic swallow, as tolerated/medically cleared x15 reps with min in order to improve the strength/ agility/efficiency of her swallow to decrease laryngeal stasis for improved swallowing safety and QOL. 2. Patient will recall/demonstrate aspiration precautions and safe swallowing strategies with 100% acc when provided with Jacquelyn (small sips/bites; 2-3 re-swallows, alternate liquids/solids; slow rate; oral care 2-3x daily; Sit upright at 90 degree angle during po trials and for 30 minute post po intake) to decrease the reported incidences of dysphagia and s/sx of aspiration with Soft solids +nectar thick liquids. 3.  Patient will recall/ demonstrate elements warranted to participate in [facility] Protocol with  aggressive oral care in order to implement in conjunction with restorative swallowing exercises to reduce c/o xerostomia and improve QOL. Long Term Goals: To be accomplished in 4 weeks   1. Patient will consume a least restrictive diet utilizing compensatory swallowing strategies with jimy in 4/5 trials without s/s of aspiration/ penetration to promote QOL, enhance hydration/ nutrition status, and reduce risk of aspiration.         PLAN  []  Upgrade activities as tolerated     [x]  Continue plan of care  []  Discharge due to:__  [x] Other: f/u with ortho and GI ASAP    KANNAN Durbin 5/27/2020  2:00 PM  MA, [address] [facility]  Speech-Language Pathologist

## 2020-05-27 NOTE — PROGRESS NOTES
[facility] - [facility] [employer]  [address]  [phone] fax    Plan of Care/ Statement of Necessity for Speech Therapy Services    Patient name: Oriana Breen Start of Care: 2020   Referral source: Pancho Cui MD : 1970    Medical Diagnosis: Dysphagia, unspecified [R13.10]  Payor: BLUE CROSS / Plan: [facility] / Product Type: PPO /  Onset Date:3/2020    Treatment Diagnosis: R13.12 oropharyngeal dysphagia   Prior Hospitalization: see medical history Provider#: [id number]   Medications: Verified on Patient summary List    Comorbidities: anosmia, etd, tmj, Skin Ca, sleep apnea, epistaxia, s/p tonsillectomy and ACDF   Prior Level of Function: Speech/language, swallowing, and cognitive function WNLs. The Plan of Care and following information is based on the information from the initial evaluation. Assessment/ key information:   Pt was referred to OP ST for mod-sev dysphagia. Pt reports she abruptly experienced dysphagia s/p  ACDF of c 4-5, 3/2020. An MBS was conducted on , indicating:  Based on the objective data described below, the patient presents with mod-sev pharyngeal dysphagia s/p ACDF 3/5/2020 C4-C5. Pt with silent aspiration during the swallow with thin, nectar-thick, honey-thick, and solid consistencies. Further silent aspiration events post swallow secondary to mod vallecular and pyriform sinus residuals. Pt with increased residuals at the site of cervical hardware. Small sips/bites, effortful swallows, and chin tuck ineffective at improving airway protection; however, airway compromise appeared minimally improved with nectar-thick viscosity. She did tolerate puree with effortful swallow with no aspiration/penetration events; however, suspect she would eventually have airway compromise with residuals observed.  Deficits include decreased laryngeal elevation and impaired pharyngeal motility/sensation. Safest diet would be NPO with consideration of an alternate means of nutrition/hydration; however, recommend initiation of OP SLP services with diet modifications per MD discretion.      Recommend soft solid diet with nectar-thick liquids, aspiration precautions, oral care TID, and meds as tolerated. Rec [place] Protocol. Highly recommend OP SLP services to address above deficits with repeat MBS x 6-8 weeks to reassess oropharyngeal swallow fxn. She may benefit from follow up with ortho per MD discretion as well as a possible GI consult? Results/recommendations d/w pt in detail following study with handout provided.       PLAN :     Recommendations:  Safest diet would be NPO with consideration of an alternate means of nutrition/hydration; however, rec initiation of OP SLP services with diet modifications to see if there is pharyngeal swallow improvement prior to GI consult.            A clinical beside swallow eval completed. Pt A&Ox4,reporting dysphagia with globus sensation. Speech/voice within functional limits. Oral mech examination revealed structures functional for speech and deglutition, yielding natural dentition and min L linugal deviation. Pt observed with NTLs liquids via cup sip and puree only based on results of MBS. Pt demo'd adequate acceptance with min verbal cues to decrease bite/sip size, efficient swallow initiation, weak/limited laryngeal elevation to palpation/observation with delayed cough and throat clear with puree only. Po trials in neutral position and via chin tuck; pt reports chin tuck felt better, and per MBS slight improved airway protection was achieved in this posture. Based on clinical findings and MBS reports, Pt presents as a high aspiraiton risk d/t SILENT aspiration across all po trials -pudding, however, witnessed s/sx this date. Pt is recommended to be NPO with alt means of nutrition.  Based on other means of nutrition are unavailable at this time. Rec Soft solids with Nectar thick lqiuids  With chin tuck + 2-3 re-swallows; meds ideally crushed in puree or if unable to crushe take whole in puree. Highly rec pt f/u with ortho and GI ASAP. Pt also educated with regard to s/sx aspiration, aspiration risk, diet recs, comp strategies, initiation of HEP, FFWP,  and role of SLP. Pt able to verbalize understanding. It is recommended that she receive skilled speech therapy services as it is medically necessary to improve her swallow function for primary means of nutrition and hydration for safety  and for her QOL. Problem List:      []aphasic  []dysarthric  [x]dysphagic       []alexic  []agraphic  []dysphonia       []dysfluency   []Cognitive-Linguistic Disorder       []other   Treatment Plan may include any combination of the following: Dysphagia Treatment and Patient Education      Patient / Family readiness to learn indicated by: asking questions, trying to perform skills and interest    Persons(s) to be included in education:   patient (P)    Barriers to Learning/Limitations: yes;  physical    Patient Goal (s): improve swallow    Patient Self Reported Health Status: good    Rehabilitation Potential: good    Short Term Goals: To be accomplished in 4 weeks  1. Patient will complete laryngeal/pharyngeal strengthening exercises (including Mendelsohn maneuver, hard /k/ words,  effortful swallow/re-swallow, supraglottic swallow, etc, and supra supraglottic swallow, as tolerated/medically cleared x15 reps with min in order to improve the strength/ agility/efficiency of her swallow to decrease laryngeal stasis for improved swallowing safety and QOL.      2. Patient will recall/demonstrate aspiration precautions and safe swallowing strategies with 100% acc when provided with Jacquelyn (small sips/bites; 2-3 re-swallows, alternate liquids/solids; slow rate; oral care 2-3x daily;  Sit upright at 90 degree angle during po trials and for 30 minute post po intake) to decrease the reported incidences of dysphagia and s/sx of aspiration with Soft solids +nectar thick liquids.     3.  Patient will recall/ demonstrate elements warranted to participate in [place] Free Water Protocol with  aggressive oral care in order to implement in conjunction with restorative swallowing exercises to reduce c/o xerostomia and improve QOL.       Long Term Goals: To be accomplished in 4 weeks   1. Patient will consume a least restrictive diet utilizing compensatory swallowing strategies with jimy in 4/5 trials without s/s of aspiration/ penetration to promote QOL, enhance hydration/ nutrition status, and reduce risk of aspiration. Frequency / Duration: Patient to be seen 2 times per week for 4 weeks:    Patient/ Caregiver education and instruction: Diagnosis, prognosis, HEP, comp strategies, s/sx of aspiration,FFWP and  collaboration of care with GI and ortho    Kathy Albarran SLP 5/27/2020 3:42 PM  MA, [facility]-SLP  Speech-Language Pathologist    ________________________________________________________________________    I certify that the above Therapy Services are being furnished while the patient is under my care. I agree with the treatment plan and certify that this therapy is necessary.     Physician's Signature:____________Date:_________TIME:________    ** Signature, Date and Time must be completed for valid certification **    Please sign and return to [facility] - [facility] [employer] [place]  [address] [place] [address]  [phone] fax     Thank you

## 2020-06-01 ENCOUNTER — HOSPITAL ENCOUNTER (OUTPATIENT)
Dept: PHYSICAL THERAPY | Age: 50
Discharge: HOME | End: 2020-06-01
Payer: COMMERCIAL

## 2020-06-01 PROCEDURE — 92526 ORAL FUNCTION THERAPY: CPT

## 2020-06-01 NOTE — PROGRESS NOTES
ST DAILY TREATMENT NOTE    Patient Name: Luis Alberto Delgado  Date:2020  : 1970  [x]  Patient  Verified  Payor: Payor: BLUE CROSS / Plan: [employer] [place] [address] [place] / Product Type: PPO /   In time: 11:04  Out time: 11:45  Total Treatment Time (min): 41  Visit #: 2 of 8    Treatment Diagnosis: Dysphagia, unspecified [R13.10]    SUBJECTIVE  Pain Level (0-10 scale): 0  Any medication changes, allergies to medications, adverse drug reactions, diagnosis change, or new procedure performed?: [x] No    [] Yes (see summary sheet for update)  Subjective functional status/changes:   [] No changes reported  Patient reports completing supraglottic swallow daily for HEP. OBJECTIVE  Treatment provided includes:  Increase/Improve:  []  Voice Quality []  Cognitive Linguistic Skills [x]  Laryngeal/Pharyngeal Exercises   []  Vocal Loudness []  Reading Comprehension [x]  Swallowing Skills    []  Vocal Cord Function []  Auditory Comprehension []  Oral Motor Skills   []  Resonance []  Writing Skills [x]  Compensatory strategies    []  Speech Intelligibility []  Expressive Language []  Attention   []  Breath Support/Coord.  []  Receptive language []  Memory   []  Articulation []  Safety Awareness []    []  Fluency []  Word Retrieval []      Treatment Provided:  - oropharyngeal strengthening exercises with skilled cues   - pt education   - demonstration of compensatory strategies with trials of mildly thick liquids     Patient/Caregiver  Education: [x] Review HEP      HEP/Handouts given: supraglottic swallow, mendelsohn maneuver, effortful swallow, high \"ee'' added to HEP   Pain Level (0-10 scale) post treatment: 0    ASSESSMENT   Patient responded well to verbal cues and direction today to increase overall precision when completing oropharyngeal strengthening exercises   []   Improving appropriately and progressing toward goals  [x]   Improving slowly and progressing toward goals  []   Approximating goals/maximum potential  [x]   Continues to benefit from skilled therapy to address remaining functional deficits  []   Not progressing toward goals and plan of care will be adjusted  Patient will continue to benefit from skilled therapy to address remaining functional deficits: dysphagia      Progress towards goals / Updated goals:  1. Patient will complete laryngeal/pharyngeal strengthening exercises (including Mendelsohn maneuver, hard /k/ words,  effortful swallow/re-swallow, supraglottic swallow, etc, and supra supraglottic swallow, as tolerated/medically cleared x15 reps with min in order to improve the strength/ agility/efficiency of her swallow to decrease laryngeal stasis for improved swallowing safety and QOL. 6/1/2020, progressing:   high falsetto \"ee\" x10 with modA  Supraglottic swallow x12 with mildly thick liquids with mod fading to min-modA   Effortful swallows x15 with modA  mendelsohn maneuver x10 with mod-maxA, 2-3 second holds    2. Patient will recall/demonstrate aspiration precautions and safe swallowing strategies with 100% acc when provided with Jacquelyn (small sips/bites; 2-3 re-swallows, alternate liquids/solids; slow rate; oral care 2-3x daily; Sit upright at 90 degree angle during po trials and for 30 minute post po intake) to decrease the reported incidences of dysphagia and s/sx of aspiration with Soft solids +nectar thick liquids.   6/1/2020, progressing: pt education provided for compensatory strategies to increase safety with patient demonstrating with mod cues during po trials     3.  Patient will recall/ demonstrate elements warranted to participate in [place] Free Water Protocol with  aggressive oral care in order to implement in conjunction with restorative swallowing exercises to reduce c/o xerostomia and improve QOL.  6/1/2020, not directly targeted - reviews [place] free water protocol with patient     PLAN  [x]  Continue plan of care  []  Modify Goals/Treatment Plan      []  Discharge due to:  [] Other:    Rosalba Brian, SLP 6/1/2020  11:11 AM    Future Appointments   Date Time Provider Christi Purii   6/3/2020  8:00 AM Anneliese Ordonez,  [address]   6/3/2020  2:45 PM Roosvelt Mesha, SLP [id number] [address]   6/8/2020  5:30 PM HBV GET RM 4 3D HBVRMAM HBV   6/9/2020  2:00 PM Roosvelt Mesha, SLP [id number] [address]   6/11/2020  2:45 PM Roosvelt Mesha, SLP [id number] [address]   6/15/2020  8:45 AM Roosvelt [place], SLP [id number] [address]   6/17/2020  9:30 AM Myrtice Gala I, SLP [id number] [address]   6/24/2020  8:45 AM Roosvelt Springfield, SLP [id number] [address]   6/26/2020  9:30 AM Roosvelt Mesha, SLP [id number] [address]   6/29/2020 10:15 AM Roosvelt Mesha, SLP [id number] [address]   7/1/2020  8:45 AM Roosvelt Mesha, SLP [id number] [address]   7/7/2020  8:30 AM Ilatice Gala I, SLP [id number] [address]   7/9/2020  2:30 PM Roosvelt Mesha, SLP [id number] [address]   7/13/2020 10:30 AM Myrtice Gala I, SLP [id number] [address]   7/15/2020  3:00 PM Roosvelt [place], SLP [id number] [address]

## 2020-06-03 ENCOUNTER — HOSPITAL ENCOUNTER (OUTPATIENT)
Dept: PHYSICAL THERAPY | Age: 50
Discharge: HOME | End: 2020-06-03
Payer: COMMERCIAL

## 2020-06-03 ENCOUNTER — VIRTUAL VISIT (OUTPATIENT)
Dept: ORTHOPEDIC SURGERY | Age: 50
End: 2020-06-03

## 2020-06-03 DIAGNOSIS — Z98.1 S/P CERVICAL SPINAL FUSION: Primary | ICD-10-CM

## 2020-06-03 PROCEDURE — 92526 ORAL FUNCTION THERAPY: CPT

## 2020-06-03 NOTE — PROGRESS NOTES
ST DAILY TREATMENT NOTE    Patient Name: Jdui Mason  Date:6/3/2020  : 1970  [x]  Patient  Verified  Payor: Payor: BLUE CROSS / Plan:  [facility] [place] / Product Type: PPO /   In time: 2:51  Out time: 3:31  Total Treatment Time (min): 40   Visit #: 3 of 8    Treatment Diagnosis: Dysphagia, unspecified [R13.10]    SUBJECTIVE  Pain Level (0-10 scale): 0  Any medication changes, allergies to medications, adverse drug reactions, diagnosis change, or new procedure performed?: [x] No    [] Yes (see summary sheet for update)  Subjective functional status/changes:   [] No changes reported  Patient reports compliance with HEP. OBJECTIVE  Treatment provided includes:  Increase/Improve:  []  Voice Quality []  Cognitive Linguistic Skills [x]  Laryngeal/Pharyngeal Exercises   []  Vocal Loudness []  Reading Comprehension [x]  Swallowing Skills    []  Vocal Cord Function []  Auditory Comprehension []  Oral Motor Skills   []  Resonance []  Writing Skills [x]  Compensatory strategies    []  Speech Intelligibility []  Expressive Language []  Attention   []  Breath Support/Coord.  []  Receptive language []  Memory   []  Articulation []  Safety Awareness []    []  Fluency []  Word Retrieval []      Treatment Provided:  - pharyngeal and laryngeal strengthening exercises   - trials of thin liquids   - pt education   Patient/Caregiver  Education: [x] Review HEP      HEP/Handouts given: continue HEP established   Pain Level (0-10 scale) post treatment: 0    ASSESSMENT   Progressing towards goals benefits from verbal cues   []   Improving appropriately and progressing toward goals  [x]   Improving slowly and progressing toward goals  []   Approximating goals/maximum potential  [x]   Continues to benefit from skilled therapy to address remaining functional deficits  []   Not progressing toward goals and plan of care will be adjusted  Patient will continue to benefit from skilled therapy to address remaining functional deficits: dysphagia     Progress towards goals / Updated goals:  1. Patient will complete laryngeal/pharyngeal strengthening exercises (including Mendelsohn maneuver, hard /k/ words,  effortful swallow/re-swallow, supraglottic swallow, etc, and supra supraglottic swallow, as tolerated/medically cleared x15 reps with min in order to improve the strength/ agility/efficiency of her swallow to decrease laryngeal stasis for improved swallowing safety and QOL.   6/3/2020, progressing:   high falsetto \"ee\" x15 with min-modA  Supraglottic swallow x12 with mildly thick liquids with mod fading to min-modA   Effortful swallows x10, x2 set with modA  Lingual palate press x10 with Jacquelyn   Jaw opening against resistance x15 5 second holds, x10, 45 second holds  mendelsohn maneuver x10 with modA, 3-4 second holds     2. Patient will recall/demonstrate aspiration precautions and safe swallowing strategies with 100% acc when provided with Jacquelyn (small sips/bites; 2-3 re-swallows, alternate liquids/solids; slow rate; oral care 2-3x daily; Sit upright at 90 degree angle during po trials and for 30 minute post po intake) to decrease the reported incidences of dysphagia and s/sx of aspiration with Soft solids +nectar thick liquids.   6/3/2020, progressing demonstrated with min-mod cues during po trials for sip size and slow rate     3.  Patient will recall/ demonstrate elements warranted to participate in [place] Free Water Protocol with  aggressive oral care in order to implement in conjunction with restorative swallowing exercises to reduce c/o xerostomia and improve QOL.  6/3/2020, recalled/demonstrated with min cues       PLAN  [x]  Continue plan of care  []  Modify Goals/Treatment Plan      []  Discharge due to:  [] Other:    KANNAN Live 6/3/2020  2:54 PM    Future Appointments   Date Time Provider Christi Willis   6/8/2020  5:30 PM [facility] GET RM 4 3D HBVRMAM [facility]   6/9/2020  2:00 PM KANNAN Monahan MMCPTPB [facility] 6/11/2020  2:45 PM Stephanie Bi, SLP ZKHCCMI [facility]   6/15/2020  8:45 AM Stephanie Bi, SLP UOIXPVX [facility]   6/17/2020  9:30 AM Pearletha Specter I, SLP MMCPTPB [facility]   6/24/2020  8:45 AM Stephanie Bi, SLP VUJYRZF [facility]   6/26/2020  9:30 AM Pearletha Specter I, SLP MMCPTPB [facility]   6/29/2020 10:15 AM Stephanie Bi, SLP MMCPTPB [facility]   7/1/2020  8:45 AM Stephanie Bi, SLP MMCPTPB [facility]   7/7/2020  8:30 AM Pearletha Specter I, SLP NLTUIFY [facility]   7/9/2020  2:30 PM Stephanie Bi, SLP YOHRFAC [facility]   7/13/2020 10:30 AM Stephanie Bi, SLP DRTGYEM [facility]   7/15/2020  3:00 PM Stephanie Bi, SLP [id number] [facility]   9/2/2020  2:40 PM Carla Valencia  [address]

## 2020-06-03 NOTE — PROGRESS NOTES
Brandan Johnson is a 44 y.o. female who was seen by synchronous (real-time) audio-video technology on 6/3/2020. She has a history of neck pain. She had a ACDF C4/5 surgery 12 weeks ago. Prior to surgery, she had neck pain with cervicogenic headache and some arm symptoms. at her 2 week visit, her headache and arms were doing well. She was having incisional pain. She was having some trouble with swallowing. She did have trouble with a pill that morning. She had a sore throat from mouth breathing. at her last visit, she had a posterior neck ache at times, worse in the AM. She was taking tylenol prn. Using heat as needed. She stil hadsome discomfort with swallowing. she was seeing GI soon for reflux. Her ENT just increased her reflux medication. Today, she is doing well. She is seeing ST for her swallowing. She had a barium study. She feels she is getting some better and is learning the swallowing techniques. She is still taking PRN tylenol. Her pain is much improved. Denies bladder/bowel dysfunction, saddle paresthesia, weakness, gait disturbance, or other neurological deficit. Pt at this time desires to neck pain continue with current care/proceed with medication evaluation/proceed with post op care. ASSESSMENT  44 y.o. female with neck pain, s/p fusion. Diagnoses and all orders for this visit:    1. S/P cervical spinal fusion           IMPRESSION/PLAN    1) Pt was given information on neck exercises. 2) cont ST  3) cont tylenol PRN, may now use NSAIDS prn  4) Ms. Pasha Ramires has a reminder for a \"due or due soon\" health maintenance. I have asked that she contact her primary care provider, Rajat Anne NP, for follow-up on this health maintenance. 5) We have informed patient to notify us for immediate appointment if he has any worsening neurogical symptoms or if an emergency situation presents, then call 911  5) Pt will follow-up in 3 months for routine FU.     Risks and benefits of ongoing therapy have been reviewed with the patient.  is appropriate. No pain behaviors. Denies thoughts of harming self or others. Pt has a good risk to benefit ratio which allows the pt to function in a home environment without side effects. Assessment & Plan:   Diagnoses and all orders for this visit:    1. S/P cervical spinal fusion          Follow-up and Dispositions    · Return in about 3 months (around 9/3/2020) for nilda. 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  acetaminophen (TYLENOL) 325 mg tablet Take 325 mg by mouth every four (4) hours as needed for Pain.  loratadine (CLARITIN) 10 mg tablet Take 10 mg by mouth daily as needed for Allergies.  traMADol (ULTRAM) 50 mg tablet Take 50 mg by mouth daily.  cyanocobalamin 1,000 mcg tablet Take 2,500 mcg by mouth every other day.  cholecalciferol, vitamin D3, (VITAMIN D3 PO) Take 10,000 Units by mouth daily.  multivitamin (ONE A DAY) tablet Take 1 Tab by mouth daily.  azelastine-fluticasone (DYMISTA) 137-50 mcg/spray spry 1 [place] by Nasal route as needed (stuffy nose).  esomeprazole (NEXIUM) 20 mg capsule Take 20 mg by mouth two (2) times a day.       diphenhydrAMINE (SIMPLY SLEEP) 25 mg tablet Take 12.5 mg by mouth nightly. ALLERGIES  Allergies   Allergen Reactions    Ciprofloxacin Diarrhea    Milk Containing Products Diarrhea    Pennsaid [Diclofenac Sodium] Rash    Sulfa (Sulfonamide Antibiotics) Rash    Other Medication Diarrhea     Eggs, patient eats, and gets flu shot yearly with no reaction       SOCIAL HISTORY    Social History     Socioeconomic History    Marital status:      Spouse name: Not on file    Number of children: Not on file    Years of education: Not on file    Highest education level: Not on file   Occupational History    Not on file   Social Needs    Financial resource strain: Not on file    Food insecurity     Worry: Not on file     Inability: Not on file    Transportation needs     Medical: Not on file     Non-medical: Not on file   Tobacco Use    Smoking status: Never Smoker    Smokeless tobacco: Never Used   Substance and Sexual Activity    Alcohol use: Yes     Comment: few times per week    Drug use: No    Sexual activity: Yes     Partners: Male     Comment: pregnancy test negative   Lifestyle    Physical activity     Days per week: Not on file     Minutes per session: Not on file    Stress: Not on file   Relationships    Social connections     Talks on phone: Not on file     Gets together: Not on file     Attends [employer] service: Not on file     Active member of club or organization: Not on file     Attends meetings of clubs or organizations: Not on file     Relationship status: Not on file    Intimate partner violence     Fear of current or ex partner: Not on file     Emotionally abused: Not on file     Physically abused: Not on file     Forced sexual activity: Not on file   Other Topics Concern    Not on file   Social History Narrative    Not on file       Pain Scale: /10    Pain Assessment  2/28/2020   Location of Pain Neck   Location Modifiers (No Data)   Severity of Pain 3   Quality of Pain Aching; Other (Comment) Quality of Pain Comment shooting & weakness   Duration of Pain Persistent   Duration of Pain Comment -   Frequency of Pain Constant   Aggravating Factors Other (Comment)   Aggravating Factors Comment sudden movements to the side   Limiting Behavior Yes   Relieving Factors Nothing   Relieving Factors Comment -   Result of Injury (No Data)         ROS      Objective: There were no vitals taken for this visit. [INSTRUCTIONS:  \"[x]\" Indicates a positive item  \"[]\" Indicates a negative item  -- DELETE ALL ITEMS NOT EXAMINED]    Constitutional: [x] Appears well-developed and well-nourished [x] No apparent distress      [] Abnormal -     Mental status: [x] Alert and awake  [x] Oriented to person/place/time [x] Able to follow commands    [] Abnormal -     Eyes:   EOM    [x]  Normal    [] Abnormal -   Sclera  [x]  Normal    [] Abnormal -          Discharge [x]  None visible   [] Abnormal -     HENT: [x] Normocephalic, atraumatic  [] Abnormal -     Neck: [x] No visualized mass [] Abnormal -     Pulmonary/Chest: [x] Respiratory effort normal   [x] No visualized signs of difficulty breathing or respiratory distress        [] Abnormal -      Musculoskeletal:   [x] Normal gait with no signs of ataxia         [x] Normal range of motion of neck        [] Abnormal -             Neurological:        [x] No Facial Asymmetry (Cranial nerve 7 motor function) (limited exam due to video visit)          [x] No gaze palsy        [] Abnormal -          Skin:        [x] No significant exanthematous lesions or discoloration noted on facial skin         [] Abnormal -                   Wound: Incision healing well, has well approximated edges, no erythema, warmth, drainage or signs of infection. Psychiatric:       [x] Normal Affect [] Abnormal -        [x] No Hallucinations        Due to this being a TeleHealth evaluation, many elements of the physical examination are unable to be assessed.      We discussed the expected course, resolution and complications of the diagnosis(es) in detail. Medication risks, benefits, costs, interactions, and alternatives were discussed as indicated. I advised her to contact the office if her condition worsens, changes or fails to improve as anticipated. She expressed understanding with the diagnosis(es) and plan. Consent: Yeyo Oneil, who was seen by synchronous (real-time) audio-video technology, and/or her healthcare decision maker, is aware that this patient-initiated, Telehealth encounter on 6/3/2020 is a billable service, with coverage as determined by her insurance carrier. She is aware that she may receive a bill and has provided verbal consent to proceed: Yes. Yeyo Oneil is a 44 y.o. female who was evaluated by a video visit encounter for concerns as above. Patient identification was verified prior to start of the visit. A caregiver was present when appropriate. Due to this being a TeleHealth encounter ([facility]), evaluation of the following organ systems was limited: Vitals/Constitutional/EENT/Resp/CV/GI//MS/Neuro/Skin/Heme-Lymph-Imm. Pursuant to the emergency declaration under the [facility], 1135 waiver authority and the [employer] Act, this Virtual  Visit was conducted, with patient's consent, to reduce the patient's risk of exposure to COVID-19 and provide continuity of care for an established patient. Services were provided through real time synchronous discussion virtually to substitute for in-person clinic visit. Patient verbally consented to this type of visit and that they might get a bill from the billing of this visit. This service was provided through [employer] ,  the patient was located at home and  the provider was located at office. There was on the patient participating in the service. Start time [id number]  End [id number]. Sisto Ards, NP

## 2020-06-08 ENCOUNTER — HOSPITAL ENCOUNTER (OUTPATIENT)
Dept: MAMMOGRAPHY | Age: 50
Discharge: HOME | End: 2020-06-08
Attending: SURGERY
Payer: COMMERCIAL

## 2020-06-08 DIAGNOSIS — Z12.31 VISIT FOR SCREENING MAMMOGRAM: ICD-10-CM

## 2020-06-08 PROCEDURE — 77063 BREAST TOMOSYNTHESIS BI: CPT

## 2020-06-09 ENCOUNTER — TELEPHONE (OUTPATIENT)
Dept: PHYSICAL THERAPY | Age: 50
End: 2020-06-09

## 2020-06-11 ENCOUNTER — HOSPITAL ENCOUNTER (OUTPATIENT)
Dept: PHYSICAL THERAPY | Age: 50
Discharge: HOME | End: 2020-06-11
Payer: COMMERCIAL

## 2020-06-11 PROCEDURE — 92526 ORAL FUNCTION THERAPY: CPT

## 2020-06-11 NOTE — PROGRESS NOTES
ST DAILY TREATMENT NOTE    Patient Name: Stacy Armstrong  Date:2020  : 1970  [x]  Patient  Verified  Payor: Payor: BLUE CROSS / Plan: [facility] / Product Type: PPO /   In time: 2:50   Out time: 3:30  Total Treatment Time (min): 40   Visit #: 4 of 8     Treatment Diagnosis: Dysphagia, unspecified [R13.10]    SUBJECTIVE  Pain Level (0-10 scale): 0  Any medication changes, allergies to medications, adverse drug reactions, diagnosis change, or new procedure performed?: [x] No    [] Yes (see summary sheet for update)  Subjective functional status/changes:   [x] No changes reported  \"Water sometimes doesn't go so well some of time\"     OBJECTIVE  Treatment provided includes:  Increase/Improve:  []  Voice Quality []  Cognitive Linguistic Skills [x]  Laryngeal/Pharyngeal Exercises   []  Vocal Loudness []  Reading Comprehension [x]  Swallowing Skills    []  Vocal Cord Function []  Auditory Comprehension []  Oral Motor Skills   []  Resonance []  Writing Skills [x]  Compensatory strategies    []  Speech Intelligibility []  Expressive Language []  Attention   []  Breath Support/Coord. []  Receptive language []  Memory   []  Articulation []  Safety Awareness []    []  Fluency []  Word Retrieval []      Treatment Provided:  - laryngeal and pharyngeal strengthening exercises   - po trials   - pt education - pt may benefit from downgrading diet to minced and moist to improve comfort as she complains of irritation and pain when consuming meats/bread consistencies , dicussed diet options/modifications     Patient/Caregiver  Education: [x] Review HEP      HEP/Handouts given: continue established HEP   Pain Level (0-10 scale) post treatment: 0    ASSESSMENT   Patient progressing slowly.  She demonstrated difficulty tolerating po trials of puree with effortful swallow benefiting from supraglottic swallow technique   []   Improving appropriately and progressing toward goals  [x]   Improving slowly and progressing toward goals  []   Approximating goals/maximum potential  [x]   Continues to benefit from skilled therapy to address remaining functional deficits  []   Not progressing toward goals and plan of care will be adjusted  Patient will continue to benefit from skilled therapy to address remaining functional deficits: dysphagia     Progress towards goals / Updated goals:  1. Patient will complete laryngeal/pharyngeal strengthening exercises (including Mendelsohn maneuver, hard /k/ words,  effortful swallow/re-swallow, supraglottic swallow, etc, and supra supraglottic swallow, as tolerated/medically cleared x15 reps with min in order to improve the strength/ agility/efficiency of her swallow to decrease laryngeal stasis for improved swallowing safety and QOL.    6/11/2020, progressing:   Modified shaker x10, 10 second holds  Super-supraglottic swallow x15 with modA  mendelsohn maneuver x10 with modA, 4 second holds  Effortful swallows & supraglottic swallow with po trials x15 modA for chin placement/precision     2. Patient will recall/demonstrate aspiration precautions and safe swallowing strategies with 100% acc when provided with Jacquelyn (small sips/bites; 2-3 re-swallows, alternate liquids/solids; slow rate; oral care 2-3x daily; Sit upright at 90 degree angle during po trials and for 30 minute post po intake) to decrease the reported incidences of dysphagia and s/sx of aspiration with Soft solids +nectar thick liquids. 6/11/2020, progressing demonstrated with min-mod cues during po trials      3.  Patient will recall/ demonstrate elements warranted to participate in [place] Free Water Protocol with  aggressive oral care in order to implement in conjunction with restorative swallowing exercises to reduce c/o xerostomia and improve QOL.   6/11/2020, not targeted        PLAN  [x]  Continue plan of care  []  Modify Goals/Treatment Plan      []  Discharge due to:  [] Other:    Bud Martins, SLP 6/11/2020  2:47 PM    Future Appointments   Date Time Provider Christi Alba   6/15/2020  8:45 AM Maddy Shadow, SLP MMCPTPB [facility]   6/17/2020  9:30 AM Maddy Shadow, SLP MMCPTPB [facility]   6/24/2020  2:45 PM Maddy Shadow, SLP MMCPTPB [facility]   6/26/2020  9:30 AM Maddy Shadow, SLP [id number] [facility]   6/29/2020 10:15 AM Maddy Shadow, SLP NLPRZNB [facility]   6/30/2020  1:00 PM [facility]   7/1/2020  8:45 AM Maddy Shadow, SLP [id number] [facility]   7/7/2020  3:30 PM Maddy Shadow, SLP CARTERPTPB [facility]   7/9/2020  2:45 PM Maddy Shadow, SLP ROD [facility]   7/14/2020  8:45 AM Maddy Shadow, SLP MZURSQS [facility]   7/16/2020  3:30 PM Maddy Shadow, SLP VALVEKBMICA [facility]   9/2/2020  2:40 PM Meghana Dodd,  [address]

## 2020-06-15 ENCOUNTER — HOSPITAL ENCOUNTER (OUTPATIENT)
Dept: PHYSICAL THERAPY | Age: 50
Discharge: HOME | End: 2020-06-15
Payer: COMMERCIAL

## 2020-06-15 PROCEDURE — 92526 ORAL FUNCTION THERAPY: CPT

## 2020-06-15 NOTE — PROGRESS NOTES
ST DAILY TREATMENT NOTE    Patient Name: Ang Blunt  Date:6/15/2020  : 1970  [x]  Patient  Verified  Payor: Payor: BLUE CROSS / Plan: [employer] [facility] / Product Type: PPO /   In time: 8:50  Out time: 9:30   Total Treatment Time (min): 40  Visit #: 5 of 8    Treatment Diagnosis: Dysphagia, unspecified [R13.10]    SUBJECTIVE  Pain Level (0-10 scale): 0  Any medication changes, allergies to medications, adverse drug reactions, diagnosis change, or new procedure performed?: [x] No    [] Yes (see summary sheet for update)  Subjective functional status/changes:   [] No changes reported  Pt reports she has been consuming minced and moist solids as recommended and is more comfortable during meals. OBJECTIVE  Treatment provided includes:  Increase/Improve:  []  Voice Quality []  Cognitive Linguistic Skills [x]  Laryngeal/Pharyngeal Exercises   []  Vocal Loudness []  Reading Comprehension [x]  Swallowing Skills    []  Vocal Cord Function []  Auditory Comprehension []  Oral Motor Skills   []  Resonance []  Writing Skills [x]  Compensatory strategies    []  Speech Intelligibility []  Expressive Language []  Attention   []  Breath Support/Coord. []  Receptive language []  Memory   []  Articulation []  Safety Awareness []    []  Fluency []  Word Retrieval []      Treatment Provided:  - laryngeal and pharyngeal strengthening exercises   - po trials of mildly thick liquids   - pt education   Patient/Caregiver  Education: [x] Review HEP      HEP/Handouts given: continue HEP established   Pain Level (0-10 scale) post treatment: 0    ASSESSMENT   Patient is progressing reporting more comfort with po intake and decrease in globus sensation following meals.      []   Improving appropriately and progressing toward goals  [x]   Improving slowly and progressing toward goals  []   Approximating goals/maximum potential  [x]   Continues to benefit from skilled therapy to address remaining functional deficits  [] Not progressing toward goals and plan of care will be adjusted    Patient will continue to benefit from skilled therapy to address remaining functional deficits: dysphagia     Progress towards goals / Updated goals:  1. Patient will complete laryngeal/pharyngeal strengthening exercises (including Mendelsohn maneuver, hard /k/ words,  effortful swallow/re-swallow, supraglottic swallow, etc, and supra supraglottic swallow, as tolerated/medically cleared x15 reps with min in order to improve the strength/ agility/efficiency of her swallow to decrease laryngeal stasis for improved swallowing safety and QOL.    6/15/2020, progressing:   Super-supraglottic swallow x15 with Jacquelyn  Modified shaker x10, 45 second holds ; x15 3 second holds   Effortful swallows x20 with Jacquelyn   High \"ee\"x15 with Jacquelyn   Laryngeal closure exercises x15     2. Patient will recall/demonstrate aspiration precautions and safe swallowing strategies with 100% acc when provided with Jacquelyn (small sips/bites; 2-3 re-swallows, alternate liquids/solids; slow rate; oral care 2-3x daily; Sit upright at 90 degree angle during po trials and for 30 minute post po intake) to decrease the reported incidences of dysphagia and s/sx of aspiration with Soft solids +nectar thick liquids.   6/15/2020, progressing demonstrated with min-mod cues during po trials for rate      3.  Patient will recall/ demonstrate elements warranted to participate in Crabtree Free Water Protocol with  aggressive oral care in order to implement in conjunction with restorative swallowing exercises to reduce c/o xerostomia and improve QOL.   6/15/2020, not targeted        PLAN  [x]  Continue plan of care  []  Modify Goals/Treatment Plan      []  Discharge due to:  [] Other:    KANNAN Waterman 6/15/2020  8:44 AM    Future Appointments   Date Time Provider Christi Willis   6/15/2020  8:45 AM KANNAN Villa [id number] [facility]   6/17/2020  9:30 AM KANNAN Villa [id number] [facility] 6/24/2020  2:45 PM Florrie November, SLP [id number] [facility]   6/26/2020  9:30 AM Alycia Fisher I, SLP [id number] [facility]   6/29/2020 10:15 AM Florrie November, SLP [id number] [facility]   6/30/2020  1:00 PM [facility]   7/1/2020  8:45 AM Florrie November, SLP [id number] [facility]   7/7/2020  3:30 PM Florrie November, SLP [id number] [facility]   7/9/2020  2:45 PM Florrie November, SLP [id number] [facility]   7/14/2020  8:45 AM Florrie November, SLP [id number] [facility]   7/16/2020  3:30 PM Florrie November, SLP [id number] [facility]   9/2/2020  2:40 PM Aristeo Chavez,  [address]

## 2020-06-17 ENCOUNTER — HOSPITAL ENCOUNTER (OUTPATIENT)
Dept: PHYSICAL THERAPY | Age: 50
Discharge: HOME | End: 2020-06-17
Payer: COMMERCIAL

## 2020-06-17 PROCEDURE — 92526 ORAL FUNCTION THERAPY: CPT

## 2020-06-17 NOTE — PROGRESS NOTES
ST DAILY TREATMENT NOTE    Patient Name: Park Simms  Date:2020  : 1970  [x]  Patient  Verified  Payor: Payor: BLUE CROSS / Plan: [employer] [facility] / Product Type: PPO /   In time: 9:34  Out time: 10:13  Total Treatment Time (min):39  Visit #: 6 of 8    Treatment Diagnosis: Dysphagia, unspecified [R13.10]    SUBJECTIVE  Pain Level (0-10 scale): 0  Any medication changes, allergies to medications, adverse drug reactions, diagnosis change, or new procedure performed?: [x] No    [] Yes (see summary sheet for update)  Subjective functional status/changes:   [] No changes reported  \"I did okay with pasta last night\"     OBJECTIVE  Treatment provided includes:  Increase/Improve:  []  Voice Quality []  Cognitive Linguistic Skills [x]  Laryngeal/Pharyngeal Exercises   []  Vocal Loudness []  Reading Comprehension [x]  Swallowing Skills    []  Vocal Cord Function []  Auditory Comprehension []  Oral Motor Skills   []  Resonance []  Writing Skills [x]  Compensatory strategies    []  Speech Intelligibility []  Expressive Language []  Attention   []  Breath Support/Coord. []  Receptive language []  Memory   []  Articulation []  Safety Awareness []    []  Fluency []  Word Retrieval []      Treatment Provided:  - laryngeal and pharyngeal strengthening exercises   - po trials of thin liquids following marquez free water protocol   - pt education     Patient/Caregiver  Education: [x] Review HEP      HEP/Handouts given: continue HEP established   Pain Level (0-10 scale) post treatment: 0     ASSESSMENT    Patient is progressing in use of multiple effortful swallows with po trials. Patient appears to be progressing in strength and endurance when completing oropharyngeal strengthening exercises.       []   Improving appropriately and progressing toward goals  [x]   Improving slowly and progressing toward goals  []   Approximating goals/maximum potential  [x]   Continues to benefit from skilled therapy to address remaining functional deficits  []   Not progressing toward goals and plan of care will be adjusted    Patient will continue to benefit from skilled therapy to address remaining functional deficits: dysphagia     Progress towards goals / Updated goals:  1. Patient will complete laryngeal/pharyngeal strengthening exercises (including Mendelsohn maneuver, hard /k/ words,  effortful swallow/re-swallow, supraglottic swallow, etc, and supra supraglottic swallow, as tolerated/medically cleared x15 reps with min in order to improve the strength/ agility/efficiency of her swallow to decrease laryngeal stasis for improved swallowing safety and QOL.    6/17/2020, progressing:   Laryngeal closure exercises x15 with min-mod cues   Hard /k/ and lingual palate press min cues  Effortful swallows x20 with Jacquelyn   Super-supraglottic swallow x15 with Jacquelyn+visual aide for sequence   High \"ee\"x15 with Jacquelyn  Modified shaker x15, 5 second holds  Mendelsohn maneuver x20 min-modA, 5 second holds       2. Patient will recall/demonstrate aspiration precautions and safe swallowing strategies with 100% acc when provided with Jacquelyn (small sips/bites; 2-3 re-swallows, alternate liquids/solids; slow rate; oral care 2-3x daily; Sit upright at 90 degree angle during po trials and for 30 minute post po intake) to decrease the reported incidences of dysphagia and s/sx of aspiration with Soft solids +nectar thick liquids.   Prior 6/15/2020, progressing demonstrated with min-mod cues during po trials for rate      3.  Patient will recall/ demonstrate elements warranted to participate in Crabtree Free Water Protocol with  aggressive oral care in order to implement in conjunction with restorative swallowing exercises to reduce c/o xerostomia and improve QOL.   6/17/2020, recalls crabtree free water with S. Pt tolerates thin liquids 8/10 trials utilizing effortful swallow without overt s/sx of aspiration, however SLP cannot rule out silent aspiration - throat clear x1, wet vocal quality x1       PLAN  [x]  Continue plan of care  []  Modify Goals/Treatment Plan      []  Discharge due to:  [] Other:    Bud Martins, KANNAN 6/17/2020  9:35 AM    Future Appointments   Date Time Provider Christi Alba   6/24/2020  2:45 PM Arlene Herron, KANNAN [id number] [facility]   6/26/2020  9:30 AM Arlene Herron, KANNAN CWWHEZO [facility]   6/29/2020 10:15 AM Arlene Herron, SLP [id number] [facility]   6/30/2020  1:00 PM Tiarra Jorge MD BSSSHV [facility]   7/1/2020  8:45 AM Arlene Herron, SLP [id number] [facility]   7/7/2020  3:30 PM Arlene Herron, SLP [id number] [facility]   7/9/2020  2:45 PM Arlene Herron SLP HLSWGLE [facility]   7/14/2020  3:30 PM Arlene Herron, SLP [id number] [facility]   7/16/2020  3:30 PM Arlene Herron, SLP TTOFOND [facility]   9/2/2020  2:40 PM Eliazar Bustillos, MENG [address]

## 2020-06-24 ENCOUNTER — HOSPITAL ENCOUNTER (OUTPATIENT)
Dept: PHYSICAL THERAPY | Age: 50
Discharge: HOME | End: 2020-06-24
Payer: COMMERCIAL

## 2020-06-24 PROCEDURE — 92526 ORAL FUNCTION THERAPY: CPT

## 2020-06-24 NOTE — PROGRESS NOTES
ST DAILY TREATMENT NOTE    Patient Name: Savannah Bentley  Date:2020  : 1970  [x]  Patient  Verified  Payor: Payor: BLUE CROSS / Plan: [employer] [facility] / Product Type: PPO /   In time: 2:50  Out time: 330  Total Treatment Time (min): 40  Visit #: 7 of 8    Treatment Diagnosis: Dysphagia, unspecified [R13.10]    SUBJECTIVE  Pain Level (0-10 scale): 0  Any medication changes, allergies to medications, adverse drug reactions, diagnosis change, or new procedure performed?: [x] No    [] Yes (see summary sheet for update)  Subjective functional status/changes:   [x] No changes reported  \"I had trouble with my egg sandwich \"  \"I think some went down my lungs because I was coughing all day\"    OBJECTIVE  Treatment provided includes:   Increase/Improve:  []  Voice Quality []  Cognitive Linguistic Skills [x]  Laryngeal/Pharyngeal Exercises   []  Vocal Loudness []  Reading Comprehension [x]  Swallowing Skills    []  Vocal Cord Function []  Auditory Comprehension []  Oral Motor Skills   []  Resonance []  Writing Skills [x]  Compensatory strategies    []  Speech Intelligibility []  Expressive Language []  Attention   []  Breath Support/Coord.  []  Receptive language []  Memory   []  Articulation []  Safety Awareness []    []  Fluency []  Word Retrieval []      Treatment Provided:  - pharyngeal and laryngeal strengthening exercises   - po trials following marquez free water protocol  - pt education   Patient/Caregiver  Education: [x] Review HEP      HEP/Handouts given: increased effortful swallow and mendelsohn maneuver reps to x20   Pain Level (0-10 scale) post treatment: 0    ASSESSMENT   Patients is progressing appearing to have improved tolerance of thin liquids however SLP cannot rule out silent aspiration   []   Improving appropriately and progressing toward goals  [x]   Improving slowly and progressing toward goals  []   Approximating goals/maximum potential  [x]   Continues to benefit from skilled therapy to address remaining functional deficits  []   Not progressing toward goals and plan of care will be adjusted  Patient will continue to benefit from skilled therapy to address remaining functional deficits: dysphagia     Progress towards goals / Updated goals:  1. Patient will complete laryngeal/pharyngeal strengthening exercises (including Mendelsohn maneuver, hard /k/ words,  effortful swallow/re-swallow, supraglottic swallow, etc, and supra supraglottic swallow, as tolerated/medically cleared x15 reps with min in order to improve the strength/ agility/efficiency of her swallow to decrease laryngeal stasis for improved swallowing safety and QOL.    6/24/2020, progressing:   Laryngeal closure exercises x10, x2 sets with min-mod cues  Effortful swallows x25 with Jacquelyn   High \"ee\"x15 with Jacquelyn   Lingual palate press x10, x2 sets   Mendelsohn maneuver x20 min-modA, 6-7 second holds    Super-supraglottic swallow x15 with Jacquelyn+visual aide for sequence    Modified shaker x20, 5 second holds ; extended hold x3     2. Patient will recall/demonstrate aspiration precautions and safe swallowing strategies with 100% acc when provided with Jacquelyn (small sips/bites; 2-3 re-swallows, alternate liquids/solids; slow rate; oral care 2-3x daily;  Sit upright at 90 degree angle during po trials and for 30 minute post po intake) to decrease the reported incidences of dysphagia and s/sx of aspiration with Soft solids +nectar thick liquids.    6/24/2020, recalls strategies with min cues     3.  Patient will recall/ demonstrate elements warranted to participate in Crabtree Free Water Protocol with  aggressive oral care in order to implement in conjunction with restorative swallowing exercises to reduce c/o xerostomia and improve QOL.  6/24/2020, recalls crabtree free water with S. Pt tolerates thin liquids 9/10 trials utilizing effortful swallow without overt s/sx of aspiration, however SLP cannot rule out silent aspiration - throat clear x1       PLAN  [x]  Continue plan of care  []  Modify Goals/Treatment Plan      []  Discharge due to:  [] Other:    KANNAN Live 6/24/2020  2:28 PM    Future Appointments   Date Time Provider Christi Alba   6/24/2020  2:45 PM Dania Fritz, SLP [id number] [facility]   6/26/2020  9:30 AM [facility] SLP [id number] [facility]   6/29/2020 10:15 AM [facility], SLP [id number] [facility]   6/30/2020  1:00 PM Haja Mora MD BSSSHV NESSA [facility]   7/1/2020  8:45 AM [facility], SLP [id number] [facility]   7/7/2020  3:30 PM [facility] [id number] [facility]   7/9/2020  2:45 PM [facility], SLP [id number] [facility]   7/14/2020  3:30 PM [facility] SLP OHFFETD [facility]   7/16/2020  3:30 PM [facility], SLP [id number] [facility]   9/2/2020  2:40 PM Katerin Amador, MENG [address]

## 2020-06-24 NOTE — PROGRESS NOTES
[facility] Syeda Led  [address] [facility]  [phone] fax    Speech Therapy Physician Update  [x] Progress Note  [] Discharge Summary  Patient name: Yeyo Oneil Start of Care: 2020   Referral source: Luana Hodgkin, MD : 1970               Medical Diagnosis: Dysphagia, unspecified [R13.10]  Payor: BLUE CROSS / Plan: [facility] / Product Type: PPO /  Onset Date:3/2020               Treatment Diagnosis: R13.12 oropharyngeal dysphagia   Prior Hospitalization: see medical history Provider#: [id number]   Medications: Verified on Patient summary List   Comorbidities: anosmia, etd, tmj, Skin Ca, sleep apnea, epistaxia, s/p tonsillectomy and ACDF  Prior Level of Function: Speech/language, swallowing, and cognitive function WNLs. Visits from Start of Care: 7   Missed Visits: 0    Status at Evaluation/Last Progress Note:   Pt was referred to OP ST for mod-sev dysphagia. Pt reports she abruptly experienced dysphagia s/p  ACDF of c 4-5, 3/2020. An MBS was conducted on , indicating:  Based on the objective data described below, the patient presents with mod-sev pharyngeal dysphagia s/p ACDF 3/5/2020 C4-C5. Pt with silent aspiration during the swallow with thin, nectar-thick, honey-thick, and solid consistencies. Further silent aspiration events post swallow secondary to mod vallecular and pyriform sinus residuals. Pt with increased residuals at the site of cervical hardware. Small sips/bites, effortful swallows, and chin tuck ineffective at improving airway protection; however, airway compromise appeared minimally improved with nectar-thick viscosity. She did tolerate puree with effortful swallow with no aspiration/penetration events; however, suspect she would eventually have airway compromise with residuals observed.  Deficits include decreased laryngeal elevation and impaired pharyngeal motility/sensation. Safest diet would be NPO with consideration of an alternate means of nutrition/hydration; however, recommend initiation of OP SLP services with diet modifications per MD discretion.      Recommend soft solid diet with nectar-thick liquids, aspiration precautions, oral care TID, and meds as tolerated. Rec Roadmap Protocol. Highly recommend OP SLP services to address above deficits with repeat MBS x 6-8 weeks to reassess oropharyngeal swallow fxn. She may benefit from follow up with ortho per MD discretion as well as a possible GI consult? Results/recommendations d/w pt in detail following study with handout provided.       PLAN :     Recommendations:  Safest diet would be NPO with consideration of an alternate means of nutrition/hydration; however, rec initiation of OP SLP services with diet modifications to see if there is pharyngeal swallow improvement prior to GI consult.          A clinical beside swallow eval completed. Pt A&Ox4,reporting dysphagia with globus sensation. Speech/voice within functional limits.  Oral mech examination revealed structures functional for speech and deglutition, yielding natural dentition and min L linugal deviation. Pt observed with NTLs liquids via cup sip and puree only based on results of MBS. Pt demo'd adequate acceptance with min verbal cues to decrease bite/sip size, efficient swallow initiation, weak/limited laryngeal elevation to palpation/observation with delayed cough and throat clear with puree only. Po trials in neutral position and via chin tuck; pt reports chin tuck felt better, and per MBS slight improved airway protection was achieved in this posture. Based on clinical findings and MBS reports, Pt presents as a high aspiraiton risk d/t SILENT aspiration across all po trials -pudding, however, witnessed s/sx this date. Pt is recommended to be NPO with alt means of nutrition. Based on other means of nutrition are unavailable at this time. Rec Soft solids with Nectar thick lqiuids  With chin tuck + 2-3 re-swallows; meds ideally crushed in puree or if unable to crushe take whole in puree. Highly rec pt f/u with ortho and GI ASAP. Pt also educated with regard to s/sx aspiration, aspiration risk, diet recs, comp strategies, initiation of HEP, FFWP,  and role of SLP.  Pt able to verbalize understanding. It is recommended that she receive skilled speech therapy services as it is medically necessary to improve her swallow function for primary means of nutrition and hydration for safety  and for her QOL. Progress towards Goals:   1. Patient will complete laryngeal/pharyngeal strengthening exercises (including Mendelsohn maneuver, hard /k/ words,  effortful swallow/re-swallow, supraglottic swallow, etc, and supra supraglottic swallow, as tolerated/medically cleared x15 reps with min in order to improve the strength/ agility/efficiency of her swallow to decrease laryngeal stasis for improved swallowing safety and QOL.   Not met, progressing:   Laryngeal closure exercises x10, x2 sets with min-mod cues  Effortful swallows x25 with Jacquelyn   High \"ee\"x15 with Jacquelyn   Lingual palate press x10, x2 sets   Mendelsohn maneuver x20 min-modA, 6-7 second holds    Super-supraglottic swallow x15 with Jacquelyn+visual aide for sequence    Modified shaker x2 sets of x10, 5 second holds ; extended hold x3  2. Patient will recall/demonstrate aspiration precautions and safe swallowing strategies with 100% acc when provided with Jacquelyn (small sips/bites; 2-3 re-swallows, alternate liquids/solids; slow rate; oral care 2-3x daily; Sit upright at 90 degree angle during po trials and for 30 minute post po intake) to decrease the reported incidences of dysphagia and s/sx of aspiration with Soft solids +nectar thick liquids.   Not met, progressing towards goal: recalls strategies with min cues.  Patient demonstrating strategies with mildly thick liquids and puree this date with mod fading to min-mod cues presenting without overt s/sx of aspiration in 80% of trials, however SLP cannot rule out silent aspiration events. Patient presents with throat clear /cough in additional 20% of trials benefiting from cues to slow rate, bolus formation, alternating solids and liquids, and using effortful swallow    3.  Patient will recall/ demonstrate elements warranted to participate in Crabtree Free Water Protocol with  aggressive oral care in order to implement in conjunction with restorative swallowing exercises to reduce c/o xerostomia and improve QOL. Not met, progressing towards goal: recalls crabtree free water with S. Pt tolerates thin liquids 9/10 trials utilizing effortful swallow without overt s/sx of aspiration, however SLP cannot rule out silent aspiration - throat clear x1    Goals: to be achieved in 4 weeks:  1. Patient will complete laryngeal/pharyngeal strengthening exercises (including Mendelsohn maneuver, hard /k/ words,  effortful swallow/re-swallow, supraglottic swallow, etc, and supra supraglottic swallow, as tolerated/medically cleared x15 reps with min in order to improve the strength/ agility/efficiency of her swallow to decrease laryngeal stasis for improved swallowing safety and QOL. 2. Patient will recall/demonstrate aspiration precautions and safe swallowing strategies with 100% acc when provided with Jacquelyn (small sips/bites; 2-3 re-swallows, alternate liquids/solids; slow rate; oral care 2-3x daily; Sit upright at 90 degree angle during po trials and for 30 minute post po intake) to decrease the reported incidences of dysphagia and s/sx of aspiration with soft solids +nectar thick liquids. 3.  Patient will recall/ demonstrate elements warranted to participate in [place] Free Water Protocol with  aggressive oral care in order to implement in conjunction with restorative swallowing exercises to reduce c/o xerostomia and improve QOL.     ASSESSMENT:   Patient is progressing in treatment for oropharyngeal dysphagia. Patient reports increased comfort with oral intake recently and decreased coughing during meals, however pt does report variability in tolerance of various foods. She also reports decrease in coughing and sore throat in the night with improved sleep. She reports decrease in SOB following meals. She appears highly motivated and compliant with HEP. She is primarily consuming nectar (mildly) thickened liquids and minced/moist solids, however is gradually integrating more soft solids into diet. Pt is improving with precision and endurance when completing oropharyngeal strengthening exercises. Patient is progressing in treatment and would benefit from continued skilled speech therapy as it is medically necessary to improve efficiency of oropharyngeal swallow, comfort with oral intake, safety, and QOL in regards to nutrition and hydration. ASSESSMENT/RECOMMENDATIONS:  [x]Continue therapy per initial plan/protocol at a frequency of  2x per week for 4 weeks  []Continue therapy with the following recommended changes:_____________________      _____________________________________________________________________  []Discontinue therapy progressing towards or have reached established goals  []Discontinue therapy due to lack of appreciable progress towards goals  []Discontinue therapy due to lack of attendance or compliance  []Await Physician's recommendations/decisions regarding therapy  []Other:________________________________________________________________    Thank you for this referral.   KANNAN Waterman 6/24/2020 3:28 PM   MS CCC-SLP  Speech-Language Pathologist       NOTE TO PHYSICIAN:  PLEASE COMPLETE THE ORDERS BELOW AND   FAX TO [facility]: ([phone]  If you are unable to process this request in 24 hours please contact our office: [phone]    []  I have read the above report and request that my patient continue as recommended.   []  I have read the above report and request that my patient continue therapy with the following changes/special instructions:________________________________________  []I have read the above report and request that my patient be discharged from therapy.     [de-identified] Signature:____________Date:_________TIME:________    [facility], Date and Time must be completed for valid certification **

## 2020-06-26 ENCOUNTER — HOSPITAL ENCOUNTER (OUTPATIENT)
Dept: PHYSICAL THERAPY | Age: 50
Discharge: HOME | End: 2020-06-26
Payer: COMMERCIAL

## 2020-06-26 PROCEDURE — 92526 ORAL FUNCTION THERAPY: CPT

## 2020-06-26 NOTE — PROGRESS NOTES
ST DAILY TREATMENT NOTE    Patient Name: Balbina Last  Date:2020  : 1970  [x]  Patient  Verified  Payor: Payor: BLUE CROSS / Plan:  [facility] / Product Type: PPO /   In time: 9:35  Out time: 10:15   Total Treatment Time (min): 40   Visit #: 1 of 8    Treatment Diagnosis: Dysphagia, unspecified [R13.10]    SUBJECTIVE  Pain Level (0-10 scale): 0  Any medication changes, allergies to medications, adverse drug reactions, diagnosis change, or new procedure performed?: [x] No    [] Yes (see summary sheet for update)  Subjective functional status/changes:   [] No changes reported  \"I ate a BLT last night and it went well surprisingly\"     OBJECTIVE  Treatment provided includes:  Increase/Improve:  []  Voice Quality []  Cognitive Linguistic Skills [x]  Laryngeal/Pharyngeal Exercises   []  Vocal Loudness []  Reading Comprehension [x]  Swallowing Skills    []  Vocal Cord Function []  Auditory Comprehension []  Oral Motor Skills   []  Resonance []  Writing Skills [x]  Compensatory strategies    []  Speech Intelligibility []  Expressive Language []  Attention   []  Breath Support/Coord.  []  Receptive language []  Memory   []  Articulation []  Safety Awareness []    []  Fluency []  Word Retrieval []      Treatment Provided:  - oropharyngeal strengthening exercises   - po trials - puree and mildly thick liquids   - pt education and skilled cues regarding compensatory strategies     Patient/Caregiver  Education: [x] Review HEP      HEP/Handouts given: continue established HEP  Pain Level (0-10 scale) post treatment: 0    ASSESSMENT   Patient progressing in use of strategies by end of session and appears to have improved tolerance to trials of puree when compared to previous sessions  []   Improving appropriately and progressing toward goals  [x]   Improving slowly and progressing toward goals  []   Approximating goals/maximum potential  [x]   Continues to benefit from skilled therapy to address remaining functional deficits  []   Not progressing toward goals and plan of care will be adjusted    Patient will continue to benefit from skilled therapy to address remaining functional deficits: dysphagia     Progress towards goals / Updated goals:  1. Patient will complete laryngeal/pharyngeal strengthening exercises (including Mendelsohn maneuver, hard /k/ words,  effortful swallow/re-swallow, supraglottic swallow, etc, and supra supraglottic swallow, as tolerated/medically cleared x15 reps with min in order to improve the strength/ agility/efficiency of her swallow to decrease laryngeal stasis for improved swallowing safety and QOL.    6/26/2020, progressing:   Effortful swallows x25 with po trials   High \"ee\"x15 with Jacquelyn   Lingual palate press x10, x2 sets   Mendelsohn maneuver x20 min-modA, 6-7 second holds    Super-supraglottic swallow x15 with Jacquelyn+visual aide for sequence    Modified shaker x20, 5 second holds ; extended hold x3  Hanane x15 with min-modA     2. Patient will recall/demonstrate aspiration precautions and safe swallowing strategies with 100% acc when provided with Jacquelyn (small sips/bites; 2-3 re-swallows, alternate liquids/solids; slow rate; oral care 2-3x daily; Sit upright at 90 degree angle during po trials and for 30 minute post po intake) to decrease the reported incidences of dysphagia and s/sx of aspiration with Soft solids +nectar thick liquids.    6/26/2020, recalls strategies with min cues.  Demonstrates with mod fading to min - trials of puree and mildly thick liquids      3.  Patient will recall/ demonstrate elements warranted to participate in Crab[place] Free Water Protocol with  aggressive oral care in order to implement in conjunction with restorative swallowing exercises to reduce c/o xerostomia and improve QOL.  6/26/2020, not targeted       PLAN  [x]  Continue plan of care  []  Modify Goals/Treatment Plan      []  Discharge due to:  [] Other:    Jaxon Bhatt SLP 6/26/2020  9:35 AM    Future Appointments   Date Time Provider Christi Willis   6/29/2020 10:15 AM KANNAN Mcgraw [facility]   7/1/2020  2:45 PM KANNAN Mcgraw [facility]   7/7/2020  3:30 PM KANNAN Mcgraw [facility]   7/9/2020  2:45 PM KANNAN Mcgraw ADNXOUS [facility]   7/14/2020  3:30 PM KANNAN Mcgraw UOLSIXS [facility]   7/16/2020  3:30 PM KANNAN Mcgraw HROZUNO [facility]   7/22/2020  3:00 PM Rafa Romo MD BSSSHV NESSA SCHED   9/2/2020  2:40 PM Palmer Sharma  [address]

## 2020-06-29 ENCOUNTER — HOSPITAL ENCOUNTER (OUTPATIENT)
Dept: PHYSICAL THERAPY | Age: 50
Discharge: HOME | End: 2020-06-29
Payer: COMMERCIAL

## 2020-06-29 PROCEDURE — 92526 ORAL FUNCTION THERAPY: CPT

## 2020-06-29 NOTE — PROGRESS NOTES
ST DAILY TREATMENT NOTE    Patient Name: Chris Dias  Date:2020  : 1970  [x]  Patient  Verified  Payor: Payor: BLUE CROSS / Plan: [employer] [facility] / Product Type: PPO /   In time: 10:20   Out time: 11:00  Total Treatment Time (min): 40   Visit #: 2 of 8  Treatment Diagnosis: Dysphagia, unspecified [R13.10]    SUBJECTIVE  Pain Level (0-10 scale): 7/10 - shoulder/back   Any medication changes, allergies to medications, adverse drug reactions, diagnosis change, or new procedure performed?: [x] No    [] Yes (see summary sheet for update)  Subjective functional status/changes:   [] No changes reported  \"My shoulder is bothering me today\"    OBJECTIVE  Treatment provided includes:  Increase/Improve:  []  Voice Quality []  Cognitive Linguistic Skills [x]  Laryngeal/Pharyngeal Exercises   []  Vocal Loudness []  Reading Comprehension [x]  Swallowing Skills    []  Vocal Cord Function []  Auditory Comprehension []  Oral Motor Skills   []  Resonance []  Writing Skills [x]  Compensatory strategies    []  Speech Intelligibility []  Expressive Language []  Attention   []  Breath Support/Coord. []  Receptive language []  Memory   []  Articulation []  Safety Awareness []    []  Fluency []  Word Retrieval []      Treatment Provided:  - oropharyngeal strengthening exercises   - po trial soft mildly thick liquids   - patient education   Patient/Caregiver  Education: [x] Review HEP      HEP/Handouts given: continue HEP established   Pain Level (0-10 scale) post treatment: 7/10     ASSESSMENT   Patient is progressing reporting continued increase in comfort and tolerance of po with modifications such as finely chopping meats, chewing thoroughly and adding sauces.      []   Improving appropriately and progressing toward goals  [x]   Improving slowly and progressing toward goals  []   Approximating goals/maximum potential  [x]   Continues to benefit from skilled therapy to address remaining functional deficits  [] Not progressing toward goals and plan of care will be adjusted  Patient will continue to benefit from skilled therapy to address remaining functional deficits: dysphagia     Progress towards goals / Updated goals:  1. Patient will complete laryngeal/pharyngeal strengthening exercises (including Mendelsohn maneuver, hard /k/ words,  effortful swallow/re-swallow, supraglottic swallow, etc, and supra supraglottic swallow, as tolerated/medically cleared x15 reps with min in order to improve the strength/ agility/efficiency of her swallow to decrease laryngeal stasis for improved swallowing safety and QOL.    6/29/2020, progressing:    supraglottic swallow x15 with S  Jaw opening against resistance x15 10 second holds, x15 5 second holds min cues   Mendelsohn maneuver x2 sets of 10, 6-7 second holds min cues   Effortful swallows x2 sets of 15 with po trials min cues  High \"ee\"x15 with Jacquelyn   Hanane x20 with min-modA     2. Patient will recall/demonstrate aspiration precautions and safe swallowing strategies with 100% acc when provided with Jacquelyn (small sips/bites; 2-3 re-swallows, alternate liquids/solids; slow rate; oral care 2-3x daily; Sit upright at 90 degree angle during po trials and for 30 minute post po intake) to decrease the reported incidences of dysphagia and s/sx of aspiration with Soft solids +nectar thick liquids.     6/29/2020, recalls strategies with min cues.  Demonstrates with mod fading to min - trials of puree and mildly thick liquids      3.  Patient will recall/ demonstrate elements warranted to participate in [place] Free Water Protocol with  aggressive oral care in order to implement in conjunction with restorative swallowing exercises to reduce c/o xerostomia and improve QOL.  6/29/2020, not targeted       PLAN  [x]  Continue plan of care  []  Modify Goals/Treatment Plan      []  Discharge due to:  [] Other:    Tracy Null, KANNAN 6/29/2020  10:19 AM    Future Appointments   Date Time Provider Christi Willis   7/1/2020  2:45 PM Leela Abdi, KANNAN [facility]CP[facility]   7/7/2020  3:30 PM Leela Abdi, KANNAN [facility]CPTPB [facility]   7/9/2020  2:45 PM Leela Abdi, SLP [id number] [facility]   7/14/2020  3:30 PM Leela Abdi, KANNAN MYGATBR [facility]   7/16/2020  3:30 PM Leela Abdi, SLP [id number] [facility]   7/22/2020  3:00 PM Carmelo Davis  [address]   9/2/2020  2:40 PM Erin Lott  [address]

## 2020-07-01 ENCOUNTER — HOSPITAL ENCOUNTER (OUTPATIENT)
Dept: PHYSICAL THERAPY | Age: 50
Discharge: HOME | End: 2020-07-01
Payer: COMMERCIAL

## 2020-07-01 PROCEDURE — 92526 ORAL FUNCTION THERAPY: CPT

## 2020-07-01 NOTE — PROGRESS NOTES
ST DAILY TREATMENT NOTE    Patient Name: Skpi Welch  Date:2020  : 1970  [x]  Patient  Verified  Payor: Payor: BLUE CROSS / Plan: [facility] / Product Type: PPO /   In time: 2:51  Out time: 3:26  Total Treatment Time (min): 35  Visit #: 3 of 8    Treatment Diagnosis: Dysphagia, unspecified [R13.10]    SUBJECTIVE  Pain Level (0-10 scale): 0  Any medication changes, allergies to medications, adverse drug reactions, diagnosis change, or new procedure performed?: [x] No    [] Yes (see summary sheet for update)   Subjective functional status/changes:   [] No changes reported  Pt complains of waking up in the middle of the night coughing and wet/gurgly vocal quality. OBJECTIVE  Treatment provided includes:  Increase/Improve:  []  Voice Quality []  Cognitive Linguistic Skills [x]  Laryngeal/Pharyngeal Exercises   []  Vocal Loudness []  Reading Comprehension [x]  Swallowing Skills    []  Vocal Cord Function []  Auditory Comprehension []  Oral Motor Skills   []  Resonance []  Writing Skills [x]  Compensatory strategies    []  Speech Intelligibility []  Expressive Language []  Attention   []  Breath Support/Coord. []  Receptive language []  Memory   []  Articulation []  Safety Awareness []    []  Fluency []  Word Retrieval []      Treatment Provided:  - oropharyngeal strengthening exercises   - patient education for strategies to improve reflux (e.g. foods to avoid, sleeping slightly elevated)  - po trials of mildly thick liquids and soft solids utilizing compensatory strategies     Patient/Caregiver  Education: [x] Review HEP      HEP/Handouts given: continue HEP established   Pain Level (0-10 scale) post treatment: 0    ASSESSMENT    Patient is progressing.  She appeared to tolerate 50% of trials of soft solids with cues for bolus formation and compensatory strategies, SLP cannot rule out silent aspiration however   []   Improving appropriately and progressing toward goals  [x] Improving slowly and progressing toward goals  []   Approximating goals/maximum potential  [x]   Continues to benefit from skilled therapy to address remaining functional deficits  []   Not progressing toward goals and plan of care will be adjusted  Patient will continue to benefit from skilled therapy to address remaining functional deficits: dysphagia      Progress towards goals / Updated goals:  1. Patient will complete laryngeal/pharyngeal strengthening exercises (including Mendelsohn maneuver, hard /k/ words,  effortful swallow/re-swallow, supraglottic swallow, etc, and supra supraglottic swallow, as tolerated/medically cleared x15 reps with min in order to improve the strength/ agility/efficiency of her swallow to decrease laryngeal stasis for improved swallowing safety and QOL.    7/1/2020, progressing:   Super-supraglottic swallow x2 sets of 10 with min cues+visual aide  Modified shaker with CTAR ball x25, 5 second holds   Mendelsohn maneuver x25, x1 short break, 6-7 second holds min cues   Effortful swallows x2 sets of 15 min cues    2. Patient will recall/demonstrate aspiration precautions and safe swallowing strategies with 100% acc when provided with Jacquelyn (small sips/bites; 2-3 re-swallows, alternate liquids/solids; slow rate; oral care 2-3x daily; Sit upright at 90 degree angle during po trials and for 30 minute post po intake) to decrease the reported incidences of dysphagia and s/sx of aspiration with Soft solids +nectar thick liquids.      7/1/2020, recalls strategies with min cues. Demonstrates with mod cues for bolus formation and increasing moisture with bites of soft solids to improve comfort and tolerance.  She appeared to tolerate 50% of trials of soft solids and 100% of trials of mildly thick liquids, however SLP cannot completely rule out silent aspiration/penetration events      3.  Patient will recall/ demonstrate elements warranted to participate in e[place] Protocol with  aggressive oral care in order to implement in conjunction with restorative swallowing exercises to reduce c/o xerostomia and improve QOL.  7/1/2020, not targeted       PLAN   [x]  Continue plan of care  []  Modify Goals/Treatment Plan      []  Discharge due to:  [] Other:    Celeste Apple, SLP 7/1/2020  2:45 PM    Future Appointments   Date Time Provider Christi Willis   7/7/2020  3:30 PM Zunilda He, SLP [facility]   7/9/2020  2:45 PM Zunilda He, SLP [facility]   7/14/2020  3:30 PM Zunilda He, SLP [facility]   7/16/2020  3:30 PM Zunilda He, SLP [facility]   7/22/2020  3:00 PM [facility]   9/2/2020  2:40 PM Sigurd Oppenheim,  [address]

## 2020-07-07 ENCOUNTER — HOSPITAL ENCOUNTER (OUTPATIENT)
Dept: PHYSICAL THERAPY | Age: 50
Discharge: HOME | End: 2020-07-07
Payer: COMMERCIAL

## 2020-07-07 PROCEDURE — 92526 ORAL FUNCTION THERAPY: CPT

## 2020-07-07 NOTE — PROGRESS NOTES
ST DAILY TREATMENT NOTE    Patient Name: Herman Osgood  Date:2020  : 1970  [x]  Patient  Verified  Payor: Payor: BLUE CROSS / Plan: [facility] / Product Type: PPO /   In time: 2:48  Out time: 3:27  Total Treatment Time (min): 39  Visit #: 4 of 8    Treatment Diagnosis: Dysphagia, unspecified [R13.10]    SUBJECTIVE  Pain Level (0-10 scale): 0  Any medication changes, allergies to medications, adverse drug reactions, diagnosis change, or new procedure performed?: [x] No    [] Yes (see summary sheet for update)    Subjective functional status/changes:   [] No changes reported  Follow-up with ENT physician tomorrow. Reports improved tolerance of bit sized soft solids. Minimal coughing during meals. No incidence of reflux since inclining when sleeping. OBJECTIVE  Treatment provided includes:  Increase/Improve:  []  Voice Quality []  Cognitive Linguistic Skills [x]  Laryngeal/Pharyngeal Exercises   []  Vocal Loudness []  Reading Comprehension [x]  Swallowing Skills    []  Vocal Cord Function []  Auditory Comprehension []  Oral Motor Skills   []  Resonance []  Writing Skills [x]  Compensatory strategies    []  Speech Intelligibility []  Expressive Language []  Attention   []  Breath Support/Coord. []  Receptive language []  Memory   []  Articulation []  Safety Awareness []    []  Fluency []  Word Retrieval []        Treatment Provided:  - oropharyngeal strengthening exercises with skilled cueing  - patient education for utilizing compensatory strategies   - po trials of soft bite-sized solids and mildly thick liquids with training of compensatory strategies     Patient/Caregiver  Education: [x] Review HEP      HEP/Handouts given: continue HEP established     Pain Level (0-10 scale) post treatment: 0    ASSESSMENT   Patient is progressing. She continues to demonstrate gains in oropharyngeal strength and endurance.  She reports increased comfort with oral intake and increased tolerance of bite sized soft solids while utilizing chin tuck. SLP recommends updated MBS to reassess pharyngeal function and least restrictive diet. []   Improving appropriately and progressing toward goals  [x]   Improving slowly and progressing toward goals  []   Approximating goals/maximum potential  [x]   Continues to benefit from skilled therapy to address remaining functional deficits  []   Not progressing toward goals and plan of care will be adjusted    Patient will continue to benefit from skilled therapy to address remaining functional deficits: dysphagia     Progress towards goals / Updated goals:  1. Patient will complete laryngeal/pharyngeal strengthening exercises (including Mendelsohn maneuver, hard /k/ words,  effortful swallow/re-swallow, supraglottic swallow, etc, and supra supraglottic swallow, as tolerated/medically cleared x15 reps with min in order to improve the strength/ agility/efficiency of her swallow to decrease laryngeal stasis for improved swallowing safety and QOL.    7/7/2020, progressing:   Super-supraglottic swallow x2 sets of 15 with min cues+visual aide  Modified shaker with CTAR ball x5, 60 second holds   Laryngeal closure exercise x2 sets of 10   Effortful swallows x2 sets of 15 min cues during PO trials     2. Patient will recall/demonstrate aspiration precautions and safe swallowing strategies with 100% acc when provided with Jacquelyn (small sips/bites; 2-3 re-swallows, alternate liquids/solids; slow rate; oral care 2-3x daily; Sit upright at 90 degree angle during po trials and for 30 minute post po intake) to decrease the reported incidences of dysphagia and s/sx of aspiration with Soft solids +nectar thick liquids.      7/7/2020, recalls and demonstrates with min-mod cues for bite size and utilization of chin tuck of soft solids to improve comfort and tolerance.  She appeared to tolerate 90% of trials of soft solids when utilizing chin tuck and compensatory strategies and 100% of trials of mildly thick liquids, however SLP cannot completely rule out silent aspiration/penetration events      3.  Patient will recall/ demonstrate elements warranted to participate in [place] Free Water Protocol with  aggressive oral care in order to implement in conjunction with restorative swallowing exercises to reduce c/o xerostomia and improve QOL.  7/7/2020, not targeted            PLAN  [x]  Continue plan of care  []  Modify Goals/Treatment Plan      []  Discharge due to:  [] Other:    KANNAN Powell 7/7/2020  2:47 PM    Future Appointments   Date Time Provider Christi Willis   7/9/2020  2:45 PM Eulice Marking, SLP [facility]   7/14/2020  3:30 PM Eulice Marking, SLP [facility]   7/16/2020  3:30 PM Eulice Marking, SLP [facility]   7/22/2020  3:00 PM Marco Hope MD [facility]   9/2/2020  2:40 PM Wilson Hawthorne  [address]

## 2020-07-09 ENCOUNTER — APPOINTMENT (OUTPATIENT)
Dept: PHYSICAL THERAPY | Age: 50
End: 2020-07-09
Payer: COMMERCIAL

## 2020-07-13 ENCOUNTER — APPOINTMENT (OUTPATIENT)
Dept: PHYSICAL THERAPY | Age: 50
End: 2020-07-13
Payer: COMMERCIAL

## 2020-07-14 ENCOUNTER — HOSPITAL ENCOUNTER (OUTPATIENT)
Dept: PHYSICAL THERAPY | Age: 50
Discharge: HOME | End: 2020-07-14
Payer: COMMERCIAL

## 2020-07-14 PROCEDURE — 92526 ORAL FUNCTION THERAPY: CPT

## 2020-07-14 NOTE — PROGRESS NOTES
ST DAILY TREATMENT NOTE    Patient Name: Poppy Huff  Date:2020  : 1970  [x]  Patient  Verified  Payor: Payor: BLUE CROSS / Plan: [email or web address] [place] [address] [place] / Product Type: PPO /   In time:3:35  Out time:4:10  Total Treatment Time (min): 35  Visit #: 5 of 8    Treatment Diagnosis: Dysphagia, unspecified [R13.10]    SUBJECTIVE  Pain Level (0-10 scale): 0  Any medication changes, allergies to medications, adverse drug reactions, diagnosis change, or new procedure performed?: [x] No    [] Yes (see summary sheet for update)    Subjective functional status/changes:   [] No changes reported  Patient reports she is having a good day and swallowing is going well. Pt reports increased tolerance with more consistencies at home. OBJECTIVE  Treatment provided includes:  Increase/Improve:  []  Voice Quality []  Cognitive Linguistic Skills [x]  Laryngeal/Pharyngeal Exercises   []  Vocal Loudness []  Reading Comprehension [x]  Swallowing Skills    []  Vocal Cord Function []  Auditory Comprehension []  Oral Motor Skills   []  Resonance []  Writing Skills [x]  Compensatory strategies    []  Speech Intelligibility []  Expressive Language []  Attention   []  Breath Support/Coord. []  Receptive language []  Memory   []  Articulation []  Safety Awareness []    []  Fluency []  Word Retrieval []        Treatment Provided:  - oropharyngeal strengthening exercises with skilled cueing  - patient education for utilizing compensatory strategies   - po trials of mildly thick liquids with training of compensatory strategies    Patient/Caregiver  Education: [x] Review HEP      HEP/Handouts given: continue HEP established    Pain Level (0-10 scale) post treatment: 0    ASSESSMENT   Patient is progressing towards goals. She continues to demonstrate gains in oropharyngeal strength and endurance. She reports increased comfort with oral intake and increased tolerance of bite sized soft solids while utilizing chin tuck.  Required fewer breaks during exercises. []   Improving appropriately and progressing toward goals  [x]   Improving slowly and progressing toward goals  []   Approximating goals/maximum potential  [x]   Continues to benefit from skilled therapy to address remaining functional deficits  []   Not progressing toward goals and plan of care will be adjusted    Patient will continue to benefit from skilled therapy to address remaining functional deficits: dysphagia     Progress towards goals / Updated goals:  1. Patient will complete laryngeal/pharyngeal strengthening exercises (including Mendelsohn maneuver, hard /k/ words, effortful swallow/re-swallow, supraglottic swallow, etc, and supra supraglottic swallow, as tolerated/medically cleared x15 reps with min in order to improve the strength/ agility/efficiency of her swallow to decrease laryngeal stasis for improved swallowing safety and QOL.    7/14/2020, progressing:   Effortful swallows x2 sets of 18 min cues during PO trials   Laryngeal closure exercises x2 sets of 10, holding for ~3 seconds  Modified shaker with CTAR ball x6 60 second holds; 60 sec rest  High ee's x20 Jacquelyn    Super-supraglottic swallow x2 set of 15 w/ min cues +visual aid      2. Patient will recall/demonstrate aspiration precautions and safe swallowing strategies with 100% acc when provided with Jacquelyn (small sips/bites; 2-3 re-swallows, alternate liquids/solids; slow rate; oral care 2-3x daily;  Sit upright at 90 degree angle during po trials and for 30 minute post po intake) to decrease the reported incidences of dysphagia and s/sx of aspiration with Soft solids +nectar thick liquids.   7/14/2020, tolerated 100% of trials of mildly thick liquids, however SLP cannot completely rule out silent aspiration/penetration events      3.  Patient will recall/ demonstrate elements warranted to participate in [place] Free Water Protocol with  aggressive oral care in order to implement in conjunction with restorative swallowing exercises to reduce c/o xerostomia and improve QOL.   7/14/2020, not targeted    PLAN  [x]  Continue plan of care  []  Modify Goals/Treatment Plan      []  Discharge due to:  [] Other: PO trial thin liquids     Thomas Chandra 7/14/2020  3:27 PM    Future Appointments   Date Time Provider Christi Alba   7/14/2020  3:30 PM KANNAN Amaya [facility]   7/16/2020  1:15 PM KANNAN Barrientos [facility]   7/22/2020 10:00 AM [facility] DX RM 5 [facility]   7/22/2020  3:00 PM Patel Finch MD [facility]   9/2/2020  2:40 PM Jocelyn Galvan  [address]

## 2020-07-15 ENCOUNTER — APPOINTMENT (OUTPATIENT)
Dept: PHYSICAL THERAPY | Age: 50
End: 2020-07-15
Payer: COMMERCIAL

## 2020-07-16 ENCOUNTER — HOSPITAL ENCOUNTER (OUTPATIENT)
Dept: PHYSICAL THERAPY | Age: 50
Discharge: HOME | End: 2020-07-16
Payer: COMMERCIAL

## 2020-07-16 PROCEDURE — 92526 ORAL FUNCTION THERAPY: CPT

## 2020-07-16 NOTE — PROGRESS NOTES
ST DAILY TREATMENT NOTE    Patient Name: Alma Lazo  Date:2020  : 1970  [x]  Patient  Verified  Payor: Payor: BLUE CROSS / Plan: [facility] / Product Type: PPO /   In time:1:20  Out time:1:46  Total Treatment Time (min): 26  Visit #: 6 of 8    Treatment Diagnosis: Dysphagia, unspecified [R13.10]    SUBJECTIVE  Pain Level (0-10 scale): 0  Any medication changes, allergies to medications, adverse drug reactions, diagnosis change, or new procedure performed?: [x] No    [] Yes (see summary sheet for update)    Subjective functional status/changes:   [] No changes reported  Patient reports she is having a good day. Duration shorter because patient requested to end early due to work obligation. OBJECTIVE  Treatment provided includes:  Increase/Improve:  []  Voice Quality []  Cognitive Linguistic Skills [x]  Laryngeal/Pharyngeal Exercises   []  Vocal Loudness []  Reading Comprehension [x]  Swallowing Skills    []  Vocal Cord Function []  Auditory Comprehension []  Oral Motor Skills   []  Resonance []  Writing Skills [x]  Compensatory strategies    []  Speech Intelligibility []  Expressive Language []  Attention   []  Breath Support/Coord. []  Receptive language []  Memory   []  Articulation []  Safety Awareness []    []  Fluency []  Word Retrieval []        Treatment Provided:  - oropharyngeal strengthening exercises with skilled cueing   - po trials of thin liquids   - patient education     Patient/Caregiver  Education: [x] Review HEP      HEP/Handouts given: continue HEP established     Pain Level (0-10 scale) post treatment: 0    ASSESSMENT   Patient tolerated thin liquids with 3 instances of post swallow throat clears. Pt identified error during po trial of thin liquids with super supraglottic swallow, which lead to a throat clear. Increased endurance across strengthening exercises. Increased independence with HEP and strengthening exercises.    []   Improving appropriately and progressing toward goals  [x]   Improving slowly and progressing toward goals  []   Approximating goals/maximum potential  [x]   Continues to benefit from skilled therapy to address remaining functional deficits  []   Not progressing toward goals and plan of care will be adjusted    Patient will continue to benefit from skilled therapy to address remaining functional deficits: dysphagia    Progress towards goals / Updated goals:  1. Patient will complete laryngeal/pharyngeal strengthening exercises (including Mendelsohn maneuver, hard /k/ words, effortful swallow/re-swallow, supraglottic swallow, etc, and supra supraglottic swallow, as tolerated/medically cleared x15 reps with min in order to improve the strength/ agility/efficiency of her swallow to decrease laryngeal stasis for improved swallowing safety and QOL.    7/16/2020  Effortful swallow w/ po trials thin liquids x15  Super supraglottic swallow x10 w/ po trials thin liquids w/ Jacquelyn + visual cues   Modified shaker with CTAR ball x20 10sec holds; x6 60sec holds  Laryngeal closure exercises x35     2. Patient will recall/demonstrate aspiration precautions and safe swallowing strategies with 100% acc when provided with Jacquelyn (small sips/bites; 2-3 re-swallows, alternate liquids/solids; slow rate; oral care 2-3x daily; Sit upright at 90 degree angle during po trials and for 30 minute post po intake) to decrease the reported incidences of dysphagia and s/sx of aspiration with Soft solids +nectar thick liquids.   7/16/2020, tolerated 85% of thin liquids, however SLP cannot completely rule out silent aspiration/penetration events      3.  Patient will recall/ demonstrate elements warranted to participate in [place] Free Water Protocol with  aggressive oral care in order to implement in conjunction with restorative swallowing exercises to reduce c/o xerostomia and improve QOL.   7/16/2020, tolerated 17/20  trials of thin liquids, oral care completed before session    PLAN  [x]  Continue plan of care  []  Modify Goals/Treatment Plan      []  Discharge due to:  [] Other:     Desiree Duque 7/16/2020  1:18 PM    Future Appointments   Date Time Provider Christi Willis   7/22/2020 10:00 AM [facility] DX RM 5 [facility]   7/22/2020  3:00 PM Pradip Swann MD [facility]   9/2/2020  2:40 PM MENG Garcia

## 2020-07-22 ENCOUNTER — HOSPITAL ENCOUNTER (OUTPATIENT)
Dept: GENERAL RADIOLOGY | Age: 50
Discharge: HOME | End: 2020-07-22
Attending: OTOLARYNGOLOGY
Payer: COMMERCIAL

## 2020-07-22 DIAGNOSIS — R13.10 DYSPHAGIA, UNSPECIFIED TYPE: ICD-10-CM

## 2020-07-22 DIAGNOSIS — R13.10 DYSPHAGIA: ICD-10-CM

## 2020-07-22 PROCEDURE — 74011000255 HC RX REV CODE- 255: Performed by: OTOLARYNGOLOGY

## 2020-07-22 PROCEDURE — 92611 MOTION FLUOROSCOPY/SWALLOW: CPT

## 2020-07-22 PROCEDURE — 74230 X-RAY XM SWLNG FUNCJ C+: CPT

## 2020-07-22 RX ADMIN — BARIUM SULFATE 30 ML: 400 SUSPENSION ORAL at 10:50

## 2020-07-22 RX ADMIN — BARIUM SULFATE 60 G: 960 POWDER, FOR SUSPENSION ORAL at 10:50

## 2020-07-22 RX ADMIN — BARIUM SULFATE 700 MG: 700 TABLET ORAL at 10:50

## 2020-07-22 RX ADMIN — BARIUM SULFATE 30 ML: 400 PASTE ORAL at 10:50

## 2020-07-22 NOTE — PROGRESS NOTES
[address] [facility]  SPEECH LANGUAGE PATHOLOGY OUTPATIENT MODIFIED BARIUM SWALLOW STUDY    Patient: Johnathan Riddle (3 y.o. female)  Date: 7/22/2020  Primary Diagnosis: Dysphagia [R13.10]  Precautions: Aspiration       ASSESSMENT :  Based on the objective data described below, the patient presents with mild-mod pharyngeal dysphagia, improved since last MBS on 5/18/20. Pt reports consuming nectar thick liquids and working with outpatient SLP. Pt reporting increased comfort with nectar thick liquids (cough with thin liquids and with throat spray). Pt demonstrating laryngeal penetration with nectar thick liquids + straw and with pudding during the swallow that she was able to clear independently with strong throat clear. Pt tolerating small sips of thin liquid +/- straw, NTL by cup, pudding and regular solids with no airway compromise noted across trials. Unable to clear 13 mm Ba pill from oral cavity with liquid wash. Deficits include decreased laryngeal closure with slowed epiglottic inversion, decreased pharyngeal motility with mildly decreased clearance of the posterior pharyngeal wall exacerbated by cervical hardware at the level of C4-C5. Mild-mod pharyngeal residuals noted post swallow that pt was able to clear with double swallow. Pt safe for regular diet with thin liquids with strict use of small bites/sips, slow rate of intake and intermittent throat clearing during meals to clear potential aspirate (ok to continue NTL if pt prefers for increased comfort). Results/recommendations discussed with pt who was able to verbalize/demonstrate understanding. Would continue to benefit from outpatient SLP to address deficits.        PLAN :    Recommendations:  Regular diet with thin liquids  Meds per pt preference   Small bites/sips, Slow rate of intake   HOB >45 during po intake, remain >30 for 30-45 minutes after po   Intermittent throat clearing during meals   Oral care TID  Continue OP SLP to address dysphagia      SUBJECTIVE:   Patient stated I use two different kinds of thickener because they work better with different kinds of liquids    OBJECTIVE:     Past Medical History:   Diagnosis Date    Allergic rhinitis     ANURADHA (generalised anxiety disorder)     GERD (gastroesophageal reflux disease)     Lower  GERD    IBS (irritable bowel syndrome)     Nausea & vomiting     Other ill-defined conditions(899.89)     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    Skin cancer     removed from nose    Sleep apnea     \"mild per patient\" no cpap    Thumb pain 6/7/2010     Past Surgical History:   Procedure Laterality Date    HX BACK SURGERY  2012    HX CERVICAL FUSION  03/05/2020    Anterior    HX GYN      left tube removal ectopic pregnancy    HX HEENT  9/2011    Basal cell cancer lesion removed from nose    HX LYMPHADENECTOMY Right 8/25/16    HX ORTHOPAEDIC  1999    TMJ    HX OTHER SURGICAL  2013    Spinal Fusion    HX ROTATOR CUFF REPAIR Right 2019    x2    HX TONSILLECTOMY  2/2007     Current Diet: Regular diet with nectar thick liquids   Radiology:  Film Views: Lateral;Fluoro  Patient Position: 90 in chair    Trial 1: Trial 2:   Consistency Presented: Pudding(NTL + straw ) Consistency Presented: Thin liquid; Solid   How Presented: Self-fed/presented;Cup/sip;Straw;Spoon How Presented: Self-fed/presented;Cup/sip;Straw;Spoon   Bolus Acceptance: No impairment Bolus Acceptance: No impairment   Bolus Formation/Control: No impairment:   Bolus Formation/Control: No impairment:     Propulsion: No impairment Propulsion: No impairment   Oral Residue: None Oral Residue: None   Initiation of Swallow: No impairment     Timing: No impairment Timing: No impairment   Penetration: During swallow; To laryngeal vestibule;From initial swallow Penetration: None   Aspiration/Timing: No evidence of aspiration Aspiration/Timing: No evidence of aspiration   Pharyngeal Clearance: Vallecular residue;Pyriform residue ; Less than 10% Pharyngeal Clearance: Vallecular residue;Pyriform residue ; Less than 10%   Attempted Modifications: Small sips and bites;Cup/sip(Throat clear ) Attempted Modifications: Double swallow;Small sips and bites   Effective Modifications: Small sips and bites;Cup/sip(Throat clear ) Effective Modifications: (Small sips )   Cues for Modifications: Minimal Cues for Modifications: Minimal           Decreased Tongue Base Retraction?: No  Laryngeal Elevation: Incomplete laryngeal closure  Aspiration/Penetration Score: 2 (Penetration/No residue-Contrast enters the airway penetrates, remains above the folds/cords, and is cleared)  Pharyngeal Symmetry: Not assessed  Pharyngeal-Esophageal Segment: Decreased relaxation of upper esophageal segment  Pharyngeal Dysfunction: Crico-pharyngeal dysfunction;Decreased strength;Decreased elevation/closure;Decreased pharyngeal wall constriction  Oral Phase Severity: No impairment  Pharyngeal Phase Severity: Mild moderate    8-point Penetration-Aspiration Scale: Score 2    PAIN:  Pt reports 0/10 pain or discomfort prior to MBS. Pt reports 0/10 pain or discomfort post MBS. COMMUNICATION/EDUCATION:   [x]  Education provided post diagnostic testing including oropharyngeal anatomy/physiology, MBS results, diet recommendations and        compensatory strategies/positioning. [x]  Video feedback utilized. []  Handout regarding diet recommendations and thickener instructions provided. [x]  Patient/family have participated as able in goal setting and plan of care. []  Patient/family agree to work toward stated goals and plan of care. []  Patient understands intent and goals of therapy, but is neutral about his/her participation. []  Patient is unable to participate in goal setting and plan of care.     Thank you for this referral,  Shanthi Bravo M.S., [address] [facility], [place]  Speech-Language Pathologist

## 2020-07-23 ENCOUNTER — APPOINTMENT (OUTPATIENT)
Dept: PHYSICAL THERAPY | Age: 50
End: 2020-07-23
Payer: COMMERCIAL

## 2020-07-24 ENCOUNTER — OFFICE VISIT (OUTPATIENT)
Dept: SURGERY | Age: 50
End: 2020-07-24

## 2020-07-24 VITALS
BODY MASS INDEX: 28.34 KG/M2 | DIASTOLIC BLOOD PRESSURE: 74 MMHG | HEIGHT: 62 IN | SYSTOLIC BLOOD PRESSURE: 106 MMHG | OXYGEN SATURATION: 98 % | RESPIRATION RATE: 18 BRPM | WEIGHT: 154 LBS | TEMPERATURE: 97.8 F | HEART RATE: 66 BPM

## 2020-07-24 DIAGNOSIS — Z91.89 AT HIGH RISK FOR BREAST CANCER: Primary | ICD-10-CM

## 2020-07-24 DIAGNOSIS — Z80.3 FAMILY HISTORY OF BREAST CANCER IN FIRST DEGREE RELATIVE: ICD-10-CM

## 2020-07-24 RX ORDER — IBUPROFEN 200 MG
TABLET ORAL
COMMUNITY
End: 2022-01-05 | Stop reason: ALTCHOICE

## 2020-07-24 RX ORDER — FLUTICASONE PROPIONATE 50 MCG
2 SPRAY, SUSPENSION (ML) NASAL DAILY
COMMUNITY
End: 2022-01-05 | Stop reason: ALTCHOICE

## 2020-07-24 NOTE — PATIENT INSTRUCTIONS

## 2020-07-24 NOTE — PROGRESS NOTES
[facility]  General Surgery    Subjective:  Patient is in our high risk breast cancer screening program because of a 31.1% lifetime risk of developing breast cancer. She performs self breast exam in the shower. She denies any skin changes or nipple drainage or discharge or masses or lumps. She denies any unintentional weight loss. Bilateral mammo BIRADS 1. Objective:  Vitals:    07/24/20 1452   BP: 106/74   Pulse: 66   Resp: 18   Temp: 97.8 °F (36.6 °C)   TempSrc: Oral   SpO2: 98%   Weight: 69.9 kg (154 lb)   Height: 5' 2\" (1.575 m)       Physical Exam:    General: Awake and alert, oriented x4, no apparent distress   Breasts:    Left: No dimpling, discoloration, nipple inversion or retractions. No axillary or supraclavicular lymphadenopathy. No mass   Right: No dimpling, discoloration, nipple inversion or retractions. No axillary or supraclavicular lymphadenopathy. No mass    Current Medications:  Current Outpatient Medications   Medication Sig Dispense Refill    fluticasone propionate (Flonase Allergy Relief) 50 mcg/actuation nasal spray 2 Sprays by Both Nostrils route daily.  ibuprofen (AdviL) 200 mg tablet Take  by mouth.  albuterol (PROVENTIL HFA, VENTOLIN HFA, PROAIR HFA) 90 mcg/actuation inhaler INHALE 2 PUFFS BY MOUTH EVERY 6 HOURS AS NEEDED FOR WHEEZE/COUGH 8.5 Inhaler 0    B.infantis-B.ani-B.long-B.bifi (Probiotic 4X) 10-15 mg TbEC Take  by mouth daily.  cyanocobalamin 1,000 mcg tablet Take 2,500 mcg by mouth every other day.  cholecalciferol, vitamin D3, (VITAMIN D3 PO) Take 10,000 Units by mouth daily.  multivitamin (ONE A DAY) tablet Take 1 Tab by mouth daily.  esomeprazole (NEXIUM) 20 mg capsule Take 20 mg by mouth two (2) times a day.  diphenhydrAMINE (SIMPLY SLEEP) 25 mg tablet Take 12.5 mg by mouth nightly.  acetaminophen (TYLENOL) 325 mg tablet Take 325 mg by mouth every four (4) hours as needed for Pain.       loratadine (CLARITIN) 10 mg tablet Take 10 mg by mouth daily as needed for Allergies.  traMADol (ULTRAM) 50 mg tablet Take 50 mg by mouth daily.  azelastine-fluticasone (DYMISTA) 137-50 mcg/spray spry 1 Center by Nasal route as needed (stuffy nose). Chart and notes reviewed. Data reviewed. I have evaluated and examined the patient. Impression and plan:  Patient continues in her high risk breast cancer surveillance program [facility] of developing [facility].   Continue monthly self breast exam  Bilateral breast MRI with contrast in 6 months  Clinical breast exam in 6 months     Mónica Jain MD

## 2020-07-30 ENCOUNTER — HOSPITAL ENCOUNTER (OUTPATIENT)
Dept: PHYSICAL THERAPY | Age: 50
Discharge: HOME | End: 2020-07-30
Payer: COMMERCIAL

## 2020-07-30 PROCEDURE — 92526 ORAL FUNCTION THERAPY: CPT

## 2020-07-30 NOTE — PROGRESS NOTES
[facility]  [email or web address] [facility]  [address] [place] [address]  [phone] fax    Speech Therapy Physician Update  [x] Progress Note  [] Discharge Summary  Patient name: Alexandra Brown Start of Care: 2020   Referral source: Cornelius Rodriguez MD : 1970               Medical Diagnosis: Dysphagia, unspecified [R13.10]  Payor: BLUE CROSS / [facility] / Product Type: PPO /  Onset Date:3/2020               Treatment Diagnosis: R13.12 oropharyngeal dysphagia   Prior Hospitalization: see medical history Provider#: [id number]   Medications: Verified on Patient summary List   Comorbidities: anosmia, etd, tmj, Skin Ca, sleep apnea, epistaxia, s/p tonsillectomy and ACDF  Prior Level of Function: Speech/language, swallowing, and cognitive function WNLs.                 Visits from Start of Care: 14                       Missed Visits: 0     Status at Evaluation/Last Progress Note:   1. Patient will complete laryngeal/pharyngeal strengthening exercises (including Mendelsohn maneuver, hard /k/ words,  effortful swallow/re-swallow, supraglottic swallow, etc, and supra supraglottic swallow, as tolerated/medically cleared x15 reps with min in order to improve the strength/ agility/efficiency of her swallow to decrease laryngeal stasis for improved swallowing safety and QOL.   Not met, progressing:   Laryngeal closure exercises x10, x2 sets with min-mod cues  Effortful swallows x25 with Jacquelyn   High \"ee\"x15 with Jacquelyn   Lingual palate press x10, x2 sets   Mendelsohn maneuver x20 min-modA, 6-7 second holds    Super-supraglottic swallow x15 with Jacquelyn+visual aide for sequence    Modified shaker x2 sets of x10, 5 second holds ; extended hold x3  2.  Patient will recall/demonstrate aspiration precautions and safe swallowing strategies with 100% acc when provided with Jacquelyn (small sips/bites; 2-3 re-swallows, alternate liquids/solids; slow rate; oral care 2-3x daily; Sit upright at 90 degree angle during po trials and for 30 minute post po intake) to decrease the reported incidences of dysphagia and s/sx of aspiration with Soft solids +nectar thick liquids.   Not met, progressing towards goal: recalls strategies with min cues. Patient demonstrating strategies with mildly thick liquids and puree this date with mod fading to min-mod cues presenting without overt s/sx of aspiration in 80% of trials, however SLP cannot rule out silent aspiration events. Patient presents with throat clear /cough in additional 20% of trials benefiting from cues to slow rate, bolus formation, alternating solids and liquids, and using effortful swallow    3.  Patient will recall/ demonstrate elements warranted to participate in Crabtree Free Water Protocol with  aggressive oral care in order to implement in conjunction with restorative swallowing exercises to reduce c/o xerostomia and improve QOL. Not met, progressing towards goal: recalls crabtree free water with S. Pt tolerates thin liquids 9/10 trials utilizing effortful swallow without overt s/sx of aspiration, however SLP cannot rule out silent aspiration - throat clear x1  ASSESSMENT:   Patient is progressing in treatment for oropharyngeal dysphagia. Patient reports increased comfort with oral intake recently and decreased coughing during meals, however pt does report variability in tolerance of various foods. She also reports decrease in coughing and sore throat in the night with improved sleep. She reports decrease in SOB following meals. She appears highly motivated and compliant with HEP. She is primarily consuming nectar (mildly) thickened liquids and minced/moist solids, however is gradually integrating more soft solids into diet.  Pt is improving with precision and endurance when completing oropharyngeal strengthening exercises.     Progress towards Goals:   1. Patient will complete laryngeal/pharyngeal strengthening exercises (including Mendelsohn maneuver, hard /k/ words, effortful swallow/re-swallow, supraglottic swallow, etc, and supra supraglottic swallow, as tolerated/medically cleared x15 reps with min in order to improve the strength/ agility/efficiency of her swallow to decrease laryngeal stasis for improved swallowing safety and QOL.   Goal met: patient is completing >15 reps of oropharyngeal strengthening exercises with min cues provided overall. Effortful swallows x25 with po trials thin liquids   Super supraglottic swallow  x12   Modified shaker x20 10 second holds; x6 60 second holds   Laryngeal closure exercises x20   - advance goal     2. Patient will recall/demonstrate aspiration precautions and safe swallowing strategies with 100% acc when provided with Jacquelyn (small sips/bites; 2-3 re-swallows, alternate liquids/solids; slow rate; oral care 2-3x daily; Sit upright at 90 degree angle during po trials and for 30 minute post po intake) to decrease the reported incidences of dysphagia and s/sx of aspiration with Soft solids +nectar thick liquids. Goal met: tolerated 100% of mildly thick liquids across multiple sessions, however SLP cannot completely rule out silent aspiration/penetration events. Inconsistent tolerance of soft solids in sessions this reporting period, however chin tuck appeared to improve airway protection. - modify goal d/t recent diet advancement      3.  Patient will recall/ demonstrate elements warranted to participate in Crabtree Free Water Protocol with  aggressive oral care in order to implement in conjunction with restorative swallowing exercises to reduce c/o xerostomia and improve QOL. Goal met, patient tolerated 100% of trials of thin liquids x1 session  - d/c goal         Goals: to be achieved in 4 weeks:  1.  Patient will complete tongue base retraction/laryngeal/pharyngeal strengthening exercises (including Hanane maneuver,  modified Shaker with CTAR ball,  effortful swallow, supraglottic swallow, Mendelsohn maneuver, super supraglottic, etc) as appropriate >20 repetitions with S  in 4/4 sessions in order to improve the strength/ agility/efficiency of swallow to decrease risk of aspiration/penetration and  improved swallowing safety and QOL. 2. Patient will recall/demonstrate aspiration precautions and safe swallowing strategies with 100% accuracy with S in 4/4 sessions (intermittent throat clearing, multiple swallows, small sips/bites; alternating bits; slow rate; oral care 2-3x daily; Sit upright at 90 degree angle during po trials and for 30 minute post po intake)  to decrease the occurrence of dysphagia and s/sx of aspiration/penetration. 3. Patient will tolerate trials of least restrictive diet (regular diet with thin liquids) using double swallow and compensatory strategies without s/sx of aspiration/ penetration in 100% of trials when provided with Jacquelyn in 4/4 sessions to decrease the occurrence of dysphagia and s/sx of aspiration/penetration.        ASSESSMENT:   Updated MBS completed 7/22/2020 yielding the following results:   ASSESSMENT :  Based on the objective data described below, the patient presents with mild-mod pharyngeal dysphagia, improved since last MBS on 5/18/20. Pt reports consuming nectar thick liquids and working with outpatient SLP. Pt reporting increased comfort with nectar thick liquids (cough with thin liquids and with throat spray). Pt demonstrating laryngeal penetration with nectar thick liquids + straw and with pudding during the swallow that she was able to clear independently with strong throat clear. Pt tolerating small sips of thin liquid +/- straw, NTL by cup, pudding and regular solids with no airway compromise noted across trials. Unable to clear 13 mm Ba pill from oral cavity with liquid wash.   Deficits include decreased laryngeal closure with slowed epiglottic inversion, decreased pharyngeal motility with mildly decreased clearance of the posterior pharyngeal wall exacerbated by cervical hardware at the level of C4-C5. Mild-mod pharyngeal residuals noted post swallow that pt was able to clear with double swallow. Pt safe for regular diet with thin liquids with strict use of small bites/sips, slow rate of intake and intermittent throat clearing during meals to clear potential aspirate (ok to continue NTL if pt prefers for increased comfort). Results/recommendations discussed with pt who was able to verbalize/demonstrate understanding. Would continue to benefit from outpatient SLP to address deficits. PLAN:  Recommendations:  Regular diet with thin liquids  Meds per pt preference    Small bites/sips, Slow rate of intake   HOB >45 during po intake, remain >30 for 30-45 minutes after po   Intermittent throat clearing during meals   Oral care TID  Continue OP SLP to address dysphagia        Per MBS findings, diet advancement to regular solids with thin liquids. Patient has demonstrated significant gains in oropharyngeal function. She has met all her current STG goals. Significant increase in strength and endurance when completing oropharyngeal strengthening exercises. Increased tolerance of po trials noted in recent with application of compensatory techniques to improve safety. SLP cannot completely rule out silent aspiration/penetration events. Patient reports significant improvement in comfort during meals and reduction in s/sx of aspiration. She appears compliant with HEP and motivated during treatment sessions. Patient is making functional gains in treatment for dysphagia. Patient would benefit from continued skilled speech therapy services to further improve oropharyngeal efficiency, safety, and QOL.      ASSESSMENT/RECOMMENDATIONS:  [x]Continue therapy per initial plan/protocol at a frequency of  1-2 x per week for 4 weeks  []Continue therapy with the following recommended changes:_____________________ _____________________________________________________________________  []Discontinue therapy progressing towards or have reached established goals  []Discontinue therapy due to lack of appreciable progress towards goals  []Discontinue therapy due to lack of attendance or compliance  []Await Physician's recommendations/decisions regarding therapy  []Other:________________________________________________________________    Thank you for this referral.   Geeta Pink SLP 7/30/2020 3:51 PM  MS CCC-SLP  Speech-Language Pathologist       NOTE TO PHYSICIAN:  [address]   FAX TO [facility] [place] [facility]: [phone]  If you are unable to process this request in 24 hours please contact our office: [phone]    []  I have read the above report and request that my patient continue as recommended. []  I have read the above report and request that my patient continue therapy with the following changes/special instructions:________________________________________  []I have read the above report and request that my patient be discharged from therapy.     [de-identified] Signature:____________Date:_________TIME:________    [employer], Date and Time must be completed for valid certification **

## 2020-07-30 NOTE — PROGRESS NOTES
ST DAILY TREATMENT NOTE    Patient Name: Yessica Restrepo  Date:2020  : 1970  [x]  Patient  Verified  Payor: Payor: BLUE CROSS / Plan: [facility] / Product Type: PPO /   In time:3:36  Out time:4:13  Total Treatment Time (min): 37  Visit #: 7 of 8    Treatment Diagnosis: Dysphagia, unspecified [R13.10]    SUBJECTIVE  Pain Level (0-10 scale): 0  Any medication changes, allergies to medications, adverse drug reactions, diagnosis change, or new procedure performed?: [] No    [x] Yes (see summary sheet for update) Modified Barium Swallow completed 2020     Subjective functional status/changes:   [] No changes reported  Patient reports swallowing is going well. OBJECTIVE  Treatment provided includes:  Increase/Improve:  []  Voice Quality []  Cognitive Linguistic Skills [x]  Laryngeal/Pharyngeal Exercises   []  Vocal Loudness []  Reading Comprehension [x]  Swallowing Skills    []  Vocal Cord Function []  Auditory Comprehension []  Oral Motor Skills   []  Resonance []  Writing Skills [x]  Compensatory strategies    []  Speech Intelligibility []  Expressive Language []  Attention   []  Breath Support/Coord. []  Receptive language []  Memory   []  Articulation []  Safety Awareness []    []  Fluency []  Word Retrieval []        Treatment Provided:  - oropharyngeal strengthening exercises  - patient education regarding MBS results   - trials of thin liquids with application of compensatory strategies to increase safety     Patient/Caregiver  Education: [x] Review HEP      HEP/Handouts given: continue HEP established     Pain Level (0-10 scale) post treatment: 0    ASSESSMENT   Pt continues to demonstrate increased understanding and independence with oropharyngeal strengthening exercises. Pt utilizing compensatory and safe swallow strategies with increased independence.     []   Improving appropriately and progressing toward goals  [x]   Improving slowly and progressing toward goals  [] Approximating goals/maximum potential   [x]   Continues to benefit from skilled therapy to address remaining functional deficits  []   Not progressing toward goals and plan of care will be adjusted    Patient will continue to benefit from skilled therapy to address remaining functional deficits: dysphagia     Progress towards goals / Updated goals:  1. Patient will complete laryngeal/pharyngeal strengthening exercises (including Mendelsohn maneuver, hard /k/ words, effortful swallow/re-swallow, supraglottic swallow, etc, and supra supraglottic swallow, as tolerated/medically cleared x15 reps with min in order to improve the strength/ agility/efficiency of her swallow to decrease laryngeal stasis for improved swallowing safety and QOL.    7/30/2020, progressing:   Effortful swallows x25 with po trials thin liquids   Super supraglottic swallow  x12   Modified shaker x20 10 second holds; x6 60 second holds   Laryngeal closure exercises x20      2. Patient will recall/demonstrate aspiration precautions and safe swallowing strategies with 100% acc when provided with Jacquelyn (small sips/bites; 2-3 re-swallows, alternate liquids/solids; slow rate; oral care 2-3x daily; Sit upright at 90 degree angle during po trials and for 30 minute post po intake) to decrease the reported incidences of dysphagia and s/sx of aspiration with Soft solids +nectar thick liquids.   7/30/2020, tolerated 100% of thin liquids, however SLP cannot completely rule out silent aspiration/penetration events      3.  Patient will recall/ demonstrate elements warranted to participate in Crabtree Free Water Protocol with  aggressive oral care in order to implement in conjunction with restorative swallowing exercises to reduce c/o xerostomia and improve QOL. 7/30/2020, tolerated 100% of trials of thin liquids    PLAN  [x]  Continue plan of care  []  Modify Goals/Treatment Plan      []  Discharge due to:  [] Other:     Jayla Cardenas 7/30/2020  3:35 PM    Future Appointments   Date Time Provider Christi Willis   9/1/2020  8:00 AM Jackie Perez  [address]   12/15/2020 10:30 AM [facility] 1 [facility]

## 2020-08-04 ENCOUNTER — APPOINTMENT (OUTPATIENT)
Dept: PHYSICAL THERAPY | Age: 50
End: 2020-08-04
Payer: COMMERCIAL

## 2020-08-06 ENCOUNTER — HOSPITAL ENCOUNTER (OUTPATIENT)
Dept: PHYSICAL THERAPY | Age: 50
Discharge: HOME | End: 2020-08-06
Payer: COMMERCIAL

## 2020-08-06 PROCEDURE — 92526 ORAL FUNCTION THERAPY: CPT

## 2020-08-06 NOTE — PROGRESS NOTES
ST DAILY TREATMENT NOTE    Patient Name: Trina Newsome  Date:2020  : 1970  [x]  Patient  Verified   Payor: Payor: BLUE CROSS / Plan: [employer] [facility] [place] / Product Type: PPO /   In time:4:36  Out time:5:00  Total Treatment Time (min): 24  Visit #: 1 of 4    Treatment Diagnosis: Dysphagia, unspecified [R13.10]    SUBJECTIVE  Pain Level (0-10 scale): 0  Any medication changes, allergies to medications, adverse drug reactions, diagnosis change, or new procedure performed?: [x] No    [] Yes (see summary sheet for update)    Subjective functional status/changes:   [] No changes reported  Patient reports she is doing well. Patient brought thickened liquids. Patient has to leave early this date due to a conflicting appointment. OBJECTIVE  Treatment provided includes:  Increase/Improve:  []  Voice Quality []  Cognitive Linguistic Skills [x]  Laryngeal/Pharyngeal Exercises   []  Vocal Loudness []  Reading Comprehension [x]  Swallowing Skills    []  Vocal Cord Function []  Auditory Comprehension []  Oral Motor Skills   []  Resonance []  Writing Skills [x]  Compensatory strategies    []  Speech Intelligibility []  Expressive Language []  Attention   []  Breath Support/Coord. []  Receptive language []  Memory   []  Articulation []  Safety Awareness []    []  Fluency []  Word Retrieval []        Treatment Provided:  - oropharyngeal strengthening exercises  - po trials nectar (mildly) thick liquids   - patient education     Patient/Caregiver  Education: [x] Review HEP      HEP/Handouts given: continue HEP established     Pain Level (0-10 scale) post treatment: 0    ASSESSMENT   Session ended early because patient had a conflicting appointment. Increased independence and accuracy across oropharyngeal strengthening exercises. Patient reported she likes to drink thickened liquids because it makes her feel safer.  Po trials presented this date were brought by pt.   []   Improving appropriately and progressing toward goals  [x]   Improving slowly and progressing toward goals  []   Approximating goals/maximum potential  [x]   Continues to benefit from skilled therapy to address remaining functional deficits  []   Not progressing toward goals and plan of care will be adjusted    Patient will continue to benefit from skilled therapy to address remaining functional deficits: dysphagia     Progress towards goals / Updated goals:  1. Patient will complete tongue base retraction/laryngeal/pharyngeal strengthening exercises (including Hanane maneuver,  modified Shaker with CTAR ball,  effortful swallow, supraglottic swallow, Mendelsohn maneuver, super supraglottic, etc) as appropriate >20 repetitions with S  in 4/4 sessions in order to improve the strength/ agility/efficiency of swallow to decrease risk of aspiration/penetration and  improved swallowing safety and QOL. 8/6/2020, progressing:   Effortful swallows x25 with po trials thickened liquids with S   Modified shaker with CTAR ball x5 60 second holds   Laryngeal closure exercises x25 with S       2. Patient will recall/demonstrate aspiration precautions and safe swallowing strategies with 100% accuracy with S in 4/4 sessions (intermittent throat clearing, multiple swallows, small sips/bites; alternating bits; slow rate; oral care 2-3x daily; Sit upright at 90 degree angle during po trials and for 30 minute post po intake)  to decrease the occurrence of dysphagia and s/sx of aspiration/penetration. 8/6/2020, tolerated 90% of nectar thick liquids     3.  Patient will tolerate trials of least restrictive diet (regular diet with thin liquids) using double swallow and compensatory strategies without s/sx of aspiration/ penetration in 100% of trials when provided with Jacquelyn in 4/4 sessions to decrease the occurrence of dysphagia and s/sx of aspiration/penetration.    8/6/2020, tolerated 90% of nectar thick liquids; delayed throat clear x1, SLP cannot rule out silent aspiration PLAN  [x]  Continue plan of care  []  Modify Goals/Treatment Plan      []  Discharge due to:  [] Other:     Errol Elizabeth 8/6/2020  1:57 PM    Future Appointments   Date Time Provider Christi Willis   8/6/2020  4:30 PM Leela Abdi [facility] [address]   8/11/2020  8:15 AM KANNAN Downing [id number] [address]   8/19/2020  3:00 PM KANNAN Downing [id number] [address]   8/24/2020  9:45 AM Duane Simper, SLP [id number] [address]   9/1/2020  8:00 AM Leesa Hernandez  [address]   12/15/2020 10:30 AM [facility] MRI RM 1 [facility]

## 2020-08-11 ENCOUNTER — HOSPITAL ENCOUNTER (OUTPATIENT)
Dept: PHYSICAL THERAPY | Age: 50
Discharge: HOME | End: 2020-08-11
Payer: COMMERCIAL

## 2020-08-11 PROCEDURE — 92526 ORAL FUNCTION THERAPY: CPT

## 2020-08-11 NOTE — PROGRESS NOTES
ST DAILY TREATMENT NOTE    Patient Name: Herman Osgood  Date:2020  : 1970  [x]  Patient  Verified  Payor: Payor: BLUE CROSS / Plan: [employer] [place] [address] [place] / Product Type: PPO /   In time:8:19  Out time:8:57  Total Treatment Time (min): 38  Visit #: 2 of 4    Treatment Diagnosis: Dysphagia, unspecified [R13.10]    SUBJECTIVE  Pain Level (0-10 scale): 0  Any medication changes, allergies to medications, adverse drug reactions, diagnosis change, or new procedure performed?: [x] No    [] Yes (see summary sheet for update)    Subjective functional status/changes:   [] No changes reported  Pt reports she has been drinking thin liquids more frequently and is more comfortable with them. Pt reports no new foods causing irritation when eating. OBJECTIVE  Treatment provided includes:  Increase/Improve:  []  Voice Quality []  Cognitive Linguistic Skills [x]  Laryngeal/Pharyngeal Exercises   []  Vocal Loudness []  Reading Comprehension [x]  Swallowing Skills    []  Vocal Cord Function []  Auditory Comprehension []  Oral Motor Skills   []  Resonance []  Writing Skills [x]  Compensatory strategies    []  Speech Intelligibility []  Expressive Language []  Attention   []  Breath Support/Coord. []  Receptive language []  Memory   []  Articulation []  Safety Awareness []    []  Fluency []  Word Retrieval []        Treatment Provided:  - oropharyngeal strengthening exercises  - po trials thin liquids    - patient education     Patient/Caregiver  Education: [x] Review HEP      HEP/Handouts given: continue HEP established, bring snack for next session    Pain Level (0-10 scale) post treatment: 0    ASSESSMENT   Continued increased precision and endurance during oropharyngeal strengthening exercises this date. Patient tolerated all PO trials presented without overt s/sx of aspiration/penetration, however SLP cannot rule out silent aspiration.     []   Improving appropriately and progressing toward goals  [x] Improving slowly and progressing toward goals  []   Approximating goals/maximum potential  [x]   Continues to benefit from skilled therapy to address remaining functional deficits  []   Not progressing toward goals and plan of care will be adjusted    Patient will continue to benefit from skilled therapy to address remaining functional deficits: dysphagia     Progress towards goals / Updated goals:  1. Patient will complete tongue base retraction/laryngeal/pharyngeal strengthening exercises (including Hanane maneuver,  modified Shaker with CTAR ball,  effortful swallow, supraglottic swallow, Mendelsohn maneuver, super supraglottic, etc) as appropriate >20 repetitions with S  in 4/4 sessions in order to improve the strength/ agility/efficiency of swallow to decrease risk of aspiration/penetration and  improved swallowing safety and QOL. 8/11/2020, progressing:   Effortful swallows x25 with po trials thin liquids with S    Hanane maneuver 2 sets x20 Jacquelyn; x15 with S   Modified shaker with CTAR ball x6 60 second holds, x20 10 second holds   Super supraglottic swallows x15 with Jacquelyn    Laryngeal closure exercises 2 sets x25 5 second holds       2. Patient will recall/demonstrate aspiration precautions and safe swallowing strategies with 100% accuracy with S in 4/4 sessions (intermittent throat clearing, multiple swallows, small sips/bites; alternating bits; slow rate; oral care 2-3x daily; Sit upright at 90 degree angle during po trials and for 30 minute post po intake)  to decrease the occurrence of dysphagia and s/sx of aspiration/penetration. 8/11/2020, progressing: pt tolerated 100% of thin liquids     3.  Patient will tolerate trials of least restrictive diet (regular diet with thin liquids) using double swallow and compensatory strategies without s/sx of aspiration/ penetration in 100% of trials when provided with Jacquelyn in 4/4 sessions to decrease the occurrence of dysphagia and s/sx of aspiration/penetration.   8/11/2020, progressing: tolerated 100% of thin liquids without overt s/sx of aspiration, however SLP cannot rule out silent aspiration/penetration events     PLAN  [x]  Continue plan of care  []  Modify Goals/Treatment Plan       []  Discharge due to:  [] Other:     Desireerenny Duque 8/11/2020  7:54 AM    Future Appointments   Date Time Provider Christi Willis   8/11/2020  8:15 AM KANNAN Veliz [id number] [facility]   8/19/2020  3:00 PM KANNAN Veliz TCAUSIA [facility]   8/24/2020  9:45 AM KANNAN Carbajal [id number]   9/1/2020  8:00 AM Mauricio Squires  [address]   12/15/2020 10:30 AM [facility] MRI RM 1 [facility]

## 2020-08-19 ENCOUNTER — HOSPITAL ENCOUNTER (OUTPATIENT)
Dept: PHYSICAL THERAPY | Age: 50
Discharge: HOME | End: 2020-08-19
Payer: COMMERCIAL

## 2020-08-19 PROCEDURE — 92526 ORAL FUNCTION THERAPY: CPT

## 2020-08-19 NOTE — PROGRESS NOTES
ST DAILY TREATMENT NOTE    Patient Name: Rosalba Kwong  Date:2020  : 1970  [x]  Patient  Verified  Payor: Payor: BLUE CROSS / Plan: [employer] [facility] [place] / Product Type: PPO /   In time:3:07  Out time:3:46  Total Treatment Time (min): 39  Visit #: 3 of 4    Treatment Diagnosis: Dysphagia, unspecified [R13.10]    SUBJECTIVE  Pain Level (0-10 scale): 0  Any medication changes, allergies to medications, adverse drug reactions, diagnosis change, or new procedure performed?: [x] No    [] Yes (see summary sheet for update)    Subjective functional status/changes:   [] No changes reported  Patient reports she has been waking up in the middle of the night coughing the past x3 nights, education provided regarding reflux and positioning. Patient brought pudding to session this date. OBJECTIVE  Treatment provided includes:  Increase/Improve:  []  Voice Quality []  Cognitive Linguistic Skills [x]  Laryngeal/Pharyngeal Exercises   []  Vocal Loudness []  Reading Comprehension [x]  Swallowing Skills    []  Vocal Cord Function []  Auditory Comprehension []  Oral Motor Skills   []  Resonance []  Writing Skills [x]  Compensatory strategies    []  Speech Intelligibility []  Expressive Language []  Attention   []  Breath Support/Coord. []  Receptive language []  Memory   []  Articulation []  Safety Awareness []    []  Fluency []  Word Retrieval []        Treatment Provided:  - oropharyngeal strengthening exercises   - patient education   - po trials: thin liquids, pudding thick, regular solids     Patient/Caregiver  Education: [x] Review HEP      HEP/Handouts given: continue HEP established     Pain Level (0-10 scale) post treatment: 0    ASSESSMENT   See progress note. Cues for bolus size and slow rate improved accuracy across po trials.   []   Improving appropriately and progressing toward goals  [x]   Improving slowly and progressing toward goals  []   Approximating goals/maximum potential  [x]   Continues to benefit from skilled therapy to address remaining functional deficits  []   Not progressing toward goals and plan of care will be adjusted  Patient will continue to benefit from skilled therapy to address remaining functional deficits: dysphagia      Progress towards goals / Updated goals:  1. Patient will complete tongue base retraction/laryngeal/pharyngeal strengthening exercises (including Hanane maneuver,  modified Shaker with CTAR ball,  effortful swallow, supraglottic swallow, Mendelsohn maneuver, super supraglottic, etc) as appropriate >20 repetitions with S  in 4/4 sessions in order to improve the strength/ agility/efficiency of swallow to decrease risk of aspiration/penetration and  improved swallowing safety and QOL.   8/19/2020, progressing:   Effortful swallows x20 with po trials thin liquids with S     Hanane maneuver 2 sets x20 with S    Modified shaker with CTAR ball x20 15 second holds - increased reps  [employer] x20  with 3-5 second holds with Jacquelyn       2. Patient will recall/demonstrate aspiration precautions and safe swallowing strategies with 100% accuracy with S in 4/4 sessions (intermittent throat clearing, multiple swallows, small sips/bites; alternating bits; slow rate; oral care 2-3x daily; Sit upright at 90 degree angle during po trials and for 30 minute post po intake)  to decrease the occurrence of dysphagia and s/sx of aspiration/penetration. 8/19/2020, 90% acc, min cues for bite size and slow rate, increased tolerance across consistencies       3.  Patient will tolerate trials of least restrictive diet (regular diet with thin liquids) using double swallow and compensatory strategies without s/sx of aspiration/ penetration in 100% of trials when provided with Jacquelyn in 4/4 sessions to decrease the occurrence of dysphagia and s/sx of aspiration/penetration.   8/19/2020,   Tolerated 13/14 trials of pudding without overt s/sx of aspiration, however SLP cannot rule out silent aspiration/penetration events  Tolerated 3/7 trials of regular solids - delayed throat clears noted x4 bites, throat clears began after x1 large bite with immediate cough/throat clear, irritation from this event could have impacted future po trials. SLP cannot rule out silent aspiration/penetration events. Tolerated 14/15 thin liquids, irritation likely residual from irritation on regular solid po trials. SLP cannot rule out silent aspiration/penetration events.      PLAN   [x]  Continue plan of care  []  Modify Goals/Treatment Plan      []  Discharge due to:  [] Other:     Matt Music 8/19/2020  2:03 PM    Future Appointments   Date Time Provider Christi Willis   8/19/2020  3:00 PM KANNAN Abel [id number] [facility]   8/24/2020  9:45 AM KANNAN Hugo [id number] [facility]   9/1/2020  8:00 AM Constantino Coronado  [address]    12/15/2020 10:30 AM [facility] MRI RM 1 [facility]

## 2020-08-19 NOTE — PROGRESS NOTES
[facility]  [place] [facility] [facility]  [address] [place] [address]  [phone] fax    Speech Therapy Physician Update  [x]? Progress Note                    []? Discharge Summary  Patient name: Alexandra Bautista Start of Care: 2020   Referral source: Bon Rodriguez MD : 1970               Medical Diagnosis: Dysphagia, unspecified [R13.10]  Payor: BLUE CROSS / [facility] / Product Type: PPO /  Onset Date:3/2020               Treatment Diagnosis: R13.12 oropharyngeal dysphagia   Prior Hospitalization: see medical history Provider#: [id number]   Medications: Verified on Patient summary List   Comorbidities: anosmia, etd, tmj, Skin Ca, sleep apnea, epistaxia, s/p tonsillectomy and ACDF  Prior Level of Function: Speech/language, swallowing, and cognitive function WNLs.                 Visits from Start of Care: 17                       Missed Visits: 0    Status at Evaluation/Last Progress Note:   1. Patient will complete laryngeal/pharyngeal strengthening exercises (including Mendelsohn maneuver, hard /k/ words, effortful swallow/re-swallow, supraglottic swallow, etc, and supra supraglottic swallow, as tolerated/medically cleared x15 reps with min in order to improve the strength/ agility/efficiency of her swallow to decrease laryngeal stasis for improved swallowing safety and QOL.   Goal met: patient is completing >15 reps of oropharyngeal strengthening exercises with min cues provided overall. Effortful swallows x25 with po trials thin liquids   Super supraglottic swallow  x12   Modified shaker x20 10 second holds; x6 60 second holds   Laryngeal closure exercises x20   - advance goal   2. Patient will recall/demonstrate aspiration precautions and safe swallowing strategies with 100% acc when provided with Jacquelyn (small sips/bites; 2-3 re-swallows, alternate liquids/solids; slow rate; oral care 2-3x daily;  Sit upright at 90 degree angle during po trials and for 30 minute post po intake) to decrease the reported incidences of dysphagia and s/sx of aspiration with Soft solids +nectar thick liquids. Goal met: tolerated 100% of mildly thick liquids across multiple sessions, however SLP cannot completely rule out silent aspiration/penetration events. Inconsistent tolerance of soft solids in sessions this reporting period, however chin tuck appeared to improve airway protection. - modify goal d/t recent diet advancement   3.  Patient will recall/ demonstrate elements warranted to participate in [place] Free Water Protocol with  aggressive oral care in order to implement in conjunction with restorative swallowing exercises to reduce c/o xerostomia and improve QOL.   Goal met, patient tolerated 100% of trials of thin liquids x1 session  - d/c goal    ASSESSMENT: (previous progress note)  Updated MBS completed 7/22/2020 yielding the following results:   ASSESSMENT :  Based on the objective data described below, the patient presents with mild-mod pharyngeal dysphagia, improved since last MBS on 5/18/20.  Pt reports consuming nectar thick liquids and working with outpatient SLP.  Pt reporting increased comfort with nectar thick liquids (cough with thin liquids and with throat spray).  Pt demonstrating laryngeal penetration with nectar thick liquids + straw and with pudding during the swallow that she was able to clear independently with strong throat clear.  Pt tolerating small sips of thin liquid +/- straw, NTL by cup, pudding and regular solids with no airway compromise noted across trials.  Unable to clear 13 mm Ba pill from oral cavity with liquid wash.  Deficits include decreased laryngeal closure with slowed epiglottic inversion, decreased pharyngeal motility with mildly decreased clearance of the posterior pharyngeal wall exacerbated by cervical hardware at the level of C4-C5.  Mild-mod pharyngeal residuals noted post swallow that pt was able to clear with double swallow.  Pt safe for regular diet with thin liquids with strict use of small bites/sips, slow rate of intake and intermittent throat clearing during meals to clear potential aspirate (ok to continue NTL if pt prefers for increased comfort).  Results/recommendations discussed with pt who was able to verbalize/demonstrate understanding.  Would continue to benefit from outpatient SLP to address deficits.        PLAN:  Recommendations:  Regular diet with thin liquids  Meds per pt preference    Small bites/sips, Slow rate of intake   HOB >45 during po intake, remain >30 for 30-45 minutes after po   Intermittent throat clearing during meals   Oral care TID  Continue OP SLP to address dysphagia       Per MBS findings, diet advancement to regular solids with thin liquids. Patient has demonstrated significant gains in oropharyngeal function. She has met all her current STG goals. Significant increase in strength and endurance when completing oropharyngeal strengthening exercises. Increased tolerance of po trials noted in recent with application of compensatory techniques to improve safety. SLP cannot completely rule out silent aspiration/penetration events. Patient reports significant improvement in comfort during meals and reduction in s/sx of aspiration. She appears compliant with HEP and motivated during treatment sessions. Patient is making functional gains in treatment for dysphagia. Patient would benefit from continued skilled speech therapy services to further improve oropharyngeal efficiency, safety, and QOL.       Progress towards Goals:   1. Patient will complete tongue base retraction/laryngeal/pharyngeal strengthening exercises (including Hanane maneuver,  modified Shaker with CTAR ball,  effortful swallow, supraglottic swallow, Mendelsohn maneuver, super supraglottic, etc) as appropriate >20 repetitions with S  in 4/4 sessions in order to improve the strength/ agility/efficiency of swallow to decrease risk of aspiration/penetration and  improved swallowing safety and QOL.   Not met, approaching goal:    Effortful swallows x20 with po trials thin liquids with S     Hanane maneuver 2 sets x20 with S   Modified shaker with CTAR ball x20 15 second holds - increased reps  [employer] x20  with 3-5 second holds with Jacquelyn  -  decreased level of cueing, increased precision   2. Patient will recall/demonstrate aspiration precautions and safe swallowing strategies with 100% accuracy with S in 4/4 sessions (intermittent throat clearing, multiple swallows, small sips/bites; alternating bits; slow rate; oral care 2-3x daily; Sit upright at 90 degree angle during po trials and for 30 minute post po intake)  to decrease the occurrence of dysphagia and s/sx of aspiration/penetration. Not met, approaching goal: 90% acc, min cues for bite size and slow rate, increased tolerance across consistencies and pt responded well to verbal cues   3. Patient will tolerate trials of least restrictive diet (regular diet with thin liquids) using double swallow and compensatory strategies without s/sx of aspiration/ penetration in 100% of trials when provided with Jacquelyn in 4/4 sessions to decrease the occurrence of dysphagia and s/sx of aspiration/penetration.   Not met, progressing: Tolerated 13/14 trials of pudding without overt s/sx of aspiration, however SLP cannot rule out silent aspiration/penetration events  Tolerated 3/7 trials of regular solids - delayed throat clears noted x4 bites, throat clears began after x1 large bite with immediate cough/throat clear, irritation from this event could have impacted future po trials. SLP cannot rule out silent aspiration/penetration events. Tolerated 14/15 thin liquids, irritation may be residual from irritation on regular solid po trials. SLP cannot rule out silent aspiration/penetration events.      Goals: to be achieved in 4 weeks:  1. Patient will complete tongue base retraction/laryngeal/pharyngeal strengthening exercises (including Hanane maneuver,  modified Shaker with CTAR ball,  effortful swallow, supraglottic swallow, Mendelsohn maneuver, super supraglottic, etc) as appropriate >20 repetitions with S  in 4/4 sessions in order to improve the strength/ agility/efficiency of swallow to decrease risk of aspiration/penetration and  improved swallowing safety and QOL   2. Patient will recall/demonstrate aspiration precautions and safe swallowing strategies with 100% accuracy with S in 4/4 sessions (intermittent throat clearing, multiple swallows, small sips/bites; alternating bits; slow rate; oral care 2-3x daily; Sit upright at 90 degree angle during po trials and for 30 minute post po intake)  to decrease the occurrence of dysphagia and s/sx of aspiration/penetration. 3. Patient will tolerate trials of least restrictive diet (regular diet with thin liquids) using double swallow and compensatory strategies without s/sx of aspiration/ penetration in 100% of trials when provided with Jacquelyn in 4/4 sessions to decrease the occurrence of dysphagia and s/sx of aspiration/penetration.     ASSESSMENT:   Patient continues to demonstrate significant gains in treatment for dysphagia. She reports improved tolerance and comfort with various textures including meat and bread (previously challenging) with mild modifications to texture on occasion (ie adding moisture). She reports increased consumption of thin liquids, however continues to prefer slightly thickened liquids primarily. Pt appears motivated and compliant with HEP consistently. She continues to improve in oropharyngeal strength, endurance, and precision. Cues required across po trials to slow rate and decrease bolus size.  Patient reports s/sx of aspiration including coughing up food occasionally during each meal. Recommend continuation of skilled therapy as it is medically necessary to decrease risk of aspiration and further improve oropharyngeal efficiency and safety/independence with oral intake. ASSESSMENT/RECOMMENDATIONS:  [x]Continue therapy per initial plan/protocol at a frequency of  1-2 x per week for 4 weeks  []Continue therapy with the following recommended changes:  []Discontinue therapy progressing towards or have reached established goals   []Discontinue therapy due to lack of appreciable progress towards goals  []Discontinue therapy due to lack of attendance or compliance  []Await Physician's recommendations/decisions regarding therapy  []Other:________________________________________________________________    Thank you for this referral.   Cesar Mckenzie, SLP 8/19/2020 1:43 PM  MS CCC-SLP  Speech-Language Pathologist      NOTE TO PHYSICIAN:  [address]   FAX TO [facility]: [phone]  If you are unable to process this request in 24 hours please contact our office: [phone]    []  I have read the above report and request that my patient continue as recommended. []  I have read the above report and request that my patient continue therapy with the following changes/special instructions:________________________________________  []I have read the above report and request that my patient be discharged from therapy.     [de-identified] Signature:____________Date:_________TIME:________    [employer], Date and Time must be completed for valid certification **

## 2020-08-24 ENCOUNTER — HOSPITAL ENCOUNTER (OUTPATIENT)
Dept: PHYSICAL THERAPY | Age: 50
Discharge: HOME | End: 2020-08-24
Payer: COMMERCIAL

## 2020-08-24 PROCEDURE — 92526 ORAL FUNCTION THERAPY: CPT

## 2020-08-24 NOTE — PROGRESS NOTES
ST DAILY TREATMENT NOTE    Patient Name: Chris Dias  Date:2020  : 1970  [x]  Patient  Verified  Payor: Payor: BLUE CROSS / Plan: [address] [place] [address] [place] / Product Type: PPO /   In time: 9: 48 Out time: 10:30  Total Treatment Time (min): 40   Visit #: 4 of 4    Treatment Diagnosis: Dysphagia, unspecified [R13.10]    SUBJECTIVE  Pain Level (0-10 scale): back pain 1  Any medication changes, allergies to medications, adverse drug reactions, diagnosis change, or new procedure performed?: [] No    [x] Yes (see summary sheet for update)   Flaygel for bacterials infection:  x10 days for bacterial infection. Subjective functional status/changes:   [] No changes reported  Pt reports tolerating thin liquids much more than last few weeks; she reports rarely using nectar thick liquids. Pt reports she had her first salad yesterday  that didn't     OBJECTIVE  Treatment provided includes:  Increase/Improve:  []  Voice Quality []  Cognitive Linguistic Skills [x]  Laryngeal/Pharyngeal Exercises   []  Vocal Loudness []  Reading Comprehension [x]  Swallowing Skills    []  Vocal Cord Function []  Auditory Comprehension []  Oral Motor Skills   []  Resonance []  Writing Skills [x]  Compensatory strategies    []  Speech Intelligibility []  Expressive Language []  Attention   []  Breath Support/Coord.  []  Receptive language []  Memory   []  Articulation [x]  Safety Awareness []    []  Fluency []  Word Retrieval [x] pt education        Treatment Provided:  -laryngeal exercises  Po trials with comp strategies  -pt education     Patient/Caregiver  Education: [x] Review HEP      HEP/Handouts given: Continue HEP    Pain Level (0-10 scale) post treatment: back 1    ASSESSMENT     []   Improving appropriately and progressing toward goals  [x]   Improving slowly and progressing toward goals  []   Approximating goals/maximum potential  [x]   Continues to benefit from skilled therapy to address remaining functional deficits  []   Not progressing toward goals and plan of care will be adjusted    Patient will continue to benefit from skilled therapy to address remaining functional deficits: dysphagia    Progress towards goals / Updated goals:  1. Patient will complete tongue base retraction/laryngeal/pharyngeal strengthening exercises (including Hanane maneuver,  modified Shaker with CTAR ball,  effortful swallow, supraglottic swallow, Mendelsohn maneuver, super supraglottic, etc) as appropriate >20 repetitions with S  in 4/4 sessions in order to improve the strength/ agility/efficiency of swallow to decrease risk of aspiration/penetration and  improved swallowing safety and QOL   Current 8/24/2020: 1)Mendelsohn maneuver x25 reps, holding 5-7 seconds jimy- SLP recommended attempting to hold up to 10 seconds   2)CTAR: x3 reps holding for 1 min and then x30 reps holding 3-5 seconds S- pt denies any pain/ discomfort      2. Patient will recall/demonstrate aspiration precautions and safe swallowing strategies with 100% accuracy with S in 4/4 sessions (intermittent throat clearing, multiple swallows, small sips/bites; alternating bits; slow rate; oral care 2-3x daily; Sit upright at 90 degree angle during po trials and for 30 minute post po intake)  to decrease the occurrence of dysphagia and s/sx of aspiration/penetration. Current 8/24/2020: pt able to recall comp strategies with mod I; demo'd with 80% acc given; Min skilled instructions/reminders given. 3. Patient will tolerate trials of least restrictive diet (regular diet with thin liquids) using double swallow and compensatory strategies without s/sx of aspiration/ penetration in 100% of trials when provided with Jacquelyn in 4/4 sessions to decrease the occurrence of dysphagia and s/sx of aspiration/penetration.   Current 8/24/2020:  Thin liquids: 90% tolerance lilly; throat clear x2 with no change in VQ  Regular solids (peanut butter crackers): 75% acc tolerance lilly; x1 cough and x2 throat clears when reported globus  Min verbal cues to slow rate, increase use of double swallow prior to initiating throat clear, not speak with food in mouth , controlled/small bite/sip size.      PLAN  [x]  Continue plan of care  []  Modify Goals/Treatment Plan      []  Discharge due to:  [] Other:    KANNAN Stout 8/24/2020  10:01 AM  MA, CCC-SLP  Speech-Language Pathologist    Future Appointments   Date Time Provider Christi Alba   8/24/2020  9:45 AM KANNAN Vasquez [facility]   9/1/2020  8:00 AM Fausto Mello  [address]   9/1/2020  3:45 PM KANNAN Hough [facility]   9/9/2020  3:45 PM KANNAN Hough [facility]   9/15/2020  3:45 PM KANNAN Carrington [facility]   12/15/2020 10:30 AM [facility] MRI RM 1 [facility]

## 2020-09-01 ENCOUNTER — OFFICE VISIT (OUTPATIENT)
Dept: ORTHOPEDIC SURGERY | Age: 50
End: 2020-09-01

## 2020-09-01 ENCOUNTER — HOSPITAL ENCOUNTER (OUTPATIENT)
Dept: PHYSICAL THERAPY | Age: 50
Discharge: HOME | End: 2020-09-01
Payer: COMMERCIAL

## 2020-09-01 VITALS
WEIGHT: 145 LBS | SYSTOLIC BLOOD PRESSURE: 98 MMHG | HEIGHT: 62 IN | OXYGEN SATURATION: 97 % | HEART RATE: 74 BPM | DIASTOLIC BLOOD PRESSURE: 72 MMHG | TEMPERATURE: 97.5 F | RESPIRATION RATE: 14 BRPM | BODY MASS INDEX: 26.68 KG/M2

## 2020-09-01 DIAGNOSIS — Z98.1 S/P CERVICAL SPINAL FUSION: Primary | ICD-10-CM

## 2020-09-01 DIAGNOSIS — R13.10 DYSPHAGIA, UNSPECIFIED TYPE: ICD-10-CM

## 2020-09-01 DIAGNOSIS — M50.20 HNP (HERNIATED NUCLEUS PULPOSUS), CERVICAL: ICD-10-CM

## 2020-09-01 PROCEDURE — 92526 ORAL FUNCTION THERAPY: CPT

## 2020-09-01 NOTE — PROGRESS NOTES
ST DAILY TREATMENT NOTE    Patient Name: Jo Ann Sanchez  Date:2020  : 1970  [x]  Patient  Verified  Payor: Payor: BLUE CROSS / Plan: [email or web address] [place] [address] [place] / Product Type: PPO /   In time: 2:40  Out time: 3:15   Total Treatment Time (min): 35  Visit #: 3 of 4    Treatment Diagnosis: Dysphagia, unspecified [R13.10]     SUBJECTIVE  Pain Level (0-10 scale): 0  Any medication changes, allergies to medications, adverse drug reactions, diagnosis change, or new procedure performed?: [x] No    [] Yes (see summary sheet for update)  Subjective functional status/changes:   [] No changes reported  \"Corn irritated my throat. But other than that I haven't been coughing much\"      OBJECTIVE  Treatment provided includes:  Increase/Improve:  []  Voice Quality []  Cognitive Linguistic Skills [x]  Laryngeal/Pharyngeal Exercises   []  Vocal Loudness []  Reading Comprehension [x]  Swallowing Skills    []  Vocal Cord Function []  Auditory Comprehension []  Oral Motor Skills   []  Resonance []  Writing Skills [x]  Compensatory strategies    []  Speech Intelligibility []  Expressive Language []  Attention   []  Breath Support/Coord. []  Receptive language []  Memory   []  Articulation []  Safety Awareness []    []  Fluency []  Word Retrieval []      Treatment Provided:  - oropharyngeal strengthening exercises   - po trials with compensatory strategies  - pt education     Patient/Caregiver  Education: [x] Review HEP      HEP/Handouts given: continue HEP established   Pain Level (0-10 scale) post treatment: 0    ASSESSMENT   Patient is progressing min cues for compensatory strategies including slower rate, bolus formation, no piecemeal swallowing, small bites, chin placement. Improvement in tolerance appeared apparent with verbal cueing present.      []   Improving appropriately and progressing toward goals  [x]   Improving slowly and progressing toward goals  []   Approximating goals/maximum potential  [x]   Continues to benefit from skilled therapy to address remaining functional deficits  []   Not progressing toward goals and plan of care will be adjusted  Patient will continue to benefit from skilled therapy to address remaining functional deficits: dysphagia     Progress towards goals / Updated goals:  1. Patient will complete tongue base retraction/laryngeal/pharyngeal strengthening exercises (including Hanane maneuver,  modified Shaker with CTAR ball,  effortful swallow, supraglottic swallow, Mendelsohn maneuver, super supraglottic, etc) as appropriate >20 repetitions with S  in 4/4 sessions in order to improve the strength/ agility/efficiency of swallow to decrease risk of aspiration/penetration and  improved swallowing safety and QOL   Current 9/1/2020:    1) Mendelsohn maneuver x25 reps, holding 8-9 seconds with Jacquelyn  2) super-supraglottic swallows x2 sets of x10 - 20 total with S  3) modified shaker with CTAR ball x30 5 second holds  S     2. Patient will recall/demonstrate aspiration precautions and safe swallowing strategies with 100% accuracy with S in 4/4 sessions (intermittent throat clearing, multiple swallows, small sips/bites; alternating bits; slow rate; oral care 2-3x daily; Sit upright at 90 degree angle during po trials and for 30 minute post po intake)  to decrease the occurrence of dysphagia and s/sx of aspiration/penetration. Current 9/1/2020:  min cues for bolus formation, bite size, and rate          3. Patient will tolerate trials of least restrictive diet (regular diet with thin liquids) using double swallow and compensatory strategies without s/sx of aspiration/ penetration in 100% of trials when provided with Jacquelyn in 4/4 sessions to decrease the occurrence of dysphagia and s/sx of aspiration/penetration.   Current 9/1/2020:    Thin liquids: 9/10 tolerance; throat clear x1 with no change in VQ  Regular solids (protein bar): 11/15 tolerance; x3 cough and x1 delayed throat   - cannot rule out silent aspiration/penetration     Min verbal cues to slow rate, bolus formation, chin placement, controlled/small bite/sip size.         PLAN  [x]  Continue plan of care  []  Modify Goals/Treatment Plan      []  Discharge due to:  [] Other:    KANNAN Gutierrez 9/1/2020  2:43 PM       Future Appointments   Date Time Provider Christi Willis   9/9/2020  3:45 PM KANNAN Whyte CE[facility]L [facility]   9/15/2020  3:45 PM KANNAN Whyte PLKINJC [facility]   12/15/2020 10:30 AM [facility] RM 1 [facility]   3/5/2021  8:00 AM Jory Cagle  [address]

## 2020-09-01 NOTE — LETTER
9/1/20 Patient: Monika Mcfarland YOB: 1970 Date of Visit: 9/1/2020 Douglas Simons NP 
[address] [facility] [address] [place] [id number] VIA In Basket Dear Douglas Simons NP, Thank you for referring Ms. Racheal Mckeon to [facility] for evaluation. My notes for this consultation are attached. If you have questions, please do not hesitate to call me. I look forward to following your patient along with you.  
 
 
Sincerely, 
 
Regina Vance MD

## 2020-09-01 NOTE — PROGRESS NOTES
[address]  [address], [address]  [address]  Phone: [phone]  Fax: [phone]  PROGRESS NOTE  Patient: Trip Painter                MRN: [id number]       SSN: [id number]  YOB: 1970        AGE: 44 y.o. SEX: female  Body mass index is 26.52 kg/m². PCP: Asha Morales NP  09/01/20    Chief Complaint   Patient presents with    Neck Pain       HISTORY OF PRESENT ILLNESS, RADIOGRAPHS, and PLAN:     Mr. Alvah Spatz returns today. She is approximately 5 months out from her cervical decompression fusion having no neck or arm pain. She still having ongoing dysphasia does she has been in speech and swallowing they see steady improvement she has been seen by her ENT doctor who she sees regularly and they have scoped her they do not see anything. At this point her x-rays look good she is fused and she is asymptomatic she has had multiple cervical surgeries she seems to have esophageal motility issues. At this point the only recommendation could be sometimes a larger caliber scope such as what they use for a GI procedure can do a dilation to the dysfunctional area of the esophagus and provide improved or reset esophageal motility in my experience. I would leave these management decisions up to her ENT specialist.  We will see her back on the anniversary of her surgery . This dictation was created utilizing voice recognition software. Errors may be present.        Past Medical History:   Diagnosis Date    Allergic rhinitis     Dysphagia     in physical therapy    ANURADHA (generalised anxiety disorder)     GERD (gastroesophageal reflux disease)     Lower  GERD    IBS (irritable bowel syndrome)     Nausea & vomiting     Other ill-defined conditions(549.57)     C5/6 HNP    Skin cancer     removed from nose    Sleep apnea     \"mild per patient\" no cpap    Thumb pain 6/7/2010       Family History   Problem Relation Age of Onset    Cancer Mother         breast cancer    Diabetes Father     High Cholesterol Father     Lung Disease Father         COPD and nodule on lung    Heart Disease Father     Gout Father     Cancer Maternal Aunt         breast cancer    Arthritis-osteo Maternal Grandmother         arthritis     Cancer Cousin        Current Outpatient Medications   Medication Sig Dispense Refill    fluticasone propionate (Flonase Allergy Relief) 50 mcg/actuation nasal spray 2 Sprays by Both Nostrils route daily.  B.infantis-B.ani-B.long-B.bifi (Probiotic 4X) 10-15 mg TbEC Take  by mouth daily.  cyanocobalamin 1,000 mcg tablet Take 2,500 mcg by mouth every other day.  cholecalciferol, vitamin D3, (VITAMIN D3 PO) Take 10,000 Units by mouth daily.  multivitamin (ONE A DAY) tablet Take 1 Tab by mouth daily.  esomeprazole (NEXIUM) 20 mg capsule Take 20 mg by mouth two (2) times a day.  diphenhydrAMINE (SIMPLY SLEEP) 25 mg tablet Take 12.5 mg by mouth nightly.  ibuprofen (AdviL) 200 mg tablet Take  by mouth.  albuterol (PROVENTIL HFA, VENTOLIN HFA, PROAIR HFA) 90 mcg/actuation inhaler INHALE 2 PUFFS BY MOUTH EVERY 6 HOURS AS NEEDED FOR WHEEZE/COUGH 8.5 Inhaler 0    acetaminophen (TYLENOL) 325 mg tablet Take 325 mg by mouth every four (4) hours as needed for Pain.  loratadine (CLARITIN) 10 mg tablet Take 10 mg by mouth daily as needed for Allergies.  traMADol (ULTRAM) 50 mg tablet Take 50 mg by mouth daily.  azelastine-fluticasone (DYMISTA) 137-50 mcg/spray spry 1 [place] by Nasal route as needed (stuffy nose).          Allergies   Allergen Reactions    Ciprofloxacin Diarrhea    Milk Containing Products Diarrhea    Pennsaid [Diclofenac Sodium] Rash    Sulfa (Sulfonamide Antibiotics) Rash    Other Medication Diarrhea     Eggs, patient eats, and gets flu shot yearly with no reaction       Past Surgical History:   Procedure Laterality Date    HX BACK SURGERY  2012    HX CERVICAL FUSION  03/05/2020    Anterior    HX CERVICAL FUSION  03/05/2020    HX GYN      left tube removal ectopic pregnancy    HX HEENT  9/2011    Basal cell cancer lesion removed from nose    HX LYMPHADENECTOMY Right 8/25/16    HX ORTHOPAEDIC  1999    TMJ    HX OTHER SURGICAL  2013    Spinal Fusion    HX ROTATOR CUFF REPAIR Right 2019    x2    HX TONSILLECTOMY  2/2007       Past Medical History:   Diagnosis Date    Allergic rhinitis     Dysphagia     in physical therapy    ANURADHA (generalised anxiety disorder)     GERD (gastroesophageal reflux disease)     Lower  GERD    IBS (irritable bowel syndrome)     Nausea & vomiting     Other ill-defined conditions(799.89)     C5/6 HNP    Skin cancer     removed from nose    Sleep apnea     \"mild per patient\" no cpap    Thumb pain 6/7/2010       Social History     Socioeconomic History    Marital status:      Spouse name: Not on file    Number of children: Not on file    Years of education: Not on file    Highest education level: Not on file   Occupational History    Not on file   Social Needs    Financial resource strain: Not on file    Food insecurity     Worry: Not on file     Inability: Not on file    Transportation needs     Medical: Not on file     Non-medical: Not on file   Tobacco Use    Smoking status: Never Smoker    Smokeless tobacco: Never Used   Substance and Sexual Activity    Alcohol use: Yes     Comment: few times per week    Drug use: No    Sexual activity: Yes     Partners: Male     Comment: pregnancy test negative   Lifestyle    Physical activity     Days per week: Not on file     Minutes per session: Not on file    Stress: Not on file   Relationships    Social connections     Talks on phone: Not on file     Gets together: Not on file     Attends [employer] service: Not on file     Active member of club or organization: Not on file     Attends meetings of clubs or organizations: Not on file     Relationship status: Not on file  Intimate partner violence     Fear of current or ex partner: Not on file     Emotionally abused: Not on file     Physically abused: Not on file     Forced sexual activity: Not on file   Other Topics Concern    Not on file   Social History Narrative    Not on file         REVIEW OF SYSTEMS:   CONSTITUTIONAL SYMPTOMS:  Negative. EYES:  Negative. EARS, NOSE, THROAT AND MOUTH:  Negative. CARDIOVASCULAR:  Negative. RESPIRATORY:  Negative. GENITOURINARY: Per HPI. GASTROINTESTINAL:  Per HPI. INTEGUMENTARY (SKIN AND/OR BREAST):  Negative. MUSCULOSKELETAL: Per HPI.   ENDOCRINE/RHEUMATOLOGIC:  Negative. NEUROLOGICAL:  Per HPI. HEMATOLOGIC/LYMPHATIC:  Negative. ALLERGIC/IMMUNOLOGIC:  Negative. PSYCHIATRIC:  Negative. PHYSICAL EXAMINATION:   Visit Vitals  BP 98/72 (BP 1 Location: Left arm, BP Patient Position: Sitting)   Pulse 74   Temp 97.5 °F (36.4 °C) (Skin)   Resp 14   Ht 5' 2\" (1.575 m)   Wt 145 lb (65.8 kg)   SpO2 97% Comment: RA   BMI 26.52 kg/m²    PAIN SCALE: 0 - No pain/10    CONSTITUTIONAL: The patient is in no apparent distress and is alert and oriented x 3. HEENT: Normocephalic. Hearing grossly intact. NECK: Supple and symmetric. no tenderness, or masses were felt. RESPIRATORY: No labored breathing. CARDIOVASCULAR: The carotid pulses were normal. Peripheral pulses were 2+. CHEST: Normal AP diameter and normal contour without any kyphoscoliosis. LYMPHATIC: No lymphadenopathy was appreciated in the neck, axillae or groin. SKIN: Incision healing well, no drainage, no erythema, no hernia, no seroma, no swelling, no dehiscence, incision well approximated. Negative for scars, rashes, lesions, or ulcers on the right upper, right lower, left upper, left lower and trunk. NEUROLOGICAL: Alert and oriented x 3. Ambulation without assistive device. FWB. EXTREMITIES: See musculoskeletal.  MUSCULOSKELETAL:   Head and Neck: Problems with swallowing.  Negative for misalignment, asymmetry, crepitation, defects, tenderness masses or effusions.  Left Upper Extremity: Inspection, percussion and palpation performed. Billss sign is negative.  Right Upper Extremity: Inspection, percussion and palpation performed. Billss sign is negative.  Spine, Ribs and Pelvis: Inspection, percussion and palpation performed. Negative for misalignment, asymmetry, crepitation, defects, tenderness masses or effusions.  Left Lower Extremity: Inspection, percussion and palpation performed. Negative straight leg raise.  Right Lower Extremity: Inspection, percussion and palpation performed. Negative straight leg raise. SPINE EXAM:     Cervical spine: Neck is midline. Normal muscle tone. No focal atrophy is noted. ASSESSMENT    ICD-10-CM ICD-9-CM    1. S/P cervical spinal fusion  Z98.1 V45.4 AMB POC XRAY, SPINE, CERVICAL; 2 OR 3   2. HNP (herniated nucleus pulposus), cervical  M50.20 722.0 AMB POC XRAY, SPINE, CERVICAL; 2 OR 3   3. Dysphagia, unspecified type  R13.10 787.20        Written by Tisha Carrillo, as dictated by Ernesto Herrera MD.    I, Dr. Ernesto Herrera MD, confirm that all documentation is accurate.

## 2020-09-01 NOTE — PROGRESS NOTES
Joce Rodriguez presents today for   Chief Complaint   Patient presents with    Neck Pain       Is someone accompanying this pt? no    Is the patient using any DME equipment during OV? no    Depression Screening:  3 most recent PHQ Screens 2/27/2020   Little interest or pleasure in doing things Not at all   Feeling down, depressed, irritable, or hopeless Not at all   Total Score PHQ 2 0       Learning Assessment:  Learning Assessment 7/16/2015   PRIMARY LEARNER Patient   HIGHEST LEVEL OF EDUCATION - PRIMARY LEARNER  > 4 YEARS OF COLLEGE   BARRIERS PRIMARY LEARNER NONE   CO-LEARNER CAREGIVER No   PRIMARY LANGUAGE ENGLISH   LEARNER PREFERENCE PRIMARY READING     -   ANSWERED BY patient   RELATIONSHIP SELF       Abuse Screening:  Abuse Screening Questionnaire 2/14/2017   Do you ever feel afraid of your partner? N   Are you in a relationship with someone who physically or mentally threatens you? N   Is it safe for you to go home? Y         Coordination of Care:  1. Have you been to the ER, urgent care clinic since your last visit? Yes pt went to urgent care for sinus infection. Hospitalized since your last visit? no    2. Have you seen or consulted any other health care providers outside of the [address] [place] [address] since your last visit? no Include any pap smears or colon screening.  no

## 2020-09-09 ENCOUNTER — HOSPITAL ENCOUNTER (OUTPATIENT)
Dept: PHYSICAL THERAPY | Age: 50
Discharge: HOME | End: 2020-09-09
Payer: COMMERCIAL

## 2020-09-09 PROCEDURE — 92526 ORAL FUNCTION THERAPY: CPT

## 2020-09-09 NOTE — PROGRESS NOTES
ST DAILY TREATMENT NOTE    Patient Name: William Kimbrough  Date:2020  : 1970  [x]  Patient  Verified  Payor: Payor: BHUPINDER BRYANT / Plan: Ramu Borden / Product Type: PPO /   In time: 3:48  Out time: 4:25  Total Treatment Time (min): 37  Visit #: 4 of 4-8    Treatment Diagnosis: Dysphagia, unspecified [R13.10]    SUBJECTIVE  Pain Level (0-10 scale): 0  Any medication changes, allergies to medications, adverse drug reactions, diagnosis change, or new procedure performed?: [x] No    [] Yes (see summary sheet for update)  Subjective functional status/changes:   [] No changes reported  \"I have a rash\"    Pt reports minimal coughing recently. OBJECTIVE   Treatment provided includes:  Increase/Improve:  []  Voice Quality []  Cognitive Linguistic Skills [x]  Laryngeal/Pharyngeal Exercises   []  Vocal Loudness []  Reading Comprehension [x]  Swallowing Skills    []  Vocal Cord Function []  Auditory Comprehension []  Oral Motor Skills   []  Resonance []  Writing Skills [x]  Compensatory strategies    []  Speech Intelligibility []  Expressive Language []  Attention   []  Breath Support/Coord. []  Receptive language []  Memory   []  Articulation []  Safety Awareness []    []  Fluency []  Word Retrieval []      Treatment Provided:  - oropharyngeal strengthening exercises   - po trials with application of compensatory strategies   - pt education     Patient/Caregiver  Education: [x] Review HEP      HEP/Handouts given: continue HEP established   Pain Level (0-10 scale) post treatment: 0-    ASSESSMENT   Significant increase in tolerance of least restrictive diet without overt s/sx of aspiration (cannot rule out silent aspiration/penetration). Pt approaching goals and discharge. Recommend x1 more session for further education and generalization of skills.    []   Improving appropriately and progressing toward goals  [x]   Improving slowly and progressing toward goals  []   Approximating goals/maximum potential  [x]   Continues to benefit from skilled therapy to address remaining functional deficits  []   Not progressing toward goals and plan of care will be adjusted  Patient will continue to benefit from skilled therapy to address remaining functional deficits: dysphagia     Progress towards goals / Updated goals:  1. Patient will complete tongue base retraction/laryngeal/pharyngeal strengthening exercises (including Hanane maneuver, modified Shaker with CTAR ball, effortful swallow, supraglottic swallow, Mendelsohn maneuver, super supraglottic, etc) as appropriate >20 repetitions with S in 4/4 sessions in order to improve the strength/ agility/efficiency of swallow to decrease risk of aspiration/penetration and improved swallowing safety and QOL   Current 9/9/2020:   1) Mendelsohn maneuver x25 reps, holding 8-10 seconds with Jacquelyn   2) Super-supraglottic swallows x2 sets of x10 - 20 total with S   3) Hanane x2 sets of 15 with S  4) Modified shaker with CTAR x20 10 second holds, x3 60 second holds     2. Patient will recall/demonstrate aspiration precautions and safe swallowing strategies with 100% accuracy with S in 4/4 sessions (intermittent throat clearing, multiple swallows, small sips/bites; alternating bits; slow rate; oral care 2-3x daily; Sit upright at 90 degree angle during po trials and for 30 minute post po intake) to decrease the occurrence of dysphagia and s/sx of aspiration/penetration. Current 9/9/2020: approaching goal, min cues for bolus formation, bite size, and rate     3. Patient will tolerate trials of least restrictive diet (regular diet with thin liquids) using double swallow and compensatory strategies without s/sx of aspiration/ penetration in 100% of trials when provided with Jacquelyn in 4/4 sessions to decrease the occurrence of dysphagia and s/sx of aspiration/penetration. Current 9/9/2020:    Thin liquids: 100% of trials tolerated without overt s/sx of aspiration     Solids: -Banana: 100% of trials tolerated without overt s/sx of aspiration    -Protein bar: 100% of trials tolerated without overt s/sx of aspiration     Min cues with faded cueing across trials for double swallow and bolus size     SLP cannot rule out silent aspiration across any po trials completed in treatment session     PLAN   [x]  Continue plan of care  []  Modify Goals/Treatment Plan       []  Discharge due to:   [] Other: HEP plan for d/c upcoming visit: mendelsohn maneuver, super-supraglottic swallows, veronica keyes Daily, SLP 9/9/2020  2:56 PM    Future Appointments   Date Time Provider Christi Willis   9/9/2020  3:45 PM Solange Andrade, SLP [facility]   9/15/2020  3:45 PM Solange Andrade, SLP [facility]   12/15/2020 10:30 AM [facility] RM 1 [facility]   3/5/2021  8:00 AM Franklyn Harley MD [facility]

## 2020-09-15 ENCOUNTER — APPOINTMENT (OUTPATIENT)
Dept: PHYSICAL THERAPY | Age: 50
End: 2020-09-15
Payer: COMMERCIAL

## 2020-09-23 ENCOUNTER — HOSPITAL ENCOUNTER (OUTPATIENT)
Dept: PHYSICAL THERAPY | Age: 50
Discharge: HOME | End: 2020-09-23
Payer: COMMERCIAL

## 2020-09-23 PROCEDURE — 92526 ORAL FUNCTION THERAPY: CPT

## 2020-09-23 NOTE — PROGRESS NOTES
ST DAILY TREATMENT NOTE    Patient Name: Priti Calix  Date:2020  : 1970  [x]  Patient  Verified  Payor: Payor: BLUE CROSS / Plan:  [facility] / Product Type: PPO /   In time: 3:05  Out time: 3:35   Total Treatment Time (min): 30  Visit #: 5 of 8    Treatment Diagnosis: Dysphagia, unspecified [R13.10]    SUBJECTIVE  Pain Level (0-10 scale): 0  Any medication changes, allergies to medications, adverse drug reactions, diagnosis change, or new procedure performed?: [x] No    [] Yes (see summary sheet for update)  Subjective functional status/changes:   [] No changes reported  Pt reports she is receiving esophageal dilation this Friday per recent GI visit. OBJECTIVE  Treatment provided includes:  Increase/Improve:  []  Voice Quality []  Cognitive Linguistic Skills [x]  Laryngeal/Pharyngeal Exercises   []  Vocal Loudness []  Reading Comprehension [x]  Swallowing Skills    []  Vocal Cord Function []  Auditory Comprehension []  Oral Motor Skills   []  Resonance []  Writing Skills [x]  Compensatory strategies    []  Speech Intelligibility []  Expressive Language []  Attention   []  Breath Support/Coord. []  Receptive language []  Memory   []  Articulation [x]  Safety Awareness []    []  Fluency []  Word Retrieval []      Treatment Provided:  - oropharyngeal strengthening exercises   - HEP/discharge instructions education   - po trials   Patient/Caregiver  Education: [x] Review HEP      HEP/Handouts given: discharge instructions; discharge HEP  Pain Level (0-10 scale) post treatment: 0    ASSESSMENT   See summary.    []   Improving appropriately and progressing toward goals  [x]   Improving slowly and progressing toward goals  []   Approximating goals/maximum potential  [x]   Continues to benefit from skilled therapy to address remaining functional deficits  []   Not progressing toward goals and plan of care will be adjusted  Patient will continue to benefit from skilled therapy to address remaining functional deficits: dysphagia     Progress towards goals / Updated goals:  1. Patient will complete tongue base retraction/laryngeal/pharyngeal strengthening exercises (including Hanane maneuver, modified Shaker with CTAR ball, effortful swallow, supraglottic swallow, Mendelsohn maneuver, super supraglottic, etc) as appropriate >20 repetitions with S in 4/4 sessions in order to improve the strength/ agility/efficiency of swallow to decrease risk of aspiration/penetration and improved swallowing safety and QOL   Current 9/23/2020:   Approaching goal, met x2/4 sessions   1) Mendelsohn maneuver x25 reps, holding 8-10 seconds with S  2) Super-supraglottic swallows x2 sets of x10 - 20 total with S   3) Hanane x20 with S   4) Modified shaker with CTAR x20 5 second holds     2. Patient will recall/demonstrate aspiration precautions and safe swallowing strategies with 100% accuracy with S in 4/4 sessions (intermittent throat clearing, multiple swallows, small sips/bites; alternating bits; slow rate; oral care 2-3x daily; Sit upright at 90 degree angle during po trials and for 30 minute post po intake) to decrease the occurrence of dysphagia and s/sx of aspiration/penetration. Current 9/23/2020: approaching goal, met x1 session      3. Patient will tolerate trials of least restrictive diet (regular diet with thin liquids) using double swallow and compensatory strategies without s/sx of aspiration/ penetration in 100% of trials when provided with Jacquelyn in 4/4 sessions to decrease the occurrence of dysphagia and s/sx of aspiration/penetration.    Current 9/23/2020:   Met across multiple treatment sessions    Thin liquids: 100% of trials tolerated without overt s/sx of aspiration with S  Solids:   -peanut butter crackers: tolerates 100% without overt s/sx of aspiration with S   SLP cannot rule out silent aspiration across any po trials completed in treatment session       PLAN  []  Continue plan of care  [] Modify Goals/Treatment Plan      [x]  Discharge due to: goal achievement   [] Other:    KANNAN Vásquez 9/23/2020  2:45 PM    Future Appointments   Date Time Provider Christi Willis   9/23/2020  3:00 PM KANNAN Montelongo [facility]   12/15/2020 10:30 AM [facility] MRI RM 1 [facility]   3/5/2021  8:00 AM Berenice Hanna MD [facility]

## 2020-09-23 NOTE — PROGRESS NOTES
[facility] Rinalenalaxmi Sergio  [address] [facility]  [phone] fax    Speech Therapy Discharge Summary    Patient name: Alexandra Arellano Start of Care: 2020   Referral source: Drea Rodriguez MD : 1970               Medical Diagnosis: Dysphagia, unspecified [R13.10]  Payor: BLUE CROSS / Plan: Alan Rico / Product Type: PPO /  Onset Date:3/2020               Treatment Diagnosis: R13.12 oropharyngeal dysphagia   Prior Hospitalization: see medical history Provider#: [id number]   Medications: Verified on Patient summary List   Comorbidities: anosmia, etd, tmj, Skin Ca, sleep apnea, epistaxia, s/p tonsillectomy and ACDF  Prior Level of Function: Speech/language, swallowing, and cognitive function WNLs.                 Visits from Start of Care: 21    Missed Visits: 0  Reporting Period : 2020 to 2020     Summary of Care:  Goal: 1. Patient will complete tongue base retraction/laryngeal/pharyngeal strengthening exercises (including Hanane maneuver,  modified Shaker with CTAR ball,  effortful swallow, supraglottic swallow, Mendelsohn maneuver, super supraglottic, etc) as appropriate >20 repetitions with S  in 4/4 sessions in order to improve the strength/ agility/efficiency of swallow to decrease risk of aspiration/penetration and  improved swallowing safety and QOL 1. Patient will complete tongue base retraction/laryngeal/pharyngeal strengthening exercises (including Hanane maneuver,  modified Shaker with CTAR ball,  effortful swallow, supraglottic swallow, Mendelsohn maneuver, super supraglottic, etc) as appropriate >20 repetitions with S  in 4/4 sessions in order to improve the strength/ agility/efficiency of swallow to decrease risk of aspiration/penetration and  improved swallowing safety and QOL   Status at last note/certification: Not met  Effortful swallows x20 with po trials thin liquids with S     Hanane maneuver 2 sets x20 with S   Modified shaker with CTAR ball x20 15 second holds - increased reps  [employer] x20  with 3-5 second holds with Jacquelyn  -  decreased level of cueing, increased precision   Status at discharge: Approaching goal, met x2/4 sessions   1) Mendelsohn maneuver x25 reps, holding 8-10 seconds with S  2) Super-supraglottic swallows x2 sets of x10 - 20 total with S   3) Hanane x20 with S   4) Modified shaker with CTAR x20 5 second holds S     Goal: 2. Patient will recall/demonstrate aspiration precautions and safe swallowing strategies with 100% accuracy with S in 4/4 sessions (intermittent throat clearing, multiple swallows, small sips/bites; alternating bits; slow rate; oral care 2-3x daily; Sit upright at 90 degree angle during po trials and for 30 minute post po intake)  to decrease the occurrence of dysphagia and s/sx of aspiration/penetration. Status at last note/certification: Not met, approaching goal: 90% acc, min cues for bite size and slow rate, increased tolerance across consistencies and pt responded well to verbal cues   Status at discharge: approaching goal, met x1 session with S; additional sessions Jacquelyn    Goal: 3. Patient will tolerate trials of least restrictive diet (regular diet with thin liquids) using double swallow and compensatory strategies without s/sx of aspiration/ penetration in 100% of trials when provided with Jacquelyn in 4/4 sessions to decrease the occurrence of dysphagia and s/sx of aspiration/penetration.   Status at last note/certification: Not met, progressing: Tolerated 13/14 trials of pudding without overt s/sx of aspiration, however SLP cannot rule out silent aspiration/penetration events  Tolerated 3/7 trials of regular solids - delayed throat clears noted x4 bites, throat clears began after x1 large bite with immediate cough/throat clear, irritation from this event could have impacted future po trials. SLP cannot rule out silent aspiration/penetration events. Tolerated 14/15 thin liquids, irritation may be residual from irritation on regular solid po trials. SLP cannot rule out silent aspiration/penetration events.   Status at discharge: Met, across multiple treatment sessions    Thin liquids: 100% of trials tolerated without overt s/sx of aspiration with S  Solids:   -peanut butter crackers: tolerates 100% without overt s/sx of aspiration with S   SLP cannot rule out silent aspiration across any po trials completed in treatment session      ASSESSMENT:   Patient made significant gains in oropharyngeal function through course of treatment addressing dysphagia. Diet advanced to regular solids with thin liquids with patient demonstrating tolerance without overt s/sx of aspiration across several treatment sessions with use of compensatory strategies, however SLP cannot rule out silent aspiration events. She reports significant decrease in globus sensation and coughing with po intake. Patient provided with discharge recommendations and HEP. She verbalized understanding. No further skilled outpatient SLP services indicated at this time. Please re-consult if needed per MD discretion.        RECOMMENDATIONS:  [x]Discontinue therapy: [x]Patient has reached or is progressing toward set goals      []Patient is non-compliant or has abdicated      []Due to lack of appreciable progress towards set KANNAN Mccarthy 9/23/2020 2:46 PM  MS CCC-SLP  Speech-Language Pathologist

## 2020-12-15 ENCOUNTER — HOSPITAL ENCOUNTER (OUTPATIENT)
Age: 50
Discharge: HOME | End: 2020-12-15
Attending: SURGERY
Payer: COMMERCIAL

## 2020-12-15 DIAGNOSIS — Z91.89 AT HIGH RISK FOR BREAST CANCER: ICD-10-CM

## 2020-12-15 DIAGNOSIS — Z80.3 FAMILY HISTORY OF BREAST CANCER IN FIRST DEGREE RELATIVE: ICD-10-CM

## 2020-12-15 PROCEDURE — 74011250636 HC RX REV CODE- 250/636: Performed by: SURGERY

## 2020-12-15 PROCEDURE — 77049 MRI BREAST C-+ W/CAD BI: CPT

## 2020-12-15 PROCEDURE — A9577 INJ MULTIHANCE: HCPCS | Performed by: SURGERY

## 2020-12-15 RX ADMIN — GADOBENATE DIMEGLUMINE 13 ML: 529 INJECTION, SOLUTION INTRAVENOUS at 11:00

## 2020-12-30 ENCOUNTER — OFFICE VISIT (OUTPATIENT)
Dept: SURGERY | Age: 50
End: 2020-12-30
Payer: COMMERCIAL

## 2020-12-30 VITALS
DIASTOLIC BLOOD PRESSURE: 77 MMHG | HEART RATE: 68 BPM | RESPIRATION RATE: 16 BRPM | SYSTOLIC BLOOD PRESSURE: 113 MMHG | OXYGEN SATURATION: 98 % | TEMPERATURE: 97.7 F | WEIGHT: 147 LBS | HEIGHT: 62 IN | BODY MASS INDEX: 27.05 KG/M2

## 2020-12-30 DIAGNOSIS — Z80.3 FAMILY HISTORY OF BREAST CANCER IN FIRST DEGREE RELATIVE: ICD-10-CM

## 2020-12-30 DIAGNOSIS — Z91.89 AT HIGH RISK FOR BREAST CANCER: Primary | ICD-10-CM

## 2020-12-30 PROCEDURE — 99213 OFFICE O/P EST LOW 20 MIN: CPT | Performed by: SURGERY

## 2020-12-30 RX ORDER — CLINDAMYCIN HYDROCHLORIDE 300 MG/1
300 CAPSULE ORAL 3 TIMES DAILY
COMMUNITY
End: 2021-03-25 | Stop reason: ALTCHOICE

## 2020-12-30 NOTE — PATIENT INSTRUCTIONS

## 2020-12-30 NOTE — PROGRESS NOTES
[facility]  General Surgery    Subjective:  Patient states that she is done her self breast exam routinely since her last visit. She denies any masses or skin changes or pain. She denies any unintentional weight loss. Most recent MRI was December 15, 2020 which reveals BI-RADS 2 changes in the bilateral breasts. Objective:  Vitals:    12/30/20 1002   BP: 113/77   Pulse: 68   Resp: 16   Temp: 97.7 °F (36.5 °C)   TempSrc: Temporal   SpO2: 98%   Weight: 66.7 kg (147 lb)   Height: 5' 2\" (1.575 m)       Physical Exam:    General: Awake and alert, oriented x4, no apparent distress   Breasts:    Left: No dimpling, discoloration, nipple inversion or retractions. No axillary or supraclavicular lymphadenopathy. No mass   Right: No dimpling, discoloration, nipple inversion or retractions. No axillary or supraclavicular lymphadenopathy. No mass    Current Medications:  Current Outpatient Medications   Medication Sig Dispense Refill    clindamycin (CLEOCIN) 300 mg capsule Take 300 mg by mouth three (3) times daily.  OTHER Allergy shots once a week      fluticasone propionate (Flonase Allergy Relief) 50 mcg/actuation nasal spray 2 Sprays by Both Nostrils route daily.  ibuprofen (AdviL) 200 mg tablet Take  by mouth.  albuterol (PROVENTIL HFA, VENTOLIN HFA, PROAIR HFA) 90 mcg/actuation inhaler INHALE 2 PUFFS BY MOUTH EVERY 6 HOURS AS NEEDED FOR WHEEZE/COUGH 8.5 Inhaler 0    B.infantis-B.ani-B.long-B.bifi (Probiotic 4X) 10-15 mg TbEC Take  by mouth daily.  acetaminophen (TYLENOL) 325 mg tablet Take 325 mg by mouth every four (4) hours as needed for Pain.  loratadine (CLARITIN) 10 mg tablet Take 10 mg by mouth daily as needed for Allergies.  traMADol (ULTRAM) 50 mg tablet Take 50 mg by mouth daily.  cyanocobalamin 1,000 mcg tablet Take 2,500 mcg by mouth every other day.  cholecalciferol, vitamin D3, (VITAMIN D3 PO) Take 10,000 Units by mouth daily.  multivitamin (ONE A DAY) tablet Take 1 Tab by mouth daily.  azelastine-fluticasone (DYMISTA) 137-50 mcg/spray spry 1 Sebastian by Nasal route as needed (stuffy nose).  esomeprazole (NEXIUM) 20 mg capsule Take 20 mg by mouth two (2) times a day.  diphenhydrAMINE (SIMPLY SLEEP) 25 mg tablet Take 12.5 mg by mouth nightly. Chart and notes reviewed. Data reviewed. I have evaluated and examined the patient. Impression and plan:  Patient with family history of breast cancer in her mother and maternal aunt given her an increased risk of developing breast cancer lifetime. Continue monthly self breast exam bilateral 3D screening mammography in 6 months  Clinical breast exam in 6 months  Please call or visit with any questions or concerns.        Elvin Collier MD

## 2021-03-25 ENCOUNTER — OFFICE VISIT (OUTPATIENT)
Dept: FAMILY MEDICINE CLINIC | Age: 51
End: 2021-03-25
Payer: COMMERCIAL

## 2021-03-25 ENCOUNTER — HOSPITAL ENCOUNTER (OUTPATIENT)
Dept: LAB | Age: 51
Discharge: HOME | End: 2021-03-25
Payer: COMMERCIAL

## 2021-03-25 VITALS
WEIGHT: 148.4 LBS | DIASTOLIC BLOOD PRESSURE: 75 MMHG | SYSTOLIC BLOOD PRESSURE: 117 MMHG | BODY MASS INDEX: 27.14 KG/M2 | HEART RATE: 68 BPM | TEMPERATURE: 97 F | OXYGEN SATURATION: 97 %

## 2021-03-25 DIAGNOSIS — Z11.59 ENCOUNTER FOR HEPATITIS C SCREENING TEST FOR LOW RISK PATIENT: ICD-10-CM

## 2021-03-25 DIAGNOSIS — E53.8 VITAMIN B 12 DEFICIENCY: ICD-10-CM

## 2021-03-25 DIAGNOSIS — E55.9 VITAMIN D DEFICIENCY: ICD-10-CM

## 2021-03-25 DIAGNOSIS — Z11.59 ENCOUNTER FOR HEPATITIS C SCREENING TEST FOR LOW RISK PATIENT: Primary | ICD-10-CM

## 2021-03-25 LAB
25(OH)D3 SERPL-MCNC: 77.2 NG/ML (ref 30–100)
FOLATE SERPL-MCNC: 15.7 NG/ML (ref 3.1–17.5)
VIT B12 SERPL-MCNC: 1547 PG/ML (ref 211–911)

## 2021-03-25 PROCEDURE — 99214 OFFICE O/P EST MOD 30 MIN: CPT | Performed by: NURSE PRACTITIONER

## 2021-03-25 PROCEDURE — 36415 COLL VENOUS BLD VENIPUNCTURE: CPT

## 2021-03-25 PROCEDURE — 82746 ASSAY OF FOLIC ACID SERUM: CPT

## 2021-03-25 PROCEDURE — 82306 VITAMIN D 25 HYDROXY: CPT

## 2021-03-25 PROCEDURE — 86803 HEPATITIS C AB TEST: CPT

## 2021-03-25 NOTE — PROGRESS NOTES
Subjective:   44 y.o. female for Well Woman Check. She is postmenopausal.  Social History: not sexually active. Pertinent past medical hstory: no history of HTN, DVT, CAD, DM, liver disease, migraines or smoking. Patient Active Problem List   Diagnosis Code    Foot pain M79.673    Thumb pain M79.646    Mass of axilla R22.30    Seasonal allergic rhinitis J30.2    GERD (gastroesophageal reflux disease) K21.9    Cervical disc disorder with radiculopathy M50.10    Cervical disc herniation M50.20    S/P cervical discectomy Z98.890    Bulging lumbar disc M51.26    Spondylosis of lumbar region without myelopathy or radiculopathy M47.816    Lumbar neuritis M54.16    DDD (degenerative disc disease), lumbar M51.36    Displacement of lumbar intervertebral disc without myelopathy M51.26    HNP (herniated nucleus pulposus), lumbar M51.26    Status post lumbar laminectomy Z98.890    Cough R05    Sore throat J02.9    HNP (herniated nucleus pulposus), cervical M50.20     Current Outpatient Medications   Medication Sig Dispense Refill    OTHER Allergy shots once a week      fluticasone propionate (Flonase Allergy Relief) 50 mcg/actuation nasal spray 2 Sprays by Both Nostrils route daily.  ibuprofen (AdviL) 200 mg tablet Take  by mouth.  albuterol (PROVENTIL HFA, VENTOLIN HFA, PROAIR HFA) 90 mcg/actuation inhaler INHALE 2 PUFFS BY MOUTH EVERY 6 HOURS AS NEEDED FOR WHEEZE/COUGH 8.5 Inhaler 0    B.infantis-B.ani-B.long-B.bifi (Probiotic 4X) 10-15 mg TbEC Take  by mouth daily.  cyanocobalamin 1,000 mcg tablet Take 2,500 mcg by mouth every other day.  cholecalciferol, vitamin D3, (VITAMIN D3 PO) Take 10,000 Units by mouth daily.  multivitamin (ONE A DAY) tablet Take 1 Tab by mouth daily.  esomeprazole (NEXIUM) 20 mg capsule Take 20 mg by mouth two (2) times a day.  diphenhydrAMINE (SIMPLY SLEEP) 25 mg tablet Take 12.5 mg by mouth nightly.       acetaminophen (TYLENOL) 325 mg tablet Take 325 mg by mouth every four (4) hours as needed for Pain. Allergies   Allergen Reactions    Ciprofloxacin Diarrhea    Milk Containing Products Diarrhea    Pennsaid [Diclofenac Sodium] Rash    Sulfa (Sulfonamide Antibiotics) Rash    Other Medication Diarrhea     Eggs, patient eats, and gets flu shot yearly with no reaction        ROS:  Feeling well. No dyspnea or chest pain on exertion. No abdominal pain, change in bowel habits, black or bloody stools. No urinary tract symptoms. GYN ROS: no breast pain or new or enlarging lumps on self exam, no vaginal bleeding, no discharge or pelvic pain. Menopausal symptoms: none. No neurological complaints. Objective:     Visit Vitals  /75 (BP 1 Location: Right arm, BP Patient Position: Sitting, BP Cuff Size: Adult)   Pulse 68   Temp 97 °F (36.1 °C)   Wt 148 lb 6.4 oz (67.3 kg)   SpO2 97%   BMI 27.14 kg/m²     The patient appears well, alert, oriented x 3, in no distress. ENT normal.  Neck supple. No adenopathy or thyromegaly. DAVID. Lungs are clear, good air entry, no wheezes, rhonchi or rales. S1 and S2 normal, no murmurs, regular rate and rhythm. Abdomen soft without tenderness, guarding, mass or organomegaly. Extremities show no edema, normal peripheral pulses. Neurological is normal, no focal findings. BREAST EXAM: patient declines to have breast exam    PELVIC EXAM: exam declined by the patient    Assessment/Plan:   well woman  postmenopausal  return annually or prn    ICD-10-CM ICD-9-CM    1. Encounter for hepatitis C screening test for low risk patient  Z11.59 V73.89 HEPATITIS C AB   2. Vitamin D deficiency  E55.9 268.9 VITAMIN D, 25 HYDROXY   3. Vitamin B 12 deficiency  E53.8 266.2 VITAMIN B12 & FOLATE       .

## 2021-03-25 NOTE — PATIENT INSTRUCTIONS
Well Visit, Women 48 to 72: Care Instructions Your Care Instructions Physical exams can help you stay healthy. Your doctor has checked your overall health and may have suggested ways to take good care of yourself. He or she also may have recommended tests. At home, you can help prevent illness with healthy eating, regular exercise, and other steps. Follow-up care is a key part of your treatment and safety. Be sure to make and go to all appointments, and call your doctor if you are having problems. It's also a good idea to know your test results and keep a list of the medicines you take. How can you care for yourself at home? · Reach and stay at a healthy weight. This will lower your risk for many problems, such as obesity, diabetes, heart disease, and high blood pressure. · Get at least 30 minutes of exercise on most days of the week. Walking is a good choice. You also may want to do other activities, such as running, swimming, cycling, or playing tennis or team sports. · Do not smoke. Smoking can make health problems worse. If you need help quitting, talk to your doctor about stop-smoking programs and medicines. These can increase your chances of quitting for good. · Protect your skin from too much sun. When you're outdoors from 10 a.m. to 4 p.m., stay in the shade or cover up with clothing and a hat with a wide brim. Wear sunglasses that block UV rays. Even when it's cloudy, put broad-spectrum sunscreen (SPF 30 or higher) on any exposed skin. · See a dentist one or two times a year for checkups and to have your teeth cleaned. · Wear a seat belt in the car. Follow your doctor's advice about when to have certain tests. These tests can spot problems early. · Cholesterol. Your doctor will tell you how often to have this done based on your age, family history, or other things that can increase your risk for heart attack and stroke. · Blood pressure.  Have your blood pressure checked during a routine doctor visit. Your doctor will tell you how often to check your blood pressure based on your age, your blood pressure results, and other factors. · Mammogram. Ask your doctor how often you should have a mammogram, which is an X-ray of your breasts. A mammogram can spot breast cancer before it can be felt and when it is easiest to treat. · Pap test and pelvic exam. Ask your doctor how often you should have a Pap test. You may not need to have a Pap test as often as you used to. · Vision. Have your eyes checked every year or two or as often as your doctor suggests. Some experts recommend that you have yearly exams for glaucoma and other age-related eye problems starting at age 48. · Hearing. Tell your doctor if you notice any change in your hearing. You can have tests to find out how well you hear. · Diabetes. Ask your doctor whether you should have tests for diabetes. · Colorectal cancer. Your risk for colorectal cancer gets higher as you get older. Some experts say that adults should start regular screening at age 48 and stop at age 76. Others say to start before age 48 or continue after age 76. Talk with your doctor about your risk and when to start and stop screening. · Thyroid disease. Talk to your doctor about whether to have your thyroid checked as part of a regular physical exam. Women have an increased chance of a thyroid problem. · Osteoporosis. You should begin tests for bone density at age 72. If you are younger than 72, ask your doctor whether you have factors that may increase your risk for this disease. You may want to have this test before age 72. · Heart attack and stroke risk. At least every 4 to 6 years, you should have your risk for heart attack and stroke assessed. Your doctor uses factors such as your age, blood pressure, cholesterol, and whether you smoke or have diabetes to show what your risk for a heart attack or stroke is over the next 10 years. When should you call for help? Watch closely for changes in your health, and be sure to contact your doctor if you have any problems or symptoms that concern you. Where can you learn more? Go to http://www.gray.com/ Enter E147 in the search box to learn more about \"Well Visit, Women 50 to 72: Care Instructions. \" Current as of: May 27, 2020               Content Version: 12.6 © 9035-1335 [employer]. Care instructions adapted under license by [employer] (which disclaims liability or warranty for this information). If you have questions about a medical condition or this instruction, always ask your healthcare professional. [place] [address] any warranty or liability for your use of this information. Learning About Vitamin D Why is it important to get enough vitamin D? Your body needs vitamin D to absorb calcium. Calcium keeps your bones and muscles, including your heart, healthy and strong. If your muscles don't get enough calcium, they can cramp, hurt, or feel weak. You may have long-term (chronic) muscle aches and pains. If you don't get enough vitamin D throughout life, you have an increased chance of having thin and brittle bones (osteoporosis) in your later years. Children who don't get enough vitamin D may not grow as much as others their age. They also have a chance of getting a rare disease called rickets. It causes weak bones. Vitamin D and calcium are added to many foods. And your body uses sunshine to make its own vitamin D. How much vitamin D do you need? The [employer] recommends that people ages 3 through 79 get 600 IU (international units) every day. Adults 71 and older need 800 IU every day. Blood tests for vitamin D can check your vitamin D level. But there is no standard normal range used by all laboratories. You're likely getting enough vitamin D if your levels are in the range of 20 to 50 ng/mL. How can you get more vitamin D? Foods that contain vitamin D include: 
· Arvin, tuna, and mackerel. These are some of the best foods to eat when you need to get more vitamin D. 
· Cheese, egg yolks, and beef liver. These foods have vitamin D in small amounts. · Milk, soy drinks, orange juice, yogurt, margarine, and some kinds of cereal have vitamin D added to them. Some people don't make vitamin D as well as others. They may have to take extra care in getting enough vitamin D. Things that reduce how much vitamin D your body makes include: · Dark skin, such as many  Americans have. · Age, especially if you are older than 72. · Digestive problems, such as Crohn's or celiac disease. · Liver and kidney disease. Some people who do not get enough vitamin D may need supplements. Are there any risks from taking vitamin D? 
· Too much vitamin D: 
? Can damage your kidneys. ? Can cause nausea and vomiting, constipation, and weakness. ? Raises the amount of calcium in your blood. If this happens, you can get confused or have an irregular heart rhythm. · Vitamin D may interact with other medicines. Tell your doctor about all of the medicines you take, including over-the-counter drugs, herbs, and pills. Tell your doctor about all of your current medical problems. Where can you learn more? Go to http://www.gray.com/ Enter [id number] in the search box to learn more about \"Learning About Vitamin D.\" 
Current as of: August 22, 2019               Content Version: 12.6 © 4756-7379 [employer]. Care instructions adapted under license by [employer] (which disclaims liability or warranty for this information). If you have questions about a medical condition or this instruction, always ask your healthcare professional. [place] [address] any warranty or liability for your use of this information. Vitamin B12 Deficiency: Care Instructions Overview A vitamin B12 deficiency means that your body doesn't have enough of this vitamin. You need vitamin B12 to keep red blood cells and nerve cells healthy. Not enough B12 can cause anemia. It can also damage nerves and cause trouble with memory and thinking. Many things can cause low levels of vitamin B12. They include: · Not getting enough of this vitamin through food. · An autoimmune problem, like pernicious anemia. · Weight-loss surgery, like gastric bypass. · Long-term use of heartburn medicines. Low levels of B12 may not cause symptoms. But symptoms may include fatigue, depression, and thinking or memory problems. You may have tingling in your hands or feet and changes in the way you walk. Treatment depends on the reason for low vitamin B12. Eating more foods rich in B12 may be enough. Or you might take the vitamin as a pill, as shots, or as nasal spray. How can you care for yourself? · Take vitamin B12 as your doctor recommends. · Go to your appointments if you are getting B12 shots. · Eat more foods rich in vitamin B12. Examples are: ? Animal products. These include meat, seafood, milk products, poultry, and eggs. ? Foods that have B12 added. These are called fortified foods. They include soy products, nutritional yeast, and dry cereals. · Work with a nutritionist or dietitian if you need help getting more vitamin B12 from food. · Talk to your doctor about stopping medicines if they are adding to your B12 deficiency. When should you call for help? Call 911 anytime you think you may need emergency care. For example, call if: 
  · You passed out (lost consciousness). Call your doctor now or seek immediate medical care if: 
  · You are dizzy or lightheaded, or you feel like you may faint. Watch closely for changes in your health. Be sure to call your doctor if: 
  · You are confused or can't think clearly.  
  · You don't get better as expected. Where can you learn more?  
Go to http://www.BTIG.com/ Enter ([phone] in the search box to learn more about \"Vitamin B12 Deficiency: Care Instructions. \" Current as of: November 8, 2019               Content Version: 12.6 © 1748-9407 [employer]. Care instructions adapted under license by [employer] (which disclaims liability or warranty for this information). If you have questions about a medical condition or this instruction, always ask your healthcare professional. [address] any warranty or liability for your use of this information.

## 2021-03-26 LAB
HCV AB SER IA-ACNC: 0.07 INDEX
HCV AB SERPL QL IA: NEGATIVE
HCV COMMENT,HCGAC: NORMAL

## 2021-04-20 ENCOUNTER — VIRTUAL VISIT (OUTPATIENT)
Dept: FAMILY MEDICINE CLINIC | Age: 51
End: 2021-04-20
Payer: COMMERCIAL

## 2021-04-20 DIAGNOSIS — E67.8 HYPERVITAMINOSIS, B COMPLEX: Primary | ICD-10-CM

## 2021-04-20 PROCEDURE — 99213 OFFICE O/P EST LOW 20 MIN: CPT | Performed by: NURSE PRACTITIONER

## 2021-04-20 NOTE — PROGRESS NOTES
Miley Griffiths is a 44 y.o. female who was seen by synchronous (real-time) audio-video technology on 4/20/2021 for Labs    Pt is following up for her labs. Her Vitamin B12 was elevated and she wanted to know is she should stop taking it every other day. Pt wants to know if 1 time a week is okay. Pt has had no side effects from levels. Pt has no other concerns. Assessment & Plan:   Diagnoses and all orders for this visit:    1. Hypervitaminosis, B complex      Follow-up and Dispositions    · Return in about 1 year (around 4/20/2022) for [facility] (No PAP). 12  Subjective:       Prior to Admission medications    Medication Sig Start Date End Date Taking? Authorizing Provider   fluticasone propionate (Flonase Allergy Relief) 50 mcg/actuation nasal spray 2 Sprays by Both Nostrils route daily. Yes Provider, Historical   ibuprofen (AdviL) 200 mg tablet Take  by mouth. Yes Provider, Historical   albuterol (PROVENTIL HFA, VENTOLIN HFA, PROAIR HFA) 90 mcg/actuation inhaler INHALE 2 PUFFS BY MOUTH EVERY 6 HOURS AS NEEDED FOR WHEEZE/COUGH 5/4/20  Yes Migel Orlando, NP   B.infantis-B.ani-B.long-B.bifi (Probiotic 4X) 10-15 mg TbEC Take  by mouth daily. Yes Provider, Historical   acetaminophen (TYLENOL) 325 mg tablet Take 325 mg by mouth every four (4) hours as needed for Pain. Yes Provider, Historical   cyanocobalamin 1,000 mcg tablet Take 2,500 mcg by mouth every seven (7) days. Yes Provider, Historical   cholecalciferol, vitamin D3, (VITAMIN D3 PO) Take 10,000 Units by mouth daily. Yes Provider, Historical   multivitamin (ONE A DAY) tablet Take 1 Tab by mouth daily. Yes Provider, Historical   esomeprazole (NEXIUM) 20 mg capsule Take 20 mg by mouth two (2) times a day. Yes Provider, Historical   diphenhydrAMINE (SIMPLY SLEEP) 25 mg tablet Take 12.5 mg by mouth nightly.    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  ibuprofen (AdviL) 200 mg tablet Take  by mouth.  albuterol (PROVENTIL HFA, VENTOLIN HFA, PROAIR HFA) 90 mcg/actuation inhaler INHALE 2 PUFFS BY MOUTH EVERY 6 HOURS AS NEEDED FOR WHEEZE/COUGH 8.5 Inhaler 0    B.infantis-B.ani-B.long-B.bifi (Probiotic 4X) 10-15 mg TbEC Take  by mouth daily.  acetaminophen (TYLENOL) 325 mg tablet Take 325 mg by mouth every four (4) hours as needed for Pain.  cyanocobalamin 1,000 mcg tablet Take 2,500 mcg by mouth every seven (7) days.  cholecalciferol, vitamin D3, (VITAMIN D3 PO) Take 10,000 Units by mouth daily.  multivitamin (ONE A DAY) tablet Take 1 Tab by mouth daily.  esomeprazole (NEXIUM) 20 mg capsule Take 20 mg by mouth two (2) times a day.  diphenhydrAMINE (SIMPLY SLEEP) 25 mg tablet Take 12.5 mg by mouth nightly.  OTHER Allergy shots once a week         ROS    Objective:   No flowsheet data found.    General: alert, cooperative, no distress   Mental  status: normal mood, behavior, speech, dress, motor activity, and thought processes, able to follow commands   HENT: NCAT   Neck: no visualized mass   Resp: no respiratory distress   Neuro: no gross deficits   Skin: no discoloration or lesions of concern on visible areas   Psychiatric: normal affect, consistent with stated mood, no evidence of hallucinations     Additional exam findings: N/A      We discussed the expected course, resolution and complications of the diagnosis(es) in detail. Medication risks, benefits, costs, interactions, and alternatives were discussed as indicated. I advised her to contact the office if her condition worsens, changes or fails to improve as anticipated. She expressed understanding with the diagnosis(es) and plan. Daniel Jaimes, who was evaluated through a patient-initiated, synchronous (real-time) audio-video encounter, and/or her healthcare decision maker, is aware that it is a billable service, with coverage as determined by her insurance carrier. She provided verbal consent to proceed: Yes, and patient identification was verified. It was conducted pursuant to the emergency declaration under the [address] [place] [address] authority and the [employer] Act. A caregiver was present when appropriate. Ability to conduct physical exam was limited. I was at home. The patient was at work.       Le Brink NP

## 2021-06-14 ENCOUNTER — HOSPITAL ENCOUNTER (OUTPATIENT)
Dept: MAMMOGRAPHY | Age: 51
Discharge: HOME | End: 2021-06-14
Attending: SURGERY
Payer: COMMERCIAL

## 2021-06-14 DIAGNOSIS — Z80.3 FAMILY HISTORY OF BREAST CANCER IN FIRST DEGREE RELATIVE: ICD-10-CM

## 2021-06-14 DIAGNOSIS — Z91.89 AT HIGH RISK FOR BREAST CANCER: ICD-10-CM

## 2021-06-14 PROCEDURE — 77063 BREAST TOMOSYNTHESIS BI: CPT

## 2021-06-18 NOTE — TELEPHONE ENCOUNTER
From: Don Chou  To: Alana Mckoy MD  Sent: 1/6/2019 9:13 AM EST  Subject: Visit Robb Kocher Dr. Vernelle Saint, I have been having Elbow pain (same arm as the shoulder) for over a year as well. Dr. Nanci Givens has given me three shots total in that that only last about two or three months. Would it be better if I got that MRI'd as well and check if is also torn? Could that surgery (if necessary) be done at that at the same time? Or do we have to do this as two different surgeries. I still have not received a phone call about scheduling the shoulder surgery either. Thank you. Patient needs a PA for this medication.

## 2021-06-22 NOTE — PROGRESS NOTES
Pr arrived to unit, oriented to room and call light, dual skin assessment competed with Patrica DOWELL.    Pt is here for preop exam for neck surgery on 3/5/2020 with Dr Estephania Fernandez    1. Have you been to the ER, urgent care clinic since your last visit? Hospitalized since your last visit? No    2. Have you seen or consulted any other health care providers outside of the [address] [place] [address] since your last visit? Include any pap smears or colon screening.  No

## 2021-06-30 ENCOUNTER — OFFICE VISIT (OUTPATIENT)
Dept: SURGERY | Age: 51
End: 2021-06-30
Payer: COMMERCIAL

## 2021-06-30 VITALS
HEIGHT: 62 IN | SYSTOLIC BLOOD PRESSURE: 106 MMHG | TEMPERATURE: 97.8 F | RESPIRATION RATE: 16 BRPM | DIASTOLIC BLOOD PRESSURE: 70 MMHG | BODY MASS INDEX: 28.16 KG/M2 | HEART RATE: 73 BPM | WEIGHT: 153 LBS

## 2021-06-30 DIAGNOSIS — Z80.3 FAMILY HISTORY OF BREAST CANCER IN FIRST DEGREE RELATIVE: ICD-10-CM

## 2021-06-30 DIAGNOSIS — Z12.39 BREAST CANCER SCREENING OTHER THAN MAMMOGRAM: ICD-10-CM

## 2021-06-30 DIAGNOSIS — Z91.89 AT HIGH RISK FOR BREAST CANCER: Primary | ICD-10-CM

## 2021-06-30 PROCEDURE — 99213 OFFICE O/P EST LOW 20 MIN: CPT | Performed by: SURGERY

## 2021-06-30 RX ORDER — DOXYCYCLINE 50 MG/1
CAPSULE ORAL
COMMUNITY
Start: 2021-06-14 | End: 2022-01-05 | Stop reason: ALTCHOICE

## 2021-06-30 NOTE — PROGRESS NOTES
[facility]  General Surgery    Subjective:  Patient in a high risk breast cancer screening program secondary to 31% lifetime risk of developing breast cancer due to family history of breast cancer. She has no new complaints. She continues to perform self breast exam.  She denies any unintentional weight loss or breast pain or nipple drainage or discharge. Objective:  Vitals:    06/30/21 1537   BP: 106/70   Pulse: 73   Resp: 16   Temp: 97.8 °F (36.6 °C)   TempSrc: Temporal   Weight: 69.4 kg (153 lb)   Height: 5' 2\" (1.575 m)       Physical Exam:    General: Awake and alert, oriented x4, no apparent distress   Breasts:    Left: No dimpling, discoloration, nipple inversion or retractions. No axillary or supraclavicular lymphadenopathy. No mass   Right: No dimpling, discoloration, nipple inversion or retractions. No axillary or supraclavicular lymphadenopathy. No mass    Current Medications:  Current Outpatient Medications   Medication Sig Dispense Refill    doxycycline (MONODOX) 50 mg capsule       OTHER Allergy shots once a week      fluticasone propionate (Flonase Allergy Relief) 50 mcg/actuation nasal spray 2 Sprays by Both Nostrils route daily.  ibuprofen (AdviL) 200 mg tablet Take  by mouth.  albuterol (PROVENTIL HFA, VENTOLIN HFA, PROAIR HFA) 90 mcg/actuation inhaler INHALE 2 PUFFS BY MOUTH EVERY 6 HOURS AS NEEDED FOR WHEEZE/COUGH 8.5 Inhaler 0    B.infantis-B.ani-B.long-B.bifi (Probiotic 4X) 10-15 mg TbEC Take  by mouth daily.  acetaminophen (TYLENOL) 325 mg tablet Take 325 mg by mouth every four (4) hours as needed for Pain.  cyanocobalamin 1,000 mcg tablet Take 2,500 mcg by mouth every seven (7) days.  cholecalciferol, vitamin D3, (VITAMIN D3 PO) Take 10,000 Units by mouth daily.  multivitamin (ONE A DAY) tablet Take 1 Tab by mouth daily.  esomeprazole (NEXIUM) 20 mg capsule Take 20 mg by mouth two (2) times a day.       diphenhydrAMINE (SIMPLY SLEEP) 25 mg tablet Take 12.5 mg by mouth nightly. Chart and notes reviewed. Data reviewed. I have evaluated and examined the patient. Impression and plan:  Patient with increased risk of developing breast cancer 31.1% lifetime risk of developing breast cancer due to family history. Continue in a high risk breast cancer screening program  Bilateral breast MRI in 6 months  Clinical breast exam in 6 months following MRI  Continue monthly self breast exam  Please call or visit with any questions or concerns.        Lukas Briscoe MD

## 2021-06-30 NOTE — PATIENT INSTRUCTIONS

## 2021-09-07 ENCOUNTER — OFFICE VISIT (OUTPATIENT)
Dept: SURGERY | Age: 51
End: 2021-09-07
Payer: COMMERCIAL

## 2021-09-07 VITALS
TEMPERATURE: 97.8 F | DIASTOLIC BLOOD PRESSURE: 82 MMHG | HEIGHT: 62 IN | BODY MASS INDEX: 29.44 KG/M2 | HEART RATE: 64 BPM | RESPIRATION RATE: 17 BRPM | SYSTOLIC BLOOD PRESSURE: 115 MMHG | WEIGHT: 160 LBS

## 2021-09-07 DIAGNOSIS — R22.31 AXILLARY MASS, RIGHT: Primary | ICD-10-CM

## 2021-09-07 PROCEDURE — 99213 OFFICE O/P EST LOW 20 MIN: CPT | Performed by: SURGERY

## 2021-09-07 RX ORDER — NAPROXEN 375 MG/1
375 TABLET ORAL 2 TIMES DAILY WITH MEALS
COMMUNITY
End: 2022-01-10 | Stop reason: ALTCHOICE

## 2021-09-07 NOTE — PROGRESS NOTES
[facility]  General Surgery    Subjective:  Patient is in a high risk breast cancer screening program secondary to 31% lifetime risk of developing breast cancer due to family history. She was last seen in late June at which time her exam was normal as well as her bilateral screening mammogram BI-RADS 1. She presents now with complaints of a fatty tissue mass in the right axilla. She states that it first was noted a couple weeks ago. There has been no increase in size. It may have decreased in size. She denies any night sweats or fever chills or nausea vomiting. She denies any unintentional weight loss. She has no pain. Objective:  Vitals:    09/07/21 1554   BP: 115/82   Pulse: 64   Resp: 17   Temp: 97.8 °F (36.6 °C)   TempSrc: Temporal   Weight: 72.6 kg (160 lb)   Height: 5' 2\" (1.575 m)       Physical Exam:    General: Awake and alert, oriented x4, no apparent distress   Breasts:    Right: No discrete right axillary masses are noted. There is a fullness in the axilla but no discrete mass. Current Medications:  Current Outpatient Medications   Medication Sig Dispense Refill    naproxen (NAPROSYN) 375 mg tablet Take 375 mg by mouth two (2) times daily (with meals).  OTHER Allergy shots once a week      B.infantis-B.ani-B.long-B.bifi (Probiotic 4X) 10-15 mg TbEC Take  by mouth daily.  cyanocobalamin 1,000 mcg tablet Take 2,500 mcg by mouth every seven (7) days.  cholecalciferol, vitamin D3, (VITAMIN D3 PO) Take 10,000 Units by mouth daily.  multivitamin (ONE A DAY) tablet Take 1 Tab by mouth daily.  esomeprazole (NEXIUM) 20 mg capsule Take 20 mg by mouth two (2) times a day.  diphenhydrAMINE (SIMPLY SLEEP) 25 mg tablet Take 12.5 mg by mouth nightly.  doxycycline (MONODOX) 50 mg capsule  (Patient not taking: Reported on 9/7/2021)      fluticasone propionate (Flonase Allergy Relief) 50 mcg/actuation nasal spray 2 Sprays by Both Nostrils route daily. (Patient not taking: Reported on 9/7/2021)      ibuprofen (AdviL) 200 mg tablet Take  by mouth. (Patient not taking: Reported on 9/7/2021)      albuterol (PROVENTIL HFA, VENTOLIN HFA, PROAIR HFA) 90 mcg/actuation inhaler INHALE 2 PUFFS BY MOUTH EVERY 6 HOURS AS NEEDED FOR WHEEZE/COUGH (Patient not taking: Reported on 9/7/2021) 8.5 Inhaler 0    acetaminophen (TYLENOL) 325 mg tablet Take 325 mg by mouth every four (4) hours as needed for Pain. (Patient not taking: Reported on 9/7/2021)         Chart and notes reviewed. Data reviewed. I have evaluated and examined the patient. Impression and plan:  Patient with right axillary fullness of unclear etiology but no discrete masses.   Ultrasound right axilla  Follow-up in 2 weeks     Marquita Vela MD

## 2021-09-13 ENCOUNTER — HOSPITAL ENCOUNTER (OUTPATIENT)
Dept: ULTRASOUND IMAGING | Age: 51
Discharge: HOME | End: 2021-09-13
Attending: SURGERY
Payer: COMMERCIAL

## 2021-09-13 DIAGNOSIS — R22.31 AXILLARY MASS, RIGHT: ICD-10-CM

## 2021-09-13 PROCEDURE — 76882 US LMTD JT/FCL EVL NVASC XTR: CPT

## 2021-10-01 NOTE — ED NOTES
I have reviewed discharge instructions with the patient. The patient verbalized understanding. Medication teaching given, to include name, dose, action, and side effects. Patient verbalized understanding of medications. Encouraged patient to voice any concerns with reassurance provided. Patient armband removed and shredded    Patient Discharged in stable condition.
resilient/elastic

## 2021-10-06 ENCOUNTER — TELEPHONE (OUTPATIENT)
Dept: SURGERY | Age: 51
End: 2021-10-06

## 2021-10-06 DIAGNOSIS — Z91.89 INCREASED RISK OF BREAST CANCER: Primary | ICD-10-CM

## 2021-10-06 NOTE — TELEPHONE ENCOUNTER
I spoke with Ms. Deepali Garnica this morning to let her know that we need to reschedule her appointment because of a surgery I need to perform this afternoon. She was very understanding. She states that she had intentions of calling our office as well because she had a coworker tested positive for Covid and was quarantined. We discussed the negative results of her axillary ultrasound. She will see us in December 2021 following her bilateral breast MRI.

## 2021-12-07 ENCOUNTER — HOSPITAL ENCOUNTER (OUTPATIENT)
Age: 51
Discharge: HOME | End: 2021-12-07
Attending: SURGERY
Payer: COMMERCIAL

## 2021-12-07 DIAGNOSIS — Z91.89 INCREASED RISK OF BREAST CANCER: ICD-10-CM

## 2021-12-07 PROCEDURE — 74011250636 HC RX REV CODE- 250/636: Performed by: SURGERY

## 2021-12-07 PROCEDURE — A9577 INJ MULTIHANCE: HCPCS | Performed by: SURGERY

## 2021-12-07 PROCEDURE — 77049 MRI BREAST C-+ W/CAD BI: CPT

## 2021-12-07 RX ADMIN — GADOBENATE DIMEGLUMINE 15 ML: 529 INJECTION, SOLUTION INTRAVENOUS at 10:56

## 2021-12-29 ENCOUNTER — HOSPITAL ENCOUNTER (EMERGENCY)
Age: 51
Discharge: HOME | End: 2021-12-29
Attending: STUDENT IN AN ORGANIZED HEALTH CARE EDUCATION/TRAINING PROGRAM
Payer: COMMERCIAL

## 2021-12-29 ENCOUNTER — NURSE TRIAGE (OUTPATIENT)
Dept: OTHER | Facility: CLINIC | Age: 51
End: 2021-12-29

## 2021-12-29 ENCOUNTER — APPOINTMENT (OUTPATIENT)
Dept: GENERAL RADIOLOGY | Age: 51
End: 2021-12-29
Attending: STUDENT IN AN ORGANIZED HEALTH CARE EDUCATION/TRAINING PROGRAM
Payer: COMMERCIAL

## 2021-12-29 VITALS
SYSTOLIC BLOOD PRESSURE: 134 MMHG | RESPIRATION RATE: 16 BRPM | HEIGHT: 62 IN | WEIGHT: 155 LBS | DIASTOLIC BLOOD PRESSURE: 89 MMHG | BODY MASS INDEX: 28.52 KG/M2 | TEMPERATURE: 98.4 F | HEART RATE: 61 BPM | OXYGEN SATURATION: 100 %

## 2021-12-29 DIAGNOSIS — G89.29 CHRONIC PAIN IN LEFT SHOULDER: Primary | ICD-10-CM

## 2021-12-29 DIAGNOSIS — R07.9 NONSPECIFIC CHEST PAIN: ICD-10-CM

## 2021-12-29 DIAGNOSIS — M25.512 CHRONIC PAIN IN LEFT SHOULDER: Primary | ICD-10-CM

## 2021-12-29 LAB
ALBUMIN SERPL-MCNC: 3.9 G/DL (ref 3.4–5)
ALBUMIN/GLOB SERPL: 1.1 {RATIO} (ref 0.8–1.7)
ALP SERPL-CCNC: 98 U/L (ref 45–117)
ALT SERPL-CCNC: 37 U/L (ref 13–56)
ANION GAP SERPL CALC-SCNC: 6 MMOL/L (ref 3–18)
AST SERPL-CCNC: 22 U/L (ref 10–38)
ATRIAL RATE: 65 BPM
BASOPHILS # BLD: 0 K/UL (ref 0–0.1)
BASOPHILS NFR BLD: 0 % (ref 0–2)
BILIRUB SERPL-MCNC: 0.3 MG/DL (ref 0.2–1)
BNP SERPL-MCNC: 40 PG/ML (ref 0–900)
BUN SERPL-MCNC: 16 MG/DL (ref 7–18)
BUN/CREAT SERPL: 24 (ref 12–20)
CALCIUM SERPL-MCNC: 9 MG/DL (ref 8.5–10.1)
CALCULATED P AXIS, ECG09: 21 DEGREES
CALCULATED R AXIS, ECG10: -53 DEGREES
CALCULATED T AXIS, ECG11: -10 DEGREES
CHLORIDE SERPL-SCNC: 107 MMOL/L (ref 100–111)
CO2 SERPL-SCNC: 28 MMOL/L (ref 21–32)
CREAT SERPL-MCNC: 0.68 MG/DL (ref 0.6–1.3)
DIAGNOSIS, 93000: NORMAL
DIFFERENTIAL METHOD BLD: NORMAL
EOSINOPHIL # BLD: 0.3 K/UL (ref 0–0.4)
EOSINOPHIL NFR BLD: 4 % (ref 0–5)
ERYTHROCYTE [DISTWIDTH] IN BLOOD BY AUTOMATED COUNT: 12.5 % (ref 11.6–14.5)
GLOBULIN SER CALC-MCNC: 3.4 G/DL (ref 2–4)
GLUCOSE SERPL-MCNC: 104 MG/DL (ref 74–99)
HCT VFR BLD AUTO: 41.6 % (ref 35–45)
HGB BLD-MCNC: 14 G/DL (ref 12–16)
IMM GRANULOCYTES # BLD AUTO: 0 K/UL (ref 0–0.04)
IMM GRANULOCYTES NFR BLD AUTO: 0 % (ref 0–0.5)
LYMPHOCYTES # BLD: 2 K/UL (ref 0.9–3.6)
LYMPHOCYTES NFR BLD: 28 % (ref 21–52)
MCH RBC QN AUTO: 30.6 PG (ref 24–34)
MCHC RBC AUTO-ENTMCNC: 33.7 G/DL (ref 31–37)
MCV RBC AUTO: 91 FL (ref 78–100)
MONOCYTES # BLD: 0.4 K/UL (ref 0.05–1.2)
MONOCYTES NFR BLD: 6 % (ref 3–10)
NEUTS SEG # BLD: 4.2 K/UL (ref 1.8–8)
NEUTS SEG NFR BLD: 61 % (ref 40–73)
NRBC # BLD: 0 K/UL (ref 0–0.01)
NRBC BLD-RTO: 0 PER 100 WBC
P-R INTERVAL, ECG05: 134 MS
PLATELET # BLD AUTO: 330 K/UL (ref 135–420)
PMV BLD AUTO: 9.9 FL (ref 9.2–11.8)
POTASSIUM SERPL-SCNC: 4.1 MMOL/L (ref 3.5–5.5)
PROT SERPL-MCNC: 7.3 G/DL (ref 6.4–8.2)
Q-T INTERVAL, ECG07: 384 MS
QRS DURATION, ECG06: 80 MS
QTC CALCULATION (BEZET), ECG08: 399 MS
RBC # BLD AUTO: 4.57 M/UL (ref 4.2–5.3)
SODIUM SERPL-SCNC: 141 MMOL/L (ref 136–145)
TROPONIN-HIGH SENSITIVITY: 3 NG/L (ref 0–54)
VENTRICULAR RATE, ECG03: 65 BPM
WBC # BLD AUTO: 7 K/UL (ref 4.6–13.2)

## 2021-12-29 PROCEDURE — 80053 COMPREHEN METABOLIC PANEL: CPT

## 2021-12-29 PROCEDURE — 99283 EMERGENCY DEPT VISIT LOW MDM: CPT

## 2021-12-29 PROCEDURE — 71045 X-RAY EXAM CHEST 1 VIEW: CPT

## 2021-12-29 PROCEDURE — 83880 ASSAY OF NATRIURETIC PEPTIDE: CPT

## 2021-12-29 PROCEDURE — 84484 ASSAY OF TROPONIN QUANT: CPT

## 2021-12-29 PROCEDURE — 85025 COMPLETE CBC W/AUTO DIFF WBC: CPT

## 2021-12-29 PROCEDURE — 93005 ELECTROCARDIOGRAM TRACING: CPT

## 2021-12-29 RX ORDER — CAPSAICIN 0.07 G/100G
CREAM TOPICAL 3 TIMES DAILY
Qty: 60 G | Refills: 0 | Status: SHIPPED | OUTPATIENT
Start: 2021-12-29 | End: 2022-07-24

## 2021-12-29 NOTE — TELEPHONE ENCOUNTER
Received call from Dima De Santiago at [facility] with Red Flag Complaint. Subjective: Caller states \"Three separate issues: left shoulder pain, right hip pain, and eye pain. \"     Current Symptoms:   Left shoulder pain (several weeks around my back - very sore spot) if I push or sleep on it it hurts \"feels like it's the muscle possibly\" - and does hurt more when moving arm/neck     Right hip pain - not as severe - \"I suspect I need to do some stretches\" - hurts like a bruise     Eye ( Started 2 weeks ago) underneath my eye started to hurt - Last Tuesday 12/21 went to [facility] - given drops for 1 week - still feels the same - feels like there is a \"cut on lower eyelid - feels like glass\" - vision is NOT impaired - just stabbing pain    Pt denies any cardiac history   Pt denies shortness of breath or chest pain    Onset: several weeks ago; worsening    Associated Symptoms: NA    Pain Severity:     Temperature: Denies fever    What has been tried: Stretches, went to [facility] for eval of the eye     LMP: NA Pregnant: NA    Recommended disposition: Go to ED Now - have someone else drive    Care advice provided, patient verbalizes understanding; denies any other questions or concerns; instructed to call back for any new or worsening symptoms. Patient proceeding to [facility] in [facility] Emergency Department    Attention Provider: Thank you for allowing me to participate in the care of your patient. The patient was connected to triage in response to information provided to the [facility]. Please do not respond through this encounter as the response is not directed to a shared pool.     Reason for Disposition   Age > 36 and no obvious cause and pain even when not moving the arm (Exception: pain is clearly made worse by moving arm or bending neck)    Protocols used: SHOULDER PAIN-ADULT-[place]

## 2021-12-29 NOTE — ED PROVIDER NOTES
EMERGENCY DEPARTMENT HISTORY AND PHYSICAL EXAM      Patient Name: Clarissa Banks    History of Presenting Illness     HPI:     24-year-old female with no significant medical history presents to the ED for evaluation of 1 month history of a left scapula/shoulder discomfort that seems to be constant, worse with palpation to the area or movement of her right shoulder. She called her PCP today as she was traveling recently out of town and please instruct her to come the ER to have her heart evaluated for concern of cardiac related etiology. She also is reporting small swelling in her left lower eyelid over the last 2 weeks, initially thought she had a stye and was given eyedrops by an urgent care in [place]. She denies any chest pain, nausea, vomiting or diaphoresis, extremity paresthesias or weakness, syncope, presyncope, blurry or double vision, fevers, chills. PCP: Willard Wills NP    No current facility-administered medications on file prior to encounter. Current Outpatient Medications on File Prior to Encounter   Medication Sig Dispense Refill    naproxen (NAPROSYN) 375 mg tablet Take 375 mg by mouth two (2) times daily (with meals).  doxycycline (MONODOX) 50 mg capsule  (Patient not taking: Reported on 9/7/2021)      OTHER Allergy shots once a week      fluticasone propionate (Flonase Allergy Relief) 50 mcg/actuation nasal spray 2 Sprays by Both Nostrils route daily. (Patient not taking: Reported on 9/7/2021)      ibuprofen (AdviL) 200 mg tablet Take  by mouth. (Patient not taking: Reported on 9/7/2021)      albuterol (PROVENTIL HFA, VENTOLIN HFA, PROAIR HFA) 90 mcg/actuation inhaler INHALE 2 PUFFS BY MOUTH EVERY 6 HOURS AS NEEDED FOR WHEEZE/COUGH (Patient not taking: Reported on 9/7/2021) 8.5 Inhaler 0    B.infantis-B.ani-B.long-B.bifi (Probiotic 4X) 10-15 mg TbEC Take  by mouth daily.       acetaminophen (TYLENOL) 325 mg tablet Take 325 mg by mouth every four (4) hours as needed for Pain. (Patient not taking: Reported on 9/7/2021)      cyanocobalamin 1,000 mcg tablet Take 2,500 mcg by mouth every seven (7) days.  cholecalciferol, vitamin D3, (VITAMIN D3 PO) Take 10,000 Units by mouth daily.  multivitamin (ONE A DAY) tablet Take 1 Tab by mouth daily.  esomeprazole (NEXIUM) 20 mg capsule Take 20 mg by mouth two (2) times a day.  diphenhydrAMINE (SIMPLY SLEEP) 25 mg tablet Take 12.5 mg by mouth nightly.          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    Skin cancer     removed from nose    Sleep apnea     \"mild per patient\" no cpap    Thumb pain 6/7/2010       Past Surgical History:  Past Surgical History:   Procedure Laterality Date    HX BACK SURGERY  2012    HX CERVICAL FUSION  03/05/2020    Anterior    HX CERVICAL FUSION  03/05/2020    HX GYN      left tube removal ectopic pregnancy    HX HEENT  9/2011    Basal cell cancer lesion removed from nose    HX LYMPHADENECTOMY Right 8/25/16    HX OOPHORECTOMY      HX ORTHOPAEDIC  1999    TMJ    HX OTHER SURGICAL  2013    Spinal Fusion    HX ROTATOR CUFF REPAIR Right 2019    x2    HX TONSILLECTOMY  2/2007       Family History:  Family History   Problem Relation Age of Onset    Cancer Mother         breast cancer    Diabetes Father     High Cholesterol Father     Lung Disease Father         COPD and nodule on lung    Heart Disease Father     Gout Father     Cancer Maternal Aunt         breast cancer    OSTEOARTHRITIS Maternal Grandmother         arthritis     Cancer Cousin        Social History:  Social History     Tobacco Use    Smoking status: Never Smoker    Smokeless tobacco: Never Used   Substance Use Topics    Alcohol use: Yes     Comment: few times per week    Drug use: No       Allergies: Allergies   Allergen Reactions    Ciprofloxacin Diarrhea    Milk Containing Products Diarrhea    Pennsaid [Diclofenac Sodium] Rash    Sulfa (Sulfonamide Antibiotics) Rash    Other Medication Diarrhea     Eggs, patient eats, and gets flu shot yearly with no reaction       Review of Nursing Notes: I have reviewed the relevant nursing notes that were available at the time of this entry. Portions of the Family and Social history, as well as medications and allergies, may have been entered into my documentation by nursing or other ancillary staff; I have confirmed and may have supplemented that information to the best of my ability at the time the note was reviewed. There are some disagreements between the nursing notes and my evaluation at times. Some of the above referenced nursing documentation appears in my note after completion of my review. Review of Systems     CONSTITUTIONAL: Denies fevers, chills, sweats, or weight changes. EYES: Denies any visual changes or symptoms. HENT: Denies headaches, changes to hearing, rhinitis, sore throat, dysphagia, or change in voice. CV: Denies chest pains or palpitations. Lungs/Chest: Denies dyspnea, wheezing, or cough. GI: Denies nausea, vomiting, diarrhea, constipation, abdominal pain, hematochezia, or melena. : Denies urinary retention or incontinence. No genital discharge. No dysuria. MSK: Denies myalgias. NEURO: Denies chronic headaches or seizures. No numbness, tingling or weakness. PSYCHIATRIC: Denies problems with mood disturbance and anxiety. DERMATOLOGIC: Denies any rashes or skin changes. Physical Exam     General: In no apparent distress. Well-nourished/well-developed. Head/Neck: Normocephalic, atraumatic. Nontender midline neck, normal ROM. EENT: PERRLA. EOM intact bilaterally.  Oropharyngeal mucosa is moist, lesions or erythema. No nasal discharge. Cardiovascular: RRR, no murmurs, gallops, or rubs. Peripheral pulses normal and intact in BUE and BLE. Lungs/Chest: CTAB, no wheezing, rhonchi, or rales. Abdomen: No distention. No organomegaly. No rebound, rigidity, or guarding. Nontender. Extremities/Skin: Warm distal extremities No deformities. No edema. No rashes. Point tenderness to the left lateral scapular border. Pain reproduced with any ROM of the left shoulder. Neurovascularly intact distally. Neuro: A&O x 3. Grossly intact sensations and motor function in upper and lower extremities bilaterally. Psych: Appropriate mood and affect. Diagnostic Study Results     Labs -     Recent Results (from the past 12 hour(s))   METABOLIC PANEL, COMPREHENSIVE    Collection Time: 12/29/21  9:49 AM   Result Value Ref Range    Sodium 141 136 - 145 mmol/L    Potassium 4.1 3.5 - 5.5 mmol/L    Chloride 107 100 - 111 mmol/L    CO2 28 21 - 32 mmol/L    Anion gap 6 3.0 - 18 mmol/L    Glucose 104 (H) 74 - 99 mg/dL    BUN 16 7.0 - 18 MG/DL    Creatinine 0.68 0.6 - 1.3 MG/DL    BUN/Creatinine ratio 24 (H) 12 - 20      GFR est AA >60 >60 ml/min/1.73m2    GFR est non-AA >60 >60 ml/min/1.73m2    Calcium 9.0 8.5 - 10.1 MG/DL    Bilirubin, total 0.3 0.2 - 1.0 MG/DL    ALT (SGPT) 37 13 - 56 U/L    AST (SGOT) 22 10 - 38 U/L    Alk.  phosphatase 98 45 - 117 U/L    Protein, total 7.3 6.4 - 8.2 g/dL    Albumin 3.9 3.4 - 5.0 g/dL    Globulin 3.4 2.0 - 4.0 g/dL    A-G Ratio 1.1 0.8 - 1.7     CBC WITH AUTOMATED DIFF    Collection Time: 12/29/21  9:49 AM   Result Value Ref Range    WBC 7.0 4.6 - 13.2 K/uL    RBC 4.57 4.20 - 5.30 M/uL    HGB 14.0 12.0 - 16.0 g/dL    HCT 41.6 35.0 - 45.0 %    MCV 91.0 78.0 - 100.0 FL    MCH 30.6 24.0 - 34.0 PG    MCHC 33.7 31.0 - 37.0 g/dL    RDW 12.5 11.6 - 14.5 %    PLATELET 252 117 - 024 K/uL    MPV 9.9 9.2 - 11.8 FL    NRBC 0.0 0  WBC    ABSOLUTE NRBC 0.00 0.00 - 0.01 K/uL    NEUTROPHILS 61 40 - 73 % LYMPHOCYTES 28 21 - 52 %    MONOCYTES 6 3 - 10 %    EOSINOPHILS 4 0 - 5 %    BASOPHILS 0 0 - 2 %    IMMATURE GRANULOCYTES 0 0.0 - 0.5 %    ABS. NEUTROPHILS 4.2 1.8 - 8.0 K/UL    ABS. LYMPHOCYTES 2.0 0.9 - 3.6 K/UL    ABS. MONOCYTES 0.4 0.05 - 1.2 K/UL    ABS. EOSINOPHILS 0.3 0.0 - 0.4 K/UL    ABS. BASOPHILS 0.0 0.0 - 0.1 K/UL    ABS. IMM. GRANS. 0.0 0.00 - 0.04 K/UL    DF AUTOMATED     NT-PRO BNP    Collection Time: 12/29/21  9:49 AM   Result Value Ref Range    NT pro-BNP 40 0 - 900 PG/ML   TROPONIN-HIGH SENSITIVITY    Collection Time: 12/29/21  9:49 AM   Result Value Ref Range    Troponin-High Sensitivity 3 0 - 54 ng/L       Radiologic Studies -   XR CHEST PORT   Final Result   1. No acute infiltrate or effusion. CT Results  (Last 48 hours)    None        CXR Results  (Last 48 hours)               12/29/21 0928  XR CHEST PORT Final result    Impression:  1. No acute infiltrate or effusion. Narrative:  EXAM: Chest Radiograph       INDICATION:  cp       TECHNIQUE: AP view of the chest       COMPARISON: 3/24/2020, 11/2/2012       FINDINGS: No pneumothorax identified. The lungs are clear. No infiltrates   appreciated. No effusions identified. The cardiac silhouette is mildly   prominent. This is slightly more conspicuous on the most recent prior. The   pulmonary vasculature is unremarkable. Old right clavicle fracture is again   noted. No acute pathology appreciated in the osseous structures. Medical Decision Making     I am the first provider for this patient. Vital Signs- I personally reviewed and interpreted the patient's vital signs. No data found. EKG interpretation: Normal sinus rhythm. Rate 65. Normal axis. Normal intervals. Nonspecific ST/T changes. No STEMI.   EKG read and interpreted by ED physician at 2702    Records Reviewed: I reviewed the patient's records to interpret any previous medical data available to me including EKGs, previous medical records, previous images, previous labs. Provider Notes (Medical Decision Making):     47 yo F presents to the ER for evaluation of 1 month history of left shoulder pain. Reproducible and positional.    Upon my examination, patient is afebrile and overall well appearing. Hemodynamically normal. Neurovascularly intact. Given today's clinical picture, my differential diagnoses indlude: MSK pain, contusion, muscle sprain/strain, rotator cuff injury, acs. To further refine my diagnosis, I will order ekg, cxr, labs including cardiac enzymes. ED Course:     ED Course as of 12/29/21 1120   Wed Dec 29, 2021   1119 Initial set of labs including cardiac enzymes and EKG are reassuring. CXR normal.  Unknown etiology of her nonspecific left scapular pain however I highly doubt it is cardiac related as it should have manifested itself in the work-up today if it has been ongoing for this long. I do still want her to see a cardiologist, discussed follow-up. Patient remained stable throughout their ED course. I discussed relevant findings with patient. My plan is to discharge home with return precautions and PCP follow-up within two days. Additional verbal discharge instructions were given and discussed with the patient. Patient expressed understanding, agrees to plan, and all of their questions were answered.  [EK]      ED Course User Index  [EK] DO Rosalba Bailey, DO  Date: 12/29/2021

## 2021-12-29 NOTE — ED NOTES
Assumed care of patient, A&Ox4 resp even and unlabored, NAD noted or indicated, ambulates with steady gait. Pt presents to ed with complaints of shoulder blade and neck pain. Pt appears well, denies SOB.

## 2022-01-05 ENCOUNTER — OFFICE VISIT (OUTPATIENT)
Dept: SURGERY | Age: 52
End: 2022-01-05
Payer: COMMERCIAL

## 2022-01-05 VITALS
SYSTOLIC BLOOD PRESSURE: 130 MMHG | HEIGHT: 62 IN | OXYGEN SATURATION: 98 % | WEIGHT: 169 LBS | BODY MASS INDEX: 31.1 KG/M2 | TEMPERATURE: 98.2 F | DIASTOLIC BLOOD PRESSURE: 80 MMHG | RESPIRATION RATE: 16 BRPM | HEART RATE: 85 BPM

## 2022-01-05 DIAGNOSIS — Z91.89 AT HIGH RISK FOR BREAST CANCER: Primary | ICD-10-CM

## 2022-01-05 DIAGNOSIS — Z80.3 FAMILY HISTORY OF BREAST CANCER IN FIRST DEGREE RELATIVE: ICD-10-CM

## 2022-01-05 PROCEDURE — 99213 OFFICE O/P EST LOW 20 MIN: CPT | Performed by: SURGERY

## 2022-01-05 NOTE — PATIENT INSTRUCTIONS

## 2022-01-05 NOTE — PROGRESS NOTES
[facility]  General Surgery    Subjective:  Patient in a high risk breast cancer surveillance program secondary to family history of breast cancer given her a 31% lifetime risk of developing breast cancer. She denies any unintentional weight loss breast pain nipple drainage or discharge. She still feels a fullness in the right axilla. However right axillary ultrasound was negative for mass. Bilateral breast MRI performed December 7 is BI-RADS 2. Objective:  Vitals:    01/05/22 1523   BP: 130/80   Pulse: 85   Resp: 16   Temp: 98.2 °F (36.8 °C)   TempSrc: Temporal   SpO2: 98%   Weight: 76.7 kg (169 lb)   Height: 5' 2\" (1.575 m)       Physical Exam:    General: Awake and alert, oriented x4, no apparent distress   Breasts:    Left: No dimpling, discoloration, nipple inversion or retractions. No axillary or supraclavicular lymphadenopathy. No mass   Right: No dimpling, discoloration, nipple inversion or retractions. No axillary or supraclavicular lymphadenopathy. No mass    Current Medications:  Current Outpatient Medications   Medication Sig Dispense Refill    capsaicin 0.075 % topical cream Apply  to affected area three (3) times daily. 60 g 0    naproxen (NAPROSYN) 375 mg tablet Take 375 mg by mouth two (2) times daily (with meals).  OTHER Allergy shots once a week      B.infantis-B.ani-B.long-B.bifi (Probiotic 4X) 10-15 mg TbEC Take  by mouth daily.  cholecalciferol, vitamin D3, (VITAMIN D3 PO) Take 10,000 Units by mouth daily.  multivitamin (ONE A DAY) tablet Take 1 Tab by mouth daily.  esomeprazole (NEXIUM) 20 mg capsule Take 20 mg by mouth two (2) times a day.  diphenhydrAMINE (SIMPLY SLEEP) 25 mg tablet Take 12.5 mg by mouth nightly.  doxycycline (MONODOX) 50 mg capsule  (Patient not taking: Reported on 9/7/2021)      fluticasone propionate (Flonase Allergy Relief) 50 mcg/actuation nasal spray 2 Sprays by Both Nostrils route daily.  (Patient not taking: Reported on 9/7/2021)      ibuprofen (AdviL) 200 mg tablet Take  by mouth. (Patient not taking: Reported on 9/7/2021)      albuterol (PROVENTIL HFA, VENTOLIN HFA, PROAIR HFA) 90 mcg/actuation inhaler INHALE 2 PUFFS BY MOUTH EVERY 6 HOURS AS NEEDED FOR WHEEZE/COUGH (Patient not taking: Reported on 9/7/2021) 8.5 Inhaler 0    acetaminophen (TYLENOL) 325 mg tablet Take 325 mg by mouth every four (4) hours as needed for Pain. (Patient not taking: Reported on 9/7/2021)      cyanocobalamin 1,000 mcg tablet Take 2,500 mcg by mouth every seven (7) days. Chart and notes reviewed. Data reviewed. I have evaluated and examined the patient. Impression and plan:  Patient with 31.1% lifetime risk of developing breast cancer secondary to family history. Continue monthly self breast exam  Bilateral 3D screening mammography in 6 months  Clinical breast exam to follow-up mammogram  Please call or visit with any questions or concerns.        Melly Varela MD

## 2022-01-10 ENCOUNTER — HOSPITAL ENCOUNTER (OUTPATIENT)
Dept: GENERAL RADIOLOGY | Age: 52
Discharge: HOME | End: 2022-01-10
Payer: COMMERCIAL

## 2022-01-10 ENCOUNTER — PATIENT MESSAGE (OUTPATIENT)
Dept: FAMILY MEDICINE CLINIC | Age: 52
End: 2022-01-10

## 2022-01-10 ENCOUNTER — VIRTUAL VISIT (OUTPATIENT)
Dept: FAMILY MEDICINE CLINIC | Age: 52
End: 2022-01-10
Payer: COMMERCIAL

## 2022-01-10 DIAGNOSIS — M25.512 ACUTE PAIN OF LEFT SHOULDER: ICD-10-CM

## 2022-01-10 DIAGNOSIS — M25.512 ACUTE PAIN OF LEFT SHOULDER: Primary | ICD-10-CM

## 2022-01-10 PROCEDURE — 73010 X-RAY EXAM OF SHOULDER BLADE: CPT

## 2022-01-10 PROCEDURE — 73030 X-RAY EXAM OF SHOULDER: CPT

## 2022-01-10 PROCEDURE — 99214 OFFICE O/P EST MOD 30 MIN: CPT | Performed by: NURSE PRACTITIONER

## 2022-01-10 RX ORDER — MELOXICAM 7.5 MG/1
7.5 TABLET ORAL DAILY
Qty: 90 TABLET | Refills: 0 | Status: SHIPPED | OUTPATIENT
Start: 2022-01-10 | End: 2022-01-27 | Stop reason: SDUPTHER

## 2022-01-10 NOTE — PROGRESS NOTES
Dania Eid is a 46 y.o. female who was seen by synchronous (real-time) audio-video technology on 1/10/2022 for Shoulder Pain (Left shoulder)    Assessment & Plan:     Diagnoses and all orders for this visit:    1. Acute pain of left shoulder  -     XR SCAPULA LT; Future  -     XR SHOULDER LT AP/LAT MIN 2 V; Future  -     meloxicam (MOBIC) 7.5 mg tablet; Take 1 Tablet by mouth daily. Take with food      Follow-up and Dispositions    · Return in about 4 weeks (around 2/7/2022) for Left shoulder pain, (In Office). Routing History     712  Subjective:     Hospital Follow Up:    Dania Eid is seen for follow up from recent ED visit to [facility] on December 29, 2021. I reviewed the lab results, imaging, the notes, the records. She presented with Left shoulder pain. She is taking her medications as directed & without any side effects. She reports symptoms are not changed. HPI:   \"46year-old female with no significant medical history presents to the ED for evaluation of 1 month history of a left scapula/shoulder discomfort that seems to be constant, worse with palpation to the area or movement of her right shoulder. She called her PCP today as she was traveling recently out of town and please instruct her to come the ER to have her heart evaluated for concern of cardiac related etiology. She also is reporting small swelling in her left lower eyelid over the last 2 weeks, initially thought she had a stye and was given eyedrops by an urgent care in [place]. She denies any chest pain, nausea, vomiting or diaphoresis, extremity paresthesias or weakness, syncope, presyncope, blurry or double vision, fevers, chills\". Pt states that the left shoulder pain has not subsided. She states that the pain has not moved down her bicep, and the pain is constant and 5-6/10 and rest and 8/10 with movement. Reaching hurst the worst. Pt denies injury to the area.  Pt has tried Naproxen, Tylenol, capsaicin to the area. Pt would like to see Dr. Saud Youssef if she need to see an orthopedic provider. Prior to Admission medications    Medication Sig Start Date End Date Taking? Authorizing Provider   capsaicin 0.075 % topical cream Apply  to affected area three (3) times daily. 12/29/21  Yes Clarice Reich,    naproxen (NAPROSYN) 375 mg tablet Take 375 mg by mouth two (2) times daily (with meals). Yes Provider, Historical   OTHER Allergy shots once a week   Yes Provider, Historical   B.infantis-B.ani-B.long-B.bifi (Probiotic 4X) 10-15 mg TbEC Take  by mouth daily. Yes Provider, Historical   cholecalciferol, vitamin D3, (VITAMIN D3 PO) Take 10,000 Units by mouth daily. Yes Provider, Historical   multivitamin (ONE A DAY) tablet Take 1 Tab by mouth daily. Yes Provider, Historical   esomeprazole (NEXIUM) 20 mg capsule Take 20 mg by mouth two (2) times a day. Yes Provider, Historical   diphenhydrAMINE (SIMPLY SLEEP) 25 mg tablet Take 12.5 mg by mouth nightly. Yes Provider, Historical     ROS    Objective:     Patient-Reported Vitals 1/10/2022   Patient-Reported Weight 164   Patient-Reported Height -      General: alert, cooperative, no distress   Mental  status: normal mood, behavior, speech, dress, motor activity, and thought processes, able to follow commands   HENT: NCAT   Neck: no visualized mass   Resp: no respiratory distress   Neuro: no gross deficits   Skin: no discoloration or lesions of concern on visible areas   Psychiatric: normal affect, consistent with stated mood, no evidence of hallucinations     Additional exam findings: N/A      We discussed the expected course, resolution and complications of the diagnosis(es) in detail. Medication risks, benefits, costs, interactions, and alternatives were discussed as indicated. I advised her to contact the office if her condition worsens, changes or fails to improve as anticipated.  She expressed understanding with the diagnosis(es) and plan.       Andreia Kerns, who was evaluated through a patient-initiated, synchronous (real-time) audio-video encounter, and/or her healthcare decision maker, is aware that it is a billable service, with coverage as determined by her insurance carrier. She provided verbal consent to proceed: Yes, and patient identification was verified. It was conducted pursuant to the emergency declaration under the [address] [place] [address] and the [employer] General Act. A caregiver was present when appropriate. Ability to conduct physical exam was limited. I was in the office. The patient was at home.       Amanda Pacheco NP

## 2022-01-10 NOTE — PROGRESS NOTES
Reviewed record in preparation for visit and have obtained necessary documentation. Azeem Manley is a 46 y.o.  female presents today for virtual visit for   Chief Complaint   Patient presents with    Shoulder Pain     Left shoulder   . Azeem Manley preferred language for health care discussion is english/other. Advance Directive:  1. Do you have an advance directive in place? Patient Reply: NO    2. If not, would you like material regarding how to put one in place? NO    Coordination of Care:  1. Have you been to the ER, urgent care clinic since your last visit? Hospitalized since your last visit? NO    2. Have you seen or consulted any other health care providers outside of the [facility] since your last visit? Include any pap smears or colon screening. NO    Upcoming Appts  Yes When: Cardiology 01/18/2022 bon secure      Azeem Manley is due for:   Health Maintenance Due   Topic    COVID-19 Vaccine (1)    DTaP/Tdap/Td series (1 - Tdap)    Cervical cancer screen     Flu Vaccine (1)     Health Maintenance reviewed and discussed per provider  Please order/place referral if appropriate.     Requested Prescriptions      No prescriptions requested or ordered in this encounter       Learning Assessment:  Learning Assessment 7/16/2015 3/17/2014 12/12/2012   PRIMARY LEARNER Patient Patient Patient   HIGHEST LEVEL OF EDUCATION - PRIMARY LEARNER  > 4 YEARS OF COLLEGE - -   BARRIERS PRIMARY LEARNER NONE - -   CO-LEARNER CAREGIVER No - -   PRIMARY LANGUAGE ENGLISH ENGLISH ENGLISH   LEARNER PREFERENCE PRIMARY READING DEMONSTRATION DEMONSTRATION     - READING READING   ANSWERED BY patient patient patient   RELATIONSHIP SELF SELF SELF     Depression Screening:  3 most recent [facility] Screens 4/20/2021 3/25/2021 2/27/2020 12/26/2019 11/18/2019 6/10/2019 5/20/2019   Little interest or pleasure in doing things Not at all Not at all Not at all Not at all Not at all Not at all Not at all   Feeling down, depressed, irritable, or hopeless Not at all Not at all Not at all Not at all Not at all Not at all Not at all   Total Score PHQ 2 0 0 0 0 0 0 0     Abuse Screening:  Abuse Screening Questionnaire 2/14/2017 4/15/2015 9/15/2014   Do you ever feel afraid of your partner? N N N   Are you in a relationship with someone who physically or mentally threatens you? N N N   Is it safe for you to go home? Y Y Y     Fall Risk  No flowsheet data found. Recent Travel Screening and Travel History documentation     Travel Screening       Question   Response    In the last month, have you been in contact with someone who was confirmed or suspected to have Coronavirus / COVID-19? No / Unsure    Have you had a COVID-19 viral test in the last 14 days? No    Do you have any of the following new or worsening symptoms? Cough    Have you traveled internationally or domestically in the last month?   No          Travel History   Travel since 09/06/21       No documented travel since 09/06/21

## 2022-01-18 ENCOUNTER — OFFICE VISIT (OUTPATIENT)
Dept: CARDIOLOGY CLINIC | Age: 52
End: 2022-01-18
Payer: COMMERCIAL

## 2022-01-18 VITALS
SYSTOLIC BLOOD PRESSURE: 108 MMHG | HEIGHT: 62 IN | DIASTOLIC BLOOD PRESSURE: 72 MMHG | HEART RATE: 74 BPM | BODY MASS INDEX: 30.91 KG/M2 | WEIGHT: 168 LBS | OXYGEN SATURATION: 98 %

## 2022-01-18 DIAGNOSIS — M47.816 SPONDYLOSIS OF LUMBAR REGION WITHOUT MYELOPATHY OR RADICULOPATHY: ICD-10-CM

## 2022-01-18 DIAGNOSIS — M50.10 CERVICAL DISC DISORDER WITH RADICULOPATHY: ICD-10-CM

## 2022-01-18 DIAGNOSIS — M79.602 LEFT ARM PAIN: ICD-10-CM

## 2022-01-18 DIAGNOSIS — M50.20 HNP (HERNIATED NUCLEUS PULPOSUS), CERVICAL: Primary | ICD-10-CM

## 2022-01-18 PROCEDURE — 99204 OFFICE O/P NEW MOD 45 MIN: CPT | Performed by: INTERNAL MEDICINE

## 2022-01-25 PROBLEM — I44.4 LAFB (LEFT ANTERIOR FASCICULAR BLOCK): Status: ACTIVE | Noted: 2022-01-25

## 2022-01-25 PROBLEM — I45.10 INCOMPLETE RIGHT BUNDLE BRANCH BLOCK: Status: ACTIVE | Noted: 2022-01-25

## 2022-01-25 PROBLEM — M79.602 LEFT ARM PAIN: Status: ACTIVE | Noted: 2022-01-25

## 2022-01-25 NOTE — PROGRESS NOTES
Subjective:      Fe is in the office today for cardiac evaluation. She is a 47-year-old woman with no known prior cardiac history. The patient was seen in the ED recently for evaluation of a 1 month history of left scapula/shoulder discomfort. She was instructed by her PCP to seek medical attention at the ED. The patient reports that her pain is primarily located in her left scapular region and triceps area. The pain has been present for \"a couple of weeks \". The discomfort is not worsened by physical activity or heightened emotions. It has been difficult to sleep on her left side. .  She has used some occasional Mobic which seems to help. She has had no shortness of breath. She has had no peripheral swelling. She has had no palpitations near syncope or syncope.      Patient's cardiac risk factors are; borderline dyslipidemia        Patient Active Problem List    Diagnosis Date Noted    LAFB (left anterior fascicular block) 01/25/2022    Incomplete right bundle branch block 01/25/2022    Left arm pain 01/25/2022    HNP (herniated nucleus pulposus), cervical 03/05/2020    Cough 02/14/2017    Sore throat 02/14/2017    Status post lumbar laminectomy 08/19/2016    HNP (herniated nucleus pulposus), lumbar 07/01/2016    Displacement of lumbar intervertebral disc without myelopathy 06/03/2016    Bulging lumbar disc 04/07/2016    Spondylosis of lumbar region without myelopathy or radiculopathy 04/07/2016    Lumbar neuritis 04/07/2016    DDD (degenerative disc disease), lumbar 04/07/2016    Cervical disc herniation 11/14/2012    S/P cervical discectomy 11/14/2012    Cervical disc disorder with radiculopathy 09/21/2012    Seasonal allergic rhinitis 07/17/2012    GERD (gastroesophageal reflux disease) 07/17/2012    Mass of axilla 08/27/2010    Foot pain 06/07/2010    Thumb pain 06/07/2010     Current Outpatient Medications   Medication Sig Dispense Refill    meloxicam (MOBIC) 7.5 mg tablet Take 1 Tablet by mouth daily. Take with food 90 Tablet 0    capsaicin 0.075 % topical cream Apply  to affected area three (3) times daily. 60 g 0    OTHER Allergy shots once a week      B.infantis-B.ani-B.long-B.bifi (Probiotic 4X) 10-15 mg TbEC Take  by mouth daily.  cholecalciferol, vitamin D3, (VITAMIN D3 PO) Take 10,000 Units by mouth daily.  multivitamin (ONE A DAY) tablet Take 1 Tab by mouth daily.  esomeprazole (NEXIUM) 20 mg capsule Take 20 mg by mouth two (2) times a day.  diphenhydrAMINE (SIMPLY SLEEP) 25 mg tablet Take 12.5 mg by mouth nightly.        Allergies   Allergen Reactions    Ciprofloxacin Diarrhea    Clindamycin Other (comments)    Milk Containing Products Diarrhea    Pennsaid [Diclofenac Sodium] Rash    Sulfa (Sulfonamide Antibiotics) Rash    Other Medication Diarrhea     Eggs, patient eats, and gets flu shot yearly with no reaction     Past Medical History:   Diagnosis Date    Allergic rhinitis     Dysphagia     in physical therapy    ANURADHA (generalised anxiety disorder)     GERD (gastroesophageal reflux disease)     Lower  GERD    IBS (irritable bowel syndrome)     Lymphadenopathy     Nausea & vomiting     Other ill-defined conditions(799.89)     C5/6 HNP    Skin cancer     removed from nose    Sleep apnea     \"mild per patient\" no cpap    Thumb pain 6/7/2010     Past Surgical History:   Procedure Laterality Date    HX BACK SURGERY  2012    HX CERVICAL FUSION  03/05/2020    Anterior    HX CERVICAL FUSION  03/05/2020    HX GYN      left tube removal ectopic pregnancy    HX HEENT  9/2011    Basal cell cancer lesion removed from nose    HX LYMPHADENECTOMY Right 8/25/16    HX OOPHORECTOMY      HX ORTHOPAEDIC  1999    TMJ    HX OTHER SURGICAL  2013    Spinal Fusion    HX ROTATOR CUFF REPAIR Right 2019    x2    HX TONSILLECTOMY  2/2007     Family History   Problem Relation Age of Onset    Cancer Mother         breast cancer    Diabetes Father     High Cholesterol Father     Lung Disease Father         COPD and nodule on lung    Heart Disease Father     Gout Father     Cancer Maternal Aunt         breast cancer    OSTEOARTHRITIS Maternal Grandmother         arthritis     Cancer Cousin      Social History     Tobacco Use   Smoking Status Never Smoker   Smokeless Tobacco Never Used          Review of Systems, additional:  Constitutional: negative  Eyes: negative  Respiratory: negative  Cardiovascular: positive for Left arm pain  Gastrointestinal: negative  Musculoskeletal:positive for Severe cervical and lumbar back disease  Neurological: negative  Behvioral/Psych: negative  Endocrine: negative  ENT: negative    Objective:     Visit Vitals  /72 (BP 1 Location: Left upper arm, BP Patient Position: Sitting, BP Cuff Size: Adult)   Pulse 74   Ht 5' 2\" (1.575 m)   Wt 76.2 kg (168 lb)   SpO2 98%   BMI 30.73 kg/m²     General:  alert, cooperative, no distress   Chest Wall: inspection normal - no chest wall deformities or tenderness, respiratory effort normal.  Increased tenderness along the cervical thoracic vertebral bodies   Lung: clear to auscultation bilaterally   Heart:  normal rate and regular rhythm, S1 and S2 normal, no murmurs noted, no gallops noted, no JVD   Abdomen: soft, non-tender. Bowel sounds normal. No masses,  no organomegaly   Extremities: extremities normal, atraumatic, no cyanosis or edema Skin: no rashes   Neuro: alert, oriented, normal speech, no focal findings or movement disorder noted     EKG:/29/21. Sinus rhythm. Left anterior fascicular block. IVCD of RBBB type. Nondiagnostic ST and T wave abnormalities    Assessment/Plan:       ICD-10-CM ICD-9-CM    1. HNP (herniated nucleus pulposus), cervical  M50.20 722.0    2. Cervical disc disorder with radiculopathy  M50.10 723.4    3. Spondylosis of lumbar region without myelopathy or radiculopathy  M47.816 721.3    4. Left arm pain , several week history of.   Believe etiology most likely related to cervical radiculopathy. Further evaluation planned at this time. Patient has a appointment to meet with Dr. Sho Gao from sports medicine.   Return as needed V39.126 107.4

## 2022-01-27 ENCOUNTER — OFFICE VISIT (OUTPATIENT)
Dept: ORTHOPEDIC SURGERY | Age: 52
End: 2022-01-27
Payer: COMMERCIAL

## 2022-01-27 VITALS
HEART RATE: 67 BPM | BODY MASS INDEX: 27.19 KG/M2 | RESPIRATION RATE: 16 BRPM | OXYGEN SATURATION: 97 % | WEIGHT: 169.2 LBS | HEIGHT: 66 IN

## 2022-01-27 DIAGNOSIS — S29.011A MUSCLE STRAIN OF CHEST WALL, INITIAL ENCOUNTER: ICD-10-CM

## 2022-01-27 DIAGNOSIS — M75.42 SHOULDER IMPINGEMENT, LEFT: Primary | ICD-10-CM

## 2022-01-27 DIAGNOSIS — M99.02 THORACIC REGION SOMATIC DYSFUNCTION: ICD-10-CM

## 2022-01-27 DIAGNOSIS — M99.08 RIB CAGE REGION SOMATIC DYSFUNCTION: ICD-10-CM

## 2022-01-27 DIAGNOSIS — M99.01 CERVICAL SOMATIC DYSFUNCTION: ICD-10-CM

## 2022-01-27 PROCEDURE — 98926 OSTEOPATH MANJ 3-4 REGIONS: CPT | Performed by: FAMILY MEDICINE

## 2022-01-27 PROCEDURE — 99204 OFFICE O/P NEW MOD 45 MIN: CPT | Performed by: FAMILY MEDICINE

## 2022-01-27 RX ORDER — MELOXICAM 7.5 MG/1
7.5 TABLET ORAL DAILY
Qty: 90 TABLET | Refills: 0 | Status: SHIPPED | OUTPATIENT
Start: 2022-01-27 | End: 2022-07-24

## 2022-01-27 NOTE — PROGRESS NOTES
HISTORY OF PRESENT ILLNESS    Krystyna Garcia 1970 is a 46y.o. year old female comes in today as new patient for: left shoulder pain    Patients symptoms have been present for about 2 months. Pain level 4/10 left upper back to left arm. It has worsened with certain position. Patient has tried:  mobic with benefit. IMAGING: XR left shoulder 1/10/2022  IMPRESSION  1. No acute fracture-dislocation. XR left shoulder 1/10/2022  IMPRESSION  1. No acute fracture-dislocation. Past Surgical History:   Procedure Laterality Date    HX BACK SURGERY  2012    HX CERVICAL FUSION  03/05/2020    Anterior    HX CERVICAL FUSION  03/05/2020    HX GYN      left tube removal ectopic pregnancy    HX HEENT  9/2011    Basal cell cancer lesion removed from nose    HX LYMPHADENECTOMY Right 8/25/16    HX OOPHORECTOMY      HX ORTHOPAEDIC  1999    TMJ    HX OTHER SURGICAL  2013    Spinal Fusion    HX ROTATOR CUFF REPAIR Right 2019    x2    HX TONSILLECTOMY  2/2007     Social History     Socioeconomic History    Marital status:    Tobacco Use    Smoking status: Never Smoker    Smokeless tobacco: Never Used   Vaping Use    Vaping Use: Never used   Substance and Sexual Activity    Alcohol use: Yes     Comment: few times per week    Drug use: No    Sexual activity: Yes     Partners: Male     Comment: pregnancy test negative      Current Outpatient Medications   Medication Sig Dispense Refill    meloxicam (MOBIC) 7.5 mg tablet Take 1 Tablet by mouth daily. Take with food 90 Tablet 0    capsaicin 0.075 % topical cream Apply  to affected area three (3) times daily. 60 g 0    OTHER Allergy shots once a week      B.infantis-B.ani-B.long-B.bifi (Probiotic 4X) 10-15 mg TbEC Take  by mouth daily.  cholecalciferol, vitamin D3, (VITAMIN D3 PO) Take 10,000 Units by mouth daily.  multivitamin (ONE A DAY) tablet Take 1 Tab by mouth daily.       esomeprazole (NEXIUM) 20 mg capsule Take 20 mg by mouth two (2) times a day.  diphenhydrAMINE (SIMPLY SLEEP) 25 mg tablet Take 12.5 mg by mouth nightly. Past Medical History:   Diagnosis Date    Allergic rhinitis     Dysphagia     in physical therapy    ANURADHA (generalised anxiety disorder)     GERD (gastroesophageal reflux disease)     Lower  GERD    IBS (irritable bowel syndrome)     Lymphadenopathy     Nausea & vomiting     Other ill-defined conditions(799.89)     C5/6 HNP    Skin cancer     removed from nose    Sleep apnea     \"mild per patient\" no cpap    Thumb pain 6/7/2010     Family History   Problem Relation Age of Onset    Cancer Mother         breast cancer    Diabetes Father     High Cholesterol Father     Lung Disease Father         COPD and nodule on lung    Heart Disease Father     Gout Father     Cancer Maternal Aunt         breast cancer    OSTEOARTHRITIS Maternal Grandmother         arthritis     Cancer Cousin        ROS:  No numb, tingle      Objective:  Pulse 67   Resp 16   Ht 5' 6\" (1.676 m)   Wt 169 lb 3.2 oz (76.7 kg)   SpO2 97%   BMI 27.31 kg/m²   NEURO:  Sensation intact light touch B/L upper extremities. right hand dominant. DTRs normal biceps and triceps   M/S:  Shoulder ROM Normal  bilaterally. Spurling's negative bilaterally  left Shoulder:  Empty can negative External rotation negative. Internal rotation negative. [place] negative. SLAP negative. Load and Shift +1 Anterior, 1 Posterior. Strength +5/5 bilaterally upper extremities. Crossover test negative. Negative atrophy bilaterally. Negative TTP at [facility] joint. Apprehension test negative. Baird-Sony Test positive. Yergason's test negative. Speed's test negative. TTP serratus ant left. Assessment/Plan:    ICD-10-CM ICD-9-CM    1. Shoulder impingement, left  M75.42 726.2 meloxicam (MOBIC) 7.5 mg tablet   2. Muscle strain of chest wall, initial encounter  S29.011A 848.8 meloxicam (MOBIC) 7.5 mg tablet   3.  Rib cage region somatic dysfunction  M99.08 739.8 [place] OSTEOPATHIC MANIP,3-4 BODY REGN   4. Thoracic region somatic dysfunction  M99.02 739.2 [place] OSTEOPATHIC MANIP,3-4 BODY REGN   5. Cervical somatic dysfunction  M99.01 739.1 [place] OSTEOPATHIC MANIP,3-4 BODY REGN       Patient (or guardian if minor) verbalizes understanding of evaluation and plan. Verbal consent obtained. Cervical, Thoracic and Rib SD treated with ME. Correction of previous malalignments verified after Tx. Pt tolerated well. Notes improvement of Sx and pain is now rated 1/10. HEP/stretches daily. Discussed stretching/strengthening/posture. Will start HEP and Rx for mobic as above and plan follow-up 4 weeks. Total time spent on encounter including chart/imaging/lab review and evaluation/documentation/demo home program/coordination of care/form completion but not including time for any procedures/manipulation 53 minutes.

## 2022-01-27 NOTE — LETTER
1/27/2022    Patient: Zhang Chambers   YOB: 1970   Date of Visit: 1/27/2022     Lakisha Morgan, [address]  [address] [place]  [address]  [address] [facility] [address]    Dear Lakisha Morgan, NP,      Thank you for referring Ms. Jorge Luis Mondragon to [place] [address]. for evaluation. My notes for this consultation are attached. If you have questions, please do not hesitate to call me. I look forward to following your patient along with you.       Sincerely,    Lucy Mejia, DO

## 2022-01-27 NOTE — PATIENT INSTRUCTIONS
30 seconds 3 times each side and do with arm at side, 45 degrees up, and 45 degrees down. Repeat 4-6 times per day.       Search YouTube for my channel:    Dr. Lorena reich

## 2022-02-07 ENCOUNTER — OFFICE VISIT (OUTPATIENT)
Dept: FAMILY MEDICINE CLINIC | Age: 52
End: 2022-02-07
Payer: COMMERCIAL

## 2022-02-07 VITALS
DIASTOLIC BLOOD PRESSURE: 81 MMHG | SYSTOLIC BLOOD PRESSURE: 113 MMHG | BODY MASS INDEX: 26.87 KG/M2 | HEART RATE: 64 BPM | RESPIRATION RATE: 16 BRPM | OXYGEN SATURATION: 98 % | TEMPERATURE: 98.2 F | HEIGHT: 66 IN | WEIGHT: 167.2 LBS

## 2022-02-07 DIAGNOSIS — M25.512 ACUTE PAIN OF LEFT SHOULDER: Primary | ICD-10-CM

## 2022-02-07 DIAGNOSIS — Z13.220 SCREENING FOR CHOLESTEROL LEVEL: ICD-10-CM

## 2022-02-07 DIAGNOSIS — Z13.228 SCREENING FOR METABOLIC DISORDER: ICD-10-CM

## 2022-02-07 PROCEDURE — 99213 OFFICE O/P EST LOW 20 MIN: CPT | Performed by: NURSE PRACTITIONER

## 2022-02-07 NOTE — PROGRESS NOTES
General Office Visit Note        Assessment/Plan:     Diagnoses and all orders for this visit:    1. Acute pain of left shoulder    2. Screening for cholesterol level  -     LIPID PANEL; Future    3. Screening for metabolic disorder  -     METABOLIC PANEL, BASIC; Future        Follow-up and Dispositions    · Return in about 6 months (around 8/7/2022) for Well Woman W/ PAP, Labs 1 Week Prior. Subjective:     Lorenzo Jean is a 46 y.o. y.o. female who complains of   Chief Complaint   Patient presents with    Shoulder Pain     left      Shoulder Pain Review:    Pt is still having left shoulder pain, and is sees Dr. Galina Reina with ortho for the shoulder pain. Pt has a follow-up 2/21/2022. Pt has been doing strengthening exercises, and will be starting yoga soon.      Past Medical History:   Diagnosis Date    Allergic rhinitis     Dysphagia     in physical therapy    ANURADHA (generalised anxiety disorder)     GERD (gastroesophageal reflux disease)     Lower  GERD    IBS (irritable bowel syndrome)     Lymphadenopathy     Nausea & vomiting     Other ill-defined conditions(799.89)     C5/6 HNP    Skin cancer     removed from nose    Sleep apnea     \"mild per patient\" no cpap    Thumb pain 6/7/2010     Past Surgical History:   Procedure Laterality Date    HX BACK SURGERY  2012    HX CERVICAL FUSION  03/05/2020    Anterior    HX CERVICAL FUSION  03/05/2020    HX GYN      left tube removal ectopic pregnancy    HX HEENT  9/2011    Basal cell cancer lesion removed from nose    HX LYMPHADENECTOMY Right 8/25/16    HX OOPHORECTOMY      HX ORTHOPAEDIC  1999    TMJ    HX OTHER SURGICAL  2013    Spinal Fusion    HX ROTATOR CUFF REPAIR Right 2019    x2    HX TONSILLECTOMY  2/2007     Social History     Socioeconomic History    Marital status:    Tobacco Use    Smoking status: Never Smoker    Smokeless tobacco: Never Used   Vaping Use    Vaping Use: Never used   Substance and Sexual Activity    Alcohol use: Yes     Comment: few times per week    Drug use: No    Sexual activity: Yes     Partners: Male     Comment: pregnancy test negative     Current Outpatient Medications   Medication Sig Dispense Refill    meloxicam (MOBIC) 7.5 mg tablet Take 1 Tablet by mouth daily. Take with food 90 Tablet 0    capsaicin 0.075 % topical cream Apply  to affected area three (3) times daily. 60 g 0    OTHER Allergy shots once every other week      B.infantis-B.ani-B.long-B.bifi (Probiotic 4X) 10-15 mg TbEC Take  by mouth daily.  multivitamin (ONE A DAY) tablet Take 1 Tab by mouth daily.  esomeprazole (NEXIUM) 20 mg capsule Take 20 mg by mouth two (2) times a day.  diphenhydrAMINE (SIMPLY SLEEP) 25 mg tablet Take 12.5 mg by mouth nightly.  cholecalciferol, vitamin D3, (VITAMIN D3 PO) Take 10,000 Units by mouth daily.        Allergies   Allergen Reactions    Ciprofloxacin Diarrhea    Clindamycin Other (comments)    Milk Containing Products Diarrhea    Pennsaid [Diclofenac Sodium] Rash    Sulfa (Sulfonamide Antibiotics) Rash    Other Medication Diarrhea     Eggs, patient eats, and gets flu shot yearly with no reaction     The patient has a family history of    REVIEW OF SYSTEMS  ROS    Objective:     Visit Vitals  /81 (BP 1 Location: Left arm, BP Patient Position: Sitting, BP Cuff Size: Adult)   Pulse 64   Temp 98.2 °F (36.8 °C) (Temporal)   Resp 16   Ht 5' 6\" (1.676 m)   Wt 167 lb 3.2 oz (75.8 kg)   SpO2 98%   BMI 26.99 kg/m²       Current Outpatient Medications   Medication Instructions    B.infantis-B.ani-B.long-B.bifi (Probiotic 4X) 10-15 mg TbEC Oral, DAILY    capsaicin 0.075 % topical cream Topical, 3 TIMES DAILY    cholecalciferol, vitamin D3, (VITAMIN D3 PO) 10,000 Units, Oral, DAILY    diphenhydrAMINE (SIMPLY SLEEP) 12.5 mg, Oral, EVERY BEDTIME    esomeprazole (NEXIUM) 20 mg, Oral, 2 TIMES DAILY    meloxicam (MOBIC) 7.5 mg, Oral, DAILY, Take with food    multivitamin (ONE A DAY) tablet 1 Tablet, Oral, DAILY    OTHER Allergy shots once every other week        PHYSICAL EXAM  Physical Exam  Vitals and nursing note reviewed. Constitutional:       Appearance: Normal appearance. Cardiovascular:      Rate and Rhythm: Normal rate and regular rhythm. Pulses: Normal pulses. Heart sounds: Normal heart sounds. Pulmonary:      Effort: Pulmonary effort is normal.      Breath sounds: Normal breath sounds. Musculoskeletal:      Right shoulder: Normal.      Left shoulder: Tenderness present. Cervical back: Normal range of motion and neck supple. Skin:     General: Skin is warm and dry. Neurological:      Mental Status: She is alert and oriented to person, place, and time. Psychiatric:         Mood and Affect: Mood normal.         Behavior: Behavior normal.         Disclaimer:    I have discussed the diagnosis with the patient and the intended plan as seen above. The patient understands our medical plan. The risks, benefits and significant side effects of all medications have been reviewed. Anticipated time course and progression of condition reviewed. All questions have been addressed. She received an after visit summary, with information reviewed, and questions answered. Where appropriate, she is instructed to call the clinic if she has not been notified either by phone or through [address] with the results of her tests or with an appointment plan for any referrals within 1 week(s). The patient  is to call if her condition worsens or fails to improve or if significant side effects are experienced.        Willian Royal NP     2/7/2022

## 2022-02-07 NOTE — PROGRESS NOTES
Chief Complaint   Patient presents with    Shoulder Pain     left        1. \"Have you been to the ER, urgent care clinic since your last visit? Hospitalized since your last visit? \" No    2. \"Have you seen or consulted any other health care providers outside of the [address] [place] [address] since your last visit? \" No     3. For patients aged 39-70: Has the patient had a colonoscopy? No     If the patient is female:    4. For patients aged 41-77: Has the patient had a mammogram within the past 2 years? Yes      5. For patients aged 21-65: Has the patient had a pap smear? Yes - no Care Gap present    Learning Assessment 7/16/2015   PRIMARY LEARNER Patient   HIGHEST LEVEL OF EDUCATION - PRIMARY LEARNER  > 4 YEARS OF COLLEGE   BARRIERS PRIMARY LEARNER NONE   CO-LEARNER CAREGIVER No   PRIMARY LANGUAGE ENGLISH   LEARNER PREFERENCE PRIMARY READING     -   ANSWERED BY patient   RELATIONSHIP SELF     3 most recent PHQ Screens 2/7/2022   Little interest or pleasure in doing things Not at all   Feeling down, depressed, irritable, or hopeless Not at all   Total Score PHQ 2 0     No flowsheet data found. Abuse Screening Questionnaire 2/14/2017   Do you ever feel afraid of your partner? N   Are you in a relationship with someone who physically or mentally threatens you? N   Is it safe for you to go home?  Bernadette Ley

## 2022-02-21 ENCOUNTER — OFFICE VISIT (OUTPATIENT)
Dept: ORTHOPEDIC SURGERY | Age: 52
End: 2022-02-21
Payer: COMMERCIAL

## 2022-02-21 VITALS
WEIGHT: 168 LBS | BODY MASS INDEX: 27 KG/M2 | HEART RATE: 70 BPM | OXYGEN SATURATION: 97 % | HEIGHT: 66 IN | RESPIRATION RATE: 15 BRPM

## 2022-02-21 DIAGNOSIS — M99.08 RIB CAGE REGION SOMATIC DYSFUNCTION: ICD-10-CM

## 2022-02-21 DIAGNOSIS — M75.42 SHOULDER IMPINGEMENT, LEFT: Primary | ICD-10-CM

## 2022-02-21 DIAGNOSIS — M99.01 CERVICAL SOMATIC DYSFUNCTION: ICD-10-CM

## 2022-02-21 DIAGNOSIS — M99.02 THORACIC REGION SOMATIC DYSFUNCTION: ICD-10-CM

## 2022-02-21 DIAGNOSIS — S29.011D CHEST WALL MUSCLE STRAIN, SUBSEQUENT ENCOUNTER: ICD-10-CM

## 2022-02-21 PROCEDURE — 99213 OFFICE O/P EST LOW 20 MIN: CPT | Performed by: FAMILY MEDICINE

## 2022-02-21 PROCEDURE — 98926 OSTEOPATH MANJ 3-4 REGIONS: CPT | Performed by: FAMILY MEDICINE

## 2022-02-21 NOTE — LETTER
2/21/2022    Patient: Juanis Gallagher   YOB: 1970   Date of Visit: 2/21/2022     Gustavo Hunter, [address]  [address] [place]  [address]  [address] [facility] [address]    Dear Gustavo Hunter NP,      Thank you for referring Ms. Jerod Gomez to [place] [phone]. for evaluation. My notes for this consultation are attached. If you have questions, please do not hesitate to call me. I look forward to following your patient along with you.       Sincerely,    Jazmin White, DO

## 2022-02-21 NOTE — PROGRESS NOTES
HISTORY OF PRESENT ILLNESS    Kyler Issa 1970 is a 46y.o. year old female comes in today to be evaluated and treated for: left shoulder pain    Since last appt has noticed improvement with HEP/ROM. Pain level 3/10. Using mobic with benefit but only twice in the last week and has been able to get back in to some yoga. IMAGING: XR left shoulder 1/10/2022  IMPRESSION  1. No acute fracture-dislocation. Past Surgical History:   Procedure Laterality Date    HX BACK SURGERY  2012    HX CERVICAL FUSION  03/05/2020    Anterior    HX CERVICAL FUSION  03/05/2020    HX GYN      left tube removal ectopic pregnancy    HX HEENT  9/2011    Basal cell cancer lesion removed from nose    HX LYMPHADENECTOMY Right 8/25/16    HX OOPHORECTOMY      HX ORTHOPAEDIC  1999    TMJ    HX OTHER SURGICAL  2013    Spinal Fusion    HX ROTATOR CUFF REPAIR Right 2019    x2    HX TONSILLECTOMY  2/2007     Social History     Socioeconomic History    Marital status:    Tobacco Use    Smoking status: Never Smoker    Smokeless tobacco: Never Used   Vaping Use    Vaping Use: Never used   Substance and Sexual Activity    Alcohol use: Yes     Comment: few times per week    Drug use: No    Sexual activity: Yes     Partners: Male     Comment: pregnancy test negative     Current Outpatient Medications   Medication Sig Dispense Refill    meloxicam (MOBIC) 7.5 mg tablet Take 1 Tablet by mouth daily. Take with food 90 Tablet 0    capsaicin 0.075 % topical cream Apply  to affected area three (3) times daily. 60 g 0    OTHER Allergy shots once every other week      B.infantis-B.ani-B.long-B.bifi (Probiotic 4X) 10-15 mg TbEC Take  by mouth daily.  cholecalciferol, vitamin D3, (VITAMIN D3 PO) Take 10,000 Units by mouth daily.  multivitamin (ONE A DAY) tablet Take 1 Tab by mouth daily.  esomeprazole (NEXIUM) 20 mg capsule Take 20 mg by mouth two (2) times a day.       diphenhydrAMINE (SIMPLY SLEEP) 25 mg tablet Take 12.5 mg by mouth nightly. Past Medical History:   Diagnosis Date    Allergic rhinitis     Dysphagia     in physical therapy    ANURADHA (generalised anxiety disorder)     GERD (gastroesophageal reflux disease)     Lower  GERD    IBS (irritable bowel syndrome)     Lymphadenopathy     Nausea & vomiting     Other ill-defined conditions(799.89)     C5/6 HNP    Skin cancer     removed from nose    Sleep apnea     \"mild per patient\" no cpap    Thumb pain 6/7/2010     Family History   Problem Relation Age of Onset    Cancer Mother         breast cancer    Diabetes Father     High Cholesterol Father     Lung Disease Father         COPD and nodule on lung    Heart Disease Father     Gout Father     Cancer Maternal Aunt         breast cancer    OSTEOARTHRITIS Maternal Grandmother         arthritis     Cancer Cousin          ROS:  No numb, tingle, swell    Objective:  Pulse 70   Resp 15   Ht 5' 6\" (1.676 m)   Wt 168 lb (76.2 kg)   SpO2 97%   BMI 27.12 kg/m²   NEURO:  Sensation intact light touch B/L upper extremities. right hand dominant. DTRs normal biceps and triceps   M/S:  Shoulder ROM Normal  bilaterally. Spurling's negative bilaterally  left Shoulder:  Empty can negative External rotation negative. Internal rotation negative. [place] negative. SLAP negative. Load and Shift +1 Anterior, 1 Posterior. Strength +5/5 bilaterally upper extremities. Crossover test negative. Negative atrophy bilaterally. Negative TTP at [facility] joint. Apprehension test negative. Baird-Sony Test negative. Yergason's test negative. Speed's test negative. Still TTP serratus ant left. Examined seated and supine. TTA at C3, 6 on left worse flexion and T1, 2 on left worse flexion Rib(s) 1, 2 left TTP and superior        Assessment/Plan:     ICD-10-CM ICD-9-CM    1. Shoulder impingement, left  M75.42 726.2    2. Chest wall muscle strain, subsequent encounter  S29.011D V58.89      848.8    3.  Thoracic region somatic dysfunction  M99.02 739.2 [place] OSTEOPATHIC MANIP,3-4 BODY REGN   4. Cervical somatic dysfunction  M99.01 739.1 [place] OSTEOPATHIC MANIP,3-4 BODY REGN   5. Rib cage region somatic dysfunction  M99.08 739.8 [place] OSTEOPATHIC MANIP,3-4 BODY REGN       Patient (or guardian if minor) verbalizes understanding of evaluation and plan. Verbal consent obtained. Cervical, Thoracic and Rib SD treated with ME. Correction of previous malalignments verified after Tx. Pt tolerated well. Notes improvement of Sx and pain is now rated 0/10. HEP/stretches daily. Discussed stretching/strengthening/posture. Will continue serratus stretch, HEP and Rx for mobic from prior as above and plan follow-up as needed.

## 2022-02-21 NOTE — PATIENT INSTRUCTIONS
Search YouTube for my channel:    Dr. [place] cuff  Low back/Piriformis  Runner's Knee  Hip Stretches  Plantar Fasciitis  IT Band  Concussion  Pes Anserine/Plica  Lam Splints  Carpal Tunnel  Neck/Upper back

## 2022-03-18 PROBLEM — M79.602 LEFT ARM PAIN: Status: ACTIVE | Noted: 2022-01-25

## 2022-03-19 PROBLEM — M50.20 HNP (HERNIATED NUCLEUS PULPOSUS), CERVICAL: Status: ACTIVE | Noted: 2020-03-05

## 2022-03-19 PROBLEM — R05.9 COUGH: Status: ACTIVE | Noted: 2017-02-14

## 2022-03-19 PROBLEM — J02.9 SORE THROAT: Status: ACTIVE | Noted: 2017-02-14

## 2022-03-19 PROBLEM — I44.4 LAFB (LEFT ANTERIOR FASCICULAR BLOCK): Status: ACTIVE | Noted: 2022-01-25

## 2022-03-19 PROBLEM — M25.512 ACUTE PAIN OF LEFT SHOULDER: Status: ACTIVE | Noted: 2022-02-07

## 2022-03-20 PROBLEM — I45.10 INCOMPLETE RIGHT BUNDLE BRANCH BLOCK: Status: ACTIVE | Noted: 2022-01-25

## 2022-05-25 ENCOUNTER — VIRTUAL VISIT (OUTPATIENT)
Dept: FAMILY MEDICINE CLINIC | Age: 52
End: 2022-05-25
Payer: COMMERCIAL

## 2022-05-25 DIAGNOSIS — U07.1 COVID-19: Primary | ICD-10-CM

## 2022-05-25 DIAGNOSIS — R11.2 NAUSEA AND VOMITING, UNSPECIFIED VOMITING TYPE: ICD-10-CM

## 2022-05-25 PROCEDURE — 99213 OFFICE O/P EST LOW 20 MIN: CPT | Performed by: NURSE PRACTITIONER

## 2022-05-25 RX ORDER — ONDANSETRON 8 MG/1
8 TABLET, ORALLY DISINTEGRATING ORAL
Qty: 15 TABLET | Refills: 1 | Status: SHIPPED | OUTPATIENT
Start: 2022-05-25

## 2022-05-25 NOTE — PROGRESS NOTES
Kings Nam is a 46 y.o. female who was seen by synchronous (real-time) audio-video technology on 5/25/2022 for No chief complaint on file. Assessment & Plan:   Diagnoses and all orders for this visit:    1. COVID-19    2. Nausea and vomiting, unspecified vomiting type  -     ondansetron (ZOFRAN ODT) 8 mg disintegrating tablet; Take 1 Tablet by mouth every eight (8) hours as needed for Nausea or Vomiting. 3. Patient was instructed to call the office later on this afternoon or tomorrow if her symptoms began to worsen, and she could be sent over antiviral treatment. Follow-up and Dispositions    · Return if symptoms worsen or fail to improve. 12  Subjective:     COVID Review:    Kings Nam is a 46 y.o. y.o. female who complains of Headache  Malaise / Body Ache  Fever  Chills  Cough  High risk due to recent travel and possible exposure to COVID-19 infected person  Recent travel  sore throat, dry cough, fever and chills gradual,for 3 days, gradually improving since that time. She denies a history of: chest congestion, wheezing and SOB/SOLIS. She denies a history of asthma. Patient denies smoke cigarettes. COVID risk factors include: COVID Risk Factors for Severe Disease: Obesity (BMI 30 or higher). Tolerating PO: no. Treatments have included: OTC products. Patient reports sick contacts: no. Pt tested COVID Positive, via home test, on May 23, 2022. Pt tested postive On Monday afternoon, via home test, and symptoms started on Sunday. COVID-19 vaccination status: Yes, [employer], October 27, 2021    None. Prior to Admission medications    Medication Sig Start Date End Date Taking?  Authorizing Provider   ondansetron (ZOFRAN ODT) 8 mg disintegrating tablet Take 1 Tablet by mouth every eight (8) hours as needed for Nausea or Vomiting. 5/25/22  Yes Horne Husbands, NP   OTHER Allergy shots once every other week   Yes Provider, Historical   B.infantis-B.ani-B.long-B.bifi (Probiotic 4X) 10-15 mg TbEC Take  by mouth daily. Yes Provider, Historical   cholecalciferol, vitamin D3, (VITAMIN D3 PO) Take 10,000 Units by mouth daily. Yes Provider, Historical   multivitamin (ONE A DAY) tablet Take 1 Tab by mouth daily. Yes Provider, Historical   esomeprazole (NEXIUM) 20 mg capsule Take 20 mg by mouth two (2) times a day. Yes Provider, Historical   diphenhydrAMINE (SIMPLY SLEEP) 25 mg tablet Take 12.5 mg by mouth nightly. Yes Provider, Historical   meloxicam (MOBIC) 7.5 mg tablet Take 1 Tablet by mouth daily. Take with food  Patient not taking: Reported on 5/25/2022 1/27/22   Femi Schulz DO   capsaicin 0.075 % topical cream Apply  to affected area three (3) times daily. 12/29/21   Clarice Reich DO     ROS    Objective:     Patient-Reported Vitals 5/25/2022   Patient-Reported Weight 160   Patient-Reported Height 52   Patient-Reported Temperature 99.2      General: alert, cooperative, no distress   Mental  status: normal mood, behavior, speech, dress, motor activity, and thought processes, able to follow commands   HENT: NCAT   Neck: no visualized mass   Resp: no respiratory distress   Neuro: no gross deficits   Skin: no discoloration or lesions of concern on visible areas   Psychiatric: normal affect, consistent with stated mood, no evidence of hallucinations     Additional exam findings: We discussed the expected course, resolution and complications of the diagnosis(es) in detail. Medication risks, benefits, costs, interactions, and alternatives were discussed as indicated. I advised her to contact the office if her condition worsens, changes or fails to improve as anticipated. She expressed understanding with the diagnosis(es) and plan. Alka Mulligan, was evaluated through a synchronous (real-time) audio-video encounter. The patient (or guardian if applicable) is aware that this is a billable service, which includes applicable co-pays.  Verbal consent to proceed has been obtained. The visit was conducted pursuant to the emergency declaration under the [facility], [address] [place] [address] authority and the Sushil Kauli and [employer] Act. Patient identification was verified, and a caregiver was present when appropriate. The patient was located at home in a state where the provider was licensed to provide care.       Anjum Willis NP

## 2022-06-01 ENCOUNTER — TRANSCRIBE ORDER (OUTPATIENT)
Dept: SCHEDULING | Age: 52
End: 2022-06-01

## 2022-06-01 DIAGNOSIS — Z12.31 VISIT FOR SCREENING MAMMOGRAM: Primary | ICD-10-CM

## 2022-06-21 ENCOUNTER — HOSPITAL ENCOUNTER (OUTPATIENT)
Dept: MAMMOGRAPHY | Age: 52
Discharge: HOME | End: 2022-06-21
Attending: SURGERY
Payer: COMMERCIAL

## 2022-06-21 DIAGNOSIS — Z12.31 VISIT FOR SCREENING MAMMOGRAM: ICD-10-CM

## 2022-06-21 PROCEDURE — 77063 BREAST TOMOSYNTHESIS BI: CPT

## 2022-07-20 ENCOUNTER — OFFICE VISIT (OUTPATIENT)
Dept: SURGERY | Age: 52
End: 2022-07-20
Payer: COMMERCIAL

## 2022-07-20 VITALS
BODY MASS INDEX: 25.71 KG/M2 | HEART RATE: 78 BPM | TEMPERATURE: 98.7 F | WEIGHT: 160 LBS | RESPIRATION RATE: 16 BRPM | SYSTOLIC BLOOD PRESSURE: 121 MMHG | OXYGEN SATURATION: 98 % | HEIGHT: 66 IN | DIASTOLIC BLOOD PRESSURE: 83 MMHG

## 2022-07-20 DIAGNOSIS — Z91.89 AT HIGH RISK FOR BREAST CANCER: ICD-10-CM

## 2022-07-20 DIAGNOSIS — Z80.3 FAMILY HISTORY OF BREAST CANCER IN FIRST DEGREE RELATIVE: Primary | ICD-10-CM

## 2022-07-20 PROCEDURE — 99213 OFFICE O/P EST LOW 20 MIN: CPT | Performed by: SURGERY

## 2022-07-20 NOTE — PROGRESS NOTES
Yeyo Little is a 46 y.o. female  Chief Complaint   Patient presents with    Follow-up     6 month (breast cancer)

## 2022-07-25 NOTE — PROGRESS NOTES
[facility]  General Surgery    Subjective:  Patient returns today following [address] interval since last visit MRI. Most recent mammogram was performed June 21, 2022 is a BI-RADS 1 with C density breast.  Patient endorses performing self breast exam.  Her Tyrer-Cuzick score is a lifetime risk of developing breast cancer at 31% due to family history. Patient denies any unintentional weight loss or breast pain or nipple drainage or discharge. Objective:  Vitals:    07/20/22 1455   BP: 121/83   Pulse: 78   Resp: 16   Temp: 98.7 °F (37.1 °C)   TempSrc: Temporal   SpO2: 98%   Weight: 72.6 kg (160 lb)   Height: 5' 6\" (1.676 m)       Physical Exam:    General: Awake and alert, oriented x4, no apparent distress   Breasts:    Left: No dimpling, discoloration, nipple inversion or retractions. No axillary or supraclavicular lymphadenopathy. No mass   Right: No dimpling, discoloration, nipple inversion or retractions. No axillary or supraclavicular lymphadenopathy. No mass    Current Medications:  Current Outpatient Medications   Medication Sig Dispense Refill    ondansetron (ZOFRAN ODT) 8 mg disintegrating tablet Take 1 Tablet by mouth every eight (8) hours as needed for Nausea or Vomiting. 15 Tablet 1    OTHER Allergy shots once every other week      B.infantis-B.ani-B.long-B.bifi (Probiotic 4X) 10-15 mg TbEC Take  by mouth daily. cholecalciferol, vitamin D3, (VITAMIN D3 PO) Take 10,000 Units by mouth daily. multivitamin (ONE A DAY) tablet Take 1 Tab by mouth daily. esomeprazole (NEXIUM) 20 mg capsule Take 20 mg by mouth two (2) times a day. diphenhydrAMINE (BENADRYL) 25 mg tablet Take 12.5 mg by mouth nightly. Chart and notes reviewed. Data reviewed. I have evaluated and examined the patient.         Impression and plan:  Patient with increased risk of developing breast cancer lifetime risk estimated at 31% secondary to family history of breast cancer in our high risk breast cancer screening program  Continue monthly self breast exam  Bilateral breast MRI in 6 months  Clinical breast exam to follow MRI  Please call or visit with any questions or concerns.          Zhang Galvan MD

## 2022-10-25 DIAGNOSIS — Z91.89 AT HIGH RISK FOR BREAST CANCER: Primary | ICD-10-CM

## 2022-11-13 NOTE — PROGRESS NOTES
Patient is here for sore throat, nasal congestion. Patient reports excessive mucous build up in the throat. 1. Have you been to the ER, urgent care clinic since your last visit? Hospitalized since your last visit? No    2. Have you seen or consulted any other health care providers outside of the [address] [place] [address] since your last visit? Include any pap smears or colon screening.  Dentist Dr Ana Cannon, Dr Brandy Sanders Neurologist As per HPI,

## 2022-12-06 ENCOUNTER — HOSPITAL ENCOUNTER (OUTPATIENT)
Age: 52
Discharge: HOME | End: 2022-12-06
Attending: SURGERY
Payer: COMMERCIAL

## 2022-12-06 DIAGNOSIS — Z91.89 AT HIGH RISK FOR BREAST CANCER: ICD-10-CM

## 2022-12-06 PROCEDURE — 74011250636 HC RX REV CODE- 250/636: Performed by: SURGERY

## 2022-12-06 PROCEDURE — A9577 INJ MULTIHANCE: HCPCS | Performed by: SURGERY

## 2022-12-06 PROCEDURE — 77049 MRI BREAST C-+ W/CAD BI: CPT

## 2022-12-06 RX ADMIN — GADOBENATE DIMEGLUMINE 17 ML: 529 INJECTION, SOLUTION INTRAVENOUS at 08:46

## 2023-01-06 ENCOUNTER — OFFICE VISIT (OUTPATIENT)
Dept: SURGERY | Age: 53
End: 2023-01-06
Payer: COMMERCIAL

## 2023-01-06 VITALS
DIASTOLIC BLOOD PRESSURE: 76 MMHG | HEIGHT: 62 IN | HEART RATE: 82 BPM | WEIGHT: 179 LBS | OXYGEN SATURATION: 98 % | SYSTOLIC BLOOD PRESSURE: 106 MMHG | RESPIRATION RATE: 18 BRPM | BODY MASS INDEX: 32.94 KG/M2 | TEMPERATURE: 97.3 F

## 2023-01-06 DIAGNOSIS — Z91.89 AT HIGH RISK FOR BREAST CANCER: ICD-10-CM

## 2023-01-06 DIAGNOSIS — Q83.1 ACCESSORY BREAST TISSUE OF AXILLA: ICD-10-CM

## 2023-01-06 DIAGNOSIS — Z80.3 FAMILY HISTORY OF BREAST CANCER IN FIRST DEGREE RELATIVE: Primary | ICD-10-CM

## 2023-01-06 NOTE — LETTER
1/6/2023    Patient: Brandan Martínez   YOB: 1970   Date of Visit: 1/6/2023     Herber Silverman, [address] [facility]  [address]  [address]  [address] [facility] [address]    Dear Herber Silverman, NP,      Thank you for referring Ms. Lorna Hsieh to [address] for evaluation. My notes for this consultation are attached. If you have questions, please do not hesitate to call me. I look forward to following your patient along with you.       Sincerely,    Nadia May, [address] [facility] [address] [facility]

## 2023-01-06 NOTE — PROGRESS NOTES
CC:   Chief Complaint   Patient presents with    Follow-up     High risk breast screening         Assessment:    ICD-10-CM ICD-9-CM    1. Family history of breast cancer in first degree relative  Z80.3 V16.3       2. At high risk for breast cancer  Z91.89 V49.89       3. Accessory breast tissue of axilla  Q83.1 757.6           Plan: I reviewed with the patient the most recent MRI from December 2022 which was BI-RADS 1. She reports no changes to self breast exam which she performs monthly. She would like to continue with the high risk screening program alternating with mammography and MRI every 6 months. We did discuss the and benefits of screening with MRI including sensitivity but not specificity and this may may not make her prone to more negative biopsies. Mammogram sensitivity does increase with age. She prefers to continue to screen with MRI still at this time. We will order her repeat mammogram bilaterally in June which coincides with her yearly mammograms. She is to continue with clinical breast exams once yearly after MRI. She would like to undergo repeat genetic testing today as this was done many years ago and she has a strong family history of breast cancer. She will call prior to her yearly follow-up if she has any questions or concerns and we will notify her once genetic testing is completed. HPI:  Erich Angelucci is a 45 y.o. female who is referred by Rosalinda Moreira NP for high risk breast screening. She has been previously seen by Dr. Stacie Forbes last in July 2022 for high risk screening program.  She has Tyrer-Cuzick of 31%. She does have a mother with breast cancer age 48 maternal aunt and paternal aunt with breast cancer. Patient underwent menopause in May 2019. She has never been on hormone replacement therapy.   She performs breast exams monthly and reports no changes at all to her breast.  She did undergo genetic testing greater than 10 years ago and does not recall these results. Allergies: Allergies   Allergen Reactions    Ciprofloxacin Diarrhea    Clindamycin Other (comments)    Milk Containing Products Diarrhea    Pennsaid [Diclofenac Sodium] Rash    Sulfa (Sulfonamide Antibiotics) Rash    Other Medication Diarrhea     Eggs, patient eats, and gets flu shot yearly with no reaction       Medication Review:  Current Outpatient Medications on File Prior to Visit   Medication Sig Dispense Refill    OTHER Allergy shots once every other week      B.infantis-B.ani-B.long-B.bifi (Probiotic 4X) 10-15 mg TbEC Take  by mouth daily. cholecalciferol, vitamin D3, (VITAMIN D3 PO) Take 10,000 Units by mouth daily. multivitamin (ONE A DAY) tablet Take 1 Tab by mouth daily. esomeprazole (NEXIUM) 20 mg capsule Take 20 mg by mouth two (2) times a day. diphenhydrAMINE (BENADRYL) 25 mg tablet Take 12.5 mg by mouth nightly. ondansetron (ZOFRAN ODT) 8 mg disintegrating tablet Take 1 Tablet by mouth every eight (8) hours as needed for Nausea or Vomiting. 15 Tablet 1     No current facility-administered medications on file prior to visit. Systems Review:  Review of Systems   Constitutional:  Negative for activity change, fatigue, fever and unexpected weight change. Respiratory:  Negative for chest tightness and shortness of breath. Cardiovascular:  Negative for chest pain and palpitations. Skin:  Negative for pallor, rash and wound. Hematological:  Negative for adenopathy. Does not bruise/bleed easily.      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    Skin cancer     removed from nose    Sleep apnea     \"mild per patient\" no cpap    Thumb pain 6/7/2010       Surgical History:  Past Surgical History:   Procedure Laterality Date    HX BACK SURGERY  2012    HX CERVICAL FUSION  03/05/2020    Anterior    HX CERVICAL FUSION  03/05/2020    HX GYN      left tube removal ectopic pregnancy    HX HEENT  9/2011    Basal cell cancer lesion removed from nose    HX LYMPHADENECTOMY Right 8/25/16    HX OOPHORECTOMY      HX ORTHOPAEDIC  1999    TMJ    HX OTHER SURGICAL  2013    Spinal Fusion    HX ROTATOR CUFF REPAIR Right 2019    x2    HX TONSILLECTOMY  2/2007       Social History:  Social History     Socioeconomic History    Marital status:    Tobacco Use    Smoking status: Never    Smokeless tobacco: Never   Vaping Use    Vaping Use: Never used   Substance and Sexual Activity    Alcohol use: Yes     Comment: few times per week    Drug use: No    Sexual activity: Yes     Partners: Male     Comment: pregnancy test negative       Family History:  Family History   Problem Relation Age of Onset    Cancer Mother         breast cancer    Diabetes Father     High Cholesterol Father     Lung Disease Father         COPD and nodule on lung    Heart Disease Father     Gout Father     Cancer Maternal Aunt         breast cancer    OSTEOARTHRITIS Maternal Grandmother         arthritis     Cancer Cousin        No visits with results within 3 Month(s) from this visit.    Latest known visit with results is:   Admission on 12/29/2021, Discharged on 12/29/2021   Component Date Value Ref Range Status    Ventricular Rate 12/29/2021 65  BPM Final    Atrial Rate 12/29/2021 65  BPM Final    P-R Interval 12/29/2021 134  ms Final    QRS Duration 12/29/2021 80  ms Final    Q-T Interval 12/29/2021 384  ms Final    QTC Calculation (Bezet) 12/29/2021 399  ms Final    Calculated P Axis 12/29/2021 21  degrees Final    Calculated R Axis 12/29/2021 -53  degrees Final    Calculated T Axis 12/29/2021 -10  degrees Final    Diagnosis 12/29/2021    Final                    Value:Normal sinus rhythm  Pulmonary disease pattern  RSR' or QR pattern in V1 suggests right ventricular conduction delay  Left anterior fascicular block  ST & T wave abnormality, consider anterior ischemia  Abnormal ECG  When compared with ECG of 24-MAR-2020 06:23,  No significant change was found  Confirmed by Stuart Robison MD, Shonna Chinchilla ([id number]) on 12/29/2021 3:53:14 PM      Sodium 12/29/2021 141  136 - 145 mmol/L Final    Potassium 12/29/2021 4.1  3.5 - 5.5 mmol/L Final    Chloride 12/29/2021 107  100 - 111 mmol/L Final    CO2 12/29/2021 28  21 - 32 mmol/L Final    Anion gap 12/29/2021 6  3.0 - 18 mmol/L Final    Glucose 12/29/2021 104 (A)  74 - 99 mg/dL Final    BUN 12/29/2021 16  7.0 - 18 MG/DL Final    Creatinine 12/29/2021 0.68  0.6 - 1.3 MG/DL Final    BUN/Creatinine ratio 12/29/2021 24 (A)  12 - 20   Final    GFR est AA 12/29/2021 >60  >60 ml/min/1.73m2 Final    GFR est non-AA 12/29/2021 >60  >60 ml/min/1.73m2 Final    Comment: (NOTE)  Estimated GFR is calculated using the Modification of Diet in Renal   Disease (MDRD) Study equation, reported for both  Americans   (GFRAA) and non- Americans (GFRNA), and normalized to 1.73m2   body surface area. The physician must decide which value applies to   the patient. The MDRD study equation should only be used in   individuals age 25 or older. It has not been validated for the   following: pregnant women, patients with serious comorbid conditions,   or on certain medications, or persons with extremes of body size,   muscle mass, or nutritional status. Calcium 12/29/2021 9.0  8.5 - 10.1 MG/DL Final    Bilirubin, total 12/29/2021 0.3  0.2 - 1.0 MG/DL Final    ALT (SGPT) 12/29/2021 37  13 - 56 U/L Final    AST (SGOT) 12/29/2021 22  10 - 38 U/L Final    Alk.  phosphatase 12/29/2021 98  45 - 117 U/L Final    Protein, total 12/29/2021 7.3  6.4 - 8.2 g/dL Final    Albumin 12/29/2021 3.9  3.4 - 5.0 g/dL Final    Globulin 12/29/2021 3.4  2.0 - 4.0 g/dL Final    A-G Ratio 12/29/2021 1.1  0.8 - 1.7   Final WBC 12/29/2021 7.0  4.6 - 13.2 K/uL Final    RBC 12/29/2021 4.57  4.20 - 5.30 M/uL Final    HGB 12/29/2021 14.0  12.0 - 16.0 g/dL Final    HCT 12/29/2021 41.6  35.0 - 45.0 % Final    MCV 12/29/2021 91.0  78.0 - 100.0 FL Final    MCH 12/29/2021 30.6  24.0 - 34.0 PG Final    MCHC 12/29/2021 33.7  31.0 - 37.0 g/dL Final    RDW 12/29/2021 12.5  11.6 - 14.5 % Final    PLATELET 59/70/7074 186  135 - 420 K/uL Final    MPV 12/29/2021 9.9  9.2 - 11.8 FL Final    NRBC 12/29/2021 0.0  0  WBC Final    ABSOLUTE NRBC 12/29/2021 0.00  0.00 - 0.01 K/uL Final    NEUTROPHILS 12/29/2021 61  40 - 73 % Final    LYMPHOCYTES 12/29/2021 28  21 - 52 % Final    MONOCYTES 12/29/2021 6  3 - 10 % Final    EOSINOPHILS 12/29/2021 4  0 - 5 % Final    BASOPHILS 12/29/2021 0  0 - 2 % Final    IMMATURE GRANULOCYTES 12/29/2021 0  0.0 - 0.5 % Final    ABS. NEUTROPHILS 12/29/2021 4.2  1.8 - 8.0 K/UL Final    ABS. LYMPHOCYTES 12/29/2021 2.0  0.9 - 3.6 K/UL Final    ABS. MONOCYTES 12/29/2021 0.4  0.05 - 1.2 K/UL Final    ABS. EOSINOPHILS 12/29/2021 0.3  0.0 - 0.4 K/UL Final    ABS. BASOPHILS 12/29/2021 0.0  0.0 - 0.1 K/UL Final    ABS. IMM. GRANS. 12/29/2021 0.0  0.00 - 0.04 K/UL Final    DF 12/29/2021 AUTOMATED    Final    NT pro-BNP 12/29/2021 40  0 - 900 PG/ML Final    Comment:           For patients with dyspnea, NT proBNP is highly sensitive for detecting acute congestive heart failure. Also, an NT proBNP <300 pg/mL effectively rules out acute congestive heart failure, with 99% negative predictive value. Our reference ranges are for acute dyspnea. Age              Range (pg/ml)        0-49                0-450        50-75               0-900        >75                 0-1800       For ambulatory office patients, the ranges below apply: For patients with dyspnea, NT proBNP is highly sensitive for detecting acute congestive heart failure.  Also, an NT proBNP <300 pg/mL effectively rules out acute congestive heart failure, with 99% negative predictive value. Our reference ranges are for acute dyspnea. Troponin-High Sensitivity 12/29/2021 3  0 - 54 ng/L Final    Up to 50% of normal patients may have low levels of detectable troponin (within reference range) circulating in the blood. Mild elevations in troponin that are above the reference range, may be present with other cardiac and non-cardiac disease states. Two or three serial tests at two hour intervals, are recommended to evaluate changes in circulating troponin over time. MRI BREAST BI W WO CONT  Narrative: EXAM: MR BREAST WITH AND WITHOUT CONTRAST  BILATERAL    CLINICAL HISTORY/INDICATION: High-risk screening, breast cancer mother in   her  46s, maternal aunt in her 62s, paternal aunt in her 62s, surgical history   of  benign right axillary lymph node excision 2015    COMPARISON: MR breast 12/7/2021 mammogram/20/22. TECHNIQUE: With the patient in the prone position axial localizer, axial   T1,  axial T2, sagittal T1, and thin sliced dynamic 3D pre and post gadolinium   T1  weighted sequences with fat suppression were obtained utilizing a 3 T itzbige scanner and a dedicated 16 channel breast coil. Rapid imaging   with  multiple passes through the breast was performed. Subtraction and MIP   images  were generated and reviewed. 17 cc of Multihance was administered IV for   the  contrast-enhanced portion of the exam.    FINDINGS:    Technical issues:  none    Amount of fibroglandular tissue: Heterogeneous fibroglandular tissue    Background breast enhancement: minimal    Right Breast: No mass. No suspicious enhancement. .    Left Breast: No mass. No suspicious enhancement. .    Axilla: No suspicious adenopathy. .  Internal mammary chain:  No adenopathy  Impression: No suspicious finding. BIRADS 1: Negative.   Management recommendation: Continue annual screening mammography   interleaved  with annual screening breast MR         Physical Exam:  Visit Vitals  /76 Pulse 82   Temp 97.3 °F (36.3 °C)   Resp 18   Ht 5' 2\" (1.575 m)   Wt 81.2 kg (179 lb)   SpO2 98%   BMI 32.74 kg/m²    BMI: Body mass index is 32.74 kg/m². Physical Exam  Constitutional:       Appearance: Normal appearance. She is normal weight. HENT:      Head: Normocephalic and atraumatic. Eyes:      Extraocular Movements: Extraocular movements intact. Conjunctiva/sclera: Conjunctivae normal.      Pupils: Pupils are equal, round, and reactive to light. Cardiovascular:      Rate and Rhythm: Normal rate. Pulmonary:      Effort: Pulmonary effort is normal.   Chest:   Breasts:     Right: No swelling, bleeding, inverted nipple, mass, nipple discharge, skin change or tenderness. Left: Normal. No bleeding, inverted nipple, mass, nipple discharge, skin change or tenderness. Comments: Right axillary breast tissue- unchanged per patient   Lymphadenopathy:      Upper Body:      Right upper body: No supraclavicular, axillary or pectoral adenopathy. Left upper body: No supraclavicular, axillary or pectoral adenopathy. Skin:     General: Skin is warm. Neurological:      General: No focal deficit present. Mental Status: She is alert and oriented to person, place, and time. Mental status is at baseline. Psychiatric:         Mood and Affect: Mood normal.         Behavior: Behavior normal.         Thought Content: Thought content normal.         Judgment: Judgment normal.       I have reviewed the information entered by the clinical staff and/or patient and verified it as accurate or edited where necessary.      Electronically signed by:    David Allen DO, MPH

## 2023-01-25 ENCOUNTER — NURSE NAVIGATOR (OUTPATIENT)
Dept: SURGERY | Age: 53
End: 2023-01-25

## 2023-01-25 NOTE — PROGRESS NOTES
[address]  Breast & Colorectal Oncology Nurse Navigator Encounter    Name: Willie Arauz  Age: 45 y.o.  : 1970      Encounter type:  []Initial Navigator Encounter  []Patient Initiated  []Navigator Follow-up  [x]Other: Genetic testing results    Narrative:   Call patient to inform her of negative genetic testing results with Ankur Fay. Patient doing well and had no questions or concerns at this time. All questions answered.       Interdisciplinary Team:      Nurse Navigator: GARFIELD Block BSN, RN  Breast & Colorectal Cancer Nurse Navigator    [address]  [facility] [address] [address] [place], [address]  Office number [phone]  [email or web address]  Good Help to Those in [facility]

## 2023-02-01 DIAGNOSIS — Z91.89 AT HIGH RISK FOR BREAST CANCER: Primary | ICD-10-CM

## 2023-02-04 DIAGNOSIS — Z91.89 AT HIGH RISK FOR BREAST CANCER: Primary | ICD-10-CM

## 2023-03-14 DIAGNOSIS — E53.8 DEFICIENCY OF OTHER SPECIFIED B GROUP VITAMINS: ICD-10-CM

## 2023-03-14 DIAGNOSIS — Z13.0 SCREENING FOR DEFICIENCY ANEMIA: ICD-10-CM

## 2023-03-14 DIAGNOSIS — Z13.1 SCREENING FOR DIABETES MELLITUS: ICD-10-CM

## 2023-03-14 DIAGNOSIS — Z13.228 SCREENING FOR METABOLIC DISORDER: ICD-10-CM

## 2023-03-14 DIAGNOSIS — E55.9 VITAMIN D DEFICIENCY, UNSPECIFIED: Primary | ICD-10-CM

## 2023-03-14 DIAGNOSIS — Z13.220 SCREENING FOR CHOLESTEROL LEVEL: ICD-10-CM

## 2023-03-14 DIAGNOSIS — Z13.29 SCREENING FOR THYROID DISORDER: ICD-10-CM

## 2023-03-15 ENCOUNTER — HOSPITAL ENCOUNTER (OUTPATIENT)
Facility: HOSPITAL | Age: 53
Setting detail: SPECIMEN
Discharge: HOME | End: 2023-03-18
Payer: COMMERCIAL

## 2023-03-15 DIAGNOSIS — E55.9 VITAMIN D DEFICIENCY, UNSPECIFIED: ICD-10-CM

## 2023-03-15 DIAGNOSIS — Z13.0 SCREENING FOR DEFICIENCY ANEMIA: ICD-10-CM

## 2023-03-15 DIAGNOSIS — Z13.220 SCREENING FOR CHOLESTEROL LEVEL: ICD-10-CM

## 2023-03-15 DIAGNOSIS — Z13.228 SCREENING FOR METABOLIC DISORDER: ICD-10-CM

## 2023-03-15 DIAGNOSIS — E53.8 DEFICIENCY OF OTHER SPECIFIED B GROUP VITAMINS: ICD-10-CM

## 2023-03-15 DIAGNOSIS — Z13.29 SCREENING FOR THYROID DISORDER: ICD-10-CM

## 2023-03-15 DIAGNOSIS — Z13.1 SCREENING FOR DIABETES MELLITUS: ICD-10-CM

## 2023-03-15 LAB
25(OH)D3 SERPL-MCNC: 65.9 NG/ML (ref 30–100)
ANION GAP SERPL CALC-SCNC: 6 MMOL/L (ref 3–18)
BUN SERPL-MCNC: 11 MG/DL (ref 7–18)
BUN/CREAT SERPL: 14 (ref 12–20)
CALCIUM SERPL-MCNC: 9.7 MG/DL (ref 8.5–10.1)
CHLORIDE SERPL-SCNC: 105 MMOL/L (ref 100–111)
CHOLEST SERPL-MCNC: 247 MG/DL
CO2 SERPL-SCNC: 29 MMOL/L (ref 21–32)
CREAT SERPL-MCNC: 0.77 MG/DL (ref 0.6–1.3)
ERYTHROCYTE [DISTWIDTH] IN BLOOD BY AUTOMATED COUNT: 12.9 % (ref 11.6–14.5)
EST. AVERAGE GLUCOSE BLD GHB EST-MCNC: 91 MG/DL
GLUCOSE SERPL-MCNC: 98 MG/DL (ref 74–99)
HBA1C MFR BLD: 4.8 % (ref 4.2–5.6)
HCT VFR BLD AUTO: 43.7 % (ref 35–45)
HDLC SERPL-MCNC: 52 MG/DL (ref 40–60)
HDLC SERPL: 4.8 (ref 0–5)
HGB BLD-MCNC: 14.5 G/DL (ref 12–16)
LDLC SERPL CALC-MCNC: 171.2 MG/DL (ref 0–100)
LIPID PANEL: ABNORMAL
MCH RBC QN AUTO: 31.3 PG (ref 24–34)
MCHC RBC AUTO-ENTMCNC: 33.2 G/DL (ref 31–37)
MCV RBC AUTO: 94.4 FL (ref 78–100)
NRBC # BLD: 0 K/UL (ref 0–0.01)
NRBC BLD-RTO: 0 PER 100 WBC
PLATELET # BLD AUTO: 341 K/UL (ref 135–420)
PMV BLD AUTO: 10.7 FL (ref 9.2–11.8)
POTASSIUM SERPL-SCNC: 3.9 MMOL/L (ref 3.5–5.5)
RBC # BLD AUTO: 4.63 M/UL (ref 4.2–5.3)
SODIUM SERPL-SCNC: 140 MMOL/L (ref 136–145)
TRIGL SERPL-MCNC: 119 MG/DL
TSH SERPL DL<=0.05 MIU/L-ACNC: 2.73 UIU/ML (ref 0.36–3.74)
VIT B12 SERPL-MCNC: 819 PG/ML (ref 211–911)
VLDLC SERPL CALC-MCNC: 23.8 MG/DL
WBC # BLD AUTO: 6.7 K/UL (ref 4.6–13.2)

## 2023-03-15 PROCEDURE — 80061 LIPID PANEL: CPT

## 2023-03-15 PROCEDURE — 82306 VITAMIN D 25 HYDROXY: CPT

## 2023-03-15 PROCEDURE — 80048 BASIC METABOLIC PNL TOTAL CA: CPT

## 2023-03-15 PROCEDURE — 83036 HEMOGLOBIN GLYCOSYLATED A1C: CPT

## 2023-03-15 PROCEDURE — 85027 COMPLETE CBC AUTOMATED: CPT

## 2023-03-15 PROCEDURE — 82607 VITAMIN B-12: CPT

## 2023-03-15 PROCEDURE — 84443 ASSAY THYROID STIM HORMONE: CPT

## 2023-03-15 PROCEDURE — 36415 COLL VENOUS BLD VENIPUNCTURE: CPT

## 2023-03-20 ENCOUNTER — OFFICE VISIT (OUTPATIENT)
Age: 53
End: 2023-03-20
Payer: COMMERCIAL

## 2023-03-20 VITALS
OXYGEN SATURATION: 96 % | RESPIRATION RATE: 20 BRPM | SYSTOLIC BLOOD PRESSURE: 118 MMHG | WEIGHT: 173.4 LBS | DIASTOLIC BLOOD PRESSURE: 84 MMHG | HEART RATE: 67 BPM | HEIGHT: 62 IN | BODY MASS INDEX: 31.91 KG/M2 | TEMPERATURE: 98 F

## 2023-03-20 DIAGNOSIS — Z00.00 ROUTINE HISTORY AND PHYSICAL EXAMINATION OF ADULT: Primary | ICD-10-CM

## 2023-03-20 DIAGNOSIS — J30.2 SEASONAL ALLERGIC RHINITIS, UNSPECIFIED TRIGGER: ICD-10-CM

## 2023-03-20 DIAGNOSIS — R21 RASH IN ADULT: ICD-10-CM

## 2023-03-20 PROCEDURE — 99396 PREV VISIT EST AGE 40-64: CPT | Performed by: NURSE PRACTITIONER

## 2023-03-20 RX ORDER — MULTIVIT-MIN/IRON/FA/VIT K/LUT 4MG-200MCG
TABLET ORAL
COMMUNITY

## 2023-03-20 RX ORDER — TRIAMCINOLONE ACETONIDE 5 MG/G
CREAM TOPICAL
Qty: 1 EACH | Refills: 3 | Status: SHIPPED | OUTPATIENT
Start: 2023-03-20 | End: 2023-03-27

## 2023-03-20 RX ORDER — ACETAMINOPHEN 160 MG
TABLET,DISINTEGRATING ORAL
COMMUNITY

## 2023-03-20 RX ORDER — L.ACID/L.CASEI/B.BIF/B.LON/FOS 2B CELL-50
CAPSULE ORAL
COMMUNITY
End: 2023-03-20 | Stop reason: CLARIF

## 2023-03-20 RX ORDER — MONTELUKAST SODIUM 10 MG/1
10 TABLET ORAL NIGHTLY
Qty: 90 TABLET | Refills: 1 | Status: SHIPPED | OUTPATIENT
Start: 2023-03-20

## 2023-03-20 RX ORDER — MELATONIN 10 MG
CAPSULE ORAL
COMMUNITY

## 2023-03-20 RX ORDER — ALBUTEROL SULFATE 90 UG/1
AEROSOL, METERED RESPIRATORY (INHALATION)
COMMUNITY
Start: 2023-02-23

## 2023-03-20 RX ORDER — LANOLIN ALCOHOL/MO/W.PET/CERES
500 CREAM (GRAM) TOPICAL WEEKLY
COMMUNITY

## 2023-03-20 SDOH — ECONOMIC STABILITY: INCOME INSECURITY: HOW HARD IS IT FOR YOU TO PAY FOR THE VERY BASICS LIKE FOOD, HOUSING, MEDICAL CARE, AND HEATING?: NOT HARD AT ALL

## 2023-03-20 SDOH — ECONOMIC STABILITY: FOOD INSECURITY: WITHIN THE PAST 12 MONTHS, YOU WORRIED THAT YOUR FOOD WOULD RUN OUT BEFORE YOU GOT MONEY TO BUY MORE.: NEVER TRUE

## 2023-03-20 SDOH — ECONOMIC STABILITY: HOUSING INSECURITY
IN THE LAST 12 MONTHS, WAS THERE A TIME WHEN YOU DID NOT HAVE A STEADY PLACE TO SLEEP OR SLEPT IN A SHELTER (INCLUDING NOW)?: NO

## 2023-03-20 SDOH — ECONOMIC STABILITY: FOOD INSECURITY: WITHIN THE PAST 12 MONTHS, THE FOOD YOU BOUGHT JUST DIDN'T LAST AND YOU DIDN'T HAVE MONEY TO GET MORE.: NEVER TRUE

## 2023-03-20 ASSESSMENT — PATIENT HEALTH QUESTIONNAIRE - PHQ9
SUM OF ALL RESPONSES TO PHQ QUESTIONS 1-9: 0
SUM OF ALL RESPONSES TO PHQ9 QUESTIONS 1 & 2: 0
SUM OF ALL RESPONSES TO PHQ QUESTIONS 1-9: 0
2. FEELING DOWN, DEPRESSED OR HOPELESS: 0
1. LITTLE INTEREST OR PLEASURE IN DOING THINGS: 0

## 2023-03-20 NOTE — PROGRESS NOTES
1. \"Have you been to the ER, urgent care clinic since your last visit? Hospitalized since your last visit? \" No    2. \"Have you seen or consulted any other health care providers outside of the [address] [place] [address] since your last visit? \"  ENT and GYN      3. For patients aged 39-70: Has the patient had a colonoscopy / FIT/ Cologuard? Yes - no Care Gap present      If the patient is female:    4. For patients aged 41-77: Has the patient had a mammogram within the past 2 years? Yes - no Care Gap present      5. For patients aged 21-65: Has the patient had a pap smear? Yes - Care Gap present.  Rooming MA/LPN to request most recent results
active all four quadrants,     no masses, no organomegaly   Genitalia:    Normal female without lesion, discharge or tenderness   Rectal:    Normal tone ;guaiac negative stool   Extremities:   Extremities normal, atraumatic, no cyanosis or edema   Pulses:   2+ and symmetric all extremities   Skin:   Skin color, texture, turgor normal, no rashes or lesions   Lymph nodes:   Cervical, supraclavicular, and axillary nodes normal   Neurologic:   CNII-XII intact, normal strength, sensation and reflexes     throughout   . Assessment:      Healthy female exam.      Plan:       All questions answered  Discussed healthy lifestyle modifications  Educational material distributed  Follow-up as needed.

## 2023-06-01 ENCOUNTER — HOSPITAL ENCOUNTER (OUTPATIENT)
Facility: HOSPITAL | Age: 53
Discharge: HOME | End: 2023-06-01
Payer: COMMERCIAL

## 2023-06-01 DIAGNOSIS — Z91.89 AT HIGH RISK FOR BREAST CANCER: ICD-10-CM

## 2023-06-01 PROCEDURE — 77067 SCR MAMMO BI INCL CAD: CPT

## 2023-06-03 ENCOUNTER — TELEPHONE (OUTPATIENT)
Age: 53
End: 2023-06-03

## 2023-06-03 NOTE — TELEPHONE ENCOUNTER
Called to notify patient of normal mammogram. MRI is ordered for December and she will follow up then unless she has questions or concerns before then. She agrees with this plan.     Becky Pimentel

## 2023-07-06 ENCOUNTER — HOSPITAL ENCOUNTER (OUTPATIENT)
Facility: HOSPITAL | Age: 53
Discharge: HOME | End: 2023-07-09

## 2023-07-06 ENCOUNTER — OFFICE VISIT (OUTPATIENT)
Age: 53
End: 2023-07-06

## 2023-07-06 VITALS — WEIGHT: 172 LBS | BODY MASS INDEX: 31.46 KG/M2

## 2023-07-06 DIAGNOSIS — M70.51 PES ANSERINUS BURSITIS OF RIGHT KNEE: Primary | ICD-10-CM

## 2023-07-06 DIAGNOSIS — M22.2X1 PATELLOFEMORAL DISORDER, RIGHT: ICD-10-CM

## 2023-07-06 DIAGNOSIS — M70.51 PES ANSERINUS BURSITIS OF RIGHT KNEE: ICD-10-CM

## 2023-07-06 NOTE — PROGRESS NOTES
right  XR KNEE RIGHT (1-2 VIEWS)    diclofenac (VOLTAREN) 50 MG EC tablet          Patient (or guardian if minor) verbalizes understanding of evaluation and plan. Will start HEP and Rx for voltaren 50mg w/ ice often as above and plan follow-up 5 weeks.

## 2023-08-01 ENCOUNTER — OFFICE VISIT (OUTPATIENT)
Age: 53
End: 2023-08-01
Payer: COMMERCIAL

## 2023-08-01 ENCOUNTER — HOSPITAL ENCOUNTER (OUTPATIENT)
Facility: HOSPITAL | Age: 53
Discharge: HOME | End: 2023-08-04
Payer: COMMERCIAL

## 2023-08-01 VITALS
DIASTOLIC BLOOD PRESSURE: 73 MMHG | RESPIRATION RATE: 18 BRPM | OXYGEN SATURATION: 96 % | BODY MASS INDEX: 30.99 KG/M2 | HEIGHT: 62 IN | TEMPERATURE: 98 F | SYSTOLIC BLOOD PRESSURE: 103 MMHG | HEART RATE: 75 BPM | WEIGHT: 168.4 LBS

## 2023-08-01 DIAGNOSIS — Z01.818 PREOPERATIVE EXAMINATION: Primary | ICD-10-CM

## 2023-08-01 DIAGNOSIS — J30.2 SEASONAL ALLERGIC RHINITIS, UNSPECIFIED TRIGGER: ICD-10-CM

## 2023-08-01 LAB
ALBUMIN SERPL-MCNC: 4 G/DL (ref 3.4–5)
ALBUMIN/GLOB SERPL: 1.1 (ref 0.8–1.7)
ALP SERPL-CCNC: 94 U/L (ref 45–117)
ALT SERPL-CCNC: 55 U/L (ref 13–56)
ANION GAP SERPL CALC-SCNC: 6 MMOL/L (ref 3–18)
AST SERPL-CCNC: 23 U/L (ref 10–38)
BASOPHILS # BLD: 0.1 K/UL (ref 0–0.1)
BASOPHILS NFR BLD: 1 % (ref 0–2)
BILIRUB SERPL-MCNC: 0.5 MG/DL (ref 0.2–1)
BUN SERPL-MCNC: 12 MG/DL (ref 7–18)
BUN/CREAT SERPL: 16 (ref 12–20)
CALCIUM SERPL-MCNC: 9.5 MG/DL (ref 8.5–10.1)
CHLORIDE SERPL-SCNC: 106 MMOL/L (ref 100–111)
CO2 SERPL-SCNC: 28 MMOL/L (ref 21–32)
CREAT SERPL-MCNC: 0.76 MG/DL (ref 0.6–1.3)
DIFFERENTIAL METHOD BLD: NORMAL
EOSINOPHIL # BLD: 0.2 K/UL (ref 0–0.4)
EOSINOPHIL NFR BLD: 3 % (ref 0–5)
ERYTHROCYTE [DISTWIDTH] IN BLOOD BY AUTOMATED COUNT: 12.5 % (ref 11.6–14.5)
GLOBULIN SER CALC-MCNC: 3.6 G/DL (ref 2–4)
GLUCOSE SERPL-MCNC: 85 MG/DL (ref 74–99)
HCT VFR BLD AUTO: 41.5 % (ref 35–45)
HGB BLD-MCNC: 13.7 G/DL (ref 12–16)
IMM GRANULOCYTES # BLD AUTO: 0 K/UL (ref 0–0.04)
IMM GRANULOCYTES NFR BLD AUTO: 0 % (ref 0–0.5)
LYMPHOCYTES # BLD: 2.4 K/UL (ref 0.9–3.6)
LYMPHOCYTES NFR BLD: 32 % (ref 21–52)
MCH RBC QN AUTO: 31.1 PG (ref 24–34)
MCHC RBC AUTO-ENTMCNC: 33 G/DL (ref 31–37)
MCV RBC AUTO: 94.3 FL (ref 78–100)
MONOCYTES # BLD: 0.4 K/UL (ref 0.05–1.2)
MONOCYTES NFR BLD: 6 % (ref 3–10)
NEUTS SEG # BLD: 4.4 K/UL (ref 1.8–8)
NEUTS SEG NFR BLD: 59 % (ref 40–73)
NRBC # BLD: 0 K/UL (ref 0–0.01)
NRBC BLD-RTO: 0 PER 100 WBC
PLATELET # BLD AUTO: 351 K/UL (ref 135–420)
PMV BLD AUTO: 10.8 FL (ref 9.2–11.8)
POTASSIUM SERPL-SCNC: 4.4 MMOL/L (ref 3.5–5.5)
PROT SERPL-MCNC: 7.6 G/DL (ref 6.4–8.2)
RBC # BLD AUTO: 4.4 M/UL (ref 4.2–5.3)
SODIUM SERPL-SCNC: 140 MMOL/L (ref 136–145)
WBC # BLD AUTO: 7.5 K/UL (ref 4.6–13.2)

## 2023-08-01 PROCEDURE — 80053 COMPREHEN METABOLIC PANEL: CPT

## 2023-08-01 PROCEDURE — 85025 COMPLETE CBC W/AUTO DIFF WBC: CPT

## 2023-08-01 PROCEDURE — 36415 COLL VENOUS BLD VENIPUNCTURE: CPT

## 2023-08-01 PROCEDURE — 99214 OFFICE O/P EST MOD 30 MIN: CPT | Performed by: NURSE PRACTITIONER

## 2023-08-01 RX ORDER — GABAPENTIN 300 MG/1
300 CAPSULE ORAL 2 TIMES DAILY
COMMUNITY
Start: 2023-05-21

## 2023-08-01 RX ORDER — MONTELUKAST SODIUM 10 MG/1
10 TABLET ORAL NIGHTLY
Qty: 90 TABLET | Refills: 1 | OUTPATIENT
Start: 2023-08-01

## 2023-08-01 ASSESSMENT — PATIENT HEALTH QUESTIONNAIRE - PHQ9
2. FEELING DOWN, DEPRESSED OR HOPELESS: 0
SUM OF ALL RESPONSES TO PHQ QUESTIONS 1-9: 0
SUM OF ALL RESPONSES TO PHQ9 QUESTIONS 1 & 2: 0
SUM OF ALL RESPONSES TO PHQ QUESTIONS 1-9: 0
1. LITTLE INTEREST OR PLEASURE IN DOING THINGS: 0

## 2023-08-01 NOTE — PROGRESS NOTES
1. \"Have you been to the ER, urgent care clinic since your last visit? Hospitalized since your last visit? \" No    2. \"Have you seen or consulted any other health care providers outside of the [address] [place] [address] since your last visit? \"  Podiatry      3. For patients aged 43-73: Has the patient had a colonoscopy / FIT/ Cologuard? Yes - no Care Gap present      If the patient is female:    4. For patients aged 43-66: Has the patient had a mammogram within the past 2 years? Yes - no Care Gap present      5. For patients aged 21-65: Has the patient had a pap smear?  Yes - no Care Gap present

## 2023-08-01 NOTE — PROGRESS NOTES
Preoperative Evaluation    Date of Exam: 8/1/2023     Wayne Padilla is a 45 y.o. female 1970 who presents for preoperative evaluation.      Procedure/Surgery:Tarsal tunnel release right Plantar Fasciotomy right foot  Date of Procedure/Surgery: 8/14/2023  Surgeon: Dr Aydee Raymond: Jae Amezcua  Primary Physician: Katy Viera NP  Latex Allergy: No    Allergies   Allergen Reactions    Ciprofloxacin Diarrhea    Clindamycin Other (See Comments)    Milk (Cow) Diarrhea    Diclofenac Sodium Rash    Sulfa Antibiotics Rash   ,   Past Medical History:   Diagnosis Date    Allergic rhinitis     Dysphagia     in physical therapy    GERD (gastroesophageal reflux disease)     Lower  GERD    IBS (irritable bowel syndrome)     Lymphadenopathy     Nausea & vomiting     Other ill-defined conditions(799.89)     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    Skin cancer     removed from nose    Sleep apnea     \"mild per patient\" no cpap    Thumb pain 6/7/2010   ,   Past Surgical History:   Procedure Laterality Date    BACK SURGERY  2012    CERVICAL FUSION  03/05/2020    Anterior    CERVICAL FUSION  03/05/2020    GYN      left tube removal ectopic pregnancy    HEENT  9/2011    Basal cell cancer lesion removed from nose    LYMPHADENECTOMY Right 8/25/16    [address] [place]    TMJ    OTHER SURGICAL HISTORY  2013    Spinal Fusion    OVARY REMOVAL      ROTATOR CUFF REPAIR Right 2019    x2    TONSILLECTOMY  2/2007   ,   Health Maintenance   Topic Date Due    HIV screen  Never done    DTaP/Tdap/Td vaccine (1 - Tdap) Never done    COVID-19 Vaccine (4 - Booster for Pfizer series) 01/07/2022    Flu vaccine (1) 08/01/2023    Depression Screen  03/20/2024    Breast cancer screen  06/01/2025    Cervical cancer screen  12/30/2027    Lipids  03/15/2028    Colorectal Cancer Screen  09/10/2028    Shingles vaccine  Completed    Hepatitis C screen  Completed    Hepatitis A vaccine  Aged Out    Hib vaccine  Aged Out

## 2023-08-08 ENCOUNTER — OFFICE VISIT (OUTPATIENT)
Age: 53
End: 2023-08-08
Payer: COMMERCIAL

## 2023-08-08 VITALS — BODY MASS INDEX: 30.73 KG/M2 | WEIGHT: 168 LBS

## 2023-08-08 DIAGNOSIS — M22.2X1 PATELLOFEMORAL DISORDER, RIGHT: ICD-10-CM

## 2023-08-08 DIAGNOSIS — M70.51 PES ANSERINUS BURSITIS OF RIGHT KNEE: Primary | ICD-10-CM

## 2023-08-08 PROCEDURE — 99213 OFFICE O/P EST LOW 20 MIN: CPT | Performed by: FAMILY MEDICINE

## 2023-08-08 NOTE — PROGRESS NOTES
HISTORY OF PRESENT ILLNESS    Asad Nunez 1970 is a 52y.o. year old female comes in today to be evaluated and treated for: right knee pain    Since last appt has noticed pain better when does HEP/stretch. Pain level 2/10. Using voltaren 50mg with benefit. IMAGING: XR right knee 7/6/2023  IMPRESSION:  There are mild degenerative changes involving the medial compartment without  acute abnormality. Past Surgical History:   Procedure Laterality Date    BACK SURGERY  2012    CERVICAL FUSION  03/05/2020    Anterior    CERVICAL FUSION  03/05/2020    GYN      left tube removal ectopic pregnancy    HEENT  9/2011    Basal cell cancer lesion removed from nose    LYMPHADENECTOMY Right 8/25/16    [address] [place]    TMJ    OTHER SURGICAL HISTORY  2013    Spinal Fusion    OVARY REMOVAL      ROTATOR CUFF REPAIR Right 2019    x2    TONSILLECTOMY  2/2007     Social History     Socioeconomic History    Marital status:      Spouse name: None    Number of children: None    Years of education: None    Highest education level: None   Tobacco Use    Smoking status: Never    Smokeless tobacco: Never   Substance and Sexual Activity    Alcohol use: Yes    Drug use: No     Social Determinants of Health     Financial Resource Strain: Low Risk     Difficulty of Paying Living Expenses: Not hard at all   Food Insecurity: No Food Insecurity    Worried About Running Out of Food in the Last Year: Never true    Ran Out of Food in the Last Year: Never true   Transportation Needs: Unknown    Lack of Transportation (Non-Medical): No   Housing Stability: Unknown    Unstable Housing in the Last Year: No     Current Outpatient Medications   Medication Sig Dispense Refill    gabapentin (NEURONTIN) 300 MG capsule Take 1 capsule by mouth 2 times daily. diclofenac (VOLTAREN) 50 MG EC tablet Take 1 tablet by mouth with breakfast and with evening meal As needed pain.  60 tablet 1    albuterol sulfate HFA

## 2023-08-29 DIAGNOSIS — M22.2X1 PATELLOFEMORAL DISORDER, RIGHT: ICD-10-CM

## 2023-08-29 DIAGNOSIS — M70.51 PES ANSERINUS BURSITIS OF RIGHT KNEE: ICD-10-CM

## 2023-09-06 DIAGNOSIS — J30.2 SEASONAL ALLERGIC RHINITIS, UNSPECIFIED TRIGGER: ICD-10-CM

## 2023-09-06 RX ORDER — MONTELUKAST SODIUM 10 MG/1
10 TABLET ORAL NIGHTLY
Qty: 90 TABLET | Refills: 1 | Status: SHIPPED | OUTPATIENT
Start: 2023-09-06

## 2023-09-06 NOTE — TELEPHONE ENCOUNTER
Last Visit: 8/1/23   Next Appointment: None    Requested Prescriptions     Pending Prescriptions Disp Refills    montelukast (SINGULAIR) 10 MG tablet [Pharmacy Med Name: MONTELUKAST SOD 10 MG TABLET] 90 tablet 1     Sig: TAKE 1 TABLET BY MOUTH NIGHTLY

## 2023-11-05 DIAGNOSIS — M22.2X1 PATELLOFEMORAL DISORDER, RIGHT: ICD-10-CM

## 2023-11-05 DIAGNOSIS — M70.51 PES ANSERINUS BURSITIS OF RIGHT KNEE: ICD-10-CM

## 2023-12-05 ENCOUNTER — HOSPITAL ENCOUNTER (OUTPATIENT)
Facility: HOSPITAL | Age: 53
Discharge: HOME | End: 2023-12-08
Payer: COMMERCIAL

## 2023-12-05 DIAGNOSIS — Z80.3 FAMILY HISTORY OF BREAST CANCER IN FIRST DEGREE RELATIVE: ICD-10-CM

## 2023-12-05 PROCEDURE — C8908 MRI W/O FOL W/CONT, BREAST,: HCPCS

## 2023-12-05 PROCEDURE — 6360000004 HC RX CONTRAST MEDICATION

## 2023-12-05 PROCEDURE — A9577 INJ MULTIHANCE: HCPCS

## 2023-12-05 RX ADMIN — GADOBENATE DIMEGLUMINE 16 ML: 529 INJECTION, SOLUTION INTRAVENOUS at 08:22

## 2023-12-08 ENCOUNTER — TELEMEDICINE (OUTPATIENT)
Age: 53
End: 2023-12-08
Payer: COMMERCIAL

## 2023-12-08 ENCOUNTER — TELEPHONE (OUTPATIENT)
Age: 53
End: 2023-12-08

## 2023-12-08 DIAGNOSIS — R06.2 WHEEZING: ICD-10-CM

## 2023-12-08 DIAGNOSIS — R05.1 ACUTE COUGH: Primary | ICD-10-CM

## 2023-12-08 PROCEDURE — 99214 OFFICE O/P EST MOD 30 MIN: CPT | Performed by: NURSE PRACTITIONER

## 2023-12-08 RX ORDER — BENZONATATE 100 MG/1
100 CAPSULE ORAL 2 TIMES DAILY PRN
COMMUNITY
Start: 2023-12-04

## 2023-12-08 RX ORDER — IPRATROPIUM BROMIDE 21 UG/1
1 SPRAY, METERED NASAL 2 TIMES DAILY
COMMUNITY
Start: 2023-12-04

## 2023-12-08 RX ORDER — ALBUTEROL SULFATE 90 UG/1
1-2 AEROSOL, METERED RESPIRATORY (INHALATION) EVERY 4 HOURS PRN
Qty: 18 G | Refills: 4 | Status: SHIPPED | OUTPATIENT
Start: 2023-12-08

## 2023-12-08 NOTE — PATIENT INSTRUCTIONS
URI/Cold/Flu/COVID at home treatment  To prevent dehydration, drink plenty of fluids, enough so that your urine is light yellow or clear like water. Take acetaminophen (Tylenol) or ibuprofen (Advil, Motrin) for fever or pain. Take cough medicine and a decongestant (if not prescribed)  Get plenty of rest.  Use saline (saltwater) nasal washes to help keep your nasal passages open and wash out mucus and bacteria. You can also use nasal sprays (Flonase, Nasonex, Nasonex, Nasacort)  Use a vaporizer or humidifier to add moisture to the air in your bedroom. Do not smoke or allow others to smoke around you. May take an allergy medication (allegra, Claritin, Zyrtec) to help with congestion, and runny nose, itchy eyes, and ear pressure, and congestion.

## 2023-12-08 NOTE — TELEPHONE ENCOUNTER
From Dr Kalpesh Nguyen, Can you please let the patient know her MRI results are benign but we recommend clinical exams every 6 months and she will have to come in if she wants to continue with high risk and we order future imaging for her     Pt aware and will schedule appt.

## 2023-12-08 NOTE — PROGRESS NOTES
Virtual Visit    Assessment/Plan:   Below is the assessment and plan developed based on review of pertinent history, physical exam, labs, studies, and medications. Dina Rodriguez was seen today for cough and wheezing. Diagnoses and all orders for this visit:    Acute cough  -     XR CHEST PA LATERAL W BOTH OBLIQUE PROJECTIONS; Future  -     albuterol sulfate HFA (PROVENTIL;VENTOLIN;PROAIR) 108 (90 Base) MCG/ACT inhaler; Inhale 1-2 puffs into the lungs every 4 hours as needed for Wheezing    Wheezing  -     XR CHEST PA LATERAL W BOTH OBLIQUE PROJECTIONS; Future  -     albuterol sulfate HFA (PROVENTIL;VENTOLIN;PROAIR) 108 (90 Base) MCG/ACT inhaler; Inhale 1-2 puffs into the lungs every 4 hours as needed for Wheezing       Follow-up and Dispositions    Return if symptoms worsen or fail to improve. Subjective:     Dina Rodriguez is a 52 y.o. y.o. female who complains of   Chief Complaint   Patient presents with    Cough    Wheezing      Patient presents for upper respiratory infection symptoms including cough and wheezing. Patient states symptoms started Thursday, the 30th, with cough, and wheezing, no fever. Patient states that her symptoms progressed so on Monday she went to, and was tested for COVID, strep, Flu which were all negative. She was given a steroid taper, nasal spray, and Tessalon Perles. Patient states she is now on day 4 of 5 of steroids. Today patient is no longer coughing, no longer wheezing. Pt states that her symptoms are usually seasonal, in the early spring and summer. She did say that she went on a trip to [place] last week and everyone that she was around including herself did get the symptoms she is speaking of above. Patient has been getting getting allergy shots for trees, mold and grass, dust for years, as well as she takes montelukast. Pt denies smoking, but states she was around her father when he smoked a pipe as a child.   Patient states they did not check a chest x-ray at urgent care,

## 2023-12-11 ENCOUNTER — HOSPITAL ENCOUNTER (OUTPATIENT)
Facility: HOSPITAL | Age: 53
Discharge: HOME | End: 2023-12-14
Payer: COMMERCIAL

## 2023-12-11 DIAGNOSIS — R05.1 ACUTE COUGH: ICD-10-CM

## 2023-12-11 DIAGNOSIS — R06.2 WHEEZING: ICD-10-CM

## 2023-12-11 PROCEDURE — 71048 X-RAY EXAM CHEST 4+ VIEWS: CPT

## 2024-01-09 DIAGNOSIS — M70.51 PES ANSERINUS BURSITIS OF RIGHT KNEE: ICD-10-CM

## 2024-01-09 DIAGNOSIS — M22.2X1 PATELLOFEMORAL DISORDER, RIGHT: ICD-10-CM

## 2024-01-15 ENCOUNTER — OFFICE VISIT (OUTPATIENT)
Age: 54
End: 2024-01-15
Payer: COMMERCIAL

## 2024-01-15 VITALS
SYSTOLIC BLOOD PRESSURE: 122 MMHG | OXYGEN SATURATION: 98 % | DIASTOLIC BLOOD PRESSURE: 76 MMHG | HEART RATE: 73 BPM | HEIGHT: 62 IN | TEMPERATURE: 96.9 F | RESPIRATION RATE: 14 BRPM | BODY MASS INDEX: 32.28 KG/M2 | WEIGHT: 175.4 LBS

## 2024-01-15 DIAGNOSIS — Z80.3 FAMILY HISTORY OF MALIGNANT NEOPLASM OF BREAST: Primary | ICD-10-CM

## 2024-01-15 DIAGNOSIS — Z91.89 AT HIGH RISK FOR BREAST CANCER: ICD-10-CM

## 2024-01-15 PROCEDURE — 99214 OFFICE O/P EST MOD 30 MIN: CPT | Performed by: SURGERY

## 2024-01-15 ASSESSMENT — ENCOUNTER SYMPTOMS
SHORTNESS OF BREATH: 0
CHEST TIGHTNESS: 0

## 2024-01-15 NOTE — PROGRESS NOTES
CC:   Chief Complaint   Patient presents with    Follow-up     Bilateral breast     Abdominal Pain        Assessment:    ICD-10-CM    1. Family history of malignant neoplasm of breast  Z80.3       2. At high risk for breast cancer  Z91.89           Plan: I reviewed with the patient the most recent MRI from December 2023 which was normal BI-RADS 1. She reports no changes to self breast exam which she performs monthly and she should notify us of any changes including new lumps,pain, nipple discharge or skin changes. She would like to continue with the high risk screening program alternating with mammography and MRI every 6 months.  We will order her repeat mammogram bilaterally in June which coincides with her yearly mammograms. She is to continue with clinical breast exams once yearly after MRI. She will call prior to her yearly follow-up if she has any questions or concerns. Most recent genetic testing was reviewed with her which was normal.         HPI:  Maki Middleton is a 53 y.o. female who is referred by Michael Echavarria NP for high risk breast screening.  She has been previously seen by Dr. Sher last in July 2022 for high risk screening program.  She has Tyrer-Cuzick of 31%.  She does have  a mother with breast cancer age 50 maternal aunt and paternal aunt with breast cancer.  Patient underwent menopause in May 2019.  She has never been on hormone replacement therapy.  She performs breast exams monthly and reports no changes at all to her  breast today. She wants to continue with high risk screening       Allergies:  Allergies   Allergen Reactions    Ciprofloxacin Diarrhea    Clindamycin Other (See Comments)    Milk (Cow) Diarrhea    Diclofenac Sodium Rash    Sulfa Antibiotics Rash       Medication Review:  Current Outpatient Medications on File Prior to Visit   Medication Sig Dispense Refill    ipratropium (ATROVENT) 0.03 % nasal spray 1 spray by Nasal route 2 times daily      albuterol sulfate HFA

## 2024-01-15 NOTE — PROGRESS NOTES
Maki Middleton is a 53 y.o. female (: 1970)     Chief Complaint   Patient presents with    Follow-up     Bilateral breast     Abdominal Pain       Medication list and allergies have been reviewed with Maki RIVAS Emi and updated as of today's date.     I have gone over all Medical, Surgical and Social History with Maki Middleton and updated/added the information accordingly.      1. Have you been to the ER, urgent care clinic since your last visit?  Hospitalized since your last visit?No    2. Have you seen or consulted any other health care providers outside of the [facility] since your last visit?  Include any pap smears or colon screening. No

## 2024-01-21 DIAGNOSIS — U07.1 COVID-19: Primary | ICD-10-CM

## 2024-02-12 ENCOUNTER — OFFICE VISIT (OUTPATIENT)
Age: 54
End: 2024-02-12
Payer: COMMERCIAL

## 2024-02-12 VITALS — WEIGHT: 175 LBS | BODY MASS INDEX: 32.2 KG/M2 | HEIGHT: 62 IN

## 2024-02-12 DIAGNOSIS — M99.03 SOMATIC DYSFUNCTION OF LUMBAR REGION: ICD-10-CM

## 2024-02-12 DIAGNOSIS — M99.04 SACRAL REGION SOMATIC DYSFUNCTION: ICD-10-CM

## 2024-02-12 DIAGNOSIS — M99.01 CERVICAL SOMATIC DYSFUNCTION: ICD-10-CM

## 2024-02-12 DIAGNOSIS — M99.07 UPPER EXTREMITY SOMATIC DYSFUNCTION: ICD-10-CM

## 2024-02-12 DIAGNOSIS — M99.06 LOWER LIMB REGION SOMATIC DYSFUNCTION: ICD-10-CM

## 2024-02-12 DIAGNOSIS — M99.02 THORACIC REGION SOMATIC DYSFUNCTION: ICD-10-CM

## 2024-02-12 DIAGNOSIS — M99.09 SOMATIC DYSFUNCTION OF ABDOMINAL REGION: ICD-10-CM

## 2024-02-12 DIAGNOSIS — M99.05 PELVIC REGION SOMATIC DYSFUNCTION: ICD-10-CM

## 2024-02-12 DIAGNOSIS — M99.08 RIB CAGE REGION SOMATIC DYSFUNCTION: ICD-10-CM

## 2024-02-12 DIAGNOSIS — M77.8 LEFT WRIST TENDINITIS: Primary | ICD-10-CM

## 2024-02-12 PROCEDURE — 99213 OFFICE O/P EST LOW 20 MIN: CPT | Performed by: FAMILY MEDICINE

## 2024-02-12 PROCEDURE — 98929 OSTEOPATH MANJ 9-10 REGIONS: CPT | Performed by: FAMILY MEDICINE

## 2024-02-12 NOTE — PATIENT INSTRUCTIONS
Wrist flexor stretch    Extend your arm in front of you with your palm up.  Bend your wrist, pointing your hand toward the floor.  With your other hand, gently bend your wrist farther until you feel a mild to moderate stretch in your forearm.  Hold for at least 15 to 30 seconds. Repeat 2 to 4 times.  Wrist extensor stretch    Repeat steps 1 through 4 of the stretch above, but begin with your extended hand palm down.  Follow-up care is a key part of your treatment and safety. Be sure to make and go to all appointments, and call your doctor if you are having problems. It's also a good idea to know your test results and keep a list of the medicines you take.

## 2024-02-12 NOTE — PROGRESS NOTES
Tennis Elbow test Negative bilateral . Golfer's Elbow test Negative bilateral . Negative muscular atrophy  Examined seated and supine.  Slump negative. Standing flexion test positive left  Sphinx test positive left.  ASIS low left  Iliac crests equal bilaterally Pubes equal bilaterally Medial malleolus low left  Sacral base posterior left  KRYSTAL low right  TTA at C3 on left worse equal, T3, 4 on left worse flexion, and L4, 5 on left worse flexion  Rib(s) 3, 4 left TTP and posterior  Thoracic diaphragm restricted left. Scapula motion restricted w/ TTA right. Hip flexion limited left.      Assessment/Plan:    Diagnosis Orders   1. Left wrist tendinitis        2. Somatic dysfunction of lumbar region  DE OSTEOPATHIC MANIPULATIVE TX 9-10 BODY REGIONS      3. Pelvic region somatic dysfunction  DE OSTEOPATHIC MANIPULATIVE TX 9-10 BODY REGIONS      4. Sacral region somatic dysfunction  DE OSTEOPATHIC MANIPULATIVE TX 9-10 BODY REGIONS      5. Thoracic region somatic dysfunction  DE OSTEOPATHIC MANIPULATIVE TX 9-10 BODY REGIONS      6. Rib cage region somatic dysfunction  DE OSTEOPATHIC MANIPULATIVE TX 9-10 BODY REGIONS      7. Cervical somatic dysfunction  DE OSTEOPATHIC MANIPULATIVE TX 9-10 BODY REGIONS      8. Lower limb region somatic dysfunction  DE OSTEOPATHIC MANIPULATIVE TX 9-10 BODY REGIONS      9. Upper extremity somatic dysfunction  DE OSTEOPATHIC MANIPULATIVE TX 9-10 BODY REGIONS      10. Somatic dysfunction of abdominal region  DE OSTEOPATHIC MANIPULATIVE TX 9-10 BODY REGIONS        Patient (or guardian if minor) verbalizes understanding of evaluation and plan.    Verbal consent obtained.  Cervical, Thoracic, Rib, Lumbar, Pelvic, Sacral, Upper Ext, Lower Ext, and Abdominal SD treated with Myofascial and ME.  Correction of previous malalignments verified after Tx.    Pt tolerated well.  Notes improvement of Sx and pain is now rated 0/10.    HEP/stretches daily. Discussed stretching/strengthening/posture.    Will

## 2024-02-26 DIAGNOSIS — J30.2 SEASONAL ALLERGIC RHINITIS, UNSPECIFIED TRIGGER: ICD-10-CM

## 2024-02-26 NOTE — TELEPHONE ENCOUNTER
Last Visit: 12/8/23 VV   Next Appointment: None    Requested Prescriptions     Pending Prescriptions Disp Refills    montelukast (SINGULAIR) 10 MG tablet [Pharmacy Med Name: MONTELUKAST SOD 10 MG TABLET] 90 tablet 1     Sig: TAKE 1 TABLET BY MOUTH EVERY DAY AT NIGHT

## 2024-02-28 RX ORDER — MONTELUKAST SODIUM 10 MG/1
10 TABLET ORAL NIGHTLY
Qty: 90 TABLET | Refills: 1 | Status: SHIPPED | OUTPATIENT
Start: 2024-02-28

## 2024-03-04 DIAGNOSIS — M22.2X1 PATELLOFEMORAL DISORDER, RIGHT: ICD-10-CM

## 2024-03-04 DIAGNOSIS — M70.51 PES ANSERINUS BURSITIS OF RIGHT KNEE: ICD-10-CM

## 2024-04-16 ENCOUNTER — HOSPITAL ENCOUNTER (OUTPATIENT)
Facility: HOSPITAL | Age: 54
Discharge: HOME | End: 2024-04-19
Payer: COMMERCIAL

## 2024-04-16 DIAGNOSIS — R07.0 PAIN IN THROAT: ICD-10-CM

## 2024-04-16 PROCEDURE — 70491 CT SOFT TISSUE NECK W/DYE: CPT

## 2024-04-16 PROCEDURE — 6360000004 HC RX CONTRAST MEDICATION: Performed by: PHYSICIAN ASSISTANT

## 2024-04-16 RX ADMIN — IOPAMIDOL 80 ML: 612 INJECTION, SOLUTION INTRAVENOUS at 15:16

## 2024-04-23 ENCOUNTER — HOSPITAL ENCOUNTER (OUTPATIENT)
Facility: HOSPITAL | Age: 54
Setting detail: SPECIMEN
Discharge: HOME | End: 2024-04-26
Payer: COMMERCIAL

## 2024-04-23 DIAGNOSIS — Z13.29 SCREENING FOR THYROID DISORDER: ICD-10-CM

## 2024-04-23 DIAGNOSIS — Z13.0 SCREENING FOR DEFICIENCY ANEMIA: ICD-10-CM

## 2024-04-23 DIAGNOSIS — E66.09 CLASS 1 OBESITY DUE TO EXCESS CALORIES WITHOUT SERIOUS COMORBIDITY WITH BODY MASS INDEX (BMI) OF 32.0 TO 32.9 IN ADULT: ICD-10-CM

## 2024-04-23 DIAGNOSIS — Z13.1 SCREENING FOR DIABETES MELLITUS: ICD-10-CM

## 2024-04-23 DIAGNOSIS — Z13.228 SCREENING FOR METABOLIC DISORDER: ICD-10-CM

## 2024-04-23 DIAGNOSIS — Z13.220 SCREENING FOR CHOLESTEROL LEVEL: ICD-10-CM

## 2024-04-23 LAB
25(OH)D3 SERPL-MCNC: 50.6 NG/ML (ref 30–100)
ANION GAP SERPL CALC-SCNC: 4 MMOL/L (ref 3–18)
BUN SERPL-MCNC: 16 MG/DL (ref 7–18)
BUN/CREAT SERPL: 22 (ref 12–20)
CALCIUM SERPL-MCNC: 9.5 MG/DL (ref 8.5–10.1)
CHLORIDE SERPL-SCNC: 105 MMOL/L (ref 100–111)
CHOLEST SERPL-MCNC: 273 MG/DL
CO2 SERPL-SCNC: 29 MMOL/L (ref 21–32)
CREAT SERPL-MCNC: 0.74 MG/DL (ref 0.6–1.3)
ERYTHROCYTE [DISTWIDTH] IN BLOOD BY AUTOMATED COUNT: 13.1 % (ref 11.6–14.5)
EST. AVERAGE GLUCOSE BLD GHB EST-MCNC: 91 MG/DL
GLUCOSE SERPL-MCNC: 88 MG/DL (ref 74–99)
HBA1C MFR BLD: 4.8 % (ref 4.2–5.6)
HCT VFR BLD AUTO: 41.7 % (ref 35–45)
HDLC SERPL-MCNC: 44 MG/DL (ref 40–60)
HDLC SERPL: 6.2 (ref 0–5)
HGB BLD-MCNC: 13.4 G/DL (ref 12–16)
LDLC SERPL CALC-MCNC: 190.4 MG/DL (ref 0–100)
LIPID PANEL: ABNORMAL
MCH RBC QN AUTO: 31.2 PG (ref 24–34)
MCHC RBC AUTO-ENTMCNC: 32.1 G/DL (ref 31–37)
MCV RBC AUTO: 97 FL (ref 78–100)
NRBC # BLD: 0 K/UL (ref 0–0.01)
NRBC BLD-RTO: 0 PER 100 WBC
PLATELET # BLD AUTO: 317 K/UL (ref 135–420)
PMV BLD AUTO: 10.3 FL (ref 9.2–11.8)
POTASSIUM SERPL-SCNC: 4.1 MMOL/L (ref 3.5–5.5)
RBC # BLD AUTO: 4.3 M/UL (ref 4.2–5.3)
SODIUM SERPL-SCNC: 138 MMOL/L (ref 136–145)
TRIGL SERPL-MCNC: 193 MG/DL
TSH SERPL DL<=0.05 MIU/L-ACNC: 2.72 UIU/ML (ref 0.36–3.74)
VIT B12 SERPL-MCNC: 658 PG/ML (ref 211–911)
VLDLC SERPL CALC-MCNC: 38.6 MG/DL
WBC # BLD AUTO: 5.7 K/UL (ref 4.6–13.2)

## 2024-04-23 PROCEDURE — 80048 BASIC METABOLIC PNL TOTAL CA: CPT

## 2024-04-23 PROCEDURE — 82607 VITAMIN B-12: CPT

## 2024-04-23 PROCEDURE — 85027 COMPLETE CBC AUTOMATED: CPT

## 2024-04-23 PROCEDURE — 84443 ASSAY THYROID STIM HORMONE: CPT

## 2024-04-23 PROCEDURE — 36415 COLL VENOUS BLD VENIPUNCTURE: CPT

## 2024-04-23 PROCEDURE — 82306 VITAMIN D 25 HYDROXY: CPT

## 2024-04-23 PROCEDURE — 80061 LIPID PANEL: CPT

## 2024-04-23 PROCEDURE — 83036 HEMOGLOBIN GLYCOSYLATED A1C: CPT

## 2024-04-29 PROBLEM — E78.2 MIXED HYPERLIPIDEMIA: Status: ACTIVE | Noted: 2024-04-29

## 2024-05-06 DIAGNOSIS — M70.51 PES ANSERINUS BURSITIS OF RIGHT KNEE: ICD-10-CM

## 2024-05-06 DIAGNOSIS — M22.2X1 PATELLOFEMORAL DISORDER, RIGHT: ICD-10-CM

## 2024-05-24 ENCOUNTER — TELEPHONE (OUTPATIENT)
Age: 54
End: 2024-05-24

## 2024-05-27 SDOH — HEALTH STABILITY: PHYSICAL HEALTH: ON AVERAGE, HOW MANY DAYS PER WEEK DO YOU ENGAGE IN MODERATE TO STRENUOUS EXERCISE (LIKE A BRISK WALK)?: 6 DAYS

## 2024-05-27 SDOH — HEALTH STABILITY: PHYSICAL HEALTH: ON AVERAGE, HOW MANY MINUTES DO YOU ENGAGE IN EXERCISE AT THIS LEVEL?: 30 MIN

## 2024-05-28 ENCOUNTER — OFFICE VISIT (OUTPATIENT)
Age: 54
End: 2024-05-28
Payer: COMMERCIAL

## 2024-05-28 VITALS — WEIGHT: 165 LBS | BODY MASS INDEX: 30.36 KG/M2 | HEIGHT: 62 IN

## 2024-05-28 DIAGNOSIS — M25.572 ACUTE LEFT ANKLE PAIN: Primary | ICD-10-CM

## 2024-05-28 DIAGNOSIS — S82.841A CLOSED BIMALLEOLAR FRACTURE OF RIGHT ANKLE, INITIAL ENCOUNTER: ICD-10-CM

## 2024-05-28 PROCEDURE — 27808 TREATMENT OF ANKLE FRACTURE: CPT

## 2024-05-28 PROCEDURE — 99203 OFFICE O/P NEW LOW 30 MIN: CPT

## 2024-05-28 NOTE — PATIENT INSTRUCTIONS
Broken Ankle: Care Instructions  Overview     An ankle may break (fracture) during sports, a fall, or other accidents. Fractures can range from a small, hairline crack, to a bone or bones broken into two or more pieces. Your treatment depends on how bad the break is.  Your doctor may have put your ankle in a splint or cast to allow it to heal or to keep it stable until you see another doctor. It may take weeks or months for your ankle to heal. You can help your ankle heal with some care at home.  You heal best when you take good care of yourself. Eat a variety of healthy foods, and don't smoke.  You may have had a sedative to help you relax. You may be unsteady after having sedation. It can take a few hours for the medicine's effects to wear off. Common side effects of sedation include nausea, vomiting, and feeling sleepy or tired.  The doctor has checked you carefully, but problems can develop later. If you notice any problems or new symptoms,  get medical treatment right away.  Follow-up care is a key part of your treatment and safety. Be sure to make and go to all appointments, and call your doctor if you are having problems. It's also a good idea to know your test results and keep a list of the medicines you take.  How can you care for yourself at home?  If the doctor gave you a sedative:  For 24 hours, don't do anything that requires attention to detail, such as going to work, making important decisions, or signing any legal documents. It takes time for the medicine's effects to completely wear off.  For your safety, do not drive or operate any machinery that could be dangerous. Wait until the medicine wears off and you can think clearly and react easily.  Put ice or a cold pack on your ankle for 10 to 20 minutes at a time. Try to do this every 1 to 2 hours for the next 3 days (when you are awake). Put a thin cloth between the ice and your cast or splint. Keep your cast or splint dry.  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 Denies CP, SOB, N/V, diarrhea, fever, chills, recent travel outside of country. He underwent DDRT on 5/24 with a donor left nephrectomy to R external iliac vessels. He tolerated the procedure well. He was making urine in the PACU. He had a PCA but still complained of some pain. SICU was consulted because he required pressors to reach goal SBP >120.    24 HOUR EVENTS:  - Patient temporarily off of levo but then restarted overnight  - HD overnight without taking fluid off  - Diet advanced  - Tacro advanced to 3 BID    SUBJECTIVE/ROS:  [ x] A ten-point review of systems was otherwise negative except as noted.  [ ] Due to altered mental status/intubation, subjective information were not able to be obtained from the patient. History was obtained, to the extent possible, from review of the chart and collateral sources of information.      NEURO  Exam: Aox4  Meds: acetaminophen   Tablet. 975 milliGRAM(s) Oral every 6 hours PRN Mild Pain (1 - 3)  oxyCODONE    IR 5 milliGRAM(s) Oral every 4 hours PRN Moderate Pain (4 - 6)    [x] Adequacy of sedation and pain control has been assessed and adjusted      RESPIRATORY  RR: 21 (05-26-18 @ 04:00) (9 - 37)  SpO2: 96% (05-26-18 @ 04:00) (87% - 100%)  Wt(kg): --  Exam: unlabored, clear to auscultation bilaterally  Mechanical Ventilation:   ABG - ( 24 May 2018 18:41 )  pH: 7.26  /  pCO2: 48    /  pO2: 90    / HCO3: 21    / Base Excess: -5.7  /  SaO2: 95      Lactate: x                [ ] Extubation Readiness Assessed  Meds:       CARDIOVASCULAR  HR: 81 (05-26-18 @ 04:00) (74 - 109)  BP: 126/67 (05-25-18 @ 20:30) (126/67 - 126/67)  BP(mean): 91 (05-25-18 @ 20:30) (91 - 91)  ABP: 111/59 (05-26-18 @ 04:00) (94/53 - 142/82)  ABP(mean): 81 (05-26-18 @ 04:00) (68 - 106)  Wt(kg): --  CVP(cm H2O): --      Exam:  Cardiac Rhythm:  Perfusion     [ ]Adequate   [ ]Inadequate  Mentation   [ ]Normal       [ ]Reduced  Extremities  [ ]Warm         [ ]Cool  Volume Status [ ]Hypervolemic [ ]Euvolemic [ ]Hypovolemic  Meds: norepinephrine Infusion 0.05 MICROgram(s)/kG/Min IV Continuous <Continuous>        GI/NUTRITION  Exam:  Diet:  Meds: docusate sodium 100 milliGRAM(s) Oral three times a day  famotidine    Tablet 20 milliGRAM(s) Oral daily  senna 2 Tablet(s) Oral at bedtime      GENITOURINARY  I&O's Detail    05-24 @ 07:01  -  05-25 @ 07:00  --------------------------------------------------------  IN:    DOPamine Infusion: 56.5 mL    norepinephrine Infusion: 12.5 mL    norepinephrine Infusion: 237.9 mL    sodium chloride 0.9%.: 1190 mL    Solution: 100 mL  Total IN: 1596.9 mL    OUT:    Indwelling Catheter - Urethral: 235 mL  Total OUT: 235 mL    Total NET: 1361.9 mL      05-25 @ 07:01  -  05-26 @ 05:29  --------------------------------------------------------  IN:    norepinephrine Infusion: 50.3 mL    norepinephrine Infusion: 39.2 mL    sodium chloride 0.9%.: 1470 mL    Solution: 50 mL  Total IN: 1609.5 mL    OUT:    Indwelling Catheter - Urethral: 170 mL  Total OUT: 170 mL    Total NET: 1439.5 mL          05-26    136  |  98  |  28<H>  ----------------------------<  253<H>  4.1   |  24  |  4.57<H>    Ca    7.4<L>      26 May 2018 03:06  Phos  3.1     05-26  Mg     2.1     05-26    TPro  6.1  /  Alb  3.6  /  TBili  0.2  /  DBili  x   /  AST  30  /  ALT  15  /  AlkPhos  32<L>  05-26    [ ] Jean catheter, indication: N/A  Meds: sodium chloride 0.9%. 1000 milliLiter(s) IV Continuous <Continuous>  sodium chloride 0.9%. 1000 milliLiter(s) IV Continuous <Continuous>        HEMATOLOGIC  Meds: aspirin  chewable 81 milliGRAM(s) Oral daily    [x] VTE Prophylaxis                        9.2    7.4   )-----------( 118      ( 26 May 2018 03:06 )             28.0     PT/INR - ( 25 May 2018 03:18 )   PT: 11.9 sec;   INR: 1.09 ratio         PTT - ( 25 May 2018 03:18 )  PTT:28.2 sec  Transfusion     [ ] PRBC   [ ] Platelets   [ ] FFP   [ ] Cryoprecipitate      INFECTIOUS DISEASES  T(C): 36.7 (05-26-18 @ 03:00), Max: 36.7 (05-25-18 @ 07:00)  Wt(kg): --  WBC Count: 7.4 K/uL (05-26 @ 03:06)    Recent Cultures:    Meds: mycophenolate mofetil 1000 milliGRAM(s) Oral two times a day  nystatin    Suspension 278164 Unit(s) Swish and Swallow four times a day  tacrolimus 3 milliGRAM(s) Oral every 12 hours  trimethoprim   80 mG/sulfamethoxazole 400 mG 1 Tablet(s) Oral daily  valGANciclovir 450 milliGRAM(s) Oral daily        ENDOCRINE  Capillary Blood Glucose    Meds: insulin lispro (HumaLOG) corrective regimen sliding scale   SubCutaneous every 4 hours  methylPREDNISolone sodium succinate Injectable 60 milliGRAM(s) IV Push every 12 hours        ACCESS DEVICES:  [ ] Peripheral IV  [ ] Central Venous Line	[ ] R	[ ] L	[ ] IJ	[ ] Fem	[ ] SC	Placed:   [ ] Arterial Line		[ ] R	[ ] L	[ ] Fem	[ ] Rad	[ ] Ax	Placed:   [ ] PICC:					[ ] Mediport  [ ] Urinary Catheter, Date Placed:   [ ] Necessity of urinary, arterial, and venous catheters discussed    OTHER MEDICATIONS:      CODE STATUS:     IMAGING: HISTORY  FLOYD NEVES is a 58 year old gentleman with h/o HTN, CAD s/p CABG in 2007, 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 Denies CP, SOB, N/V, diarrhea, fever, chills, recent travel outside of country. He underwent DDRT on 5/24 with a donor left nephrectomy to R external iliac vessels. He tolerated the procedure well. He was making urine in the PACU. He had a PCA but still complained of some pain. SICU was consulted because he required pressors to reach goal SBP >120.    24 HOUR EVENTS:  - Patient temporarily off of levo but then restarted overnight  - HD overnight without taking fluid off  - Diet advanced  - Tacro advanced to 3 BID    SUBJECTIVE/ROS:  [ x] A ten-point review of systems was otherwise negative except as noted.  [ ] Due to altered mental status/intubation, subjective information were not able to be obtained from the patient. History was obtained, to the extent possible, from review of the chart and collateral sources of information.      NEURO  Exam: Aox4  Meds: acetaminophen   Tablet. 975 milliGRAM(s) Oral every 6 hours PRN Mild Pain (1 - 3)  oxyCODONE    IR 5 milliGRAM(s) Oral every 4 hours PRN Moderate Pain (4 - 6)    [x] Adequacy of sedation and pain control has been assessed and adjusted      RESPIRATORY  RR: 21 (05-26-18 @ 04:00) (9 - 37)  SpO2: 96% (05-26-18 @ 04:00) (87% - 100%)  Wt(kg): --  Exam: unlabored, clear to auscultation bilaterally  Mechanical Ventilation:   ABG - ( 24 May 2018 18:41 )  pH: 7.26  /  pCO2: 48    /  pO2: 90    / HCO3: 21    / Base Excess: -5.7  /  SaO2: 95      Lactate: x      [ ] Extubation Readiness Assessed  Meds:       CARDIOVASCULAR  HR: 81 (05-26-18 @ 04:00) (74 - 109)  BP: 126/67 (05-25-18 @ 20:30) (126/67 - 126/67)  BP(mean): 91 (05-25-18 @ 20:30) (91 - 91)  ABP: 111/59 (05-26-18 @ 04:00) (94/53 - 142/82)  ABP(mean): 81 (05-26-18 @ 04:00) (68 - 106)  Wt(kg): --  CVP(cm H2O): --  Exam:RRR  Cardiac Rhythm: NS  Perfusion     [ ]Adequate   [x ]Inadequate  Mentation   [x ]Normal       [ ]Reduced  Extremities  [ x]Warm         [ ]Cool  Volume Status [ ]Hypervolemic [ ]Euvolemic [ ]Hypovolemic  Meds: norepinephrine Infusion 0.05 MICROgram(s)/kG/Min IV Continuous <Continuous>        GI/NUTRITION  Exam: soft, nt, nd  Diet: Reg  Meds: docusate sodium 100 milliGRAM(s) Oral three times a day  famotidine    Tablet 20 milliGRAM(s) Oral daily  senna 2 Tablet(s) Oral at bedtime      GENITOURINARY  I&O's Detail    05-24 @ 07:01  -  05-25 @ 07:00  --------------------------------------------------------  IN:    DOPamine Infusion: 56.5 mL    norepinephrine Infusion: 12.5 mL    norepinephrine Infusion: 237.9 mL    sodium chloride 0.9%.: 1190 mL    Solution: 100 mL  Total IN: 1596.9 mL    OUT:    Indwelling Catheter - Urethral: 235 mL  Total OUT: 235 mL    Total NET: 1361.9 mL      05-25 @ 07:01  -  05-26 @ 05:29  --------------------------------------------------------  IN:    norepinephrine Infusion: 50.3 mL    norepinephrine Infusion: 39.2 mL    sodium chloride 0.9%.: 1470 mL    Solution: 50 mL  Total IN: 1609.5 mL    OUT:    Indwelling Catheter - Urethral: 170 mL  Total OUT: 170 mL    Total NET: 1439.5 mL          05-26    136  |  98  |  28<H>  ----------------------------<  253<H>  4.1   |  24  |  4.57<H>    Ca    7.4<L>      26 May 2018 03:06  Phos  3.1     05-26  Mg     2.1     05-26    TPro  6.1  /  Alb  3.6  /  TBili  0.2  /  DBili  x   /  AST  30  /  ALT  15  /  AlkPhos  32<L>  05-26    [ ] Jean catheter, indication: N/A  Meds: sodium chloride 0.9%. 1000 milliLiter(s) IV Continuous <Continuous>  sodium chloride 0.9%. 1000 milliLiter(s) IV Continuous <Continuous>        HEMATOLOGIC  Meds: aspirin  chewable 81 milliGRAM(s) Oral daily    [x] VTE Prophylaxis                        9.2    7.4   )-----------( 118      ( 26 May 2018 03:06 )             28.0     PT/INR - ( 25 May 2018 03:18 )   PT: 11.9 sec;   INR: 1.09 ratio         PTT - ( 25 May 2018 03:18 )  PTT:28.2 sec  Transfusion     [ ] PRBC   [ ] Platelets   [ ] FFP   [ ] Cryoprecipitate      INFECTIOUS DISEASES  T(C): 36.7 (05-26-18 @ 03:00), Max: 36.7 (05-25-18 @ 07:00)  Wt(kg): --  WBC Count: 7.4 K/uL (05-26 @ 03:06)    Recent Cultures:    Meds: mycophenolate mofetil 1000 milliGRAM(s) Oral two times a day  nystatin    Suspension [id number] Unit(s) Swish and Swallow four times a day  tacrolimus 3 milliGRAM(s) Oral every 12 hours  trimethoprim   80 mG/sulfamethoxazole 400 mG 1 Tablet(s) Oral daily  valGANciclovir 450 milliGRAM(s) Oral daily        ENDOCRINE  Capillary Blood Glucose    Meds: insulin lispro (HumaLOG) corrective regimen sliding scale   SubCutaneous every 4 hours  methylPREDNISolone sodium succinate Injectable 60 milliGRAM(s) IV Push every 12 hours        ACCESS DEVICES:  [x ] Peripheral IV  [ ] Central Venous Line	[ ] R	[ ] L	[ ] IJ	[ ] Fem	[ ] SC	Placed:   [x] Arterial Line		[ ] R	[ ] L	[ ] Fem	[ ] Rad	[ ] Ax	Placed:   [ ] PICC:					[ ] Mediport  [x ] Urinary Catheter, Date Placed:   [x ] Necessity of urinary, arterial, and venous catheters discussed    OTHER MEDICATIONS:      CODE STATUS: Full    IMAGING:

## 2024-05-28 NOTE — PROGRESS NOTES
Otherwise, follow up as needed or if symptoms worsen.    Encounter Diagnoses   Name Primary?    Acute left ankle pain Yes    Closed bimalleolar fracture of right ankle, initial encounter         As part of continued conservative pain management options the patient was advised to utilize Tylenol or OTC NSAIDS as long as it is not medically contraindicated.     Return to Office:      Scribed by STANLEY Diaz - CNP as dictated by STANLEY Cruz, CNP.  Documentation, performed by, True and Accepted STANLEY Cruz, CNP

## 2024-06-04 ENCOUNTER — HOSPITAL ENCOUNTER (OUTPATIENT)
Facility: HOSPITAL | Age: 54
Discharge: HOME | End: 2024-06-07
Payer: COMMERCIAL

## 2024-06-04 VITALS — BODY MASS INDEX: 30.17 KG/M2 | HEIGHT: 62 IN

## 2024-06-04 DIAGNOSIS — Z91.89 AT HIGH RISK FOR BREAST CANCER: ICD-10-CM

## 2024-06-04 PROCEDURE — 77063 BREAST TOMOSYNTHESIS BI: CPT

## 2024-06-24 ENCOUNTER — HOSPITAL ENCOUNTER (OUTPATIENT)
Facility: HOSPITAL | Age: 54
Discharge: HOME | End: 2024-06-27
Payer: COMMERCIAL

## 2024-06-24 DIAGNOSIS — S82.841A CLOSED BIMALLEOLAR FRACTURE OF RIGHT ANKLE, INITIAL ENCOUNTER: ICD-10-CM

## 2024-06-24 DIAGNOSIS — M25.572 ACUTE LEFT ANKLE PAIN: ICD-10-CM

## 2024-06-24 PROCEDURE — 73610 X-RAY EXAM OF ANKLE: CPT

## 2024-06-28 ENCOUNTER — OFFICE VISIT (OUTPATIENT)
Age: 54
End: 2024-06-28

## 2024-06-28 DIAGNOSIS — M25.572 LEFT ANKLE PAIN, UNSPECIFIED CHRONICITY: Primary | ICD-10-CM

## 2024-06-28 NOTE — PROGRESS NOTES
Name: Maki Middleton    : 1970     [facility]  [address], [address]  [facility] [address]  Dept: [phone]  Dept Fax: [phone]     Chief Complaint   Patient presents with    Ankle Pain    Fracture        There were no vitals taken for this visit.     Allergies   Allergen Reactions    Ciprofloxacin Diarrhea    Clindamycin Other (See Comments)    Milk (Cow) Diarrhea    Diclofenac Sodium Rash     Patient states only allergic to topical form    Sulfa Antibiotics Rash        Current Outpatient Medications   Medication Sig Dispense Refill    diclofenac (VOLTAREN) 50 MG EC tablet TAKE 1 TABLET BY MOUTH WITH BREAKFAST AND WITH EVENING MEAL AS NEEDED FOR PAIN. 60 tablet 1    levocetirizine (XYZAL) 5 MG tablet TAKE 1 TAB BY MOUTH ONCE NIGHTLY AT BEDTIME AS NEEDED      mometasone (NASONEX) 50 MCG/ACT nasal spray USE 2 SPRAYS INTO EACH NOSTRIL ONCE A DAY      famotidine (PEPCID) 20 MG tablet       Berberine Chloride (BERBERINE HCI) 500 MG CAPS       montelukast (SINGULAIR) 10 MG tablet TAKE 1 TABLET BY MOUTH EVERY DAY AT NIGHT 90 tablet 1    albuterol sulfate HFA (PROVENTIL;VENTOLIN;PROAIR) 108 (90 Base) MCG/ACT inhaler Inhale 1-2 puffs into the lungs every 4 hours as needed for Wheezing 18 g 4    Cholecalciferol (VITAMIN D3) 50 MCG (2000 UT) CAPS       vitamin B-12 (CYANOCOBALAMIN) 1000 MCG tablet Take 0.5 tablets by mouth once a week      melatonin 10 MG CAPS capsule       Multiple Vitamins-Minerals (CENTRUM MINIS WOMEN 50+) TABS       Probiotic Product (4X PROBIOTIC PO) Take by mouth daily      esomeprazole (NEXIUM) 20 MG delayed release capsule Take 1 capsule by mouth 2 times daily       No current facility-administered medications for this visit.       Patient Active Problem List   Diagnosis    Left arm pain    DDD (degenerative disc disease), lumbar    Mass of axilla    Cervical disc herniation

## 2024-06-28 NOTE — PATIENT INSTRUCTIONS
Broken Ankle: Care Instructions  Overview     An ankle may break (fracture) during sports, a fall, or other accidents. Fractures can range from a small, hairline crack, to a bone or bones broken into two or more pieces. Your treatment depends on how bad the break is.  Your doctor may have put your ankle in a splint or cast to allow it to heal or to keep it stable until you see another doctor. It may take weeks or months for your ankle to heal. You can help your ankle heal with some care at home.  You heal best when you take good care of yourself. Eat a variety of healthy foods, and don't smoke.  You may have had a sedative to help you relax. You may be unsteady after having sedation. It can take a few hours for the medicine's effects to wear off. Common side effects of sedation include nausea, vomiting, and feeling sleepy or tired.  The doctor has checked you carefully, but problems can develop later. If you notice any problems or new symptoms,  get medical treatment right away.  Follow-up care is a key part of your treatment and safety. Be sure to make and go to all appointments, and call your doctor if you are having problems. It's also a good idea to know your test results and keep a list of the medicines you take.  How can you care for yourself at home?  If the doctor gave you a sedative:  For 24 hours, don't do anything that requires attention to detail, such as going to work, making important decisions, or signing any legal documents. It takes time for the medicine's effects to completely wear off.  For your safety, do not drive or operate any machinery that could be dangerous. Wait until the medicine wears off and you can think clearly and react easily.  Put ice or a cold pack on your ankle for 10 to 20 minutes at a time. Try to do this every 1 to 2 hours for the next 3 days (when you are awake). Put a thin cloth between the ice and your cast or splint. Keep your cast or splint dry.  Follow the cast care

## 2024-06-30 DIAGNOSIS — M70.51 PES ANSERINUS BURSITIS OF RIGHT KNEE: ICD-10-CM

## 2024-06-30 DIAGNOSIS — M22.2X1 PATELLOFEMORAL DISORDER, RIGHT: ICD-10-CM

## 2024-07-02 ENCOUNTER — HOSPITAL ENCOUNTER (OUTPATIENT)
Facility: HOSPITAL | Age: 54
Setting detail: RECURRING SERIES
Discharge: HOME | End: 2024-07-05
Payer: COMMERCIAL

## 2024-07-02 PROCEDURE — 97535 SELF CARE MNGMENT TRAINING: CPT

## 2024-07-02 PROCEDURE — 97162 PT EVAL MOD COMPLEX 30 MIN: CPT

## 2024-07-02 NOTE — PROGRESS NOTES
PHYSICAL / OCCUPATIONAL THERAPY - DAILY TREATMENT NOTE (updated )  For Eval visit    Patient Name: Maki Middleton    Date: 2024    : 1970  Insurance: Payor: RUBÉN / Plan: [employer] [place] FEDERAL / Product Type: *No Product type* /      Patient  verified yes     Visit #   Current / Total 1 16   Time   In / Out 325 410   Pain   In / Out 2 2   Subjective Functional Status/Changes: See POC     TREATMENT AREA =  see POC  OBJECTIVE          22 min   Eval - untimed                      Therapeutic Procedures:    Tx Min Billable or 1:1 Min (if diff from Tx Min) Procedure, Rationale, Specifics     14408 Self Care/Home Management (timed):  improve patient knowledge and understanding of pain reducing techniques, activity modification, and diagnosis/prognosis  to improve patient's ability to progress to PLOF and address remaining functional goals.  (see flow sheet as applicable)     Details if applicable:              Details if applicable:            Details if applicable:            Details if applicable:     23  [facility] Totals Reminder: bill using total billable min of TIMED therapeutic procedures (example: do not include dry needle or estim unattended, both untimed codes, in totals to left)  8-22 min = 1 unit; 23-37 min = 2 units; 38-52 min = 3 units; 53-67 min = 4 units; 68-82 min = 5 units   Total Total     [x]  Patient Education billed concurrently with other procedures   [x] Review HEP    [] Progressed/Changed HEP, detail:    [] Other detail:       Objective Information/Functional Measures/Assessment    See POC    Patient will continue to benefit from skilled PT / OT services to modify and progress therapeutic interventions, analyze and address functional mobility deficits, analyze and address ROM deficits, analyze and address strength deficits, analyze and address soft tissue restrictions, analyze and cue for proper movement patterns, and analyze and modify for postural abnormalities to address 
functional improvement.  EVAL: 59    3.  To improve L ankle strength to 5/5  in order to assist with  ADLS,  community activities and walking with decrease pain   EVAL:   L ankle strength  ( DF / PF / INV/ Eversion ) -  3/5     Frequency / Duration:   Patient to be seen  1-2 times per week for 6-8  week      Patient/ Caregiver education and instruction: Diagnosis, prognosis, self care, activity modification, and exercises [x]  Plan of care has been reviewed with PTA    Certification Period: LENA Smallwood, PT       7/2/2024       3:30 PM    Payor: RUBÉN / Plan: [employer] [place] FEDERAL / Product Type: *No Product type* /     No Physician signature required

## 2024-07-09 ENCOUNTER — HOSPITAL ENCOUNTER (OUTPATIENT)
Facility: HOSPITAL | Age: 54
Setting detail: RECURRING SERIES
Discharge: HOME | End: 2024-07-12
Payer: COMMERCIAL

## 2024-07-09 PROCEDURE — 97140 MANUAL THERAPY 1/> REGIONS: CPT

## 2024-07-09 PROCEDURE — 97110 THERAPEUTIC EXERCISES: CPT

## 2024-07-09 PROCEDURE — 97530 THERAPEUTIC ACTIVITIES: CPT

## 2024-07-09 NOTE — PROGRESS NOTES
PHYSICAL / OCCUPATIONAL THERAPY - DAILY TREATMENT NOTE    Patient Name: Maki Middleton    Date: 2024    : 1970  Insurance: Payor: RUBÉN / Plan: [employer]ETH [place] FEDERAL / Product Type: *No Product type* /      Patient  verified Yes     Visit #   Current / Total 2 16   Time   In / Out 250 340   Pain   In / Out 1 1   Subjective Functional Status/Changes: I tweaked my knee last week. I see Dr. Story on Thursday .      TREATMENT AREA =  Left ankle pain [M25.572]     OBJECTIVE    Modalities Rationale:     decrease edema, decrease inflammation, decrease pain, and increase tissue extensibility to improve patient's ability to progress to PLOF and address remaining functional goals.     min [] Estim Unattended, type/location:                                      []  w/ice    []  w/heat    min [] Estim Attended, type/location:                                     []  w/US     []  w/ice    []  w/heat    []  TENS insruct      min []  Mechanical Traction: type/lbs                   []  pro   []  sup   []  int   []  cont    []  before manual    []  after manual    min []  Ultrasound, settings/location:     10 min  unbill [x]  Ice  post    []  Heat    location/position:  left ankle and reclined     min []  Paraffin,  details:     min []  Vasopneumatic Device, press/temp:     min []  Whirlpool / Fluido:    If using vaso (only need to measure limb vaso being performed on)      pre-treatment girth :       post-treatment girth :       measured at (landmark location) :      min []  Other:    Skin assessment post-treatment:   Redness, no adverse reactions      Therapeutic Procedures:    Tx Min Billable or 1:1 Min (if diff from Tx Min) Procedure, Rationale, Specifics   00 13 [id number] Therapeutic Exercise (timed):  increase ROM, strength, coordination, balance, and proprioception to improve patient's ability to progress to PLOF and address remaining functional goals. (see flow sheet as applicable)     Details if applicable:       8

## 2024-07-11 ENCOUNTER — HOSPITAL ENCOUNTER (OUTPATIENT)
Facility: HOSPITAL | Age: 54
Setting detail: RECURRING SERIES
Discharge: HOME | End: 2024-07-14
Payer: COMMERCIAL

## 2024-07-11 ENCOUNTER — OFFICE VISIT (OUTPATIENT)
Age: 54
End: 2024-07-11
Payer: COMMERCIAL

## 2024-07-11 ENCOUNTER — HOSPITAL ENCOUNTER (OUTPATIENT)
Facility: HOSPITAL | Age: 54
Discharge: HOME | End: 2024-07-14

## 2024-07-11 VITALS — RESPIRATION RATE: 14 BRPM | WEIGHT: 188 LBS | HEIGHT: 62 IN | BODY MASS INDEX: 34.6 KG/M2

## 2024-07-11 DIAGNOSIS — M99.04 SACRAL REGION SOMATIC DYSFUNCTION: ICD-10-CM

## 2024-07-11 DIAGNOSIS — M99.06 LOWER LIMB REGION SOMATIC DYSFUNCTION: ICD-10-CM

## 2024-07-11 DIAGNOSIS — M99.07 UPPER EXTREMITY SOMATIC DYSFUNCTION: ICD-10-CM

## 2024-07-11 DIAGNOSIS — M99.08 RIB CAGE REGION SOMATIC DYSFUNCTION: ICD-10-CM

## 2024-07-11 DIAGNOSIS — M99.01 CERVICAL SOMATIC DYSFUNCTION: ICD-10-CM

## 2024-07-11 DIAGNOSIS — M22.2X1 PATELLOFEMORAL DISORDER, RIGHT: Primary | ICD-10-CM

## 2024-07-11 DIAGNOSIS — M70.51 PES ANSERINUS BURSITIS OF RIGHT KNEE: ICD-10-CM

## 2024-07-11 DIAGNOSIS — M99.02 THORACIC REGION SOMATIC DYSFUNCTION: ICD-10-CM

## 2024-07-11 DIAGNOSIS — M99.05 PELVIC REGION SOMATIC DYSFUNCTION: ICD-10-CM

## 2024-07-11 DIAGNOSIS — M99.09 SOMATIC DYSFUNCTION OF ABDOMINAL REGION: ICD-10-CM

## 2024-07-11 DIAGNOSIS — S82.892S CLOSED FRACTURE OF LEFT ANKLE, SEQUELA: ICD-10-CM

## 2024-07-11 DIAGNOSIS — M99.03 SOMATIC DYSFUNCTION OF LUMBAR REGION: ICD-10-CM

## 2024-07-11 DIAGNOSIS — M22.2X1 PATELLOFEMORAL DISORDER, RIGHT: ICD-10-CM

## 2024-07-11 PROCEDURE — 98929 OSTEOPATH MANJ 9-10 REGIONS: CPT | Performed by: FAMILY MEDICINE

## 2024-07-11 PROCEDURE — 99214 OFFICE O/P EST MOD 30 MIN: CPT | Performed by: FAMILY MEDICINE

## 2024-07-11 PROCEDURE — 97530 THERAPEUTIC ACTIVITIES: CPT

## 2024-07-11 PROCEDURE — 97140 MANUAL THERAPY 1/> REGIONS: CPT

## 2024-07-11 PROCEDURE — 97110 THERAPEUTIC EXERCISES: CPT

## 2024-07-11 NOTE — PROGRESS NOTES
6. Sacral region somatic dysfunction  ME OSTEOPATHIC MANIPULATIVE TX 9-10 BODY REGIONS      7. Thoracic region somatic dysfunction  ME OSTEOPATHIC MANIPULATIVE TX 9-10 BODY REGIONS      8. Rib cage region somatic dysfunction  ME OSTEOPATHIC MANIPULATIVE TX 9-10 BODY REGIONS      9. Cervical somatic dysfunction  ME OSTEOPATHIC MANIPULATIVE TX 9-10 BODY REGIONS      10. Lower limb region somatic dysfunction  ME OSTEOPATHIC MANIPULATIVE TX 9-10 BODY REGIONS      11. Upper extremity somatic dysfunction  ME OSTEOPATHIC MANIPULATIVE TX 9-10 BODY REGIONS      12. Somatic dysfunction of abdominal region  ME OSTEOPATHIC MANIPULATIVE TX 9-10 BODY REGIONS        Patient (or guardian if minor) verbalizes understanding of evaluation and plan.    Verbal consent obtained.  Cervical, Thoracic, Rib, Lumbar, Pelvic, Sacral, Upper Ext, Lower Ext, and Abdominal SD treated with Myofascial and ME.  Correction of previous malalignments verified after Tx.    Pt tolerated well.  Notes improvement of Sx and pain is now rated 0/10.    HEP/stretches daily. Discussed stretching/strengthening/posture.    Will start HEP, PT, and Rx for Voltaren 50mg from prior with ice/sleeve as above and plan follow-up 6 weeks. Continue Tx ankle Fx.

## 2024-07-11 NOTE — PROGRESS NOTES
PHYSICAL / OCCUPATIONAL THERAPY - DAILY TREATMENT NOTE    Patient Name: Maki Middleton    Date: 2024    : 1970  Insurance: Payor: RUBÉN / Plan: [employer] [place] FEDERAL / Product Type: *No Product type* /      Patient  verified Yes     Visit #   Current / Total 3 16   Time   In / Out 4:07 4:55   Pain   In / Out 0 0   Subjective Functional Status/Changes: I'm not having any ankle pain in the last couple weeks.      TREATMENT AREA =  Left ankle pain [M25.572]     OBJECTIVE    Modalities Rationale:     decrease edema, decrease inflammation, decrease pain, and increase tissue extensibility to improve patient's ability to progress to PLOF and address remaining functional goals.     min [] Estim Unattended, type/location:                                      []  w/ice    []  w/heat    min [] Estim Attended, type/location:                                     []  w/US     []  w/ice    []  w/heat    []  TENS insruct      min []  Mechanical Traction: type/lbs                   []  pro   []  sup   []  int   []  cont    []  before manual    []  after manual    min []  Ultrasound, settings/location:     10 min  unbill [x]  Ice  post    []  Heat    location/position:  left ankle and reclined     min []  Paraffin,  details:     min []  Vasopneumatic Device, press/temp:     min []  Whirlpool / Fluido:    If using vaso (only need to measure limb vaso being performed on)      pre-treatment girth :       post-treatment girth :       measured at (landmark location) :      min []  Other:    Skin assessment post-treatment:   Redness, no adverse reactions      Therapeutic Procedures:    Tx Min Billable or 1:1 Min (if diff from Tx Min) Procedure, Rationale, Specifics   [phone] [id number] Therapeutic Exercise (timed):  increase ROM, strength, coordination, balance, and proprioception to improve patient's ability to progress to PLOF and address remaining functional goals. (see flow sheet as applicable)     Details if applicable:       8

## 2024-07-11 NOTE — PATIENT INSTRUCTIONS
Either scan this QR code on your smartphone:        Or search YouTube for my channel:    Dr. LAURA Story    [facility]      Try ankle weight (start 1 lb.) or use heavy boot/shoe at ankle of injured knee and extend knee as far as possible and hold 1 second. Work up to 3 sets of 15 reps 4+ times/week.

## 2024-07-11 NOTE — PROGRESS NOTES
REGIONS      12. Somatic dysfunction of abdominal region  OK OSTEOPATHIC MANIPULATIVE TX 9-10 BODY REGIONS          Patient (or guardian if minor) verbalizes understanding of evaluation and plan.    Verbal consent obtained.  Cervical, Thoracic, Rib, Lumbar, Pelvic, Sacral, Upper Ext, Lower Ext, and Abdominal SD treated with Myofascial and ME.  Correction of previous malalignments verified after Tx.    Pt tolerated well.  Notes improvement of Sx and pain is now rated 0/10.    HEP/stretches daily. Discussed stretching/strengthening/posture.    Will start HEP, PT, and with ice/sleeve and Rx Voltaren 50mg from prior as above and plan follow-up 6 weeks.

## 2024-07-15 ENCOUNTER — HOSPITAL ENCOUNTER (OUTPATIENT)
Facility: HOSPITAL | Age: 54
Setting detail: RECURRING SERIES
Discharge: HOME | End: 2024-07-18
Payer: COMMERCIAL

## 2024-07-15 PROCEDURE — 97530 THERAPEUTIC ACTIVITIES: CPT

## 2024-07-15 PROCEDURE — 97110 THERAPEUTIC EXERCISES: CPT

## 2024-07-15 NOTE — PROGRESS NOTES
mentioned in therapy  flow sheet. Patient  reports that she is doing her HEP everyday .Patient Tolerated PT Tx without any Fatigue and without any pain  .  Patient will continue to benefit from skilled PT / OT services to modify and progress therapeutic interventions, analyze and address functional mobility deficits, analyze and address ROM deficits, analyze and address strength deficits, analyze and address soft tissue restrictions, analyze and cue for proper movement patterns, and analyze and modify for postural abnormalities to address functional deficits and attain remaining goals.    Progress toward goals / Updated goals:  []  See Progress Note/Recertification    Short Term Goals: To be accomplished in  3-4  weeks:  1. Independent with HEP.  EVAL: HEP established and given to patient    CURRENT : Pt reports that she is doing her HEP everyday , 7/15/24   2. Decrease pain 1/10 assist  with  ADLS , community activities and walking   EVAL: 2/10   CURRENT   1 7/9/24     Long Term Goals: To be accomplished in  6-8  weeks:  1. Decrease pain  to 0/10   to assist with  ADLS , community activities and walking   EVAL: 2/10     CURRENT 1 7/9/24  2. Improve  Lower Extremity Functional Scale  Score by  9 points in order to show significant functional improvement.  EVAL: 59   CURRENT NA 7/9/24  3.  To improve L ankle strength to 5/5  in order to assist with  ADLS,  community activities and walking with decrease pain   EVAL:   L ankle strength  ( DF / PF / INV/ Eversion ) -  3/5   CURRENT NA 7/9/24  Next PN due 8/2/24  Auth due (visit number/ date) 75 visits, 12/31/24    PLAN  - Continue Plan of Care    Janel Smallwood PT    7/15/2024    3:04 PM    Future Appointments   Date Time Provider Department Center   7/19/2024  2:50 PM [facility]   7/22/2024 12:50 PM [facility]   7/24/2024  3:30 PM [facility] Owens[facility]   7/29/2024  2:50 PM [facility]   8/2/2024  2:10

## 2024-07-19 ENCOUNTER — HOSPITAL ENCOUNTER (OUTPATIENT)
Facility: HOSPITAL | Age: 54
Setting detail: RECURRING SERIES
Discharge: HOME | End: 2024-07-22
Payer: COMMERCIAL

## 2024-07-19 PROCEDURE — 97530 THERAPEUTIC ACTIVITIES: CPT

## 2024-07-19 PROCEDURE — 97110 THERAPEUTIC EXERCISES: CPT

## 2024-07-19 NOTE — PROGRESS NOTES
PHYSICAL / OCCUPATIONAL THERAPY - DAILY TREATMENT NOTE    Patient Name: Maki Middleton    Date: 2024    : 1970  Insurance: Payor: RUBÉN / Plan: [employer] [place] FEDERAL / Product Type: *No Product type* /      Patient  verified Yes     Visit #   Current / Total 5 16   Time   In / Out 250 330   Pain   In / Out Left  ankle 0/10  Left  ankle 0/10   Subjective Functional Status/Changes: Patient reports of no Left ankle pain today      TREATMENT AREA =  Left ankle pain [M25.572]     OBJECTIVE      Therapeutic Procedures:    Tx Min Billable or 1:1 Min (if diff from Tx Min) Procedure, Rationale, Specifics   30  48247 Therapeutic Exercise (timed):  increase ROM, strength, coordination, balance, and proprioception to improve patient's ability to progress to PLOF and address remaining functional goals. (see flow sheet as applicable)     Details if applicable:       10  22114 Therapeutic Activity (timed):  use of dynamic activities replicating functional movements to increase ROM, strength, coordination, balance, and proprioception in order to improve patient's ability to progress to PLOF and address remaining functional goals.  (see flow sheet as applicable)     Details if applicable:            Details if applicable:            Details if applicable:            Details if applicable:     40   BC Totals Reminder: bill using total billable min of TIMED therapeutic procedures (example: do not include dry needle or estim unattended, both untimed codes, in totals to left)  8-22 min = 1 unit; 23-37 min = 2 units; 38-52 min = 3 units; 53-67 min = 4 units; 68-82 min = 5 units   Total Total     [x]  Patient Education billed concurrently with other procedures   [x] Review HEP    [] Progressed/Changed HEP, detail:    [] Other detail:       Objective Information/Functional Measures/Assessment  Pt progressed to  BTB exercises   per therapy flow sheet today .Patient needs Vcs for proper technique for exercises as mentioned in

## 2024-07-22 ENCOUNTER — HOSPITAL ENCOUNTER (OUTPATIENT)
Facility: HOSPITAL | Age: 54
Setting detail: RECURRING SERIES
Discharge: HOME | End: 2024-07-25
Payer: COMMERCIAL

## 2024-07-22 PROCEDURE — 97110 THERAPEUTIC EXERCISES: CPT

## 2024-07-22 PROCEDURE — 97530 THERAPEUTIC ACTIVITIES: CPT

## 2024-07-22 NOTE — PROGRESS NOTES
PHYSICAL / OCCUPATIONAL THERAPY - DAILY TREATMENT NOTE    Patient Name: Maki Middleton    Date: 2024    : 1970  Insurance: Payor: RUBÉN / Plan: [employer] [place] FEDERAL / Product Type: *No Product type* /      Patient  verified Yes     Visit #   Current / Total 6 16   Time   In / Out 1250 130   Pain   In / Out Left  ankle 0/10  Left  ankle 0/10   Subjective Functional Status/Changes: Patient reports of no Left ankle pain today      TREATMENT AREA =  Left ankle pain [M25.572]     OBJECTIVE      Therapeutic Procedures:    Tx Min Billable or 1:1 Min (if diff from Tx Min) Procedure, Rationale, Specifics   30  39120 Therapeutic Exercise (timed):  increase ROM, strength, coordination, balance, and proprioception to improve patient's ability to progress to PLOF and address remaining functional goals. (see flow sheet as applicable)     Details if applicable:       10  72303 Therapeutic Activity (timed):  use of dynamic activities replicating functional movements to increase ROM, strength, coordination, balance, and proprioception in order to improve patient's ability to progress to PLOF and address remaining functional goals.  (see flow sheet as applicable)     Details if applicable:            Details if applicable:            Details if applicable:            Details if applicable:     40  [facility] Totals Reminder: bill using total billable min of TIMED therapeutic procedures (example: do not include dry needle or estim unattended, both untimed codes, in totals to left)  8-22 min = 1 unit; 23-37 min = 2 units; 38-52 min = 3 units; 53-67 min = 4 units; 68-82 min = 5 units   Total Total     [x]  Patient Education billed concurrently with other procedures   [x] Review HEP    [] Progressed/Changed HEP, detail:    [] Other detail:       Objective Information/Functional Measures/Assessment  Pt progressed to  PTB exercises   per therapy flow sheet today .Patient needs Vcs for proper technique for exercises as mentioned

## 2024-07-23 ENCOUNTER — APPOINTMENT (OUTPATIENT)
Facility: HOSPITAL | Age: 54
End: 2024-07-23
Payer: COMMERCIAL

## 2024-07-24 ENCOUNTER — HOSPITAL ENCOUNTER (OUTPATIENT)
Facility: HOSPITAL | Age: 54
Setting detail: RECURRING SERIES
Discharge: HOME | End: 2024-07-27
Payer: COMMERCIAL

## 2024-07-24 PROCEDURE — 97530 THERAPEUTIC ACTIVITIES: CPT

## 2024-07-24 PROCEDURE — 97140 MANUAL THERAPY 1/> REGIONS: CPT

## 2024-07-24 PROCEDURE — 97110 THERAPEUTIC EXERCISES: CPT

## 2024-07-24 NOTE — PROGRESS NOTES
totals to left)  8-22 min = 1 unit; 23-37 min = 2 units; 38-52 min = 3 units; 53-67 min = 4 units; 68-82 min = 5 units   Total Total     [x]  Patient Education billed concurrently with other procedures   [x] Review HEP    [] Progressed/Changed HEP, detail:    [] Other detail:       Objective Information/Functional Measures/Assessment    Patient presents to today's session with no reports of left ankle pain. Progressed exercises, as per flow sheet, to focus on improving B glute, ankle strength and balance in order to return to PLOF with ease. Verbal cues were required for proper form. Patient reported increased muscle fatigue and being challenged with today's added exercises. Patient responded well to manual therapy, as evident by, decreased swelling. Patient declined modalities today. No increase in left ankle pain was reported at the end of today's session. Will continue to progress patient as tolerated and able.     Patient will continue to benefit from skilled PT / OT services to modify and progress therapeutic interventions, analyze and address functional mobility deficits, analyze and address ROM deficits, analyze and address strength deficits, analyze and address soft tissue restrictions, analyze and cue for proper movement patterns, analyze and address imbalance/dizziness, and instruct in home and community integration to address functional deficits and attain remaining goals.    Progress toward goals / Updated goals:  []  See Progress Note/Recertification    Short Term Goals: To be accomplished in  3-4  weeks:  1. Independent with HEP.  EVAL: HEP established and given to patient    CURRENT : Pt reports that she is doing her HEP everyday , 7/24/24   2. Decrease pain 1/10 assist  with  ADLS , community activities and walking   EVAL: 2/10   CURRENT:  0/10, 7/24/24      Long Term Goals: To be accomplished in  6-8  weeks:  1. Decrease pain  to 0/10   to assist with  ADLS , community activities and walking   EVAL:

## 2024-07-25 ENCOUNTER — APPOINTMENT (OUTPATIENT)
Facility: HOSPITAL | Age: 54
End: 2024-07-25
Payer: COMMERCIAL

## 2024-07-29 ENCOUNTER — HOSPITAL ENCOUNTER (OUTPATIENT)
Facility: HOSPITAL | Age: 54
Setting detail: RECURRING SERIES
Discharge: HOME | End: 2024-08-01
Payer: COMMERCIAL

## 2024-07-29 PROCEDURE — 97530 THERAPEUTIC ACTIVITIES: CPT

## 2024-07-29 PROCEDURE — 97110 THERAPEUTIC EXERCISES: CPT

## 2024-07-29 NOTE — PROGRESS NOTES
PHYSICAL / OCCUPATIONAL THERAPY - DAILY TREATMENT NOTE    Patient Name: Maki Middleton    Date: 2024    : 1970  Insurance: Payor: RUBÉN / Plan: [employer] [place] FEDERAL / Product Type: *No Product type* /      Patient  verified Yes     Visit #   Current / Total 8 16   Time   In / Out 250 334   Pain   In / Out Left  ankle 0/10  Left  ankle 0/10   Subjective Functional Status/Changes: Patient reports of no Left ankle pain today      TREATMENT AREA =  Left ankle pain [M25.572]     OBJECTIVE      Therapeutic Procedures:    Tx Min Billable or 1:1 Min (if diff from Tx Min) Procedure, Rationale, Specifics   34  60083 Therapeutic Exercise (timed):  increase ROM, strength, coordination, balance, and proprioception to improve patient's ability to progress to PLOF and address remaining functional goals. (see flow sheet as applicable)     Details if applicable:       10  88581 Therapeutic Activity (timed):  use of dynamic activities replicating functional movements to increase ROM, strength, coordination, balance, and proprioception in order to improve patient's ability to progress to PLOF and address remaining functional goals.  (see flow sheet as applicable)     Details if applicable:            Details if applicable:            Details if applicable:            Details if applicable:     44  [facility] Totals Reminder: bill using total billable min of TIMED therapeutic procedures (example: do not include dry needle or estim unattended, both untimed codes, in totals to left)  8-22 min = 1 unit; 23-37 min = 2 units; 38-52 min = 3 units; 53-67 min = 4 units; 68-82 min = 5 units   Total Total     [x]  Patient Education billed concurrently with other procedures   [x] Review HEP    [] Progressed/Changed HEP, detail:    [] Other detail:       Objective Information/Functional Measures/Assessment  Pt progressed to increased weight on Leg press exercises per therapy flow sheet today .Patient needs Vcs for proper technique for

## 2024-08-06 ENCOUNTER — HOSPITAL ENCOUNTER (OUTPATIENT)
Facility: HOSPITAL | Age: 54
Setting detail: RECURRING SERIES
Discharge: HOME | End: 2024-08-09
Payer: COMMERCIAL

## 2024-08-06 PROCEDURE — 97535 SELF CARE MNGMENT TRAINING: CPT

## 2024-08-06 PROCEDURE — 97110 THERAPEUTIC EXERCISES: CPT

## 2024-08-06 PROCEDURE — 97530 THERAPEUTIC ACTIVITIES: CPT

## 2024-08-06 NOTE — THERAPY DISCHARGE
PT DISCHARGE DAILY NOTE AND SUMMARY     Date:2024    Patient Name: Maki Middleton : 1970   Medical   Diagnosis: Left ankle pain [M25.572]  Treatment Diagnosis: M25.572  LEFT ANKLE PAIN     Onset Date: 24 ( date of fall )       Referral Source: Fili Madrid MD Start of Care (SOC): 2024   Prior Hospitalization: See medical history Provider #: [id number]   Prior Level of Function: Independent with ADLS, community activities , walking with no Pain    Comorbidities: Pt reports that she has skin cancer on nose and  skin cancer has been removed by surgery in the past  . Pt also  reports no cancer in her body now and pt reports that she is free from cancer now .  Three Back surgeries in past , R shoulder Rotator cuff surgery in the past , R foot surgery in , High cholesterol            Insurance: Payor: [employer] / Plan: [employer] FEDERAL / Product Type: *No Product type* /      Visits from Start of Care: 9 Missed Visits: 1    non-Medicare    Patient  verified yes     Visit #   Current / Total 9 16   Time   In / Out 413 453   Pain   In / Out 0 0   Subjective Functional Status/Changes: Patient reports of no Left ankle pain today      TREATMENT AREA =  Left ankle pain [M25.572]    If an interpreting service is utilized for treatment of this patient, the contents of this document represent the material reviewed with the patient via the .     OBJECTIVE        Therapeutic Procedures:    Tx Min Billable or 1:1 Min (if diff from Tx Min) Procedure, Rationale, Specifics   22  14459 Therapeutic Exercise (timed):  increase ROM, strength, coordination, balance, and proprioception to improve patient's ability to progress to PLOF and address remaining functional goals. (see flow sheet as applicable)     Details if applicable:       10  47748 Therapeutic Activity (timed):  use of dynamic activities replicating functional movements to increase ROM, strength, coordination, balance, and

## 2024-08-12 ENCOUNTER — HOSPITAL ENCOUNTER (OUTPATIENT)
Facility: HOSPITAL | Age: 54
Setting detail: RECURRING SERIES
Discharge: HOME | End: 2024-08-15
Payer: COMMERCIAL

## 2024-08-12 PROCEDURE — 97110 THERAPEUTIC EXERCISES: CPT

## 2024-08-12 PROCEDURE — 97161 PT EVAL LOW COMPLEX 20 MIN: CPT

## 2024-08-12 PROCEDURE — 97535 SELF CARE MNGMENT TRAINING: CPT

## 2024-08-12 NOTE — THERAPY EVALUATION
Not post operative    Evaluation Complexity:  History:  HIGH Complexity :3+ comorbidities / personal factors will impact the outcome/ POC ; Examination:  HIGH Complexity : 4+ Standardized tests and measures addressing body structure, function, activity limitation and / or participation in recreation  ;Presentation:  LOW Complexity : Stable, uncomplicated  ;Clinical Decision Making: Lower Extremity Functional Scale (LEFS) = 44 ; (40-60 Mild to Moderate Dysfunction) = MODERATE Complexity  Overall Complexity Rating: LOW   Problem List: pain affecting function, decrease ROM, decrease strength, impaired gait/balance, decrease ADL/functional abilities, decrease activity tolerance, decrease flexibility/joint mobility, decrease transfer abilities , and other LEFS 44    Treatment Plan may include any combination of the followin Therapeutic Exercise, 94049 Neuromuscular Re-Education, 80756 Manual Therapy, 50624 Therapeutic Activity, 69424 Self Care/Home Management, 90043 Electrical Stim unattended, 67549 Electrical Stim attended, and 77323 Ultrasound  Patient / Family readiness to learn indicated by: asking questions, trying to perform skills, interest, return verbalization , and return demonstration   Persons(s) to be included in education: patient (P)  Barriers to Learning/Limitations: none  Measures taken if barriers to learning present: NA  Patient Goal (s): lessen pain and walk for exercise   Patient Self Reported Health Status: fair  Rehabilitation Potential: good    Short Term Goals: To be accomplished in 2 WEEKS  1 patient will have established and be I with HEP to aid with independence and self management at discharge  EVAL HEP issued at evaluation  2 patient will have pain 2/10 to aid with increase tolerance to ADLS and regular daily activities at home and in the community  EVAL 4    Long Term Goals: To be accomplished in 4 WEEKS  1 patient will have established and be I with HEP to aid with 
  CLINICAL INDICATION/HISTORY: Anterior knee pain.     COMPARISON: 6 July 2023.     TECHNIQUE: Two views.       FINDINGS: There is mild narrowing of the medial joint space with subtle marginal  osteophyte formation at the medial femoral condyle. The lateral patellofemoral  joints are unremarkable.  Other bony structures are normal.  There is a small  effusion in the suprapatellar recess, not seen previously.  Other soft tissues  are unremarkable.       IMPRESSION:     Mild medial compartment arthrosis, not significantly changed.  The remainder of  the bony structures and joint spaces are normal.  Small effusion.           Electronically signed by Isaac Cee           Exam Ended: 07/11/24 10:30 EDT Last Resulted: 07/12/24 15:33 EDT          ASSESSMENT/Changes in Function: Patient demonstrates the potential to make gains with improved ROM, strength, endurance/activity tolerance, functional LEFS survey score 44 and all within a reasonable time frame so as to increase their functional independence with ADLs and activities for carryover to  improved quality of life and tolerance to household and community activities and work demands. Patient requires skilled Physical Therapy so as to monitor their response to and modify their treatment plan accordingly. Patient appears to be an appropriate candidate for skilled outpatient Physical Therapy.      Patient will continue to benefit from skilled PT services to modify and progress therapeutic interventions, analyze and address functional mobility deficits, analyze and address ROM deficits, analyze and address strength deficits, analyze and address soft tissue restrictions, analyze and cue for proper movement patterns, analyze and modify for postural abnormalities, and instruct in home and community integration to address functional deficits and attain remaining goals.       [x]  See Plan of Care for goals and reassessment       PLAN  - Continue Plan of Care  - Upgrade

## 2024-08-13 ENCOUNTER — HOSPITAL ENCOUNTER (OUTPATIENT)
Facility: HOSPITAL | Age: 54
Setting detail: RECURRING SERIES
Discharge: HOME | End: 2024-08-16
Payer: COMMERCIAL

## 2024-08-13 PROCEDURE — 97110 THERAPEUTIC EXERCISES: CPT

## 2024-08-13 PROCEDURE — 97035 APP MDLTY 1+ULTRASOUND EA 15: CPT

## 2024-08-13 PROCEDURE — 97530 THERAPEUTIC ACTIVITIES: CPT

## 2024-08-13 NOTE — PROGRESS NOTES
regular daily activities at home and in the community  EVAL 4  CURRENT 2 8/13/24     Long Term Goals: To be accomplished in 4 WEEKS  1 patient will have established and be I with HEP to aid with independence and self management at discharge  EVAL HEP issued at evaluation  CURRENT not initiated  yet 8/13/24  2 patient will have pain 1/10 to aid with increase tolerance to ADLS and regular daily activities at home and in the community  EVAL 4  CURRENT 2 8/13/24  3 patient will have LEFS 53 to show significant and minimal projected gains with function for carryover to ADLs and activities at home and work  EVAL 44  CURRENT NA 8/13/24  4 patient will report overall 50% improvement to aid with increase tolerance to her walking program with less pain and little to no difficulty  EVAL pain worse with walking > 2 blocks   CURRENT NA 8/13/24       Next PN/ RC due    PN 9/12/24  Auth due (visit number/ date) CARISA    PLAN  - Continue Plan of Care  - Upgrade activities as tolerated    Sherita Palma, LESLI    8/13/2024    4:01 PM  If an interpreting service was utilized for treatment of this patient, the contents of this document represent the material reviewed with the patient via the .     Future Appointments   Date Time Provider Department Center   8/19/2024 11:30 AM Sherita Palma, PT [facility]   8/21/2024  3:30 PM Carla Chacon PTA [facility]   8/26/2024  2:50 PM Shantel Uriarte PT [facility]   8/29/2024  3:30 PM Carla Chacon PTA [facility]   9/3/2024  3:30 PM Sherita Palma, PT [facility]

## 2024-08-19 ENCOUNTER — HOSPITAL ENCOUNTER (OUTPATIENT)
Facility: HOSPITAL | Age: 54
Setting detail: RECURRING SERIES
Discharge: HOME | End: 2024-08-22
Payer: COMMERCIAL

## 2024-08-19 PROCEDURE — 97035 APP MDLTY 1+ULTRASOUND EA 15: CPT

## 2024-08-19 PROCEDURE — 97110 THERAPEUTIC EXERCISES: CPT

## 2024-08-19 PROCEDURE — 97530 THERAPEUTIC ACTIVITIES: CPT

## 2024-08-19 NOTE — PROGRESS NOTES
PHYSICAL / OCCUPATIONAL THERAPY - DAILY TREATMENT NOTE    Patient Name: Maki Middleton    Date: 2024    : 1970  Insurance: Payor: RUBÉN / Plan: [employer] [place] FEDERAL / Product Type: *No Product type* /      Patient  verified Yes     Visit #   Current / Total 3 8   Time   In / Out 1127 1210   Pain   In / Out 1 2   Subjective Functional Status/Changes: I didn't wear the brace yesterday and didn't have any increase in pain. Also not wearing today and no increase pain. Reports she did do a lot of walking yesterday with no noted adverse effects. Reports she wants to start doing some light bar/band pilates.      TREATMENT AREA =  Pain in right knee [M25.561]     OBJECTIVE    Modalities Rationale:     decrease inflammation, decrease pain, and increase tissue extensibility to improve patient's ability to progress to PLOF and address remaining functional goals.     min [] Estim Unattended, type/location:                                      []  w/ice    []  w/heat    min [] Estim Attended, type/location:                                     []  w/US     []  w/ice    []  w/heat    []  TENS insruct      min []  Mechanical Traction: type/lbs                   []  pro   []  sup   []  int   []  cont    []  before manual    []  after manual   8 min [x]  Ultrasound, settings/location: 3.3 MHZ 1.0 w/cm2 reclined right medial knee     min  unbill []  Ice     []  Heat    location/position:     min []  Paraffin,  details:     min []  Vasopneumatic Device, press/temp:     min []  Whirlpool / Fluido:    If using vaso (only need to measure limb vaso being performed on)      pre-treatment girth :       post-treatment girth :       measured at (landmark location) :      min []  Other:    Skin assessment post-treatment:   Intact      Therapeutic Procedures:    Tx Min Billable or 1:1 Min (if diff from Tx Min) Procedure, Rationale, Specifics   25 06 [id number] Therapeutic Exercise (timed):  increase ROM, strength, coordination,

## 2024-08-21 ENCOUNTER — HOSPITAL ENCOUNTER (OUTPATIENT)
Facility: HOSPITAL | Age: 54
Setting detail: RECURRING SERIES
Discharge: HOME | End: 2024-08-24
Payer: COMMERCIAL

## 2024-08-21 PROCEDURE — 97530 THERAPEUTIC ACTIVITIES: CPT

## 2024-08-21 PROCEDURE — 97110 THERAPEUTIC EXERCISES: CPT

## 2024-08-21 PROCEDURE — 97035 APP MDLTY 1+ULTRASOUND EA 15: CPT

## 2024-08-21 NOTE — PROGRESS NOTES
PHYSICAL / OCCUPATIONAL THERAPY - DAILY TREATMENT NOTE    Patient Name: Maki Middleton    Date: 2024    : 1970  Insurance: Payor: RUBÉN / Plan: [employer]ETH [place] FEDERAL / Product Type: *No Product type* /      Patient  verified Yes     Visit #   Current / Total 4 8   Time   In / Out 3:36 4:18   Pain   In / Out 1 0   Subjective Functional Status/Changes: \"Little pain.\"     TREATMENT AREA =  Pain in right knee [M25.561]     OBJECTIVE    Modalities Rationale:     decrease pain and increase tissue extensibility to improve patient's ability to progress to PLOF and address remaining functional goals.     min [] Estim Unattended, type/location:                                      []  w/ice    []  w/heat    min [] Estim Attended, type/location:                                     []  w/US     []  w/ice    []  w/heat    []  TENS insruct      min []  Mechanical Traction: type/lbs                   []  pro   []  sup   []  int   []  cont    []  before manual    []  after manual   8 min []  Ultrasound, settings/location:  1.3  mhz    right medial/inferior knee    min  unbill []  Ice     []  Heat    location/position:     min []  Paraffin,  details:     min []  Vasopneumatic Device, press/temp:     min []  Whirlpool / Fluido:    If using vaso (only need to measure limb vaso being performed on)      pre-treatment girth :       post-treatment girth :       measured at (landmark location) :      min []  Other:    Skin assessment post-treatment:   Intact      Therapeutic Procedures:    Tx Min Billable or 1:1 Min (if diff from Tx Min) Procedure, Rationale, Specifics     09469 Therapeutic Exercise (timed):  increase ROM, strength, coordination, balance, and proprioception to improve patient's ability to progress to PLOF and address remaining functional goals. (see flow sheet as applicable)     Details if applicable:       15  47090 Therapeutic Activity (timed):  use of dynamic activities replicating functional movements

## 2024-08-24 DIAGNOSIS — M70.51 PES ANSERINUS BURSITIS OF RIGHT KNEE: ICD-10-CM

## 2024-08-24 DIAGNOSIS — M22.2X1 PATELLOFEMORAL DISORDER, RIGHT: ICD-10-CM

## 2024-08-26 ENCOUNTER — HOSPITAL ENCOUNTER (OUTPATIENT)
Facility: HOSPITAL | Age: 54
Setting detail: RECURRING SERIES
Discharge: HOME | End: 2024-08-29
Payer: COMMERCIAL

## 2024-08-26 DIAGNOSIS — J30.2 SEASONAL ALLERGIC RHINITIS, UNSPECIFIED TRIGGER: ICD-10-CM

## 2024-08-26 PROCEDURE — 97112 NEUROMUSCULAR REEDUCATION: CPT

## 2024-08-26 PROCEDURE — 97110 THERAPEUTIC EXERCISES: CPT

## 2024-08-26 PROCEDURE — 97530 THERAPEUTIC ACTIVITIES: CPT

## 2024-08-26 RX ORDER — MONTELUKAST SODIUM 10 MG/1
10 TABLET ORAL NIGHTLY
Qty: 90 TABLET | Refills: 1 | Status: SHIPPED | OUTPATIENT
Start: 2024-08-26

## 2024-08-26 NOTE — PROGRESS NOTES
PHYSICAL / OCCUPATIONAL THERAPY - DAILY TREATMENT NOTE    Patient Name: Maki Middleton    Date: 2024    : 1970  Insurance: Payor: RUBÉN / Plan: [employer] [place] FEDERAL / Product Type: *No Product type* /      Patient  verified Yes     Visit #   Current / Total 5 8   Time   In / Out 2:50 3:28   Pain   In / Out 1 0   Subjective Functional Status/Changes: \"I am not really having much pain today.\"     TREATMENT AREA =  Pain in right knee [M25.561]     OBJECTIVE      Therapeutic Procedures:    Tx Min Billable or 1:1 Min (if diff from Tx Min) Procedure, Rationale, Specifics   15 15 78885 Therapeutic Exercise (timed):  increase ROM, strength, coordination, balance, and proprioception to improve patient's ability to progress to PLOF and address remaining functional goals. (see flow sheet as applicable)     Details if applicable:       15 15 24096 Therapeutic Activity (timed):  use of dynamic activities replicating functional movements to increase ROM, strength, coordination, balance, and proprioception in order to improve patient's ability to progress to PLOF and address remaining functional goals.  (see flow sheet as applicable)     Details if applicable:     97112 Neuromuscular Re-Education (timed):  improve balance, coordination, kinesthetic sense, posture, core stability and proprioception to improve patient's ability to develop conscious control of individual muscles and awareness of position of extremities in order to progress to PLOF and address remaining functional goals. (see flow sheet as applicable)      Details if applicable:            Details if applicable:            Details if applicable:     38 38 [facility] Totals Reminder: bill using total billable min of TIMED therapeutic procedures (example: do not include dry needle or estim unattended, both untimed codes, in totals to left)  8-22 min = 1 unit; 23-37 min = 2 units; 38-52 min = 3 units; 53-67 min = 4 units; 68-82 min = 5 units   Total Total  ADLs and activities at home and work  EVAL 44  CURRENT NA 8/19/24  4 patient will report overall 50% improvement to aid with increase tolerance to her walking program with less pain and little to no difficulty  EVAL pain worse with walking > 2 blocks   CURRENT NA 8/19/24     Next PN/ RC due    PN 9/12/24  Auth due (visit number/ date) CARISA    PLAN  - Continue Plan of Care  - Upgrade activities as tolerated      Shantel Uriarte, PT    8/26/2024    3:32 PM  If an interpreting service was utilized for treatment of this patient, the contents of this document represent the material reviewed with the patient via the .     Future Appointments   Date Time Provider Department Center   8/29/2024  3:30 PM Carla Chacon, PTA [facility]   9/3/2024  3:30 PM Sherita Palma, PT [facility]

## 2024-08-29 ENCOUNTER — HOSPITAL ENCOUNTER (OUTPATIENT)
Facility: HOSPITAL | Age: 54
Setting detail: RECURRING SERIES
End: 2024-08-29
Payer: COMMERCIAL

## 2024-08-29 PROCEDURE — 97110 THERAPEUTIC EXERCISES: CPT

## 2024-08-29 PROCEDURE — 97035 APP MDLTY 1+ULTRASOUND EA 15: CPT

## 2024-08-29 PROCEDURE — 97530 THERAPEUTIC ACTIVITIES: CPT

## 2024-08-29 NOTE — PROGRESS NOTES
PHYSICAL / OCCUPATIONAL THERAPY - DAILY TREATMENT NOTE    Patient Name: Maki Middleton    Date: 2024    : 1970  Insurance: Payor: RUBÉN / Plan: [employer]ETH [place] FEDERAL / Product Type: *No Product type* /      Patient  verified Yes     Visit #   Current / Total 6 8   Time   In / Out 3:30 4:23   Pain   In / Out 4 1.5   Subjective Functional Status/Changes: \"I felt pain later on after last session.\"     TREATMENT AREA =  Pain in right knee [M25.561]     OBJECTIVE    Modalities Rationale:     decrease inflammation and decrease pain to improve patient's ability to progress to PLOF and address remaining functional goals.     min [] Estim Unattended, type/location:                                      []  w/ice    []  w/heat    min [] Estim Attended, type/location:                                     []  w/US     []  w/ice    []  w/heat    []  TENS insruct      min []  Mechanical Traction: type/lbs                   []  pro   []  sup   []  int   []  cont    []  before manual    []  after manual   8 min []  Ultrasound, settings/location:  1.3 mhz  medial right knee   10 min  unbill [x]  Ice     []  Heat    location/position: Semi reclined   right knee    min []  Paraffin,  details:     min []  Vasopneumatic Device, press/temp:     min []  Whirlpool / Fluido:    If using vaso (only need to measure limb vaso being performed on)      pre-treatment girth :       post-treatment girth :       measured at (landmark location) :      min []  Other:    Skin assessment post-treatment:   Intact      Therapeutic Procedures:    Tx Min Billable or 1:1 Min (if diff from Tx Min) Procedure, Rationale, Specifics   15  06765 Therapeutic Exercise (timed):  increase ROM, strength, coordination, balance, and proprioception to improve patient's ability to progress to PLOF and address remaining functional goals. (see flow sheet as applicable)     Details if applicable:       12  86121 Therapeutic Activity (timed):  use of dynamic  patterns, analyze and modify for postural abnormalities, analyze and address imbalance/dizziness, and instruct in home and community integration to address functional deficits and attain remaining goals.    Progress toward goals / Updated goals:  []  See Progress Note/Recertification  Short Term Goals: To be accomplished in 2 WEEKS  1 patient will have established and be I with HEP to aid with independence and self management at discharge  EVAL HEP issued at evaluation  CURRENT reports compliance 8/19/24  2 patient will have pain 2/10 to aid with increase tolerance to ADLS and regular daily activities at home and in the community  EVAL 4  CURRENT   arrival  pain 4/10 8/29/24     Long Term Goals: To be accomplished in 4 WEEKS  1 patient will have established and be I with HEP to aid with independence and self management at discharge  EVAL HEP issued at evaluation  CURRENT reports compliance 8/19/24  2 patient will have pain 1/10 to aid with increase tolerance to ADLS and regular daily activities at home and in the community  EVAL 4  CURRENT   arrival  pain 4   8/29/24  3 patient will have LEFS 53 to show significant and minimal projected gains with function for carryover to ADLs and activities at home and work  EVAL 44  CURRENT NA 8/19/24  4 patient will report overall 50% improvement to aid with increase tolerance to her walking program with less pain and little to no difficulty  EVAL pain worse with walking > 2 blocks   CURRENT NA 8/19/24     Next PN/ RC due    PN 9/12/24  Auth due (visit number/ date) CARISA      PLAN  - Continue Plan of Care    Carla Chacon, MADDISON    8/29/2024    3:39 PM  If an interpreting service was utilized for treatment of this patient, the contents of this document represent the material reviewed with the patient via the .     Future Appointments   Date Time Provider Department Center   9/3/2024  3:30 PM Sherita Palma, PT [facility]

## 2024-09-03 ENCOUNTER — HOSPITAL ENCOUNTER (OUTPATIENT)
Facility: HOSPITAL | Age: 54
Setting detail: RECURRING SERIES
Discharge: HOME | End: 2024-09-06
Payer: COMMERCIAL

## 2024-09-03 PROCEDURE — 97530 THERAPEUTIC ACTIVITIES: CPT

## 2024-09-03 PROCEDURE — 97035 APP MDLTY 1+ULTRASOUND EA 15: CPT

## 2024-09-03 PROCEDURE — 97110 THERAPEUTIC EXERCISES: CPT

## 2024-09-05 ENCOUNTER — HOSPITAL ENCOUNTER (OUTPATIENT)
Facility: HOSPITAL | Age: 54
Setting detail: RECURRING SERIES
Discharge: HOME | End: 2024-09-08
Payer: COMMERCIAL

## 2024-09-05 PROCEDURE — 97035 APP MDLTY 1+ULTRASOUND EA 15: CPT

## 2024-09-05 PROCEDURE — 97110 THERAPEUTIC EXERCISES: CPT

## 2024-09-05 PROCEDURE — 97530 THERAPEUTIC ACTIVITIES: CPT

## 2024-09-09 ENCOUNTER — HOSPITAL ENCOUNTER (OUTPATIENT)
Facility: HOSPITAL | Age: 54
Setting detail: RECURRING SERIES
Discharge: HOME | End: 2024-09-12
Payer: COMMERCIAL

## 2024-09-09 PROCEDURE — 97035 APP MDLTY 1+ULTRASOUND EA 15: CPT

## 2024-09-09 PROCEDURE — 97530 THERAPEUTIC ACTIVITIES: CPT

## 2024-09-09 PROCEDURE — 97110 THERAPEUTIC EXERCISES: CPT

## 2024-09-11 ENCOUNTER — HOSPITAL ENCOUNTER (OUTPATIENT)
Facility: HOSPITAL | Age: 54
Setting detail: RECURRING SERIES
Discharge: HOME | End: 2024-09-14
Payer: COMMERCIAL

## 2024-09-11 PROCEDURE — 97035 APP MDLTY 1+ULTRASOUND EA 15: CPT

## 2024-09-11 PROCEDURE — 97110 THERAPEUTIC EXERCISES: CPT

## 2024-09-11 PROCEDURE — 97530 THERAPEUTIC ACTIVITIES: CPT

## 2024-09-12 ENCOUNTER — HOSPITAL ENCOUNTER (OUTPATIENT)
Facility: HOSPITAL | Age: 54
Discharge: HOME | End: 2024-09-15
Attending: ORTHOPAEDIC SURGERY
Payer: COMMERCIAL

## 2024-09-12 DIAGNOSIS — M54.16 LUMBAR RADICULOPATHY: ICD-10-CM

## 2024-09-12 PROCEDURE — 72148 MRI LUMBAR SPINE W/O DYE: CPT

## 2024-09-16 ENCOUNTER — HOSPITAL ENCOUNTER (OUTPATIENT)
Facility: HOSPITAL | Age: 54
Setting detail: RECURRING SERIES
Discharge: HOME | End: 2024-09-19
Payer: COMMERCIAL

## 2024-09-16 ENCOUNTER — HOSPITAL ENCOUNTER (OUTPATIENT)
Facility: HOSPITAL | Age: 54
Discharge: HOME | End: 2024-09-19
Payer: COMMERCIAL

## 2024-09-16 DIAGNOSIS — M54.16 LUMBAR RADICULOPATHY: ICD-10-CM

## 2024-09-16 PROCEDURE — 97110 THERAPEUTIC EXERCISES: CPT

## 2024-09-16 PROCEDURE — 97035 APP MDLTY 1+ULTRASOUND EA 15: CPT

## 2024-09-16 PROCEDURE — 97530 THERAPEUTIC ACTIVITIES: CPT

## 2024-09-16 PROCEDURE — 72110 X-RAY EXAM L-2 SPINE 4/>VWS: CPT

## 2024-09-18 ENCOUNTER — HOSPITAL ENCOUNTER (OUTPATIENT)
Facility: HOSPITAL | Age: 54
Setting detail: RECURRING SERIES
Discharge: HOME | End: 2024-09-21
Payer: COMMERCIAL

## 2024-09-18 PROCEDURE — 97110 THERAPEUTIC EXERCISES: CPT

## 2024-09-18 PROCEDURE — 97035 APP MDLTY 1+ULTRASOUND EA 15: CPT

## 2024-09-18 PROCEDURE — 97530 THERAPEUTIC ACTIVITIES: CPT

## 2024-09-24 ENCOUNTER — HOSPITAL ENCOUNTER (OUTPATIENT)
Facility: HOSPITAL | Age: 54
Setting detail: RECURRING SERIES
Discharge: HOME | End: 2024-09-27
Payer: COMMERCIAL

## 2024-09-24 PROCEDURE — 97530 THERAPEUTIC ACTIVITIES: CPT

## 2024-09-24 PROCEDURE — 97035 APP MDLTY 1+ULTRASOUND EA 15: CPT

## 2024-09-24 PROCEDURE — 97110 THERAPEUTIC EXERCISES: CPT

## 2024-09-26 ENCOUNTER — HOSPITAL ENCOUNTER (OUTPATIENT)
Facility: HOSPITAL | Age: 54
Setting detail: RECURRING SERIES
Discharge: HOME | End: 2024-09-29
Payer: COMMERCIAL

## 2024-09-26 PROCEDURE — 97530 THERAPEUTIC ACTIVITIES: CPT

## 2024-09-26 PROCEDURE — 97110 THERAPEUTIC EXERCISES: CPT

## 2024-09-26 NOTE — PROGRESS NOTES
PHYSICAL / OCCUPATIONAL THERAPY - DAILY TREATMENT NOTE    Patient Name: Maki Middleton    Date: 2024    : 1970  Insurance: Payor: RUBÉN / Plan: JE[employer]ETH [place] FEDERAL / Product Type: *No Product type* /      Patient  verified Yes     Visit #   Current / Total 6 8   Time   In / Out 4:10 pm  4:48 pm    Pain   In / Out 2/10 0/10    Subjective Functional Status/Changes: Patient reports some soreness because she was sitting on planes and walking through airports all day yesterday.      TREATMENT AREA =  Pain in right knee [M25.561]     OBJECTIVE    Modalities Rationale:     decrease inflammation and decrease pain to improve patient's ability to progress to PLOF and address remaining functional goals.     min [] Estim Unattended, type/location:                                      []  w/ice    []  w/heat    min [] Estim Attended, type/location:                                     []  w/US     []  w/ice    []  w/heat    []  TENS insruct      min []  Mechanical Traction: type/lbs                   []  pro   []  sup   []  int   []  cont    []  before manual    []  after manual    min []  Ultrasound, settings/location:  1.0 w/cm3; 1MHz to right medial knee    min  unbill []  Ice     []  Heat    location/position:     min []  Paraffin,  details:     min []  Vasopneumatic Device, press/temp:     min []  Whirlpool / Fluido:    If using vaso (only need to measure limb vaso being performed on)      pre-treatment girth :       post-treatment girth :       measured at (landmark location) :      min []  Other:    Skin assessment post-treatment:   Intact      Therapeutic Procedures:    Tx Min Billable or 1:1 Min (if diff from Tx Min) Procedure, Rationale, Specifics   13  99799 Therapeutic Exercise (timed):  increase ROM, strength, coordination, balance, and proprioception to improve patient's ability to progress to PLOF and address remaining functional goals. (see flow sheet as applicable)     Details if applicable:

## 2024-10-01 ENCOUNTER — HOSPITAL ENCOUNTER (OUTPATIENT)
Facility: HOSPITAL | Age: 54
Setting detail: RECURRING SERIES
Discharge: HOME | End: 2024-10-04
Payer: COMMERCIAL

## 2024-10-01 PROCEDURE — 97112 NEUROMUSCULAR REEDUCATION: CPT

## 2024-10-01 PROCEDURE — 97530 THERAPEUTIC ACTIVITIES: CPT

## 2024-10-01 PROCEDURE — 97110 THERAPEUTIC EXERCISES: CPT

## 2024-10-01 NOTE — PROGRESS NOTES
PHYSICAL / OCCUPATIONAL THERAPY - DAILY TREATMENT NOTE    Patient Name: Maki Middleton    Date: 10/1/2024    : 1970  Insurance: Payor: RUBÉN / Plan: JE[employer]ETH [place] FEDERAL / Product Type: *No Product type* /      Patient  verified Yes     Visit #   Current / Total 7 8   Time   In / Out 3:35 pm  4:16 pm    Pain   In / Out 0/10 0/10   Subjective Functional Status/Changes: Patient reports that she went on multiple long walks over the weekend without any pain.       TREATMENT AREA =  Pain in right knee [M25.561]     OBJECTIVE      Therapeutic Procedures:    Tx Min Billable or 1:1 Min (if diff from Tx Min) Procedure, Rationale, Specifics   13  12344 Therapeutic Exercise (timed):  increase ROM, strength, coordination, balance, and proprioception to improve patient's ability to progress to PLOF and address remaining functional goals. (see flow sheet as applicable)     Details if applicable:       20  48373 Therapeutic Activity (timed):  use of dynamic activities replicating functional movements to increase ROM, strength, coordination, balance, and proprioception in order to improve patient's ability to progress to PLOF and address remaining functional goals.  (see flow sheet as applicable)     Details if applicable:     8   80129 Neuromuscular Re-Education (timed):  improve balance, coordination, kinesthetic sense, posture, core stability and proprioception to improve patient's ability to develop conscious control of individual muscles and awareness of position of extremities in order to progress to PLOF and address remaining functional goals. (see flow sheet as applicable)      Details if applicable:            Details if applicable:            Details if applicable:     41  [facility] Totals Reminder: bill using total billable min of TIMED therapeutic procedures (example: do not include dry needle or estim unattended, both untimed codes, in totals to left)  8-22 min = 1 unit; 23-37 min = 2 units; 38-52 min = 3 units;

## 2024-10-02 ENCOUNTER — OFFICE VISIT (OUTPATIENT)
Facility: CLINIC | Age: 54
End: 2024-10-02
Payer: COMMERCIAL

## 2024-10-02 VITALS
SYSTOLIC BLOOD PRESSURE: 104 MMHG | BODY MASS INDEX: 36.18 KG/M2 | DIASTOLIC BLOOD PRESSURE: 74 MMHG | WEIGHT: 196.6 LBS | RESPIRATION RATE: 20 BRPM | TEMPERATURE: 98.2 F | HEART RATE: 76 BPM | OXYGEN SATURATION: 97 % | HEIGHT: 62 IN

## 2024-10-02 DIAGNOSIS — R06.02 SOB (SHORTNESS OF BREATH): ICD-10-CM

## 2024-10-02 DIAGNOSIS — R05.2 SUBACUTE COUGH: ICD-10-CM

## 2024-10-02 DIAGNOSIS — M62.08 DIASTASIS RECTI: ICD-10-CM

## 2024-10-02 DIAGNOSIS — R06.2 WHEEZING: ICD-10-CM

## 2024-10-02 DIAGNOSIS — R10.33 PERIUMBILICAL ABDOMINAL PAIN: Primary | ICD-10-CM

## 2024-10-02 PROCEDURE — 99214 OFFICE O/P EST MOD 30 MIN: CPT | Performed by: NURSE PRACTITIONER

## 2024-10-02 RX ORDER — DIPHENOXYLATE HCL/ATROPINE 2.5-.025MG
TABLET ORAL
COMMUNITY

## 2024-10-02 RX ORDER — GABAPENTIN 300 MG/1
1 CAPSULE ORAL 2 TIMES DAILY
COMMUNITY

## 2024-10-02 NOTE — PROGRESS NOTES
General Office Visit Note    Assessment/Plan:   orders and follow up as documented in EMR    1. Periumbilical abdominal pain  -     US ABDOMEN LIMITED; Future  2. Diastasis recti  3. SOB (shortness of breath)  -     [facility] Herbert Velazquez MD, Pulmonology, [facility])  -     XR CHEST PA LATERAL W BOTH OBLIQUE PROJECTIONS; Future  4. Wheezing  -     Herbert Gillespie MD, Pulmonology, [facility])  -     XR CHEST PA LATERAL W BOTH OBLIQUE PROJECTIONS; Future  5. Subacute cough  -     Herbert Gillespie MD, Pulmonology, [facility])  -     XR CHEST PA LATERAL W BOTH OBLIQUE PROJECTIONS; Future     Follow-up and Dispositions    Return for After the abominal ultrasound..       Subjective:     Maki is a 54 y.o. y.o. female who complains of   Chief Complaint   Patient presents with    Abdominal Pain     X8 months, mid abdomen worse with bending over    Wheezing    Shortness of Breath      Abdominal Pain Review:    Maki Middleton is a 54 y.o. female who is being evaluated for abdominal pain. The pain is located in the periumbilical area. The pain is described as sharp, ,and intermittent, with bending . Onset was 10 months ago. Symptoms have been unchanged since. Aggravating factors: happens with bending forward, to do things such as tie her shoe, or bend to pick something up or put her sock on. Alleviating factors: not doing the action that causes the symptoms. Associated symptoms: none, . Pt states pain is perceived as severe (6-8 pain scale) The patient denies other symptoms..    Previous treatments/tests: transvaginal ultrasound 9/25/2024 with GYN that was negative, previous.     Pt c/o labored breathing since June after she had COVID. Pt states she has noticed labored breathing and wheezing more often in the last few months. Pt was given an inhaler from her allergist with [facility]. Pt has noticed that she is having to use the inhaler more often. Pt

## 2024-10-03 ENCOUNTER — HOSPITAL ENCOUNTER (OUTPATIENT)
Facility: HOSPITAL | Age: 54
Setting detail: RECURRING SERIES
Discharge: HOME | End: 2024-10-06
Payer: COMMERCIAL

## 2024-10-03 PROBLEM — M62.08 DIASTASIS RECTI: Status: ACTIVE | Noted: 2024-10-03

## 2024-10-03 PROCEDURE — 97110 THERAPEUTIC EXERCISES: CPT

## 2024-10-03 PROCEDURE — 97530 THERAPEUTIC ACTIVITIES: CPT

## 2024-10-03 PROCEDURE — 97112 NEUROMUSCULAR REEDUCATION: CPT

## 2024-10-03 NOTE — THERAPY DISCHARGE
PT DISCHARGE DAILY NOTE AND SUMMARY     If signature is requested please return to:    [facility]  Fax: [phone]    Date:10/3/2024  Patient name: Maki Middleton Start of Care: 24   Referral source: Bhargav Story DO : 1970   Medical/Treatment Diagnosis: Pain in right knee [M25.561] Onset Date:7/3/24     Prior Hospitalization: see medical history Provider#: [id number]   Comorbidities: Musculoskeletal disorders and Social determinants of health: Alcohol high cholesterol, left ankle fracture   Prior Level of Function: Ind all areas of ADLS and activities with left ankle involvement, drove, working, household and community activities, no AD   Insurance: Payor: [employer] / Plan: JEANETH [employer] FEDERAL / Product Type: *No Product type* /      Visits from Start of Care: 16 Missed Visits: 0    non-Medicare    Patient  verified yes     Visit #   Current / Total 16 16   Time   In / Out 3:32 pm 4:10   Pain   In / Out 0 0   Subjective Functional Status/Changes: \"I have been going to long walks with my daughters dogs without any problem and started back at the gym.\"     TREATMENT AREA =  Pain in right knee [M25.561]      OBJECTIVE    Therapeutic Procedures:    Tx Min Billable or 1:1 Min (if diff from Tx Min) Procedure, Rationale, Specifics   15  77870 Therapeutic Activity (timed):  use of dynamic activities replicating functional movements to increase ROM, strength, coordination, balance, and proprioception in order to improve patient's ability to progress to PLOF and address remaining functional goals.  (see flow sheet as applicable)     Details if applicable:       10  67875 Therapeutic Exercise (timed):  increase ROM, strength, coordination, balance, and proprioception to improve patient's ability to progress to PLOF and address remaining functional goals. (see flow sheet as applicable)     Details if applicable:     13  85848 Neuromuscular Re-Education (timed):  improve balance,

## 2024-10-04 ENCOUNTER — HOSPITAL ENCOUNTER (OUTPATIENT)
Facility: HOSPITAL | Age: 54
Discharge: HOME | End: 2024-10-07
Payer: COMMERCIAL

## 2024-10-04 DIAGNOSIS — R06.2 WHEEZING: ICD-10-CM

## 2024-10-04 DIAGNOSIS — R06.02 SOB (SHORTNESS OF BREATH): ICD-10-CM

## 2024-10-04 DIAGNOSIS — R05.2 SUBACUTE COUGH: ICD-10-CM

## 2024-10-04 DIAGNOSIS — R10.33 PERIUMBILICAL ABDOMINAL PAIN: ICD-10-CM

## 2024-10-04 PROCEDURE — 76705 ECHO EXAM OF ABDOMEN: CPT

## 2024-10-04 PROCEDURE — 71046 X-RAY EXAM CHEST 2 VIEWS: CPT

## 2024-10-07 DIAGNOSIS — R16.0 HEPATOMEGALY: Primary | ICD-10-CM

## 2024-10-20 DIAGNOSIS — M70.51 PES ANSERINUS BURSITIS OF RIGHT KNEE: ICD-10-CM

## 2024-10-20 DIAGNOSIS — M22.2X1 PATELLOFEMORAL DISORDER, RIGHT: ICD-10-CM

## 2024-11-04 ENCOUNTER — OFFICE VISIT (OUTPATIENT)
Age: 54
End: 2024-11-04
Payer: COMMERCIAL

## 2024-11-04 VITALS
SYSTOLIC BLOOD PRESSURE: 136 MMHG | HEART RATE: 71 BPM | TEMPERATURE: 97.3 F | DIASTOLIC BLOOD PRESSURE: 82 MMHG | BODY MASS INDEX: 36.07 KG/M2 | WEIGHT: 196 LBS | RESPIRATION RATE: 16 BRPM | OXYGEN SATURATION: 99 % | HEIGHT: 62 IN

## 2024-11-04 DIAGNOSIS — R06.02 SHORTNESS OF BREATH: Primary | ICD-10-CM

## 2024-11-04 DIAGNOSIS — R06.2 WHEEZING: ICD-10-CM

## 2024-11-04 DIAGNOSIS — R05.2 SUBACUTE COUGH: ICD-10-CM

## 2024-11-04 DIAGNOSIS — J45.30 MILD PERSISTENT EXTRINSIC ASTHMA WITHOUT COMPLICATION: ICD-10-CM

## 2024-11-04 PROCEDURE — 94729 DIFFUSING CAPACITY: CPT | Performed by: HOSPITALIST

## 2024-11-04 PROCEDURE — 94727 GAS DIL/WSHOT DETER LNG VOL: CPT | Performed by: HOSPITALIST

## 2024-11-04 PROCEDURE — 94060 EVALUATION OF WHEEZING: CPT | Performed by: HOSPITALIST

## 2024-11-04 PROCEDURE — 99204 OFFICE O/P NEW MOD 45 MIN: CPT | Performed by: HOSPITALIST

## 2024-11-04 RX ORDER — BUDESONIDE AND FORMOTEROL FUMARATE DIHYDRATE 80; 4.5 UG/1; UG/1
2 AEROSOL RESPIRATORY (INHALATION) 2 TIMES DAILY
Qty: 10.2 G | Refills: 3 | Status: SHIPPED | OUTPATIENT
Start: 2024-11-04

## 2024-11-04 ASSESSMENT — PATIENT HEALTH QUESTIONNAIRE - PHQ9
SUM OF ALL RESPONSES TO PHQ QUESTIONS 1-9: 0
1. LITTLE INTEREST OR PLEASURE IN DOING THINGS: NOT AT ALL
SUM OF ALL RESPONSES TO PHQ QUESTIONS 1-9: 0
2. FEELING DOWN, DEPRESSED OR HOPELESS: NOT AT ALL
SUM OF ALL RESPONSES TO PHQ9 QUESTIONS 1 & 2: 0

## 2024-11-04 NOTE — PROGRESS NOTES
[facility]                                                       Pulmonary, Critical Care, and Sleep Medicine      Pulmonary Office Initial Consultation.    Name: Maki Middleton     : 1970     Date: 2024        Subjective:   Chief complaint: Dyspnea, wheeze, cough  Patient is a 54 y.o. female with a PMHx of chronic pain, GERD,    HPI:  Today in clinic, she states she is not dealing with cough intermittently for the past 2 years but has gotten much worse in the past few months.  The cough is generally dry and associated with some chest pressure.  When the cough comes on she does experience shortness of breath.  It seems to worsen spring and fall seasons.  She also has nocturnal awakenings 3-4 times a week on average.  She has albuterol inhaler which only helps somewhat.  She takes a daily Singulair but does not feel like it helps much.  She is a lifelong non-smoker but does know both her parents smoked around her growing up.  Denies other substance use.  She has not to go to emergency room or urgent care for any breathing issue in the past year.  She has not been prescribed oral steroids or antibiotics for any respiratory infection.  She does have recurrent allergic rhinitis worse in the spring and fall for which she takes daily mometasone as needed.    Patient denies systemic symptoms to include fever, chills, nausea, vomiting, abdominal pain, night sweats, or significant weight loss  Has no known history of formally diagnosed  lung disease to include asthma, COPD, or ILD.  Also has no history of PE or DVT.  Patient is a lifelong non-smoker.  Also denies other inhalational substance use to include vaping or marijuana.  Patient denies other illicit substance use.  Denies any recent travel history or known sick contact exposure.  Patient is currently retired.  Denies known dust, mold, or hazardous chemical exposure.  They

## 2024-11-04 NOTE — PROGRESS NOTES
Maki Middleton presents today for   Chief Complaint   Patient presents with    Wheezing    Cough     cough       Is someone accompanying this pt? no    Is the patient using any DME equipment during OV? no    -DME Company no    Depression Screenin/4/2024    10:06 AM   PHQ-9 Questionaire   Little interest or pleasure in doing things 0   Feeling down, depressed, or hopeless 0   PHQ-9 Total Score 0       Learning Assessment:    Failed to redirect to the Timeline version of the [employer] SmartLink.    Abuse Screening:         No data to display                Fall Risk    Failed to redirect to the Timeline version of the [employer] SmartLink.    Coordination of Care:    1. Have you been to the ER, urgent care clinic since your last visit? Hospitalized since your last visit? no    2. Have you seen or consulted any other health care providers outside of the [facility] since your last visit? Include any pap smears or colon screening. no    Medication list has been update per patient.

## 2024-11-05 ENCOUNTER — HOSPITAL ENCOUNTER (OUTPATIENT)
Facility: HOSPITAL | Age: 54
Discharge: HOME | End: 2024-11-08
Payer: COMMERCIAL

## 2024-11-05 DIAGNOSIS — J45.30 MILD PERSISTENT EXTRINSIC ASTHMA WITHOUT COMPLICATION: ICD-10-CM

## 2024-11-05 LAB
BASOPHILS # BLD: 0.1 K/UL (ref 0–0.1)
BASOPHILS NFR BLD: 1 % (ref 0–2)
DIFFERENTIAL METHOD BLD: ABNORMAL
EOSINOPHIL # BLD: 0.2 K/UL (ref 0–0.4)
EOSINOPHIL NFR BLD: 2 % (ref 0–5)
ERYTHROCYTE [DISTWIDTH] IN BLOOD BY AUTOMATED COUNT: 13.2 % (ref 11.6–14.5)
HCT VFR BLD AUTO: 41.7 % (ref 35–45)
HGB BLD-MCNC: 13.6 G/DL (ref 12–16)
IMM GRANULOCYTES # BLD AUTO: 0 K/UL (ref 0–0.04)
IMM GRANULOCYTES NFR BLD AUTO: 0 % (ref 0–0.5)
LYMPHOCYTES # BLD: 2 K/UL (ref 0.9–3.6)
LYMPHOCYTES NFR BLD: 20 % (ref 21–52)
MCH RBC QN AUTO: 32.6 PG (ref 24–34)
MCHC RBC AUTO-ENTMCNC: 32.6 G/DL (ref 31–37)
MCV RBC AUTO: 100 FL (ref 78–100)
MONOCYTES # BLD: 0.7 K/UL (ref 0.05–1.2)
MONOCYTES NFR BLD: 7 % (ref 3–10)
NEUTS SEG # BLD: 7.1 K/UL (ref 1.8–8)
NEUTS SEG NFR BLD: 71 % (ref 40–73)
NRBC # BLD: 0 K/UL (ref 0–0.01)
NRBC BLD-RTO: 0 PER 100 WBC
PLATELET # BLD AUTO: 331 K/UL (ref 135–420)
PMV BLD AUTO: 10.2 FL (ref 9.2–11.8)
RBC # BLD AUTO: 4.17 M/UL (ref 4.2–5.3)
WBC # BLD AUTO: 10 K/UL (ref 4.6–13.2)

## 2024-11-05 PROCEDURE — 85025 COMPLETE CBC W/AUTO DIFF WBC: CPT

## 2024-11-05 PROCEDURE — 86003 ALLG SPEC IGE CRUDE XTRC EA: CPT

## 2024-11-05 PROCEDURE — 36415 COLL VENOUS BLD VENIPUNCTURE: CPT

## 2024-11-05 PROCEDURE — 82785 ASSAY OF IGE: CPT

## 2024-11-08 LAB
A ALTERNATA IGE QN: <0.1 KU/L
A FUMIGATUS IGE QN: <0.1 KU/L
AMER ROACH IGE QN: 0.16 KU/L
AMER SYCAMORE IGE QN: <0.1 KU/L
BAHIA GRASS IGE QN: <0.1 KU/L
BERMUDA GRASS IGE QN: <0.1 KU/L
BOXELDER IGE QN: <0.1 KU/L
C HERBARUM IGE QN: <0.1 KU/L
CAT DANDER IGG QN: <0.1 KU/L
COMMON RAGWEED IGE QN: <0.1 KU/L
D FARINAE IGE QN: <0.1 KU/L
D PTERONYSS IGE QN: <0.1 KU/L
DEPRECATED IGE QN: <0.1 KU/L
DOG DANDER IGE QN: <0.1 KU/L
ENGL PLANTAIN IGE QN: <0.1 KU/L
IGE SERPL-ACNC: 47 IU/ML (ref 6–495)
JOHNSON GRASS IGE QN: <0.1 KU/L
Lab: ABNORMAL
M RACEMOSUS IGE QN: <0.1 KU/L
MT JUNIPER IGE QN: <0.1 KU/L
MUGWORT IGE QN: <0.1 KU/L
NETTLE IGE QN: <0.1 KU/L
P NOTATUM IGE QN: <0.1 KU/L
S BOTRYOSUM IGE QN: <0.1 KU/L
SHEEP SORREL IGE QN: <0.1 KU/L
SWEET GUM IGE QN: <0.1 KU/L
TIMOTHY IGE QN: <0.1 KU/L
WHITE BIRCH IGE QN: <0.1 KU/L
WHITE ELM IGG QN: <0.1 KU/L
WHITE HICKORY IGE QN: <0.1 KU/L
WHITE MULBERRY IGE QN: <0.1 KU/L
WHITE OAK IGE QN: <0.1 KU/L

## 2024-11-11 DIAGNOSIS — Z91.89 AT HIGH RISK FOR BREAST CANCER: Primary | ICD-10-CM

## 2024-11-11 DIAGNOSIS — Z80.3 FAMILY HISTORY OF MALIGNANT NEOPLASM OF BREAST: ICD-10-CM

## 2024-12-03 ENCOUNTER — HOSPITAL ENCOUNTER (OUTPATIENT)
Facility: HOSPITAL | Age: 54
Discharge: HOME | End: 2024-12-06
Attending: SURGERY
Payer: OTHER GOVERNMENT

## 2024-12-03 DIAGNOSIS — Z91.89 AT HIGH RISK FOR BREAST CANCER: ICD-10-CM

## 2024-12-03 DIAGNOSIS — Z80.3 FAMILY HISTORY OF MALIGNANT NEOPLASM OF BREAST: ICD-10-CM

## 2024-12-03 PROCEDURE — 6360000004 HC RX CONTRAST MEDICATION: Performed by: SURGERY

## 2024-12-03 PROCEDURE — C8908 MRI W/O FOL W/CONT, BREAST,: HCPCS

## 2024-12-03 PROCEDURE — A9577 INJ MULTIHANCE: HCPCS | Performed by: SURGERY

## 2024-12-03 RX ADMIN — GADOBENATE DIMEGLUMINE 18 ML: 529 INJECTION, SOLUTION INTRAVENOUS at 11:23

## 2025-01-20 ENCOUNTER — OFFICE VISIT (OUTPATIENT)
Age: 55
End: 2025-01-20
Payer: OTHER GOVERNMENT

## 2025-01-20 VITALS
OXYGEN SATURATION: 98 % | TEMPERATURE: 96.9 F | SYSTOLIC BLOOD PRESSURE: 90 MMHG | HEART RATE: 99 BPM | BODY MASS INDEX: 35.85 KG/M2 | HEIGHT: 62 IN | WEIGHT: 194.8 LBS | RESPIRATION RATE: 16 BRPM | DIASTOLIC BLOOD PRESSURE: 70 MMHG

## 2025-01-20 DIAGNOSIS — Z91.89 AT HIGH RISK FOR BREAST CANCER: ICD-10-CM

## 2025-01-20 DIAGNOSIS — Z80.3 FAMILY HISTORY OF MALIGNANT NEOPLASM OF BREAST: Primary | ICD-10-CM

## 2025-01-20 PROCEDURE — 99214 OFFICE O/P EST MOD 30 MIN: CPT | Performed by: SURGERY

## 2025-01-20 RX ORDER — MAGNESIUM 30 MG
30 TABLET ORAL 2 TIMES DAILY
COMMUNITY

## 2025-01-20 RX ORDER — POTASSIUM CHLORIDE 1.5 G/1.58G
20 POWDER, FOR SOLUTION ORAL 2 TIMES DAILY
COMMUNITY

## 2025-01-20 RX ORDER — DIPHENHYDRAMINE HYDROCHLORIDE 50 MG/ML
25 INJECTION INTRAMUSCULAR; INTRAVENOUS EVERY 6 HOURS PRN
COMMUNITY

## 2025-01-20 ASSESSMENT — ENCOUNTER SYMPTOMS
SHORTNESS OF BREATH: 0
CHEST TIGHTNESS: 0

## 2025-01-20 NOTE — PROGRESS NOTES
Maki Middleton is a 54 y.o. female (: 1970)     Chief Complaint   Patient presents with    Follow-up     Bilateral breast        Medication list and allergies have been reviewed with Maki Middleton and updated as of today's date.     I have gone over all Medical, Surgical and Social History with Maki Middleton and updated/added the information accordingly.     1. Have you been to the ER, urgent care clinic since your last visit?  Hospitalized since your last visit? No    2. Have you seen or consulted any other health care providers outside of the [facility] since your last visit?  Include any pap smears or colon screening. No

## 2025-01-20 NOTE — PROGRESS NOTES
CC:   Chief Complaint   Patient presents with   • Follow-up     Bilateral breast         Assessment:    ICD-10-CM    1. Family history of malignant neoplasm of breast  Z80.3       2. At high risk for breast cancer  Z91.89 ADE GERRTUDE DIGITAL SCREEN BILATERAL          Plan: I reviewed with the patient the most recent MRI from December 2024 which was normal BI-RADS 1. She reports no changes to self breast exam which she performs monthly and she should notify us of any changes including new lumps,pain, nipple discharge or skin changes. She would like to continue with the high risk screening program alternating with mammography and MRI every 6 months. Genetic testing has been negative. We will order her repeat mammogram bilaterally in June which coincides with her yearly mammograms. She is to continue with clinical breast exams once yearly after MRI. She will call prior to her yearly follow-up if she has any questions or concerns.      HPI:  .Maki Middleton is a 54 y.o. female who is referred by Michael Echavarria NP for high risk breast screening.  She has been previously seen by Dr. Sher last in July 2022 for high risk screening program.  She has Tyrer-Cuzick of 31%.  She does have  a mother with breast cancer age 50 maternal aunt and paternal aunt with breast cancer.  Patient underwent menopause in May 2019.  She has never been on hormone replacement therapy.  She performs breast exams monthly and reports no changes at all to her  breast today. She wants to continue with high risk screening.        Allergies:  Allergies   Allergen Reactions   • Ciprofloxacin Diarrhea   • Clindamycin Other (See Comments)   • Milk (Cow) Diarrhea   • Diclofenac Sodium Rash     Patient states only allergic to topical form   • Sulfa Antibiotics Rash       Medication Review:  Current Outpatient Medications on File Prior to Visit   Medication Sig Dispense Refill   • diphenhydrAMINE (BENADRYL) 50 MG/ML injection Infuse 0.5 mLs

## 2025-02-13 ENCOUNTER — OFFICE VISIT (OUTPATIENT)
Age: 55
End: 2025-02-13
Payer: OTHER GOVERNMENT

## 2025-02-13 VITALS
TEMPERATURE: 97.1 F | OXYGEN SATURATION: 97 % | RESPIRATION RATE: 14 BRPM | WEIGHT: 193.8 LBS | BODY MASS INDEX: 35.66 KG/M2 | HEART RATE: 78 BPM | HEIGHT: 62 IN | SYSTOLIC BLOOD PRESSURE: 133 MMHG | DIASTOLIC BLOOD PRESSURE: 87 MMHG

## 2025-02-13 DIAGNOSIS — J45.30 MILD PERSISTENT EXTRINSIC ASTHMA WITHOUT COMPLICATION: Primary | ICD-10-CM

## 2025-02-13 PROCEDURE — 99214 OFFICE O/P EST MOD 30 MIN: CPT | Performed by: HOSPITALIST

## 2025-02-13 RX ORDER — BUDESONIDE AND FORMOTEROL FUMARATE DIHYDRATE 160; 4.5 UG/1; UG/1
2 AEROSOL RESPIRATORY (INHALATION) 2 TIMES DAILY
Qty: 10.2 G | Refills: 3 | Status: SHIPPED | OUTPATIENT
Start: 2025-02-13

## 2025-02-13 ASSESSMENT — PATIENT HEALTH QUESTIONNAIRE - PHQ9
SUM OF ALL RESPONSES TO PHQ QUESTIONS 1-9: 0
2. FEELING DOWN, DEPRESSED OR HOPELESS: NOT AT ALL
SUM OF ALL RESPONSES TO PHQ QUESTIONS 1-9: 0
SUM OF ALL RESPONSES TO PHQ QUESTIONS 1-9: 0
1. LITTLE INTEREST OR PLEASURE IN DOING THINGS: NOT AT ALL
SUM OF ALL RESPONSES TO PHQ QUESTIONS 1-9: 0
SUM OF ALL RESPONSES TO PHQ9 QUESTIONS 1 & 2: 0

## 2025-02-13 NOTE — PROGRESS NOTES
Makiitz Middleton presents today for   Chief Complaint   Patient presents with    Shortness of Breath       Is someone accompanying this pt? no    Is the patient using any DME equipment during OV? no    -DME Company no    Depression Screenin/13/2025    10:43 AM   PHQ-9 Questionaire   Little interest or pleasure in doing things 0   Feeling down, depressed, or hopeless 0   PHQ-9 Total Score 0       Learning Assessment:    Failed to redirect to the Timeline version of the [employer] SmartLink.    Abuse Screening:         No data to display                Fall Risk    Failed to redirect to the Timeline version of the [employer] SmartLink.    Coordination of Care:    1. Have you been to the ER, urgent care clinic since your last visit? Hospitalized since your last visit? no    2. Have you seen or consulted any other health care providers outside of the [facility] since your last visit? Include any pap smears or colon screening. no    Medication list has been update per patient.  
was met and/or exceeded --- 31min (total aggregate time)    This patient has a moderate complexity chronic care condition   This Visit needed moderate complexity medically necessary decision making and management plans.    Time spent in preparing for the visit-review of history, tests done prior to arrival, additional time reviewing clinical data, imaging, outside records and test results as well as time spent in ordering tests, treatments and referring patient for further care was a total of 15 minutes.  Additional time-counseling with patient and family members regarding care plan 16 minutes.

## 2025-02-17 DIAGNOSIS — J30.2 SEASONAL ALLERGIC RHINITIS, UNSPECIFIED TRIGGER: ICD-10-CM

## 2025-02-17 RX ORDER — MONTELUKAST SODIUM 10 MG/1
10 TABLET ORAL NIGHTLY
Qty: 90 TABLET | Refills: 1 | Status: SHIPPED | OUTPATIENT
Start: 2025-02-17

## 2025-03-04 ENCOUNTER — TELEPHONE (OUTPATIENT)
Facility: CLINIC | Age: 55
End: 2025-03-04

## 2025-03-25 ENCOUNTER — TELEPHONE (OUTPATIENT)
Facility: CLINIC | Age: 55
End: 2025-03-25

## 2025-03-25 NOTE — TELEPHONE ENCOUNTER
Patient called to inquire about getting labs ordered before her first visit with the provider. Informed patient that she can request this with the provider after her first visit.

## 2025-04-27 SDOH — HEALTH STABILITY: PHYSICAL HEALTH: ON AVERAGE, HOW MANY DAYS PER WEEK DO YOU ENGAGE IN MODERATE TO STRENUOUS EXERCISE (LIKE A BRISK WALK)?: 6 DAYS

## 2025-04-27 SDOH — HEALTH STABILITY: PHYSICAL HEALTH: ON AVERAGE, HOW MANY MINUTES DO YOU ENGAGE IN EXERCISE AT THIS LEVEL?: 30 MIN

## 2025-04-30 ENCOUNTER — OFFICE VISIT (OUTPATIENT)
Facility: CLINIC | Age: 55
End: 2025-04-30
Payer: COMMERCIAL

## 2025-04-30 VITALS
WEIGHT: 184 LBS | HEIGHT: 62 IN | DIASTOLIC BLOOD PRESSURE: 79 MMHG | BODY MASS INDEX: 33.86 KG/M2 | OXYGEN SATURATION: 95 % | HEART RATE: 87 BPM | SYSTOLIC BLOOD PRESSURE: 110 MMHG

## 2025-04-30 DIAGNOSIS — E66.811 CLASS 1 OBESITY WITH SERIOUS COMORBIDITY AND BODY MASS INDEX (BMI) OF 33.0 TO 33.9 IN ADULT, UNSPECIFIED OBESITY TYPE: ICD-10-CM

## 2025-04-30 DIAGNOSIS — M25.541 ARTHRALGIA OF BOTH HANDS: ICD-10-CM

## 2025-04-30 DIAGNOSIS — Z00.00 ENCOUNTER FOR MEDICAL EXAMINATION TO ESTABLISH CARE: ICD-10-CM

## 2025-04-30 DIAGNOSIS — J45.30 MILD PERSISTENT ASTHMA WITHOUT COMPLICATION: ICD-10-CM

## 2025-04-30 DIAGNOSIS — Z13.29 SCREENING FOR ENDOCRINE DISORDER: ICD-10-CM

## 2025-04-30 DIAGNOSIS — M25.542 ARTHRALGIA OF BOTH HANDS: ICD-10-CM

## 2025-04-30 DIAGNOSIS — R25.2 MUSCLE CRAMP: ICD-10-CM

## 2025-04-30 DIAGNOSIS — E78.2 MIXED HYPERLIPIDEMIA: Primary | ICD-10-CM

## 2025-04-30 DIAGNOSIS — J30.2 SEASONAL ALLERGIC RHINITIS, UNSPECIFIED TRIGGER: ICD-10-CM

## 2025-04-30 DIAGNOSIS — E53.8 VITAMIN B12 DEFICIENCY: ICD-10-CM

## 2025-04-30 DIAGNOSIS — E55.9 VITAMIN D DEFICIENCY: ICD-10-CM

## 2025-04-30 PROCEDURE — 99214 OFFICE O/P EST MOD 30 MIN: CPT | Performed by: STUDENT IN AN ORGANIZED HEALTH CARE EDUCATION/TRAINING PROGRAM

## 2025-04-30 SDOH — ECONOMIC STABILITY: FOOD INSECURITY: WITHIN THE PAST 12 MONTHS, THE FOOD YOU BOUGHT JUST DIDN'T LAST AND YOU DIDN'T HAVE MONEY TO GET MORE.: NEVER TRUE

## 2025-04-30 SDOH — ECONOMIC STABILITY: FOOD INSECURITY: WITHIN THE PAST 12 MONTHS, YOU WORRIED THAT YOUR FOOD WOULD RUN OUT BEFORE YOU GOT MONEY TO BUY MORE.: NEVER TRUE

## 2025-04-30 ASSESSMENT — PATIENT HEALTH QUESTIONNAIRE - PHQ9
SUM OF ALL RESPONSES TO PHQ QUESTIONS 1-9: 0
SUM OF ALL RESPONSES TO PHQ QUESTIONS 1-9: 0
2. FEELING DOWN, DEPRESSED OR HOPELESS: NOT AT ALL
SUM OF ALL RESPONSES TO PHQ QUESTIONS 1-9: 0
1. LITTLE INTEREST OR PLEASURE IN DOING THINGS: NOT AT ALL
SUM OF ALL RESPONSES TO PHQ QUESTIONS 1-9: 0

## 2025-04-30 ASSESSMENT — ENCOUNTER SYMPTOMS
RHINORRHEA: 0
ABDOMINAL PAIN: 0
DIARRHEA: 0
SHORTNESS OF BREATH: 1
BACK PAIN: 0
VOMITING: 0
CHEST TIGHTNESS: 0
BLOOD IN STOOL: 0
COUGH: 0
NAUSEA: 0
WHEEZING: 0

## 2025-04-30 NOTE — PROGRESS NOTES
Negative for chest pain and leg swelling.   Gastrointestinal:  Negative for abdominal pain, blood in stool, diarrhea, nausea and vomiting.   Endocrine: Negative for polydipsia and polyphagia.   Genitourinary:  Negative for decreased urine volume, dysuria, frequency and pelvic pain.   Musculoskeletal:  Positive for arthralgias. Negative for back pain, myalgias and neck pain.   Neurological:  Negative for dizziness, tremors, seizures, syncope, speech difficulty, weakness, numbness and headaches.   Psychiatric/Behavioral:  Negative for agitation and confusion. The patient is not nervous/anxious.    All other systems reviewed and are negative.        Allergies   Allergen Reactions    Ciprofloxacin Diarrhea    Clindamycin Other (See Comments)    Milk (Cow) Diarrhea    Diclofenac Sodium Rash     Patient states only allergic to topical form    Sulfa Antibiotics Rash       PHQ Screening       4/30/2025     2:40 PM 2/13/2025    10:43 AM 11/4/2024    10:06 AM 4/29/2024     9:55 AM 8/1/2023     2:20 PM 3/20/2023     1:12 PM 5/25/2022     7:00 AM   PHQ-9 Questionaire   Little interest or pleasure in doing things 0 0 0 0 0 0 0   Feeling down, depressed, or hopeless 0 0 0 0 0 0 0   PHQ-9 Total Score 0 0 0 0 0 0 0          No data to display                  History  Past Medical History:   Diagnosis Date    Allergic rhinitis     Asthma 2024    Dysphagia     in physical therapy    GERD (gastroesophageal reflux disease)     Lower  GERD    IBS (irritable bowel syndrome)     Liver disease 2024    Lymphadenopathy     Nausea & vomiting     Osteoarthritis 2015    Other ill-defined conditions(799.89)     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

## 2025-06-11 ENCOUNTER — HOSPITAL ENCOUNTER (OUTPATIENT)
Facility: HOSPITAL | Age: 55
Setting detail: SPECIMEN
Discharge: HOME | End: 2025-06-14
Payer: COMMERCIAL

## 2025-06-11 DIAGNOSIS — M25.542 ARTHRALGIA OF BOTH HANDS: ICD-10-CM

## 2025-06-11 DIAGNOSIS — M25.541 ARTHRALGIA OF BOTH HANDS: ICD-10-CM

## 2025-06-11 DIAGNOSIS — Z13.29 SCREENING FOR ENDOCRINE DISORDER: ICD-10-CM

## 2025-06-11 DIAGNOSIS — E53.8 VITAMIN B12 DEFICIENCY: ICD-10-CM

## 2025-06-11 DIAGNOSIS — E78.2 MIXED HYPERLIPIDEMIA: ICD-10-CM

## 2025-06-11 DIAGNOSIS — R25.2 MUSCLE CRAMP: ICD-10-CM

## 2025-06-11 DIAGNOSIS — E55.9 VITAMIN D DEFICIENCY: ICD-10-CM

## 2025-06-11 LAB
25(OH)D3 SERPL-MCNC: 67.8 NG/ML (ref 30–100)
ALBUMIN SERPL-MCNC: 4 G/DL (ref 3.4–5)
ALBUMIN/GLOB SERPL: 1.4 (ref 0.8–1.7)
ALP SERPL-CCNC: 109 U/L (ref 45–117)
ALT SERPL-CCNC: 35 U/L (ref 10–35)
ANION GAP SERPL CALC-SCNC: 13 MMOL/L (ref 3–18)
AST SERPL-CCNC: 27 U/L (ref 10–38)
BASOPHILS # BLD: 0.04 K/UL (ref 0–0.1)
BASOPHILS NFR BLD: 0.7 % (ref 0–2)
BILIRUB SERPL-MCNC: 0.2 MG/DL (ref 0.2–1)
BUN SERPL-MCNC: 13 MG/DL (ref 6–23)
BUN/CREAT SERPL: 16 (ref 12–20)
CALCIUM SERPL-MCNC: 9.9 MG/DL (ref 8.5–10.1)
CHLORIDE SERPL-SCNC: 108 MMOL/L (ref 98–107)
CHOLEST SERPL-MCNC: 267 MG/DL
CO2 SERPL-SCNC: 24 MMOL/L (ref 21–32)
CREAT SERPL-MCNC: 0.81 MG/DL (ref 0.6–1.3)
DIFFERENTIAL METHOD BLD: NORMAL
EOSINOPHIL # BLD: 0.08 K/UL (ref 0–0.4)
EOSINOPHIL NFR BLD: 1.4 % (ref 0–5)
ERYTHROCYTE [DISTWIDTH] IN BLOOD BY AUTOMATED COUNT: 13.5 % (ref 11.6–14.5)
ERYTHROCYTE [SEDIMENTATION RATE] IN BLOOD: 25 MM/HR (ref 0–30)
EST. AVERAGE GLUCOSE BLD GHB EST-MCNC: 105 MG/DL
FOLATE SERPL-MCNC: 17.6 NG/ML (ref 4.6–34.8)
GLOBULIN SER CALC-MCNC: 2.8 G/DL (ref 2–4)
GLUCOSE SERPL-MCNC: 88 MG/DL (ref 74–108)
HBA1C MFR BLD: 5.3 % (ref 4.2–5.6)
HCT VFR BLD AUTO: 42.8 % (ref 35–45)
HDLC SERPL-MCNC: 43 MG/DL (ref 40–60)
HDLC SERPL: 6.3 (ref 0–5)
HGB BLD-MCNC: 13.6 G/DL (ref 12–16)
IMM GRANULOCYTES # BLD AUTO: 0.01 K/UL (ref 0–0.04)
IMM GRANULOCYTES NFR BLD AUTO: 0.2 % (ref 0–0.5)
LDLC SERPL CALC-MCNC: 202 MG/DL (ref 0–100)
LYMPHOCYTES # BLD: 1.54 K/UL (ref 0.9–3.6)
LYMPHOCYTES NFR BLD: 27.5 % (ref 21–52)
MAGNESIUM SERPL-MCNC: 2.3 MG/DL (ref 1.6–2.6)
MCH RBC QN AUTO: 30.8 PG (ref 24–34)
MCHC RBC AUTO-ENTMCNC: 31.8 G/DL (ref 31–37)
MCV RBC AUTO: 97.1 FL (ref 78–100)
MONOCYTES # BLD: 0.39 K/UL (ref 0.05–1.2)
MONOCYTES NFR BLD: 7 % (ref 3–10)
NEUTS SEG # BLD: 3.54 K/UL (ref 1.8–8)
NEUTS SEG NFR BLD: 63.2 % (ref 40–73)
NRBC # BLD: 0 K/UL (ref 0–0.01)
NRBC BLD-RTO: 0 PER 100 WBC
PLATELET # BLD AUTO: 318 K/UL (ref 135–420)
PMV BLD AUTO: 10.4 FL (ref 9.2–11.8)
POTASSIUM SERPL-SCNC: 4.5 MMOL/L (ref 3.5–5.5)
PROT SERPL-MCNC: 6.8 G/DL (ref 6.4–8.2)
RBC # BLD AUTO: 4.41 M/UL (ref 4.2–5.3)
SODIUM SERPL-SCNC: 144 MMOL/L (ref 136–145)
TRIGL SERPL-MCNC: 112 MG/DL (ref 0–150)
TSH, 3RD GENERATION: 4.19 UIU/ML (ref 0.27–4.2)
VIT B12 SERPL-MCNC: 792 PG/ML (ref 211–911)
VLDLC SERPL CALC-MCNC: 22 MG/DL
WBC # BLD AUTO: 5.6 K/UL (ref 4.6–13.2)

## 2025-06-11 PROCEDURE — 85025 COMPLETE CBC W/AUTO DIFF WBC: CPT

## 2025-06-11 PROCEDURE — 84443 ASSAY THYROID STIM HORMONE: CPT

## 2025-06-11 PROCEDURE — 82306 VITAMIN D 25 HYDROXY: CPT

## 2025-06-11 PROCEDURE — 82607 VITAMIN B-12: CPT

## 2025-06-11 PROCEDURE — 82746 ASSAY OF FOLIC ACID SERUM: CPT

## 2025-06-11 PROCEDURE — 83036 HEMOGLOBIN GLYCOSYLATED A1C: CPT

## 2025-06-11 PROCEDURE — 36415 COLL VENOUS BLD VENIPUNCTURE: CPT

## 2025-06-11 PROCEDURE — 80061 LIPID PANEL: CPT

## 2025-06-11 PROCEDURE — 83735 ASSAY OF MAGNESIUM: CPT

## 2025-06-11 PROCEDURE — 80053 COMPREHEN METABOLIC PANEL: CPT

## 2025-06-11 PROCEDURE — 85652 RBC SED RATE AUTOMATED: CPT

## 2025-06-16 ENCOUNTER — HOSPITAL ENCOUNTER (OUTPATIENT)
Facility: HOSPITAL | Age: 55
Discharge: HOME | End: 2025-06-19
Payer: COMMERCIAL

## 2025-06-16 VITALS — HEIGHT: 62 IN | WEIGHT: 178 LBS | BODY MASS INDEX: 32.76 KG/M2

## 2025-06-16 DIAGNOSIS — Z91.89 AT HIGH RISK FOR BREAST CANCER: ICD-10-CM

## 2025-06-16 PROCEDURE — 77063 BREAST TOMOSYNTHESIS BI: CPT

## 2025-06-18 ENCOUNTER — OFFICE VISIT (OUTPATIENT)
Facility: CLINIC | Age: 55
End: 2025-06-18
Payer: COMMERCIAL

## 2025-06-18 VITALS
DIASTOLIC BLOOD PRESSURE: 78 MMHG | WEIGHT: 182.8 LBS | OXYGEN SATURATION: 95 % | HEIGHT: 62 IN | HEART RATE: 73 BPM | BODY MASS INDEX: 33.64 KG/M2 | SYSTOLIC BLOOD PRESSURE: 113 MMHG

## 2025-06-18 DIAGNOSIS — E78.2 MIXED HYPERLIPIDEMIA: Primary | ICD-10-CM

## 2025-06-18 DIAGNOSIS — E53.8 VITAMIN B12 DEFICIENCY: ICD-10-CM

## 2025-06-18 DIAGNOSIS — E55.9 VITAMIN D DEFICIENCY: ICD-10-CM

## 2025-06-18 DIAGNOSIS — G47.9 SLEEP DISTURBANCE: ICD-10-CM

## 2025-06-18 DIAGNOSIS — J45.30 MILD PERSISTENT ASTHMA WITHOUT COMPLICATION: ICD-10-CM

## 2025-06-18 DIAGNOSIS — M25.541 ARTHRALGIA OF BOTH HANDS: ICD-10-CM

## 2025-06-18 DIAGNOSIS — M25.542 ARTHRALGIA OF BOTH HANDS: ICD-10-CM

## 2025-06-18 PROCEDURE — 99214 OFFICE O/P EST MOD 30 MIN: CPT | Performed by: STUDENT IN AN ORGANIZED HEALTH CARE EDUCATION/TRAINING PROGRAM

## 2025-06-18 RX ORDER — BUDESONIDE AND FORMOTEROL FUMARATE DIHYDRATE 160; 4.5 UG/1; UG/1
2 AEROSOL RESPIRATORY (INHALATION) 2 TIMES DAILY
Qty: 10.2 G | Refills: 3 | Status: SHIPPED | OUTPATIENT
Start: 2025-06-18

## 2025-06-18 RX ORDER — ATORVASTATIN CALCIUM 40 MG/1
40 TABLET, FILM COATED ORAL DAILY
Qty: 90 TABLET | Refills: 1 | Status: SHIPPED | OUTPATIENT
Start: 2025-06-18

## 2025-06-18 SDOH — ECONOMIC STABILITY: FOOD INSECURITY: WITHIN THE PAST 12 MONTHS, THE FOOD YOU BOUGHT JUST DIDN'T LAST AND YOU DIDN'T HAVE MONEY TO GET MORE.: NEVER TRUE

## 2025-06-18 SDOH — ECONOMIC STABILITY: FOOD INSECURITY: WITHIN THE PAST 12 MONTHS, YOU WORRIED THAT YOUR FOOD WOULD RUN OUT BEFORE YOU GOT MONEY TO BUY MORE.: NEVER TRUE

## 2025-06-18 ASSESSMENT — ENCOUNTER SYMPTOMS
CHEST TIGHTNESS: 0
NAUSEA: 0
BLOOD IN STOOL: 0
BACK PAIN: 0
DIARRHEA: 0
ABDOMINAL PAIN: 0
COUGH: 0
WHEEZING: 0
RHINORRHEA: 0
VOMITING: 0
SHORTNESS OF BREATH: 1

## 2025-06-18 ASSESSMENT — PATIENT HEALTH QUESTIONNAIRE - PHQ9
SUM OF ALL RESPONSES TO PHQ QUESTIONS 1-9: 0
2. FEELING DOWN, DEPRESSED OR HOPELESS: NOT AT ALL
1. LITTLE INTEREST OR PLEASURE IN DOING THINGS: NOT AT ALL
SUM OF ALL RESPONSES TO PHQ QUESTIONS 1-9: 0

## 2025-06-18 NOTE — PROGRESS NOTES
Respiratory:  Positive for shortness of breath. Negative for cough, chest tightness and wheezing.    Cardiovascular:  Negative for chest pain and leg swelling.   Gastrointestinal:  Negative for abdominal pain, blood in stool, diarrhea, nausea and vomiting.   Endocrine: Negative for polydipsia and polyphagia.   Genitourinary:  Negative for decreased urine volume, dysuria, frequency and pelvic pain.   Musculoskeletal:  Positive for arthralgias. Negative for back pain, myalgias and neck pain.   Neurological:  Negative for dizziness, tremors, seizures, syncope, speech difficulty, weakness, numbness and headaches.   Psychiatric/Behavioral:  Negative for agitation and confusion. The patient is not nervous/anxious.    All other systems reviewed and are negative.        Allergies   Allergen Reactions    Ciprofloxacin Diarrhea    Clindamycin Other (See Comments)    Milk (Cow) Diarrhea    Diclofenac Sodium Rash     Patient states only allergic to topical form    Sulfa Antibiotics Rash       PHQ Screening       6/18/2025     3:38 PM 4/30/2025     2:40 PM 2/13/2025    10:43 AM 11/4/2024    10:06 AM 4/29/2024     9:55 AM 8/1/2023     2:20 PM 3/20/2023     1:12 PM   PHQ-9 Questionaire   Little interest or pleasure in doing things 0 0 0 0 0 0 0   Feeling down, depressed, or hopeless 0 0 0 0 0 0 0   PHQ-9 Total Score 0 0 0 0 0 0 0          No data to display                  History  Past Medical History:   Diagnosis Date    Allergic rhinitis     Asthma 2024    Dysphagia     in physical therapy    GERD (gastroesophageal reflux disease)     Lower  GERD    IBS (irritable bowel syndrome)     Liver disease 2024    Lymphadenopathy     Nausea & vomiting     Osteoarthritis 2015    Other ill-defined conditions(799.89)     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

## 2025-06-26 ASSESSMENT — SLEEP AND FATIGUE QUESTIONNAIRES
HOW LIKELY ARE YOU TO NOD OFF OR FALL ASLEEP WHILE SITTING AND TALKING TO SOMEONE: WOULD NEVER DOZE
HOW LIKELY ARE YOU TO NOD OFF OR FALL ASLEEP WHILE LYING DOWN TO REST IN THE AFTERNOON WHEN CIRCUMSTANCES PERMIT: SLIGHT CHANCE OF DOZING
HOW LIKELY ARE YOU TO NOD OFF OR FALL ASLEEP WHILE SITTING AND READING: SLIGHT CHANCE OF DOZING
HOW LIKELY ARE YOU TO NOD OFF OR FALL ASLEEP WHILE SITTING AND READING: SLIGHT CHANCE OF DOZING
HOW LIKELY ARE YOU TO NOD OFF OR FALL ASLEEP IN A CAR, WHILE STOPPED FOR A FEW MINUTES IN TRAFFIC: WOULD NEVER DOZE
HOW LIKELY ARE YOU TO NOD OFF OR FALL ASLEEP WHILE SITTING INACTIVE IN A PUBLIC PLACE: WOULD NEVER DOZE
HOW LIKELY ARE YOU TO NOD OFF OR FALL ASLEEP WHILE WATCHING TV: WOULD NEVER DOZE
HOW LIKELY ARE YOU TO NOD OFF OR FALL ASLEEP WHILE SITTING INACTIVE IN A PUBLIC PLACE: WOULD NEVER DOZE
HOW LIKELY ARE YOU TO NOD OFF OR FALL ASLEEP WHEN YOU ARE A PASSENGER IN A CAR FOR AN HOUR WITHOUT A BREAK: SLIGHT CHANCE OF DOZING
HOW LIKELY ARE YOU TO NOD OFF OR FALL ASLEEP WHILE LYING DOWN TO REST IN THE AFTERNOON WHEN CIRCUMSTANCES PERMIT: SLIGHT CHANCE OF DOZING
HOW LIKELY ARE YOU TO NOD OFF OR FALL ASLEEP WHILE SITTING QUIETLY AFTER LUNCH WITHOUT ALCOHOL: WOULD NEVER DOZE
ESS TOTAL SCORE: 3
HOW LIKELY ARE YOU TO NOD OFF OR FALL ASLEEP WHILE SITTING QUIETLY AFTER LUNCH WITHOUT ALCOHOL: WOULD NEVER DOZE
HOW LIKELY ARE YOU TO NOD OFF OR FALL ASLEEP WHILE WATCHING TV: WOULD NEVER DOZE
HOW LIKELY ARE YOU TO NOD OFF OR FALL ASLEEP IN A CAR, WHILE STOPPED FOR A FEW MINUTES IN TRAFFIC: WOULD NEVER DOZE
HOW LIKELY ARE YOU TO NOD OFF OR FALL ASLEEP WHEN YOU ARE A PASSENGER IN A CAR FOR AN HOUR WITHOUT A BREAK: SLIGHT CHANCE OF DOZING
HOW LIKELY ARE YOU TO NOD OFF OR FALL ASLEEP WHILE SITTING AND TALKING TO SOMEONE: WOULD NEVER DOZE

## 2025-06-27 ENCOUNTER — OFFICE VISIT (OUTPATIENT)
Age: 55
End: 2025-06-27
Payer: COMMERCIAL

## 2025-06-27 VITALS
RESPIRATION RATE: 18 BRPM | DIASTOLIC BLOOD PRESSURE: 81 MMHG | TEMPERATURE: 97.4 F | SYSTOLIC BLOOD PRESSURE: 129 MMHG | BODY MASS INDEX: 34.04 KG/M2 | OXYGEN SATURATION: 97 % | HEART RATE: 83 BPM | HEIGHT: 62 IN | WEIGHT: 185 LBS

## 2025-06-27 DIAGNOSIS — R25.2 LEG CRAMPS: ICD-10-CM

## 2025-06-27 DIAGNOSIS — Z72.821 INADEQUATE SLEEP HYGIENE: ICD-10-CM

## 2025-06-27 DIAGNOSIS — R06.83 SNORING: Primary | ICD-10-CM

## 2025-06-27 DIAGNOSIS — E03.9 HYPOTHYROIDISM, UNSPECIFIED TYPE: ICD-10-CM

## 2025-06-27 DIAGNOSIS — G47.9 SLEEP DISTURBANCE: ICD-10-CM

## 2025-06-27 DIAGNOSIS — G47.63 BRUXISM, SLEEP-RELATED: ICD-10-CM

## 2025-06-27 DIAGNOSIS — G47.21 SLEEP-WAKE SCHEDULE DISORDER, DELAYED PHASE TYPE: ICD-10-CM

## 2025-06-27 DIAGNOSIS — E55.9 VITAMIN D DEFICIENCY: ICD-10-CM

## 2025-06-27 PROCEDURE — 99205 OFFICE O/P NEW HI 60 MIN: CPT | Performed by: OTOLARYNGOLOGY

## 2025-06-27 RX ORDER — DIPHENHYDRAMINE HCL 25 MG
50 CAPSULE ORAL EVERY 6 HOURS PRN
COMMUNITY

## 2025-06-27 ASSESSMENT — PATIENT HEALTH QUESTIONNAIRE - PHQ9
2. FEELING DOWN, DEPRESSED OR HOPELESS: NOT AT ALL
SUM OF ALL RESPONSES TO PHQ QUESTIONS 1-9: 0
SUM OF ALL RESPONSES TO PHQ QUESTIONS 1-9: 0
1. LITTLE INTEREST OR PLEASURE IN DOING THINGS: NOT AT ALL
SUM OF ALL RESPONSES TO PHQ QUESTIONS 1-9: 0
SUM OF ALL RESPONSES TO PHQ QUESTIONS 1-9: 0

## 2025-06-27 NOTE — PROGRESS NOTES
Maki Middleton presents today for   Chief Complaint   Patient presents with    Snoring    Sleep Problem       Is someone accompanying this pt? no    Is the patient using any DME equipment during OV? no    -DME Company: N/A    Have you ever had a sleep study done before? yes    Depression Screenin/27/2025     2:25 PM   PHQ-9    Little interest or pleasure in doing things 0   Feeling down, depressed, or hopeless 0   PHQ-2 Score 0   PHQ-9 Total Score 0        [place] Sleepiness Scale:      2025     5:16 PM   Sleep Medicine   Sitting and reading 1   Watching TV 0   Sitting, inactive in a public place (e.g. a theatre or a meeting) 0   As a passenger in a car for an hour without a break 1   Lying down to rest in the afternoon when circumstances permit 1   Sitting and talking to someone 0   Sitting quietly after a lunch without alcohol 0   In a car, while stopped for a few minutes in traffic 0   [place] Sleepiness Score 3    Neck (Inches) 12.5       Patient-reported       Stop-Ban/27/2025     2:00 PM   STOP-BANG QUESTIONNAIRE   Are you a loud and/or regular snorer? 1   Do you often feel tired or groggy upon awakening or do you awaken with a headache? 0   Have you been observed to gasp or stop breathing during sleep? 0   Are you often tired or fatigued during wake time hours?  0   Do you fall asleep sitting, reading, watching TV or driving? 0   Do you often have problems with memory or concentration? 0   Do you have or are you being treated for high blood pressure? 0   Recent BMI (Calculated) 33.5   Is BMI greater than 35 kg/m2? 0=No   Age older than 50 years old? 1=Yes   Is your neck circumference greater than 17 inches (Male) or 16 inches (Female)? 0   Gender - Male 0=No   STOP-Bang Total Score 35.5         Coordination of Care:  1. Have you been to the ER, urgent care clinic since your last visit? Hospitalized since your last visit? no    2. Have you seen or consulted any other health care 
5.3 4.2 - 5.6 % Final     Comment:     Reference Range  Normal       <5.7%  Prediabetes  5.7-6.4%  Diabetes     >6.4%          Lab Results   Component Value Date/Time     06/11/2025 07:10 AM    K 4.5 06/11/2025 07:10 AM     06/11/2025 07:10 AM    CO2 24 06/11/2025 07:10 AM    BUN 13 06/11/2025 07:10 AM    CREATININE 0.81 06/11/2025 07:10 AM    GLUCOSE 88 06/11/2025 07:10 AM    CALCIUM 9.9 06/11/2025 07:10 AM    LABGLOM 85 06/11/2025 07:10 AM    LABGLOM >90 04/23/2024 10:08 AM         Lab Results   Component Value Date/Time    VITD25 67.8 06/11/2025 07:10 AM       Lab Results   Component Value Date    TSH 4.190 06/11/2025     Historical Sleep Testing Data:   Home sleep apnea test 2/10/25  RIDDHI/AHI 4.3  O2 alexx 91%   min    This note was dictated utilizing voice recognition software which may lead to typographical errors.  I apologize in advance if the situation occurs.  If questions arise please do not hesitate to contact me or call our department.    Total time spent on this encounter, including previsit review of of separately obtained history, face to face interaction, performing medically appropriate physical exam, patient/counseling, ordering tests, care coordination and documentation was  60  minutes.  I spent 30 to 40 minutes with the patient, at least 20 minutes reviewing her records and reviewing medications and when they were added and subtracted and I spent at least another 10 minutes writing a comprehensive note.    Electronically signed by Emily Diego DO on6/27/2025 at 3:35 PM

## 2025-06-27 NOTE — ASSESSMENT & PLAN NOTE
Instead of calling her issue \"insomnia\" and rather labeled as sleep disturbance.  I reminded her that it is normal for people to wake up throughout the night but if you start checking the clock in looking at your Samantha it will be very difficult to fall back asleep.    Also, taking Benadryl long-term may help people fall asleep but it does not improve sleep quality.  It is not meant for long-term use as discussed today.

## 2025-06-27 NOTE — PATIENT INSTRUCTIONS
Please make a follow up appointment to discuss the results of your sleep study or I will send you a \"my chart\" message with your results and treatment options.     The [facility] is located in the [email or web address], adjacent to [facility]. The lab is on the second floor. The direct number to call for sleep study related questions is: [phone].    Please call our clinic back at [phone] or send a message on [employer] if you have any questions or concerns or if you are experiencing any of the following:     You have not received a follow up appointment within 30 days prior the recommended follow up time.    If you are not tolerating treatment plan and/or not able to obtain equipment or prescribed medication(s).  if you are experiencing any difficulties with the Durable Medical Equipment  (DME) Company you may be using or is assigned to you.  Two weeks have passed and you have not received an appointment for a scheduled procedure.  Two weeks have passed since you underwent a test and/or procedure and you have not received your results.  Your  Durable Medical Equipment (DME ) company is supposed to provide you with replacement filters, tubing and masks. You can either call your DME when you need new supplies or you can arrange for an automatic shipment schedule.    Your need to be seen by our office at lat minimum of every 12 months in order to renew the prescription for these supplies.   Please make note of who your DME company is and their phone number.   Please make sure that you clean your mask and hosing on a regular basis.  Your DME can provide you with additional information regarding proper care and cleaning of your device     Safety is strongly encouraged.  You should not drive if sleepy, tired, distracted and/or fatigued.      Chest Xrays and blood work do not require appointments.  They are considered \"Walk-In\" services and can be obtained at either [facility] or [facility]

## 2025-06-27 NOTE — ASSESSMENT & PLAN NOTE
This is likely part of the issue as we discussed today.  Again there are several factors adding up to the complaint of \"insomnia\".  She really does not have difficulty falling asleep, may have some bad habits surrounding the way she sleeps (reading her Samantha, etc.) but her complaint of waking up frequently throughout the night is likely multifactorial in nature.  I do think that when she retires in 3 years she will be able to fall asleep much easier because she can stay up later.  At least that will be helpful.

## 2025-07-02 ENCOUNTER — HOSPITAL ENCOUNTER (OUTPATIENT)
Facility: HOSPITAL | Age: 55
Setting detail: SPECIMEN
Discharge: HOME | End: 2025-07-05
Payer: COMMERCIAL

## 2025-07-02 DIAGNOSIS — J30.2 SEASONAL ALLERGIC RHINITIS, UNSPECIFIED TRIGGER: ICD-10-CM

## 2025-07-02 DIAGNOSIS — E78.2 MIXED HYPERLIPIDEMIA: ICD-10-CM

## 2025-07-02 LAB
ALBUMIN SERPL-MCNC: 3.9 G/DL (ref 3.4–5)
ALBUMIN/GLOB SERPL: 1.4 (ref 0.8–1.7)
ALP SERPL-CCNC: 109 U/L (ref 45–117)
ALT SERPL-CCNC: 35 U/L (ref 10–35)
ANION GAP SERPL CALC-SCNC: 11 MMOL/L (ref 3–18)
AST SERPL-CCNC: 27 U/L (ref 10–38)
BILIRUB SERPL-MCNC: 0.4 MG/DL (ref 0.2–1)
BUN SERPL-MCNC: 13 MG/DL (ref 6–23)
BUN/CREAT SERPL: 13 (ref 12–20)
CALCIUM SERPL-MCNC: 10.3 MG/DL (ref 8.5–10.1)
CHLORIDE SERPL-SCNC: 106 MMOL/L (ref 98–107)
CHOLEST SERPL-MCNC: 158 MG/DL
CO2 SERPL-SCNC: 28 MMOL/L (ref 21–32)
CREAT SERPL-MCNC: 0.97 MG/DL (ref 0.6–1.3)
GLOBULIN SER CALC-MCNC: 2.8 G/DL (ref 2–4)
GLUCOSE SERPL-MCNC: 95 MG/DL (ref 74–108)
HDLC SERPL-MCNC: 48 MG/DL (ref 40–60)
HDLC SERPL: 3.3 (ref 0–5)
LDLC SERPL CALC-MCNC: 96 MG/DL (ref 0–100)
POTASSIUM SERPL-SCNC: 4.4 MMOL/L (ref 3.5–5.5)
PROT SERPL-MCNC: 6.7 G/DL (ref 6.4–8.2)
SODIUM SERPL-SCNC: 144 MMOL/L (ref 136–145)
TRIGL SERPL-MCNC: 72 MG/DL (ref 0–150)
VLDLC SERPL CALC-MCNC: 14 MG/DL

## 2025-07-02 PROCEDURE — 36415 COLL VENOUS BLD VENIPUNCTURE: CPT

## 2025-07-02 PROCEDURE — 80061 LIPID PANEL: CPT

## 2025-07-02 PROCEDURE — 80053 COMPREHEN METABOLIC PANEL: CPT

## 2025-07-02 RX ORDER — MONTELUKAST SODIUM 10 MG/1
10 TABLET ORAL NIGHTLY
Qty: 90 TABLET | Refills: 1 | Status: SHIPPED | OUTPATIENT
Start: 2025-07-02

## 2025-07-04 ENCOUNTER — PATIENT MESSAGE (OUTPATIENT)
Facility: CLINIC | Age: 55
End: 2025-07-04

## 2025-07-04 DIAGNOSIS — Z13.29 SCREENING FOR THYROID DISORDER: Primary | ICD-10-CM

## 2025-07-22 ENCOUNTER — OFFICE VISIT (OUTPATIENT)
Facility: CLINIC | Age: 55
End: 2025-07-22
Payer: COMMERCIAL

## 2025-07-22 VITALS
DIASTOLIC BLOOD PRESSURE: 78 MMHG | WEIGHT: 181 LBS | HEIGHT: 62 IN | HEART RATE: 74 BPM | TEMPERATURE: 97.8 F | BODY MASS INDEX: 33.31 KG/M2 | OXYGEN SATURATION: 95 % | RESPIRATION RATE: 18 BRPM | SYSTOLIC BLOOD PRESSURE: 113 MMHG

## 2025-07-22 DIAGNOSIS — M54.50 ACUTE LEFT-SIDED LOW BACK PAIN WITHOUT SCIATICA: Primary | ICD-10-CM

## 2025-07-22 DIAGNOSIS — E83.52 SERUM CALCIUM ELEVATED: ICD-10-CM

## 2025-07-22 DIAGNOSIS — R79.89 ELEVATED TSH: ICD-10-CM

## 2025-07-22 PROCEDURE — G2211 COMPLEX E/M VISIT ADD ON: HCPCS | Performed by: STUDENT IN AN ORGANIZED HEALTH CARE EDUCATION/TRAINING PROGRAM

## 2025-07-22 PROCEDURE — 99214 OFFICE O/P EST MOD 30 MIN: CPT | Performed by: STUDENT IN AN ORGANIZED HEALTH CARE EDUCATION/TRAINING PROGRAM

## 2025-07-22 RX ORDER — CYCLOBENZAPRINE HCL 5 MG
5 TABLET ORAL 2 TIMES DAILY PRN
Qty: 30 TABLET | Refills: 1 | Status: SHIPPED | OUTPATIENT
Start: 2025-07-22 | End: 2025-08-21

## 2025-07-22 RX ORDER — METHYLPREDNISOLONE 4 MG/1
TABLET ORAL
Qty: 1 KIT | Refills: 0 | Status: SHIPPED | OUTPATIENT
Start: 2025-07-22 | End: 2025-07-28

## 2025-07-22 SDOH — ECONOMIC STABILITY: FOOD INSECURITY: WITHIN THE PAST 12 MONTHS, YOU WORRIED THAT YOUR FOOD WOULD RUN OUT BEFORE YOU GOT MONEY TO BUY MORE.: NEVER TRUE

## 2025-07-22 SDOH — ECONOMIC STABILITY: FOOD INSECURITY: WITHIN THE PAST 12 MONTHS, THE FOOD YOU BOUGHT JUST DIDN'T LAST AND YOU DIDN'T HAVE MONEY TO GET MORE.: NEVER TRUE

## 2025-07-22 ASSESSMENT — PATIENT HEALTH QUESTIONNAIRE - PHQ9
SUM OF ALL RESPONSES TO PHQ QUESTIONS 1-9: 0
1. LITTLE INTEREST OR PLEASURE IN DOING THINGS: NOT AT ALL
2. FEELING DOWN, DEPRESSED OR HOPELESS: NOT AT ALL
SUM OF ALL RESPONSES TO PHQ QUESTIONS 1-9: 0

## 2025-07-22 ASSESSMENT — ENCOUNTER SYMPTOMS
ABDOMINAL PAIN: 0
NAUSEA: 0
COUGH: 0
BACK PAIN: 1
WHEEZING: 0
RHINORRHEA: 0
CHEST TIGHTNESS: 0
BLOOD IN STOOL: 0
VOMITING: 0
SHORTNESS OF BREATH: 1
DIARRHEA: 0

## 2025-07-22 NOTE — PROGRESS NOTES
Have you been to the ER, urgent care clinic since your last visit?  Hospitalized since your last visit?   NO    Have you seen or consulted any other health care providers outside our system since your last visit?   NO            
sounds.   Musculoskeletal:         General: Normal range of motion.      Comments: Straight leg test negative bilaterally   Skin:     General: Skin is warm and dry.   Neurological:      General: No focal deficit present.      Mental Status: She is alert and oriented to person, place, and time.   Psychiatric:         Mood and Affect: Mood normal.         Behavior: Behavior normal.          Hospital Outpatient Visit on 07/02/2025   Component Date Value Ref Range Status    Sodium 07/02/2025 144  136 - 145 mmol/L Final    Potassium 07/02/2025 4.4  3.5 - 5.5 mmol/L Final    Chloride 07/02/2025 106  98 - 107 mmol/L Final    CO2 07/02/2025 28  21 - 32 mmol/L Final    Anion Gap 07/02/2025 11  3.0 - 18.0 mmol/L Final    Glucose 07/02/2025 95  74 - 108 mg/dL Final    Comment: <70 mg/dL Consistent with, but not fully diagnostic of hypoglycemia.  100 - 125 mg/dL Impaired fasting glucose/consistent with pre-diabetes mellitus.  > 126 mg/dl Fasting glucose consistent with overt diabetes mellitus      BUN 07/02/2025 13  6 - 23 MG/DL Final    Creatinine 07/02/2025 0.97  0.6 - 1.3 MG/DL Final    BUN/Creatinine Ratio 07/02/2025 13  12 - 20   Final    Est, Glom Filt Rate 07/02/2025 68  >60 ml/min/1.73m2 Final    Comment:    Pediatric calculator link: https://www.kidney.org/professionals/kdoqi/gfr_calculatorped     These results are not intended for use in patients <18 years of age.     eGFR results are calculated without a race factor using  the 2021 CKD-EPI equation. Careful clinical correlation is recommended, particularly when comparing to results calculated using previous equations.  The CKD-EPI equation is less accurate in patients with extremes of muscle mass, extra-renal metabolism of creatinine, excessive creatine ingestion, or following therapy that affects renal tubular secretion.      Calcium 07/02/2025 10.3 (H)  8.5 - 10.1 MG/DL Final    Total Bilirubin 07/02/2025 0.4  0.2 - 1.0 MG/DL Final    ALT 07/02/2025 35  10 - 35 U/L

## 2025-08-05 ENCOUNTER — TELEPHONE (OUTPATIENT)
Facility: CLINIC | Age: 55
End: 2025-08-05

## 2025-08-11 ENCOUNTER — OFFICE VISIT (OUTPATIENT)
Age: 55
End: 2025-08-11
Payer: COMMERCIAL

## 2025-08-11 VITALS
WEIGHT: 182 LBS | BODY MASS INDEX: 33.49 KG/M2 | HEIGHT: 62 IN | HEART RATE: 77 BPM | OXYGEN SATURATION: 97 % | SYSTOLIC BLOOD PRESSURE: 110 MMHG | RESPIRATION RATE: 16 BRPM | DIASTOLIC BLOOD PRESSURE: 78 MMHG | TEMPERATURE: 97.4 F

## 2025-08-11 VITALS
DIASTOLIC BLOOD PRESSURE: 72 MMHG | HEART RATE: 67 BPM | OXYGEN SATURATION: 98 % | BODY MASS INDEX: 33.49 KG/M2 | SYSTOLIC BLOOD PRESSURE: 118 MMHG | HEIGHT: 62 IN | WEIGHT: 182 LBS

## 2025-08-11 DIAGNOSIS — I44.4 LAFB (LEFT ANTERIOR FASCICULAR BLOCK): ICD-10-CM

## 2025-08-11 DIAGNOSIS — J45.30 MILD PERSISTENT EXTRINSIC ASTHMA WITHOUT COMPLICATION: Primary | ICD-10-CM

## 2025-08-11 DIAGNOSIS — R94.31 ABNORMAL EKG: ICD-10-CM

## 2025-08-11 DIAGNOSIS — I45.10 INCOMPLETE RIGHT BUNDLE BRANCH BLOCK: Primary | ICD-10-CM

## 2025-08-11 DIAGNOSIS — E78.2 MIXED HYPERLIPIDEMIA: ICD-10-CM

## 2025-08-11 PROCEDURE — 99203 OFFICE O/P NEW LOW 30 MIN: CPT | Performed by: INTERNAL MEDICINE

## 2025-08-11 PROCEDURE — 93000 ELECTROCARDIOGRAM COMPLETE: CPT | Performed by: INTERNAL MEDICINE

## 2025-08-11 PROCEDURE — 99214 OFFICE O/P EST MOD 30 MIN: CPT | Performed by: HOSPITALIST

## 2025-08-11 RX ORDER — ESTRADIOL 0.5 MG/1
0.5 TABLET ORAL DAILY
COMMUNITY
Start: 2025-07-02 | End: 2025-09-30

## 2025-08-11 RX ORDER — PROGESTERONE 100 MG/1
100 CAPSULE ORAL DAILY
COMMUNITY
Start: 2025-07-02

## 2025-08-11 ASSESSMENT — ENCOUNTER SYMPTOMS
COUGH: 0
CONSTIPATION: 0
SHORTNESS OF BREATH: 0
ABDOMINAL PAIN: 0
DIARRHEA: 0
VOMITING: 0
BLOOD IN STOOL: 0
NAUSEA: 0
TROUBLE SWALLOWING: 0
WHEEZING: 1
BACK PAIN: 1

## 2025-08-11 ASSESSMENT — PATIENT HEALTH QUESTIONNAIRE - PHQ9
SUM OF ALL RESPONSES TO PHQ QUESTIONS 1-9: 0
1. LITTLE INTEREST OR PLEASURE IN DOING THINGS: NOT AT ALL
SUM OF ALL RESPONSES TO PHQ QUESTIONS 1-9: 0
2. FEELING DOWN, DEPRESSED OR HOPELESS: NOT AT ALL
SUM OF ALL RESPONSES TO PHQ QUESTIONS 1-9: 0
SUM OF ALL RESPONSES TO PHQ QUESTIONS 1-9: 0

## (undated) DEVICE — ENDOSCOPY PUMP TUBING/ CAP SET: Brand: ERBE

## (undated) DEVICE — FLEX ADVANTAGE 3000CC: Brand: FLEX ADVANTAGE

## (undated) DEVICE — Device

## (undated) DEVICE — 3M™ TEGADERM™ TRANSPARENT FILM DRESSING FRAME STYLE, 1626W, 4 IN X 4-3/4 IN (10 CM X 12 CM), 50/CT 4CT/CASE: Brand: 3M™ TEGADERM™

## (undated) DEVICE — SUTURE VCRL SZ 3-0 L27IN ABSRB UD L26MM SH 1/2 CIR J416H

## (undated) DEVICE — MEDI-VAC NON-CONDUCTIVE SUCTION TUBING: Brand: CARDINAL HEALTH

## (undated) DEVICE — INSULATED BLADE ELECTRODE: Brand: EDGE

## (undated) DEVICE — APPLIER CLP L9.38IN M LIG TI DISP STR RNG HNDL LIGACLP

## (undated) DEVICE — BLANKET WRM AD W50XL85.8IN PACU FULL BODY FORC AIR

## (undated) DEVICE — BASIN EMESIS 500CC ROSE 250/CS 60/PLT: Brand: MEDEGEN MEDICAL PRODUCTS, LLC

## (undated) DEVICE — PREP CHLORAPREP 10.5 ML ORG --

## (undated) DEVICE — SCREW EXT FIX L14MM FOR DISTRCTN

## (undated) DEVICE — INTENDED FOR TISSUE SEPARATION, AND OTHER PROCEDURES THAT REQUIRE A SHARP SURGICAL BLADE TO PUNCTURE OR CUT.: Brand: BARD-PARKER SAFETY BLADES SIZE 10, STERILE

## (undated) DEVICE — CATHETER SUCT TR FL TIP 14FR W/ O CTRL

## (undated) DEVICE — SSC BONE WAX: Brand: SSC BONE WAX

## (undated) DEVICE — STOCKING COMPR L L16-18IN LNG 19MMHG ANK 9-10IN CALF

## (undated) DEVICE — SOFT SILICONE HYDROCELLULAR SACRUM DRESSING WITH LOCK AWAY LAYER: Brand: ALLEVYN LIFE SACRUM (LARGE) PACK OF 10

## (undated) DEVICE — SOLUTION IRRIG 1000ML H2O STRL BLT

## (undated) DEVICE — APPLICATOR BNDG 1MM ADH PREMIERPRO EXOFIN

## (undated) DEVICE — SYR 50ML SLIP TIP NSAF LF STRL --

## (undated) DEVICE — KIT CLN UP BON SECOURS MARYV

## (undated) DEVICE — DRAIN SURG W7MMXL20CM SIL FULL PERF HUBLESS FLAT RADPQ STRP

## (undated) DEVICE — SYRINGE MED 3ML NDL 22GA L1 1/2IN REG BVL SFGLDE

## (undated) DEVICE — 10FR FRAZIER SUCTION HANDLE: Brand: CARDINAL HEALTH

## (undated) DEVICE — COLLAR CERV L H3.25X23IN M DENS FOAM COT STOCK CVR LO

## (undated) DEVICE — STERILE POLYISOPRENE POWDER-FREE SURGICAL GLOVES: Brand: PROTEXIS

## (undated) DEVICE — REM POLYHESIVE ADULT PATIENT RETURN ELECTRODE: Brand: VALLEYLAB

## (undated) DEVICE — SYRINGE MED 25GA 3ML L5/8IN SUBQ PLAS W/ DETACH NDL SFTY

## (undated) DEVICE — OPTIFOAM GENTLE SA, POSTOP, 4X8: Brand: MEDLINE

## (undated) DEVICE — MEDI-VAC SUCTION HIGH CAPACITY: Brand: CARDINAL HEALTH

## (undated) DEVICE — GARMENT,MEDLINE,DVT,SEQUENTIAL,CALF,MD: Brand: MEDLINE

## (undated) DEVICE — 3.0MM PRECISION NEURO (MATCH HEAD)

## (undated) DEVICE — AIRLIFE™ NASAL OXYGEN CANNULA CURVED, FLARED TIP WITH 14 FOOT (4.3 M) CRUSH-RESISTANT TUBING, OVER-THE-EAR STYLE: Brand: AIRLIFE™

## (undated) DEVICE — BITE BLOCK ENDOSCP UNIV AD 6 TO 9.4 MM

## (undated) DEVICE — GAUZE,SPONGE,4"X4",16PLY,STRL,LF,10/TRAY: Brand: MEDLINE

## (undated) DEVICE — GOWN,REINFORCED,POLY,AURORA,XLARGE,STRL: Brand: MEDLINE

## (undated) DEVICE — SPONGE DISSECT PNUT SM 3/8IN -- 5/PK

## (undated) DEVICE — FLUFF AND POLYMER UNDERPAD,EXTRA HEAVY: Brand: WINGS

## (undated) DEVICE — PACKING 8004000 NEURAY 200PK 13X13MM: Brand: NEURAY ®